# Patient Record
Sex: FEMALE | Race: ASIAN | NOT HISPANIC OR LATINO
[De-identification: names, ages, dates, MRNs, and addresses within clinical notes are randomized per-mention and may not be internally consistent; named-entity substitution may affect disease eponyms.]

---

## 2019-08-07 ENCOUNTER — APPOINTMENT (OUTPATIENT)
Dept: UROLOGY | Facility: CLINIC | Age: 74
End: 2019-08-07

## 2019-08-16 ENCOUNTER — APPOINTMENT (OUTPATIENT)
Dept: UROLOGY | Facility: CLINIC | Age: 74
End: 2019-08-16
Payer: MEDICARE

## 2019-08-16 VITALS
DIASTOLIC BLOOD PRESSURE: 77 MMHG | HEIGHT: 60 IN | WEIGHT: 125 LBS | RESPIRATION RATE: 17 BRPM | BODY MASS INDEX: 24.54 KG/M2 | SYSTOLIC BLOOD PRESSURE: 151 MMHG | HEART RATE: 97 BPM | TEMPERATURE: 98.2 F

## 2019-08-16 PROCEDURE — 99204 OFFICE O/P NEW MOD 45 MIN: CPT

## 2019-08-16 RX ORDER — METHOTREXATE 2.5 MG/1
2.5 TABLET ORAL
Qty: 78 | Refills: 0 | Status: ACTIVE | COMMUNITY
Start: 2019-07-26

## 2019-08-16 RX ORDER — CHROMIUM 200 MCG
TABLET ORAL
Refills: 0 | Status: ACTIVE | COMMUNITY

## 2019-08-16 RX ORDER — MELOXICAM 15 MG/1
15 TABLET ORAL
Qty: 90 | Refills: 0 | Status: ACTIVE | COMMUNITY
Start: 2019-07-26

## 2019-08-16 NOTE — HISTORY OF PRESENT ILLNESS
[FreeTextEntry1] : 74F presents for evaluation of left 3.5 cm renal mass found on imaging done due to ongoing cough and right flank pain. No gross hematuria, weight loss, loss of appetite, shortness of breath. No chemical or smoking exposures. No family history. \par \par PMH: rheumatism\par Meds: methotrexate, mobic, folic acid\par PSH: appendectomy (1969)\par Allergies: none \par Social: non-smoker, dry-cleaning business\par Family Hx: none

## 2019-08-16 NOTE — PHYSICAL EXAM
[General Appearance - Well Developed] : well developed [Normal Appearance] : normal appearance [General Appearance - Well Nourished] : well nourished [] : no respiratory distress [Respiration, Rhythm And Depth] : normal respiratory rhythm and effort [Exaggerated Use Of Accessory Muscles For Inspiration] : no accessory muscle use [Abdomen Soft] : soft [Abdomen Tenderness] : non-tender [Costovertebral Angle Tenderness] : no ~M costovertebral angle tenderness [Skin Color & Pigmentation] : normal skin color and pigmentation [Skin Turgor] : supple

## 2019-08-16 NOTE — ASSESSMENT
[FreeTextEntry1] : 74F presents for evaluation of left 3.5 cm renal mass\par -Discussed risks and benefits of active surveillance, biopsy versus partial nephrectomy \par -Opting for biopsy to guide decisions regarding surgery \par -CBC/PT/INR

## 2019-08-16 NOTE — REVIEW OF SYSTEMS
[Feeling Tired] : feeling tired [Dry Eyes] : dryness of the eyes [Abdominal Pain] : abdominal pain [see HPI] : see HPI [Painful [place]] : painful [place] [Loss of interest] : loss of interest in sexual activity [Date of last menstrual period ____] : date of last menstrual period: [unfilled] [Presently in menopause ___] : presently in menopause [unfilled] [Urine Infection (bladder/kidney)] : bladder/kidney infection [Pain during urination] : pain during urination [Blood in urine that you can see] : blood visible in urine [Slow urine stream] : slow urine stream [Itching] : itching [Easy Bruising] : a tendency for easy bruising [Easy Bleeding] : a tendency for easy bleeding [Negative] : Psychiatric

## 2019-08-19 ENCOUNTER — MESSAGE (OUTPATIENT)
Age: 74
End: 2019-08-19

## 2019-08-20 ENCOUNTER — APPOINTMENT (OUTPATIENT)
Dept: UROLOGY | Facility: CLINIC | Age: 74
End: 2019-08-20
Payer: MEDICARE

## 2019-08-20 ENCOUNTER — APPOINTMENT (OUTPATIENT)
Dept: UROLOGY | Facility: CLINIC | Age: 74
End: 2019-08-20

## 2019-08-20 ENCOUNTER — OUTPATIENT (OUTPATIENT)
Dept: OUTPATIENT SERVICES | Facility: HOSPITAL | Age: 74
LOS: 1 days | End: 2019-08-20
Payer: MEDICARE

## 2019-08-20 VITALS
DIASTOLIC BLOOD PRESSURE: 77 MMHG | HEART RATE: 89 BPM | SYSTOLIC BLOOD PRESSURE: 149 MMHG | WEIGHT: 125 LBS | TEMPERATURE: 98.5 F | BODY MASS INDEX: 24.54 KG/M2 | HEIGHT: 60 IN

## 2019-08-20 DIAGNOSIS — N28.89 OTHER SPECIFIED DISORDERS OF KIDNEY AND URETER: ICD-10-CM

## 2019-08-20 DIAGNOSIS — R35.0 FREQUENCY OF MICTURITION: ICD-10-CM

## 2019-08-20 PROCEDURE — 76775 US EXAM ABDO BACK WALL LIM: CPT | Mod: 26

## 2019-08-20 PROCEDURE — 50200 RENAL BIOPSY PERQ: CPT

## 2019-08-20 PROCEDURE — 76775 US EXAM ABDO BACK WALL LIM: CPT

## 2019-08-20 NOTE — PROCEDURE
[Kidney Biopsy] : kidney biopsy was performed [Consent Obtained] : written consent was obtained prior to the procedure and is detailed in the patient's record [Patient] : the patient [Left Kidney] : left kidney [Betadine] : with betadine solution [18 gauge 1 inch] : An 18 gauge 1 inch needle was used [Tolerated Well] : the patient tolerated the procedure well

## 2019-08-22 ENCOUNTER — CLINICAL ADVICE (OUTPATIENT)
Age: 74
End: 2019-08-22

## 2019-08-22 LAB — CORE LAB BIOPSY: NORMAL

## 2019-08-23 DIAGNOSIS — N28.89 OTHER SPECIFIED DISORDERS OF KIDNEY AND URETER: ICD-10-CM

## 2019-09-04 ENCOUNTER — OUTPATIENT (OUTPATIENT)
Dept: OUTPATIENT SERVICES | Facility: HOSPITAL | Age: 74
LOS: 1 days | End: 2019-09-04
Payer: MEDICARE

## 2019-09-04 VITALS
RESPIRATION RATE: 14 BRPM | SYSTOLIC BLOOD PRESSURE: 110 MMHG | TEMPERATURE: 98 F | HEIGHT: 60 IN | WEIGHT: 126.99 LBS | OXYGEN SATURATION: 98 % | DIASTOLIC BLOOD PRESSURE: 70 MMHG | HEART RATE: 73 BPM

## 2019-09-04 DIAGNOSIS — N28.89 OTHER SPECIFIED DISORDERS OF KIDNEY AND URETER: ICD-10-CM

## 2019-09-04 DIAGNOSIS — Z90.49 ACQUIRED ABSENCE OF OTHER SPECIFIED PARTS OF DIGESTIVE TRACT: Chronic | ICD-10-CM

## 2019-09-04 LAB
ANION GAP SERPL CALC-SCNC: 13 MMO/L — SIGNIFICANT CHANGE UP (ref 7–14)
APPEARANCE UR: CLEAR — SIGNIFICANT CHANGE UP
BASOPHILS # BLD AUTO: 0.05 K/UL — SIGNIFICANT CHANGE UP (ref 0–0.2)
BASOPHILS NFR BLD AUTO: 1.1 % — SIGNIFICANT CHANGE UP (ref 0–2)
BILIRUB UR-MCNC: NEGATIVE — SIGNIFICANT CHANGE UP
BLOOD UR QL VISUAL: NEGATIVE — SIGNIFICANT CHANGE UP
BUN SERPL-MCNC: 17 MG/DL — SIGNIFICANT CHANGE UP (ref 7–23)
CALCIUM SERPL-MCNC: 10.1 MG/DL — SIGNIFICANT CHANGE UP (ref 8.4–10.5)
CHLORIDE SERPL-SCNC: 107 MMOL/L — SIGNIFICANT CHANGE UP (ref 98–107)
CO2 SERPL-SCNC: 23 MMOL/L — SIGNIFICANT CHANGE UP (ref 22–31)
COLOR SPEC: SIGNIFICANT CHANGE UP
CREAT SERPL-MCNC: 0.66 MG/DL — SIGNIFICANT CHANGE UP (ref 0.5–1.3)
EOSINOPHIL # BLD AUTO: 0.09 K/UL — SIGNIFICANT CHANGE UP (ref 0–0.5)
EOSINOPHIL NFR BLD AUTO: 2 % — SIGNIFICANT CHANGE UP (ref 0–6)
GLUCOSE SERPL-MCNC: 82 MG/DL — SIGNIFICANT CHANGE UP (ref 70–99)
GLUCOSE UR-MCNC: NEGATIVE — SIGNIFICANT CHANGE UP
HCT VFR BLD CALC: 34.4 % — LOW (ref 34.5–45)
HGB BLD-MCNC: 11.2 G/DL — LOW (ref 11.5–15.5)
IMM GRANULOCYTES NFR BLD AUTO: 0.2 % — SIGNIFICANT CHANGE UP (ref 0–1.5)
KETONES UR-MCNC: NEGATIVE — SIGNIFICANT CHANGE UP
LEUKOCYTE ESTERASE UR-ACNC: NEGATIVE — SIGNIFICANT CHANGE UP
LYMPHOCYTES # BLD AUTO: 1.57 K/UL — SIGNIFICANT CHANGE UP (ref 1–3.3)
LYMPHOCYTES # BLD AUTO: 35.5 % — SIGNIFICANT CHANGE UP (ref 13–44)
MCHC RBC-ENTMCNC: 31.9 PG — SIGNIFICANT CHANGE UP (ref 27–34)
MCHC RBC-ENTMCNC: 32.6 % — SIGNIFICANT CHANGE UP (ref 32–36)
MCV RBC AUTO: 98 FL — SIGNIFICANT CHANGE UP (ref 80–100)
MONOCYTES # BLD AUTO: 0.5 K/UL — SIGNIFICANT CHANGE UP (ref 0–0.9)
MONOCYTES NFR BLD AUTO: 11.3 % — SIGNIFICANT CHANGE UP (ref 2–14)
NEUTROPHILS # BLD AUTO: 2.2 K/UL — SIGNIFICANT CHANGE UP (ref 1.8–7.4)
NEUTROPHILS NFR BLD AUTO: 49.9 % — SIGNIFICANT CHANGE UP (ref 43–77)
NITRITE UR-MCNC: NEGATIVE — SIGNIFICANT CHANGE UP
NRBC # FLD: 0 K/UL — SIGNIFICANT CHANGE UP (ref 0–0)
PH UR: 8 — SIGNIFICANT CHANGE UP (ref 5–8)
PLATELET # BLD AUTO: 187 K/UL — SIGNIFICANT CHANGE UP (ref 150–400)
PMV BLD: 11.4 FL — SIGNIFICANT CHANGE UP (ref 7–13)
POTASSIUM SERPL-MCNC: 4.2 MMOL/L — SIGNIFICANT CHANGE UP (ref 3.5–5.3)
POTASSIUM SERPL-SCNC: 4.2 MMOL/L — SIGNIFICANT CHANGE UP (ref 3.5–5.3)
PROT UR-MCNC: NEGATIVE — SIGNIFICANT CHANGE UP
RBC # BLD: 3.51 M/UL — LOW (ref 3.8–5.2)
RBC # FLD: 14.5 % — SIGNIFICANT CHANGE UP (ref 10.3–14.5)
SODIUM SERPL-SCNC: 143 MMOL/L — SIGNIFICANT CHANGE UP (ref 135–145)
SP GR SPEC: 1.01 — SIGNIFICANT CHANGE UP (ref 1–1.04)
UROBILINOGEN FLD QL: NORMAL — SIGNIFICANT CHANGE UP
WBC # BLD: 4.42 K/UL — SIGNIFICANT CHANGE UP (ref 3.8–10.5)
WBC # FLD AUTO: 4.42 K/UL — SIGNIFICANT CHANGE UP (ref 3.8–10.5)

## 2019-09-04 PROCEDURE — 93010 ELECTROCARDIOGRAM REPORT: CPT

## 2019-09-04 RX ORDER — METHOTREXATE 2.5 MG/1
6 TABLET ORAL
Qty: 0 | Refills: 0 | DISCHARGE

## 2019-09-04 RX ORDER — SODIUM CHLORIDE 9 MG/ML
1000 INJECTION, SOLUTION INTRAVENOUS
Refills: 0 | Status: DISCONTINUED | OUTPATIENT
Start: 2019-09-16 | End: 2019-09-16

## 2019-09-04 NOTE — H&P PST ADULT - MUSCULOSKELETAL
details… detailed exam normal strength/no joint warmth/no joint swelling/ROM intact/no joint erythema/no calf tenderness

## 2019-09-04 NOTE — H&P PST ADULT - NSICDXPROBLEM_GEN_ALL_CORE_FT
PROBLEM DIAGNOSES  Problem: Other specified disorders of kidney and ureter  Assessment and Plan: Left laparoscopic partial nephrectomy .possible open ,possible radical   Medical clearance as per Dr Bright

## 2019-09-04 NOTE — H&P PST ADULT - RS GEN PE MLT RESP DETAILS PC
breath sounds equal/respirations non-labored/good air movement/no rales/no rhonchi/no wheezes/clear to auscultation bilaterally/airway patent

## 2019-09-04 NOTE — H&P PST ADULT - HISTORY OF PRESENT ILLNESS
This is a 74 y.o .female with incidental finding of other specified disorders of kidney and ureter. Pt had MRI of abdomen , referred to Dr Bright , evaluated . Pt offers no complaints , now for surgery .

## 2019-09-05 LAB — SPECIMEN SOURCE: SIGNIFICANT CHANGE UP

## 2019-09-06 LAB — BACTERIA UR CULT: SIGNIFICANT CHANGE UP

## 2019-09-09 ENCOUNTER — APPOINTMENT (OUTPATIENT)
Dept: UROLOGY | Facility: CLINIC | Age: 74
End: 2019-09-09

## 2019-09-10 ENCOUNTER — APPOINTMENT (OUTPATIENT)
Dept: UROLOGY | Facility: CLINIC | Age: 74
End: 2019-09-10
Payer: MEDICARE

## 2019-09-10 PROCEDURE — 99214 OFFICE O/P EST MOD 30 MIN: CPT

## 2019-09-10 RX ORDER — DIAZEPAM 5 MG/1
5 TABLET ORAL
Qty: 1 | Refills: 0 | Status: COMPLETED | COMMUNITY
Start: 2019-08-18 | End: 2019-09-10

## 2019-09-10 NOTE — PHYSICAL EXAM
[General Appearance - Well Developed] : well developed [General Appearance - Well Nourished] : well nourished [Bowel Sounds] : normal bowel sounds [Abdomen Soft] : soft [Skin Color & Pigmentation] : normal skin color and pigmentation [Skin Turgor] : supple [Heart Rate And Rhythm] : Heart rate and rhythm were normal [] : no respiratory distress [Oriented To Time, Place, And Person] : oriented to person, place, and time [Normal Station and Gait] : the gait and station were normal for the patient's age [No Focal Deficits] : no focal deficits

## 2019-09-10 NOTE — ASSESSMENT
[FreeTextEntry1] : We discussed the surgery in detail . We discussed risks including converting to an open procedure , injury to adjacent major organs , nerves , muscles . We discussed the possibility of converting to an open procedure and risk of blood transfusion . We discussed the clear liquid diet and bowel prep .\par they wish to proceed

## 2019-09-10 NOTE — HISTORY OF PRESENT ILLNESS
[FreeTextEntry1] : Left renal mass . History of cough . Chest xray done . Abdominal ultrasound done revealing a 3.5 cm renal mass . Biopsy done positive for RCC FG 2 Here to discuss surgery  [None] : no symptoms

## 2019-09-15 ENCOUNTER — TRANSCRIPTION ENCOUNTER (OUTPATIENT)
Age: 74
End: 2019-09-15

## 2019-09-16 ENCOUNTER — RESULT REVIEW (OUTPATIENT)
Age: 74
End: 2019-09-16

## 2019-09-16 ENCOUNTER — APPOINTMENT (OUTPATIENT)
Dept: UROLOGY | Facility: HOSPITAL | Age: 74
End: 2019-09-16

## 2019-09-16 ENCOUNTER — INPATIENT (INPATIENT)
Facility: HOSPITAL | Age: 74
LOS: 1 days | Discharge: HOME | End: 2019-09-18
Attending: UROLOGY | Admitting: UROLOGY
Payer: MEDICARE

## 2019-09-16 VITALS
HEART RATE: 70 BPM | TEMPERATURE: 98 F | WEIGHT: 126.99 LBS | SYSTOLIC BLOOD PRESSURE: 158 MMHG | DIASTOLIC BLOOD PRESSURE: 64 MMHG | HEIGHT: 60 IN | OXYGEN SATURATION: 99 % | RESPIRATION RATE: 16 BRPM

## 2019-09-16 DIAGNOSIS — Z90.49 ACQUIRED ABSENCE OF OTHER SPECIFIED PARTS OF DIGESTIVE TRACT: Chronic | ICD-10-CM

## 2019-09-16 DIAGNOSIS — R11.2 NAUSEA WITH VOMITING, UNSPECIFIED: ICD-10-CM

## 2019-09-16 DIAGNOSIS — M06.9 RHEUMATOID ARTHRITIS, UNSPECIFIED: ICD-10-CM

## 2019-09-16 DIAGNOSIS — D64.9 ANEMIA, UNSPECIFIED: ICD-10-CM

## 2019-09-16 DIAGNOSIS — N28.89 OTHER SPECIFIED DISORDERS OF KIDNEY AND URETER: ICD-10-CM

## 2019-09-16 LAB
BLD GP AB SCN SERPL QL: NEGATIVE — SIGNIFICANT CHANGE UP
RH IG SCN BLD-IMP: POSITIVE — SIGNIFICANT CHANGE UP
RH IG SCN BLD-IMP: POSITIVE — SIGNIFICANT CHANGE UP

## 2019-09-16 PROCEDURE — 88307 TISSUE EXAM BY PATHOLOGIST: CPT | Mod: 26

## 2019-09-16 PROCEDURE — 99223 1ST HOSP IP/OBS HIGH 75: CPT

## 2019-09-16 PROCEDURE — 88312 SPECIAL STAINS GROUP 1: CPT | Mod: 26

## 2019-09-16 RX ORDER — ONDANSETRON 8 MG/1
4 TABLET, FILM COATED ORAL ONCE
Refills: 0 | Status: DISCONTINUED | OUTPATIENT
Start: 2019-09-16 | End: 2019-09-16

## 2019-09-16 RX ORDER — ACETAMINOPHEN 500 MG
650 TABLET ORAL EVERY 6 HOURS
Refills: 0 | Status: DISCONTINUED | OUTPATIENT
Start: 2019-09-16 | End: 2019-09-18

## 2019-09-16 RX ORDER — OXYCODONE AND ACETAMINOPHEN 5; 325 MG/1; MG/1
2 TABLET ORAL EVERY 6 HOURS
Refills: 0 | Status: DISCONTINUED | OUTPATIENT
Start: 2019-09-16 | End: 2019-09-18

## 2019-09-16 RX ORDER — FOLIC ACID 0.8 MG
1 TABLET ORAL DAILY
Refills: 0 | Status: DISCONTINUED | OUTPATIENT
Start: 2019-09-16 | End: 2019-09-18

## 2019-09-16 RX ORDER — OXYCODONE HYDROCHLORIDE 5 MG/1
5 TABLET ORAL EVERY 6 HOURS
Refills: 0 | Status: DISCONTINUED | OUTPATIENT
Start: 2019-09-16 | End: 2019-09-18

## 2019-09-16 RX ORDER — SODIUM CHLORIDE 9 MG/ML
1000 INJECTION, SOLUTION INTRAVENOUS
Refills: 0 | Status: DISCONTINUED | OUTPATIENT
Start: 2019-09-16 | End: 2019-09-17

## 2019-09-16 RX ORDER — KETOROLAC TROMETHAMINE 30 MG/ML
15 SYRINGE (ML) INJECTION EVERY 6 HOURS
Refills: 0 | Status: DISCONTINUED | OUTPATIENT
Start: 2019-09-16 | End: 2019-09-18

## 2019-09-16 RX ORDER — SENNA PLUS 8.6 MG/1
2 TABLET ORAL AT BEDTIME
Refills: 0 | Status: DISCONTINUED | OUTPATIENT
Start: 2019-09-16 | End: 2019-09-18

## 2019-09-16 RX ORDER — HEPARIN SODIUM 5000 [USP'U]/ML
5000 INJECTION INTRAVENOUS; SUBCUTANEOUS EVERY 8 HOURS
Refills: 0 | Status: DISCONTINUED | OUTPATIENT
Start: 2019-09-16 | End: 2019-09-18

## 2019-09-16 RX ORDER — HYDROMORPHONE HYDROCHLORIDE 2 MG/ML
0.5 INJECTION INTRAMUSCULAR; INTRAVENOUS; SUBCUTANEOUS
Refills: 0 | Status: DISCONTINUED | OUTPATIENT
Start: 2019-09-16 | End: 2019-09-16

## 2019-09-16 RX ORDER — INFLUENZA VIRUS VACCINE 15; 15; 15; 15 UG/.5ML; UG/.5ML; UG/.5ML; UG/.5ML
0.5 SUSPENSION INTRAMUSCULAR ONCE
Refills: 0 | Status: COMPLETED | OUTPATIENT
Start: 2019-09-16 | End: 2019-09-16

## 2019-09-16 RX ORDER — DOCUSATE SODIUM 100 MG
100 CAPSULE ORAL THREE TIMES A DAY
Refills: 0 | Status: DISCONTINUED | OUTPATIENT
Start: 2019-09-16 | End: 2019-09-18

## 2019-09-16 RX ORDER — KETOROLAC TROMETHAMINE 30 MG/ML
15 SYRINGE (ML) INJECTION EVERY 6 HOURS
Refills: 0 | Status: COMPLETED | OUTPATIENT
Start: 2019-09-16 | End: 2019-09-18

## 2019-09-16 RX ADMIN — HEPARIN SODIUM 5000 UNIT(S): 5000 INJECTION INTRAVENOUS; SUBCUTANEOUS at 23:19

## 2019-09-16 RX ADMIN — HYDROMORPHONE HYDROCHLORIDE 0.5 MILLIGRAM(S): 2 INJECTION INTRAMUSCULAR; INTRAVENOUS; SUBCUTANEOUS at 13:40

## 2019-09-16 RX ADMIN — HYDROMORPHONE HYDROCHLORIDE 0.5 MILLIGRAM(S): 2 INJECTION INTRAMUSCULAR; INTRAVENOUS; SUBCUTANEOUS at 14:45

## 2019-09-16 RX ADMIN — Medication 15 MILLIGRAM(S): at 23:19

## 2019-09-16 RX ADMIN — HYDROMORPHONE HYDROCHLORIDE 0.5 MILLIGRAM(S): 2 INJECTION INTRAMUSCULAR; INTRAVENOUS; SUBCUTANEOUS at 15:00

## 2019-09-16 RX ADMIN — Medication 15 MILLIGRAM(S): at 23:20

## 2019-09-16 RX ADMIN — HYDROMORPHONE HYDROCHLORIDE 0.5 MILLIGRAM(S): 2 INJECTION INTRAMUSCULAR; INTRAVENOUS; SUBCUTANEOUS at 13:57

## 2019-09-16 RX ADMIN — Medication 15 MILLIGRAM(S): at 18:26

## 2019-09-16 RX ADMIN — SODIUM CHLORIDE 30 MILLILITER(S): 9 INJECTION, SOLUTION INTRAVENOUS at 09:10

## 2019-09-16 RX ADMIN — SODIUM CHLORIDE 125 MILLILITER(S): 9 INJECTION, SOLUTION INTRAVENOUS at 13:20

## 2019-09-16 RX ADMIN — Medication 100 MILLIGRAM(S): at 23:19

## 2019-09-16 NOTE — CONSULT NOTE ADULT - SUBJECTIVE AND OBJECTIVE BOX
Patient is a 74y old  Female who presents with a chief complaint of     HPI: 74F h/o RA on MTX (no recent steroids), was evaluated for R sided rib pain.  Incidentally found to have a L renal mass, no symptoms, no hematuria.  Pt now s/p left laparoscopic Radical nephrectomy.  Doing well postop.  In PACU had some nausea/vomiting and dizziness, now resolved.  Mild pain at surgical sites.  No chest pain, SOB. Thristy, mild headache.    Allergies:  No Known Allergies    HOME MEDICATIONS:  MTX 15 qWed  folic acid  Mobic PRN    MEDICATIONS  (STANDING):  docusate sodium 100 milliGRAM(s) Oral three times a day  folic acid 1 milliGRAM(s) Oral daily  heparin  Injectable 5000 Unit(s) SubCutaneous every 8 hours  influenza   Vaccine 0.5 milliLiter(s) IntraMuscular once  ketorolac   Injectable 15 milliGRAM(s) IV Push every 6 hours  lactated ringers. 1000 milliLiter(s) (125 mL/Hr) IV Continuous <Continuous>    MEDICATIONS  (PRN):  acetaminophen   Tablet .. 650 milliGRAM(s) Oral every 6 hours PRN Mild Pain (1 - 3)  ketorolac   Injectable 15 milliGRAM(s) IV Push every 6 hours PRN Moderate Pain (4 - 6)  oxyCODONE    5 mG/acetaminophen 325 mG 2 Tablet(s) Oral every 6 hours PRN Severe Pain (7 - 10)  oxyCODONE    IR 5 milliGRAM(s) Oral every 6 hours PRN Moderate Pain (4 - 6)  senna 2 Tablet(s) Oral at bedtime PRN Constipation    PAST MEDICAL & SURGICAL HISTORY:  Arthritis: rheumatoid  S/P appendectomy:     SOCIAL HISTORY:  , 3 children, retired from dry cleaning  no tob/alcohol/drugs    FAMILY HISTORY:  mother - doing well at 97yo  father -  young of the flu    REVIEW OF SYSTEMS:  CONSTITUTIONAL: No fever, weight loss, or fatigue  EYES: No eye pain, visual disturbances, or discharge  ENMT:  No difficulty hearing, tinnitus, vertigo; No sinus or throat pain  NECK: No pain or stiffness  BREASTS: No pain, masses, or nipple discharge  RESPIRATORY: No cough, wheezing, chills or hemoptysis; No shortness of breath  CARDIOVASCULAR: No chest pain, palpitations, dizziness, or leg swelling  GASTROINTESTINAL: No abdominal or epigastric pain. No nausea, vomiting, or hematemesis; No diarrhea or constipation. No melena or hematochezia.  GENITOURINARY: No dysuria, frequency, hematuria, or incontinence  NEUROLOGICAL: No headaches, memory loss, loss of strength, numbness, or tremors  SKIN: No itching, burning, rashes, or lesions   LYMPH NODES: No enlarged glands  ENDOCRINE: No heat or cold intolerance; No hair loss  MUSCULOSKELETAL: No muscle or back pain  PSYCHIATRIC: No depression, anxiety, mood swings, or difficulty sleeping  HEME/LYMPH: No easy bruising, or bleeding gums  ALLERGY AND IMMUNOLOGIC: No hives or eczema  [ x ] All other ROS negative  [  ] Unable to obtain due to poor mental status    Vital Signs Last 24 Hrs  T(C): 36.4 (16 Sep 2019 17:46), Max: 36.7 (16 Sep 2019 08:22)  T(F): 97.5 (16 Sep 2019 17:46), Max: 98.1 (16 Sep 2019 08:22)  HR: 56 (16 Sep 2019 17:46) (48 - 71)  BP: 168/57 (16 Sep 2019 17:46) (136/62 - 168/57)  BP(mean): 80 (16 Sep 2019 16:00) (73 - 95)  RR: 17 (16 Sep 2019 17:46) (6 - 57)  SpO2: 99% (16 Sep 2019 17:46) (90% - 100%)    PHYSICAL EXAM:  GENERAL: NAD, well-groomed, well-developed  HEAD:  Atraumatic, Normocephalic  EYES: EOMI, PERRLA, conjunctiva and sclera clear  ENMT: Moist mucous membranes  NECK: Supple, No JVD  RESPIRATORY: Clear to auscultation bilaterally; No rales, rhonchi, wheezing, or rubs  CARDIOVASCULAR: Regular rate and rhythm; No murmurs, rubs, or gallops  GASTROINTESTINAL: Soft, Nondistended; incisions intact  GENITOURINARY: +hartley  EXTREMITIES:  2+ Peripheral Pulses, No clubbing, cyanosis, or edema  NERVOUS SYSTEM:  Alert & Oriented X3; Moving all 4 extremities; No gross sensory deficits  HEME/LYMPH: No lymphadenopathy noted  SKIN: No rashes or lesions  PSYCH: calm, appropriate    LABS:    Basic Metabolic Panel (19 @ 09:15)    Sodium, Serum: 143 mmol/L    Potassium, Serum: 4.2 mmol/L    Chloride, Serum: 107 mmol/L    Carbon Dioxide, Serum: 23 mmol/L    Anion Gap, Serum: 13 mmo/L    Blood Urea Nitrogen, Serum: 17 mg/dL    Creatinine, Serum: 0.66 mg/dL    Glucose, Serum: 82 mg/dL    Calcium, Total Serum: 10.1 mg/dL    eGFR if Non : 86:     Complete Blood Count + Automated Diff (19 @ 09:15)    Nucleated RBC #: 0 K/uL    WBC Count: 4.42 K/uL    RBC Count: 3.51 M/uL    Hemoglobin: 11.2 g/dL    Hematocrit: 34.4 %    Mean Cell Volume: 98.0 fL    Mean Cell Hemoglobin: 31.9 pg    Mean Cell Hemoglobin Conc: 32.6 %    Red Cell Distrib Width: 14.5 %    Platelet Count - Automated: 187 K/uL      RADIOLOGY & ADDITIONAL STUDIES:    EKG:   Personally Reviewed:  [ ] YES     Imaging:   Personally Reviewed:  [ ] YES               Consultant(s) notes reviewed:    Care Discussed with Consultant(s)/Other Providers:  urology re overall care

## 2019-09-16 NOTE — ASU PATIENT PROFILE, ADULT - INTERPRETATION SERVICES DECLINED
Vinh Parnell to interpet for her/ declines  phone at this time/Patient/Caregiver requests family/friend to interpret.

## 2019-09-16 NOTE — CHART NOTE - NSCHARTNOTEFT_GEN_A_CORE
Post op Check: yo POD #0    Pt seen and examined without complaints. Pain is controlled. Denies SOB/CP/N/V.     Vital Signs Last 24 Hrs  T(C): 36.7 (16 Sep 2019 13:00), Max: 36.7 (16 Sep 2019 08:22)  T(F): 98 (16 Sep 2019 13:00), Max: 98.1 (16 Sep 2019 08:22)  HR: 53 (16 Sep 2019 15:15) (48 - 70)  BP: 137/54 (16 Sep 2019 15:15) (137/54 - 162/73)  BP(mean): 73 (16 Sep 2019 15:15) (73 - 95)  RR: 15 (16 Sep 2019 15:15) (6 - 57)  SpO2: 99% (16 Sep 2019 15:15) (90% - 100%)    I&O's Summary  Hans:  630 yellow  16 Sep 2019 07:01  -  16 Sep 2019 15:30  --------------------------------------------------------  IN: 475 mL / OUT: 630 mL / NET: -155 mL        Physical Exam  Gen: NAD  Pulm: No respiratory distress, clear to auscultation  CV: Reg  Abd: Lower pfannensteil steris with some strike through, soft, ND, appropriately tender  : Maxwell secured  Venodynes: In place        Plan:   IVF: LR @ 125  Diet: CLD  Labs: in AM  Abx: None  Strict I&O's  Analgesia and antiemetics as needed  DVT prophylaxis/OOB  Incentive spirometry

## 2019-09-16 NOTE — ASU PATIENT PROFILE, ADULT - LANGUAGE ASSISTANCE NEEDED
patient English and Maltese speaking/No-Patient/Caregiver offered and refused free interpretation services.

## 2019-09-16 NOTE — CONSULT NOTE ADULT - ASSESSMENT
74F h/o RA on MTX (no recent steroids), was evaluated for R sided rib pain.  Incidentally found to have a L renal mass, no symptoms, no hematuria.  Pt now s/p left laparoscopic Radical nephrectomy.

## 2019-09-17 LAB
ANION GAP SERPL CALC-SCNC: 10 MMO/L — SIGNIFICANT CHANGE UP (ref 7–14)
BUN SERPL-MCNC: 18 MG/DL — SIGNIFICANT CHANGE UP (ref 7–23)
CALCIUM SERPL-MCNC: 9.3 MG/DL — SIGNIFICANT CHANGE UP (ref 8.4–10.5)
CHLORIDE SERPL-SCNC: 107 MMOL/L — SIGNIFICANT CHANGE UP (ref 98–107)
CO2 SERPL-SCNC: 22 MMOL/L — SIGNIFICANT CHANGE UP (ref 22–31)
CREAT SERPL-MCNC: 1.01 MG/DL — SIGNIFICANT CHANGE UP (ref 0.5–1.3)
GLUCOSE SERPL-MCNC: 167 MG/DL — HIGH (ref 70–99)
HCT VFR BLD CALC: 31.9 % — LOW (ref 34.5–45)
HGB BLD-MCNC: 10.4 G/DL — LOW (ref 11.5–15.5)
MCHC RBC-ENTMCNC: 31.9 PG — SIGNIFICANT CHANGE UP (ref 27–34)
MCHC RBC-ENTMCNC: 32.6 % — SIGNIFICANT CHANGE UP (ref 32–36)
MCV RBC AUTO: 97.9 FL — SIGNIFICANT CHANGE UP (ref 80–100)
NRBC # FLD: 0 K/UL — SIGNIFICANT CHANGE UP (ref 0–0)
PLATELET # BLD AUTO: 184 K/UL — SIGNIFICANT CHANGE UP (ref 150–400)
PMV BLD: 10.8 FL — SIGNIFICANT CHANGE UP (ref 7–13)
POTASSIUM SERPL-MCNC: 4.8 MMOL/L — SIGNIFICANT CHANGE UP (ref 3.5–5.3)
POTASSIUM SERPL-SCNC: 4.8 MMOL/L — SIGNIFICANT CHANGE UP (ref 3.5–5.3)
RBC # BLD: 3.26 M/UL — LOW (ref 3.8–5.2)
RBC # FLD: 14.6 % — HIGH (ref 10.3–14.5)
SODIUM SERPL-SCNC: 139 MMOL/L — SIGNIFICANT CHANGE UP (ref 135–145)
WBC # BLD: 11.94 K/UL — HIGH (ref 3.8–10.5)
WBC # FLD AUTO: 11.94 K/UL — HIGH (ref 3.8–10.5)

## 2019-09-17 PROCEDURE — 99233 SBSQ HOSP IP/OBS HIGH 50: CPT

## 2019-09-17 RX ORDER — SODIUM CHLORIDE 9 MG/ML
1000 INJECTION, SOLUTION INTRAVENOUS
Refills: 0 | Status: DISCONTINUED | OUTPATIENT
Start: 2019-09-17 | End: 2019-09-17

## 2019-09-17 RX ADMIN — Medication 1 MILLIGRAM(S): at 14:36

## 2019-09-17 RX ADMIN — HEPARIN SODIUM 5000 UNIT(S): 5000 INJECTION INTRAVENOUS; SUBCUTANEOUS at 14:37

## 2019-09-17 RX ADMIN — Medication 15 MILLIGRAM(S): at 18:46

## 2019-09-17 RX ADMIN — Medication 100 MILLIGRAM(S): at 21:32

## 2019-09-17 RX ADMIN — SODIUM CHLORIDE 75 MILLILITER(S): 9 INJECTION, SOLUTION INTRAVENOUS at 12:13

## 2019-09-17 RX ADMIN — Medication 15 MILLIGRAM(S): at 06:07

## 2019-09-17 RX ADMIN — Medication 10 MILLIGRAM(S): at 11:14

## 2019-09-17 RX ADMIN — Medication 100 MILLIGRAM(S): at 06:07

## 2019-09-17 RX ADMIN — Medication 15 MILLIGRAM(S): at 12:13

## 2019-09-17 RX ADMIN — HEPARIN SODIUM 5000 UNIT(S): 5000 INJECTION INTRAVENOUS; SUBCUTANEOUS at 21:32

## 2019-09-17 RX ADMIN — Medication 100 MILLIGRAM(S): at 14:36

## 2019-09-17 RX ADMIN — HEPARIN SODIUM 5000 UNIT(S): 5000 INJECTION INTRAVENOUS; SUBCUTANEOUS at 06:07

## 2019-09-17 NOTE — PROGRESS NOTE ADULT - SUBJECTIVE AND OBJECTIVE BOX
ANESTHESIA POSTOP CHECK    74y Female POSTOP DAY 1    Vital Signs Last 24 Hrs  T(C): 36.6 (17 Sep 2019 09:59), Max: 36.7 (16 Sep 2019 13:00)  T(F): 97.8 (17 Sep 2019 09:59), Max: 98.1 (17 Sep 2019 06:01)  HR: 78 (17 Sep 2019 09:59) (48 - 88)  BP: 120/54 (17 Sep 2019 09:59) (107/56 - 168/57)  BP(mean): 80 (16 Sep 2019 16:00) (73 - 95)  RR: 16 (17 Sep 2019 09:59) (6 - 57)  SpO2: 100% (17 Sep 2019 09:59) (90% - 100%)  I&O's Summary    16 Sep 2019 07:01  -  17 Sep 2019 07:00  --------------------------------------------------------  IN: 1850 mL / OUT: 2915 mL / NET: -1065 mL        [X ] NO APPARENT ANESTHESIA COMPLICATIONS

## 2019-09-17 NOTE — PROGRESS NOTE ADULT - PROBLEM SELECTOR PLAN 2
hold methotrexate for now  hold Mobic periop  Tylenol PRN. d/w Dr. GEORGE Domínguez, pt's rheumatologist [phone] - pt can resume MTX as usual, will have her office call pt to set up f/u appointment  hold Mobic periop  Tylenol PRN. d/w Dr. Dianne Domínguez, pt's rheumatologist [phone] - pt can resume MTX as usual, will have her office call pt to set up f/u appointment  hold Mobic periop  Tylenol PRN.

## 2019-09-17 NOTE — PROGRESS NOTE ADULT - ASSESSMENT
74F POD # 1 s/p left lap radical nephrectomy   -AM labs  -Dulcolax  -OOB/amb, pain control   -D/C Hans, trial of void  -GI function  -Dale De

## 2019-09-17 NOTE — PROGRESS NOTE ADULT - PROBLEM SELECTOR PLAN 4
post op management per urology, encourage ambulation, bowel function returning, pain control, IVFs, IS, DVT ppx (SC heparin).

## 2019-09-17 NOTE — PROGRESS NOTE ADULT - SUBJECTIVE AND OBJECTIVE BOX
Patient is a 74y old  Female who presents with a chief complaint of surgery (16 Sep 2019 19:10)      SUBJECTIVE / OVERNIGHT EVENTS:  No problems reported over night. Small amount flatus.  Tolerating clears.  Been up walking.  Voided x2 since hartley removed.    MEDICATIONS  (STANDING):  dextrose 5% + sodium chloride 0.45%. 1000 milliLiter(s) (75 mL/Hr) IV Continuous <Continuous>  docusate sodium 100 milliGRAM(s) Oral three times a day  folic acid 1 milliGRAM(s) Oral daily  heparin  Injectable 5000 Unit(s) SubCutaneous every 8 hours  influenza   Vaccine 0.5 milliLiter(s) IntraMuscular once  ketorolac   Injectable 15 milliGRAM(s) IV Push every 6 hours    MEDICATIONS  (PRN):  acetaminophen   Tablet .. 650 milliGRAM(s) Oral every 6 hours PRN Mild Pain (1 - 3)  ketorolac   Injectable 15 milliGRAM(s) IV Push every 6 hours PRN Moderate Pain (4 - 6)  oxyCODONE    5 mG/acetaminophen 325 mG 2 Tablet(s) Oral every 6 hours PRN Severe Pain (7 - 10)  oxyCODONE    IR 5 milliGRAM(s) Oral every 6 hours PRN Moderate Pain (4 - 6)  senna 2 Tablet(s) Oral at bedtime PRN Constipation      CAPILLARY BLOOD GLUCOSE          I&O's Summary    16 Sep 2019 07:01  -  17 Sep 2019 07:00  --------------------------------------------------------  IN: 1850 mL / OUT: 2915 mL / NET: -1065 mL        Vital Signs Last 24 Hrs  T(C): 36.6 (17 Sep 2019 09:59), Max: 36.7 (17 Sep 2019 01:43)  T(F): 97.8 (17 Sep 2019 09:59), Max: 98.1 (17 Sep 2019 06:01)  HR: 78 (17 Sep 2019 09:59) (48 - 88)  BP: 120/54 (17 Sep 2019 09:59) (107/56 - 168/57)  BP(mean): 80 (16 Sep 2019 16:00) (73 - 89)  RR: 16 (17 Sep 2019 09:59) (12 - 57)  SpO2: 100% (17 Sep 2019 09:59) (93% - 100%)    PHYSICAL EXAM:  GENERAL: resting comfortably in bed  HEAD:  Atraumatic, Normocephalic  EYES: EOMI, PERRLA, conjunctiva and sclera clear  NECK: Supple, No JVD  CHEST/LUNG: Clear to auscultation bilaterally; No wheeze  HEART: Regular rate and rhythm; No murmurs, rubs, or gallops  ABDOMEN: Soft, ND, incisions intact  EXTREMITIES:  2+ Peripheral Pulses, No clubbing, cyanosis, or edema  PSYCH: AAOx3  NEUROLOGY: non-focal  SKIN: No rashes or lesions  (hartley out)    LABS:                        10.4   11.94 )-----------( 184      ( 17 Sep 2019 10:58 )             31.9     09-17    139  |  107  |  18  ----------------------------<  167<H>  4.8   |  22  |  1.01    Ca    9.3      17 Sep 2019 10:58                RADIOLOGY & ADDITIONAL TESTS:    Imaging Personally Reviewed:    Consultant(s) Notes Reviewed:      Care Discussed with Consultants/Other Providers: urology re overall care

## 2019-09-17 NOTE — PROGRESS NOTE ADULT - SUBJECTIVE AND OBJECTIVE BOX
Progress Note    Subjective  Patient seen and examined in AM. Pain controlled. Tolerating clears. Not yet OOB. No GI function.  Maxwell removed on AM rounds    Objective  T(F): , Max: 98.1 (09-16-19 @ 08:22)  HR: 77  BP: 118/54  SpO2: 100%    Output     Indwelling Catheter - Urethral: 2125 cc     Gen NAD  Abd soft, lightly distended/tender, incisions c/d/i, mild serosang drainage from Pfannenstiel incision   : Maxwell clear yellow     Diet/Fluids  Clears, LR @ 125      Labs pending

## 2019-09-18 ENCOUNTER — TRANSCRIPTION ENCOUNTER (OUTPATIENT)
Age: 74
End: 2019-09-18

## 2019-09-18 VITALS
HEART RATE: 88 BPM | OXYGEN SATURATION: 99 % | DIASTOLIC BLOOD PRESSURE: 64 MMHG | TEMPERATURE: 98 F | SYSTOLIC BLOOD PRESSURE: 132 MMHG | RESPIRATION RATE: 17 BRPM

## 2019-09-18 PROCEDURE — 99238 HOSP IP/OBS DSCHRG MGMT 30/<: CPT

## 2019-09-18 PROCEDURE — 99232 SBSQ HOSP IP/OBS MODERATE 35: CPT

## 2019-09-18 RX ORDER — ACETAMINOPHEN 500 MG
2 TABLET ORAL
Qty: 0 | Refills: 0 | DISCHARGE
Start: 2019-09-18

## 2019-09-18 RX ADMIN — HEPARIN SODIUM 5000 UNIT(S): 5000 INJECTION INTRAVENOUS; SUBCUTANEOUS at 05:30

## 2019-09-18 RX ADMIN — Medication 15 MILLIGRAM(S): at 05:30

## 2019-09-18 RX ADMIN — Medication 100 MILLIGRAM(S): at 05:29

## 2019-09-18 NOTE — PROGRESS NOTE ADULT - SUBJECTIVE AND OBJECTIVE BOX
Progress Note    Subjective  Seen and examined. Tolerating regular diet. Pain controlled. OOB/ambulating. Voiding. +GI function    Objective  T(F): , Max: 98.7 (09-18-19 @ 01:25)  HR: 82  BP: 150/78  SpO2: 99%    Void: 500     Gen: NAD  Abd soft, NT/ND, incisions c/d/i

## 2019-09-18 NOTE — DISCHARGE NOTE NURSING/CASE MANAGEMENT/SOCIAL WORK - NSDCPNDISPN_GEN_ALL_CORE
Education provided on the pain management plan of care/Opioids not applicable/not prescribed/Activities of daily living, including home environment that might     exacerbate pain or reduce effectiveness of the pain management plan of care as well as strategies to address these issues

## 2019-09-18 NOTE — PROGRESS NOTE ADULT - ASSESSMENT
74F POD # 2 s/p left lap radical nephrectomy   -No AM labs  -OOB/amb, pain control   -GI function  -Reg, IV lock   -D/C home today

## 2019-09-18 NOTE — DISCHARGE NOTE NURSING/CASE MANAGEMENT/SOCIAL WORK - NSDPDISTO_GEN_ALL_CORE
Home/Pt  with abdominal scope sites and incisions, steri-strips CDI. Pt voiding without difficulty. yuliya diet well  no nausea, no vomiting. VS stable Afebrile. pt seen by MD and cleared for Dc to home as per safe DC plan.

## 2019-09-18 NOTE — DISCHARGE NOTE PROVIDER - CARE PROVIDER_API CALL
Eris Bright; [facility])  Urology  [address] [place] [address]  Phone: [phone]  Fax: [phone]  Follow Up Time:

## 2019-09-18 NOTE — PROGRESS NOTE ADULT - PROBLEM SELECTOR PLAN 2
d/w Dr. Dianne Domínguez, pt's rheumatologist [phone] - pt can resume MTX as usual, will have her office call pt to set up f/u appointment  hold Mobic periop  Tylenol PRN.

## 2019-09-18 NOTE — PROGRESS NOTE ADULT - SUBJECTIVE AND OBJECTIVE BOX
Patient is a 74y old  Female who presents with a chief complaint of renal mass (18 Sep 2019 07:13)      SUBJECTIVE / OVERNIGHT EVENTS:  Doing well.  Tolerating PO.  Walking without problem.      MEDICATIONS  (STANDING):  docusate sodium 100 milliGRAM(s) Oral three times a day  folic acid 1 milliGRAM(s) Oral daily  heparin  Injectable 5000 Unit(s) SubCutaneous every 8 hours  ketorolac   Injectable 15 milliGRAM(s) IV Push every 6 hours    MEDICATIONS  (PRN):  acetaminophen   Tablet .. 650 milliGRAM(s) Oral every 6 hours PRN Mild Pain (1 - 3)  ketorolac   Injectable 15 milliGRAM(s) IV Push every 6 hours PRN Moderate Pain (4 - 6)  oxyCODONE    5 mG/acetaminophen 325 mG 2 Tablet(s) Oral every 6 hours PRN Severe Pain (7 - 10)  oxyCODONE    IR 5 milliGRAM(s) Oral every 6 hours PRN Moderate Pain (4 - 6)  senna 2 Tablet(s) Oral at bedtime PRN Constipation      CAPILLARY BLOOD GLUCOSE          I&O's Summary    17 Sep 2019 07:01  -  18 Sep 2019 07:00  --------------------------------------------------------  IN: 0 mL / OUT: 900 mL / NET: -900 mL        Vital Signs Last 24 Hrs  T(C): 36.5 (18 Sep 2019 05:29), Max: 37.1 (18 Sep 2019 01:25)  T(F): 97.7 (18 Sep 2019 05:29), Max: 98.7 (18 Sep 2019 01:25)  HR: 82 (18 Sep 2019 05:29) (78 - 98)  BP: 150/78 (18 Sep 2019 05:29) (110/46 - 150/78)  BP(mean): --  RR: 17 (18 Sep 2019 05:29) (16 - 17)  SpO2: 99% (18 Sep 2019 05:29) (97% - 100%)    PHYSICAL EXAM:  GENERAL: resting comfortably in bed  HEAD:  Atraumatic, Normocephalic  EYES: EOMI, PERRLA, conjunctiva and sclera clear  NECK: Supple, No JVD  CHEST/LUNG: Clear to auscultation bilaterally; No wheeze  HEART: Regular rate and rhythm; No murmurs, rubs, or gallops  ABDOMEN: Soft, ND, incisions intact  EXTREMITIES:  2+ Peripheral Pulses, No clubbing, cyanosis, or edema  PSYCH: AAOx3  NEUROLOGY: non-focal  SKIN: No rashes or lesions    LABS:                        10.4   11.94 )-----------( 184      ( 17 Sep 2019 10:58 )             31.9     09-17    139  |  107  |  18  ----------------------------<  167<H>  4.8   |  22  |  1.01    Ca    9.3      17 Sep 2019 10:58                RADIOLOGY & ADDITIONAL TESTS:    Imaging Personally Reviewed:    Consultant(s) Notes Reviewed:      Care Discussed with Consultants/Other Providers: urology re overall care

## 2019-09-18 NOTE — DISCHARGE NOTE NURSING/CASE MANAGEMENT/SOCIAL WORK - NSDCPECAREGIVERED_GEN_ALL_CORE
Medline and carenotes for surgical procedure Lap Rdical Nephrectomy, as well as DC Medications and side effects literature for patient reference.

## 2019-09-18 NOTE — DISCHARGE NOTE PROVIDER - NSDCACTIVITY_GEN_ALL_CORE
Walking - Indoors allowed/Stairs allowed/No heavy lifting/straining/Showering allowed/Walking - Outdoors allowed

## 2019-09-18 NOTE — DISCHARGE NOTE NURSING/CASE MANAGEMENT/SOCIAL WORK - NSDCFUADDAPPT_GEN_ALL_CORE_FT
Follow-up with  in the office as instructed for post-op check as well as with PMD for continuity of care.

## 2019-09-18 NOTE — PROGRESS NOTE ADULT - PROBLEM SELECTOR PLAN 4
post op management per urology, encourage ambulation, bowel function returning, pain control, IVFs, IS, DVT ppx (SC heparin).  stable for d/c home today

## 2019-09-18 NOTE — DISCHARGE NOTE PROVIDER - NSDCCPCAREPLAN_GEN_ALL_CORE_FT
PRINCIPAL DISCHARGE DIAGNOSIS  Diagnosis: Kidney mass  Assessment and Plan of Treatment: Drink plenty of fluids.  No heavy lifting (greater than 10 pounds) or straining for 4 to 6 weeks.  You may shower, just pat white strips dry, they will fall off in a few weeks.   Call 's  office  to schedule a follow up appointment.  Call the office if you have fever greater than 101, difficulty urinating, pain not relieved with pain medication, nausea/vomiting..

## 2019-09-18 NOTE — DISCHARGE NOTE PROVIDER - HOSPITAL COURSE
Pt had L. lap rad neph 2 days ago; did well;  ambulating; voiding well; passed gas and yuliya reg diet; home today

## 2019-09-18 NOTE — DISCHARGE NOTE NURSING/CASE MANAGEMENT/SOCIAL WORK - PATIENT PORTAL LINK FT
You can access the FollowMyHealth Patient Portal offered by [facility] by registering at the following website: [email or web address]. By joining [facility]’s FollowMyHealth portal, you will also be able to view your health information using other applications (apps) compatible with our system.

## 2019-10-08 ENCOUNTER — APPOINTMENT (OUTPATIENT)
Dept: UROLOGY | Facility: CLINIC | Age: 74
End: 2019-10-08
Payer: MEDICARE

## 2019-10-08 PROCEDURE — 99024 POSTOP FOLLOW-UP VISIT: CPT

## 2019-10-08 NOTE — ASSESSMENT
[FreeTextEntry1] : 74F with a history of left radical nephrectomy (9/16/19- ccRCC, FG 2, pT1a) here for follow-up.\par -Renal u/s in 6 months

## 2019-10-08 NOTE — HISTORY OF PRESENT ILLNESS
[FreeTextEntry1] : 74F with a history of left radical nephrectomy (9/16/19- ccRCC, FG 2, pT1a) here for follow-up. Tolerating diet, +GI function. Reports intermittent nausea, which self-resolves. Pain controlled. Pathology reviewed again. No other complaints

## 2019-10-08 NOTE — PHYSICAL EXAM
[General Appearance - Well Developed] : well developed [General Appearance - Well Nourished] : well nourished [Normal Appearance] : normal appearance [Skin Color & Pigmentation] : normal skin color and pigmentation [Skin Turgor] : supple [Edema] : no peripheral edema [] : no respiratory distress [Respiration, Rhythm And Depth] : normal respiratory rhythm and effort [Exaggerated Use Of Accessory Muscles For Inspiration] : no accessory muscle use [Abdomen Soft] : soft [Abdomen Tenderness] : non-tender [FreeTextEntry1] : incisions c/d/i, remaining steristrips removed

## 2020-04-10 ENCOUNTER — APPOINTMENT (OUTPATIENT)
Dept: UROLOGY | Facility: CLINIC | Age: 75
End: 2020-04-10

## 2020-06-09 ENCOUNTER — APPOINTMENT (OUTPATIENT)
Dept: UROLOGY | Facility: CLINIC | Age: 75
End: 2020-06-09
Payer: MEDICARE

## 2020-06-09 ENCOUNTER — OUTPATIENT (OUTPATIENT)
Dept: OUTPATIENT SERVICES | Facility: HOSPITAL | Age: 75
LOS: 1 days | End: 2020-06-09
Payer: MEDICARE

## 2020-06-09 VITALS — TEMPERATURE: 98.2 F

## 2020-06-09 DIAGNOSIS — Z90.49 ACQUIRED ABSENCE OF OTHER SPECIFIED PARTS OF DIGESTIVE TRACT: Chronic | ICD-10-CM

## 2020-06-09 DIAGNOSIS — R35.0 FREQUENCY OF MICTURITION: ICD-10-CM

## 2020-06-09 PROCEDURE — 76775 US EXAM ABDO BACK WALL LIM: CPT | Mod: 26

## 2020-06-09 PROCEDURE — 99214 OFFICE O/P EST MOD 30 MIN: CPT | Mod: 25

## 2020-06-09 PROCEDURE — 76775 US EXAM ABDO BACK WALL LIM: CPT

## 2020-06-09 NOTE — ASSESSMENT
[FreeTextEntry1] : Renal ultrasound done today is benign . No evidence of recurrence .Right mid pole cyst noted \par Reassured about the left sided discomfort . No evidence of hernia .

## 2020-06-09 NOTE — HISTORY OF PRESENT ILLNESS
[None] : no symptoms [FreeTextEntry1] : Left radical nephrectomy done in September 2019. Pt1a FG 2 She is having some discomfort intermittently on the left abd area . Explained it is scar tissue or the bowel replacing the kidney site

## 2020-06-10 DIAGNOSIS — C64.9 MALIGNANT NEOPLASM OF UNSPECIFIED KIDNEY, EXCEPT RENAL PELVIS: ICD-10-CM

## 2020-10-21 NOTE — H&P PST ADULT - NEGATIVE GENERAL GENITOURINARY SYMPTOMS
0900: DaughterDuarte at bedside. Consent obtained, update provided regarding plan of care.     1100: Rounds with Dr. Matson.    1300: Patient to OR, report at bedside.    1645: Patient returned from OR, report at bedside. DaughterDuarte at bedside and update provided.       no flank pain L/no flank pain R/no hematuria/no dysuria

## 2020-11-12 ENCOUNTER — APPOINTMENT (OUTPATIENT)
Dept: UROLOGY | Facility: CLINIC | Age: 75
End: 2020-11-12
Payer: MEDICARE

## 2020-11-12 VITALS
WEIGHT: 123 LBS | SYSTOLIC BLOOD PRESSURE: 134 MMHG | DIASTOLIC BLOOD PRESSURE: 84 MMHG | HEART RATE: 77 BPM | HEIGHT: 60 IN | BODY MASS INDEX: 24.15 KG/M2 | TEMPERATURE: 97.6 F

## 2020-11-12 DIAGNOSIS — R10.84 GENERALIZED ABDOMINAL PAIN: ICD-10-CM

## 2020-11-12 PROCEDURE — 99072 ADDL SUPL MATRL&STAF TM PHE: CPT

## 2020-11-12 PROCEDURE — 99214 OFFICE O/P EST MOD 30 MIN: CPT

## 2020-11-15 PROBLEM — R10.84 ABDOMINAL PAIN, DIFFUSE: Status: ACTIVE | Noted: 2020-11-12

## 2020-11-15 NOTE — ASSESSMENT
[FreeTextEntry1] : 76 yo F with history of RCC s/p lap radical nephrectomy, abdominal pain\par \par - Reviewed US done in June, 2020\par - Discussed potential etiologies for symptoms -  and non- related\par - UA\par - Abd US

## 2020-11-15 NOTE — HISTORY OF PRESENT ILLNESS
[FreeTextEntry1] : 74 yo Albanian speaking F s.p lap radical nephrectomy for RCC\par Presents with longstanding history of diffuse abdominal pain, flank pain, pelvic pain\par Describes it as constant pressure\par Admits she had symptoms even prior to surgery\par chronic constipation\par normal urination\par no gross hematuria, no dysuria\par

## 2020-11-15 NOTE — PHYSICAL EXAM
[General Appearance - Well Developed] : well developed [General Appearance - Well Nourished] : well nourished [Normal Appearance] : normal appearance [Well Groomed] : well groomed [General Appearance - In No Acute Distress] : no acute distress [Edema] : no peripheral edema [Respiration, Rhythm And Depth] : normal respiratory rhythm and effort [Exaggerated Use Of Accessory Muscles For Inspiration] : no accessory muscle use [Abdomen Soft] : soft [Costovertebral Angle Tenderness] : no ~M costovertebral angle tenderness [Urinary Bladder Findings] : the bladder was normal on palpation [Normal Station and Gait] : the gait and station were normal for the patient's age [] : no rash [No Focal Deficits] : no focal deficits [Oriented To Time, Place, And Person] : oriented to person, place, and time [Affect] : the affect was normal [Mood] : the mood was normal [Not Anxious] : not anxious [No Palpable Adenopathy] : no palpable adenopathy [FreeTextEntry1] : nondistended, currently not tender to palpation, well healed incision

## 2020-11-16 LAB
APPEARANCE: CLEAR
BILIRUBIN URINE: NEGATIVE
BLOOD URINE: NEGATIVE
COLOR: COLORLESS
GLUCOSE QUALITATIVE U: NEGATIVE
KETONES URINE: NEGATIVE
LEUKOCYTE ESTERASE URINE: NEGATIVE
NITRITE URINE: NEGATIVE
PH URINE: 7
PROTEIN URINE: NEGATIVE
SPECIFIC GRAVITY URINE: 1
UROBILINOGEN URINE: NORMAL

## 2021-03-15 NOTE — ASU PATIENT PROFILE, ADULT - TEACHING/LEARNING CULTURAL CONSIDERATIONS
[facility]  ED  EMERGENCY DEPARTMENT ENCOUNTER      Pt Name: Carina Argueta  MRN: [id number]  [place] 1961  Date of evaluation: 3/15/2021  Provider: Keith Beckman MD    CHIEF COMPLAINT       Chief Complaint   Patient presents with    Hip Pain     Patient seen here yesterday for hip pain, pain is getting worse. Difficult for patient to get up and down, has hardware in place from prior surgery. HISTORY OF PRESENT ILLNESS   (Location/Symptom, Timing/Onset, Context/Setting, Quality, Duration, Modifying Factors, Severity)  Note limiting factors. Carina Argueta is a 60 y.o. female who presents to the emergency department     Is a 79-year-old female previously healthy who presents with right low back pain, right hip pain, and right knee pain. Patient reports that this has been a chronic ongoing issue for a few months was actually seen by orthopedic doctor back in January. She was supposed to have an outpatient MRI done but she lost her insurance was unable to complete it. She did have an injection done in February in her right knee which seemed to help some of the pain but still had the persistent right back pain. Over the weekend the pain has gotten out of control. She reports excruciating pain in her right low back, right groin, down to her right knee. She rates this pain as an 8 out of 10 on the pain scale. She was seen yesterday in the emergency department was given lidocaine patches and steroids which she states have not helped. She did take some leftover pain meds at home which helped but made her very sleepy and actually made her nauseous. Patient had worsening pain today and was having difficulty getting around, using the restroom and showering so came back in for reevaluation. The history is provided by the patient. Nursing Notes were reviewed.     REVIEW OF SYSTEMS    (2-9 systems for level 4, 10 or more for level 5)     Review of Systems   Constitutional: Negative for chills and fever. HENT: Negative for congestion and sore throat. Eyes: Negative for visual disturbance. Respiratory: Negative for cough and shortness of breath. Cardiovascular: Negative for chest pain. Gastrointestinal: Negative for abdominal pain, nausea and vomiting. Genitourinary: Negative for dysuria and hematuria. Musculoskeletal: Positive for back pain. Negative for neck pain. Skin: Negative for pallor and rash. Neurological: Negative for light-headedness and headaches. All other systems reviewed and are negative. Except as noted above the remainder of the review of systems was reviewed and negative.        PAST MEDICAL HISTORY     Past Medical History:   Diagnosis Date    Cigarette smoker     DJD (degenerative joint disease) of hip     left         SURGICAL HISTORY       Past Surgical History:   Procedure Laterality Date    COLONOSCOPY N/A 3/27/2019    COLONOSCOPY POLYPECTOMY SNARE/COLD BIOPSY performed by Kraig Gaytan DO at [address] [place] [address] Dr      left - deep laceration    JOINT REPLACEMENT Bilateral     THR    TOE AMPUTATION Right     right second toe amputation    TOTAL HIP ARTHROPLASTY Left 4/12    Dr Yudy Joshi       Discharge Medication List as of 3/15/2021 10:14 AM      CONTINUE these medications which have NOT CHANGED    Details   lidocaine (LIDODERM) 5 % Place 1 patch onto the skin daily for 10 days 12 hours on, 12 hours off., Disp-10 patch, R-0Normal      predniSONE (DELTASONE) 20 MG tablet Take 2 tablets by mouth daily for 5 days, Disp-10 tablet, R-0Normal      Flaxseed, Linseed, (FLAX SEED OIL PO) Take by mouthHistorical Med      Multiple Vitamins-Minerals (THERAPEUTIC MULTIVITAMIN-MINERALS) tablet Take 1 tablet by mouth dailyHistorical Med             ALLERGIES     Misc natural products    FAMILY HISTORY       Family History   Problem Relation Age of Onset    Cancer Mother         lymphoma    Heart Disease Father     Colon Cancer Father     Other Brother         ALS    Other Sister         ALS          SOCIAL HISTORY       Social History     Socioeconomic History    Marital status:      Spouse name: Not on file    Number of children: Not on file    Years of education: Not on file    Highest education level: Not on file   Occupational History    Not on file   Social Needs    Financial resource strain: Not on file    Food insecurity     Worry: Not on file     Inability: Not on file    Transportation needs     Medical: Not on file     Non-medical: Not on file   Tobacco Use    Smoking status: Current Some Day Smoker     Types: Cigarettes    Smokeless tobacco: Never Used   Substance and Sexual Activity    Alcohol use:  Yes    Drug use: No    Sexual activity: Not on file   Lifestyle    Physical activity     Days per week: Not on file     Minutes per session: Not on file    Stress: Not on file   Relationships    Social connections     Talks on phone: Not on file     Gets together: Not on file     Attends Anabaptist service: Not on file     Active member of club or organization: Not on file     Attends meetings of clubs or organizations: Not on file     Relationship status: Not on file    Intimate partner violence     Fear of current or ex partner: Not on file     Emotionally abused: Not on file     Physically abused: Not on file     Forced sexual activity: Not on file   Other Topics Concern    Not on file   Social History Narrative    Not on file       SCREENINGS    Peetz Coma Scale  Eye Opening: Spontaneous  Best Verbal Response: Oriented  Best Motor Response: Obeys commands  Peetz Coma Scale Score: 15          PHYSICAL EXAM    (up to 7 for level 4, 8 or more for level 5)     ED Triage Vitals   BP Temp Temp Source Pulse Resp SpO2 Height Weight   03/15/21 0706 03/15/21 0706 03/15/21 0706 03/15/21 0706 03/15/21 0706 03/15/21 0706 03/15/21 0709 03/15/21 0709   132/78 97.7 °F (36.5 °C) Oral 76 20 96 % 5' 2\" (1.575 m) 193 lb (87.5 kg)       Physical Exam  Vitals signs and nursing note reviewed. Constitutional:       General: She is not in acute distress. Appearance: Normal appearance. HENT:      Head: Normocephalic and atraumatic. Nose: Nose normal. No congestion. Mouth/Throat:      Mouth: Mucous membranes are moist.   Eyes:      Conjunctiva/sclera: Conjunctivae normal.   Neck:      Musculoskeletal: Normal range of motion and neck supple. Cardiovascular:      Rate and Rhythm: Normal rate and regular rhythm. Pulses: Normal pulses. Heart sounds: Normal heart sounds. No murmur. Pulmonary:      Effort: Pulmonary effort is normal. No respiratory distress. Breath sounds: Normal breath sounds. Abdominal:      General: There is no distension. Palpations: Abdomen is soft. Tenderness: There is no abdominal tenderness. Musculoskeletal:        Back:       Comments: Patient remains neurovascularly intact with intact strength and sensation. Patient has full normal range of motion of the right hip. Reports pain over her right quad but is not tender to touch. Reports pain in the right knee but has full range of motion, no redness or swelling noted. Skin:     General: Skin is warm and dry. Neurological:      General: No focal deficit present. Mental Status: She is alert and oriented to person, place, and time. DIAGNOSTIC RESULTS     EKG: All EKG's are interpreted by the Emergency Department Physician who either signs or Co-signs this chart in the absence of a cardiologist.        RADIOLOGY:     Interpretation per the Radiologist below, if available at the time of this note:    CT Lumbar Spine WO Contrast   Final Result   1. Multilevel degenerative changes of the spine with advanced disc space   height loss as above. 2. No acute fracture. 3. 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          CT HIP RIGHT WO CONTRAST   Final Result   Status post bilateral total hip arthroplasties. No convincing evidence for   perihardware fracture or hardware loosening. LABS:  Labs Reviewed - No data to display    All other labs were within normal range or not returned as of this dictation. EMERGENCY DEPARTMENT COURSE and DIFFERENTIAL DIAGNOSIS/MDM:   Vitals:    Vitals:    03/15/21 0822 03/15/21 0924 03/15/21 1035 03/15/21 1116   BP: (!) 148/78 125/88 132/74 (!) 143/83   Pulse: 67 67 77 70   Resp: 18 20 22 20   Temp:       TempSrc:       SpO2: 96% 94% 100% 97%   Weight:       Height:           Patient evaluated and previous record reviewed. Patient presents with worsening right low back pain, right hip pain, right knee pain. Vital signs stable and within normal limits. Physical exam as documented above notable for point tenderness at the right sciatic nerve outlet, full range of motion of the right hip and the right knee. CT scan of the right hip obtained which shows intact hardware without acute fracture. CT lumbar spine shows chronic degenerative changes. No red flag symptoms present on exam.  Patient was given pain medicine and muscle relaxer with improvement in symptoms. Will discharge her home with a short course of both and encouraged her to continue taking the steroids that were prescribed yesterday in addition to the lidocaine patches. Advised patient that she needs to get the MRI that she was ordered by her orthopedic surgeon and to follow-up with orthopedics in the clinic for further plan of care. She voices understanding and is amenable with plan. CONSULTS:  None    PROCEDURES:  Unless otherwise noted below, none     Procedures      FINAL IMPRESSION      1. Acute right-sided low back pain with right-sided sciatica    2.  Right hip pain          DISPOSITION/PLAN   DISPOSITION Decision To Discharge 03/15/2021 10:11:17 AM      PATIENT REFERRED TO:  Hortencia Piña MD  [address]  [address] [facility]  [phone]    Schedule an appointment as soon as possible for a visit         DISCHARGE MEDICATIONS:  Discharge Medication List as of 3/15/2021 10:14 AM      START taking these medications    Details   HYDROcodone-acetaminophen (NORCO) 5-325 MG per tablet Take 1 tablet by mouth every 6 hours as needed for Pain for up to 3 days. Intended supply: 3 days. Take lowest dose possible to manage pain, Disp-12 tablet, R-0Normal      orphenadrine (NORFLEX) 100 MG extended release tablet Take 1 tablet by mouth 2 times daily for 10 days, Disp-20 tablet, R-0Normal           Controlled Substances Monitoring:     No flowsheet data found.     (Please note that portions of this note were completed with a voice recognition program.  Efforts were made to edit the dictations but occasionally words are mis-transcribed.)    Arturo Frankel MD (electronically signed)  Attending Emergency Physician           Aristides Young MD  03/15/21 1150 none

## 2021-06-11 ENCOUNTER — OUTPATIENT (OUTPATIENT)
Dept: OUTPATIENT SERVICES | Facility: HOSPITAL | Age: 76
LOS: 1 days | End: 2021-06-11
Payer: MEDICARE

## 2021-06-11 ENCOUNTER — APPOINTMENT (OUTPATIENT)
Dept: UROLOGY | Facility: CLINIC | Age: 76
End: 2021-06-11
Payer: MEDICARE

## 2021-06-11 VITALS
RESPIRATION RATE: 17 BRPM | HEART RATE: 83 BPM | TEMPERATURE: 98.2 F | BODY MASS INDEX: 25.32 KG/M2 | SYSTOLIC BLOOD PRESSURE: 146 MMHG | HEIGHT: 60 IN | DIASTOLIC BLOOD PRESSURE: 77 MMHG | WEIGHT: 129 LBS

## 2021-06-11 DIAGNOSIS — Z90.49 ACQUIRED ABSENCE OF OTHER SPECIFIED PARTS OF DIGESTIVE TRACT: Chronic | ICD-10-CM

## 2021-06-11 DIAGNOSIS — R35.0 FREQUENCY OF MICTURITION: ICD-10-CM

## 2021-06-11 DIAGNOSIS — C64.9 MALIGNANT NEOPLASM OF UNSPECIFIED KIDNEY, EXCEPT RENAL PELVIS: ICD-10-CM

## 2021-06-11 PROCEDURE — 99213 OFFICE O/P EST LOW 20 MIN: CPT

## 2021-06-11 PROCEDURE — 76775 US EXAM ABDO BACK WALL LIM: CPT | Mod: 26

## 2021-06-11 PROCEDURE — 99072 ADDL SUPL MATRL&STAF TM PHE: CPT

## 2021-06-11 PROCEDURE — 76775 US EXAM ABDO BACK WALL LIM: CPT

## 2021-09-07 ENCOUNTER — INPATIENT (INPATIENT)
Facility: HOSPITAL | Age: 76
LOS: 26 days | Discharge: HOME | DRG: 406 | End: 2021-10-04
Attending: SURGERY | Admitting: INTERNAL MEDICINE
Payer: MEDICARE

## 2021-09-07 VITALS
RESPIRATION RATE: 18 BRPM | OXYGEN SATURATION: 100 % | TEMPERATURE: 98 F | HEART RATE: 79 BPM | HEIGHT: 60 IN | DIASTOLIC BLOOD PRESSURE: 80 MMHG | WEIGHT: 115.08 LBS | SYSTOLIC BLOOD PRESSURE: 133 MMHG

## 2021-09-07 DIAGNOSIS — Z90.49 ACQUIRED ABSENCE OF OTHER SPECIFIED PARTS OF DIGESTIVE TRACT: Chronic | ICD-10-CM

## 2021-09-07 DIAGNOSIS — M06.9 RHEUMATOID ARTHRITIS, UNSPECIFIED: ICD-10-CM

## 2021-09-07 DIAGNOSIS — R10.9 UNSPECIFIED ABDOMINAL PAIN: ICD-10-CM

## 2021-09-07 DIAGNOSIS — K80.51 CALCULUS OF BILE DUCT WITHOUT CHOLANGITIS OR CHOLECYSTITIS WITH OBSTRUCTION: ICD-10-CM

## 2021-09-07 LAB
ALBUMIN SERPL ELPH-MCNC: 4.4 G/DL — SIGNIFICANT CHANGE UP (ref 3.3–5)
ALP SERPL-CCNC: 922 U/L — HIGH (ref 40–120)
ALT FLD-CCNC: 376 U/L — HIGH (ref 10–45)
ANION GAP SERPL CALC-SCNC: 20 MMOL/L — HIGH (ref 5–17)
APPEARANCE UR: CLEAR — SIGNIFICANT CHANGE UP
AST SERPL-CCNC: 250 U/L — HIGH (ref 10–40)
BASOPHILS # BLD AUTO: 0.05 K/UL — SIGNIFICANT CHANGE UP (ref 0–0.2)
BASOPHILS NFR BLD AUTO: 1 % — SIGNIFICANT CHANGE UP (ref 0–2)
BILIRUB SERPL-MCNC: 3.9 MG/DL — HIGH (ref 0.2–1.2)
BILIRUB UR-MCNC: NEGATIVE — SIGNIFICANT CHANGE UP
BUN SERPL-MCNC: 12 MG/DL — SIGNIFICANT CHANGE UP (ref 7–23)
CALCIUM SERPL-MCNC: 11.4 MG/DL — HIGH (ref 8.4–10.5)
CHLORIDE SERPL-SCNC: 107 MMOL/L — SIGNIFICANT CHANGE UP (ref 96–108)
CO2 SERPL-SCNC: 20 MMOL/L — LOW (ref 22–31)
COLOR SPEC: SIGNIFICANT CHANGE UP
CREAT SERPL-MCNC: 0.81 MG/DL — SIGNIFICANT CHANGE UP (ref 0.5–1.3)
DIFF PNL FLD: NEGATIVE — SIGNIFICANT CHANGE UP
EOSINOPHIL # BLD AUTO: 0.17 K/UL — SIGNIFICANT CHANGE UP (ref 0–0.5)
EOSINOPHIL NFR BLD AUTO: 3.3 % — SIGNIFICANT CHANGE UP (ref 0–6)
GLUCOSE SERPL-MCNC: 128 MG/DL — HIGH (ref 70–99)
GLUCOSE UR QL: NEGATIVE — SIGNIFICANT CHANGE UP
HCT VFR BLD CALC: 36.5 % — SIGNIFICANT CHANGE UP (ref 34.5–45)
HGB BLD-MCNC: 12.2 G/DL — SIGNIFICANT CHANGE UP (ref 11.5–15.5)
IMM GRANULOCYTES NFR BLD AUTO: 0.4 % — SIGNIFICANT CHANGE UP (ref 0–1.5)
KETONES UR-MCNC: NEGATIVE — SIGNIFICANT CHANGE UP
LEUKOCYTE ESTERASE UR-ACNC: NEGATIVE — SIGNIFICANT CHANGE UP
LIDOCAIN IGE QN: 111 U/L — HIGH (ref 7–60)
LYMPHOCYTES # BLD AUTO: 1.56 K/UL — SIGNIFICANT CHANGE UP (ref 1–3.3)
LYMPHOCYTES # BLD AUTO: 30.5 % — SIGNIFICANT CHANGE UP (ref 13–44)
MCHC RBC-ENTMCNC: 32.5 PG — SIGNIFICANT CHANGE UP (ref 27–34)
MCHC RBC-ENTMCNC: 33.4 GM/DL — SIGNIFICANT CHANGE UP (ref 32–36)
MCV RBC AUTO: 97.3 FL — SIGNIFICANT CHANGE UP (ref 80–100)
MONOCYTES # BLD AUTO: 0.45 K/UL — SIGNIFICANT CHANGE UP (ref 0–0.9)
MONOCYTES NFR BLD AUTO: 8.8 % — SIGNIFICANT CHANGE UP (ref 2–14)
NEUTROPHILS # BLD AUTO: 2.87 K/UL — SIGNIFICANT CHANGE UP (ref 1.8–7.4)
NEUTROPHILS NFR BLD AUTO: 56 % — SIGNIFICANT CHANGE UP (ref 43–77)
NITRITE UR-MCNC: NEGATIVE — SIGNIFICANT CHANGE UP
NRBC # BLD: 0 /100 WBCS — SIGNIFICANT CHANGE UP (ref 0–0)
PH UR: 8 — SIGNIFICANT CHANGE UP (ref 5–8)
PLATELET # BLD AUTO: 194 K/UL — SIGNIFICANT CHANGE UP (ref 150–400)
POTASSIUM SERPL-MCNC: 4.1 MMOL/L — SIGNIFICANT CHANGE UP (ref 3.5–5.3)
POTASSIUM SERPL-SCNC: 4.1 MMOL/L — SIGNIFICANT CHANGE UP (ref 3.5–5.3)
PROT SERPL-MCNC: 8.3 G/DL — SIGNIFICANT CHANGE UP (ref 6–8.3)
PROT UR-MCNC: NEGATIVE — SIGNIFICANT CHANGE UP
RBC # BLD: 3.75 M/UL — LOW (ref 3.8–5.2)
RBC # FLD: 16.1 % — HIGH (ref 10.3–14.5)
SARS-COV-2 RNA SPEC QL NAA+PROBE: SIGNIFICANT CHANGE UP
SODIUM SERPL-SCNC: 147 MMOL/L — HIGH (ref 135–145)
SP GR SPEC: 1 — LOW (ref 1.01–1.02)
UROBILINOGEN FLD QL: NEGATIVE — SIGNIFICANT CHANGE UP
WBC # BLD: 5.12 K/UL — SIGNIFICANT CHANGE UP (ref 3.8–10.5)
WBC # FLD AUTO: 5.12 K/UL — SIGNIFICANT CHANGE UP (ref 3.8–10.5)

## 2021-09-07 PROCEDURE — 99285 EMERGENCY DEPT VISIT HI MDM: CPT

## 2021-09-07 RX ORDER — INFLUENZA VIRUS VACCINE 15; 15; 15; 15 UG/.5ML; UG/.5ML; UG/.5ML; UG/.5ML
0.5 SUSPENSION INTRAMUSCULAR ONCE
Refills: 0 | Status: DISCONTINUED | OUTPATIENT
Start: 2021-09-07 | End: 2021-10-04

## 2021-09-07 RX ORDER — METHOTREXATE 2.5 MG/1
6 TABLET ORAL
Qty: 0 | Refills: 0 | DISCHARGE

## 2021-09-07 RX ORDER — ACETAMINOPHEN 500 MG
650 TABLET ORAL EVERY 6 HOURS
Refills: 0 | Status: DISCONTINUED | OUTPATIENT
Start: 2021-09-07 | End: 2021-09-17

## 2021-09-07 RX ORDER — HEPARIN SODIUM 5000 [USP'U]/ML
5000 INJECTION INTRAVENOUS; SUBCUTANEOUS EVERY 12 HOURS
Refills: 0 | Status: DISCONTINUED | OUTPATIENT
Start: 2021-09-07 | End: 2021-09-17

## 2021-09-07 RX ORDER — FOLIC ACID 0.8 MG
1 TABLET ORAL
Qty: 0 | Refills: 0 | DISCHARGE

## 2021-09-07 RX ADMIN — HEPARIN SODIUM 5000 UNIT(S): 5000 INJECTION INTRAVENOUS; SUBCUTANEOUS at 18:03

## 2021-09-07 NOTE — ED PROVIDER NOTE - PROGRESS NOTE DETAILS
David, PGY-2  Reached out to Dr. Parnell who sent patient in. Dr. Parnell in middle of a procedure and will try again around 2:30. David, PGY-2  Spoke with Dr. Parnell who is requesting ERCP for patient. Spoke with PCP Dr. Salvador who is aware of plan and will admit to unattached.

## 2021-09-07 NOTE — ED PROVIDER NOTE - ATTENDING CONTRIBUTION TO CARE
Attending MD Alejandra.  Agree with HPI as authored by resident.  Pt with gradual onset sxs and mild transaminitis at last check ~1 wk ago.  Pt endorses intermittent RUQ pain and has developed dark urine, pruritis and mild jaundice noted to sclera.  Concern on MRI for poss intraductal papillary mucinous neoplasm (1.1 cm cystic lesion) vs. poss biliary stenosis/stricture.  Pt is well appearing, no current nausea/vomiting.  Planned admission for ERCP.  Pt is hemodynamically stable. Attending MD Alejandra.  Agree with HPI as authored by resident.  Pt with gradual onset sxs and mild transaminitis at last check ~1 wk ago.  Pt endorses intermittent RUQ pain and has developed dark urine, pruritis and mild jaundice noted to sclera.  Concern on MRI for poss intraductal papillary mucinous neoplasm (1.1 cm cystic lesion) vs. poss biliary stenosis/stricture.  Pt is well appearing, no current nausea/vomiting.  Planned admission for ERCP.  Pt is hemodynamically stable.  Pt stable for admission to medicine with planned GI cx inpt for ERCP.

## 2021-09-07 NOTE — ED PROVIDER NOTE - OBJECTIVE STATEMENT
75 y/o female with pmhx of rheumatoid arthritis, high cholesterol, appendectomy, and left nephrectomy presenting with abdominal pain x months sent in by PCP (Dr. Parnell; [phone]) for MRI findings significant for dilated pancreatic/bile ducts and elevated bili/liver enzymes requesting ERCP. Abdominal pain localized to RUQ with intermittent radiation to back. States that pain is mild, not requiring pain medication, no associated fevers/chills, n/v/d, cough, rashes, or changes in urination. Reports intermittent mild itching and darker urine than usual.

## 2021-09-07 NOTE — ED PROVIDER NOTE - NS ED ROS FT
Gen: Denies fever  CV: Denies chest pain, palpitations  Skin: Denies rash, erythema, color changes  Resp: Denies SOB, cough  Endo: Denies sensitivity to heat, cold, increased urination  GI: Denies diarrhea, constipation, nausea, vomiting  Msk: Denies LE swelling, extremity pain  : Denies dysuria, increased frequency  Neuro: Denies weakness, numbness/tingling

## 2021-09-07 NOTE — CONSULT NOTE ADULT - SUBJECTIVE AND OBJECTIVE BOX
Chief Complaint:  Patient is a 76y old  Female who presents with a chief complaint of     HPI:    76 year old woman with history of L. renal cancer s/p L. nephrectomy (2019), rheumatoid arthritis (on MTX), presenting at request of outpatient Gastroenterologist for abnormal MRCP on 21. Non-contrast MRCP revealed severely dilated intrahepatic and extrahepatic bile ducts, dilated PD, multiple cystic lesions communicating with main PD concerning for occult mass. Prior to MRCP, US abdomen from 21 revealed 12 mm CBD and dilated intrahepatic and extrahepatic ducts. Imaging performed by Gastroenterologist. Dr. Francis Parnell for elevated liver enzymes. Patient reports itching and dark urine for the last 1 month, as well as abdominal pain for 6 months. Due to abdominal pain, she is eating less (admits to skipping dinner) resulting in 5 lb weight loss recently. She reports a family history of pancreatic CA in her brother who had a pancreatic body mass diagnosed at age 74 years. The cancer was resected without post-operative chemotherapy.     Allergies:  No Known Allergies      Home Medications:    Hospital Medications:  acetaminophen   Tablet .. 650 milliGRAM(s) Oral every 6 hours PRN  heparin   Injectable 5000 Unit(s) SubCutaneous every 12 hours  influenza   Vaccine 0.5 milliLiter(s) IntraMuscular once      PMHX/PSHX:  Arthritis    S/P appendectomy        Family history:      Social History:     ROS:     General:  No weight loss, fevers, chills, night sweats, fatigue  Eyes:  No vision changes, no yellowing of eyes   ENT:  No throat pain, runny nose  CV:  No chest pain, palpitations  Resp:  No SOB, cough, wheezing  GI:  See HPI  :  No burning with urination, no hematuria +dark urine  Muscle:  No muscle pain, weakness  Neuro:  No numbness/tingling, memory problems  Psych:  No fatigue, insomnia, mood problems  Heme:  No easy bruisability  Skin:  No rash + itching       PHYSICAL EXAM:     GENERAL:  Appears stated age, well-groomed, non-toxic  HEENT:  Conjunctivae clear and pink,  no scleral icterus  CHEST:  Normal respiratory effort  HEART:  Regular rhythm & rate  ABDOMEN:  Soft,  non-distended +RUQ/epigstrium mildly TTP  EXTREMITIES:  no cyanosis,clubbing or edema  SKIN:  No rash/erythema/ecchymoses/petechiae +laparoscopic scars over abdomen  NEURO:  Alert, orientedx3    Vital Signs:  Vital Signs Last 24 Hrs  T(C): 36.7 (07 Sep 2021 20:47), Max: 36.9 (07 Sep 2021 12:04)  T(F): 98.1 (07 Sep 2021 20:47), Max: 98.4 (07 Sep 2021 12:04)  HR: 71 (07 Sep 2021 20:47) (69 - 79)  BP: 127/73 (07 Sep 2021 20:47) (127/73 - 138/75)  BP(mean): --  RR: 14 (07 Sep 2021 20:47) (14 - 18)  SpO2: 98% (07 Sep 2021 20:47) (98% - 100%)  Daily Height in cm: 152.4 (07 Sep 2021 10:54)    Daily     LABS:                        12.2   5.12  )-----------( 194      ( 07 Sep 2021 12:19 )             36.5     09-07    147<H>  |  107  |  12  ----------------------------<  128<H>  4.1   |  20<L>  |  0.81    Ca    11.4<H>      07 Sep 2021 12:19    TPro  8.3  /  Alb  4.4  /  TBili  3.9<H>  /  DBili  2.9<H>  /  AST  250<H>  /  ALT  376<H>  /  AlkPhos  922<H>  09-07    LIVER FUNCTIONS - ( 07 Sep 2021 12:19 )  Alb: 4.4 g/dL / Pro: 8.3 g/dL / ALK PHOS: 922 U/L / ALT: 376 U/L / AST: 250 U/L / GGT: x             Urinalysis Basic - ( 07 Sep 2021 12:32 )    Color: Light Yellow / Appearance: Clear / S.005 / pH: x  Gluc: x / Ketone: Negative  / Bili: Negative / Urobili: Negative   Blood: x / Protein: Negative / Nitrite: Negative   Leuk Esterase: Negative / RBC: x / WBC x   Sq Epi: x / Non Sq Epi: x / Bacteria: x      Amylase Serum--      Lipase [id number]       Ammonia--      Imaging:             Chief Complaint:  Patient is a 76y old  Female who presents with a chief complaint of jaundice    HPI:    76 year old woman with history of L. renal cancer s/p L. nephrectomy (2019), rheumatoid arthritis (on MTX), presenting at request of outpatient Gastroenterologist for abnormal MRCP on 21. Non-contrast MRCP revealed severely dilated intrahepatic and extrahepatic bile ducts, dilated PD, multiple cystic lesions communicating with main PD concerning for occult mass. Prior to MRCP, US abdomen from 21 revealed 12 mm CBD and dilated intrahepatic and extrahepatic ducts. Imaging performed by Gastroenterologist. Dr. Francis Parnell for elevated liver enzymes. Patient reports itching and dark urine for the last 1 month, as well as abdominal pain for 6 months. Due to abdominal pain, she is eating less (admits to skipping dinner) resulting in 5 lb weight loss recently. She reports a family history of pancreatic CA in her brother who had a pancreatic body mass diagnosed at age 74 years. The cancer was resected without post-operative chemotherapy.     Allergies:  No Known Allergies      Home Medications:    Hospital Medications:  acetaminophen   Tablet .. 650 milliGRAM(s) Oral every 6 hours PRN  heparin   Injectable 5000 Unit(s) SubCutaneous every 12 hours  influenza   Vaccine 0.5 milliLiter(s) IntraMuscular once      PMHX/PSHX:  Arthritis    S/P appendectomy        Family history:    brother with pancreatic cancer    Social History:   Tob: Denies  EtOH: Denies  Illicit Drugs: Denies      ROS:     General:  No weight loss, fevers, chills, night sweats, fatigue  Eyes:  No vision changes, no yellowing of eyes   ENT:  No throat pain, runny nose  CV:  No chest pain, palpitations  Resp:  No SOB, cough, wheezing  GI:  See HPI  :  No burning with urination, no hematuria +dark urine  Muscle:  No muscle pain, weakness  Neuro:  No numbness/tingling, memory problems  Psych:  No fatigue, insomnia, mood problems  Heme:  No easy bruisability  Skin:  No rash + itching       PHYSICAL EXAM:     GENERAL:  Appears stated age, well-groomed, non-toxic  HEENT:  Conjunctivae clear and pink,  no scleral icterus  CHEST:  Normal respiratory effort  HEART:  Regular rhythm & rate  ABDOMEN:  Soft,  non-distended +RUQ/epigstrium mildly TTP  EXTREMITIES:  no cyanosis,clubbing or edema  SKIN:  No rash/erythema/ecchymoses/petechiae +laparoscopic scars over abdomen  NEURO:  Alert, orientedx3    Vital Signs:  Vital Signs Last 24 Hrs  T(C): 36.7 (07 Sep 2021 20:47), Max: 36.9 (07 Sep 2021 12:04)  T(F): 98.1 (07 Sep 2021 20:47), Max: 98.4 (07 Sep 2021 12:04)  HR: 71 (07 Sep 2021 20:47) (69 - 79)  BP: 127/73 (07 Sep 2021 20:47) (127/73 - 138/75)  BP(mean): --  RR: 14 (07 Sep 2021 20:47) (14 - 18)  SpO2: 98% (07 Sep 2021 20:47) (98% - 100%)  Daily Height in cm: 152.4 (07 Sep 2021 10:54)    Daily     LABS:                        12.2   5.12  )-----------( 194      ( 07 Sep 2021 12:19 )             36.5     09-    147<H>  |  107  |  12  ----------------------------<  128<H>  4.1   |  20<L>  |  0.81    Ca    11.4<H>      07 Sep 2021 12:19    TPro  8.3  /  Alb  4.4  /  TBili  3.9<H>  /  DBili  2.9<H>  /  AST  250<H>  /  ALT  376<H>  /  AlkPhos  922<H>      LIVER FUNCTIONS - ( 07 Sep 2021 12:19 )  Alb: 4.4 g/dL / Pro: 8.3 g/dL / ALK PHOS: 922 U/L / ALT: 376 U/L / AST: 250 U/L / GGT: x             Urinalysis Basic - ( 07 Sep 2021 12:32 )    Color: Light Yellow / Appearance: Clear / S.005 / pH: x  Gluc: x / Ketone: Negative  / Bili: Negative / Urobili: Negative   Blood: x / Protein: Negative / Nitrite: Negative   Leuk Esterase: Negative / RBC: x / WBC x   Sq Epi: x / Non Sq Epi: x / Bacteria: x      Amylase Serum--      Lipase [id number]       Ammonia--      Imaging:    reviewed

## 2021-09-07 NOTE — ED PROVIDER NOTE - PHYSICAL EXAMINATION
Gen: WDWN, NAD, comfortable appearing   HEENT: PERRLA, EOMI, no scleral icterus, no nasal discharge, mucous membranes moist  CV: RRR, +S1/S2, no M/R/G  Resp: CTAB, no W/R/R  GI: Abdomen soft non-distended, NTTP, negative Ortega sign, no masses organomegaly   MSK: No CVA tenderness, no open wounds, no bruising, no LE edema  Neuro: A&Ox4, following commands, moving all four extremities spontaneously  Psych: appropriate mood  Skin: No jaundice

## 2021-09-07 NOTE — ED ADULT NURSE NOTE - OBJECTIVE STATEMENT
76y female arrived to ED complaining of abd pain. Patient reports several weeks of R sided intermittent abd pain worse with food. Patient went to PMD and found to have elevated LFTs on outpatient labs. Patient PMHx RA. Endorses nausea. Denies CP, SOB, v/d, fever, chills. Patient A&Ox4, ambulatory, VS stable.

## 2021-09-07 NOTE — CONSULT NOTE ADULT - ASSESSMENT
76 year old woman with history of L. renal cancer s/p L. nephrectomy (2019), rheumatoid arthritis (on MTX), presenting at request of outpatient Gastroenterologist for abnormal MRCP on 9/1/21.     # Dilated PD and biliary tree - Cholestatic liver injury pattern with direct hyperbilirubinemia; highly concerning for ampullary mass vs. pancreatic head/neck mass in setting of family history vs. less likely IPMN  # Pancreatic cystic lesions - Directly communicating with main PD; DDx includes side branch or mixed IPMN   # Rheumatoid arthritis on MTX  # History of renal CA s/p L. nephrectomy    Recommendations:  - plan for EGD +/- ERCP on 9/9/21 (Thursday) to evaluate for occult mass  - can start cholestyramine 4 g BID PRN before meals for pruritis  - please check PT/INR with AM labs  - trend total bilirubin and liver enzymes on CMP daily  - regular diet now; NPO at midnight on 9/8/21      Minnie Ness PGY-6  Gastroenterology/Hepatology Fellow  Pager #[phone]  E-mail [email or web address]  Call [phone] to speak to answering service for on-call GI fellow 5pm-7am and weekends.

## 2021-09-07 NOTE — ED PROVIDER NOTE - CLINICAL SUMMARY MEDICAL DECISION MAKING FREE TEXT BOX
75 y/o female with pmhx of rheumatoid arthritis, high cholesterol, appendectomy, and left nephrectomy presenting with abdominal pain x months sent in by PCP (Dr. Parnell; [phone]) for MRI findings significant for dilated pancreatic/bile ducts and elevated bili/liver enzymes requesting ERCP. Will repeat labs and patient will likely need admission for ERCP.

## 2021-09-07 NOTE — CHART NOTE - NSCHARTNOTEFT_GEN_A_CORE
Patient seen and examined. Will plan for EUS+/-ERCP on 9/9/21 (Thursday). Patient can resume oral diet at this time (ordered by GI fellow). Please order PT/INR with AM labs. Patient informed of plan. Full consult note to follow.    Minnie Ness PGY-6  Gastroenterology/Hepatology Fellow  Pager #[phone]  E-mail [email or web address]  Call [phone] to speak to answering service for on-call GI fellow 5pm-7am and weekends. Patient seen and examined. Outpatient medical records reviewed. Will plan for EUS+/-ERCP on 9/9/21 (Thursday). Patient can resume oral diet at this time (ordered by GI fellow). Please order PT/INR with AM labs. Patient informed of plan. Full consult note to follow.    Minnie eNss PGY-6  Gastroenterology/Hepatology Fellow  Pager #[phone]  E-mail [email or web address]  Call [phone] to speak to answering service for on-call GI fellow 5pm-7am and weekends.

## 2021-09-07 NOTE — ED PROVIDER NOTE - CARE PLAN
1 Principal Discharge DX:	Abdominal pain   Principal Discharge DX:	Abdominal pain  Secondary Diagnosis:	Transaminitis

## 2021-09-07 NOTE — H&P ADULT - HISTORY OF PRESENT ILLNESS
75 y/o female with pmhx of rheumatoid arthritis, high cholesterol, appendectomy, and left nephrectomy presenting with abdominal pain x months sent in by PCP (Dr. Parnell; [phone]) for MRI findings significant for dilated pancreatic/bile ducts and elevated bili/liver enzymes requesting ERCP. Abdominal pain localized to RUQ with intermittent radiation to back. States that pain is mild, not requiring pain medication, no associated fevers/chills, n/v/d, cough, rashes, or changes in urination. Reports intermittent mild itching and darker urine than usual.  Labs indicate elevated LFTs and priti

## 2021-09-08 LAB
ALBUMIN SERPL ELPH-MCNC: 3.6 G/DL — SIGNIFICANT CHANGE UP (ref 3.3–5)
ALP SERPL-CCNC: 761 U/L — HIGH (ref 40–120)
ALT FLD-CCNC: 311 U/L — HIGH (ref 10–45)
ANION GAP SERPL CALC-SCNC: 13 MMOL/L — SIGNIFICANT CHANGE UP (ref 5–17)
AST SERPL-CCNC: 226 U/L — HIGH (ref 10–40)
BILIRUB SERPL-MCNC: 1.9 MG/DL — HIGH (ref 0.2–1.2)
BUN SERPL-MCNC: 16 MG/DL — SIGNIFICANT CHANGE UP (ref 7–23)
CALCIUM SERPL-MCNC: 9.9 MG/DL — SIGNIFICANT CHANGE UP (ref 8.4–10.5)
CHLORIDE SERPL-SCNC: 105 MMOL/L — SIGNIFICANT CHANGE UP (ref 96–108)
CO2 SERPL-SCNC: 20 MMOL/L — LOW (ref 22–31)
CREAT SERPL-MCNC: 0.77 MG/DL — SIGNIFICANT CHANGE UP (ref 0.5–1.3)
CULTURE RESULTS: NO GROWTH — SIGNIFICANT CHANGE UP
GLUCOSE SERPL-MCNC: 91 MG/DL — SIGNIFICANT CHANGE UP (ref 70–99)
HCT VFR BLD CALC: 35.5 % — SIGNIFICANT CHANGE UP (ref 34.5–45)
HGB BLD-MCNC: 11.4 G/DL — LOW (ref 11.5–15.5)
INR BLD: 0.9 RATIO — SIGNIFICANT CHANGE UP (ref 0.88–1.16)
MCHC RBC-ENTMCNC: 31.5 PG — SIGNIFICANT CHANGE UP (ref 27–34)
MCHC RBC-ENTMCNC: 32.1 GM/DL — SIGNIFICANT CHANGE UP (ref 32–36)
MCV RBC AUTO: 98.1 FL — SIGNIFICANT CHANGE UP (ref 80–100)
NRBC # BLD: 0 /100 WBCS — SIGNIFICANT CHANGE UP (ref 0–0)
PLATELET # BLD AUTO: 168 K/UL — SIGNIFICANT CHANGE UP (ref 150–400)
POTASSIUM SERPL-MCNC: 4.1 MMOL/L — SIGNIFICANT CHANGE UP (ref 3.5–5.3)
POTASSIUM SERPL-SCNC: 4.1 MMOL/L — SIGNIFICANT CHANGE UP (ref 3.5–5.3)
PROT SERPL-MCNC: 6.7 G/DL — SIGNIFICANT CHANGE UP (ref 6–8.3)
PROTHROM AB SERPL-ACNC: 10.9 SEC — SIGNIFICANT CHANGE UP (ref 10.6–13.6)
RBC # BLD: 3.62 M/UL — LOW (ref 3.8–5.2)
RBC # FLD: 16.3 % — HIGH (ref 10.3–14.5)
SODIUM SERPL-SCNC: 138 MMOL/L — SIGNIFICANT CHANGE UP (ref 135–145)
SPECIMEN SOURCE: SIGNIFICANT CHANGE UP
WBC # BLD: 4.27 K/UL — SIGNIFICANT CHANGE UP (ref 3.8–10.5)
WBC # FLD AUTO: 4.27 K/UL — SIGNIFICANT CHANGE UP (ref 3.8–10.5)

## 2021-09-08 PROCEDURE — 99222 1ST HOSP IP/OBS MODERATE 55: CPT | Mod: GC

## 2021-09-08 PROCEDURE — 93010 ELECTROCARDIOGRAM REPORT: CPT

## 2021-09-08 RX ORDER — CHOLESTYRAMINE 4 G/9G
4 POWDER, FOR SUSPENSION ORAL
Refills: 0 | Status: DISCONTINUED | OUTPATIENT
Start: 2021-09-08 | End: 2021-09-17

## 2021-09-08 RX ADMIN — HEPARIN SODIUM 5000 UNIT(S): 5000 INJECTION INTRAVENOUS; SUBCUTANEOUS at 05:54

## 2021-09-08 RX ADMIN — HEPARIN SODIUM 5000 UNIT(S): 5000 INJECTION INTRAVENOUS; SUBCUTANEOUS at 18:56

## 2021-09-08 NOTE — PROGRESS NOTE ADULT - PROBLEM SELECTOR PLAN 4
post op management per urology, encourage ambulation, bowel function returning, pain control, IVFs, IS, DVT ppx

## 2021-09-08 NOTE — PROGRESS NOTE ADULT - SUBJECTIVE AND OBJECTIVE BOX
Patient is a 74y old  Female who presents with a chief complaint of renal mass (18 Sep 2019 07:13)      SUBJECTIVE / OVERNIGHT EVENTS:  Tolerating PO.  Walking without problem.      MEDICATIONS  (STANDING):  docusate sodium 100 milliGRAM(s) Oral three times a day  folic acid 1 milliGRAM(s) Oral daily  heparin  Injectable 5000 Unit(s) SubCutaneous every 8 hours  ketorolac   Injectable 15 milliGRAM(s) IV Push every 6 hours    MEDICATIONS  (PRN):  acetaminophen   Tablet .. 650 milliGRAM(s) Oral every 6 hours PRN Mild Pain (1 - 3)  ketorolac   Injectable 15 milliGRAM(s) IV Push every 6 hours PRN Moderate Pain (4 - 6)  oxyCODONE    5 mG/acetaminophen 325 mG 2 Tablet(s) Oral every 6 hours PRN Severe Pain (7 - 10)  oxyCODONE    IR 5 milliGRAM(s) Oral every 6 hours PRN Moderate Pain (4 - 6)  senna 2 Tablet(s) Oral at bedtime PRN Constipation      CAPILLARY BLOOD GLUCOSE          I&O's Summary    17 Sep 2019 07:01  -  18 Sep 2019 07:00  --------------------------------------------------------  IN: 0 mL / OUT: 900 mL / NET: -900 mL        Vital Signs Last 24 Hrs  T(C): 36.5 (18 Sep 2019 05:29), Max: 37.1 (18 Sep 2019 01:25)  T(F): 97.7 (18 Sep 2019 05:29), Max: 98.7 (18 Sep 2019 01:25)  HR: 82 (18 Sep 2019 05:29) (78 - 98)  BP: 150/78 (18 Sep 2019 05:29) (110/46 - 150/78)  BP(mean): --  RR: 17 (18 Sep 2019 05:29) (16 - 17)  SpO2: 99% (18 Sep 2019 05:29) (97% - 100%)    PHYSICAL EXAM:  GENERAL: resting comfortably in bed  HEAD:  Atraumatic, Normocephalic  EYES: EOMI, PERRLA, conjunctiva and sclera clear  NECK: Supple, No JVD  CHEST/LUNG: Clear to auscultation bilaterally; No wheeze  HEART: Regular rate and rhythm; No murmurs, rubs, or gallops  ABDOMEN: Soft, ND, incisions intact  EXTREMITIES:  2+ Peripheral Pulses, No clubbing, cyanosis, or edema  PSYCH: AAOx3  NEUROLOGY: non-focal  SKIN: No rashes or lesions    LABS:                        10.4   11.94 )-----------( 184      ( 17 Sep 2019 10:58 )             31.9     09-17    139  |  107  |  18  ----------------------------<  167<H>  4.8   |  22  |  1.01    Ca    9.3      17 Sep 2019 10:58                RADIOLOGY & ADDITIONAL TESTS:    Imaging Personally Reviewed:    Consultant(s) Notes Reviewed:      Care Discussed with Consultants/Other Providers: urology re overall care

## 2021-09-09 ENCOUNTER — RESULT REVIEW (OUTPATIENT)
Age: 76
End: 2021-09-09

## 2021-09-09 LAB
ALBUMIN SERPL ELPH-MCNC: 3.6 G/DL — SIGNIFICANT CHANGE UP (ref 3.3–5)
ALP SERPL-CCNC: 758 U/L — HIGH (ref 40–120)
ALT FLD-CCNC: 275 U/L — HIGH (ref 10–45)
ANION GAP SERPL CALC-SCNC: 14 MMOL/L — SIGNIFICANT CHANGE UP (ref 5–17)
AST SERPL-CCNC: 158 U/L — HIGH (ref 10–40)
BILIRUB DIRECT SERPL-MCNC: 0.8 MG/DL — HIGH (ref 0–0.2)
BILIRUB INDIRECT FLD-MCNC: 0.7 MG/DL — SIGNIFICANT CHANGE UP (ref 0.2–1)
BILIRUB SERPL-MCNC: 1.5 MG/DL — HIGH (ref 0.2–1.2)
BILIRUB SERPL-MCNC: 1.5 MG/DL — HIGH (ref 0.2–1.2)
BLD GP AB SCN SERPL QL: NEGATIVE — SIGNIFICANT CHANGE UP
BUN SERPL-MCNC: 14 MG/DL — SIGNIFICANT CHANGE UP (ref 7–23)
CALCIUM SERPL-MCNC: 10.1 MG/DL — SIGNIFICANT CHANGE UP (ref 8.4–10.5)
CHLORIDE SERPL-SCNC: 107 MMOL/L — SIGNIFICANT CHANGE UP (ref 96–108)
CO2 SERPL-SCNC: 19 MMOL/L — LOW (ref 22–31)
CREAT SERPL-MCNC: 0.77 MG/DL — SIGNIFICANT CHANGE UP (ref 0.5–1.3)
GLUCOSE SERPL-MCNC: 103 MG/DL — HIGH (ref 70–99)
HCT VFR BLD CALC: 34.1 % — LOW (ref 34.5–45)
HCT VFR BLD CALC: 35.4 % — SIGNIFICANT CHANGE UP (ref 34.5–45)
HGB BLD-MCNC: 11.1 G/DL — LOW (ref 11.5–15.5)
HGB BLD-MCNC: 11.7 G/DL — SIGNIFICANT CHANGE UP (ref 11.5–15.5)
INR BLD: 0.89 RATIO — SIGNIFICANT CHANGE UP (ref 0.88–1.16)
MCHC RBC-ENTMCNC: 31.6 PG — SIGNIFICANT CHANGE UP (ref 27–34)
MCHC RBC-ENTMCNC: 31.8 PG — SIGNIFICANT CHANGE UP (ref 27–34)
MCHC RBC-ENTMCNC: 32.6 GM/DL — SIGNIFICANT CHANGE UP (ref 32–36)
MCHC RBC-ENTMCNC: 33.1 GM/DL — SIGNIFICANT CHANGE UP (ref 32–36)
MCV RBC AUTO: 96.2 FL — SIGNIFICANT CHANGE UP (ref 80–100)
MCV RBC AUTO: 97.2 FL — SIGNIFICANT CHANGE UP (ref 80–100)
NRBC # BLD: 0 /100 WBCS — SIGNIFICANT CHANGE UP (ref 0–0)
NRBC # BLD: 0 /100 WBCS — SIGNIFICANT CHANGE UP (ref 0–0)
PLATELET # BLD AUTO: 172 K/UL — SIGNIFICANT CHANGE UP (ref 150–400)
PLATELET # BLD AUTO: 173 K/UL — SIGNIFICANT CHANGE UP (ref 150–400)
POTASSIUM SERPL-MCNC: 3.8 MMOL/L — SIGNIFICANT CHANGE UP (ref 3.5–5.3)
POTASSIUM SERPL-SCNC: 3.8 MMOL/L — SIGNIFICANT CHANGE UP (ref 3.5–5.3)
PROT SERPL-MCNC: 6.9 G/DL — SIGNIFICANT CHANGE UP (ref 6–8.3)
PROTHROM AB SERPL-ACNC: 10.7 SEC — SIGNIFICANT CHANGE UP (ref 10.6–13.6)
RBC # BLD: 3.51 M/UL — LOW (ref 3.8–5.2)
RBC # BLD: 3.68 M/UL — LOW (ref 3.8–5.2)
RBC # FLD: 15.9 % — HIGH (ref 10.3–14.5)
RBC # FLD: 16 % — HIGH (ref 10.3–14.5)
RH IG SCN BLD-IMP: POSITIVE — SIGNIFICANT CHANGE UP
RH IG SCN BLD-IMP: POSITIVE — SIGNIFICANT CHANGE UP
SODIUM SERPL-SCNC: 140 MMOL/L — SIGNIFICANT CHANGE UP (ref 135–145)
WBC # BLD: 4.49 K/UL — SIGNIFICANT CHANGE UP (ref 3.8–10.5)
WBC # BLD: 6.76 K/UL — SIGNIFICANT CHANGE UP (ref 3.8–10.5)
WBC # FLD AUTO: 4.49 K/UL — SIGNIFICANT CHANGE UP (ref 3.8–10.5)
WBC # FLD AUTO: 6.76 K/UL — SIGNIFICANT CHANGE UP (ref 3.8–10.5)

## 2021-09-09 PROCEDURE — 76000 FLUOROSCOPY <1 HR PHYS/QHP: CPT | Mod: 26,59

## 2021-09-09 PROCEDURE — 88173 CYTOPATH EVAL FNA REPORT: CPT | Mod: 26

## 2021-09-09 PROCEDURE — 74329 X-RAY FOR PANCREAS ENDOSCOPY: CPT | Mod: 26,59

## 2021-09-09 PROCEDURE — 43238 EGD US FINE NEEDLE BX/ASPIR: CPT

## 2021-09-09 PROCEDURE — 88305 TISSUE EXAM BY PATHOLOGIST: CPT | Mod: 26

## 2021-09-09 PROCEDURE — 43255 EGD CONTROL BLEEDING ANY: CPT | Mod: 59

## 2021-09-09 RX ORDER — PANTOPRAZOLE SODIUM 20 MG/1
80 TABLET, DELAYED RELEASE ORAL ONCE
Refills: 0 | Status: COMPLETED | OUTPATIENT
Start: 2021-09-09 | End: 2021-09-09

## 2021-09-09 RX ORDER — SODIUM CHLORIDE 9 MG/ML
500 INJECTION, SOLUTION INTRAVENOUS
Refills: 0 | Status: COMPLETED | OUTPATIENT
Start: 2021-09-09 | End: 2021-09-09

## 2021-09-09 RX ORDER — INDOMETHACIN 50 MG
100 CAPSULE ORAL ONCE
Refills: 0 | Status: COMPLETED | OUTPATIENT
Start: 2021-09-09 | End: 2021-09-09

## 2021-09-09 RX ORDER — PANTOPRAZOLE SODIUM 20 MG/1
8 TABLET, DELAYED RELEASE ORAL
Qty: 80 | Refills: 0 | Status: DISCONTINUED | OUTPATIENT
Start: 2021-09-09 | End: 2021-09-11

## 2021-09-09 RX ORDER — METOCLOPRAMIDE HCL 10 MG
5 TABLET ORAL ONCE
Refills: 0 | Status: COMPLETED | OUTPATIENT
Start: 2021-09-09 | End: 2021-09-09

## 2021-09-09 RX ADMIN — Medication 100 MILLIGRAM(S): at 16:59

## 2021-09-09 RX ADMIN — Medication 100 MILLIGRAM(S): at 14:47

## 2021-09-09 RX ADMIN — Medication 5 MILLIGRAM(S): at 17:32

## 2021-09-09 RX ADMIN — PANTOPRAZOLE SODIUM 10 MG/HR: 20 TABLET, DELAYED RELEASE ORAL at 17:31

## 2021-09-09 RX ADMIN — SODIUM CHLORIDE 30 MILLILITER(S): 9 INJECTION, SOLUTION INTRAVENOUS at 13:21

## 2021-09-09 RX ADMIN — PANTOPRAZOLE SODIUM 80 MILLIGRAM(S): 20 TABLET, DELAYED RELEASE ORAL at 17:31

## 2021-09-09 NOTE — CHART NOTE - NSCHARTNOTEFT_GEN_A_CORE
Patient give pantoprazole 80 mg IVP then continuous infusion for upper GI bleeding during EUS. Bleeding managed endoscopically with epinephrine injection and hemostatic clip placement. No bleeding at end of procedure. Please refer to procedure note in [place]. Interventional Radiology notified of patient in event of recurrent upper GI bleeding. Continue PPI infusion. Please keep NPO at this time.     Minnie Ness PGY-6  Gastroenterology/Hepatology Fellow  Pager #[phone]  E-mail [email or web address]  Call [phone] to speak to answering service for on-call GI fellow 5pm-7am and weekends.

## 2021-09-09 NOTE — PRE PROCEDURE NOTE - PRE PROCEDURE EVALUATION
Attending Physician:       Dr. Ralph/Dr. Canales                     Procedure:   EUS/ERCP    Indication for Procedure:   Choledocholithiasis  ________________________________________________________  PAST MEDICAL & SURGICAL HISTORY:    Arthritis rheumatoid    S/P appendectomy 1969      ALLERGIES:  No Known Allergies    HOME MEDICATIONS:  atorvastatin 10 mg oral tablet: 1 tab(s) orally once a day    NOTE: CURRENTLY ON HOLD. PATIENT LAST DOSE JULY 2021  folic acid 1 mg oral tablet: 1 tab(s) orally once a day    NOTE: CURRENTLY ON HOLD. PATIENT LAST DOSE JULY 2021  methotrexate 2.5 mg oral tablet: 6 tab(s) orally once a week wednesdays    NOTE: CURRENTLY ON HOLD. PATIENT LAST DOSE JULY 2021  Systane ophthalmic solution: 1 drop(s) to each affected eye , As Needed    AICD/PPM: [ ] yes   [ ] no    PERTINENT LAB DATA:                        11.7   4.49  )-----------( 173      ( 09 Sep 2021 07:07 )             35.4     09-09    140  |  107  |  14  ----------------------------<  103<H>  3.8   |  19<L>  |  0.77    Ca    10.1      09 Sep 2021 07:07  TPro  6.9  /  Alb  3.6  /  TBili  1.5<H>  /  DBili  0.8<H>  /  AST  158<H>  /  ALT  275<H>  /  AlkPhos  758<H>  09-09  PT/INR - ( 09 Sep 2021 07:07 )   PT: 10.7 sec;   INR: 0.89 ratio      PHYSICAL EXAMINATION:    T(C): 36.8  HR: 69  BP: 112/69  RR: 18  SpO2: 97%    Constitutional: NAD    Neck:  No JVD  Respiratory: CTAB/L  Cardiovascular: S1 and S2  Gastrointestinal: BS+, soft, NT/ND  Extremities: No peripheral edema  Neurological: A/O x 4      COMMENTS:    The patient is a suitable candidate for the planned procedure unless box checked [ ]  No, explain:

## 2021-09-09 NOTE — PROGRESS NOTE ADULT - SUBJECTIVE AND OBJECTIVE BOX
Patient is a 76y old  Female who presents with a chief complaint of     HPI:  No new symptoms  No n/v    PAST MEDICAL & SURGICAL HISTORY:  Arthritis  rheumatoid    S/P appendectomy  1969        Review of Systems:   CONSTITUTIONAL: No fever, weight loss, or fatigue  EYES: No eye pain, visual disturbances, or discharge  ENMT:  No difficulty hearing, tinnitus, vertigo; No sinus or throat pain  NECK: No pain or stiffness  BREASTS: No pain, masses, or nipple discharge  RESPIRATORY: No cough, wheezing, chills or hemoptysis; No shortness of breath  CARDIOVASCULAR: No chest pain, palpitations, dizziness, or leg swelling  GASTROINTESTINAL: Mild abdominal & epigastric pain. No nausea, vomiting, or hematemesis; No diarrhea or constipation. No melena or hematochezia.  GENITOURINARY: No dysuria, frequency, hematuria, or incontinence  NEUROLOGICAL: No headaches, memory loss, loss of strength, numbness, or tremors  SKIN: No itching, burning, rashes, or lesions   LYMPH NODES: No enlarged glands  ENDOCRINE: No heat or cold intolerance; No hair loss  MUSCULOSKELETAL: No joint pain or swelling; No muscle, back, or extremity pain  PSYCHIATRIC: No depression, anxiety, mood swings, or difficulty sleeping  HEME/LYMPH: No easy bruising, or bleeding gums  ALLERY AND IMMUNOLOGIC: No hives or eczema    Allergies    No Known Allergies    Intolerances        Social History:     FAMILY HISTORY:      MEDICATIONS  (STANDING):  heparin   Injectable 5000 Unit(s) SubCutaneous every 12 hours  influenza   Vaccine 0.5 milliLiter(s) IntraMuscular once  metoclopramide Injectable 5 milliGRAM(s) IV Push once  pantoprazole  Injectable 80 milliGRAM(s) IV Push once  pantoprazole Infusion 8 mG/Hr (10 mL/Hr) IV Continuous <Continuous>    MEDICATIONS  (PRN):  acetaminophen   Tablet .. 650 milliGRAM(s) Oral every 6 hours PRN Temp greater or equal to 38C (100.4F), Moderate Pain (4 - 6)  cholestyramine Powder (Sugar-Free) 4 Gram(s) Oral two times a day PRN Pruritis        CAPILLARY BLOOD GLUCOSE        I&O's Summary    08 Sep 2021 07:01  -  09 Sep 2021 07:00  --------------------------------------------------------  IN: 1380 mL / OUT: 0 mL / NET: 1380 mL    09 Sep 2021 07:01  -  09 Sep 2021 17:06  --------------------------------------------------------  IN: 0 mL / OUT: 0 mL / NET: 0 mL        PHYSICAL EXAM:  Vital Signs Last 24 Hrs  T(C): 36.3 (09 Sep 2021 16:05), Max: 36.8 (09 Sep 2021 04:06)  T(F): 97.3 (09 Sep 2021 16:05), Max: 98.3 (09 Sep 2021 08:48)  HR: 62 (09 Sep 2021 16:55) (61 - 76)  BP: 133/80 (09 Sep 2021 16:55) (110/66 - 148/72)  BP(mean): --  RR: 15 (09 Sep 2021 16:55) (15 - 20)  SpO2: 98% (09 Sep 2021 16:55) (97% - 99%)    GENERAL: NAD, well-developed  HEAD:  Atraumatic, Normocephalic  EYES: EOMI, PERRLA, conjunctiva and sclera clear  NECK: Supple, No JVD  CHEST/LUNG: Clear to auscultation bilaterally; No wheeze  HEART: Regular rate and rhythm; No murmurs, rubs, or gallops  ABDOMEN: Soft, Nontender, Nondistended; Bowel sounds present  EXTREMITIES:  2+ Peripheral Pulses, No clubbing, cyanosis, or edema  PSYCH: AAOx3  NEUROLOGY: non-focal  SKIN: No rashes or lesions    LABS:                        11.7   4.49  )-----------( 173      ( 09 Sep 2021 07:07 )             35.4     09-09    140  |  107  |  14  ----------------------------<  103<H>  3.8   |  19<L>  |  0.77    Ca    10.1      09 Sep 2021 07:07    TPro  6.9  /  Alb  3.6  /  TBili  1.5<H>  /  DBili  0.8<H>  /  AST  158<H>  /  ALT  275<H>  /  AlkPhos  758<H>  09-09    PT/INR - ( 09 Sep 2021 07:07 )   PT: 10.7 sec;   INR: 0.89 ratio                   RADIOLOGY & ADDITIONAL TESTS:    Imaging Personally Reviewed:    Consultant(s) Notes Reviewed:      Care Discussed with Consultants/Other Providers:

## 2021-09-10 LAB
ALBUMIN SERPL ELPH-MCNC: 3.2 G/DL — LOW (ref 3.3–5)
ALP SERPL-CCNC: 662 U/L — HIGH (ref 40–120)
ALT FLD-CCNC: 201 U/L — HIGH (ref 10–45)
ANION GAP SERPL CALC-SCNC: 16 MMOL/L — SIGNIFICANT CHANGE UP (ref 5–17)
ANION GAP SERPL CALC-SCNC: 16 MMOL/L — SIGNIFICANT CHANGE UP (ref 5–17)
AST SERPL-CCNC: 102 U/L — HIGH (ref 10–40)
BILIRUB SERPL-MCNC: 1.4 MG/DL — HIGH (ref 0.2–1.2)
BUN SERPL-MCNC: 16 MG/DL — SIGNIFICANT CHANGE UP (ref 7–23)
BUN SERPL-MCNC: 18 MG/DL — SIGNIFICANT CHANGE UP (ref 7–23)
CALCIUM SERPL-MCNC: 9.5 MG/DL — SIGNIFICANT CHANGE UP (ref 8.4–10.5)
CALCIUM SERPL-MCNC: 9.6 MG/DL — SIGNIFICANT CHANGE UP (ref 8.4–10.5)
CANCER AG19-9 SERPL-ACNC: 50 U/ML — HIGH
CHLORIDE SERPL-SCNC: 106 MMOL/L — SIGNIFICANT CHANGE UP (ref 96–108)
CHLORIDE SERPL-SCNC: 108 MMOL/L — SIGNIFICANT CHANGE UP (ref 96–108)
CO2 SERPL-SCNC: 14 MMOL/L — LOW (ref 22–31)
CO2 SERPL-SCNC: 18 MMOL/L — LOW (ref 22–31)
COVID-19 SPIKE DOMAIN AB INTERP: POSITIVE
COVID-19 SPIKE DOMAIN ANTIBODY RESULT: >250 U/ML — HIGH
CREAT SERPL-MCNC: 0.74 MG/DL — SIGNIFICANT CHANGE UP (ref 0.5–1.3)
CREAT SERPL-MCNC: 0.77 MG/DL — SIGNIFICANT CHANGE UP (ref 0.5–1.3)
GLUCOSE SERPL-MCNC: 81 MG/DL — SIGNIFICANT CHANGE UP (ref 70–99)
GLUCOSE SERPL-MCNC: 82 MG/DL — SIGNIFICANT CHANGE UP (ref 70–99)
HCT VFR BLD CALC: 31.6 % — LOW (ref 34.5–45)
HCT VFR BLD CALC: 32.1 % — LOW (ref 34.5–45)
HGB BLD-MCNC: 10.4 G/DL — LOW (ref 11.5–15.5)
HGB BLD-MCNC: 10.6 G/DL — LOW (ref 11.5–15.5)
MAGNESIUM SERPL-MCNC: 2.1 MG/DL — SIGNIFICANT CHANGE UP (ref 1.6–2.6)
MCHC RBC-ENTMCNC: 32.2 PG — SIGNIFICANT CHANGE UP (ref 27–34)
MCHC RBC-ENTMCNC: 32.3 PG — SIGNIFICANT CHANGE UP (ref 27–34)
MCHC RBC-ENTMCNC: 32.9 GM/DL — SIGNIFICANT CHANGE UP (ref 32–36)
MCHC RBC-ENTMCNC: 33 GM/DL — SIGNIFICANT CHANGE UP (ref 32–36)
MCV RBC AUTO: 97.6 FL — SIGNIFICANT CHANGE UP (ref 80–100)
MCV RBC AUTO: 98.1 FL — SIGNIFICANT CHANGE UP (ref 80–100)
NRBC # BLD: 0 /100 WBCS — SIGNIFICANT CHANGE UP (ref 0–0)
NRBC # BLD: 0 /100 WBCS — SIGNIFICANT CHANGE UP (ref 0–0)
PLATELET # BLD AUTO: 157 K/UL — SIGNIFICANT CHANGE UP (ref 150–400)
PLATELET # BLD AUTO: 168 K/UL — SIGNIFICANT CHANGE UP (ref 150–400)
POTASSIUM SERPL-MCNC: 4.5 MMOL/L — SIGNIFICANT CHANGE UP (ref 3.5–5.3)
POTASSIUM SERPL-MCNC: 5.9 MMOL/L — HIGH (ref 3.5–5.3)
POTASSIUM SERPL-SCNC: 4.5 MMOL/L — SIGNIFICANT CHANGE UP (ref 3.5–5.3)
POTASSIUM SERPL-SCNC: 5.9 MMOL/L — HIGH (ref 3.5–5.3)
PROT SERPL-MCNC: 6.7 G/DL — SIGNIFICANT CHANGE UP (ref 6–8.3)
RBC # BLD: 3.22 M/UL — LOW (ref 3.8–5.2)
RBC # BLD: 3.29 M/UL — LOW (ref 3.8–5.2)
RBC # FLD: 15.5 % — HIGH (ref 10.3–14.5)
RBC # FLD: 15.5 % — HIGH (ref 10.3–14.5)
SARS-COV-2 IGG+IGM SERPL QL IA: >250 U/ML — HIGH
SARS-COV-2 IGG+IGM SERPL QL IA: POSITIVE
SODIUM SERPL-SCNC: 138 MMOL/L — SIGNIFICANT CHANGE UP (ref 135–145)
SODIUM SERPL-SCNC: 140 MMOL/L — SIGNIFICANT CHANGE UP (ref 135–145)
WBC # BLD: 5.28 K/UL — SIGNIFICANT CHANGE UP (ref 3.8–10.5)
WBC # BLD: 6.68 K/UL — SIGNIFICANT CHANGE UP (ref 3.8–10.5)
WBC # FLD AUTO: 5.28 K/UL — SIGNIFICANT CHANGE UP (ref 3.8–10.5)
WBC # FLD AUTO: 6.68 K/UL — SIGNIFICANT CHANGE UP (ref 3.8–10.5)

## 2021-09-10 PROCEDURE — 99232 SBSQ HOSP IP/OBS MODERATE 35: CPT | Mod: GC

## 2021-09-10 RX ORDER — SODIUM CHLORIDE 9 MG/ML
1000 INJECTION INTRAMUSCULAR; INTRAVENOUS; SUBCUTANEOUS
Refills: 0 | Status: DISCONTINUED | OUTPATIENT
Start: 2021-09-10 | End: 2021-09-12

## 2021-09-10 RX ORDER — SODIUM CHLORIDE 9 MG/ML
1000 INJECTION, SOLUTION INTRAVENOUS
Refills: 0 | Status: DISCONTINUED | OUTPATIENT
Start: 2021-09-10 | End: 2021-09-10

## 2021-09-10 RX ADMIN — PANTOPRAZOLE SODIUM 10 MG/HR: 20 TABLET, DELAYED RELEASE ORAL at 21:52

## 2021-09-10 RX ADMIN — HEPARIN SODIUM 5000 UNIT(S): 5000 INJECTION INTRAVENOUS; SUBCUTANEOUS at 17:58

## 2021-09-10 RX ADMIN — SODIUM CHLORIDE 60 MILLILITER(S): 9 INJECTION INTRAMUSCULAR; INTRAVENOUS; SUBCUTANEOUS at 21:52

## 2021-09-10 RX ADMIN — HEPARIN SODIUM 5000 UNIT(S): 5000 INJECTION INTRAVENOUS; SUBCUTANEOUS at 05:12

## 2021-09-10 RX ADMIN — SODIUM CHLORIDE 60 MILLILITER(S): 9 INJECTION, SOLUTION INTRAVENOUS at 12:36

## 2021-09-10 RX ADMIN — PANTOPRAZOLE SODIUM 10 MG/HR: 20 TABLET, DELAYED RELEASE ORAL at 05:10

## 2021-09-10 NOTE — PROGRESS NOTE ADULT - ATTENDING COMMENTS
As above.    Impression:    #1.  Pancreatic mass, subtle, associated with dilated bile and pancreatic ducts, s/p EUS/FNB on 9/9/21  #2.  Failed ERCP, but no high grade biliary obstruction  #3.  Gastroesophageal junction abrasion with bleeding, s/p endoscopic hemostasis on 9/9/21.  #4.  Anemia, stable, no overt GI bleed.    Recommendations:    #1.  CT pancreas protocol to further characterize pancreas and regional vessels.  #2.  Await pathology results.

## 2021-09-10 NOTE — PROGRESS NOTE ADULT - ASSESSMENT
76 year old woman with history of L. renal cancer s/p L. nephrectomy (2019), rheumatoid arthritis (on MTX), presenting at request of outpatient Gastroenterologist for abnormal MRCP on 9/1/21.     # Hypoechoic pancreas lesion on EUS - s/p FNA x 7 passes. Awaiting pathology. Associated with dilated PD and CBD. Ddx includes pancraetic adenocarcinoma vs. neuroendocrine tumor vs. less likely pancreatic cyst (side branch or mixed IPMN)   # Upper GI bleeding secondary to gastric cardia tear pm EUS - s/p bipolar cautery and hemostatic clip placement x4. Hb unchanged post-procedure. No melena/hematemesis post-procedure.   # Rheumatoid arthritis on MTX  # History of renal CA s/p L. nephrectomy    Recommendations:  - start clear liquid diet now  - plan obtain CT pancreas protocol for further characterization of pancreas lesion  - can hydrate patient with IV fluids to mitigate risk for AMERICA prior to CT scan  - cholestyramine 4 g BID PRN before meals for pruritis  - trend CBC daily and monitor bowel movements given recent upper GI bleeding  - continue pantoprazole infusion at 8mg/hr; can transition to 40 mg IV BID tomorrow  - GI to follow      Minnie Ness PGY-6  Gastroenterology/Hepatology Fellow  Pager #[phone]  E-mail [email or web address]  Call [phone] to speak to answering service for on-call GI fellow 5pm-7am and weekends.        Minnie Ness PGY-6  Gastroenterology/Hepatology Fellow  Pager #[phone]  E-mail [email or web address]  Call [phone] to speak to answering service for on-call GI fellow 5pm-7am and weekends.

## 2021-09-10 NOTE — PROGRESS NOTE ADULT - SUBJECTIVE AND OBJECTIVE BOX
Patient is a 76y old  Female who presents with a chief complaint of     9/10/21  HPI:  No new symptoms  No n/v    PAST MEDICAL & SURGICAL HISTORY:  Arthritis  rheumatoid    S/P appendectomy  1969        Review of Systems:   CONSTITUTIONAL: No fever, weight loss, or fatigue  EYES: No eye pain, visual disturbances, or discharge  ENMT:  No difficulty hearing, tinnitus, vertigo; No sinus or throat pain  NECK: No pain or stiffness  BREASTS: No pain, masses, or nipple discharge  RESPIRATORY: No cough, wheezing, chills or hemoptysis; No shortness of breath  CARDIOVASCULAR: No chest pain, palpitations, dizziness, or leg swelling  GASTROINTESTINAL: Mild abdominal & epigastric pain. No nausea, vomiting, or hematemesis; No diarrhea or constipation. No melena or hematochezia.  GENITOURINARY: No dysuria, frequency, hematuria, or incontinence  NEUROLOGICAL: No headaches, memory loss, loss of strength, numbness, or tremors  SKIN: No itching, burning, rashes, or lesions   LYMPH NODES: No enlarged glands  ENDOCRINE: No heat or cold intolerance; No hair loss  MUSCULOSKELETAL: No joint pain or swelling; No muscle, back, or extremity pain  PSYCHIATRIC: No depression, anxiety, mood swings, or difficulty sleeping  HEME/LYMPH: No easy bruising, or bleeding gums  ALLERY AND IMMUNOLOGIC: No hives or eczema    Allergies    No Known Allergies    Intolerances        Social History:     FAMILY HISTORY:      MEDICATIONS  (STANDING):  heparin   Injectable 5000 Unit(s) SubCutaneous every 12 hours  influenza   Vaccine 0.5 milliLiter(s) IntraMuscular once  metoclopramide Injectable 5 milliGRAM(s) IV Push once  pantoprazole  Injectable 80 milliGRAM(s) IV Push once  pantoprazole Infusion 8 mG/Hr (10 mL/Hr) IV Continuous <Continuous>    MEDICATIONS  (PRN):  acetaminophen   Tablet .. 650 milliGRAM(s) Oral every 6 hours PRN Temp greater or equal to 38C (100.4F), Moderate Pain (4 - 6)  cholestyramine Powder (Sugar-Free) 4 Gram(s) Oral two times a day PRN Pruritis        CAPILLARY BLOOD GLUCOSE        I&O's Summary    08 Sep 2021 07:01  -  09 Sep 2021 07:00  --------------------------------------------------------  IN: 1380 mL / OUT: 0 mL / NET: 1380 mL    09 Sep 2021 07:01  -  09 Sep 2021 17:06  --------------------------------------------------------  IN: 0 mL / OUT: 0 mL / NET: 0 mL        PHYSICAL EXAM:  Vital Signs Last 24 Hrs  T(C): 36.3 (09 Sep 2021 16:05), Max: 36.8 (09 Sep 2021 04:06)  T(F): 97.3 (09 Sep 2021 16:05), Max: 98.3 (09 Sep 2021 08:48)  HR: 62 (09 Sep 2021 16:55) (61 - 76)  BP: 133/80 (09 Sep 2021 16:55) (110/66 - 148/72)  BP(mean): --  RR: 15 (09 Sep 2021 16:55) (15 - 20)  SpO2: 98% (09 Sep 2021 16:55) (97% - 99%)    GENERAL: NAD, well-developed  HEAD:  Atraumatic, Normocephalic  EYES: EOMI, PERRLA, conjunctiva and sclera clear  NECK: Supple, No JVD  CHEST/LUNG: Clear to auscultation bilaterally; No wheeze  HEART: Regular rate and rhythm; No murmurs, rubs, or gallops  ABDOMEN: Soft, Nontender, Nondistended; Bowel sounds present  EXTREMITIES:  2+ Peripheral Pulses, No clubbing, cyanosis, or edema  PSYCH: AAOx3  NEUROLOGY: non-focal  SKIN: No rashes or lesions    LABS:                        11.7   4.49  )-----------( 173      ( 09 Sep 2021 07:07 )             35.4     09-09    140  |  107  |  14  ----------------------------<  103<H>  3.8   |  19<L>  |  0.77    Ca    10.1      09 Sep 2021 07:07    TPro  6.9  /  Alb  3.6  /  TBili  1.5<H>  /  DBili  0.8<H>  /  AST  158<H>  /  ALT  275<H>  /  AlkPhos  758<H>  09-09    PT/INR - ( 09 Sep 2021 07:07 )   PT: 10.7 sec;   INR: 0.89 ratio                   RADIOLOGY & ADDITIONAL TESTS:    Imaging Personally Reviewed:    Consultant(s) Notes Reviewed:      Care Discussed with Consultants/Other Providers:

## 2021-09-10 NOTE — PROGRESS NOTE ADULT - SUBJECTIVE AND OBJECTIVE BOX
Chief Complaint:  Patient is a 76y old  Female who presents with a chief complaint of     Interval Events:     s/p EUS with biopsy of ill-defined small hypoechoic mass in pancreas head; grossly dilated PD and distal CBD noted; EUS complicated by bleeding managed with bipolar cautery and hemostatic clip placement - no bleeding at end of procedure   ERCP and cannulation of bile duct attempted but no successful so procedure was aborted    Patient reports one brown stool last night; denied nausea, vomiting, abdominal pain, dizziness overnight and this morning  She states she is hungry and requests to eat       Allergies:  No Known Allergies      Hospital Medications:  acetaminophen   Tablet .. 650 milliGRAM(s) Oral every 6 hours PRN  cholestyramine Powder (Sugar-Free) 4 Gram(s) Oral two times a day PRN  dextrose 5% + sodium chloride 0.9%. 1000 milliLiter(s) IV Continuous <Continuous>  heparin   Injectable 5000 Unit(s) SubCutaneous every 12 hours  influenza   Vaccine 0.5 milliLiter(s) IntraMuscular once  pantoprazole Infusion 8 mG/Hr IV Continuous <Continuous>      PMHX/PSHX:  Arthritis    S/P appendectomy        Family history:      ROS:     General:  No weight loss, fevers, chills, night sweats, fatigue   Eyes:  No vision changes  ENT:  No sore throat, pain, runny nose  CV:  No chest pain, palpitations, dizziness   Resp:  No SOB, cough, wheezing  GI:  See HPI  :  No burning with urination, hematuria  Muscle:  No pain, weakness  Neuro:  No weakness/tingling, memory problems  Psych:  No fatigue, insomnia, mood problems, depression  Heme:  No easy bruisability  Skin:  No rash, edema      PHYSICAL EXAM:     GENERAL:  Appears stated age, well-groomed, non-toxic  HEENT:  Conjunctivae clear and pink,  no scleral icterus  CHEST:  Normal respiratory effort  HEART:  Regular rhythm & rate  ABDOMEN:  Soft,  non-distended +RUQ/epigastrium mildly TTP  EXTREMITIES:  no LE edema  SKIN:  No rash/erythema/ecchymoses/petechiae +healed laparoscopic scars over abdomen  NEURO:  Alert, orientedx3      Vital Signs:  Vital Signs Last 24 Hrs  T(C): 36.7 (10 Sep 2021 05:13), Max: 36.9 (09 Sep 2021 19:56)  T(F): 98.1 (10 Sep 2021 05:13), Max: 98.4 (09 Sep 2021 19:56)  HR: 69 (10 Sep 2021 05:13) (61 - 76)  BP: 116/63 (10 Sep 2021 05:13) (116/63 - 148/72)  BP(mean): --  RR: 16 (10 Sep 2021 05:13) (15 - 20)  SpO2: 97% (10 Sep 2021 05:13) (97% - 99%)  Daily Height in cm: 154.94 (09 Sep 2021 12:10)    Daily     LABS:                        10.6   6.68  )-----------( 157      ( 10 Sep 2021 11:15 )             32.1     09-10    138  |  108  |  16  ----------------------------<  81  5.9<H>   |  14<L>  |  0.74    Ca    9.5      10 Sep 2021 07:25    TPro  6.7  /  Alb  3.2<L>  /  TBili  1.4<H>  /  DBili  x   /  AST  102<H>  /  ALT  201<H>  /  AlkPhos  662<H>  09-10    LIVER FUNCTIONS - ( 10 Sep 2021 07:25 )  Alb: 3.2 g/dL / Pro: 6.7 g/dL / ALK PHOS: 662 U/L / ALT: 201 U/L / AST: 102 U/L / GGT: x           PT/INR - ( 09 Sep 2021 07:07 )   PT: 10.7 sec;   INR: 0.89 ratio        Imaging:  < from: Upper EUS (09.09.21 @ 12:58) >  [facility]  ____________________________________________________________________________________________________  Patient Name: Lucas Milton                          MRN: [id number]  Account Number: [id number]                     YOB: 1945  Room: Endoscopy Room 4                           Gender: Female  Attending MD: Tashia Ralph MD               Procedure Date No Time: 9/9/2021  ____________________________________________________________________________________________________     Procedure:           Upper EUS  Indications:         Dilated CBD and PD without obvious mass on MRCP  Providers:           Tashia Ralph MD, Minnie Ness (Fellow), Jian Canales MD  Referring MD:        Cyn Leal MD; Inpatient  Medicines:           Propofol per Anesthesia, Levaquin 500mg IV, Fluoro 1.0min  Complications:       No immediate complications.  ____________________________________________________________________________________________________    < end of copied text >  < from: Upper EUS (09.09.21 @ 12:58) >  Findings:       EGD: :       The upper and mid esophagus was normal. The distal esophagus was sigmoid shaped.       The entire examined stomach was normal.       The examined duodenum was normal.       EUS:       The exam was performed with a linear echoendoscope.       There was a very subtle, ill-defined hypoechoic area in the head of the pancreas that        measured approximately 1.7cmx1.8cm in diameter. The CBD had an abrupt cutoff at this level        and had significant proximal dilation (up to 1.8cm). The PD also appeared to end abruptly at        this area and was significantly dilated as well. The lesion appeared adjacent to the portal        vein but did not involve the vessel. The SMV and splenic vein at the level of the the       confluence were not able to be visualized.       The pancreatic parenchyma throughout was heterogeneous and the PD was dilated throughout.        There was a hypoechoic region around the duct.       The examined portions of the liver, spleen, andkidneys appeared normal.       There were no suspicious appearing lymph nodes.       After ensuring an avascular path, the pancreatic head lesion was biopsied for cytology with a        25g FNB Acquire needle x 7 passes. There was visible tissue acquisition.       ERCP:       The  film was normal.       The duodenoscope was passed to the 2nd portion of the duodenum to the major papilla, which        was normal but covered by a howell/duodenal fold.       Bile was freely flowing from the ampulla. Initial attempts at biliary cannulation with the        3.9F sphinctertome resulted in preferential PD cannulation. Further attempts to cannulate the        bile duct were not made as bile was clearly flowing freely and her liver tests were only       mildly elevated       Upon withdrawl of the duodenoscope, there was a large amount of clotted blood seen in the        stomach. Thus, repeat EGD was planned.       EGD:       The upper endoscope was passed through the normal appearing esophagus into the stomach, which        a large amount of clotted blood was seen.       The stomach was carefully examined and lavaged. An actively spurting vessel was seen just        distal to the GEJ in the gastric cardia (lesser curvature). This was only able to be        visualized in a torqued forward viewing position and was not seen in retroflexion. 4 clips        were deployed and the site was burned with bipolar cautery, eventually resulting in cessation        of bleeding.       The liquid from the stomach was suctioned, however the clots remained.                                                                                                        Impression:          - Subtle pancreatic head hypoechoic lesion (1.7x1.8cm) with abrupt cutoffof                        CBD and PD. Biopsied for histology with FNB needle x 7 passes. Adjacent to                        but not involving portal vein. SMV and SV at the confluence were not able to                        be seen.      - Hypoechoic, heterogeneous pancreatic parenchyma with dilated PD throughout.                       - Grossly dilated CBD proximal to the pancreatic head.                       - No other abnormalities seen on EUS.                       - CBD stent not placed. Bile seen flowing freely from ampulla.                       - Actively spurting arterial bleed from gastric cardia just distal to GEJ.                        Hemostasis acheived with clips and cautery.  Recommendation:      - Return patient to hospital [facility] for ongoing care.                       - Follow up pancreatic biopsy results.                       - Follow liver tests; if rising significantly can re-evaluate for ERCP/CBD                        stent.                       - For gastric bleeding vessel, trend Hgb, keep NPO and start high dose PPI.                        If rebleeds (hemodynamic instability, bright red hematemesis, dropping Hgb,                        significant melena), please call IR for empiric embolization (IR aware).                                                                                                        Attending Participation:       I personally performed the entire procedure.                                                      < end of copied text >

## 2021-09-11 LAB
ALBUMIN SERPL ELPH-MCNC: 3.1 G/DL — LOW (ref 3.3–5)
ALP SERPL-CCNC: 558 U/L — HIGH (ref 40–120)
ALT FLD-CCNC: 156 U/L — HIGH (ref 10–45)
ANION GAP SERPL CALC-SCNC: 11 MMOL/L — SIGNIFICANT CHANGE UP (ref 5–17)
AST SERPL-CCNC: 83 U/L — HIGH (ref 10–40)
BILIRUB SERPL-MCNC: 1.2 MG/DL — SIGNIFICANT CHANGE UP (ref 0.2–1.2)
BUN SERPL-MCNC: 17 MG/DL — SIGNIFICANT CHANGE UP (ref 7–23)
CALCIUM SERPL-MCNC: 9 MG/DL — SIGNIFICANT CHANGE UP (ref 8.4–10.5)
CHLORIDE SERPL-SCNC: 112 MMOL/L — HIGH (ref 96–108)
CO2 SERPL-SCNC: 21 MMOL/L — LOW (ref 22–31)
CREAT SERPL-MCNC: 0.92 MG/DL — SIGNIFICANT CHANGE UP (ref 0.5–1.3)
GLUCOSE SERPL-MCNC: 96 MG/DL — SIGNIFICANT CHANGE UP (ref 70–99)
HCT VFR BLD CALC: 31.4 % — LOW (ref 34.5–45)
HGB BLD-MCNC: 10.3 G/DL — LOW (ref 11.5–15.5)
MCHC RBC-ENTMCNC: 32.6 PG — SIGNIFICANT CHANGE UP (ref 27–34)
MCHC RBC-ENTMCNC: 32.8 GM/DL — SIGNIFICANT CHANGE UP (ref 32–36)
MCV RBC AUTO: 99.4 FL — SIGNIFICANT CHANGE UP (ref 80–100)
NRBC # BLD: 0 /100 WBCS — SIGNIFICANT CHANGE UP (ref 0–0)
PLATELET # BLD AUTO: 173 K/UL — SIGNIFICANT CHANGE UP (ref 150–400)
POTASSIUM SERPL-MCNC: 4.3 MMOL/L — SIGNIFICANT CHANGE UP (ref 3.5–5.3)
POTASSIUM SERPL-SCNC: 4.3 MMOL/L — SIGNIFICANT CHANGE UP (ref 3.5–5.3)
PROT SERPL-MCNC: 6 G/DL — SIGNIFICANT CHANGE UP (ref 6–8.3)
RBC # BLD: 3.16 M/UL — LOW (ref 3.8–5.2)
RBC # FLD: 15.6 % — HIGH (ref 10.3–14.5)
SODIUM SERPL-SCNC: 144 MMOL/L — SIGNIFICANT CHANGE UP (ref 135–145)
WBC # BLD: 4.51 K/UL — SIGNIFICANT CHANGE UP (ref 3.8–10.5)
WBC # FLD AUTO: 4.51 K/UL — SIGNIFICANT CHANGE UP (ref 3.8–10.5)

## 2021-09-11 PROCEDURE — 74177 CT ABD & PELVIS W/CONTRAST: CPT | Mod: 26

## 2021-09-11 PROCEDURE — 99232 SBSQ HOSP IP/OBS MODERATE 35: CPT | Mod: GC

## 2021-09-11 RX ORDER — PANTOPRAZOLE SODIUM 20 MG/1
40 TABLET, DELAYED RELEASE ORAL
Refills: 0 | Status: DISCONTINUED | OUTPATIENT
Start: 2021-09-11 | End: 2021-09-17

## 2021-09-11 RX ADMIN — PANTOPRAZOLE SODIUM 10 MG/HR: 20 TABLET, DELAYED RELEASE ORAL at 05:35

## 2021-09-11 RX ADMIN — HEPARIN SODIUM 5000 UNIT(S): 5000 INJECTION INTRAVENOUS; SUBCUTANEOUS at 17:49

## 2021-09-11 RX ADMIN — SODIUM CHLORIDE 60 MILLILITER(S): 9 INJECTION INTRAMUSCULAR; INTRAVENOUS; SUBCUTANEOUS at 05:35

## 2021-09-11 RX ADMIN — HEPARIN SODIUM 5000 UNIT(S): 5000 INJECTION INTRAVENOUS; SUBCUTANEOUS at 05:37

## 2021-09-11 NOTE — PROGRESS NOTE ADULT - SUBJECTIVE AND OBJECTIVE BOX
Patient is a 76y old  Female who presents with a chief complaint of     9/11/21  HPI:  No new symptoms  No n/v    PAST MEDICAL & SURGICAL HISTORY:  Arthritis  rheumatoid    S/P appendectomy  1969        Review of Systems:   CONSTITUTIONAL: No fever, weight loss, or fatigue  EYES: No eye pain, visual disturbances, or discharge  ENMT:  No difficulty hearing, tinnitus, vertigo; No sinus or throat pain  NECK: No pain or stiffness  BREASTS: No pain, masses, or nipple discharge  RESPIRATORY: No cough, wheezing, chills or hemoptysis; No shortness of breath  CARDIOVASCULAR: No chest pain, palpitations, dizziness, or leg swelling  GASTROINTESTINAL: Mild abdominal & epigastric pain. No nausea, vomiting, or hematemesis; No diarrhea or constipation. No melena or hematochezia.  GENITOURINARY: No dysuria, frequency, hematuria, or incontinence  NEUROLOGICAL: No headaches, memory loss, loss of strength, numbness, or tremors  SKIN: No itching, burning, rashes, or lesions   LYMPH NODES: No enlarged glands  ENDOCRINE: No heat or cold intolerance; No hair loss  MUSCULOSKELETAL: No joint pain or swelling; No muscle, back, or extremity pain  PSYCHIATRIC: No depression, anxiety, mood swings, or difficulty sleeping  HEME/LYMPH: No easy bruising, or bleeding gums  ALLERY AND IMMUNOLOGIC: No hives or eczema    Allergies    No Known Allergies    Intolerances        Social History:     FAMILY HISTORY:      MEDICATIONS  (STANDING):  heparin   Injectable 5000 Unit(s) SubCutaneous every 12 hours  influenza   Vaccine 0.5 milliLiter(s) IntraMuscular once  metoclopramide Injectable 5 milliGRAM(s) IV Push once  pantoprazole  Injectable 80 milliGRAM(s) IV Push once  pantoprazole Infusion 8 mG/Hr (10 mL/Hr) IV Continuous <Continuous>    MEDICATIONS  (PRN):  acetaminophen   Tablet .. 650 milliGRAM(s) Oral every 6 hours PRN Temp greater or equal to 38C (100.4F), Moderate Pain (4 - 6)  cholestyramine Powder (Sugar-Free) 4 Gram(s) Oral two times a day PRN Pruritis        CAPILLARY BLOOD GLUCOSE        I&O's Summary    08 Sep 2021 07:01  -  09 Sep 2021 07:00  --------------------------------------------------------  IN: 1380 mL / OUT: 0 mL / NET: 1380 mL    09 Sep 2021 07:01  -  09 Sep 2021 17:06  --------------------------------------------------------  IN: 0 mL / OUT: 0 mL / NET: 0 mL        PHYSICAL EXAM:  Vital Signs Last 24 Hrs  T(C): 36.3 (09 Sep 2021 16:05), Max: 36.8 (09 Sep 2021 04:06)  T(F): 97.3 (09 Sep 2021 16:05), Max: 98.3 (09 Sep 2021 08:48)  HR: 62 (09 Sep 2021 16:55) (61 - 76)  BP: 133/80 (09 Sep 2021 16:55) (110/66 - 148/72)  BP(mean): --  RR: 15 (09 Sep 2021 16:55) (15 - 20)  SpO2: 98% (09 Sep 2021 16:55) (97% - 99%)    GENERAL: NAD, well-developed  HEAD:  Atraumatic, Normocephalic  EYES: EOMI, PERRLA, conjunctiva and sclera clear  NECK: Supple, No JVD  CHEST/LUNG: Clear to auscultation bilaterally; No wheeze  HEART: Regular rate and rhythm; No murmurs, rubs, or gallops  ABDOMEN: Soft, Nontender, Nondistended; Bowel sounds present  EXTREMITIES:  2+ Peripheral Pulses, No clubbing, cyanosis, or edema  PSYCH: AAOx3  NEUROLOGY: non-focal  SKIN: No rashes or lesions    LABS:                        11.7   4.49  )-----------( 173      ( 09 Sep 2021 07:07 )             35.4     09-09    140  |  107  |  14  ----------------------------<  103<H>  3.8   |  19<L>  |  0.77    Ca    10.1      09 Sep 2021 07:07    TPro  6.9  /  Alb  3.6  /  TBili  1.5<H>  /  DBili  0.8<H>  /  AST  158<H>  /  ALT  275<H>  /  AlkPhos  758<H>  09-09    PT/INR - ( 09 Sep 2021 07:07 )   PT: 10.7 sec;   INR: 0.89 ratio                   RADIOLOGY & ADDITIONAL TESTS:    Imaging Personally Reviewed:    Consultant(s) Notes Reviewed:      Care Discussed with Consultants/Other Providers:

## 2021-09-11 NOTE — PROGRESS NOTE ADULT - ATTENDING COMMENTS
76 year old woman with history of L. renal cancer s/p L. nephrectomy (2019), rheumatoid arthritis (on MTX), who presented with jaundice and abnormal MRCP as outpatient (double duct sign).  S/p EGD/EUS/attempted ERCP with subtle pancreatic head mass s/p FNB and hemostatic therapy of bleeding GEJ vessel.   TB remains <2 despite elevated liver enzymes and dilated ducts.  Pending panc protocol CT for better characterization of pancreatic lesion as well as path results from biopsy.  Can consider repeat ERCP attempt as needed down the road if total bilirubin rises.  Will need medical and surgical oncology consultations.  OK to advance diet as tolerated.    Thank you for this interesting consult.  Please call the advanced GI service with any questions or concerns.

## 2021-09-11 NOTE — PROGRESS NOTE ADULT - ASSESSMENT
76 year old woman with history of L. renal cancer s/p L. nephrectomy (2019), rheumatoid arthritis (on MTX), presenting at request of outpatient Gastroenterologist for abnormal MRCP on 9/1/21.     # Hypoechoic pancreas lesion on EUS - s/p FNA x 7 passes. Awaiting pathology. Associated with dilated PD and CBD. Ddx includes pancraetic adenocarcinoma vs. neuroendocrine tumor vs. less likely pancreatic cyst (side branch or mixed IPMN)   # Upper GI bleeding secondary to gastric cardia tear pm EUS - s/p bipolar cautery and hemostatic clip placement x4. Hb unchanged post-procedure. No melena/hematemesis post-procedure.   # Rheumatoid arthritis on MTX  # History of renal CA s/p L. nephrectomy    Recommendations:  -continue clear liquid diet  - obtain CT pancreas protocol for further characterization of pancreas lesion  (please call to expedite today)  - can hydrate patient with IV fluids to mitigate risk for [place] prior to CT scan  - cholestyramine 4 g BID PRN before meals for pruritis  - trend CBC daily and monitor bowel movements given recent upper GI bleeding  - on pantoprazole infusion at 8mg/hr --> can transition to 40 mg IV BID today  - GI to follow              Thank you for involving us in the care of this patient, please reach out if any further questions.     Ashwin Navarro MD  Gastroenterology Fellow, PGY5  Available on Microsoft Teams  [phone] ([facility])  [id number] ([facility])  Please contact on call fellow weekdays after 5pm-7am and weekends: [phone]

## 2021-09-11 NOTE — PROGRESS NOTE ADULT - SUBJECTIVE AND OBJECTIVE BOX
Chief Complaint:  Patient is a 76y old  Female who presents with a chief complaint of     Interval Events:     NAEON, afebrile HD stable  Denies BM today, previously had 1 brown BM. Denies abd pain, n/v/d  Liver enzymes downtrending    s/p EUS with biopsy of ill-defined small hypoechoic mass in pancreas head; grossly dilated PD and distal CBD noted; EUS complicated by bleeding managed with bipolar cautery and hemostatic clip placement - no bleeding at end of procedure   ERCP and cannulation of bile duct attempted but no successful so procedure was aborted  Awaiting CT pancreas protocol      Allergies:  No Known Allergies      Hospital Medications:  acetaminophen   Tablet .. 650 milliGRAM(s) Oral every 6 hours PRN  cholestyramine Powder (Sugar-Free) 4 Gram(s) Oral two times a day PRN  dextrose 5% + sodium chloride 0.9%. 1000 milliLiter(s) IV Continuous <Continuous>  heparin   Injectable 5000 Unit(s) SubCutaneous every 12 hours  influenza   Vaccine 0.5 milliLiter(s) IntraMuscular once  pantoprazole Infusion 8 mG/Hr IV Continuous <Continuous>      PMHX/PSHX:  Arthritis    S/P appendectomy        Family history:      ROS:     General:  No weight loss, fevers, chills, night sweats, fatigue   Eyes:  No vision changes  ENT:  No sore throat, pain, runny nose  CV:  No chest pain, palpitations, dizziness   Resp:  No SOB, cough, wheezing  GI:  See HPI  :  No burning with urination, hematuria  Muscle:  No pain, weakness  Neuro:  No weakness/tingling, memory problems  Psych:  No fatigue, insomnia, mood problems, depression  Heme:  No easy bruisability  Skin:  No rash, edema      PHYSICAL EXAM:   Vital Signs Last 24 Hrs  T(C): 36.7 (11 Sep 2021 04:30), Max: 36.8 (10 Sep 2021 14:49)  T(F): 98 (11 Sep 2021 04:30), Max: 98.3 (10 Sep 2021 14:49)  HR: 62 (11 Sep 2021 04:30) (62 - 78)  BP: 115/67 (11 Sep 2021 04:30) (115/67 - 129/69)  BP(mean): --  RR: 18 (11 Sep 2021 04:30) (18 - 18)  SpO2: 98% (11 Sep 2021 04:30) (97% - 98%)    GENERAL:  Appears stated age, well-groomed, non-toxic  HEENT:  Conjunctivae clear and pink,  no scleral icterus  CHEST:  Normal respiratory effort  HEART:  Regular rhythm & rate  ABDOMEN:  Soft,  non-distended +RUQ/epigastrium mildly TTP  EXTREMITIES:  no LE edema  SKIN:  No rash/erythema/ecchymoses/petechiae +healed laparoscopic scars over abdomen  NEURO:  Alert, orientedx3      LABS:                        10.3   4.51  )-----------( 173      ( 11 Sep 2021 07:30 )             31.4     09-11    144  |  112<H>  |  17  ----------------------------<  96  4.3   |  21<L>  |  0.92    Ca    9.0      11 Sep 2021 07:30  Mg     2.1     09-10    TPro  6.0  /  Alb  3.1<L>  /  TBili  1.2  /  DBili  x   /  AST  83<H>  /  ALT  156<H>  /  AlkPhos  558<H>  09-11    LIVER FUNCTIONS - ( 11 Sep 2021 07:30 )  Alb: 3.1 g/dL / Pro: 6.0 g/dL / ALK PHOS: 558 U/L / ALT: 156 U/L / AST: 83 U/L / GGT: x                             Imaging:  < from: Upper EUS (09.09.21 @ 12:58) >  [facility]  ____________________________________________________________________________________________________  Patient Name: Lucas Milton                          MRN: [id number]  Account Number: [id number]                     YOB: 1945  Room: Endoscopy Room 4                           Gender: Female  Attending MD: Tashia Ralph MD               Procedure Date No Time: 9/9/2021  ____________________________________________________________________________________________________     Procedure:           Upper EUS  Indications:         Dilated CBD and PD without obvious mass on MRCP  Providers:           Tashia Ralph MD, Minnie Ness (Fellow), Jian Canales MD  Referring MD:        Cyn Leal MD; Inpatient  Medicines:           Propofol per Anesthesia, Levaquin 500mg IV, Fluoro 1.0min  Complications:       No immediate complications.  ____________________________________________________________________________________________________    < end of copied text >  < from: Upper EUS (09.09.21 @ 12:58) >  Findings:       EGD: :       The upper and mid esophagus was normal. The distal esophagus was sigmoid shaped.       The entire examined stomach was normal.       The examined duodenum was normal.       EUS:       The exam was performed with a linear echoendoscope.       There was a very subtle, ill-defined hypoechoic area in the head of the pancreas that        measured approximately 1.7cmx1.8cm in diameter. The CBD had an abrupt cutoff at this level        and had significant proximal dilation (up to 1.8cm). The PD also appeared to end abruptly at        this area and was significantly dilated as well. The lesion appeared adjacent to the portal        vein but did not involve the vessel. The SMV and splenic vein at the level of the the       confluence were not able to be visualized.       The pancreatic parenchyma throughout was heterogeneous and the PD was dilated throughout.        There was a hypoechoic region around the duct.       The examined portions of the liver, spleen, andkidneys appeared normal.       There were no suspicious appearing lymph nodes.       After ensuring an avascular path, the pancreatic head lesion was biopsied for cytology with a        25g FNB Acquire needle x 7 passes. There was visible tissue acquisition.       ERCP:       The  film was normal.       The duodenoscope was passed to the 2nd portion of the duodenum to the major papilla, which        was normal but covered by a howell/duodenal fold.       Bile was freely flowing from the ampulla. Initial attempts at biliary cannulation with the        3.9F sphinctertome resulted in preferential PD cannulation. Further attempts to cannulate the        bile duct were not made as bile was clearly flowing freely and her liver tests were only       mildly elevated       Upon withdrawl of the duodenoscope, there was a large amount of clotted blood seen in the        stomach. Thus, repeat EGD was planned.       EGD:       The upper endoscope was passed through the normal appearing esophagus into the stomach, which        a large amount of clotted blood was seen.       The stomach was carefully examined and lavaged. An actively spurting vessel was seen just        distal to the GEJ in the gastric cardia (lesser curvature). This was only able to be        visualized in a torqued forward viewing position and was not seen in retroflexion. 4 clips        were deployed and the site was burned with bipolar cautery, eventually resulting in cessation        of bleeding.       The liquid from the stomach was suctioned, however the clots remained.                                                                                                        Impression:          - Subtle pancreatic head hypoechoic lesion (1.7x1.8cm) with abrupt cutoffof                        CBD and PD. Biopsied for histology with FNB needle x 7 passes. Adjacent to                        but not involving portal vein. SMV and SV at the confluence were not able to                        be seen.      - Hypoechoic, heterogeneous pancreatic parenchyma with dilated PD throughout.                       - Grossly dilated CBD proximal to the pancreatic head.                       - No other abnormalities seen on EUS.                       - CBD stent not placed. Bile seen flowing freely from ampulla.                       - Actively spurting arterial bleed from gastric cardia just distal to GEJ.                        Hemostasis acheived with clips and cautery.  Recommendation:      - Return patient to hospital [facility] for ongoing care.                       - Follow up pancreatic biopsy results.                       - Follow liver tests; if rising significantly can re-evaluate for ERCP/CBD                        stent.                       - For gastric bleeding vessel, trend Hgb, keep NPO and start high dose PPI.                        If rebleeds (hemodynamic instability, bright red hematemesis, dropping Hgb,                        significant melena), please call IR for empiric embolization (IR aware).                                                                                                        Attending Participation:       I personally performed the entire procedure.                                                      < end of copied text >

## 2021-09-12 LAB
ALBUMIN SERPL ELPH-MCNC: 3.2 G/DL — LOW (ref 3.3–5)
ALP SERPL-CCNC: 511 U/L — HIGH (ref 40–120)
ALT FLD-CCNC: 140 U/L — HIGH (ref 10–45)
ANION GAP SERPL CALC-SCNC: 11 MMOL/L — SIGNIFICANT CHANGE UP (ref 5–17)
AST SERPL-CCNC: 75 U/L — HIGH (ref 10–40)
BILIRUB SERPL-MCNC: 1 MG/DL — SIGNIFICANT CHANGE UP (ref 0.2–1.2)
BUN SERPL-MCNC: 12 MG/DL — SIGNIFICANT CHANGE UP (ref 7–23)
CALCIUM SERPL-MCNC: 9.1 MG/DL — SIGNIFICANT CHANGE UP (ref 8.4–10.5)
CHLORIDE SERPL-SCNC: 110 MMOL/L — HIGH (ref 96–108)
CO2 SERPL-SCNC: 23 MMOL/L — SIGNIFICANT CHANGE UP (ref 22–31)
CREAT SERPL-MCNC: 0.83 MG/DL — SIGNIFICANT CHANGE UP (ref 0.5–1.3)
GLUCOSE SERPL-MCNC: 103 MG/DL — HIGH (ref 70–99)
POTASSIUM SERPL-MCNC: 3.5 MMOL/L — SIGNIFICANT CHANGE UP (ref 3.5–5.3)
POTASSIUM SERPL-SCNC: 3.5 MMOL/L — SIGNIFICANT CHANGE UP (ref 3.5–5.3)
PROT SERPL-MCNC: 6.1 G/DL — SIGNIFICANT CHANGE UP (ref 6–8.3)
SODIUM SERPL-SCNC: 144 MMOL/L — SIGNIFICANT CHANGE UP (ref 135–145)

## 2021-09-12 RX ORDER — SODIUM CHLORIDE 9 MG/ML
1000 INJECTION INTRAMUSCULAR; INTRAVENOUS; SUBCUTANEOUS
Refills: 0 | Status: DISCONTINUED | OUTPATIENT
Start: 2021-09-12 | End: 2021-09-13

## 2021-09-12 RX ORDER — SODIUM CHLORIDE 9 MG/ML
1000 INJECTION INTRAMUSCULAR; INTRAVENOUS; SUBCUTANEOUS
Refills: 0 | Status: COMPLETED | OUTPATIENT
Start: 2021-09-12 | End: 2022-08-11

## 2021-09-12 RX ADMIN — HEPARIN SODIUM 5000 UNIT(S): 5000 INJECTION INTRAVENOUS; SUBCUTANEOUS at 05:32

## 2021-09-12 RX ADMIN — PANTOPRAZOLE SODIUM 40 MILLIGRAM(S): 20 TABLET, DELAYED RELEASE ORAL at 05:30

## 2021-09-12 RX ADMIN — SODIUM CHLORIDE 60 MILLILITER(S): 9 INJECTION INTRAMUSCULAR; INTRAVENOUS; SUBCUTANEOUS at 12:45

## 2021-09-12 RX ADMIN — HEPARIN SODIUM 5000 UNIT(S): 5000 INJECTION INTRAVENOUS; SUBCUTANEOUS at 17:21

## 2021-09-12 RX ADMIN — PANTOPRAZOLE SODIUM 40 MILLIGRAM(S): 20 TABLET, DELAYED RELEASE ORAL at 17:21

## 2021-09-13 LAB
ALBUMIN SERPL ELPH-MCNC: 3.4 G/DL — SIGNIFICANT CHANGE UP (ref 3.3–5)
ALP SERPL-CCNC: 482 U/L — HIGH (ref 40–120)
ALT FLD-CCNC: 127 U/L — HIGH (ref 10–45)
ANION GAP SERPL CALC-SCNC: 11 MMOL/L — SIGNIFICANT CHANGE UP (ref 5–17)
AST SERPL-CCNC: 84 U/L — HIGH (ref 10–40)
BILIRUB SERPL-MCNC: 1 MG/DL — SIGNIFICANT CHANGE UP (ref 0.2–1.2)
BUN SERPL-MCNC: 9 MG/DL — SIGNIFICANT CHANGE UP (ref 7–23)
CALCIUM SERPL-MCNC: 9.6 MG/DL — SIGNIFICANT CHANGE UP (ref 8.4–10.5)
CHLORIDE SERPL-SCNC: 107 MMOL/L — SIGNIFICANT CHANGE UP (ref 96–108)
CO2 SERPL-SCNC: 23 MMOL/L — SIGNIFICANT CHANGE UP (ref 22–31)
CREAT SERPL-MCNC: 0.79 MG/DL — SIGNIFICANT CHANGE UP (ref 0.5–1.3)
GLUCOSE SERPL-MCNC: 102 MG/DL — HIGH (ref 70–99)
HCT VFR BLD CALC: 33.4 % — LOW (ref 34.5–45)
HGB BLD-MCNC: 10.8 G/DL — LOW (ref 11.5–15.5)
MCHC RBC-ENTMCNC: 32.3 GM/DL — SIGNIFICANT CHANGE UP (ref 32–36)
MCHC RBC-ENTMCNC: 32.3 PG — SIGNIFICANT CHANGE UP (ref 27–34)
MCV RBC AUTO: 100 FL — SIGNIFICANT CHANGE UP (ref 80–100)
NRBC # BLD: 0 /100 WBCS — SIGNIFICANT CHANGE UP (ref 0–0)
PLATELET # BLD AUTO: 167 K/UL — SIGNIFICANT CHANGE UP (ref 150–400)
POTASSIUM SERPL-MCNC: 3.6 MMOL/L — SIGNIFICANT CHANGE UP (ref 3.5–5.3)
POTASSIUM SERPL-SCNC: 3.6 MMOL/L — SIGNIFICANT CHANGE UP (ref 3.5–5.3)
PROT SERPL-MCNC: 6.2 G/DL — SIGNIFICANT CHANGE UP (ref 6–8.3)
RBC # BLD: 3.34 M/UL — LOW (ref 3.8–5.2)
RBC # FLD: 15 % — HIGH (ref 10.3–14.5)
SODIUM SERPL-SCNC: 141 MMOL/L — SIGNIFICANT CHANGE UP (ref 135–145)
WBC # BLD: 4.26 K/UL — SIGNIFICANT CHANGE UP (ref 3.8–10.5)
WBC # FLD AUTO: 4.26 K/UL — SIGNIFICANT CHANGE UP (ref 3.8–10.5)

## 2021-09-13 PROCEDURE — 99232 SBSQ HOSP IP/OBS MODERATE 35: CPT | Mod: GC

## 2021-09-13 PROCEDURE — 99223 1ST HOSP IP/OBS HIGH 75: CPT

## 2021-09-13 PROCEDURE — 71250 CT THORAX DX C-: CPT | Mod: 26

## 2021-09-13 RX ADMIN — HEPARIN SODIUM 5000 UNIT(S): 5000 INJECTION INTRAVENOUS; SUBCUTANEOUS at 05:16

## 2021-09-13 RX ADMIN — HEPARIN SODIUM 5000 UNIT(S): 5000 INJECTION INTRAVENOUS; SUBCUTANEOUS at 17:46

## 2021-09-13 RX ADMIN — PANTOPRAZOLE SODIUM 40 MILLIGRAM(S): 20 TABLET, DELAYED RELEASE ORAL at 05:15

## 2021-09-13 RX ADMIN — SODIUM CHLORIDE 60 MILLILITER(S): 9 INJECTION INTRAMUSCULAR; INTRAVENOUS; SUBCUTANEOUS at 05:51

## 2021-09-13 RX ADMIN — PANTOPRAZOLE SODIUM 40 MILLIGRAM(S): 20 TABLET, DELAYED RELEASE ORAL at 17:46

## 2021-09-13 NOTE — PROGRESS NOTE ADULT - ASSESSMENT
76 F with history of L. renal cancer s/p L. nephrectomy (2019), rheumatoid arthritis (on MTX), presenting at request of outpatient Gastroenterologist for abnormal MRCP on 9/1/21.     Impression:  # Hypoechoic pancreas lesion on EUS - s/p FNA x 7 passes. Awaiting pathology. Associated with dilated PD and CBD. Ddx includes pancraetic adenocarcinoma vs. neuroendocrine tumor vs. less likely pancreatic cyst (side branch or mixed IPMN)   # Upper GI bleeding secondary to gastric cardia tear pm EUS - s/p bipolar cautery and hemostatic clip placement x4. Hb unchanged post-procedure. No melena/hematemesis post-procedure.   # Rheumatoid arthritis on MTX  # History of renal CA s/p L. nephrectomy    Recommendations:  - Diet as tolerated  - PPI BID  - Cholestyramine 4 g BID PRN before meals for pruritis  - F/u pathology  - Will need med onc and surg onc f/u   - If bilirubin rises can consider repeat attempt at ERCP if necessary. Would not plan for it at this point given stable bilirubin.     Ben Root  Gastroenterology/Hepatology Fellow  Available via [employer] Teams    NON-URGENT CONSULTS:  Please email [email or web address]  AT NIGHT AND ON WEEKENDS:  Contact on-call GI fellow via answering service ([phone]) from 5pm-8am and on weekends/holidays  MONDAY-FRIDAY 8AM-5PM:  Pager# [phone] ([facility]) or [phone] ([facility])  GI Phone# [phone] ([facility])

## 2021-09-13 NOTE — DIETITIAN INITIAL EVALUATION ADULT. - CONTINUE CURRENT NUTRITION CARE PLAN
Continue soft diet. Defer diet/fluid consistencies to medical team/pt's preference. Will continue to monitor as able and adjust as needed./yes

## 2021-09-13 NOTE — PROGRESS NOTE ADULT - SUBJECTIVE AND OBJECTIVE BOX
Chief Complaint:  Patient is a 76y old  Female who presents with a chief complaint of     Reason for consult: pancreas mass    Interval Events: Hb stable, CT shows pancreas head mass w/o vascular invasion.     Hospital Medications:  acetaminophen   Tablet .. 650 milliGRAM(s) Oral every 6 hours PRN  cholestyramine Powder (Sugar-Free) 4 Gram(s) Oral two times a day PRN  heparin   Injectable 5000 Unit(s) SubCutaneous every 12 hours  influenza   Vaccine 0.5 milliLiter(s) IntraMuscular once  pantoprazole    Tablet 40 milliGRAM(s) Oral two times a day  sodium chloride 0.9%. 1000 milliLiter(s) IV Continuous <Continuous>      ROS:   General:  No  fevers, chills, night sweats, fatigue  Eyes:  Good vision, no reported pain  ENT:  No sore throat, pain, runny nose  CV:  No pain, palpitations  Pulm:  No dyspnea, cough  GI:  See HPI, otherwise negative  :  No  incontinence, nocturia  Muscle:  No pain, weakness  Neuro:  No memory problems  Psych:  No insomnia, mood problems, depression  Endocrine:  No polyuria, polydipsia, cold/heat intolerance  Heme:  No petechiae, ecchymosis, easy bruisability  Skin:  No rash    PHYSICAL EXAM:   Vital Signs:  Vital Signs Last 24 Hrs  T(C): 36.6 (13 Sep 2021 04:22), Max: 36.9 (12 Sep 2021 20:37)  T(F): 97.8 (13 Sep 2021 04:22), Max: 98.4 (12 Sep 2021 20:37)  HR: 59 (13 Sep 2021 04:22) (59 - 63)  BP: 145/70 (13 Sep 2021 04:22) (118/71 - 145/70)  BP(mean): --  RR: 18 (13 Sep 2021 04:22) (18 - 18)  SpO2: 99% (13 Sep 2021 04:22) (97% - 99%)  Daily     Daily     GENERAL: no acute distress  NEURO: alert  HEENT: anicteric sclera, no conjunctival pallor appreciated  CHEST: no respiratory distress, no accessory muscle use  CARDIAC: regular rate, rhythm  ABDOMEN: soft, non-tender, non-distended, no rebound or guarding  EXTREMITIES: warm, well perfused, no edema  SKIN: no lesions noted    LABS: reviewed                        10.8   4.26  )-----------( 167      ( 13 Sep 2021 06:48 )             33.4     09-13    141  |  107  |  9   ----------------------------<  102<H>  3.6   |  23  |  0.79    Ca    9.6      13 Sep 2021 06:47    TPro  6.2  /  Alb  3.4  /  TBili  1.0  /  DBili  x   /  AST  84<H>  /  ALT  127<H>  /  AlkPhos  482<H>  09-13    LIVER FUNCTIONS - ( 13 Sep 2021 06:47 )  Alb: 3.4 g/dL / Pro: 6.2 g/dL / ALK PHOS: 482 U/L / ALT: 127 U/L / AST: 84 U/L / GGT: x             Interval Diagnostic Studies: see sunrise for full report

## 2021-09-13 NOTE — DIETITIAN INITIAL EVALUATION ADULT. - ENTER FROM (G/KG)
1

## 2021-09-13 NOTE — PROGRESS NOTE ADULT - ATTENDING COMMENTS
As above.    Impression:    #1.  Pancreatic mass, subtle, associated with dilated bile and pancreatic ducts, s/p EUS/FNB on 9/9/21.  CT pancreas protocol without obvious vascular involvement.  #2.  Failed ERCP, but no high grade biliary obstruction, mild hyperbilirubinemia stable.  #3.  Gastroesophageal junction abrasion with bleeding, s/p endoscopic hemostasis on 9/9/21.  #4.  Anemia, stable, no overt GI bleed.    Recommendations:    #0.  Follow CBC/LFTs  #1.  Await pathology results.  #2.  Regular diet. As above.    Impression:    #1.  Pancreatic mass, subtle, associated with dilated bile and pancreatic ducts, s/p EUS/FNB on 9/9/21.  CT pancreas protocol without obvious vascular involvement.  #2.  Failed ERCP, but no high grade biliary obstruction, mild hyperbilirubinemia stable.  #3.  Gastroesophageal junction abrasion with bleeding, s/p endoscopic hemostasis on 9/9/21.  #4.  Anemia, stable, no overt GI bleed.    Recommendations:    #0.  Follow CBC/LFTs  #1.  Await pathology results.  #2.  Regular diet.  #3.  Surgical oncology followup.

## 2021-09-13 NOTE — DIETITIAN INITIAL EVALUATION ADULT. - ADD RECOMMEND
1. Will continue to monitor PO intake, weight, labs, skin, GI status, diet. 2. Gently encourage PO intake and obtain food preferences as able (pt did not have any at this time). 3. Consider Multivitamin if medically feasible to optimize nutrient intake. 4. Provided recommendations to optimize PO and protein intake - made aware RD remains available. 5. Malnutrition notification placed in chart.

## 2021-09-13 NOTE — DIETITIAN INITIAL EVALUATION ADULT. - REASON FOR ADMISSION
Pt 77 y/o F with PMH as per chart: rheumatoid arthritis, high cholesterol, appendectomy, left renal cell cancer S/P left nephrectomy (2019), admitted with abdominal pain x months, S/P EUS with biopsy of ill-defined small hypoechoic mass in pancreas head, grossly dilated PD and distal CBD noted, EUS complicated by bleeding managed with bipolar cautery and hemostatic clip placement - no bleeding at end of procedure (09/09), pending bx results.

## 2021-09-13 NOTE — CONSULT NOTE ADULT - SUBJECTIVE AND OBJECTIVE BOX
SURGICAL ONCOLOGY CONSULT  ========================    HPI:  75 y/o female with pmhx of rheumatoid arthritis, high cholesterol, appendectomy, and left nephrectomy presenting with abdominal pain x months sent in by PCP (Dr. Parnell; [phone]) for MRI findings significant for dilated pancreatic/bile ducts and elevated bili/liver enzymes requesting ERCP. Abdominal pain localized to RUQ with intermittent radiation to back. States that pain is mild, not requiring pain medication, no associated fevers/chills, n/v/d, cough, rashes, or changes in urination. Reports intermittent mild itching and darker urine than usual.  Labs indicate elevated LFTs and priti (07 Sep 2021 16:07)      PAST MEDICAL & SURGICAL HISTORY:  Arthritis  rheumatoid    S/P appendectomy  1969        MEDICATIONS  (STANDING):  heparin   Injectable 5000 Unit(s) SubCutaneous every 12 hours  influenza   Vaccine 0.5 milliLiter(s) IntraMuscular once  pantoprazole    Tablet 40 milliGRAM(s) Oral two times a day      ALLERGIES:    ___________________________________________  REVIEW OF SYSTEMS:  All ROS negative except as per HPI.    ___________________________________________  VITALS:  Vital Signs Last 24 Hrs  T(C): 36.6 (13 Sep 2021 12:26), Max: 36.9 (12 Sep 2021 20:37)  T(F): 97.8 (13 Sep 2021 12:26), Max: 98.4 (12 Sep 2021 20:37)  HR: 72 (13 Sep 2021 12:26) (59 - 72)  BP: 151/83 (13 Sep 2021 12:26) (135/71 - 151/83)  BP(mean): --  RR: 18 (13 Sep 2021 12:26) (18 - 18)  SpO2: 99% (13 Sep 2021 12:26) (97% - 99%)    CAPILLARY BLOOD GLUCOSE          I&O's Detail    12 Sep 2021 07:01  -  13 Sep 2021 07:00  --------------------------------------------------------  IN:    Oral Fluid: 360 mL  Total IN: 360 mL    OUT:  Total OUT: 0 mL    Total NET: 360 mL          PHYSICAL EXAM:  General: AAOx3, no acute distress.  Respiratory: breathing comfortably, no increased WOB   Abdomen: soft, nontender, nondistended, no rebound, no guarding  Extremities: Moves all four.     ____________________________________________  LABS:  CBC Full  -  ( 13 Sep 2021 06:48 )  WBC Count : 4.26 K/uL  RBC Count : 3.34 M/uL  Hemoglobin : 10.8 g/dL  Hematocrit : 33.4 %  Platelet Count - Automated : 167 K/uL  Mean Cell Volume : 100.0 fl  Mean Cell Hemoglobin : 32.3 pg  Mean Cell Hemoglobin Concentration : 32.3 gm/dL  Auto Neutrophil # : x  Auto Lymphocyte # : x  Auto Monocyte # : x  Auto Eosinophil # : x  Auto Basophil # : x  Auto Neutrophil % : x  Auto Lymphocyte % : x  Auto Monocyte % : x  Auto Eosinophil % : x  Auto Basophil % : x    09-13    141  |  107  |  9   ----------------------------<  102<H>  3.6   |  23  |  0.79    Ca    9.6      13 Sep 2021 06:47    TPro  6.2  /  Alb  3.4  /  TBili  1.0  /  DBili  x   /  AST  84<H>  /  ALT  127<H>  /  AlkPhos  482<H>  09-13    LIVER FUNCTIONS - ( 13 Sep 2021 06:47 )  Alb: 3.4 g/dL / Pro: 6.2 g/dL / ALK PHOS: 482 U/L / ALT: 127 U/L / AST: 84 U/L / GGT: x                     ____________________________________________  RADIOLOGY & ADDITIONAL STUDIES:   SURGICAL ONCOLOGY CONSULT  ========================    HPI:  77 y/o F with a history of rheumatoid arthritis (on MTX), HLD, appendectomy and left renal cell cancer s/p left nephrectomy (2019) sent in by her doctor due to transaminitis and an abnormal MRCP showing a dilated PD and CBD. Patient reports poor appetite and unintentional 6lb weight loss since July. She denies abdominal pain, fevers or chills. Reports that she noticed her urine was becoming darker and her skin was jaundiced. She had outpatient labs done by her PCP which showed a transaminitis and subsequently had an abdominal ultrasound which was suspicious for lesions in her pancreas. Patient underwent an outpatient MRCP which noted a dilated PD and CBD. She was sent in for further evaluation. Patient underwent an EUS on 9/9 which showed a hypoechoic lesion in the pancreatic head, not involving the portal vein, measuring 1.7x1.8cm which was biopsied. No stents were placed given that bile was free-flowing. Patient had a CT pancreatic protocol yesterday that again showed a heterogeneously enhancing pancreatic head mass measuring 2.2x2.6x2.2cm with a dilated PD to 1.2cm and CBD of 1.9cm. Since admission, tbili has downtrended from 3.9->1, Alk phos 922->482, AST//376->84/127. Ca 19-9 50. Patient states her brother also had pancreatic cancer and states he had a lesion in the middle of his pancreas which was resected 5 years ago without need for chemotherapy and he is currently doing well. Patient states her last colonoscopy was 2 years       PAST MEDICAL & SURGICAL HISTORY:  Arthritis  rheumatoid    S/P appendectomy  1969        MEDICATIONS  (STANDING):  heparin   Injectable 5000 Unit(s) SubCutaneous every 12 hours  influenza   Vaccine 0.5 milliLiter(s) IntraMuscular once  pantoprazole    Tablet 40 milliGRAM(s) Oral two times a day      ALLERGIES:    ___________________________________________  REVIEW OF SYSTEMS:  All ROS negative except as per HPI.    ___________________________________________  VITALS:  Vital Signs Last 24 Hrs  T(C): 36.6 (13 Sep 2021 12:26), Max: 36.9 (12 Sep 2021 20:37)  T(F): 97.8 (13 Sep 2021 12:26), Max: 98.4 (12 Sep 2021 20:37)  HR: 72 (13 Sep 2021 12:26) (59 - 72)  BP: 151/83 (13 Sep 2021 12:26) (135/71 - 151/83)  BP(mean): --  RR: 18 (13 Sep 2021 12:26) (18 - 18)  SpO2: 99% (13 Sep 2021 12:26) (97% - 99%)    CAPILLARY BLOOD GLUCOSE          I&O's Detail    12 Sep 2021 07:01  -  13 Sep 2021 07:00  --------------------------------------------------------  IN:    Oral Fluid: 360 mL  Total IN: 360 mL    OUT:  Total OUT: 0 mL    Total NET: 360 mL          PHYSICAL EXAM:  General: AAOx3, no acute distress.  Respiratory: breathing comfortably, no increased WOB   Abdomen: soft, nontender, nondistended, no rebound, no guarding  Extremities: Moves all four.     ____________________________________________  LABS:  CBC Full  -  ( 13 Sep 2021 06:48 )  WBC Count : 4.26 K/uL  RBC Count : 3.34 M/uL  Hemoglobin : 10.8 g/dL  Hematocrit : 33.4 %  Platelet Count - Automated : 167 K/uL  Mean Cell Volume : 100.0 fl  Mean Cell Hemoglobin : 32.3 pg  Mean Cell Hemoglobin Concentration : 32.3 gm/dL  Auto Neutrophil # : x  Auto Lymphocyte # : x  Auto Monocyte # : x  Auto Eosinophil # : x  Auto Basophil # : x  Auto Neutrophil % : x  Auto Lymphocyte % : x  Auto Monocyte % : x  Auto Eosinophil % : x  Auto Basophil % : x    09-13    141  |  107  |  9   ----------------------------<  102<H>  3.6   |  23  |  0.79    Ca    9.6      13 Sep 2021 06:47    TPro  6.2  /  Alb  3.4  /  TBili  1.0  /  DBili  x   /  AST  84<H>  /  ALT  127<H>  /  AlkPhos  482<H>  09-13    LIVER FUNCTIONS - ( 13 Sep 2021 06:47 )  Alb: 3.4 g/dL / Pro: 6.2 g/dL / ALK PHOS: 482 U/L / ALT: 127 U/L / AST: 84 U/L / GGT: x                     ____________________________________________  RADIOLOGY & ADDITIONAL STUDIES:   SURGICAL ONCOLOGY CONSULT  ========================    HPI:  77 y/o F with a history of rheumatoid arthritis (on MTX), HLD, appendectomy and left renal cell cancer s/p left nephrectomy (2019) sent in by her doctor due to transaminitis and an abnormal MRCP showing a dilated PD and CBD. Patient reports poor appetite and unintentional 6lb weight loss since July. She denies abdominal pain, fevers or chills. Reports that she noticed her urine was becoming darker and her skin was jaundiced. She had outpatient labs done by her PCP which showed a transaminitis and subsequently had an abdominal ultrasound which was suspicious for lesions in her pancreas. Patient underwent an outpatient MRCP which noted a dilated PD and CBD. She was sent in for further evaluation. Patient underwent an EUS on 9/9 which showed a hypoechoic lesion in the pancreatic head, not involving the portal vein, measuring 1.7x1.8cm which was biopsied. No stents were placed given that bile was free-flowing. Patient had a CT pancreatic protocol yesterday that again showed a heterogeneously enhancing pancreatic head mass measuring 2.2x2.6x2.2cm with a dilated PD to 1.2cm and CBD of 1.9cm. Since admission, tbili has downtrended from 3.9->1, Alk phos 922->482, AST//376->84/127. Ca 19-9 50. Patient states her brother also had pancreatic cancer and states he had a lesion in the middle of his pancreas which was resected 5 years ago without need for chemotherapy and he is currently doing well. Patient states her last colonoscopy was 2 years ago and reportedly normal. She also had upper endoscopies every 2 years for "gastric emptying" problems.     Surgical oncology consulted for evaluation for possible operative intervention.    PAST MEDICAL & SURGICAL HISTORY:  Rheumatoid arthritis  Left renal cancer    PAST SURGICAL HISTORY:  Appendectomy 1969  Laparoscopic left nephrectomy 2019    MEDICATIONS:  Methotrexate    ALLERGIES:  NKDA    SOCIAL:  Denies alcohol, drug, tobacco use  ___________________________________________  REVIEW OF SYSTEMS:  All ROS negative except as per HPI.    ___________________________________________  VITALS:  Vital Signs Last 24 Hrs  T(C): 36.6 (13 Sep 2021 12:26), Max: 36.9 (12 Sep 2021 20:37)  T(F): 97.8 (13 Sep 2021 12:26), Max: 98.4 (12 Sep 2021 20:37)  HR: 72 (13 Sep 2021 12:26) (59 - 72)  BP: 151/83 (13 Sep 2021 12:26) (135/71 - 151/83)  BP(mean): --  RR: 18 (13 Sep 2021 12:26) (18 - 18)  SpO2: 99% (13 Sep 2021 12:26) (97% - 99%)      I&O's Detail    12 Sep 2021 07:01  -  13 Sep 2021 07:00  --------------------------------------------------------  IN:    Oral Fluid: 360 mL  Total IN: 360 mL    OUT:  Total OUT: 0 mL    Total NET: 360 mL      PHYSICAL EXAM:  General: AAOx3, no acute distress.  Respiratory: breathing comfortably, no increased WOB   Abdomen: soft, nontender, nondistended, no rebound, no guarding  Extremities: Moves all four.     ____________________________________________  LABS:  CBC Full  -  ( 13 Sep 2021 06:48 )  WBC Count : 4.26 K/uL  RBC Count : 3.34 M/uL  Hemoglobin : 10.8 g/dL  Hematocrit : 33.4 %  Platelet Count - Automated : 167 K/uL  Mean Cell Volume : 100.0 fl  Mean Cell Hemoglobin : 32.3 pg  Mean Cell Hemoglobin Concentration : 32.3 gm/dL  Auto Neutrophil # : x  Auto Lymphocyte # : x  Auto Monocyte # : x  Auto Eosinophil # : x  Auto Basophil # : x  Auto Neutrophil % : x  Auto Lymphocyte % : x  Auto Monocyte % : x  Auto Eosinophil % : x  Auto Basophil % : x    09-13    141  |  107  |  9   ----------------------------<  102<H>  3.6   |  23  |  0.79    Ca    9.6      13 Sep 2021 06:47    TPro  6.2  /  Alb  3.4  /  TBili  1.0  /  DBili  x   /  AST  84<H>  /  ALT  127<H>  /  AlkPhos  482<H>  09-13    LIVER FUNCTIONS - ( 13 Sep 2021 06:47 )  Alb: 3.4 g/dL / Pro: 6.2 g/dL / ALK PHOS: 482 U/L / ALT: 127 U/L / AST: 84 U/L / GGT: x           ____________________________________________  RADIOLOGY & ADDITIONAL STUDIES:   SURGICAL ONCOLOGY CONSULT  ========================    HPI:  75 y/o F with a history of rheumatoid arthritis (on MTX), HLD, appendectomy and left renal cell cancer s/p left nephrectomy (2019) sent in by her doctor due to transaminitis and an abnormal MRCP showing a dilated PD and CBD. Patient reports poor appetite and unintentional 6lb weight loss since July. She denies abdominal pain, fevers or chills. Reports that she noticed her urine was becoming darker and her skin was jaundiced. She had outpatient labs done by her PCP which showed a transaminitis and subsequently had an abdominal ultrasound which was suspicious for lesions in her pancreas. Patient underwent an outpatient MRCP which noted a dilated PD and CBD. She was sent in for further evaluation. Patient underwent an EUS on 9/9 which showed a hypoechoic lesion in the pancreatic head, not involving the portal vein, measuring 1.7x1.8cm which was biopsied. No stents were placed given that bile was free-flowing. Patient had a CT pancreatic protocol yesterday that again showed a heterogeneously enhancing pancreatic head mass measuring 2.2x2.6x2.2cm with a dilated PD to 1.2cm and CBD of 1.9cm. Since admission, tbili has downtrended from 3.9->1, Alk phos 922->482, AST//376->84/127. Ca 19-9 50. Patient states her brother also had pancreatic cancer and states he had a lesion in the middle of his pancreas which was resected 5 years ago without need for chemotherapy and he is currently doing well. Patient states her last colonoscopy was 2 years ago and reportedly normal. She also had upper endoscopies every 2 years for "gastric emptying" problems.     Surgical oncology consulted for evaluation for possible operative intervention.    PAST MEDICAL & SURGICAL HISTORY:  Rheumatoid arthritis  Left renal cancer    PAST SURGICAL HISTORY:  Appendectomy 1969  Laparoscopic left nephrectomy 2019    MEDICATIONS:  Methotrexate    ALLERGIES:  NKDA    SOCIAL:  Denies alcohol, drug, tobacco use  ___________________________________________  REVIEW OF SYSTEMS:  All ROS negative except as per HPI.    ___________________________________________  VITALS:  Vital Signs Last 24 Hrs  T(C): 36.6 (13 Sep 2021 12:26), Max: 36.9 (12 Sep 2021 20:37)  T(F): 97.8 (13 Sep 2021 12:26), Max: 98.4 (12 Sep 2021 20:37)  HR: 72 (13 Sep 2021 12:26) (59 - 72)  BP: 151/83 (13 Sep 2021 12:26) (135/71 - 151/83)  BP(mean): --  RR: 18 (13 Sep 2021 12:26) (18 - 18)  SpO2: 99% (13 Sep 2021 12:26) (97% - 99%)      I&O's Detail    12 Sep 2021 07:01  -  13 Sep 2021 07:00  --------------------------------------------------------  IN:    Oral Fluid: 360 mL  Total IN: 360 mL    OUT:  Total OUT: 0 mL    Total NET: 360 mL      PHYSICAL EXAM:  General: AAOx3, no acute distress.  Respiratory: breathing comfortably, no increased WOB   Abdomen: soft, nontender, nondistended, no rebound, no guarding  Extremities: Moves all four.     ____________________________________________  LABS:  CBC Full  -  ( 13 Sep 2021 06:48 )  WBC Count : 4.26 K/uL  RBC Count : 3.34 M/uL  Hemoglobin : 10.8 g/dL  Hematocrit : 33.4 %  Platelet Count - Automated : 167 K/uL  Mean Cell Volume : 100.0 fl  Mean Cell Hemoglobin : 32.3 pg  Mean Cell Hemoglobin Concentration : 32.3 gm/dL  Auto Neutrophil # : x  Auto Lymphocyte # : x  Auto Monocyte # : x  Auto Eosinophil # : x  Auto Basophil # : x  Auto Neutrophil % : x  Auto Lymphocyte % : x  Auto Monocyte % : x  Auto Eosinophil % : x  Auto Basophil % : x    09-13    141  |  107  |  9   ----------------------------<  102<H>  3.6   |  23  |  0.79    Ca    9.6      13 Sep 2021 06:47    TPro  6.2  /  Alb  3.4  /  TBili  1.0  /  DBili  x   /  AST  84<H>  /  ALT  127<H>  /  AlkPhos  482<H>  09-13    LIVER FUNCTIONS - ( 13 Sep 2021 06:47 )  Alb: 3.4 g/dL / Pro: 6.2 g/dL / ALK PHOS: 482 U/L / ALT: 127 U/L / AST: 84 U/L / GGT: x           ____________________________________________  RADIOLOGY & ADDITIONAL STUDIES:    < from: Upper EUS (09.09.21 @ 12:58) >  Findings:       EGD: :       The upper and mid esophagus was normal. The distal esophagus was sigmoid shaped.       The entire examined stomach was normal.       The examined duodenum was normal.       EUS:       The exam was performed with a linear echoendoscope.       There was a very subtle, ill-defined hypoechoic area in the head of the pancreas that        measured approximately 1.7cmx1.8cm in diameter. The CBD had an abrupt cutoff at this level        and had significant proximal dilation (up to 1.8cm). The PD also appeared to end abruptly at        this area and was significantly dilated as well. The lesion appeared adjacent to the portal        vein but did not involve the vessel. The SMV and splenic vein at the level of the the       confluence were not able to be visualized.       The pancreatic parenchyma throughout was heterogeneous and the PD was dilated throughout.        There was a hypoechoic region around the duct.       The examined portions of the liver, spleen, andkidneys appeared normal.       There were no suspicious appearing lymph nodes.       After ensuring an avascular path, the pancreatic head lesion was biopsied for cytology with a        25g FNB Acquire needle x 7 passes. There was visible tissue acquisition.       ERCP:       The  film was normal.       The duodenoscope was passed to the 2nd portion of the duodenum to the major papilla, which        was normal but covered by a howell/duodenal fold.       Bile was freely flowing from the ampulla. Initial attempts at biliary cannulation with the        3.9F sphinctertome resulted in preferential PD cannulation. Further attempts to cannulate the        bile duct were not made as bile was clearly flowing freely and her liver tests were only       mildly elevated       Upon withdrawl of the duodenoscope, there was a large amount of clotted blood seen in the        stomach. Thus, repeat EGD was planned.       EGD:       The upper endoscope was passed through the normal appearing esophagus into the stomach, which        a large amount of clotted blood was seen.       The stomach was carefully examined and lavaged. An actively spurting vessel was seen just        distal to the GEJ in the gastric cardia (lesser curvature). This was only able to be        visualized in a torqued forward viewing position and was not seen in retroflexion. 4 clips        were deployed and the site was burned with bipolar cautery, eventually resulting in cessation        of bleeding.       The liquid from the stomach was suctioned, however the clots remained.                                                                                                        Impression:          - Subtle pancreatic head hypoechoic lesion (1.7x1.8cm) with abrupt cutoffof                        CBD and PD. Biopsied for histology with FNB needle x 7 passes. Adjacent to                        but not involving portal vein. SMV and SV at the confluence were not able to                        be seen.      - Hypoechoic, heterogeneous pancreatic parenchyma with dilated PD throughout.                       - Grossly dilated CBD proximal to the pancreatic head.                       - No other abnormalities seen on EUS.                       - CBD stent not placed. Bile seen flowing freely from ampulla.                       - Actively spurting arterial bleed from gastric cardia just distal to GEJ.                        Hemostasis acheived with clips and cautery.    < end of copied text >    ===    < from: CT Abdomen and Pelvis w/ IV Cont (09.11.21 @ 12:50) >  IMPRESSION:  CBD and pancreatic ductal dilatation with a heterogeneously enhancing pancreatic head mass measuring 2.6 x 2.2 x 2.6 cm suggestive of pancreatic head adenocarcinoma. Suggest further evaluation with MRI of the pancreas.  There is no local invasion of the nearby portal vein, SMV, hepatic artery, or GDA.  Mild mass effect on the IVC and the right renal vein by the distended CBD in the pancreatic head mass.    < end of copied text >

## 2021-09-13 NOTE — DIETITIAN INITIAL EVALUATION ADULT. - ORAL INTAKE PTA/DIET HISTORY
Pt reports poor PO intake x 2 months PTA due to not having an appetite. Confirms NKFA. Pt reports not following any type of diet or restriction at home. Reports taking Multivitamin PTA; denies drinking any nutritional supplement.

## 2021-09-13 NOTE — CONSULT NOTE ADULT - ASSESSMENT
75 y/o F with a history of rheumatoid arthritis (on MTX), HLD, appendectomy and left renal cell cancer s/p left nephrectomy (2019) sent in by her doctor due to transaminitis and an abnormal MRCP showing a dilated PD and CBD now s/p EUS with concern for pancreatic cancer. Patient hemodynamically stable without evidence of cholangitis.    Plan/Recommendations:  - Operative planning pending biopsy results, however mass concerning for malignancy  - Will f/u results of FNA from EUS  - Please obtain CT chest to complete staging  - CA 19-9 50    D/w Dr. Brigid Lozano, PGY2  Red Team Surgery [phone]

## 2021-09-13 NOTE — DIETITIAN INITIAL EVALUATION ADULT. - PHYSCIAL ASSESSMENT
Skin: no noted pressure injuries as per documentation.   Performed nutrition focused physical exam with pt's consent and noted no signs of muscle/fat loss in any area. well nourished

## 2021-09-13 NOTE — DIETITIAN INITIAL EVALUATION ADULT. - OTHER INFO
Pt reports poor appetite and PO intake in house due to previously being on clear liquid diet x 3 days, states PO intake improving today. Refused nutritional supplements. Denies difficulty chewing/swallowing soft diet. Denies nausea or vomiting. Reports small BM today (09/13), states likely due to poor PO intake.     Pt reports 6 pounds weight loss x 2 months PTA due to poor PO intake, from  to 115 pounds. Weight as per flow sheets (09/09) 117 pounds.     Provided recommendations to optimize PO and protein intake, recommended small frequent meals by ordering nutrient-dense snacks and leaving non-perishable food away from tray for later consumption during the day or between meals and to start with protein; reviewed foods with protein and menu order procedures in hospital. Pt and  deny having further questions/concerns about diet and nutrition - made aware RD remains available.

## 2021-09-13 NOTE — DIETITIAN INITIAL EVALUATION ADULT. - REASON INDICATOR FOR ASSESSMENT
Pt seen for length of stay initial assessment.  Information obtained from: medical record and pt.  present at bedside.

## 2021-09-14 LAB
ALBUMIN SERPL ELPH-MCNC: 3.5 G/DL — SIGNIFICANT CHANGE UP (ref 3.3–5)
ALP SERPL-CCNC: 549 U/L — HIGH (ref 40–120)
ALT FLD-CCNC: 202 U/L — HIGH (ref 10–45)
ANION GAP SERPL CALC-SCNC: 14 MMOL/L — SIGNIFICANT CHANGE UP (ref 5–17)
AST SERPL-CCNC: 256 U/L — HIGH (ref 10–40)
BILIRUB SERPL-MCNC: 1.1 MG/DL — SIGNIFICANT CHANGE UP (ref 0.2–1.2)
BUN SERPL-MCNC: 11 MG/DL — SIGNIFICANT CHANGE UP (ref 7–23)
CALCIUM SERPL-MCNC: 9.4 MG/DL — SIGNIFICANT CHANGE UP (ref 8.4–10.5)
CHLORIDE SERPL-SCNC: 107 MMOL/L — SIGNIFICANT CHANGE UP (ref 96–108)
CO2 SERPL-SCNC: 22 MMOL/L — SIGNIFICANT CHANGE UP (ref 22–31)
CREAT SERPL-MCNC: 0.8 MG/DL — SIGNIFICANT CHANGE UP (ref 0.5–1.3)
GLUCOSE SERPL-MCNC: 118 MG/DL — HIGH (ref 70–99)
HCT VFR BLD CALC: 32.6 % — LOW (ref 34.5–45)
HGB BLD-MCNC: 10.5 G/DL — LOW (ref 11.5–15.5)
MCHC RBC-ENTMCNC: 31.7 PG — SIGNIFICANT CHANGE UP (ref 27–34)
MCHC RBC-ENTMCNC: 32.2 GM/DL — SIGNIFICANT CHANGE UP (ref 32–36)
MCV RBC AUTO: 98.5 FL — SIGNIFICANT CHANGE UP (ref 80–100)
NRBC # BLD: 0 /100 WBCS — SIGNIFICANT CHANGE UP (ref 0–0)
PLATELET # BLD AUTO: 161 K/UL — SIGNIFICANT CHANGE UP (ref 150–400)
POTASSIUM SERPL-MCNC: 3.6 MMOL/L — SIGNIFICANT CHANGE UP (ref 3.5–5.3)
POTASSIUM SERPL-SCNC: 3.6 MMOL/L — SIGNIFICANT CHANGE UP (ref 3.5–5.3)
PROT SERPL-MCNC: 6.4 G/DL — SIGNIFICANT CHANGE UP (ref 6–8.3)
RBC # BLD: 3.31 M/UL — LOW (ref 3.8–5.2)
RBC # FLD: 14.9 % — HIGH (ref 10.3–14.5)
SARS-COV-2 RNA SPEC QL NAA+PROBE: SIGNIFICANT CHANGE UP
SODIUM SERPL-SCNC: 143 MMOL/L — SIGNIFICANT CHANGE UP (ref 135–145)
WBC # BLD: 4.29 K/UL — SIGNIFICANT CHANGE UP (ref 3.8–10.5)
WBC # FLD AUTO: 4.29 K/UL — SIGNIFICANT CHANGE UP (ref 3.8–10.5)

## 2021-09-14 PROCEDURE — 99232 SBSQ HOSP IP/OBS MODERATE 35: CPT

## 2021-09-14 RX ORDER — POLYETHYLENE GLYCOL 3350 17 G/17G
17 POWDER, FOR SOLUTION ORAL ONCE
Refills: 0 | Status: COMPLETED | OUTPATIENT
Start: 2021-09-14 | End: 2021-09-14

## 2021-09-14 RX ADMIN — PANTOPRAZOLE SODIUM 40 MILLIGRAM(S): 20 TABLET, DELAYED RELEASE ORAL at 17:23

## 2021-09-14 RX ADMIN — HEPARIN SODIUM 5000 UNIT(S): 5000 INJECTION INTRAVENOUS; SUBCUTANEOUS at 06:19

## 2021-09-14 RX ADMIN — HEPARIN SODIUM 5000 UNIT(S): 5000 INJECTION INTRAVENOUS; SUBCUTANEOUS at 17:23

## 2021-09-14 RX ADMIN — PANTOPRAZOLE SODIUM 40 MILLIGRAM(S): 20 TABLET, DELAYED RELEASE ORAL at 06:19

## 2021-09-14 RX ADMIN — POLYETHYLENE GLYCOL 3350 17 GRAM(S): 17 POWDER, FOR SOLUTION ORAL at 18:05

## 2021-09-14 NOTE — PROGRESS NOTE ADULT - SUBJECTIVE AND OBJECTIVE BOX
Red Team Surgery Progress Note     SUBJECTIVE: Patient seen and examined at bedside with surgical team, patient without complaints. No pain and tolerating diet.     OBJECTIVE:    Vital Signs Last 24 Hrs  T(C): 36.8 (14 Sep 2021 14:28), Max: 36.8 (13 Sep 2021 19:52)  T(F): 98.3 (14 Sep 2021 14:28), Max: 98.3 (13 Sep 2021 19:52)  HR: 70 (14 Sep 2021 14:28) (66 - 78)  BP: 137/74 (14 Sep 2021 14:28) (120/73 - 137/74)  BP(mean): --  RR: 18 (14 Sep 2021 14:28) (18 - 18)  SpO2: 98% (14 Sep 2021 14:28) (98% - 99%)I&O's Detail    13 Sep 2021 07:01  -  14 Sep 2021 07:00  --------------------------------------------------------  IN:    Oral Fluid: 1660 mL  Total IN: 1660 mL    OUT:  Total OUT: 0 mL    Total NET: 1660 mL      14 Sep 2021 07:01  -  14 Sep 2021 16:27  --------------------------------------------------------  IN:    Oral Fluid: 960 mL  Total IN: 960 mL    OUT:  Total OUT: 0 mL    Total NET: 960 mL      MEDICATIONS  (STANDING):  heparin   Injectable 5000 Unit(s) SubCutaneous every 12 hours  influenza   Vaccine 0.5 milliLiter(s) IntraMuscular once  pantoprazole    Tablet 40 milliGRAM(s) Oral two times a day    MEDICATIONS  (PRN):  acetaminophen   Tablet .. 650 milliGRAM(s) Oral every 6 hours PRN Temp greater or equal to 38C (100.4F), Moderate Pain (4 - 6)  cholestyramine Powder (Sugar-Free) 4 Gram(s) Oral two times a day PRN Pruritis      PHYSICAL EXAM:  Constitutional: A&Ox3, NAD  Respiratory: Unlabored breathing  Abdomen: Soft, nondistended, NTTP. No rebound or guarding.  Extremities: WWP, KHANNA spontaneously    LABS:                        10.5   4.29  )-----------( 161      ( 14 Sep 2021 07:11 )             32.6     09-14    143  |  107  |  11  ----------------------------<  118<H>  3.6   |  22  |  0.80    Ca    9.4      14 Sep 2021 07:11    TPro  6.4  /  Alb  3.5  /  TBili  1.1  /  DBili  x   /  AST  256<H>  /  ALT  202<H>  /  AlkPhos  549<H>  09-14      LIVER FUNCTIONS - ( 14 Sep 2021 07:11 )  Alb: 3.5 g/dL / Pro: 6.4 g/dL / ALK PHOS: 549 U/L / ALT: 202 U/L / AST: 256 U/L / GGT: x

## 2021-09-14 NOTE — PROGRESS NOTE ADULT - SUBJECTIVE AND OBJECTIVE BOX
Patient is a 76y old  Female who presents with a chief complaint of     9/14/21  HPI:  No new symptoms  No n/v  Seen by surgical oncology    PAST MEDICAL & SURGICAL HISTORY:  Arthritis  rheumatoid    S/P appendectomy  1969        Review of Systems:   CONSTITUTIONAL: No fever, weight loss, or fatigue  EYES: No eye pain, visual disturbances, or discharge  ENMT:  No difficulty hearing, tinnitus, vertigo; No sinus or throat pain  NECK: No pain or stiffness  BREASTS: No pain, masses, or nipple discharge  RESPIRATORY: No cough, wheezing, chills or hemoptysis; No shortness of breath  CARDIOVASCULAR: No chest pain, palpitations, dizziness, or leg swelling  GASTROINTESTINAL: Mild abdominal & epigastric pain. No nausea, vomiting, or hematemesis; No diarrhea or constipation. No melena or hematochezia.  GENITOURINARY: No dysuria, frequency, hematuria, or incontinence  NEUROLOGICAL: No headaches, memory loss, loss of strength, numbness, or tremors  SKIN: No itching, burning, rashes, or lesions   LYMPH NODES: No enlarged glands  ENDOCRINE: No heat or cold intolerance; No hair loss  MUSCULOSKELETAL: No joint pain or swelling; No muscle, back, or extremity pain  PSYCHIATRIC: No depression, anxiety, mood swings, or difficulty sleeping  HEME/LYMPH: No easy bruising, or bleeding gums  ALLERY AND IMMUNOLOGIC: No hives or eczema    Allergies    No Known Allergies    Intolerances        Social History:     FAMILY HISTORY:      MEDICATIONS  (STANDING):  heparin   Injectable 5000 Unit(s) SubCutaneous every 12 hours  influenza   Vaccine 0.5 milliLiter(s) IntraMuscular once  metoclopramide Injectable 5 milliGRAM(s) IV Push once  pantoprazole  Injectable 80 milliGRAM(s) IV Push once  pantoprazole Infusion 8 mG/Hr (10 mL/Hr) IV Continuous <Continuous>    MEDICATIONS  (PRN):  acetaminophen   Tablet .. 650 milliGRAM(s) Oral every 6 hours PRN Temp greater or equal to 38C (100.4F), Moderate Pain (4 - 6)  cholestyramine Powder (Sugar-Free) 4 Gram(s) Oral two times a day PRN Pruritis        CAPILLARY BLOOD GLUCOSE        I&O's Summary    08 Sep 2021 07:01  -  09 Sep 2021 07:00  --------------------------------------------------------  IN: 1380 mL / OUT: 0 mL / NET: 1380 mL    09 Sep 2021 07:01  -  09 Sep 2021 17:06  --------------------------------------------------------  IN: 0 mL / OUT: 0 mL / NET: 0 mL        PHYSICAL EXAM:  Vital Signs Last 24 Hrs  T(C): 36.3 (09 Sep 2021 16:05), Max: 36.8 (09 Sep 2021 04:06)  T(F): 97.3 (09 Sep 2021 16:05), Max: 98.3 (09 Sep 2021 08:48)  HR: 62 (09 Sep 2021 16:55) (61 - 76)  BP: 133/80 (09 Sep 2021 16:55) (110/66 - 148/72)  BP(mean): --  RR: 15 (09 Sep 2021 16:55) (15 - 20)  SpO2: 98% (09 Sep 2021 16:55) (97% - 99%)    GENERAL: NAD, well-developed  HEAD:  Atraumatic, Normocephalic  EYES: EOMI, PERRLA, conjunctiva and sclera clear  NECK: Supple, No JVD  CHEST/LUNG: Clear to auscultation bilaterally; No wheeze  HEART: Regular rate and rhythm; No murmurs, rubs, or gallops  ABDOMEN: Soft, Nontender, Nondistended; Bowel sounds present  EXTREMITIES:  2+ Peripheral Pulses, No clubbing, cyanosis, or edema  PSYCH: AAOx3  NEUROLOGY: non-focal  SKIN: No rashes or lesions    LABS:                        11.7   4.49  )-----------( 173      ( 09 Sep 2021 07:07 )             35.4     09-09    140  |  107  |  14  ----------------------------<  103<H>  3.8   |  19<L>  |  0.77    Ca    10.1      09 Sep 2021 07:07    TPro  6.9  /  Alb  3.6  /  TBili  1.5<H>  /  DBili  0.8<H>  /  AST  158<H>  /  ALT  275<H>  /  AlkPhos  758<H>  09-09    PT/INR - ( 09 Sep 2021 07:07 )   PT: 10.7 sec;   INR: 0.89 ratio                   RADIOLOGY & ADDITIONAL TESTS:    Imaging Personally Reviewed:    Consultant(s) Notes Reviewed:      Care Discussed with Consultants/Other Providers:

## 2021-09-14 NOTE — PROGRESS NOTE ADULT - ASSESSMENT
75 y/o F with a history of rheumatoid arthritis (on MTX), HLD, appendectomy and left renal cell cancer s/p left nephrectomy (2019) sent in by her doctor due to transaminitis and an abnormal MRCP showing a dilated PD and CBD now s/p EUS with concern for pancreatic cancer. Patient hemodynamically stable without evidence of cholangitis.    Plan/Recommendations:  - Please order CEA and IGG4   - f/u CT chest   - f/u path results   - Operative planning pending biopsy results, however mass concerning for malignancy  - Will f/u results of FNA from EUS  - Please obtain CT chest to complete staging  - CA [phone]      [facility]  [id number]

## 2021-09-14 NOTE — PROGRESS NOTE ADULT - ATTENDING COMMENTS
Pathology noted.    D/w pt son Lorne and also w pt plan for Whipple procedure - Friday pending medical risk stratification Pathology noted.    D/w pt son Lorne and also w pt (with [place] ) plan for Whipple procedure - Friday pending medical risk stratification.    Discussed option of [place] trial as well - Pt and son are extremely anxious they prefer to proceed with resection at this time.  Also d/w study RN coordinator - study consent paperwork is not feasible with verbal translation alone.

## 2021-09-15 LAB
ALBUMIN SERPL ELPH-MCNC: 3.5 G/DL — SIGNIFICANT CHANGE UP (ref 3.3–5)
ALP SERPL-CCNC: 637 U/L — HIGH (ref 40–120)
ALT FLD-CCNC: 226 U/L — HIGH (ref 10–45)
ANION GAP SERPL CALC-SCNC: 13 MMOL/L — SIGNIFICANT CHANGE UP (ref 5–17)
AST SERPL-CCNC: 236 U/L — HIGH (ref 10–40)
BILIRUB SERPL-MCNC: 1.4 MG/DL — HIGH (ref 0.2–1.2)
BUN SERPL-MCNC: 11 MG/DL — SIGNIFICANT CHANGE UP (ref 7–23)
CALCIUM SERPL-MCNC: 9.8 MG/DL — SIGNIFICANT CHANGE UP (ref 8.4–10.5)
CEA SERPL-MCNC: 1.3 NG/ML — SIGNIFICANT CHANGE UP (ref 0–3.8)
CHLORIDE SERPL-SCNC: 107 MMOL/L — SIGNIFICANT CHANGE UP (ref 96–108)
CO2 SERPL-SCNC: 22 MMOL/L — SIGNIFICANT CHANGE UP (ref 22–31)
CREAT SERPL-MCNC: 0.91 MG/DL — SIGNIFICANT CHANGE UP (ref 0.5–1.3)
GLUCOSE SERPL-MCNC: 98 MG/DL — SIGNIFICANT CHANGE UP (ref 70–99)
HCT VFR BLD CALC: 33.3 % — LOW (ref 34.5–45)
HGB BLD-MCNC: 10.8 G/DL — LOW (ref 11.5–15.5)
MCHC RBC-ENTMCNC: 31.8 PG — SIGNIFICANT CHANGE UP (ref 27–34)
MCHC RBC-ENTMCNC: 32.4 GM/DL — SIGNIFICANT CHANGE UP (ref 32–36)
MCV RBC AUTO: 97.9 FL — SIGNIFICANT CHANGE UP (ref 80–100)
NON-GYNECOLOGICAL CYTOLOGY STUDY: SIGNIFICANT CHANGE UP
NRBC # BLD: 0 /100 WBCS — SIGNIFICANT CHANGE UP (ref 0–0)
PLATELET # BLD AUTO: 177 K/UL — SIGNIFICANT CHANGE UP (ref 150–400)
POTASSIUM SERPL-MCNC: 3.4 MMOL/L — LOW (ref 3.5–5.3)
POTASSIUM SERPL-SCNC: 3.4 MMOL/L — LOW (ref 3.5–5.3)
PROT SERPL-MCNC: 6.5 G/DL — SIGNIFICANT CHANGE UP (ref 6–8.3)
RBC # BLD: 3.4 M/UL — LOW (ref 3.8–5.2)
RBC # FLD: 14.6 % — HIGH (ref 10.3–14.5)
SODIUM SERPL-SCNC: 142 MMOL/L — SIGNIFICANT CHANGE UP (ref 135–145)
WBC # BLD: 4.1 K/UL — SIGNIFICANT CHANGE UP (ref 3.8–10.5)
WBC # FLD AUTO: 4.1 K/UL — SIGNIFICANT CHANGE UP (ref 3.8–10.5)

## 2021-09-15 RX ORDER — POTASSIUM CHLORIDE 20 MEQ
20 PACKET (EA) ORAL ONCE
Refills: 0 | Status: COMPLETED | OUTPATIENT
Start: 2021-09-15 | End: 2021-09-15

## 2021-09-15 RX ORDER — POLYETHYLENE GLYCOL 3350 17 G/17G
17 POWDER, FOR SOLUTION ORAL DAILY
Refills: 0 | Status: DISCONTINUED | OUTPATIENT
Start: 2021-09-15 | End: 2021-09-17

## 2021-09-15 RX ADMIN — POLYETHYLENE GLYCOL 3350 17 GRAM(S): 17 POWDER, FOR SOLUTION ORAL at 09:35

## 2021-09-15 RX ADMIN — HEPARIN SODIUM 5000 UNIT(S): 5000 INJECTION INTRAVENOUS; SUBCUTANEOUS at 06:00

## 2021-09-15 RX ADMIN — Medication 20 MILLIEQUIVALENT(S): at 14:48

## 2021-09-15 RX ADMIN — PANTOPRAZOLE SODIUM 40 MILLIGRAM(S): 20 TABLET, DELAYED RELEASE ORAL at 18:17

## 2021-09-15 RX ADMIN — HEPARIN SODIUM 5000 UNIT(S): 5000 INJECTION INTRAVENOUS; SUBCUTANEOUS at 18:18

## 2021-09-15 RX ADMIN — PANTOPRAZOLE SODIUM 40 MILLIGRAM(S): 20 TABLET, DELAYED RELEASE ORAL at 05:59

## 2021-09-15 NOTE — PROGRESS NOTE ADULT - SUBJECTIVE AND OBJECTIVE BOX
Patient is a 76y old  Female who presents with a chief complaint of     9/15/21  HPI:  No new symptoms  No n/v  Seen by surgical oncology, scheduled for Whipple surgery    PAST MEDICAL & SURGICAL HISTORY:  Arthritis  rheumatoid    S/P appendectomy  1969        Review of Systems:   CONSTITUTIONAL: No fever, weight loss, or fatigue  EYES: No eye pain, visual disturbances, or discharge  ENMT:  No difficulty hearing, tinnitus, vertigo; No sinus or throat pain  NECK: No pain or stiffness  BREASTS: No pain, masses, or nipple discharge  RESPIRATORY: No cough, wheezing, chills or hemoptysis; No shortness of breath  CARDIOVASCULAR: No chest pain, palpitations, dizziness, or leg swelling  GASTROINTESTINAL: Mild abdominal & epigastric pain. No nausea, vomiting, or hematemesis; No diarrhea or constipation. No melena or hematochezia.  GENITOURINARY: No dysuria, frequency, hematuria, or incontinence  NEUROLOGICAL: No headaches, memory loss, loss of strength, numbness, or tremors  SKIN: No itching, burning, rashes, or lesions   LYMPH NODES: No enlarged glands  ENDOCRINE: No heat or cold intolerance; No hair loss  MUSCULOSKELETAL: No joint pain or swelling; No muscle, back, or extremity pain  PSYCHIATRIC: No depression, anxiety, mood swings, or difficulty sleeping  HEME/LYMPH: No easy bruising, or bleeding gums  ALLERY AND IMMUNOLOGIC: No hives or eczema    Allergies    No Known Allergies    Intolerances        Social History:     FAMILY HISTORY:      MEDICATIONS  (STANDING):  heparin   Injectable 5000 Unit(s) SubCutaneous every 12 hours  influenza   Vaccine 0.5 milliLiter(s) IntraMuscular once  metoclopramide Injectable 5 milliGRAM(s) IV Push once  pantoprazole  Injectable 80 milliGRAM(s) IV Push once  pantoprazole Infusion 8 mG/Hr (10 mL/Hr) IV Continuous <Continuous>    MEDICATIONS  (PRN):  acetaminophen   Tablet .. 650 milliGRAM(s) Oral every 6 hours PRN Temp greater or equal to 38C (100.4F), Moderate Pain (4 - 6)  cholestyramine Powder (Sugar-Free) 4 Gram(s) Oral two times a day PRN Pruritis        CAPILLARY BLOOD GLUCOSE        I&O's Summary    08 Sep 2021 07:01  -  09 Sep 2021 07:00  --------------------------------------------------------  IN: 1380 mL / OUT: 0 mL / NET: 1380 mL    09 Sep 2021 07:01  -  09 Sep 2021 17:06  --------------------------------------------------------  IN: 0 mL / OUT: 0 mL / NET: 0 mL        PHYSICAL EXAM:  Vital Signs Last 24 Hrs  T(C): 36.3 (09 Sep 2021 16:05), Max: 36.8 (09 Sep 2021 04:06)  T(F): 97.3 (09 Sep 2021 16:05), Max: 98.3 (09 Sep 2021 08:48)  HR: 62 (09 Sep 2021 16:55) (61 - 76)  BP: 133/80 (09 Sep 2021 16:55) (110/66 - 148/72)  BP(mean): --  RR: 15 (09 Sep 2021 16:55) (15 - 20)  SpO2: 98% (09 Sep 2021 16:55) (97% - 99%)    GENERAL: NAD, well-developed  HEAD:  Atraumatic, Normocephalic  EYES: EOMI, PERRLA, conjunctiva and sclera clear  NECK: Supple, No JVD  CHEST/LUNG: Clear to auscultation bilaterally; No wheeze  HEART: Regular rate and rhythm; No murmurs, rubs, or gallops  ABDOMEN: Soft, Nontender, Nondistended; Bowel sounds present  EXTREMITIES:  2+ Peripheral Pulses, No clubbing, cyanosis, or edema  PSYCH: AAOx3  NEUROLOGY: non-focal  SKIN: No rashes or lesions    LABS:                        11.7   4.49  )-----------( 173      ( 09 Sep 2021 07:07 )             35.4     09-09    140  |  107  |  14  ----------------------------<  103<H>  3.8   |  19<L>  |  0.77    Ca    10.1      09 Sep 2021 07:07    TPro  6.9  /  Alb  3.6  /  TBili  1.5<H>  /  DBili  0.8<H>  /  AST  158<H>  /  ALT  275<H>  /  AlkPhos  758<H>  09-09    PT/INR - ( 09 Sep 2021 07:07 )   PT: 10.7 sec;   INR: 0.89 ratio                   RADIOLOGY & ADDITIONAL TESTS:    Imaging Personally Reviewed:    Consultant(s) Notes Reviewed:      Care Discussed with Consultants/Other Providers:

## 2021-09-16 ENCOUNTER — TRANSCRIPTION ENCOUNTER (OUTPATIENT)
Age: 76
End: 2021-09-16

## 2021-09-16 LAB
ALBUMIN SERPL ELPH-MCNC: 3.7 G/DL — SIGNIFICANT CHANGE UP (ref 3.3–5)
ALP SERPL-CCNC: 727 U/L — HIGH (ref 40–120)
ALT FLD-CCNC: 302 U/L — HIGH (ref 10–45)
ANION GAP SERPL CALC-SCNC: 12 MMOL/L — SIGNIFICANT CHANGE UP (ref 5–17)
AST SERPL-CCNC: 362 U/L — HIGH (ref 10–40)
BILIRUB SERPL-MCNC: 2.8 MG/DL — HIGH (ref 0.2–1.2)
BUN SERPL-MCNC: 12 MG/DL — SIGNIFICANT CHANGE UP (ref 7–23)
CALCIUM SERPL-MCNC: 10.4 MG/DL — SIGNIFICANT CHANGE UP (ref 8.4–10.5)
CHLORIDE SERPL-SCNC: 104 MMOL/L — SIGNIFICANT CHANGE UP (ref 96–108)
CO2 SERPL-SCNC: 22 MMOL/L — SIGNIFICANT CHANGE UP (ref 22–31)
CREAT SERPL-MCNC: 0.92 MG/DL — SIGNIFICANT CHANGE UP (ref 0.5–1.3)
GLUCOSE SERPL-MCNC: 109 MG/DL — HIGH (ref 70–99)
POTASSIUM SERPL-MCNC: 4.3 MMOL/L — SIGNIFICANT CHANGE UP (ref 3.5–5.3)
POTASSIUM SERPL-SCNC: 4.3 MMOL/L — SIGNIFICANT CHANGE UP (ref 3.5–5.3)
PROT SERPL-MCNC: 7 G/DL — SIGNIFICANT CHANGE UP (ref 6–8.3)
SARS-COV-2 RNA SPEC QL NAA+PROBE: SIGNIFICANT CHANGE UP
SODIUM SERPL-SCNC: 138 MMOL/L — SIGNIFICANT CHANGE UP (ref 135–145)

## 2021-09-16 PROCEDURE — 99232 SBSQ HOSP IP/OBS MODERATE 35: CPT

## 2021-09-16 PROCEDURE — 93306 TTE W/DOPPLER COMPLETE: CPT | Mod: 26

## 2021-09-16 PROCEDURE — 71045 X-RAY EXAM CHEST 1 VIEW: CPT | Mod: 26

## 2021-09-16 RX ORDER — SODIUM CHLORIDE 9 MG/ML
1000 INJECTION, SOLUTION INTRAVENOUS
Refills: 0 | Status: DISCONTINUED | OUTPATIENT
Start: 2021-09-16 | End: 2021-09-17

## 2021-09-16 RX ADMIN — HEPARIN SODIUM 5000 UNIT(S): 5000 INJECTION INTRAVENOUS; SUBCUTANEOUS at 06:09

## 2021-09-16 RX ADMIN — Medication 10 MILLIGRAM(S): at 08:31

## 2021-09-16 RX ADMIN — PANTOPRAZOLE SODIUM 40 MILLIGRAM(S): 20 TABLET, DELAYED RELEASE ORAL at 18:07

## 2021-09-16 RX ADMIN — HEPARIN SODIUM 5000 UNIT(S): 5000 INJECTION INTRAVENOUS; SUBCUTANEOUS at 18:07

## 2021-09-16 RX ADMIN — PANTOPRAZOLE SODIUM 40 MILLIGRAM(S): 20 TABLET, DELAYED RELEASE ORAL at 06:08

## 2021-09-16 RX ADMIN — Medication 1 TABLET(S): at 18:07

## 2021-09-16 NOTE — CHART NOTE - NSCHARTNOTEFT_GEN_A_CORE
Nutrition Follow Up Note  Patient seen for malnutrition follow up     Pt 77 y/o F with PMH as per chart: rheumatoid arthritis, high cholesterol, appendectomy, left renal cell cancer S/P left nephrectomy (2019), admitted with abdominal pain x months, S/P EUS with biopsy of ill-defined small hypoechoic mass in pancreas head, grossly dilated PD and distal CBD noted, EUS complicated by bleeding managed with bipolar cautery and hemostatic clip placement - no bleeding at end of procedure (09/09), pending bx results, pending Whipple (open pancreaticoduodenectomy) on (09/17).     Source: [x] Patient       [x] EMR        [] RN        [] Family at bedside       [] Other:    -If unable to interview patient: [] Trach/Vent/BiPAP  [] Disoriented/confused/inappropriate to interview as per chart     Pt started clear liquid diet after breakfast this am. Reports fair appetite and PO intake. Noted % PO intake as per flow sheets and 100% per breakfast tray at bedside. Offered nutritional supplement - pt agreed to try Ensure Clear, states not liking "anything milky". Denies difficulty chewing/swallowing soft diet. Pt denies nausea or vomiting. Reports constipation, last BM 3 days ago (09/13) - requests prune juice after surgery. Reviewed recommendations to optimize PO and protein intake as tolerated when diet is resumed. Pt denies having further questions/concerns about diet and nutrition - made aware RD remains available.     Diet Order:   Diet, NPO after Midnight:      NPO Start Date: 16-Sep-2021,   NPO Start Time: 23:59  Except Medications (09-16-21)  Diet, Clear Liquid (09-16-21)    Weights: (09/07) 117 pounds.     Nutritionally Pertinent MEDICATIONS  (STANDING):  lactated ringers.  pantoprazole    Tablet    Pertinent Labs: (09/16) Na 138, BUN 12, Cr 0.92, <H>, K+ 4.3, Alk Phos 727<H>, ALT/SGPT 302<H>, AST/SGOT 362<H>    Skin per nursing documentation: no pressure injuries.   Edema as per flow sheets: none.     Estimated Needs:   [x] no change since previous assessment  [] recalculated:     Previous Nutrition Diagnosis: Malnutrition; moderate     Nutrition Diagnosis is: [x] ongoing - addressed with PO intake encouragement and recommendations for nutritional supplement.     New Nutrition Diagnosis: [x] Not applicable    Nutrition Care Plan:  [x] In Progress     Nutrition Interventions:     Education Provided:       [x] Yes  [] No    Recommendations:      1. As medically/surgically feasible, consider low fat + soft diet. Defer diet/fluid consistencies to medical team/pt's preference. Will continue to monitor as able and adjust as needed.  2. Recommend Ensure Clear 3x daily (540 kane and 24 gm protein) to optimize kcal and protein intake. Spoke to PA.   3. Once applicable, gently encourage PO intake and obtain food preferences as able.   4. Consider Multivitamin if medically feasible to optimize nutrient intake.   5. Provided recommendations to optimize PO and protein intake as tolerated when diet is resumed.   6. Obtain current weight as able to identify changes if any.     Monitoring and Evaluation:   Continue to monitor nutritional intake, tolerance to diet prescription, weights, labs, skin integrity    RD remains available upon request and will follow up per protocol  Jenna Castanon MS RDN CDN #[phone]

## 2021-09-16 NOTE — PROGRESS NOTE ADULT - SUBJECTIVE AND OBJECTIVE BOX
SURGICAL ONCOLOGY PROGRESS NOTE    No complaints.      Vital Signs Last 24 Hrs  T(C): 36.3 (16 Sep 2021 04:58), Max: 36.9 (15 Sep 2021 20:20)  T(F): 97.4 (16 Sep 2021 04:58), Max: 98.4 (15 Sep 2021 20:20)  HR: 69 (16 Sep 2021 04:58) (68 - 70)  BP: 128/75 (16 Sep 2021 04:58) (124/73 - 130/80)  BP(mean): --  RR: 18 (16 Sep 2021 04:58) (18 - 18)  SpO2: 99% (16 Sep 2021 04:58) (96% - 99%)  I&O's Detail    15 Sep 2021 07:01  -  16 Sep 2021 07:00  --------------------------------------------------------  IN:    Oral Fluid: 1880 mL  Total IN: 1880 mL    OUT:  Total OUT: 0 mL    Total NET: 1880 mL          PE:    A&A  NAD    soft, NT, ND, No peritoneal signs                            10.8   4.10  )-----------( 177      ( 15 Sep 2021 07:20 )             33.3     09-16    138  |  104  |  12  ----------------------------<  109<H>  4.3   |  22  |  0.92    Ca    10.4      16 Sep 2021 04:47    TPro  7.0  /  Alb  3.7  /  TBili  2.8<H>  /  DBili  x   /  AST  362<H>  /  ALT  302<H>  /  AlkPhos  727<H>  09-16

## 2021-09-16 NOTE — CONSULT NOTE ADULT - SUBJECTIVE AND OBJECTIVE BOX
C A R D I O L O G Y  *********************    DATE OF SERVICE: 09-16-21    HISTORY OF PRESENT ILLNESS: HPI:  Patient is a 77 y/o female with PMH of rheumatoid arthritis, HLD, appendectomy, and left renal CA s/p L nephrectomy who presented with abdominal pain found to have pancreatic mass suspicious for malignancy and pending Whipple procedure. Cardiology consulted for preop clearance. Patient resting comfortably in no distress. Denies prior cardiac history such as CAD/MI/CHF. Reports good exercise tolerance. Denies chest pain, SOB, CAZARES, palpitations, dizziness, or syncope.    PAST MEDICAL & SURGICAL HISTORY:  Arthritis  rheumatoid    S/P appendectomy  1969      MEDICATIONS:  MEDICATIONS  (STANDING):  heparin   Injectable 5000 Unit(s) SubCutaneous every 12 hours  influenza   Vaccine 0.5 milliLiter(s) IntraMuscular once  lactated ringers. 1000 milliLiter(s) (90 mL/Hr) IV Continuous <Continuous>  pantoprazole    Tablet 40 milliGRAM(s) Oral two times a day      Allergies    No Known Allergies    Intolerances        FAMILY HISTORY:    Non-contributary for premature coronary disease or sudden cardiac death    SOCIAL HISTORY:    [x ] Non-smoker  [ ] Smoker  [ ] Alcohol    FLU VACCINE THIS YEAR STARTS IN AUGUST:  [ ] Yes    [ ] No    IF OVER 65 HAVE YOU EVER HAD A PNA VACCINE:  [ ] Yes    [ ] No       [ ] N/A      REVIEW OF SYSTEMS:  [ ]chest pain  [  ]shortness of breath  [  ]palpitations  [  ]syncope  [ ]near syncope [ ]upper extremity weakness   [ ] lower extremity weakness  [  ]diplopia  [  ]altered mental status   [  ]fevers  [ ]chills [ ]nausea  [ ]vomitting  [  ]dysphagia    [x ]abdominal pain  [ ]melena  [ ]BRBPR    [  ]epistaxis  [  ]rash    [ ]lower extremity edema        [X] All others negative	  [ ] Unable to obtain      LABS:	 	    CARDIAC MARKERS:                              10.8   4.10  )-----------( 177      ( 15 Sep 2021 07:20 )             33.3     Hb Trend:     09-16    138  |  104  |  12  ----------------------------<  109<H>  4.3   |  22  |  0.92    Ca    10.4      16 Sep 2021 04:47    TPro  7.0  /  Alb  3.7  /  TBili  2.8<H>  /  DBili  x   /  AST  362<H>  /  ALT  302<H>  /  AlkPhos  727<H>  09-16    Creatinine Trend: 0.92<--, 0.91<--, 0.80<--, 0.79<--, 0.83<--, 0.92<--    Coags:      proBNP:   Lipid Profile:   HgA1c:   TSH:         PHYSICAL EXAM:  T(C): 36.3 (09-16-21 @ 04:58), Max: 36.9 (09-15-21 @ 20:20)  HR: 69 (09-16-21 @ 04:58) (68 - 70)  BP: 128/75 (09-16-21 @ 04:58) (124/73 - 130/80)  RR: 18 (09-16-21 @ 04:58) (18 - 18)  SpO2: 99% (09-16-21 @ 04:58) (96% - 99%)  Wt(kg): --   BMI (kg/m2): 22.3 (09-09-21 @ 12:10)  I&O's Summary    15 Sep 2021 07:01  -  16 Sep 2021 07:00  --------------------------------------------------------  IN: 1880 mL / OUT: 0 mL / NET: 1880 mL    16 Sep 2021 07:01  -  16 Sep 2021 11:37  --------------------------------------------------------  IN: 360 mL / OUT: 0 mL / NET: 360 mL        Gen: Appears well in NAD  HEENT:  (-)icterus (-)pallor  CV: N S1 S2 1/6 PATRICIA (+)2 Pulses B/l  Resp:  Clear to auscultation B/L, normal effort  GI: (+) BS Soft, NT, ND  Lymph:  (-)Edema, (-)obvious lymphadenopathy  Skin: Warm to touch, Normal turgor  Psych: Appropriate mood and affect      TELEMETRY: None	      ECG: NSR, no acute ST-T changes      Echo: Pending (ordered)	    RADIOLOGY:  < from: CT Chest No Cont (09.13.21 @ 21:53) >  IMPRESSION:    4 mm nonspecific nodule within the periphery of basilar left lower lobe possibly a benign intrapulmonary lymph node. Recommend follow-up chest CT in 3 months.    < end of copied text >      ASSESSMENT/PLAN: Patient is a 77 y/o female with PMH of rheumatoid arthritis, HLD, appendectomy, and left renal CA s/p L nephrectomy who presented with abdominal pain found to have pancreatic mass suspicious for malignancy and pending Whipple procedure. Cardiology consulted for preop clearance.    - No evidence of clinical HF, anginal symptoms, or unstable cardiac syndromes  - Reports good exercise tolerance without chest pain or SOB  - EKG unremarkable in NSR  - Check baseline echo today (echo dept called to expedite today)  - Based on patient's current RCRI score, would consider her low cardiac risk for planned procedure. She otherwise appears optimized from CV perspective if echo unremarkable  - Follow up echo results  - Will follow with you and assist as needed    Kyrie Torres PA-C  Pager: [phone]

## 2021-09-16 NOTE — PROGRESS NOTE ADULT - ASSESSMENT
Pancreatic mass    Planning OR tomorrow for Whipple.  Discussed r/b/a post op expectations poss complications.  Pt understands and agrees to proceed.    Clears today    NPO p MN    Cards Eval     S/w pt son yesterday re operative plan

## 2021-09-16 NOTE — CHART NOTE - NSCHARTNOTEFT_GEN_A_CORE
Surgery Preop Note    Diagnosis: pancreatic head mass  Procedure: Whipple (open pancreaticoduodenectomy)  Surgeon: Dr. Rainey                          10.8   4.10  )-----------( 177      ( 15 Sep 2021 07:20 )             33.3     09-16    138  |  104  |  12  ----------------------------<  109<H>  4.3   |  22  |  0.92    Ca    10.4      16 Sep 2021 04:47    TPro  7.0  /  Alb  3.7  /  TBili  2.8<H>  /  DBili  x   /  AST  362<H>  /  ALT  302<H>  /  AlkPhos  727<H>  09-16        Plan: OR TOMORROW AT 7:30AM FOR WHIPPLE PROCEDURE    Preop Checklist:    [x]NPO after midnight  [x]IVF  [x]AM labs (CBC, BMP, coags)  [x]Type and Screen x2  []COVID - pending 9/16  [x]EKG  []Chest xray - pending  [x]Anticoagulation - ok to continue DVT ppx  []Medical clearance - mod CV risk  []Cardiac clearance - pending, TTE echo scheduled for today  []Consent - to be obtained    ANDREA Lozano, PGY2  [facility] [id number] Surgery Preop Note    DIAGNOSIS: pancreatic head mass  PROCEDURE: diagnostic laparoscopy, Whipple procedure (open pancreaticoduodenectomy), possible jejunostomy tube   SURGEON: Dr. Rainey  TIME: 9/17/2021 at 7:30AM                          10.8   4.10  )-----------( 177      ( 15 Sep 2021 07:20 )             33.3     09-16    138  |  104  |  12  ----------------------------<  109<H>  4.3   |  22  |  0.92    Ca    10.4      16 Sep 2021 04:47    TPro  7.0  /  Alb  3.7  /  TBili  2.8<H>  /  DBili  x   /  AST  362<H>  /  ALT  302<H>  /  AlkPhos  727<H>  09-16      Preop Checklist:    [x]NPO after midnight  [x]IVF  [x]AM labs (CBC, BMP, coags)  [x]Type and Screen x2  []COVID - pending 9/16  [x]EKG  []Chest xray - pending  [x]Anticoagulation - ok to continue DVT ppx  []Medical clearance - mod CV risk  []Cardiac clearance - pending, TTE echo scheduled for today  []Consent - to be obtained    ANDREA Lozano, PGY2  Red Team Surgery [id number]

## 2021-09-17 ENCOUNTER — APPOINTMENT (OUTPATIENT)
Dept: SURGICAL ONCOLOGY | Facility: HOSPITAL | Age: 76
End: 2021-09-17

## 2021-09-17 ENCOUNTER — RESULT REVIEW (OUTPATIENT)
Age: 76
End: 2021-09-17

## 2021-09-17 LAB
ALBUMIN SERPL ELPH-MCNC: 3.7 G/DL — SIGNIFICANT CHANGE UP (ref 3.3–5)
ALP SERPL-CCNC: 702 U/L — HIGH (ref 40–120)
ALT FLD-CCNC: 282 U/L — HIGH (ref 10–45)
ANION GAP SERPL CALC-SCNC: 12 MMOL/L — SIGNIFICANT CHANGE UP (ref 5–17)
ANION GAP SERPL CALC-SCNC: 18 MMOL/L — HIGH (ref 5–17)
APTT BLD: 34.1 SEC — SIGNIFICANT CHANGE UP (ref 27.5–35.5)
APTT BLD: 60.2 SEC — HIGH (ref 27.5–35.5)
AST SERPL-CCNC: 246 U/L — HIGH (ref 10–40)
BILIRUB SERPL-MCNC: 2.1 MG/DL — HIGH (ref 0.2–1.2)
BLD GP AB SCN SERPL QL: NEGATIVE — SIGNIFICANT CHANGE UP
BUN SERPL-MCNC: 10 MG/DL — SIGNIFICANT CHANGE UP (ref 7–23)
BUN SERPL-MCNC: 13 MG/DL — SIGNIFICANT CHANGE UP (ref 7–23)
CALCIUM SERPL-MCNC: 9.1 MG/DL — SIGNIFICANT CHANGE UP (ref 8.4–10.5)
CALCIUM SERPL-MCNC: 9.7 MG/DL — SIGNIFICANT CHANGE UP (ref 8.4–10.5)
CHLORIDE SERPL-SCNC: 105 MMOL/L — SIGNIFICANT CHANGE UP (ref 96–108)
CHLORIDE SERPL-SCNC: 105 MMOL/L — SIGNIFICANT CHANGE UP (ref 96–108)
CO2 SERPL-SCNC: 17 MMOL/L — LOW (ref 22–31)
CO2 SERPL-SCNC: 23 MMOL/L — SIGNIFICANT CHANGE UP (ref 22–31)
CREAT SERPL-MCNC: 0.86 MG/DL — SIGNIFICANT CHANGE UP (ref 0.5–1.3)
CREAT SERPL-MCNC: 0.91 MG/DL — SIGNIFICANT CHANGE UP (ref 0.5–1.3)
GAS PNL BLDA: SIGNIFICANT CHANGE UP
GLUCOSE BLDC GLUCOMTR-MCNC: 141 MG/DL — HIGH (ref 70–99)
GLUCOSE SERPL-MCNC: 115 MG/DL — HIGH (ref 70–99)
GLUCOSE SERPL-MCNC: 152 MG/DL — HIGH (ref 70–99)
HCT VFR BLD CALC: 33.2 % — LOW (ref 34.5–45)
HCT VFR BLD CALC: 33.8 % — LOW (ref 34.5–45)
HGB BLD-MCNC: 11.1 G/DL — LOW (ref 11.5–15.5)
HGB BLD-MCNC: 11.1 G/DL — LOW (ref 11.5–15.5)
IGG SERPL-MCNC: 1248 MG/DL — SIGNIFICANT CHANGE UP (ref 586–1602)
IGG1 SER-MCNC: 639 MG/DL — SIGNIFICANT CHANGE UP (ref 248–810)
IGG2 SER-MCNC: 479 MG/DL — SIGNIFICANT CHANGE UP (ref 130–555)
IGG3 SER-MCNC: 19 MG/DL — SIGNIFICANT CHANGE UP (ref 15–102)
IGG4 SER-MCNC: 16 MG/DL — SIGNIFICANT CHANGE UP (ref 2–96)
INR BLD: 0.95 RATIO — SIGNIFICANT CHANGE UP (ref 0.88–1.16)
INR BLD: 0.97 RATIO — SIGNIFICANT CHANGE UP (ref 0.88–1.16)
MAGNESIUM SERPL-MCNC: 2.2 MG/DL — SIGNIFICANT CHANGE UP (ref 1.6–2.6)
MAGNESIUM SERPL-MCNC: 2.3 MG/DL — SIGNIFICANT CHANGE UP (ref 1.6–2.6)
MCHC RBC-ENTMCNC: 31.5 PG — SIGNIFICANT CHANGE UP (ref 27–34)
MCHC RBC-ENTMCNC: 32.3 PG — SIGNIFICANT CHANGE UP (ref 27–34)
MCHC RBC-ENTMCNC: 32.8 GM/DL — SIGNIFICANT CHANGE UP (ref 32–36)
MCHC RBC-ENTMCNC: 33.4 GM/DL — SIGNIFICANT CHANGE UP (ref 32–36)
MCV RBC AUTO: 96 FL — SIGNIFICANT CHANGE UP (ref 80–100)
MCV RBC AUTO: 96.5 FL — SIGNIFICANT CHANGE UP (ref 80–100)
NRBC # BLD: 0 /100 WBCS — SIGNIFICANT CHANGE UP (ref 0–0)
NRBC # BLD: 0 /100 WBCS — SIGNIFICANT CHANGE UP (ref 0–0)
PHOSPHATE SERPL-MCNC: 4 MG/DL — SIGNIFICANT CHANGE UP (ref 2.5–4.5)
PHOSPHATE SERPL-MCNC: 4.7 MG/DL — HIGH (ref 2.5–4.5)
PLATELET # BLD AUTO: 184 K/UL — SIGNIFICANT CHANGE UP (ref 150–400)
PLATELET # BLD AUTO: 201 K/UL — SIGNIFICANT CHANGE UP (ref 150–400)
POTASSIUM SERPL-MCNC: 3.7 MMOL/L — SIGNIFICANT CHANGE UP (ref 3.5–5.3)
POTASSIUM SERPL-MCNC: 4 MMOL/L — SIGNIFICANT CHANGE UP (ref 3.5–5.3)
POTASSIUM SERPL-SCNC: 3.7 MMOL/L — SIGNIFICANT CHANGE UP (ref 3.5–5.3)
POTASSIUM SERPL-SCNC: 4 MMOL/L — SIGNIFICANT CHANGE UP (ref 3.5–5.3)
PROT SERPL-MCNC: 6.8 G/DL — SIGNIFICANT CHANGE UP (ref 6–8.3)
PROTHROM AB SERPL-ACNC: 11.4 SEC — SIGNIFICANT CHANGE UP (ref 10.6–13.6)
PROTHROM AB SERPL-ACNC: 11.7 SEC — SIGNIFICANT CHANGE UP (ref 10.6–13.6)
RBC # BLD: 3.44 M/UL — LOW (ref 3.8–5.2)
RBC # BLD: 3.52 M/UL — LOW (ref 3.8–5.2)
RBC # FLD: 14.7 % — HIGH (ref 10.3–14.5)
RBC # FLD: 15.5 % — HIGH (ref 10.3–14.5)
RH IG SCN BLD-IMP: POSITIVE — SIGNIFICANT CHANGE UP
SODIUM SERPL-SCNC: 140 MMOL/L — SIGNIFICANT CHANGE UP (ref 135–145)
SODIUM SERPL-SCNC: 140 MMOL/L — SIGNIFICANT CHANGE UP (ref 135–145)
WBC # BLD: 14.26 K/UL — HIGH (ref 3.8–10.5)
WBC # BLD: 5.02 K/UL — SIGNIFICANT CHANGE UP (ref 3.8–10.5)
WBC # FLD AUTO: 14.26 K/UL — HIGH (ref 3.8–10.5)
WBC # FLD AUTO: 5.02 K/UL — SIGNIFICANT CHANGE UP (ref 3.8–10.5)

## 2021-09-17 PROCEDURE — 48150 PARTIAL REMOVAL OF PANCREAS: CPT

## 2021-09-17 PROCEDURE — 88309 TISSUE EXAM BY PATHOLOGIST: CPT | Mod: 26

## 2021-09-17 PROCEDURE — 88305 TISSUE EXAM BY PATHOLOGIST: CPT | Mod: 26

## 2021-09-17 PROCEDURE — 88304 TISSUE EXAM BY PATHOLOGIST: CPT | Mod: 26

## 2021-09-17 PROCEDURE — 88331 PATH CONSLTJ SURG 1 BLK 1SPC: CPT | Mod: 26

## 2021-09-17 PROCEDURE — 49203: CPT

## 2021-09-17 RX ORDER — SODIUM CHLORIDE 9 MG/ML
1000 INJECTION, SOLUTION INTRAVENOUS
Refills: 0 | Status: DISCONTINUED | OUTPATIENT
Start: 2021-09-17 | End: 2021-09-18

## 2021-09-17 RX ORDER — PANTOPRAZOLE SODIUM 20 MG/1
40 TABLET, DELAYED RELEASE ORAL
Refills: 0 | Status: DISCONTINUED | OUTPATIENT
Start: 2021-09-17 | End: 2021-10-03

## 2021-09-17 RX ORDER — INSULIN LISPRO 100/ML
VIAL (ML) SUBCUTANEOUS EVERY 4 HOURS
Refills: 0 | Status: DISCONTINUED | OUTPATIENT
Start: 2021-09-17 | End: 2021-10-02

## 2021-09-17 RX ORDER — ACETAMINOPHEN 500 MG
1000 TABLET ORAL ONCE
Refills: 0 | Status: COMPLETED | OUTPATIENT
Start: 2021-09-17 | End: 2021-09-17

## 2021-09-17 RX ORDER — ACETAMINOPHEN 500 MG
700 TABLET ORAL EVERY 6 HOURS
Refills: 0 | Status: COMPLETED | OUTPATIENT
Start: 2021-09-17 | End: 2021-09-18

## 2021-09-17 RX ORDER — ENOXAPARIN SODIUM 100 MG/ML
40 INJECTION SUBCUTANEOUS DAILY
Refills: 0 | Status: DISCONTINUED | OUTPATIENT
Start: 2021-09-17 | End: 2021-10-04

## 2021-09-17 RX ORDER — HYDROMORPHONE HYDROCHLORIDE 2 MG/ML
0.25 INJECTION INTRAMUSCULAR; INTRAVENOUS; SUBCUTANEOUS ONCE
Refills: 0 | Status: DISCONTINUED | OUTPATIENT
Start: 2021-09-17 | End: 2021-09-17

## 2021-09-17 RX ADMIN — HYDROMORPHONE HYDROCHLORIDE 0.25 MILLIGRAM(S): 2 INJECTION INTRAMUSCULAR; INTRAVENOUS; SUBCUTANEOUS at 21:49

## 2021-09-17 RX ADMIN — HYDROMORPHONE HYDROCHLORIDE 0.25 MILLIGRAM(S): 2 INJECTION INTRAMUSCULAR; INTRAVENOUS; SUBCUTANEOUS at 22:04

## 2021-09-17 RX ADMIN — SODIUM CHLORIDE 90 MILLILITER(S): 9 INJECTION, SOLUTION INTRAVENOUS at 02:13

## 2021-09-17 RX ADMIN — PANTOPRAZOLE SODIUM 40 MILLIGRAM(S): 20 TABLET, DELAYED RELEASE ORAL at 06:05

## 2021-09-17 RX ADMIN — Medication 280 MILLIGRAM(S): at 23:36

## 2021-09-17 RX ADMIN — Medication 400 MILLIGRAM(S): at 19:44

## 2021-09-17 RX ADMIN — Medication 1000 MILLIGRAM(S): at 20:14

## 2021-09-17 NOTE — CONSULT NOTE ADULT - SUBJECTIVE AND OBJECTIVE BOX
General Surgery Consult Note  Attending: Dr. Joseph Rainey  Service: Red Team  [id number]    HPI:    PAST MEDICAL & SURGICAL HISTORY:  Arthritis  rheumatoid  S/P appendectomy  1969    ALLERGIES:  No Known Allergies    SOCIAL HISTORY:    FAMILY HISTORY:    PHYSICAL EXAM:  General: NAD, resting comfortably  HEENT: NC/AT, EOMI, normal hearing, no oral lesions, no LAD, neck supple  Pulmonary: normal resp effort, CTA-B  Cardiovascular: NSR, no murmurs  Abdominal: soft, ND/NT, no organomegaly  Extremities: WWP, normal strength, no clubbing/cyanosis/edema  Neuro: A/O x 3, CNs II-XII grossly intact, normal sensation, no focal deficits  Pulses: palpable distal pulses    VITAL SIGNS:  Vital Signs Last 24 Hrs  T(C): 36.8 (17 Sep 2021 07:24), Max: 36.8 (17 Sep 2021 04:47)  T(F): 98.2 (17 Sep 2021 06:50), Max: 98.2 (17 Sep 2021 04:47)  HR: 68 (17 Sep 2021 07:24) (67 - 72)  BP: 118/72 (17 Sep 2021 07:24) (114/73 - 118/72)  BP(mean): --  RR: 18 (17 Sep 2021 07:24) (18 - 18)  SpO2: 98% (17 Sep 2021 07:24) (98% - 99%)    I&O's Summary    16 Sep 2021 07:01  -  17 Sep 2021 07:00  --------------------------------------------------------  IN: 1730 mL / OUT: 0 mL / NET: 1730 mL      LABS:                        11.1   5.02  )-----------( 184      ( 17 Sep 2021 04:13 )             33.8     09-17    140  |  105  |  13  ----------------------------<  115<H>  3.7   |  23  |  0.91    Ca    9.7      17 Sep 2021 04:12  Phos  4.0     09-17  Mg     2.3     09-17    TPro  6.8  /  Alb  3.7  /  TBili  2.1<H>  /  DBili  x   /  AST  246<H>  /  ALT  282<H>  /  AlkPhos  702<H>  09-17    PT/INR - ( 17 Sep 2021 04:13 )   PT: 11.4 sec;   INR: 0.95 ratio         PTT - ( 17 Sep 2021 04:13 )  PTT:60.2 sec    CAPILLARY BLOOD GLUCOSE    LIVER FUNCTIONS - ( 17 Sep 2021 04:12 )  Alb: 3.7 g/dL / Pro: 6.8 g/dL / ALK PHOS: 702 U/L / ALT: 282 U/L / AST: 246 U/L / GGT: x           RADIOLOGY & ADDITIONAL STUDIES:             HISTORY OF PRESENT ILLNESS:  SUZY SILVA is a 76y Female with Hx of RA (on MTX), HLD, appendectomy, and L renal cell CA (s/p L nephrectomy 2019) sent in by her     75 y/o F with a history of rheumatoid arthritis (on MTX), HLD, appendectomy and left renal cell cancer s/p left nephrectomy (2019) sent in by her doctor due to transaminitis and an abnormal MRCP showing a dilated PD and CBD. Patient reports poor appetite and unintentional 6lb weight loss since July. She denies abdominal pain, fevers or chills. Reports that she noticed her urine was becoming darker and her skin was jaundiced. She had outpatient labs done by her PCP which showed a transaminitis and subsequently had an abdominal ultrasound which was suspicious for lesions in her pancreas. Patient underwent an outpatient MRCP which noted a dilated PD and CBD. She was sent in for further evaluation. Patient underwent an EUS on 9/9 which showed a hypoechoic lesion in the pancreatic head, not involving the portal vein, measuring 1.7x1.8cm which was biopsied. No stents were placed given that bile was free-flowing. Patient had a CT pancreatic protocol yesterday that again showed a heterogeneously enhancing pancreatic head mass measuring 2.2x2.6x2.2cm with a dilated PD to 1.2cm and CBD of 1.9cm. Since admission, tbili has downtrended from 3.9->1, Alk phos 922->482, AST//376->84/127. Ca 19-9 50. Patient states her brother also had pancreatic cancer and states he had a lesion in the middle of his pancreas which was resected 5 years ago without need for chemotherapy and he is currently doing well. Patient states her last colonoscopy was 2 years ago and reportedly normal. She also had upper endoscopies every 2 years for "gastric emptying" problems.         PAST MEDICAL HISTORY:   Arthritis  rheumatoid  S/P appendectomy 1969    HOME MEDICATIONS:      ALLERGIES:   No Known Allergies        PHYSICAL EXAM:    VITAL SIGNS:  ICU Vital Signs Last 24 Hrs  T(C): 36.6 (17 Sep 2021 18:05), Max: 36.8 (17 Sep 2021 04:47)  T(F): 97.9 (17 Sep 2021 18:05), Max: 98.2 (17 Sep 2021 04:47)  HR: 89 (17 Sep 2021 19:00) (67 - 89)  BP: 125/65 (17 Sep 2021 18:05) (114/73 - 125/65)  BP(mean): 89 (17 Sep 2021 18:05) (89 - 89)  ABP: 142/63 (17 Sep 2021 19:00) (136/61 - 143/63)  ABP(mean): 94 (17 Sep 2021 19:00) (93 - 107)  RR: 20 (17 Sep 2021 19:00) (18 - 24)  SpO2: 100% (17 Sep 2021 19:00) (98% - 100%)      NEURO:   Exam:   Medications:   acetaminophen  IVPB .. 1000 milliGRAM(s) IV Intermittent once      RESPIRATORY  Exam:   Mechanical Ventilation:     Labs:  ABG - ( 17 Sep 2021 18:25 )  pH: 7.33  /  pCO2: 38    /  pO2: 195   / HCO3: 20    / Base Excess: -5.4  /  SaO2: 99.8    Lactate: x                Medications:      CARDIOVASCULAR  Exam:  Cardiac Rhythm:  Labs:     Medications:       GI/NUTRITION  Exam:  Diet:  pantoprazole  Injectable 40 milliGRAM(s) IV Push two times a day      GENITOURINARY/RENAL  Exam:   [ ] Maxwell catheter, indication: urine output monitoring in critically ill patient    Meds:       I/Os:    09-16 @ 07:01 - 09-17 @ 07:00  --------------------------------------------------------  IN:    Lactated Ringers: 360 mL    Oral Fluid: 1370 mL  Total IN: 1730 mL    OUT:  Total OUT: 0 mL    Total NET: 1730 mL      09-17 @ 07:01  -  09-17 @ 19:24  --------------------------------------------------------  IN:  Total IN: 0 mL    OUT:    Bulb (mL): 25 mL    Bulb (mL): 30 mL    Indwelling Catheter - Urethral (mL): 125 mL  Total OUT: 180 mL    Total NET: -180 mL          Weight:  Weight (kg): 53.5 (09-17 @ 07:24)    Labs:  09-17    140  |  105  |  10  ----------------------------<  152<H>  4.0   |  17<L>  |  0.86    Ca    9.1      17 Sep 2021 18:27  Phos  4.7     09-17  Mg     2.2     09-17    TPro  6.8  /  Alb  3.7  /  TBili  2.1<H>  /  DBili  x   /  AST  246<H>  /  ALT  282<H>  /  AlkPhos  702<H>  09-17      HEMATOLOGIC  [ ] VTE Prophylaxis:    Medications:      Labs:                        11.1   14.26 )-----------( 201      ( 17 Sep 2021 18:27 )             33.2     PT/INR - ( 17 Sep 2021 18:27 )   PT: 11.7 sec;   INR: 0.97 ratio         PTT - ( 17 Sep 2021 18:27 )  PTT:34.1 sec    Transfusion: [ ] PRBC	[ ] Platelets	[ ] FFP	[ ] Cryoprecipitate      INFECTIOUS DISEASES:  Medications:  influenza   Vaccine 0.5 milliLiter(s) IntraMuscular once      Recent Cultures:  RECENT CULTURES:        ENDOCRINE  Glucose:  CAPILLARY BLOOD GLUCOSE        Medications:         PATIENT CARE ACCESS DEVICES:  [ ] Peripheral IV  [ ] Central Venous Line	[ ] R	[ ] L	[ ] IJ	[ ] Fem	[ ] SC	Placed:   [ ] Arterial Line		[ ] R	[ ] L	[ ] Fem	[ ] Rad	[ ] Ax	Placed:   [ ] PICC:					[ ] Mediport  [ ] Urinary Catheter, Date Placed:   [x] Necessity of urinary, arterial, and venous catheters discussed HISTORY OF PRESENT ILLNESS:  SUZY SILVA is a 76y Female with Hx of RA (on MTX), HLD, appendectomy, and L renal cell CA (s/p L nephrectomy 2019) who presented with transaminitis and an abnormal MRCP showing dilated PD and CBD, found to have a 1.7x1.8cm hypoechoic pancreatic head lesion on EUS on 9/9. She was taken to the OR on 9/17 for a Whipple on 9/17 and transferred to the SICU extubated post-operatively for hemodynamic monitoring.        Urine - Maxwell catheter 1,250  normosol 4,500    PAST MEDICAL HISTORY:   Arthritis  rheumatoid  S/P appendectomy 1969    HOME MEDICATIONS:      ALLERGIES:   No Known Allergies        PHYSICAL EXAM:    VITAL SIGNS:  ICU Vital Signs Last 24 Hrs  T(C): 36.6 (17 Sep 2021 18:05), Max: 36.8 (17 Sep 2021 04:47)  T(F): 97.9 (17 Sep 2021 18:05), Max: 98.2 (17 Sep 2021 04:47)  HR: 89 (17 Sep 2021 19:00) (67 - 89)  BP: 125/65 (17 Sep 2021 18:05) (114/73 - 125/65)  BP(mean): 89 (17 Sep 2021 18:05) (89 - 89)  ABP: 142/63 (17 Sep 2021 19:00) (136/61 - 143/63)  ABP(mean): 94 (17 Sep 2021 19:00) (93 - 107)  RR: 20 (17 Sep 2021 19:00) (18 - 24)  SpO2: 100% (17 Sep 2021 19:00) (98% - 100%)      NEURO:   Exam: awake, alert, oriented  Medications: acetaminophen  IVPB .. 1000 milliGRAM(s) IV Intermittent once    RESPIRATORY  Exam: no increased WOB on NC  Labs:  ABG - ( 17 Sep 2021 18:25 )  pH: 7.33  /  pCO2: 38    /  pO2: 195   / HCO3: 20    / Base Excess: -5.4  /  SaO2: 99.8      CARDIOVASCULAR  Exam: normal rate and rhythm noted on cardiac monitor   Cardiac Rhythm: normal sinus    GI/NUTRITION  Exam: abd soft, non-distended, appropriately tender to palpations; VIOLA drain x2 in place, dressing c/d/i, with serosanguineous OP; Prevena vac in place, functioning well with good seal  Meds: pantoprazole  Injectable 40 milliGRAM(s) IV Push two times a day    GENITOURINARY/RENAL  Exam: Maxwell catheter in place, draining clear yellow urine     I/Os:    09-16 @ 07:01  -  09-17 @ 07:00  --------------------------------------------------------  IN:    Lactated Ringers: 360 mL    Oral Fluid: 1370 mL  Total IN: 1730 mL    OUT:  Total OUT: 0 mL    Total NET: 1730 mL      09-17 @ 07:01  -  09-17 @ 19:24  --------------------------------------------------------  IN:  Total IN: 0 mL    OUT:    Bulb (mL): 25 mL    Bulb (mL): 30 mL    Indwelling Catheter - Urethral (mL): 125 mL  Total OUT: 180 mL    Total NET: -180 mL    Weight (kg): 53.5 (09-17 @ 07:24)    Labs:    140  |  105  |  10  ----------------------------<  152<H>  4.0   |  17<L>  |  0.86    Ca    9.1      17 Sep 2021 18:27  Phos  4.7     09-17  Mg     2.2     09-17    TPro  6.8  /  Alb  3.7  /  TBili  2.1<H>  /  DBili  x   /  AST  246<H>  /  ALT  282<H>  /  AlkPhos  702<H>  09-17    HEMATOLOGIC  Labs:             11.1   14.26 )-----------( 201      ( 17 Sep 2021 18:27 )             33.2     PT/INR - ( 17 Sep 2021 18:27 )   PT: 11.7 sec;   INR: 0.97 ratio     PTT - ( 17 Sep 2021 18:27 )  PTT:34.1 sec  Transfusion: [ ] PRBC	[ ] Platelets	[ ] FFP	[ ] Cryoprecipitate    INFECTIOUS DISEASES:  Medications:  influenza   Vaccine 0.5 milliLiter(s) IntraMuscular once  Recent Cultures: none    ENDOCRINE  Glucose:  CAPILLARY BLOOD GLUCOSE      PATIENT CARE ACCESS DEVICES:  [2] Peripheral IV - 9/17  [ ] Central Venous Line	[ ] R	[ ] L	[ ] IJ	[ ] Fem	[ ] SC	Placed:   [ ] Arterial Line		[ ] R	[ ] L	[ ] Fem	[ ] Rad	[ ] Ax	Placed:   [ ] PICC:					[ ] Mediport  [x] Urinary Catheter, Date Placed: 9/17  [x] Necessity of urinary, arterial, and venous catheters discussed HISTORY OF PRESENT ILLNESS:  SUZY SILVA is a 76y Female with Hx of RA (on MTX), HLD, appendectomy, and L renal cell CA (s/p L nephrectomy 2019) who presented with transaminitis and an abnormal MRCP showing dilated PD and CBD, found to have a 1.7x1.8cm hypoechoic pancreatic head lesion on EUS on 9/9. She was taken to the OR on 9/17 for a Whipple on 9/17 and transferred to the SICU extubated post-operatively for hemodynamic monitoring.        Urine - Maxwell catheter 1,250  normosol 4,500    PAST MEDICAL HISTORY:   Arthritis  rheumatoid  S/P appendectomy 1969    HOME MEDICATIONS:  · 	methotrexate 2.5 mg oral tablet: Last Dose Taken:  , 6 tab(s) orally once a week wednesdays  	NOTE: CURRENTLY ON HOLD. PATIENT LAST DOSE JULY 2021  · 	atorvastatin 10 mg oral tablet: Last Dose Taken:  , 1 tab(s) orally once a day  	NOTE: CURRENTLY ON HOLD. PATIENT LAST DOSE JULY 2021  · 	folic acid 1 mg oral tablet: Last Dose Taken:  , 1 tab(s) orally once a day  	NOTE: CURRENTLY ON HOLD. PATIENT LAST DOSE JULY 2021  · 	Systane ophthalmic solution: Last Dose Taken:  , 1 drop(s) to each affected eye , As Needed    ALLERGIES:   No Known Allergies      PHYSICAL EXAM:    VITAL SIGNS:  ICU Vital Signs Last 24 Hrs  T(C): 36.6 (17 Sep 2021 18:05), Max: 36.8 (17 Sep 2021 04:47)  T(F): 97.9 (17 Sep 2021 18:05), Max: 98.2 (17 Sep 2021 04:47)  HR: 89 (17 Sep 2021 19:00) (67 - 89)  BP: 125/65 (17 Sep 2021 18:05) (114/73 - 125/65)  BP(mean): 89 (17 Sep 2021 18:05) (89 - 89)  ABP: 142/63 (17 Sep 2021 19:00) (136/61 - 143/63)  ABP(mean): 94 (17 Sep 2021 19:00) (93 - 107)  RR: 20 (17 Sep 2021 19:00) (18 - 24)  SpO2: 100% (17 Sep 2021 19:00) (98% - 100%)      NEURO:   Exam: awake, alert, oriented  Medications: acetaminophen  IVPB .. 1000 milliGRAM(s) IV Intermittent once    RESPIRATORY  Exam: no increased WOB on NC  Labs:  ABG - ( 17 Sep 2021 18:25 )  pH: 7.33  /  pCO2: 38    /  pO2: 195   / HCO3: 20    / Base Excess: -5.4  /  SaO2: 99.8      CARDIOVASCULAR  Exam: normal rate and rhythm noted on cardiac monitor   Cardiac Rhythm: normal sinus    GI/NUTRITION  Exam: abd soft, non-distended, appropriately tender to palpations; VIOLA drain x2 in place, dressing c/d/i, with serosanguineous OP; Prevena vac in place, functioning well with good seal  Meds: pantoprazole  Injectable 40 milliGRAM(s) IV Push two times a day    GENITOURINARY/RENAL  Exam: Maxwell catheter in place, draining clear yellow urine     I/Os:    09-16 @ 07:01  -  09-17 @ 07:00  --------------------------------------------------------  IN:    Lactated Ringers: 360 mL    Oral Fluid: 1370 mL  Total IN: 1730 mL    OUT:  Total OUT: 0 mL    Total NET: 1730 mL      09-17 @ 07:01  -  09-17 @ 19:24  --------------------------------------------------------  IN:  Total IN: 0 mL    OUT:    Bulb (mL): 25 mL    Bulb (mL): 30 mL    Indwelling Catheter - Urethral (mL): 125 mL  Total OUT: 180 mL    Total NET: -180 mL    Weight (kg): 53.5 (09-17 @ 07:24)    Labs:    140  |  105  |  10  ----------------------------<  152<H>  4.0   |  17<L>  |  0.86    Ca    9.1      17 Sep 2021 18:27  Phos  4.7     09-17  Mg     2.2     09-17    TPro  6.8  /  Alb  3.7  /  TBili  2.1<H>  /  DBili  x   /  AST  246<H>  /  ALT  282<H>  /  AlkPhos  702<H>  09-17    HEMATOLOGIC  Labs:             11.1   14.26 )-----------( 201      ( 17 Sep 2021 18:27 )             33.2     PT/INR - ( 17 Sep 2021 18:27 )   PT: 11.7 sec;   INR: 0.97 ratio     PTT - ( 17 Sep 2021 18:27 )  PTT:34.1 sec  Transfusion: [ ] PRBC	[ ] Platelets	[ ] FFP	[ ] Cryoprecipitate    INFECTIOUS DISEASES:  Medications:  influenza   Vaccine 0.5 milliLiter(s) IntraMuscular once  Recent Cultures: none    ENDOCRINE  Glucose:  CAPILLARY BLOOD GLUCOSE      PATIENT CARE ACCESS DEVICES:  [2] Peripheral IV - 9/17  [ ] Central Venous Line	[ ] R	[ ] L	[ ] IJ	[ ] Fem	[ ] SC	Placed:   [ ] Arterial Line		[ ] R	[ ] L	[ ] Fem	[ ] Rad	[ ] Ax	Placed:   [ ] PICC:					[ ] Mediport  [x] Urinary Catheter, Date Placed: 9/17  [x] Necessity of urinary, arterial, and venous catheters discussed

## 2021-09-17 NOTE — BRIEF OPERATIVE NOTE - OPERATION/FINDINGS
diagnostic laparoscopy negative for gross metastasis, duodenum mobilized, mobilization of hepatic flexure, lesser sac entered, SMV identified, GDA identified and ligated, cholecystectomy performed, and  common bile duct ligated, distal stomach divided and proximal jejenum divided using stapler, neck of pancreas encircled and freed from anterior surface of portal vein and divided, reconstruction performed hepaticojejunostomy, pancreaticojejunostomy, and retrogastric and antecolic gastrojejunostomy  JPx2 - right posterior and left anterior to HJ anastomosis    repair of left lateral abdominal wall hernia repaired primarily

## 2021-09-17 NOTE — PRE-ANESTHESIA EVALUATION ADULT - NSRADCARDRESULTSFT_GEN_ALL_CORE
< from: Transthoracic Echocardiogram (09.16.21 @ 09:23) >    Dimensions:    Normal Values:  LA:     2.4    2.0 - 4.0 cm  Ao:     3.2    2.0 - 3.8 cm  SEPTUM: 0.9    0.6 - 1.2 cm  PWT:    0.7    0.6 - 1.1 cm  LVIDd:  4.1    3.0 - 5.6 cm  LVIDs:  2.5    1.8 - 4.0 cm  Derived variables:  LVMI: 64 g/m2  RWT: 0.34  EF (Visual Estimate): 70 %  ------------------------------------------------------------------------  Observations:  Mitral Valve: Normal mitral valve.  Aortic Valve/Aorta: Normal aortic valve.  Normal aortic root.  Left Atrium: Normal left atrium.  Left Ventricle: Normal left ventricular internal dimensions  and wall thicknesses.  Normal left ventricular systolic function. Probably no  segmental wall motion abnormalities.  Impaired LV-relaxation with normal filling pressure.  Right Heart: Normal right atrium. Normal right ventricular  size and function.  Normal tricuspid valve.  Normal pulmonic valve. Mild pulmonic regurgitation.  Pericardium/Pleura: Pericardial fat pad noted.  Hemodynamic: Estimated right atrial pressue is normal.  No evidence of pulmonary hypertension.  ------------------------------------------------------------------------  Conclusions:  Normal left ventricular systolic function. Probably no  segmental wall motion abnormalities.    < end of copied text >

## 2021-09-17 NOTE — CONSULT NOTE ADULT - ATTENDING COMMENTS
seen and agree w/ PA.  patient has no apparent cardiovascular symptoms, and has been able to complete >4METS of exercise, but has been feeling sick for quite some time now, and is undergoing a high risk intra-abdominal procedure.     she will be cleared to undergo surgery.  results of an echocardiogram may better inform anesthesiology .  looking for valve issues and even mild LV dysfunction.  given the urgent nature of surgery, I would not postpone for stress testing or coronary angiography.  have discussed w/ echo lab to expedite testing.
76 year old woman with history of L. renal cancer s/p L. nephrectomy (2019), rheumatoid arthritis (on MTX), presenting at request of outpatient Gastroenterologist for abnormal MRCP on 9/1/21 with findings of significantly dilated biliary tree without obvious mass.  She also has mildly elevated liver tests and bilirubin.  This is concerning for a malignant obstruction such as pancreatic mass vs ampullary mass vs CBD mass.  Keep NPO after midnight tonight (9/8/21) and will plan for EGD/EUS/ERCP tomorrow for diagnosis and possible stenting.    Thank you for this interesting consult.  Please call the advanced GI service with any questions or concerns.
75 y/o F with a history of rheumatoid arthritis (on MTX), HLD, appendectomy and left renal cell cancer s/p left nephrectomy (2019) sent in by her doctor due to transaminitis and an abnormal MRCP showing a dilated PD and CBD now s/p EUS with concern for pancreatic cancer. Patient hemodynamically stable without evidence of cholangitis.    Plan/Recommendations:  - Operative planning pending biopsy results, however mass concerning for malignancy  - Will f/u results of FNA from EUS  - Please obtain CT chest to complete staging  - CA [id number] 50
Patient seen and examined after arrival to SICU on 9/17/21  s/p Pancreaticoduodenectomy for pancreatic cancer  Extubated in OR  On arrival to SICU awake, alert, oxygenation ok, hemodynamically stable  Will observe in SICU and resuscitation as needed

## 2021-09-17 NOTE — PRE-ANESTHESIA EVALUATION ADULT - NSANTHOSAYNRD_GEN_A_CORE
No. ARCHANA screening performed.  STOP BANG Legend: 0-2 = LOW Risk; 3-4 = INTERMEDIATE Risk; 5-8 = HIGH Risk
No. ARCHANA screening performed.  STOP BANG Legend: 0-2 = LOW Risk; 3-4 = INTERMEDIATE Risk; 5-8 = HIGH Risk

## 2021-09-17 NOTE — OPERATIVE REPORT - OPERATIVE RPOSRT DETAILS
FINDINGS:  Diagnostic laparoscopy without evidence of carcinomatosis or hepatic implants.  Laparotomy performed.  Anatomy was notable for a well placed segment VI right hepatic arterial branch, which was dissected and preserved coming directly up the superior mesenteric artery.  The common bile duct is approximately 1.5 cm in size.  Frozen section is negative.  Pancreas body appears atrophic.  The pancreatic parenchyma was firm, and the pancreatic duct  measured approximately 8 mm.  An 8-Taiwanese pediatric feeding tube was left as a pancreaticojejunostomy stent.  The hepaticojejunostomy is performed in a single layer of 4-0 PDS.  The pancreaticojejunostomy is performed of the ductal mucosal lymph nodes and this consisted of 5-0 PDS in an interrupted layer and 3-0 V-Loc as an outer layer.  Anastomosis is well perfused and without any tension; 19-Taiwanese Micky drains were placed.  A gastrojejunostomy is performed in a stapled fashion with the anastomosis being antecolic retrogastric isoperistaltic.  The anastomosis is stapled with an Endo ILENE purple 80-mm stapler.  Common enterotomy was closed transversely with a TA-60 blue stapler.    PROCEDURE:  The patient was taken to the operating room and placed on the table in the supine position.  Perioperative antibiotics were given within one hour of the incision.  SCDs were placed for DVT prophylaxis.  General endotracheal anesthesia was instituted.  A sterile Maxwell catheter was inserted.  The abdomen was then prepped and draped in the usual sterile fashion.  A time-out procedure was performed.    An infraumbilical 12-mm incision was made using a 15-blade.  The dissection was continued down to the linea alba.  A balloon tip trocar was inserted.  Pneumoperitoneum was established.  A flexible 10-mm laparoscope was inserted.  There was no gross evidence of carcinomatosis or hepatic implants.  The diagnostic laparoscopy was completed.  Pneumoperitoneum was evacuated.    A midline laparotomy incision was made using a 10-blade.  The abdominal wall was opened and a Ackerman retractor was placed.  Inspection of the peritoneal and hepatic surfaces again did not demonstrate any evidence of carcinomatosis or hepatic implants.  The duodenum was visualized and kocherized.  Cattell-Braasch maneuver was also performed.  The mobilization continued to the level of the ligament of Treitz and aorta, but in the posterior plane.  The superior mesenteric vein was identified and dissection along the pancreatic neck was begun.    Next, attention was directed to the upper abdomen.  The proper hepatic artery and GDA were identified.  Further dissection allowed identification of the common hepatic artery and arterial trunk  The GDA was visualized and test clamped.  Good pulsatile flow was noted to be in the common hepatic and proper hepatic arterial branches.  The common bile duct was dissected circumferentially and a yellow vessel loop was used to retract the common bile duct laterally.  During this portion of dissection a suspicion of aberrant right hepatic artery was present.  Further dissection allowed identification of a segment VI arterial branch coursing just posterolateral to the common bile duct.  This ultimately was traced back to its origin from the superior mesenteric artery itself.  At this point, the GDAs were visualized and again test clamped and divided between ties and the Ethicon vascular power stapler.  The portal vein was further delineated and the superior portion of the pancreatic neck was identified.  We went through the pancreatic tunnel along the superior mesenteric vein after the portal vein was completed.  A 0.25-inch Penrose was then passed behind the pancreatic neck.  At this point, the stomach just proximal to the pylorus was identified and transected using firing of the ILENE stapler with a black load.  The gallbladder was then taken down in a top down fashion from the gallbladder fossa.  The cystic duct was identified, triply clipped, and transected.  The cystic artery was also triply clipped and transected.  The gallbladder was sent as a separate specimen.  The common bile duct was then controlled with a bulldog clamp and transected.  The frozen section margin was sent for further analysis.  The pancreatic neck was then transected using cautery.  The pancreatic parenchymal texture was firm.  The pancreatic duct was approximately 8-mm in size and cannulated with 8-Taiwanese pediatric tubing tube.  The proximal jejunum, which had been delivered to the right side of the abdomen with extensive kocherization portion was selected for anastomosis.  A mesenteric window was created.  ILENE 80 purple stapler was then used to transect the proximal jejunum.  The mesentery of the jejunum was taken with serial applications of the LigaSure following to continue the dissection to the uncinate process of the pancreas.  The superior mesenteric artery was identified and dissection along the superior mesenteric artery continued.  At this point, the segment VI arterial branch was clearly delineated and the uncinate was dissected off the SMA and other replaced segment VI arterial branch.  The portal vein had been dissected off the uncinate process with careful dissection.  The posterolateral portion of the portal vein was somewhat adherent to the tumor.  The specimen was freed from the portal vein with a firing of the vascular Ethicon 35-mm motorized stapler.  The specimen was then oriented with stitches being placed in the common bile duct, pancreatic neck, portal vein, and SMA portion of dissection.  The pancreatic neck margin was sent for frozen sectioning, which demonstrated benign findings.    Reconstruction was then begun with the creation of hepaticojejunostomy.  The hepatic duct was approximately 1.5 cm in size.  An enterotomy was created and a posterior row consisting of 4-0 PDS was also performed.  An anterior row was then also performed with 4-0 PDS.  The vein anastomosis was mildly patent and without any tension.  Proximal to this anastomosis at the transected portion of the jejunum the pancreaticojejunostomy was then reconstructed.  An enterotomy was created and the outer posterior layer of 3-0 V-Loc was used to create the outer and posterior portion of the anastomosis exposing the serosa and small bowel to the capsule of the pancreas.  The ductal mucosal anastomosis was performed with 5-0 PDS and an 8-Taiwanese pediatric feeding tube was then left in place as a stent.  The anterior outer layer of the anastomosis was competed using a capsular to serosa V-Loc suture as well.  This anastomosis was also well perfused and without any tension whatsoever.  The gastrojejunostomy was completed in the left side of the abdomen consisting of isoperistaltic retrogastric antecolic fashion.  Please note, prior to creating this anastomosis the stay sutures were placed near the duodenojejunal flexure.  The anastomosis was performed along the greater curve of the stomach with stay sutures being placed and an 80-mm purple ILENE stapler.  The common enterotomy was then   FINDINGS:  Diagnostic laparoscopy without evidence of carcinomatosis or hepatic implants.  Laparotomy performed.  Anatomy was notable for a well placed segment VI right hepatic arterial branch, which was dissected and preserved coming directly up the superior mesenteric artery.  The common bile duct is approximately 1.5 cm in size.  Frozen section is negative.  Pancreas body appears atrophic.  The pancreatic parenchyma was firm, and the pancreatic duct  measured approximately 8 mm.  An 8-Swazi pediatric feeding tube was left as a pancreaticojejunostomy stent.  The hepaticojejunostomy is performed in a single layer of 4-0 PDS.  The pancreaticojejunostomy is performed of the ductal mucosal lymph nodes and this consisted of 5-0 PDS in an interrupted layer and 3-0 V-Loc as an outer layer.  Anastomosis is well perfused and without any tension; 19-Swazi Micky drains were placed.  A gastrojejunostomy is performed in a stapled fashion with the anastomosis being antecolic retrogastric isoperistaltic.  The anastomosis is stapled with an Endo ILENE purple 80-mm stapler.  Common enterotomy was closed transversely with a TA-60 blue stapler.    PROCEDURE:  The patient was taken to the operating room and placed on the table in the supine position.  Perioperative antibiotics were given within one hour of the incision.  SCDs were placed for DVT prophylaxis.  General endotracheal anesthesia was instituted.  A sterile Maxwell catheter was inserted.  The abdomen was then prepped and draped in the usual sterile fashion.  A time-out procedure was performed.    An infraumbilical 12-mm incision was made using a 15-blade.  The dissection was continued down to the linea alba.  A balloon tip trocar was inserted.  Pneumoperitoneum was established.  A flexible 10-mm laparoscope was inserted.  There was no gross evidence of carcinomatosis or hepatic implants.  The diagnostic laparoscopy was completed.  Pneumoperitoneum was evacuated.    A midline laparotomy incision was made using a 10-blade.  The abdominal wall was opened and a Ackerman retractor was placed.  Inspection of the peritoneal and hepatic surfaces again did not demonstrate any evidence of carcinomatosis or hepatic implants.  The duodenum was visualized and kocherized.  Cattell-Braasch maneuver was also performed.  The mobilization continued to the level of the ligament of Treitz and aorta, but in the posterior plane.  The superior mesenteric vein was identified and dissection along the pancreatic neck was begun.    Next, attention was directed to the upper abdomen.  The proper hepatic artery and GDA were identified.  Further dissection allowed identification of the common hepatic artery and arterial trunk  The GDA was visualized and test clamped.  Good pulsatile flow was noted to be in the common hepatic and proper hepatic arterial branches.  The common bile duct was dissected circumferentially and a yellow vessel loop was used to retract the common bile duct laterally.  During this portion of dissection a suspicion of aberrant right hepatic artery was present.  Further dissection allowed identification of a segment VI arterial branch coursing just posterolateral to the common bile duct.  This ultimately was traced back to its origin from the superior mesenteric artery itself.  At this point, the GDAs were visualized and again test clamped and divided between ties and the Ethicon vascular power stapler.  The portal vein was further delineated and the superior portion of the pancreatic neck was identified.  We went through the pancreatic tunnel along the superior mesenteric vein after the portal vein was completed.  A 0.25-inch Penrose was then passed behind the pancreatic neck.  At this point, the stomach just proximal to the pylorus was identified and transected using firing of the ILENE stapler with a black load.  The gallbladder was then taken down in a top down fashion from the gallbladder fossa.  The cystic duct was identified, triply clipped, and transected.  The cystic artery was also triply clipped and transected.  The gallbladder was sent as a separate specimen.  The common bile duct was then controlled with a bulldog clamp and transected.  The frozen section margin was sent for further analysis.  The pancreatic neck was then transected using cautery.  The pancreatic parenchymal texture was firm.  The pancreatic duct was approximately 8-mm in size and cannulated with 8-Swazi pediatric tubing tube.  The proximal jejunum, which had been delivered to the right side of the abdomen with extensive kocherization portion was selected for anastomosis.  A mesenteric window was created.  ILENE 80 purple stapler was then used to transect the proximal jejunum.  The mesentery of the jejunum was taken with serial applications of the LigaSure following to continue the dissection to the uncinate process of the pancreas.  The superior mesenteric artery was identified and dissection along the superior mesenteric artery continued.  At this point, the segment VI arterial branch was clearly delineated and the uncinate was dissected off the SMA and other replaced segment VI arterial branch.  The portal vein had been dissected off the uncinate process with careful dissection.  The posterolateral portion of the portal vein was somewhat adherent to the tumor.  The specimen was freed from the portal vein with a firing of the vascular Ethicon 35-mm motorized stapler.  The specimen was then oriented with stitches being placed in the common bile duct, pancreatic neck, portal vein, and SMA portion of dissection.  The pancreatic neck margin was sent for frozen sectioning, which demonstrated benign findings.    Reconstruction was then begun with the creation of hepaticojejunostomy.  The hepatic duct was approximately 1.5 cm in size.  An enterotomy was created and a posterior row consisting of 4-0 PDS was also performed.  An anterior row was then also performed with 4-0 PDS.  The vein anastomosis was mildly patent and without any tension.  Proximal to this anastomosis at the transected portion of the jejunum the pancreaticojejunostomy was then reconstructed.  An enterotomy was created and the outer posterior layer of 3-0 V-Loc was used to create the outer and posterior portion of the anastomosis exposing the serosa and small bowel to the capsule of the pancreas.  The ductal mucosal anastomosis was performed with 5-0 PDS and an 8-Swazi pediatric feeding tube was then left in place as a stent.  The anterior outer layer of the anastomosis was competed using a capsular to serosa V-Loc suture as well.  This anastomosis was also well perfused and without any tension whatsoever.  The gastrojejunostomy was completed in the left side of the abdomen consisting of isoperistaltic retrogastric antecolic fashion.  Please note, prior to creating this anastomosis the stay sutures were placed near the duodenojejunal flexure.  The anastomosis was performed along the greater curve of the stomach with stay sutures being placed and an 80-mm purple ILENE stapler.  The common enterotomy was then closed transversely using firing of the TA 60 blue stapler.  Reinforcing stitches were placed to minimize tension on the anastomosis.  An NG tube was positioned appropriately and secured by Anesthesia.  The abdomen was then copiously irrigated and inspected for hemostasis, which was achieved.  A 19-Swazi Micky drain was placed in the upper abdomen draining both anterior and posterior to the anastomosis.  The midline fascia was then closed with looped #1 PDS stitch.  The subcutaneous tissue was then reapproximated using buried interrupted 2-0 Vicryl suture and a Prevena wound VAC was placed as well.    The patient was then extubated and taken to the recovery room in stable condition.  The instrument, sponge, and needle counts were correct at the end of the case.  The patient tolerated the procedure well.  There were no complications.

## 2021-09-17 NOTE — PRE-ANESTHESIA EVALUATION ADULT - NSANTHPMHFT_GEN_ALL_CORE
76F w/ RA on methotrexate, Left renal cell CA s/p Left nephrectomy 2019, HLD admitted 9/7 with jaundice, elevated LFTs and outpt MRCP concerning for CBD dilatation. Found to have pancreatic head mass on CT A/P.

## 2021-09-17 NOTE — CONSULT NOTE ADULT - ASSESSMENT
76y Female with Hx of RA (on MTX), HLD, appendectomy, and L renal cell CA (s/p L nephrectomy 2019) who presented with transaminitis and an abnormal MRCP showing dilated PD and CBD, found to have a 1.7x1.8cm hypoechoic pancreatic head lesion on EUS on 9/9. She was taken to the OR on 9/17 for a Whipple on 9/17 and transferred to the SICU extubated post-operatively for hemodynamic monitoring.     PLAN:    Neuro: post-op pain control - s/p spinal block   - Pain control: IV Tylenol ATC     Respiratory: post-op atelectasis  - Monitor respiratory status  - Wean supplemental O2 as tolerated  - Incentive spirometry 10x/hr while in bed   - Encourage OOB as tolerated     Cardiovascular: h/o HLD  - Monitor vitals signs q1h     Gastrointestinal: s/p Whipple  - Diet: NPO  - NGT to LCWS  - Protonix 40mg IV BID  - AM VIOLA Amylase -- VIOLA#1 @ 0500, VIOLA#2 @ 0530    Genitourinary/Renal: no active issues; h/o L renal cell CA (s/p L nephrectomy 2019)  - IVF: LR @ 100cc/hr  - Monitor UOP q1h  - Trend electrolytes on BMP qD, replete PRN    Heme:  - Trend H/H on CBC qD  - VTE ppx: Lovenox   - SCDs while in bed    ID: no active issues   - Trend WBC on CBC qD  - Monitor fever curve    Endocrine: h/o RA (on home MTX)  - Trend FS q4h  - MISS   - HOLD home methotrexate    Lines:   - PIV x 2 - 9/17  - Maxwell - 9/17    Dispo: SICU  Code Status: Full Code      Rossy Atkinson, PGY-2  Surgical ICU  #[id number]  76y Female with Hx of RA (on MTX), HLD, appendectomy, and L renal cell CA (s/p L nephrectomy 2019) who presented with transaminitis and an abnormal MRCP showing dilated PD and CBD, found to have a 1.7x1.8cm hypoechoic pancreatic head lesion on EUS on 9/9. She was taken to the OR on 9/17 for a Whipple on 9/17 and transferred to the SICU extubated post-operatively for hemodynamic monitoring.     PLAN:    Neuro: post-op pain control - s/p spinal block   - Pain control: IV Tylenol ATC     Respiratory: post-op atelectasis  - Monitor respiratory status  - Wean supplemental O2 as tolerated  - Incentive spirometry 10x/hr while in bed   - Encourage OOB as tolerated     Cardiovascular: h/o HLD  - Monitor vitals signs q1h     Gastrointestinal: s/p Whipple  - Diet: NPO  - NGT to LCWS  - Incisional Prevena vac x 5d  - Protonix 40mg IV BID  - AM VIOLA Amylase -- VIOLA#1 @ 0500, VIOLA#2 @ 0530    Genitourinary/Renal: no active issues; h/o L renal cell CA (s/p L nephrectomy 2019)  - IVF: LR @ 100cc/hr  - C/w Maxwell catheter  - Monitor UOP q1h  - Trend electrolytes on CMP qD, replete PRN    Heme:  - Trend H/H on CBC qD  - VTE ppx: Lovenox   - SCDs while in bed    ID: no active issues   - Trend WBC on CBC qD  - Monitor fever curve    Endocrine: h/o RA (on home MTX)  - Trend FS q4h  - MISS   - HOLD home methotrexate    Lines:   - PIV x 2 - 9/17  - Maxwell - 9/17    Dispo: SICU  Code Status: Full Code      Rossy China, PGY-2  Surgical ICU  #[id number]  76y Female with Hx of RA (on MTX), HLD, appendectomy, and L renal cell CA (s/p L nephrectomy 2019) who presented with transaminitis and an abnormal MRCP showing dilated PD and CBD, found to have a 1.7x1.8cm hypoechoic pancreatic head lesion on EUS on 9/9. She was taken to the OR on 9/17 for a Whipple on 9/17 and transferred to the SICU extubated post-operatively for hemodynamic monitoring.     PLAN:    Neuro: post-op pain control - s/p spinal block   - Pain control: IV Tylenol ATC     Respiratory: post-op atelectasis  - Monitor respiratory status  - Wean supplemental O2 as tolerated  - Incentive spirometry 10x/hr while in bed   - Encourage OOB as tolerated     Cardiovascular: h/o HLD  - Monitor vitals signs q1h     Gastrointestinal: s/p Whipple  - Diet: NPO  - NGT to LCWS  - Incisional Prevena vac x 5d  - Protonix 40mg IV BID  - AM VIOLA Amylase -- VIOLA#1 @ 0500, VIOLA#2 @ 0530  - HOLD home atorvastatin 10mg PO qD while patient is NPO    Genitourinary/Renal: no active issues; h/o L renal cell CA (s/p L nephrectomy 2019)  - IVF: LR @ 100cc/hr  - C/w Maxwell catheter  - Monitor UOP q1h  - Trend electrolytes on CMP qD, replete PRN    Heme:  - Trend H/H on CBC qD  - VTE ppx: Lovenox   - SCDs while in bed    ID: no active issues   - Trend WBC on CBC qD  - Monitor fever curve    Endocrine: h/o RA (last dose MTX 7/2021)  - Trend FS q4h  - MISS     Lines:   - PIV x 2 - 9/17  - Maxwell - 9/17    Dispo: SICU  Code Status: Full Code      Rossy Atkinson, PGY-2  Surgical ICU  #[id number]

## 2021-09-18 LAB
A1C WITH ESTIMATED AVERAGE GLUCOSE RESULT: 5.7 % — HIGH (ref 4–5.6)
ALBUMIN SERPL ELPH-MCNC: 2.8 G/DL — LOW (ref 3.3–5)
ALBUMIN SERPL ELPH-MCNC: 3.1 G/DL — LOW (ref 3.3–5)
ALP SERPL-CCNC: 377 U/L — HIGH (ref 40–120)
ALP SERPL-CCNC: 479 U/L — HIGH (ref 40–120)
ALT FLD-CCNC: 139 U/L — HIGH (ref 10–45)
ALT FLD-CCNC: 211 U/L — HIGH (ref 10–45)
AMYLASE FLD-CCNC: 64 U/L — SIGNIFICANT CHANGE UP
AMYLASE FLD-CCNC: 72 U/L — SIGNIFICANT CHANGE UP
ANION GAP SERPL CALC-SCNC: 11 MMOL/L — SIGNIFICANT CHANGE UP (ref 5–17)
ANION GAP SERPL CALC-SCNC: 14 MMOL/L — SIGNIFICANT CHANGE UP (ref 5–17)
APTT BLD: 36.7 SEC — HIGH (ref 27.5–35.5)
APTT BLD: 38.5 SEC — HIGH (ref 27.5–35.5)
AST SERPL-CCNC: 160 U/L — HIGH (ref 10–40)
AST SERPL-CCNC: 67 U/L — HIGH (ref 10–40)
BILIRUB SERPL-MCNC: 1 MG/DL — SIGNIFICANT CHANGE UP (ref 0.2–1.2)
BILIRUB SERPL-MCNC: 1.2 MG/DL — SIGNIFICANT CHANGE UP (ref 0.2–1.2)
BUN SERPL-MCNC: 10 MG/DL — SIGNIFICANT CHANGE UP (ref 7–23)
BUN SERPL-MCNC: 11 MG/DL — SIGNIFICANT CHANGE UP (ref 7–23)
CALCIUM SERPL-MCNC: 8.6 MG/DL — SIGNIFICANT CHANGE UP (ref 8.4–10.5)
CALCIUM SERPL-MCNC: 9 MG/DL — SIGNIFICANT CHANGE UP (ref 8.4–10.5)
CHLORIDE SERPL-SCNC: 105 MMOL/L — SIGNIFICANT CHANGE UP (ref 96–108)
CHLORIDE SERPL-SCNC: 106 MMOL/L — SIGNIFICANT CHANGE UP (ref 96–108)
CO2 SERPL-SCNC: 18 MMOL/L — LOW (ref 22–31)
CO2 SERPL-SCNC: 20 MMOL/L — LOW (ref 22–31)
CREAT SERPL-MCNC: 0.74 MG/DL — SIGNIFICANT CHANGE UP (ref 0.5–1.3)
CREAT SERPL-MCNC: 0.78 MG/DL — SIGNIFICANT CHANGE UP (ref 0.5–1.3)
ESTIMATED AVERAGE GLUCOSE: 117 MG/DL — HIGH (ref 68–114)
GAS PNL BLDA: SIGNIFICANT CHANGE UP
GAS PNL BLDA: SIGNIFICANT CHANGE UP
GLUCOSE BLDC GLUCOMTR-MCNC: 112 MG/DL — HIGH (ref 70–99)
GLUCOSE BLDC GLUCOMTR-MCNC: 117 MG/DL — HIGH (ref 70–99)
GLUCOSE BLDC GLUCOMTR-MCNC: 125 MG/DL — HIGH (ref 70–99)
GLUCOSE BLDC GLUCOMTR-MCNC: 158 MG/DL — HIGH (ref 70–99)
GLUCOSE BLDC GLUCOMTR-MCNC: 166 MG/DL — HIGH (ref 70–99)
GLUCOSE SERPL-MCNC: 151 MG/DL — HIGH (ref 70–99)
GLUCOSE SERPL-MCNC: 173 MG/DL — HIGH (ref 70–99)
HCT VFR BLD CALC: 29.5 % — LOW (ref 34.5–45)
HCT VFR BLD CALC: 31.8 % — LOW (ref 34.5–45)
HGB BLD-MCNC: 10.6 G/DL — LOW (ref 11.5–15.5)
HGB BLD-MCNC: 9.6 G/DL — LOW (ref 11.5–15.5)
MAGNESIUM SERPL-MCNC: 2 MG/DL — SIGNIFICANT CHANGE UP (ref 1.6–2.6)
MAGNESIUM SERPL-MCNC: 2 MG/DL — SIGNIFICANT CHANGE UP (ref 1.6–2.6)
MCHC RBC-ENTMCNC: 31.9 PG — SIGNIFICANT CHANGE UP (ref 27–34)
MCHC RBC-ENTMCNC: 32 PG — SIGNIFICANT CHANGE UP (ref 27–34)
MCHC RBC-ENTMCNC: 32.5 GM/DL — SIGNIFICANT CHANGE UP (ref 32–36)
MCHC RBC-ENTMCNC: 33.3 GM/DL — SIGNIFICANT CHANGE UP (ref 32–36)
MCV RBC AUTO: 95.8 FL — SIGNIFICANT CHANGE UP (ref 80–100)
MCV RBC AUTO: 98.3 FL — SIGNIFICANT CHANGE UP (ref 80–100)
NRBC # BLD: 0 /100 WBCS — SIGNIFICANT CHANGE UP (ref 0–0)
NRBC # BLD: 0 /100 WBCS — SIGNIFICANT CHANGE UP (ref 0–0)
PHOSPHATE SERPL-MCNC: 2.4 MG/DL — LOW (ref 2.5–4.5)
PHOSPHATE SERPL-MCNC: 5.8 MG/DL — HIGH (ref 2.5–4.5)
PLATELET # BLD AUTO: 177 K/UL — SIGNIFICANT CHANGE UP (ref 150–400)
PLATELET # BLD AUTO: 182 K/UL — SIGNIFICANT CHANGE UP (ref 150–400)
POTASSIUM SERPL-MCNC: 4 MMOL/L — SIGNIFICANT CHANGE UP (ref 3.5–5.3)
POTASSIUM SERPL-MCNC: 4.3 MMOL/L — SIGNIFICANT CHANGE UP (ref 3.5–5.3)
POTASSIUM SERPL-SCNC: 4 MMOL/L — SIGNIFICANT CHANGE UP (ref 3.5–5.3)
POTASSIUM SERPL-SCNC: 4.3 MMOL/L — SIGNIFICANT CHANGE UP (ref 3.5–5.3)
PROT SERPL-MCNC: 5.5 G/DL — LOW (ref 6–8.3)
PROT SERPL-MCNC: 5.7 G/DL — LOW (ref 6–8.3)
RBC # BLD: 3 M/UL — LOW (ref 3.8–5.2)
RBC # BLD: 3.32 M/UL — LOW (ref 3.8–5.2)
RBC # FLD: 15.6 % — HIGH (ref 10.3–14.5)
RBC # FLD: 15.8 % — HIGH (ref 10.3–14.5)
SODIUM SERPL-SCNC: 136 MMOL/L — SIGNIFICANT CHANGE UP (ref 135–145)
SODIUM SERPL-SCNC: 138 MMOL/L — SIGNIFICANT CHANGE UP (ref 135–145)
WBC # BLD: 11.17 K/UL — HIGH (ref 3.8–10.5)
WBC # BLD: 12.34 K/UL — HIGH (ref 3.8–10.5)
WBC # FLD AUTO: 11.17 K/UL — HIGH (ref 3.8–10.5)
WBC # FLD AUTO: 12.34 K/UL — HIGH (ref 3.8–10.5)

## 2021-09-18 PROCEDURE — 99232 SBSQ HOSP IP/OBS MODERATE 35: CPT

## 2021-09-18 RX ORDER — ONDANSETRON 8 MG/1
4 TABLET, FILM COATED ORAL EVERY 6 HOURS
Refills: 0 | Status: DISCONTINUED | OUTPATIENT
Start: 2021-09-18 | End: 2021-09-22

## 2021-09-18 RX ORDER — NALOXONE HYDROCHLORIDE 4 MG/.1ML
0.1 SPRAY NASAL
Refills: 0 | Status: DISCONTINUED | OUTPATIENT
Start: 2021-09-18 | End: 2021-09-22

## 2021-09-18 RX ORDER — HYDROMORPHONE HYDROCHLORIDE 2 MG/ML
0.5 INJECTION INTRAMUSCULAR; INTRAVENOUS; SUBCUTANEOUS
Refills: 0 | Status: DISCONTINUED | OUTPATIENT
Start: 2021-09-18 | End: 2021-09-18

## 2021-09-18 RX ORDER — CALCIUM GLUCONATE 100 MG/ML
2 VIAL (ML) INTRAVENOUS ONCE
Refills: 0 | Status: COMPLETED | OUTPATIENT
Start: 2021-09-18 | End: 2021-09-18

## 2021-09-18 RX ORDER — HYDROMORPHONE HYDROCHLORIDE 2 MG/ML
30 INJECTION INTRAMUSCULAR; INTRAVENOUS; SUBCUTANEOUS
Refills: 0 | Status: DISCONTINUED | OUTPATIENT
Start: 2021-09-18 | End: 2021-09-22

## 2021-09-18 RX ORDER — HYDROMORPHONE HYDROCHLORIDE 2 MG/ML
0.5 INJECTION INTRAMUSCULAR; INTRAVENOUS; SUBCUTANEOUS
Refills: 0 | Status: DISCONTINUED | OUTPATIENT
Start: 2021-09-18 | End: 2021-09-22

## 2021-09-18 RX ORDER — CHLORHEXIDINE GLUCONATE 213 G/1000ML
1 SOLUTION TOPICAL
Refills: 0 | Status: DISCONTINUED | OUTPATIENT
Start: 2021-09-19 | End: 2021-10-04

## 2021-09-18 RX ORDER — DEXTROSE MONOHYDRATE, SODIUM CHLORIDE, AND POTASSIUM CHLORIDE 50; .745; 4.5 G/1000ML; G/1000ML; G/1000ML
1000 INJECTION, SOLUTION INTRAVENOUS
Refills: 0 | Status: DISCONTINUED | OUTPATIENT
Start: 2021-09-18 | End: 2021-09-21

## 2021-09-18 RX ADMIN — Medication 280 MILLIGRAM(S): at 12:10

## 2021-09-18 RX ADMIN — Medication 280 MILLIGRAM(S): at 05:15

## 2021-09-18 RX ADMIN — HYDROMORPHONE HYDROCHLORIDE 0.5 MILLIGRAM(S): 2 INJECTION INTRAMUSCULAR; INTRAVENOUS; SUBCUTANEOUS at 09:15

## 2021-09-18 RX ADMIN — Medication 700 MILLIGRAM(S): at 05:45

## 2021-09-18 RX ADMIN — ENOXAPARIN SODIUM 40 MILLIGRAM(S): 100 INJECTION SUBCUTANEOUS at 12:08

## 2021-09-18 RX ADMIN — HYDROMORPHONE HYDROCHLORIDE 30 MILLILITER(S): 2 INJECTION INTRAMUSCULAR; INTRAVENOUS; SUBCUTANEOUS at 12:31

## 2021-09-18 RX ADMIN — DEXTROSE MONOHYDRATE, SODIUM CHLORIDE, AND POTASSIUM CHLORIDE 84 MILLILITER(S): 50; .745; 4.5 INJECTION, SOLUTION INTRAVENOUS at 13:30

## 2021-09-18 RX ADMIN — HYDROMORPHONE HYDROCHLORIDE 30 MILLILITER(S): 2 INJECTION INTRAMUSCULAR; INTRAVENOUS; SUBCUTANEOUS at 19:10

## 2021-09-18 RX ADMIN — Medication 2: at 17:57

## 2021-09-18 RX ADMIN — Medication 200 GRAM(S): at 05:15

## 2021-09-18 RX ADMIN — PANTOPRAZOLE SODIUM 40 MILLIGRAM(S): 20 TABLET, DELAYED RELEASE ORAL at 05:15

## 2021-09-18 RX ADMIN — PANTOPRAZOLE SODIUM 40 MILLIGRAM(S): 20 TABLET, DELAYED RELEASE ORAL at 17:57

## 2021-09-18 RX ADMIN — Medication 700 MILLIGRAM(S): at 00:06

## 2021-09-18 RX ADMIN — Medication 2: at 22:16

## 2021-09-18 RX ADMIN — Medication 700 MILLIGRAM(S): at 12:30

## 2021-09-18 RX ADMIN — HYDROMORPHONE HYDROCHLORIDE 0.5 MILLIGRAM(S): 2 INJECTION INTRAMUSCULAR; INTRAVENOUS; SUBCUTANEOUS at 09:00

## 2021-09-18 NOTE — PROGRESS NOTE ADULT - ASSESSMENT
76y Female with Hx of RA (on MTX), HLD, appendectomy, and L renal cell CA (s/p L nephrectomy 2019) who presented with transaminitis and an abnormal MRCP showing dilated PD and CBD, found to have a 1.7x1.8cm hypoechoic pancreatic head lesion on EUS on 9/9. She was taken to the OR on 9/17 for a Whipple on 9/17 and transferred to the SICU extubated post-operatively for hemodynamic monitoring.     PLAN:    Neuro: post-op pain control - s/p spinal block   - Pain control: IV Tylenol ATC   - no toradol for 48hr    Respiratory: post-op atelectasis  - Monitor respiratory status  - Wean supplemental O2 as tolerated  - Incentive spirometry 10x/hr while in bed   - Encourage OOB as tolerated     Cardiovascular: h/o HLD  - Monitor vitals signs q1h     Gastrointestinal: s/p Whipple  - Diet: NPO  - NGT to LCWS  - Incisional Prevena vac x 5d  - Protonix 40mg IV BID  - AM VIOLA Amylase -- VIOLA#1 @ 0500, VIOLA#2 @ 0530  - HOLD home atorvastatin 10mg PO qD while patient is NPO    Genitourinary/Renal: no active issues; h/o L renal cell CA (s/p L nephrectomy 2019)  - IVF: LR @ 100cc/hr  - C/w Maxwell catheter  - Monitor UOP q1h  - Trend electrolytes on CMP qD, replete PRN    Heme:  - Trend H/H on CBC qD  - VTE ppx: Lovenox   - SCDs while in bed    ID: no active issues   - Trend WBC on CBC qD  - Monitor fever curve    Endocrine: h/o RA (last dose MTX 7/2021)  - Trend FS q4h  - MISS     Lines:   - PIV x 2 - 9/17  - Maxwell - 9/17    Dispo: SICU  Code Status: Full Code

## 2021-09-18 NOTE — PROGRESS NOTE ADULT - SUBJECTIVE AND OBJECTIVE BOX
24h Events:  No acute events overnight.    Subjective:   Patient seen at bedside this AM. Pt reports that she feels good, only some minimal pain. Denies n/v. No flatus or BM yet.    Objective:  Vital Signs  T(C): 36.8 (09-18 @ 07:00), Max: 36.8 (09-18 @ 07:00)  HR: 94 (09-18 @ 08:00) (84 - 104)  BP: 137/78 (09-17 @ 19:00) (125/65 - 137/78)  RR: 17 (09-18 @ 08:00) (12 - 26)  SpO2: 97% (09-18 @ 08:00) (96% - 100%)  09-17-21 @ 07:01  -  09-18-21 @ 07:00  --------------------------------------------------------  IN:  Total IN: 0 mL    OUT:    Bulb (mL): 55 mL    Bulb (mL): 110 mL    Indwelling Catheter - Urethral (mL): 635 mL    Nasogastric/Oral tube (mL): 150 mL  Total OUT: 950 mL    Total NET: -950 mL      09-18-21 @ 07:01  -  09-18-21 @ 10:22  --------------------------------------------------------  IN:  Total IN: 0 mL    OUT:    Indwelling Catheter - Urethral (mL): 75 mL  Total OUT: 75 mL    Total NET: -75 mL        Physical Exam:  GEN: resting in bed comfortably in NAD. NGT to LCWS  RESP: no increased WOB  ABD: soft, non-distended, appropriately tender to palpation without rebound tenderness or guarding. Prevena vac in place holding suction. Drains x2 with serosanguinous output  EXTR: warm, well-perfused, no edema    Labs:                        10.6   11.17 )-----------( 182      ( 18 Sep 2021 00:55 )             31.8   09-18    138  |  106  |  10  ----------------------------<  151<H>  4.0   |  18<L>  |  0.78    Ca    8.6      18 Sep 2021 00:55  Phos  5.8     09-18  Mg     2.0     09-18    TPro  5.5<L>  /  Alb  2.8<L>  /  TBili  1.2  /  DBili  x   /  AST  160<H>  /  ALT  211<H>  /  AlkPhos  479<H>  09-18    CAPILLARY BLOOD GLUCOSE      POCT Blood Glucose.: 125 mg/dL (18 Sep 2021 05:17)  POCT Blood Glucose.: 141 mg/dL (17 Sep 2021 22:16)      Imaging:

## 2021-09-18 NOTE — PROGRESS NOTE ADULT - ASSESSMENT
76y Female with Hx of RA (on MTX), HLD, appendectomy, and L renal cell CA (s/p L nephrectomy 2019) who presented with transaminitis and an abnormal MRCP showing dilated PD and CBD, found to have a 1.7x1.8cm hypoechoic pancreatic head lesion on EUS on 9/9. She was taken to the OR on 9/17 for a Whipple on 9/17 and transferred to the SICU extubated post-operatively for hemodynamic monitoring.     - NGT to LCWS  - NPO  - Await return of bowel function  - Prevena vac in place, to come off POD5 (9/22)  - Maxwell catheter  - Trend VIOLA amylase  - Care per SICU    Red Team Surgery  [id number]

## 2021-09-18 NOTE — PROGRESS NOTE ADULT - ASSESSMENT
CARDIOLOGY ATTENDING    Agree with above. Given unremarkable echo no further inpatient cardiac workup expected. Supportive care as per SICU.

## 2021-09-18 NOTE — PROGRESS NOTE ADULT - ATTENDING COMMENTS
Patient seen and examined   Chart reviewed  s/p Pancreaticoduodenectomy for pancreatic cancer  No acute events overnight.  Pt remains awake, alert, oxygenation ok, hemodynamically stable  No further resuscitation needed.  Continue PCA  D/c A-line  Change to maintenance fluid  Continue supportive care.

## 2021-09-18 NOTE — CHART NOTE - NSCHARTNOTEFT_GEN_A_CORE
Surgery Post-Op Note    Pre-Op Dx: Pancreatic mass  Procedure: Diagnostic laparoscopy with Whipple procedure      Surgeon: Darien Rainey,  Ravin, Noah      SUBJECTIVE:  Pt seen and examined at the bedside. Pt w/ no complaints. Denies F/C/N/V. Pain controlled with medication. No chest pain or shortness of breath. NG tube in place draining dark brown/green gastric content. VIOLA drains with serosanguinous output.    OBJECTIVE:  Vital Signs Last 24 Hrs  T(C): 36.6 (18 Sep 2021 03:00), Max: 36.8 (17 Sep 2021 04:47)  T(F): 97.9 (18 Sep 2021 03:00), Max: 98.2 (17 Sep 2021 04:47)  HR: 97 (18 Sep 2021 04:00) (67 - 104)  BP: 137/78 (17 Sep 2021 19:00) (115/70 - 137/78)  BP(mean): 89 (17 Sep 2021 19:00) (89 - 89)  RR: 14 (18 Sep 2021 04:00) (12 - 26)  SpO2: 97% (18 Sep 2021 04:00) (96% - 100%)    Physical Exam:  General: NAD, resting comfortably in bed  Neuro: A/O x 3, no focal deficits  Pulmonary: Nonlabored breathing, no respiratory distress  Cardiovascular: NSR  Abdominal: soft, ATTP. ND  Incision: C/D/I   Drains: JPx2, Maxwell  Extremities: WWP    LABS:                        10.6   11.17 )-----------( 182      ( 18 Sep 2021 00:55 )             31.8     09-18    138  |  106  |  10  ----------------------------<  151<H>  4.0   |  18<L>  |  0.78    Ca    8.6      18 Sep 2021 00:55  Phos  5.8     09-18  Mg     2.0     09-18    TPro  5.5<L>  /  Alb  2.8<L>  /  TBili  1.2  /  DBili  x   /  AST  160<H>  /  ALT  211<H>  /  AlkPhos  479<H>  09-18    PT/INR - ( 17 Sep 2021 18:27 )   PT: 11.7 sec;   INR: 0.97 ratio         PTT - ( 18 Sep 2021 00:55 )  PTT:36.7 sec  CAPILLARY BLOOD GLUCOSE      POCT Blood Glucose.: 141 mg/dL (17 Sep 2021 22:16)      LIVER FUNCTIONS - ( 18 Sep 2021 00:55 )  Alb: 2.8 g/dL / Pro: 5.5 g/dL / ALK PHOS: 479 U/L / ALT: 211 U/L / AST: 160 U/L / GGT: x               IMAGING:    ASSESSMENT:76y Female now 4hours s/p Whipple procedure for pancreatic mass (9/17). Patient recovering in SICU, without complaints.     PLAN:  - Pain control  - Encourage IS  - Nausea control PRN  - Monitor vitals  - Diet: IVF, NPO  - Monitor I+Os  - OOB/ Ambulate  - DVT ppx: lovenox  - Appreciate SICU care     Team Surgery

## 2021-09-18 NOTE — PROGRESS NOTE ADULT - SUBJECTIVE AND OBJECTIVE BOX
24 hr events:    SUBJECTIVE:  76y Female with Hx of RA (on MTX), HLD, appendectomy, and L renal cell CA (s/p L nephrectomy 2019) who presented with transaminitis and an abnormal MRCP showing dilated PD and CBD, found to have a 1.7x1.8cm hypoechoic pancreatic head lesion on EUS on 9/9. She was taken to the OR on 9/17 for a Whipple on 9/17 and transferred to the SICU extubated post-operatively for hemodynamic monitoring.       Urine - Maxwell catheter 1,250  normosol 4,500    MEDICATIONS  (STANDING):  acetaminophen  IVPB .. 700 milliGRAM(s) IV Intermittent every 6 hours  calcium gluconate IVPB 2 Gram(s) IV Intermittent once  enoxaparin Injectable 40 milliGRAM(s) SubCutaneous daily  influenza   Vaccine 0.5 milliLiter(s) IntraMuscular once  insulin lispro (ADMELOG) corrective regimen sliding scale   SubCutaneous every 4 hours  lactated ringers. 1000 milliLiter(s) (100 mL/Hr) IV Continuous <Continuous>  pantoprazole  Injectable 40 milliGRAM(s) IV Push two times a day    MEDICATIONS  (PRN):  artificial tears (preservative free) Ophthalmic Solution 1 Drop(s) Both EYES two times a day PRN Dry Eyes      ICU Vital Signs Last 24 Hrs  T(C): 36.7 (17 Sep 2021 23:00), Max: 36.8 (17 Sep 2021 04:47)  T(F): 98.1 (17 Sep 2021 23:00), Max: 98.2 (17 Sep 2021 04:47)  HR: 97 (18 Sep 2021 01:00) (67 - 104)  BP: 137/78 (17 Sep 2021 19:00) (115/70 - 137/78)  BP(mean): 89 (17 Sep 2021 19:00) (89 - 89)  ABP: 132/57 (18 Sep 2021 01:00) (131/59 - 158/70)  ABP(mean): 85 (18 Sep 2021 01:00) (85 - 107)  RR: 13 (18 Sep 2021 01:00) (12 - 26)  SpO2: 100% (18 Sep 2021 01:00) (98% - 100%)      Physical Exam:  NEURO:   Exam: awake, alert, oriented  Medications: acetaminophen  IVPB .. 1000 milliGRAM(s) IV Intermittent once    RESPIRATORY  Exam: no increased WOB on NC    CARDIOVASCULAR  Exam: normal rate and rhythm noted on cardiac monitor   Cardiac Rhythm: normal sinus    GI/NUTRITION  Exam: abd soft, non-distended, appropriately tender to palpations; VIOLA drain x2 in place, dressing c/d/i, with serosanguineous OP; Prevena vac in place, functioning well with good seal    GENITOURINARY/RENAL  Exam: Maxwell catheter in place, draining clear yellow urine       I&O's Summary    16 Sep 2021 07:01  -  17 Sep 2021 07:00  --------------------------------------------------------  IN: 1730 mL / OUT: 0 mL / NET: 1730 mL    17 Sep 2021 07:01  -  18 Sep 2021 01:48  --------------------------------------------------------  IN: 570 mL / OUT: 460 mL / NET: 110 mL        LABS:                        10.6   11.17 )-----------( 182      ( 18 Sep 2021 00:55 )             31.8     09-18    138  |  106  |  10  ----------------------------<  151<H>  4.0   |  18<L>  |  0.78    Ca    8.6      18 Sep 2021 00:55  Phos  5.8     09-18  Mg     2.0     09-18    TPro  5.5<L>  /  Alb  2.8<L>  /  TBili  1.2  /  DBili  x   /  AST  160<H>  /  ALT  211<H>  /  AlkPhos  479<H>  09-18    PT/INR - ( 17 Sep 2021 18:27 )   PT: 11.7 sec;   INR: 0.97 ratio         PTT - ( 17 Sep 2021 18:27 )  PTT:34.1 sec    CAPILLARY BLOOD GLUCOSE      POCT Blood Glucose.: 141 mg/dL (17 Sep 2021 22:16)    LIVER FUNCTIONS - ( 18 Sep 2021 00:55 )  Alb: 2.8 g/dL / Pro: 5.5 g/dL / ALK PHOS: 479 U/L / ALT: 211 U/L / AST: 160 U/L / GGT: x                HPI:  76y Female with Hx of RA (on MTX), HLD, appendectomy, and L renal cell CA (s/p L nephrectomy 2019) who presented with transaminitis and an abnormal MRCP showing dilated PD and CBD, found to have a 1.7x1.8cm hypoechoic pancreatic head lesion on EUS on 9/9. She was taken to the OR on 9/17 for a Whipple on 9/17 and transferred to the SICU extubated post-operatively for hemodynamic monitoring.       Urine - Maxwell catheter 1,250  normosol 4,500    24 HOUR EVENTS:    SUBJECTIVE/ROS:  Patient seen at bedside this AM. Reports feeling well, without complaints. Pain is well-controlled. Denies headache, shortness of breath, chest pain, dizziness.     PHYSICAL EXAM:    NEURO  RASS:     GCS:     CAM ICU:  Exam: awake, alert, oriented  Meds: acetaminophen  IVPB .. 700 milliGRAM(s) IV Intermittent every 6 hours  HYDROmorphone  Injectable 0.5 milliGRAM(s) IV Push every 3 hours PRN Severe Pain (7 - 10)  [x] Adequacy of sedation and pain control has been assessed and adjusted    RESPIRATORY  RR: 15 (09-18-21 @ 02:00) (12 - 26)  SpO2: 96% (09-18-21 @ 02:00) (96% - 100%)  Wt(kg): --  Exam: no increased WOB on RA  Mechanical Ventilation:   Labs: ABG - ( 18 Sep 2021 00:53 )  pH: 7.34  /  pCO2: 37    /  pO2: 183   / HCO3: 20    / Base Excess: -5.3  /  SaO2: 99.8     Meds:     CARDIOVASCULAR  HR: 100 (09-18-21 @ 02:00) (67 - 104)  BP: 137/78 (09-17-21 @ 19:00) (115/70 - 137/78)  BP(mean): 89 (09-17-21 @ 19:00) (89 - 89)  ABP: 134/56 (09-18-21 @ 02:00) (131/59 - 158/70)  ABP(mean): 84 (09-18-21 @ 02:00) (84 - 107)  Wt(kg): --  CVP(cm H2O): --    Exam: regular rate and rhythm noted on cardiac monitor  Cardiac Rhythm: normal sinus rhythm  Perfusion     [x]Adequate   [ ]Inadequate  Mentation   [x]Normal       [ ]Reduced  Extremities  [x]Warm         [ ]Cool  Volume Status [ ]Hypervolemic [x]Euvolemic [ ]Hypovolemic  Meds:     GI/NUTRITION  Exam: soft, nontender, nondistended, incision c/d/i  Meds: pantoprazole  Injectable 40 milliGRAM(s) IV Push two times a day      GENITOURINARY  I&O's Detail    09-16 @ 07:01  -  09-17 @ 07:00  --------------------------------------------------------  IN:    Lactated Ringers: 360 mL    Oral Fluid: 1370 mL  Total IN: 1730 mL    OUT:  Total OUT: 0 mL    Total NET: 1730 mL      09-17 @ 07:01 - 09-18 @ 03:03  --------------------------------------------------------  IN:    IV PiggyBack: 170 mL    Lactated Ringers: 600 mL  Total IN: 770 mL    OUT:    Bulb (mL): 25 mL    Bulb (mL): 30 mL    Indwelling Catheter - Urethral (mL): 465 mL  Total OUT: 520 mL    Total NET: 250 mL        Labs: 09-18    138  |  106  |  10  ----------------------------<  151<H>  4.0   |  18<L>  |  0.78    Ca    8.6      18 Sep 2021 00:55  Phos  5.8     09-18  Mg     2.0     09-18    TPro  5.5<L>  /  Alb  2.8<L>  /  TBili  1.2  /  DBili  x   /  AST  160<H>  /  ALT  211<H>  /  AlkPhos  479<H>  09-18    [ ] Maxwell catheter, indication: N/A  Meds: calcium gluconate IVPB 2 Gram(s) IV Intermittent once  lactated ringers. 1000 milliLiter(s) IV Continuous <Continuous>      HEMATOLOGIC  Meds: enoxaparin Injectable 40 milliGRAM(s) SubCutaneous daily    [x] VTE Prophylaxis  Labs:                         10.6   11.17 )-----------( 182      ( 18 Sep 2021 00:55 )             31.8     PT/INR - ( 17 Sep 2021 18:27 )   PT: 11.7 sec;   INR: 0.97 ratio         PTT - ( 18 Sep 2021 00:55 )  PTT:36.7 sec  Transfusion     [ ] PRBC   [ ] Platelets   [ ] FFP   [ ] Cryoprecipitate    INFECTIOUS DISEASES  Labs:   WBC Count: 11.17 K/uL (09-18 @ 00:55)  WBC Count: 14.26 K/uL (09-17 @ 18:27)  WBC Count: 5.02 K/uL (09-17 @ 04:13)    Recent Cultures:  RECENT CULTURES:    Meds: influenza   Vaccine 0.5 milliLiter(s) IntraMuscular once      ENDOCRINE  CAPILLARY BLOOD GLUCOSE      POCT Blood Glucose.: 141 mg/dL (17 Sep 2021 22:16)    Meds: insulin lispro (ADMELOG) corrective regimen sliding scale   SubCutaneous every 4 hours        ACCESS DEVICES:  [ ] Peripheral IV  [ ] Central Venous Line	[ ] R	[ ] L	[ ] IJ	[ ] Fem	[ ] SC	Placed:   [ ] Arterial Line		[ ] R	[ ] L	[ ] Fem	[ ] Rad	[ ] Ax	Placed:   [ ] PICC:					[ ] Mediport  [ ] Urinary Catheter, Date Placed:   [x] Necessity of urinary, arterial, and venous catheters discussed      OTHER MEDICATIONS:  artificial tears (preservative free) Ophthalmic Solution 1 Drop(s) Both EYES two times a day PRN

## 2021-09-18 NOTE — PROGRESS NOTE ADULT - SUBJECTIVE AND OBJECTIVE BOX
pt seen and examined, no complaints, ROS - .     acetaminophen  IVPB .. 700 milliGRAM(s) IV Intermittent every 6 hours  artificial tears (preservative free) Ophthalmic Solution 1 Drop(s) Both EYES two times a day PRN  enoxaparin Injectable 40 milliGRAM(s) SubCutaneous daily  HYDROmorphone  Injectable 0.5 milliGRAM(s) IV Push every 3 hours PRN  influenza   Vaccine 0.5 milliLiter(s) IntraMuscular once  insulin lispro (ADMELOG) corrective regimen sliding scale   SubCutaneous every 4 hours  lactated ringers. 1000 milliLiter(s) IV Continuous <Continuous>  pantoprazole  Injectable 40 milliGRAM(s) IV Push two times a day                            10.6   11.17 )-----------( 182      ( 18 Sep 2021 00:55 )             31.8       Hemoglobin: 10.6 g/dL (09-18 @ 00:55)  Hemoglobin: 11.1 g/dL (09-17 @ 18:27)  Hemoglobin: 11.1 g/dL (09-17 @ 04:13)  Hemoglobin: 10.8 g/dL (09-15 @ 07:20)  Hemoglobin: 10.5 g/dL (09-14 @ 07:11)      09-18    138  |  106  |  10  ----------------------------<  151<H>  4.0   |  18<L>  |  0.78    Ca    8.6      18 Sep 2021 00:55  Phos  5.8     09-18  Mg     2.0     09-18    TPro  5.5<L>  /  Alb  2.8<L>  /  TBili  1.2  /  DBili  x   /  AST  160<H>  /  ALT  211<H>  /  AlkPhos  479<H>  09-18    Creatinine Trend: 0.78<--, 0.86<--, 0.91<--, 0.92<--, 0.91<--, 0.80<--    COAGS: PT/INR - ( 17 Sep 2021 18:27 )   PT: 11.7 sec;   INR: 0.97 ratio         PTT - ( 18 Sep 2021 00:55 )  PTT:36.7 sec          T(C): 36.8 (09-18-21 @ 07:00), Max: 36.8 (09-18-21 @ 07:00)  HR: 94 (09-18-21 @ 08:00) (84 - 104)  BP: 137/78 (09-17-21 @ 19:00) (125/65 - 137/78)  RR: 17 (09-18-21 @ 08:00) (12 - 26)  SpO2: 97% (09-18-21 @ 08:00) (96% - 100%)  Wt(kg): --    I&O's Summary    17 Sep 2021 07:01  -  18 Sep 2021 07:00  --------------------------------------------------------  IN: 1440 mL / OUT: 950 mL / NET: 490 mL    18 Sep 2021 07:01  -  18 Sep 2021 10:48  --------------------------------------------------------  IN: 100 mL / OUT: 75 mL / NET: 25 mL      GEN: resting in bed comfortably in NAD. NGT to LCWS  RESP: no increased WOB  ABD: soft, non-distended, appropriately tender to palpation without rebound tenderness or guarding. Prevena vac in place holding suction. Drains x2 with serosanguinous output  EXTR: warm, well-perfused, no edema      76y Female with Hx of RA (on MTX), HLD, appendectomy, and L renal cell CA (s/p L nephrectomy 2019) who presented with transaminitis and an abnormal MRCP showing dilated PD and CBD, found to have a 1.7x1.8cm hypoechoic pancreatic head lesion on EUS on 9/9. She was taken to the OR on 9/17 for a Whipple on 9/17    Tele stable    GI / DVT prophylaxis.   keep K>4, mag >2.0   echo noted   Pain management   No s/s of chf on exam  Appreciate ICU follow up

## 2021-09-19 LAB
AMYLASE FLD-CCNC: 21 U/L — SIGNIFICANT CHANGE UP
AMYLASE FLD-CCNC: 37 U/L — SIGNIFICANT CHANGE UP
GLUCOSE BLDC GLUCOMTR-MCNC: 120 MG/DL — HIGH (ref 70–99)
GLUCOSE BLDC GLUCOMTR-MCNC: 128 MG/DL — HIGH (ref 70–99)
GLUCOSE BLDC GLUCOMTR-MCNC: 131 MG/DL — HIGH (ref 70–99)
GLUCOSE BLDC GLUCOMTR-MCNC: 136 MG/DL — HIGH (ref 70–99)
GLUCOSE BLDC GLUCOMTR-MCNC: 137 MG/DL — HIGH (ref 70–99)
GLUCOSE BLDC GLUCOMTR-MCNC: 150 MG/DL — HIGH (ref 70–99)

## 2021-09-19 RX ORDER — DIPHENHYDRAMINE HCL 50 MG
25 CAPSULE ORAL ONCE
Refills: 0 | Status: COMPLETED | OUTPATIENT
Start: 2021-09-19 | End: 2021-09-19

## 2021-09-19 RX ORDER — ACETAMINOPHEN 500 MG
750 TABLET ORAL EVERY 6 HOURS
Refills: 0 | Status: COMPLETED | OUTPATIENT
Start: 2021-09-19 | End: 2021-09-20

## 2021-09-19 RX ADMIN — PANTOPRAZOLE SODIUM 40 MILLIGRAM(S): 20 TABLET, DELAYED RELEASE ORAL at 18:00

## 2021-09-19 RX ADMIN — HYDROMORPHONE HYDROCHLORIDE 30 MILLILITER(S): 2 INJECTION INTRAMUSCULAR; INTRAVENOUS; SUBCUTANEOUS at 19:17

## 2021-09-19 RX ADMIN — Medication 62.5 MILLIMOLE(S): at 01:02

## 2021-09-19 RX ADMIN — HYDROMORPHONE HYDROCHLORIDE 30 MILLILITER(S): 2 INJECTION INTRAMUSCULAR; INTRAVENOUS; SUBCUTANEOUS at 12:12

## 2021-09-19 RX ADMIN — PANTOPRAZOLE SODIUM 40 MILLIGRAM(S): 20 TABLET, DELAYED RELEASE ORAL at 06:09

## 2021-09-19 RX ADMIN — DEXTROSE MONOHYDRATE, SODIUM CHLORIDE, AND POTASSIUM CHLORIDE 84 MILLILITER(S): 50; .745; 4.5 INJECTION, SOLUTION INTRAVENOUS at 06:59

## 2021-09-19 RX ADMIN — ENOXAPARIN SODIUM 40 MILLIGRAM(S): 100 INJECTION SUBCUTANEOUS at 11:25

## 2021-09-19 RX ADMIN — HYDROMORPHONE HYDROCHLORIDE 30 MILLILITER(S): 2 INJECTION INTRAMUSCULAR; INTRAVENOUS; SUBCUTANEOUS at 06:58

## 2021-09-19 RX ADMIN — Medication 25 MILLIGRAM(S): at 06:16

## 2021-09-19 RX ADMIN — CHLORHEXIDINE GLUCONATE 1 APPLICATION(S): 213 SOLUTION TOPICAL at 06:10

## 2021-09-19 RX ADMIN — Medication 300 MILLIGRAM(S): at 11:25

## 2021-09-19 NOTE — CONSULT NOTE ADULT - SUBJECTIVE AND OBJECTIVE BOX
Patient is a 76y old  Female who presents with a chief complaint of Abdominal Pain (16 Sep 2021 11:37) and was found to have a pancreatic head mass with blockage of the ducts s/p surgery on 9.17.21 (was in surgery when I rounded), in the ICU, being transferred to the floor.  Pt has slight pain RUQ, no n/v, has an NGT, rest of the detailed ROS unremarkable. Was eating less for last few weeks and lost a few ponds.      PAST MEDICAL & SURGICAL HISTORY:  Arthritis  rheumatoid    S/P appendectomy  1969    FHX: Her brother had tumor of the pancreas at 79 but it was not cancer as per her.           Meds:  acetaminophen  IVPB .. 750 milliGRAM(s) IV Intermittent every 6 hours  artificial tears (preservative free) Ophthalmic Solution 1 Drop(s) Both EYES two times a day PRN  chlorhexidine 2% Cloths 1 Application(s) Topical <User Schedule>  dextrose 5% + sodium chloride 0.45% with potassium chloride 20 mEq/L 1000 milliLiter(s) IV Continuous <Continuous>  enoxaparin Injectable 40 milliGRAM(s) SubCutaneous daily  HYDROmorphone PCA (1 mG/mL) 30 milliLiter(s) PCA Continuous PCA Continuous  HYDROmorphone PCA (1 mG/mL) Rescue Clinician Bolus 0.5 milliGRAM(s) IV Push every 15 minutes PRN  influenza   Vaccine 0.5 milliLiter(s) IntraMuscular once  insulin lispro (ADMELOG) corrective regimen sliding scale   SubCutaneous every 4 hours  naloxone Injectable 0.1 milliGRAM(s) IV Push every 3 minutes PRN  ondansetron Injectable 4 milliGRAM(s) IV Push every 6 hours PRN  pantoprazole  Injectable 40 milliGRAM(s) IV Push two times a day      No Known Allergies      FAMILY HISTORY:      Vital Signs Last 24 Hrs  T(C): 36.7 (19 Sep 2021 11:00), Max: 37 (19 Sep 2021 07:00)  T(F): 98.1 (19 Sep 2021 11:00), Max: 98.6 (19 Sep 2021 07:00)  HR: 98 (19 Sep 2021 11:00) (69 - 101)  BP: 120/56 (19 Sep 2021 11:00) (98/55 - 120/56)  BP(mean): 77 (19 Sep 2021 11:00) (73 - 86)  RR: 21 (19 Sep 2021 11:00) (10 - 24)  SpO2: 99% (19 Sep 2021 11:00) (91% - 100%)                          9.6    12.34 )-----------( 177      ( 18 Sep 2021 22:25 )             29.5       09-18    136  |  105  |  11  ----------------------------<  173<H>  4.3   |  20<L>  |  0.74    Ca    9.0      18 Sep 2021 22:25  Phos  2.4     09-18  Mg     2.0     09-18    TPro  5.7<L>  /  Alb  3.1<L>  /  TBili  1.0  /  DBili  x   /  AST  67<H>  /  ALT  139<H>  /  AlkPhos  377<H>  09-18      PT/INR - ( 17 Sep 2021 18:27 )   PT: 11.7 sec;   INR: 0.97 ratio         PTT - ( 18 Sep 2021 22:25 )  PTT:38.5 sec      CT a/p: IMPRESSION:  CBD and pancreatic ductal dilatation with a heterogeneously enhancing pancreatic head mass measuring 2.6 x 2.2 x 2.6 cm suggestive of pancreatic head adenocarcinoma. Suggest further evaluation with MRI of the pancreas.  There is no local invasion of the nearby portal vein, SMV, hepatic artery, or GDA.  Mild mass effect on the IVC and the right renal vein by the distended CBD in the pancreatic head mass.      --- End of Report ---              DOMI ONEAL MD; Resident Interventional Radiology  This document has been electronically signed.  TARYN DIGGS MD; Attending Radiologist  This document has been electronically signed. Sep 11 2021  8:05PM        CT chest: IMPRESSION:    4 mm nonspecific nodule within the periphery of basilar left lower lobe possibly a benign intrapulmonary lymph node. Recommend follow-up chest CT in 3 months.    --- End of Report ---                KIERA SMITH MD; Attending Radiologist  This document has been electronically signed. Sep 14 2021  7:55AM        Cytopath: Final Diagnosis   PANCREAS, HEAD, MASS, FNA   ATYPICAL FINDINGS.   Cytology slides reveal a sample composed of scattered groups of cells   showing cytologic atypia along with benign appearing glandular   epithelium   and focal areas of fibrosis in a background of histiocytes,   lymphocytes,   blood, and amorphous cellular debris.   The cell block is nondiagnostic ; multiple levels are examined.   This case was reviewed for  with cytopathology staff   who   concur(s) with the diagnosis on 09/14/2021   Screened by: Kevin Hines CT([facility])   Verified by: ALEJANDRA VALENTINO MD Pathologist   (Electronic Signature)   Reported on: 09/15/21 12:08 EDT, [address], [place],     EUS: Impression:          - Subtle pancreatic head hypoechoic lesion (1.7x1.8cm) with abrupt cutoffof                        CBD and PD. Biopsied for histology with FNB needle x 7 passes. Adjacent to                        but not involving portal vein. SMV and SV at the confluence were not able to                        be seen.      - Hypoechoic, heterogeneous pancreatic parenchyma with dilated PD throughout.                       - Grossly dilated CBD proximal to the pancreatic head.                       - No other abnormalities seen on EUS.                       - CBD stent not placed. Bile seen flowing freely from ampulla.                       - Actively spurting arterial bleed from gastric cardia just distal to GEJ.                        Hemostasis acheived with clips and cautery.  Recommendation:      - Return patient to hospital [facility] for ongoing care.                       - Follow up pancreatic biopsy results.                       - Follow liver tests; if rising significantly can re-evaluate for ERCP/CBD                        stent.                       - For gastric bleeding vessel, trend Hgb, keep NPO and start high dose PPI.                        If rebleeds (hemodynamic instability, bright red hematemesis, dropping Hgb,                        significant melena), please call IR for empiric embolization (IR aware).                                                                                                        Attending Participation:       I personally performed the entire procedure.                                                      ___________________  Tashia Ralph MD  9/9/2021 6:43:02 PM    __________________  Jian Canlaes MD  Number of Addenda: 0    Note Initiated On: 9/9/2021 12:58 PM

## 2021-09-19 NOTE — PROGRESS NOTE ADULT - SUBJECTIVE AND OBJECTIVE BOX
Day 2 of Anesthesia Pain Management Service    SUBJECTIVE: Patient is doing well with IV PCA    Pain Scale Score:	[X] Refer to charted pain scores    THERAPY:    [ ] IV PCA Morphine		[ ] 5 mg/mL	[ ] 1 mg/mL  [X] IV PCA Hydromorphone	[ ] 5 mg/mL	[X] 1 mg/mL  [ ] IV PCA Fentanyl		[ ] 50 micrograms/mL    Demand dose: 0.2 mg     Lockout: 6 minutes   Continuous Rate: 0 mg/hr  4 Hour Limit: 4 mg    MEDICATIONS  (STANDING):  acetaminophen  IVPB .. 750 milliGRAM(s) IV Intermittent every 6 hours  chlorhexidine 2% Cloths 1 Application(s) Topical <User Schedule>  dextrose 5% + sodium chloride 0.45% with potassium chloride 20 mEq/L 1000 milliLiter(s) (84 mL/Hr) IV Continuous <Continuous>  enoxaparin Injectable 40 milliGRAM(s) SubCutaneous daily  HYDROmorphone PCA (1 mG/mL) 30 milliLiter(s) PCA Continuous PCA Continuous  influenza   Vaccine 0.5 milliLiter(s) IntraMuscular once  insulin lispro (ADMELOG) corrective regimen sliding scale   SubCutaneous every 4 hours  pantoprazole  Injectable 40 milliGRAM(s) IV Push two times a day    MEDICATIONS  (PRN):  artificial tears (preservative free) Ophthalmic Solution 1 Drop(s) Both EYES two times a day PRN Dry Eyes  HYDROmorphone PCA (1 mG/mL) Rescue Clinician Bolus 0.5 milliGRAM(s) IV Push every 15 minutes PRN for Pain Scale GREATER THAN 6  naloxone Injectable 0.1 milliGRAM(s) IV Push every 3 minutes PRN For ANY of the following changes in patient status:  A. RR LESS THAN 10 breaths per minute, B. Oxygen saturation LESS THAN 90%, C. Sedation score of 6  ondansetron Injectable 4 milliGRAM(s) IV Push every 6 hours PRN Nausea      OBJECTIVE:    Sedation Score:	[ X] Alert	[ ] Drowsy 	[ ] Arousable	[ ] Asleep	[ ] Unresponsive    Side Effects:	[X ] None	[ ] Nausea	[ ] Vomiting	[ ] Pruritus  		[ ] Other:    Vital Signs Last 24 Hrs  T(C): 36.7 (19 Sep 2021 11:00), Max: 37 (19 Sep 2021 07:00)  T(F): 98.1 (19 Sep 2021 11:00), Max: 98.6 (19 Sep 2021 07:00)  HR: 98 (19 Sep 2021 11:00) (69 - 101)  BP: 120/56 (19 Sep 2021 11:00) (98/55 - 120/56)  BP(mean): 77 (19 Sep 2021 11:00) (73 - 86)  RR: 21 (19 Sep 2021 11:00) (10 - 21)  SpO2: 99% (19 Sep 2021 11:00) (91% - 100%)    ASSESSMENT/ PLAN    Therapy to  be:               [X] Continued   [ ] Discontinued   [ ] Changed to PRN Analgesics    Documentation and Verification of current medications:   [X] Done	[ ] Not done, not eligible    Comments: NGT Still in place, still NPO Day 2 of Anesthesia Pain Management Service    SUBJECTIVE: Patient is doing well with IV PCA    Pain Scale Score:	[X] Refer to charted pain scores    THERAPY:    [ ] IV PCA Morphine		[ ] 5 mg/mL	[ ] 1 mg/mL  [X] IV PCA Hydromorphone	[ ] 5 mg/mL	[X] 1 mg/mL  [ ] IV PCA Fentanyl		[ ] 50 micrograms/mL    Demand dose: 0.2 mg     Lockout: 6 minutes   Continuous Rate: 0 mg/hr  4 Hour Limit: 4 mg    MEDICATIONS(STANDING):  acetaminophen  IVPB .. 750 milliGRAM(s) IV Intermittent every 6 hours>  chlorhexidine 2% Cloths 1 Application(s) Topical <User Schedule>  dextrose 5% + sodium chloride 0.45% with potassium chloride 20 mEq/L 1000 milliLiter(s) (84 mL/Hr) IV Continuous <Continuous  enoxaparin Injectable 40 milliGRAM(s) SubCutaneous daily  HYDROmorphone PCA (1 mG/mL) 30 milliLiter(s) PCA Continuous PCA Continuous  influenza   Vaccine 0.5 milliLiter(s) IntraMuscular once  insulin lispro (ADMELOG) corrective regimen sliding scale   SubCutaneous every 4 hours  pantoprazole  Injectable 40 milliGRAM(s) IV Push two times a day    MEDICATIONS  (PRN):  artificial tears (preservative free) Ophthalmic Solution 1 Drop(s) Both EYES two times a day PRN Dry Eyes  HYDROmorphone PCA (1 mG/mL) Rescue Clinician Bolus 0.5 milliGRAM(s) IV Push every 15 minutes PRN for Pain Scale GREATER THAN 6  naloxone Injectable 0.1 milliGRAM(s) IV Push every 3 minutes PRN For ANY of the following changes in patient status:  A. RR LESS THAN 10 breaths per minute, B. Oxygen saturation LESS THAN 90%, C. Sedation score of 6  ondansetron Injectable 4 milliGRAM(s) IV Push every 6 hours PRN Nausea       OBJECTIVE:    Sedation Score:	[ X] Alert	[ ] Drowsy 	[ ] Arousable	[ ] Asleep	[ ] Unresponsive    Side Effects:	[X ] None	[ ] Nausea	[ ] Vomiting	[ ] Pruritus  		[ ] Other:    Vital Signs Last 24 Hrs  T(C): 36.7 (19 Sep 2021 11:00), Max: 37 (19 Sep 2021 07:00)  T(F): 98.1 (19 Sep 2021 11:00), Max: 98.6 (19 Sep 2021 07:00)  HR: 98 (19 Sep 2021 11:00) (69 - 101)  BP: 120/56 (19 Sep 2021 11:00) (98/55 - 120/56)  BP(mean): 77 (19 Sep 2021 11:00) (73 - 86)  RR: 21 (19 Sep 2021 11:00) (10 - 21)  SpO2: 99% (19 Sep 2021 11:00) (91% - 100%)    ASSESSMENT/ PLAN    Therapy to  be:               [X] Continued   [ ] Discontinued   [ ] Changed to PRN Analgesics    Documentation and Verification of current medications:   [X] Done	[ ] Not done, not eligible    Comments: NGT Still in place, still NPO. Continue

## 2021-09-19 NOTE — PROGRESS NOTE ADULT - SUBJECTIVE AND OBJECTIVE BOX
24h Events:  No acute events overnight.    Subjective:   Patient seen at bedside this AM.     Objective:  Vital Signs  T(C): 36.6 (09-18 @ 23:00), Max: 36.8 (09-18 @ 07:00)  HR: 76 (09-19 @ 00:00) (69 - 100)  BP: 104/57 (09-19 @ 00:00) (98/55 - 117/59)  RR: 10 (09-19 @ 00:00) (10 - 24)  SpO2: 100% (09-19 @ 00:00) (91% - 100%)  09-17-21 @ 07:01  -  09-18-21 @ 07:00  --------------------------------------------------------  IN:  Total IN: 0 mL    OUT:    Bulb (mL): 55 mL    Bulb (mL): 110 mL    Indwelling Catheter - Urethral (mL): 635 mL    Nasogastric/Oral tube (mL): 150 mL  Total OUT: 950 mL    Total NET: -950 mL      09-18-21 @ 07:01  -  09-19-21 @ 00:33  --------------------------------------------------------  IN:  Total IN: 0 mL    OUT:    Bulb (mL): 35 mL    Bulb (mL): 40 mL    Indwelling Catheter - Urethral (mL): 430 mL    Nasogastric/Oral tube (mL): 100 mL  Total OUT: 605 mL    Total NET: -605 mL      Physical Exam:  GEN: resting in bed comfortably in NAD. NGT to LCWS  RESP: no increased WOB  ABD: soft, non-distended, appropriately tender to palpation without rebound tenderness or guarding. Prevena vac in place holding suction. Drains x2 with serosanguinous output  EXTR: warm, well-perfused, no edema    Labs:                        9.6    12.34 )-----------( 177      ( 18 Sep 2021 22:25 )             29.5   09-18    136  |  105  |  11  ----------------------------<  173<H>  4.3   |  20<L>  |  0.74    Ca    9.0      18 Sep 2021 22:25  Phos  2.4     09-18  Mg     2.0     09-18    TPro  5.7<L>  /  Alb  3.1<L>  /  TBili  1.0  /  DBili  x   /  AST  67<H>  /  ALT  139<H>  /  AlkPhos  377<H>  09-18    CAPILLARY BLOOD GLUCOSE      POCT Blood Glucose.: 166 mg/dL (18 Sep 2021 22:13)  POCT Blood Glucose.: 158 mg/dL (18 Sep 2021 17:46)  POCT Blood Glucose.: 112 mg/dL (18 Sep 2021 13:34)  POCT Blood Glucose.: 117 mg/dL (18 Sep 2021 10:44)  POCT Blood Glucose.: 125 mg/dL (18 Sep 2021 05:17)      Imaging:     24h Events:  - IVF changed to D5 1/2NS+20K @ 84cc per Whipple pathway  - Started on a Dilaudid PCA    Subjective:   Patient seen at bedside this AM. Reports pain is improved. No gas yet.     Objective:  Vital Signs  T(C): 36.6 (09-18 @ 23:00), Max: 36.8 (09-18 @ 07:00)  HR: 76 (09-19 @ 00:00) (69 - 100)  BP: 104/57 (09-19 @ 00:00) (98/55 - 117/59)  RR: 10 (09-19 @ 00:00) (10 - 24)  SpO2: 100% (09-19 @ 00:00) (91% - 100%)  09-17-21 @ 07:01  -  09-18-21 @ 07:00  --------------------------------------------------------  IN:  Total IN: 0 mL    OUT:    Bulb (mL): 55 mL    Bulb (mL): 110 mL    Indwelling Catheter - Urethral (mL): 635 mL    Nasogastric/Oral tube (mL): 150 mL  Total OUT: 950 mL    Total NET: -950 mL      09-18-21 @ 07:01  -  09-19-21 @ 00:33  --------------------------------------------------------  IN:  Total IN: 0 mL    OUT:    Bulb (mL): 35 mL    Bulb (mL): 40 mL    Indwelling Catheter - Urethral (mL): 430 mL    Nasogastric/Oral tube (mL): 100 mL  Total OUT: 605 mL    Total NET: -605 mL      Physical Exam:  GEN: resting in bed comfortably in NAD. NGT to LCWS  RESP: no increased WOB  ABD: soft, non-distended, appropriately tender to palpation without rebound tenderness or guarding. Prevena vac in place holding suction. Drains x2 with serosanguinous output  EXTR: warm, well-perfused, no edema    Labs:                        9.6    12.34 )-----------( 177      ( 18 Sep 2021 22:25 )             29.5   09-18    136  |  105  |  11  ----------------------------<  173<H>  4.3   |  20<L>  |  0.74    Ca    9.0      18 Sep 2021 22:25  Phos  2.4     09-18  Mg     2.0     09-18    TPro  5.7<L>  /  Alb  3.1<L>  /  TBili  1.0  /  DBili  x   /  AST  67<H>  /  ALT  139<H>  /  AlkPhos  377<H>  09-18    CAPILLARY BLOOD GLUCOSE      POCT Blood Glucose.: 166 mg/dL (18 Sep 2021 22:13)  POCT Blood Glucose.: 158 mg/dL (18 Sep 2021 17:46)  POCT Blood Glucose.: 112 mg/dL (18 Sep 2021 13:34)  POCT Blood Glucose.: 117 mg/dL (18 Sep 2021 10:44)  POCT Blood Glucose.: 125 mg/dL (18 Sep 2021 05:17)      Imaging:

## 2021-09-19 NOTE — PROGRESS NOTE ADULT - ASSESSMENT
CARDIOLOGY ATTENDING    Agree with above. Given unremarkable echo no further inpatient cardiac workup expected. Supportive care as per SICU. CARDIOLOGY ATTENDING    Agree with above. Given unremarkable echo no further inpatient cardiac workup expected. Supportive care as per SICU.

## 2021-09-19 NOTE — CONSULT NOTE ADULT - ASSESSMENT
76y old  Female who presents with a chief complaint of Abdominal Pains/p resection of the pancreatic mass, will recommend:    - continue Rx as per medicine, surgical onc  - pain control - on PCA pump  - DVT prophylaxis - on lovenox  - all other supportive Rx  - f/u on final pathology  - obtain basic anemia w/u.  - to f/u as outpt after discharge      for questions, please call [phone]

## 2021-09-19 NOTE — PROGRESS NOTE ADULT - SUBJECTIVE AND OBJECTIVE BOX
HPI:  76y Female with Hx of RA (on MTX), HLD, appendectomy, and L renal cell CA (s/p L nephrectomy 2019) who presented with transaminitis and an abnormal MRCP showing dilated PD and CBD, found to have a 1.7x1.8cm hypoechoic pancreatic head lesion on EUS on 9/9. She was taken to the OR on 9/17 for a Whipple on 9/17 and transferred to the SICU extubated post-operatively for hemodynamic monitoring.     24 HOUR EVENTS:  - IVF changed to D5 1/2NS+20K @ 84cc per Whipple pathway  - Started on a Dilaudid PCA    SUBJECTIVE/ROS:  Patient seen at bedside this AM. Reports feeling well, without complaints. Pain is well-controlled. Denies headache, shortness of breath, chest pain, dizziness.     PHYSICAL EXAM:    NEURO  Exam: awake, alert, oriented  Meds: HYDROmorphone PCA (1 mG/mL) 30 milliLiter(s) PCA Continuous PCA Continuous  HYDROmorphone PCA (1 mG/mL) Rescue Clinician Bolus 0.5 milliGRAM(s) IV Push every 15 minutes PRN for Pain Scale GREATER THAN 6  ondansetron Injectable 4 milliGRAM(s) IV Push every 6 hours PRN Nausea  [x] Adequacy of sedation and pain control has been assessed and adjusted    RESPIRATORY  RR: 10 (09-19-21 @ 00:00) (10 - 24)  SpO2: 100% (09-19-21 @ 00:00) (91% - 100%)  Exam: no increased WOB on RA  Labs: ABG - ( 18 Sep 2021 22:20 )  pH: 7.30  /  pCO2: 50    /  pO2: 149   / HCO3: 25    / Base Excess: -2.1  /  SaO2: 99.7      CARDIOVASCULAR  HR: 76 (09-19-21 @ 00:00) (69 - 100)  BP: 104/57 (09-19-21 @ 00:00) (98/55 - 117/59)  BP(mean): 78 (09-19-21 @ 00:00) (74 - 84)  ABP: 124/54 (09-18-21 @ 22:00) (105/52 - 153/64)  ABP(mean): 79 (09-18-21 @ 22:00) (71 - 97)  Exam: regular rate and rhythm noted on cardiac monitor  Cardiac Rhythm: normal sinus rhythm  Perfusion     [x]Adequate   [ ]Inadequate  Mentation   [x]Normal       [ ]Reduced  Extremities  [x]Warm         [ ]Cool  Volume Status [ ]Hypervolemic [x]Euvolemic [ ]Hypovolemic    GI/NUTRITION  Exam: soft, nontender, nondistended, incision c/d/i  Meds: pantoprazole  Injectable 40 milliGRAM(s) IV Push two times a day    GENITOURINARY  I&O's Detail    09-17 @ 07:01 - 09-18 @ 07:00  --------------------------------------------------------  IN:    IV PiggyBack: 100 mL    IV PiggyBack: 240 mL    Lactated Ringers: 1100 mL  Total IN: 1440 mL    OUT:    Bulb (mL): 55 mL    Bulb (mL): 110 mL    Indwelling Catheter - Urethral (mL): 635 mL    Nasogastric/Oral tube (mL): 150 mL  Total OUT: 950 mL    Total NET: 490 mL    09-18 @ 07:01  -  09-19 @ 00:49  --------------------------------------------------------  IN:    dextrose 5% + sodium chloride 0.45% w/ Additives: 924 mL    IV PiggyBack: 50 mL    Lactated Ringers: 600 mL  Total IN: 1574 mL    OUT:    Bulb (mL): 35 mL    Bulb (mL): 40 mL    Indwelling Catheter - Urethral (mL): 430 mL    Nasogastric/Oral tube (mL): 100 mL  Total OUT: 605 mL    Total NET: 969 mL    Labs: 09-18    136  |  105  |  11  ----------------------------<  173<H>  4.3   |  20<L>  |  0.74    Ca    9.0      18 Sep 2021 22:25  Phos  2.4     09-18  Mg     2.0     09-18    TPro  5.7<L>  /  Alb  3.1<L>  /  TBili  1.0  /  DBili  x   /  AST  67<H>  /  ALT  139<H>  /  AlkPhos  377<H>  09-18    [ ] Maxwell catheter, indication: N/A  Meds: dextrose 5% + sodium chloride 0.45% with potassium chloride 20 mEq/L 1000 milliLiter(s) IV Continuous <Continuous>  sodium phosphate IVPB 15 milliMole(s) IV Intermittent once    HEMATOLOGIC  Meds: enoxaparin Injectable 40 milliGRAM(s) SubCutaneous daily    [x] VTE Prophylaxis             9.6    12.34 )-----------( 177      ( 18 Sep 2021 22:25 )             29.5   PT/INR - ( 17 Sep 2021 18:27 )   PT: 11.7 sec;   INR: 0.97 ratio    PTT - ( 18 Sep 2021 22:25 )  PTT:38.5 sec  Transfusion     [ ] PRBC   [ ] Platelets   [ ] FFP   [ ] Cryoprecipitate    INFECTIOUS DISEASES  Labs:   WBC Count: 12.34 K/uL (09-18 @ 22:25)  WBC Count: 11.17 K/uL (09-18 @ 00:55)  Meds: influenza   Vaccine 0.5 milliLiter(s) IntraMuscular once    ENDOCRINE  POCT Blood Glucose.: 166 mg/dL (18 Sep 2021 22:13)  POCT Blood Glucose.: 158 mg/dL (18 Sep 2021 17:46)  POCT Blood Glucose.: 112 mg/dL (18 Sep 2021 13:34)  POCT Blood Glucose.: 117 mg/dL (18 Sep 2021 10:44)  POCT Blood Glucose.: 125 mg/dL (18 Sep 2021 05:17)    Meds: insulin lispro (ADMELOG) corrective regimen sliding scale   SubCutaneous every 4 hours      ACCESS DEVICES:  [2] Peripheral IV - 9/17  [ ] Central Venous Line	[ ] R	[ ] L	[ ] IJ	[ ] Fem	[ ] SC	Placed:   [ ] Arterial Line		[ ] R	[ ] L	[ ] Fem	[ ] Rad	[ ] Ax	Placed:   [ ] PICC:					[ ] Mediport  [x] Urinary Catheter, Date Placed: 9/17  [x] Necessity of urinary, arterial, and venous catheters discussed      OTHER MEDICATIONS:  artificial tears (preservative free) Ophthalmic Solution 1 Drop(s) Both EYES two times a day PRN  chlorhexidine 2% Cloths 1 Application(s) Topical <User Schedule>  naloxone Injectable 0.1 milliGRAM(s) IV Push every 3 minutes PRN

## 2021-09-19 NOTE — PROGRESS NOTE ADULT - SUBJECTIVE AND OBJECTIVE BOX
pt seen and examined, no complaints, ROS - .            acetaminophen  IVPB .. 750 milliGRAM(s) IV Intermittent every 6 hours  artificial tears (preservative free) Ophthalmic Solution 1 Drop(s) Both EYES two times a day PRN  chlorhexidine 2% Cloths 1 Application(s) Topical <User Schedule>  dextrose 5% + sodium chloride 0.45% with potassium chloride 20 mEq/L 1000 milliLiter(s) IV Continuous <Continuous>  enoxaparin Injectable 40 milliGRAM(s) SubCutaneous daily  HYDROmorphone PCA (1 mG/mL) 30 milliLiter(s) PCA Continuous PCA Continuous  HYDROmorphone PCA (1 mG/mL) Rescue Clinician Bolus 0.5 milliGRAM(s) IV Push every 15 minutes PRN  influenza   Vaccine 0.5 milliLiter(s) IntraMuscular once  insulin lispro (ADMELOG) corrective regimen sliding scale   SubCutaneous every 4 hours  naloxone Injectable 0.1 milliGRAM(s) IV Push every 3 minutes PRN  ondansetron Injectable 4 milliGRAM(s) IV Push every 6 hours PRN  pantoprazole  Injectable 40 milliGRAM(s) IV Push two times a day                            9.6    12.34 )-----------( 177      ( 18 Sep 2021 22:25 )             29.5       Hemoglobin: 9.6 g/dL (09-18 @ 22:25)  Hemoglobin: 10.6 g/dL (09-18 @ 00:55)  Hemoglobin: 11.1 g/dL (09-17 @ 18:27)  Hemoglobin: 11.1 g/dL (09-17 @ 04:13)  Hemoglobin: 10.8 g/dL (09-15 @ 07:20)      09-18    136  |  105  |  11  ----------------------------<  173<H>  4.3   |  20<L>  |  0.74    Ca    9.0      18 Sep 2021 22:25  Phos  2.4     09-18  Mg     2.0     09-18    TPro  5.7<L>  /  Alb  3.1<L>  /  TBili  1.0  /  DBili  x   /  AST  67<H>  /  ALT  139<H>  /  AlkPhos  377<H>  09-18    Creatinine Trend: 0.74<--, 0.78<--, 0.86<--, 0.91<--, 0.92<--, 0.91<--    COAGS: PTT - ( 18 Sep 2021 22:25 )  PTT:38.5 sec          T(C): 37 (09-19-21 @ 07:00), Max: 37 (09-19-21 @ 07:00)  HR: 93 (09-19-21 @ 09:00) (69 - 101)  BP: 117/57 (09-19-21 @ 09:00) (98/55 - 118/65)  RR: 10 (09-19-21 @ 09:00) (10 - 24)  SpO2: 95% (09-19-21 @ 09:00) (91% - 100%)  Wt(kg): --    I&O's Summary    18 Sep 2021 07:01  -  19 Sep 2021 07:00  --------------------------------------------------------  IN: 2412 mL / OUT: 1415 mL / NET: 997 mL    19 Sep 2021 07:01  -  19 Sep 2021 10:10  --------------------------------------------------------  IN: 252 mL / OUT: 150 mL / NET: 102 mL        GEN: resting in bed comfortably in NAD. NGT to LCWS  RESP: no increased WOB  ABD: soft, non-distended, appropriately tender to palpation without rebound tenderness or guarding. Prevena vac in place holding suction. Drains x2 with serosanguinous output  EXTR: warm, well-perfused, no edema      76y Female with Hx of RA (on MTX), HLD, appendectomy, and L renal cell CA (s/p L nephrectomy 2019) who presented with transaminitis and an abnormal MRCP showing dilated PD and CBD, found to have a 1.7x1.8cm hypoechoic pancreatic head lesion on EUS on 9/9. She was taken to the OR on 9/17 for a Whipple on 9/17    Tele stable    GI / DVT prophylaxis.   keep K>4, mag >2.0   echo noted   Diet per Sx, start Statin once yuliya   Pain management   No s/s of chf on exam  Appreciate ICU follow up

## 2021-09-19 NOTE — PROGRESS NOTE ADULT - ASSESSMENT
76y Female with Hx of RA (on MTX), HLD, appendectomy, and L renal cell CA (s/p L nephrectomy 2019) who presented with transaminitis and an abnormal MRCP showing dilated PD and CBD, found to have a 1.7x1.8cm hypoechoic pancreatic head lesion on EUS on 9/9. She was taken to the OR on 9/17 for a Whipple on 9/17 and transferred to the SICU extubated post-operatively for hemodynamic monitoring.     PLAN:    Neuro: post-op pain control - s/p spinal block   - Pain control: Dilaudid PCA  - No toradol for 48hr    Respiratory: post-op atelectasis  - Monitor respiratory status  - Wean supplemental O2 as tolerated  - Incentive spirometry 10x/hr while in bed   - Encourage OOB as tolerated     Cardiovascular: h/o HLD  - Monitor vitals signs q1h     Gastrointestinal: s/p Whipple  - Diet: NPO  - NGT to LCWS  - Incisional Prevena vac x 5d  - Protonix 40mg IV BID  - AM VIOLA Amylase -- VIOLA#1 @ 0500, VIOLA#2 @ 0530  - HOLD home atorvastatin 10mg PO qD while patient is NPO    Genitourinary/Renal: no active issues; h/o L renal cell CA (s/p L nephrectomy 2019)  - IVF: D5 1/2NS+20K @ 84cc/hr  - C/w Maxwell catheter  - Monitor UOP q1h  - Trend electrolytes on CMP qD, replete PRN    Heme: no active issues   - Trend H/H on CBC qD  - VTE ppx: Lovenox   - SCDs while in bed    ID: no active issues   - Trend WBC on CBC qD  - Monitor fever curve    Endocrine: h/o RA (last dose MTX 7/2021)  - Trend FS q4h  - MISS     Lines:   - PIV x 2 - 9/17  - Maxwell - 9/17    Dispo: SICU  Code Status: Full Code

## 2021-09-19 NOTE — PROGRESS NOTE ADULT - ASSESSMENT
76y Female with Hx of RA (on MTX), HLD, appendectomy, and L renal cell CA (s/p L nephrectomy 2019) who presented with transaminitis and an abnormal MRCP showing dilated PD and CBD, found to have a 1.7x1.8cm hypoechoic pancreatic head lesion on EUS on 9/9. She was taken to the OR on 9/17 for a Whipple on 9/17 and transferred to the SICU extubated post-operatively for hemodynamic monitoring.     - NGT to LCWS  - NPO  - Await return of bowel function  - Prevena vac in place, to come off POD5 (9/22)  - Maxwell catheter  - Trend VIOLA amylase  - Care per SICU    Red Team Surgery  [id number]  76y Female with Hx of RA (on MTX), HLD, appendectomy, and L renal cell CA (s/p L nephrectomy 2019) who presented with transaminitis and an abnormal MRCP showing dilated PD and CBD, found to have a 1.7x1.8cm hypoechoic pancreatic head lesion on EUS on 9/9. She was taken to the OR on 9/17 for a Whipple on 9/17 and transferred to the SICU extubated post-operatively for hemodynamic monitoring. Now POD2.     - NGT clamp trial today. If <150 cc, can DC and advance to sips   - NPO  - Await return of bowel function  - Prevena vac in place, to come off POD5 (9/22)  - Hans dc'ed, trial of void at 7 pm  - Trend VIOLA amylase  - Care per SICU    Red Team Surgery  [id number]

## 2021-09-20 LAB
ALBUMIN SERPL ELPH-MCNC: 2.9 G/DL — LOW (ref 3.3–5)
ALP SERPL-CCNC: 276 U/L — HIGH (ref 40–120)
ALT FLD-CCNC: 85 U/L — HIGH (ref 10–45)
AMYLASE FLD-CCNC: 10 U/L — SIGNIFICANT CHANGE UP
AMYLASE FLD-CCNC: 19 U/L — SIGNIFICANT CHANGE UP
ANION GAP SERPL CALC-SCNC: 11 MMOL/L — SIGNIFICANT CHANGE UP (ref 5–17)
APTT BLD: 33.9 SEC — SIGNIFICANT CHANGE UP (ref 27.5–35.5)
AST SERPL-CCNC: 52 U/L — HIGH (ref 10–40)
BILIRUB SERPL-MCNC: 0.8 MG/DL — SIGNIFICANT CHANGE UP (ref 0.2–1.2)
BUN SERPL-MCNC: 6 MG/DL — LOW (ref 7–23)
CALCIUM SERPL-MCNC: 8.7 MG/DL — SIGNIFICANT CHANGE UP (ref 8.4–10.5)
CHLORIDE SERPL-SCNC: 106 MMOL/L — SIGNIFICANT CHANGE UP (ref 96–108)
CO2 SERPL-SCNC: 23 MMOL/L — SIGNIFICANT CHANGE UP (ref 22–31)
CREAT SERPL-MCNC: 0.73 MG/DL — SIGNIFICANT CHANGE UP (ref 0.5–1.3)
FERRITIN SERPL-MCNC: 745 NG/ML — HIGH (ref 15–150)
FOLATE SERPL-MCNC: 8.5 NG/ML — SIGNIFICANT CHANGE UP
GLUCOSE BLDC GLUCOMTR-MCNC: 119 MG/DL — HIGH (ref 70–99)
GLUCOSE BLDC GLUCOMTR-MCNC: 124 MG/DL — HIGH (ref 70–99)
GLUCOSE BLDC GLUCOMTR-MCNC: 128 MG/DL — HIGH (ref 70–99)
GLUCOSE BLDC GLUCOMTR-MCNC: 129 MG/DL — HIGH (ref 70–99)
GLUCOSE BLDC GLUCOMTR-MCNC: 130 MG/DL — HIGH (ref 70–99)
GLUCOSE BLDC GLUCOMTR-MCNC: 153 MG/DL — HIGH (ref 70–99)
GLUCOSE SERPL-MCNC: 134 MG/DL — HIGH (ref 70–99)
HAPTOGLOB SERPL-MCNC: 148 MG/DL — SIGNIFICANT CHANGE UP (ref 34–200)
HCT VFR BLD CALC: 27.2 % — LOW (ref 34.5–45)
HGB BLD-MCNC: 8.5 G/DL — LOW (ref 11.5–15.5)
INR BLD: 1.1 RATIO — SIGNIFICANT CHANGE UP (ref 0.88–1.16)
IRON SATN MFR SERPL: 18 % — SIGNIFICANT CHANGE UP (ref 14–50)
IRON SATN MFR SERPL: 27 UG/DL — LOW (ref 30–160)
LDH SERPL L TO P-CCNC: 165 U/L — SIGNIFICANT CHANGE UP (ref 50–242)
MAGNESIUM SERPL-MCNC: 2 MG/DL — SIGNIFICANT CHANGE UP (ref 1.6–2.6)
MCHC RBC-ENTMCNC: 31.3 GM/DL — LOW (ref 32–36)
MCHC RBC-ENTMCNC: 31.6 PG — SIGNIFICANT CHANGE UP (ref 27–34)
MCV RBC AUTO: 101.1 FL — HIGH (ref 80–100)
NRBC # BLD: 0 /100 WBCS — SIGNIFICANT CHANGE UP (ref 0–0)
PHOSPHATE SERPL-MCNC: 1.1 MG/DL — LOW (ref 2.5–4.5)
PHOSPHATE SERPL-MCNC: 1.4 MG/DL — LOW (ref 2.5–4.5)
PLATELET # BLD AUTO: 169 K/UL — SIGNIFICANT CHANGE UP (ref 150–400)
POTASSIUM SERPL-MCNC: 3.7 MMOL/L — SIGNIFICANT CHANGE UP (ref 3.5–5.3)
POTASSIUM SERPL-SCNC: 3.7 MMOL/L — SIGNIFICANT CHANGE UP (ref 3.5–5.3)
PROT SERPL-MCNC: 5.6 G/DL — LOW (ref 6–8.3)
PROTHROM AB SERPL-ACNC: 13.1 SEC — SIGNIFICANT CHANGE UP (ref 10.6–13.6)
RBC # BLD: 2.69 M/UL — LOW (ref 3.8–5.2)
RBC # BLD: 2.78 M/UL — LOW (ref 3.8–5.2)
RBC # FLD: 15.4 % — HIGH (ref 10.3–14.5)
RETICS #: 69.8 K/UL — SIGNIFICANT CHANGE UP (ref 25–125)
RETICS/RBC NFR: 2.5 % — SIGNIFICANT CHANGE UP (ref 0.5–2.5)
SODIUM SERPL-SCNC: 140 MMOL/L — SIGNIFICANT CHANGE UP (ref 135–145)
TIBC SERPL-MCNC: 154 UG/DL — LOW (ref 220–430)
UIBC SERPL-MCNC: 127 UG/DL — SIGNIFICANT CHANGE UP (ref 110–370)
VIT B12 SERPL-MCNC: 1234 PG/ML — SIGNIFICANT CHANGE UP (ref 232–1245)
WBC # BLD: 8.42 K/UL — SIGNIFICANT CHANGE UP (ref 3.8–10.5)
WBC # FLD AUTO: 8.42 K/UL — SIGNIFICANT CHANGE UP (ref 3.8–10.5)

## 2021-09-20 RX ORDER — METOCLOPRAMIDE HCL 10 MG
10 TABLET ORAL THREE TIMES A DAY
Refills: 0 | Status: DISCONTINUED | OUTPATIENT
Start: 2021-09-20 | End: 2021-09-20

## 2021-09-20 RX ORDER — SODIUM,POTASSIUM PHOSPHATES 278-250MG
2 POWDER IN PACKET (EA) ORAL ONCE
Refills: 0 | Status: COMPLETED | OUTPATIENT
Start: 2021-09-20 | End: 2021-09-20

## 2021-09-20 RX ORDER — POTASSIUM CHLORIDE 20 MEQ
10 PACKET (EA) ORAL ONCE
Refills: 0 | Status: COMPLETED | OUTPATIENT
Start: 2021-09-20 | End: 2021-09-20

## 2021-09-20 RX ORDER — METOCLOPRAMIDE HCL 10 MG
10 TABLET ORAL THREE TIMES A DAY
Refills: 0 | Status: DISCONTINUED | OUTPATIENT
Start: 2021-09-20 | End: 2021-10-03

## 2021-09-20 RX ADMIN — PANTOPRAZOLE SODIUM 40 MILLIGRAM(S): 20 TABLET, DELAYED RELEASE ORAL at 17:28

## 2021-09-20 RX ADMIN — ONDANSETRON 4 MILLIGRAM(S): 8 TABLET, FILM COATED ORAL at 10:07

## 2021-09-20 RX ADMIN — Medication 85 MILLIMOLE(S): at 21:24

## 2021-09-20 RX ADMIN — Medication 750 MILLIGRAM(S): at 11:00

## 2021-09-20 RX ADMIN — ENOXAPARIN SODIUM 40 MILLIGRAM(S): 100 INJECTION SUBCUTANEOUS at 12:34

## 2021-09-20 RX ADMIN — DEXTROSE MONOHYDRATE, SODIUM CHLORIDE, AND POTASSIUM CHLORIDE 42 MILLILITER(S): 50; .745; 4.5 INJECTION, SOLUTION INTRAVENOUS at 21:24

## 2021-09-20 RX ADMIN — ONDANSETRON 4 MILLIGRAM(S): 8 TABLET, FILM COATED ORAL at 20:07

## 2021-09-20 RX ADMIN — Medication 2: at 22:41

## 2021-09-20 RX ADMIN — HYDROMORPHONE HYDROCHLORIDE 30 MILLILITER(S): 2 INJECTION INTRAMUSCULAR; INTRAVENOUS; SUBCUTANEOUS at 07:37

## 2021-09-20 RX ADMIN — CHLORHEXIDINE GLUCONATE 1 APPLICATION(S): 213 SOLUTION TOPICAL at 08:28

## 2021-09-20 RX ADMIN — Medication 2 PACKET(S): at 10:06

## 2021-09-20 RX ADMIN — HYDROMORPHONE HYDROCHLORIDE 30 MILLILITER(S): 2 INJECTION INTRAMUSCULAR; INTRAVENOUS; SUBCUTANEOUS at 19:20

## 2021-09-20 RX ADMIN — Medication 10 MILLIEQUIVALENT(S): at 10:06

## 2021-09-20 RX ADMIN — PANTOPRAZOLE SODIUM 40 MILLIGRAM(S): 20 TABLET, DELAYED RELEASE ORAL at 05:19

## 2021-09-20 RX ADMIN — Medication 300 MILLIGRAM(S): at 10:05

## 2021-09-20 RX ADMIN — Medication 10 MILLIGRAM(S): at 15:20

## 2021-09-20 RX ADMIN — Medication 10 MILLIGRAM(S): at 21:26

## 2021-09-20 NOTE — PHYSICAL THERAPY INITIAL EVALUATION ADULT - PERTINENT HX OF CURRENT PROBLEM, REHAB EVAL
76 y.o. F PMH rheumatoid arthritis, high cholesterol, appendectomy, & left nephrectomy p/w abdominal pain x months sent in by PCP for MRI findings significant for dilated pancreatic/bile ducts & elevated bili/liver enzymes requesting ERCP. Pain in RUQ w/ intermittent radiation to back. States pain is mild, not requiring pain medication. Now s/p Diagnostic laparoscopy with Whipple procedure on 9/17/21.

## 2021-09-20 NOTE — PHYSICAL THERAPY INITIAL EVALUATION ADULT - ADDITIONAL COMMENTS
Pt resides in an apartment w/ spouse, 5 steps to enter, 1 flight inside. PTA pt was independent w/ all functional mobility & did not use an AD for ambulation.

## 2021-09-20 NOTE — PROGRESS NOTE ADULT - SUBJECTIVE AND OBJECTIVE BOX
C A R D I O L O G Y  **********************************     DATE OF SERVICE: 09-20-21    Patient denies chest pain or shortness of breath.   Review of systems otherwise (-)  	  MEDICATIONS:  MEDICATIONS  (STANDING):  chlorhexidine 2% Cloths 1 Application(s) Topical <User Schedule>  dextrose 5% + sodium chloride 0.45% with potassium chloride 20 mEq/L 1000 milliLiter(s) (42 mL/Hr) IV Continuous <Continuous>  enoxaparin Injectable 40 milliGRAM(s) SubCutaneous daily  HYDROmorphone PCA (1 mG/mL) 30 milliLiter(s) PCA Continuous PCA Continuous  influenza   Vaccine 0.5 milliLiter(s) IntraMuscular once  insulin lispro (ADMELOG) corrective regimen sliding scale   SubCutaneous every 4 hours  metoclopramide  IVPB 10 milliGRAM(s) IV Intermittent three times a day  pantoprazole  Injectable 40 milliGRAM(s) IV Push two times a day      LABS:	 	    CARDIAC MARKERS:                                8.5    8.42  )-----------( 169      ( 20 Sep 2021 07:09 )             27.2     Hemoglobin: 8.5 g/dL (09-20 @ 07:09)  Hemoglobin: 9.6 g/dL (09-18 @ 22:25)  Hemoglobin: 10.6 g/dL (09-18 @ 00:55)  Hemoglobin: 11.1 g/dL (09-17 @ 18:27)  Hemoglobin: 11.1 g/dL (09-17 @ 04:13)      09-20    140  |  106  |  6<L>  ----------------------------<  134<H>  3.7   |  23  |  0.73    Ca    8.7      20 Sep 2021 07:11  Phos  1.1     09-20  Mg     2.0     09-20    TPro  5.6<L>  /  Alb  2.9<L>  /  TBili  0.8  /  DBili  x   /  AST  52<H>  /  ALT  85<H>  /  AlkPhos  276<H>  09-20    Creatinine Trend: 0.73<--, 0.74<--, 0.78<--, 0.86<--, 0.91<--, 0.92<--    COAGS:   PT/INR - ( 20 Sep 2021 07:11 )   PT: 13.1 sec;   INR: 1.10 ratio         PTT - ( 20 Sep 2021 07:11 )  PTT:33.9 sec    proBNP:   Lipid Profile:   HgA1c:   TSH:       PHYSICAL EXAM:  T(C): 36.7 (09-20-21 @ 09:07), Max: 37.1 (09-19-21 @ 12:02)  HR: 98 (09-20-21 @ 09:07) (65 - 100)  BP: 132/80 (09-20-21 @ 09:07) (103/67 - 152/67)  RR: 18 (09-20-21 @ 09:07) (16 - 21)  SpO2: 98% (09-20-21 @ 09:07) (93% - 99%)  Wt(kg): --  I&O's Summary    19 Sep 2021 07:01  -  20 Sep 2021 07:00  --------------------------------------------------------  IN: 2201 mL / OUT: 1025 mL / NET: 1176 mL    20 Sep 2021 07:01  -  20 Sep 2021 10:44  --------------------------------------------------------  IN: 0 mL / OUT: 150 mL / NET: -150 mL          Gen: Appears well in NAD  HEENT:  (-)icterus (-)pallor  CV: N S1 S2 1/6 PATRICIA (+)2 Pulses B/l  Resp:  Clear to auscultation B/L, normal effort  GI: (+) BS Soft, NT, ND  Lymph:  (-)Edema, (-)obvious lymphadenopathy  Skin: Warm to touch, Normal turgor  Psych: Appropriate mood and affect      TELEMETRY: None	      ASSESSMENT/PLAN: 76y Female with Hx of RA (on MTX), HLD, appendectomy, and L renal cell CA (s/p L nephrectomy 2019) who presented with transaminitis and an abnormal MRCP showing dilated PD and CBD, found to have a 1.7x1.8cm hypoechoic pancreatic head lesion on EUS on 9/9. She was taken to the OR for a Whipple on 9/17.    - No evidence of clinical HF or anginal symptoms  - TTE noted with normal LV function  - Surgery follow up appreciated  - No further inpatient cardiac w/u planned    Kyrie Torres PA-C  Pager: [phone]

## 2021-09-20 NOTE — PROGRESS NOTE ADULT - SUBJECTIVE AND OBJECTIVE BOX
POST OPERATIVE DAY #: s/p gregg POD 3     SUBJECTIVE: Pt seen and examined at bedside. There were no acute overnight events. The patient has some nausea this am but otherwise is doing well. Her pain is controlled. She denies CP, SOB, fevers, chills, N/D.        Vital Signs Last 24 Hrs  T(C): 37.1 (20 Sep 2021 05:00), Max: 37.1 (19 Sep 2021 12:02)  T(F): 98.8 (20 Sep 2021 05:00), Max: 98.8 (19 Sep 2021 12:02)  HR: 100 (20 Sep 2021 05:00) (65 - 100)  BP: 152/67 (20 Sep 2021 05:00) (103/67 - 152/67)  BP(mean): 77 (19 Sep 2021 11:00) (77 - 80)  RR: 17 (20 Sep 2021 05:00) (10 - 21)  SpO2: 95% (20 Sep 2021 05:00) (93% - 99%)  I&O's Summary    19 Sep 2021 07:01  -  20 Sep 2021 07:00  --------------------------------------------------------  IN: 2201 mL / OUT: 1025 mL / NET: 1176 mL      I&O's Detail    19 Sep 2021 07:01  -  20 Sep 2021 07:00  --------------------------------------------------------  IN:    dextrose 5% + sodium chloride 0.45% w/ Additives: 2016 mL    IV PiggyBack: 75 mL    Oral Fluid: 110 mL  Total IN: 2201 mL    OUT:    Bulb (mL): 50 mL    Bulb (mL): 80 mL    Indwelling Catheter - Urethral (mL): 195 mL    Nasogastric/Oral tube (mL): 75 mL    Voided (mL): 625 mL  Total OUT: 1025 mL    Total NET: 1176 mL          Labs:                         8.5    8.42  )-----------( 169      ( 20 Sep 2021 07:09 )             27.2     09-20    140  |  106  |  6<L>  ----------------------------<  134<H>  3.7   |  23  |  0.73    Ca    8.7      20 Sep 2021 07:11  Phos  1.1     09-20  Mg     2.0     09-20    TPro  5.6<L>  /  Alb  2.9<L>  /  TBili  0.8  /  DBili  x   /  AST  52<H>  /  ALT  85<H>  /  AlkPhos  276<H>  09-20    PT/INR - ( 20 Sep 2021 07:11 )   PT: 13.1 sec;   INR: 1.10 ratio         PTT - ( 20 Sep 2021 07:11 )  PTT:33.9 sec      Physical Exam:  GEN: resting in bed comfortably in NAD.  RESP: no increased WOB  ABD: soft, non-distended, appropriately tender to palpation without rebound tenderness or guarding. Prevena vac in place holding suction. Drains x2 with serosanguinous output  EXTR: warm, well-perfused, no edema

## 2021-09-20 NOTE — PROGRESS NOTE ADULT - ASSESSMENT
76y Female with Hx of RA (on MTX), HLD, appendectomy, and L renal cell CA (s/p L nephrectomy 2019) who presented with transaminitis and an abnormal MRCP showing dilated PD and CBD, found to have a 1.7x1.8cm hypoechoic pancreatic head lesion on EUS on 9/9. She was taken to the OR on 9/17 for a Whipple on 9/17 and transferred to the SICU extubated post-operatively for hemodynamic monitoring. Now POD3. Her NGT is out and she is tolerating sips/chips. She passed her TOV. She is doing well.    Plan:  - DVT ppx  - Diet advance to CLD  - Will go down on IVF to 42cc/hr  - Await return of bowel function  - Pain control as needed  - Prevena vac in place, to come off POD5 (9/22)  - Trend VIOLA amylase  -Continue to follow whipple pathway      Red Team Surgery  [id number]

## 2021-09-20 NOTE — PROGRESS NOTE ADULT - ATTENDING COMMENTS
S/p Whipple    Cont post op protocol    sips     await flatus    discussed post op findings w pt and son S/p Whipple    Cont post op protocol    sips     await flatus    discussed post op findings w pt and son    replete electolytes

## 2021-09-20 NOTE — PROGRESS NOTE ADULT - SUBJECTIVE AND OBJECTIVE BOX
Day __ of Anesthesia Pain Management Service    SUBJECTIVE: Patient is doing well with IV PCA    Pain Scale Score:	[X] Refer to charted pain scores    THERAPY:    [ ] IV PCA Morphine		[ ] 5 mg/mL	[ ] 1 mg/mL  [X] IV PCA Hydromorphone	[ ] 5 mg/mL	[X] 1 mg/mL  [ ] IV PCA Fentanyl		[ ] 50 micrograms/mL    Demand dose: 0.2 mg     Lockout: 6 minutes   Continuous Rate: 0 mg/hr  4 Hour Limit: 4 mg    MEDICATIONS  (STANDING):  acetaminophen  IVPB .. 750 milliGRAM(s) IV Intermittent every 6 hours  chlorhexidine 2% Cloths 1 Application(s) Topical <User Schedule>  dextrose 5% + sodium chloride 0.45% with potassium chloride 20 mEq/L 1000 milliLiter(s) (42 mL/Hr) IV Continuous <Continuous>  enoxaparin Injectable 40 milliGRAM(s) SubCutaneous daily  HYDROmorphone PCA (1 mG/mL) 30 milliLiter(s) PCA Continuous PCA Continuous  influenza   Vaccine 0.5 milliLiter(s) IntraMuscular once  insulin lispro (ADMELOG) corrective regimen sliding scale   SubCutaneous every 4 hours  pantoprazole  Injectable 40 milliGRAM(s) IV Push two times a day  potassium chloride    Tablet ER 10 milliEquivalent(s) Oral once  potassium phosphate / sodium phosphate Powder (PHOS-NaK) 2 Packet(s) Oral once    MEDICATIONS  (PRN):  artificial tears (preservative free) Ophthalmic Solution 1 Drop(s) Both EYES two times a day PRN Dry Eyes  HYDROmorphone PCA (1 mG/mL) Rescue Clinician Bolus 0.5 milliGRAM(s) IV Push every 15 minutes PRN for Pain Scale GREATER THAN 6  naloxone Injectable 0.1 milliGRAM(s) IV Push every 3 minutes PRN For ANY of the following changes in patient status:  A. RR LESS THAN 10 breaths per minute, B. Oxygen saturation LESS THAN 90%, C. Sedation score of 6  ondansetron Injectable 4 milliGRAM(s) IV Push every 6 hours PRN Nausea      OBJECTIVE:    Sedation Score:	[ X] Alert	[ ] Drowsy 	[ ] Arousable	[ ] Asleep	[ ] Unresponsive    Side Effects:	[X ] None	[ ] Nausea	[ ] Vomiting	[ ] Pruritus  		[ ] Other:    Vital Signs Last 24 Hrs  T(C): 36.7 (20 Sep 2021 09:07), Max: 37.1 (19 Sep 2021 12:02)  T(F): 98.1 (20 Sep 2021 09:07), Max: 98.8 (19 Sep 2021 12:02)  HR: 98 (20 Sep 2021 09:07) (65 - 100)  BP: 132/80 (20 Sep 2021 09:07) (103/67 - 152/67)  BP(mean): 77 (19 Sep 2021 11:00) (77 - 79)  RR: 18 (20 Sep 2021 09:07) (10 - 21)  SpO2: 98% (20 Sep 2021 09:07) (93% - 99%)    ASSESSMENT/ PLAN    Therapy to  be:               [X] Continued   [ ] Discontinued   [ ] Changed to PRN Analgesics    Documentation and Verification of current medications:   [X] Done	[ ] Not done, not eligible    Comments: patient with minimal use. re-educated to use PRN. consider switching to PO tomorrow

## 2021-09-21 LAB
ALBUMIN SERPL ELPH-MCNC: 2.9 G/DL — LOW (ref 3.3–5)
ALP SERPL-CCNC: 242 U/L — HIGH (ref 40–120)
ALT FLD-CCNC: 70 U/L — HIGH (ref 10–45)
AMYLASE FLD-CCNC: 114 U/L — SIGNIFICANT CHANGE UP
AMYLASE FLD-CCNC: 8 U/L — SIGNIFICANT CHANGE UP
ANION GAP SERPL CALC-SCNC: 12 MMOL/L — SIGNIFICANT CHANGE UP (ref 5–17)
ANION GAP SERPL CALC-SCNC: 15 MMOL/L — SIGNIFICANT CHANGE UP (ref 5–17)
ANION GAP SERPL CALC-SCNC: 9 MMOL/L — SIGNIFICANT CHANGE UP (ref 5–17)
APTT BLD: 33.3 SEC — SIGNIFICANT CHANGE UP (ref 27.5–35.5)
AST SERPL-CCNC: 41 U/L — HIGH (ref 10–40)
BILIRUB SERPL-MCNC: 0.7 MG/DL — SIGNIFICANT CHANGE UP (ref 0.2–1.2)
BUN SERPL-MCNC: 4 MG/DL — LOW (ref 7–23)
BUN SERPL-MCNC: 5 MG/DL — LOW (ref 7–23)
BUN SERPL-MCNC: <4 MG/DL — LOW (ref 7–23)
CALCIUM SERPL-MCNC: 7.8 MG/DL — LOW (ref 8.4–10.5)
CALCIUM SERPL-MCNC: 8.4 MG/DL — SIGNIFICANT CHANGE UP (ref 8.4–10.5)
CALCIUM SERPL-MCNC: 8.6 MG/DL — SIGNIFICANT CHANGE UP (ref 8.4–10.5)
CHLORIDE SERPL-SCNC: 103 MMOL/L — SIGNIFICANT CHANGE UP (ref 96–108)
CHLORIDE SERPL-SCNC: 104 MMOL/L — SIGNIFICANT CHANGE UP (ref 96–108)
CHLORIDE SERPL-SCNC: 107 MMOL/L — SIGNIFICANT CHANGE UP (ref 96–108)
CO2 SERPL-SCNC: 21 MMOL/L — LOW (ref 22–31)
CO2 SERPL-SCNC: 21 MMOL/L — LOW (ref 22–31)
CO2 SERPL-SCNC: 23 MMOL/L — SIGNIFICANT CHANGE UP (ref 22–31)
CREAT SERPL-MCNC: 0.65 MG/DL — SIGNIFICANT CHANGE UP (ref 0.5–1.3)
CREAT SERPL-MCNC: 0.65 MG/DL — SIGNIFICANT CHANGE UP (ref 0.5–1.3)
CREAT SERPL-MCNC: 0.68 MG/DL — SIGNIFICANT CHANGE UP (ref 0.5–1.3)
GLUCOSE BLDC GLUCOMTR-MCNC: 114 MG/DL — HIGH (ref 70–99)
GLUCOSE BLDC GLUCOMTR-MCNC: 122 MG/DL — HIGH (ref 70–99)
GLUCOSE BLDC GLUCOMTR-MCNC: 125 MG/DL — HIGH (ref 70–99)
GLUCOSE BLDC GLUCOMTR-MCNC: 126 MG/DL — HIGH (ref 70–99)
GLUCOSE BLDC GLUCOMTR-MCNC: 138 MG/DL — HIGH (ref 70–99)
GLUCOSE BLDC GLUCOMTR-MCNC: 156 MG/DL — HIGH (ref 70–99)
GLUCOSE BLDC GLUCOMTR-MCNC: 170 MG/DL — HIGH (ref 70–99)
GLUCOSE SERPL-MCNC: 132 MG/DL — HIGH (ref 70–99)
GLUCOSE SERPL-MCNC: 162 MG/DL — HIGH (ref 70–99)
GLUCOSE SERPL-MCNC: 506 MG/DL — CRITICAL HIGH (ref 70–99)
HCT VFR BLD CALC: 25.4 % — LOW (ref 34.5–45)
HGB BLD-MCNC: 8.2 G/DL — LOW (ref 11.5–15.5)
INR BLD: 1.15 RATIO — SIGNIFICANT CHANGE UP (ref 0.88–1.16)
MAGNESIUM SERPL-MCNC: 2 MG/DL — SIGNIFICANT CHANGE UP (ref 1.6–2.6)
MCHC RBC-ENTMCNC: 32.3 GM/DL — SIGNIFICANT CHANGE UP (ref 32–36)
MCHC RBC-ENTMCNC: 32.3 PG — SIGNIFICANT CHANGE UP (ref 27–34)
MCV RBC AUTO: 100 FL — SIGNIFICANT CHANGE UP (ref 80–100)
NRBC # BLD: 0 /100 WBCS — SIGNIFICANT CHANGE UP (ref 0–0)
PHOSPHATE SERPL-MCNC: 3.6 MG/DL — SIGNIFICANT CHANGE UP (ref 2.5–4.5)
PHOSPHATE SERPL-MCNC: 3.6 MG/DL — SIGNIFICANT CHANGE UP (ref 2.5–4.5)
PLATELET # BLD AUTO: 195 K/UL — SIGNIFICANT CHANGE UP (ref 150–400)
POTASSIUM SERPL-MCNC: 3.2 MMOL/L — LOW (ref 3.5–5.3)
POTASSIUM SERPL-MCNC: 4.1 MMOL/L — SIGNIFICANT CHANGE UP (ref 3.5–5.3)
POTASSIUM SERPL-MCNC: 5.4 MMOL/L — HIGH (ref 3.5–5.3)
POTASSIUM SERPL-SCNC: 3.2 MMOL/L — LOW (ref 3.5–5.3)
POTASSIUM SERPL-SCNC: 4.1 MMOL/L — SIGNIFICANT CHANGE UP (ref 3.5–5.3)
POTASSIUM SERPL-SCNC: 5.4 MMOL/L — HIGH (ref 3.5–5.3)
PROT SERPL-MCNC: 5.5 G/DL — LOW (ref 6–8.3)
PROTHROM AB SERPL-ACNC: 13.7 SEC — HIGH (ref 10.6–13.6)
RBC # BLD: 2.54 M/UL — LOW (ref 3.8–5.2)
RBC # FLD: 14.7 % — HIGH (ref 10.3–14.5)
SODIUM SERPL-SCNC: 133 MMOL/L — LOW (ref 135–145)
SODIUM SERPL-SCNC: 140 MMOL/L — SIGNIFICANT CHANGE UP (ref 135–145)
SODIUM SERPL-SCNC: 142 MMOL/L — SIGNIFICANT CHANGE UP (ref 135–145)
WBC # BLD: 6.81 K/UL — SIGNIFICANT CHANGE UP (ref 3.8–10.5)
WBC # FLD AUTO: 6.81 K/UL — SIGNIFICANT CHANGE UP (ref 3.8–10.5)

## 2021-09-21 PROCEDURE — 74018 RADEX ABDOMEN 1 VIEW: CPT | Mod: 26

## 2021-09-21 RX ORDER — ACETAMINOPHEN 500 MG
975 TABLET ORAL EVERY 6 HOURS
Refills: 0 | Status: DISCONTINUED | OUTPATIENT
Start: 2021-09-21 | End: 2021-09-22

## 2021-09-21 RX ORDER — POTASSIUM CHLORIDE 20 MEQ
40 PACKET (EA) ORAL ONCE
Refills: 0 | Status: COMPLETED | OUTPATIENT
Start: 2021-09-21 | End: 2021-09-21

## 2021-09-21 RX ADMIN — HYDROMORPHONE HYDROCHLORIDE 30 MILLILITER(S): 2 INJECTION INTRAMUSCULAR; INTRAVENOUS; SUBCUTANEOUS at 08:16

## 2021-09-21 RX ADMIN — HYDROMORPHONE HYDROCHLORIDE 30 MILLILITER(S): 2 INJECTION INTRAMUSCULAR; INTRAVENOUS; SUBCUTANEOUS at 19:20

## 2021-09-21 RX ADMIN — Medication 10 MILLIGRAM(S): at 05:37

## 2021-09-21 RX ADMIN — Medication 2: at 14:12

## 2021-09-21 RX ADMIN — Medication 10 MILLIGRAM(S): at 21:12

## 2021-09-21 RX ADMIN — CHLORHEXIDINE GLUCONATE 1 APPLICATION(S): 213 SOLUTION TOPICAL at 07:49

## 2021-09-21 RX ADMIN — PANTOPRAZOLE SODIUM 40 MILLIGRAM(S): 20 TABLET, DELAYED RELEASE ORAL at 17:11

## 2021-09-21 RX ADMIN — PANTOPRAZOLE SODIUM 40 MILLIGRAM(S): 20 TABLET, DELAYED RELEASE ORAL at 05:37

## 2021-09-21 RX ADMIN — Medication 975 MILLIGRAM(S): at 17:12

## 2021-09-21 RX ADMIN — ONDANSETRON 4 MILLIGRAM(S): 8 TABLET, FILM COATED ORAL at 19:21

## 2021-09-21 RX ADMIN — Medication 40 MILLIEQUIVALENT(S): at 07:50

## 2021-09-21 RX ADMIN — HYDROMORPHONE HYDROCHLORIDE 30 MILLILITER(S): 2 INJECTION INTRAMUSCULAR; INTRAVENOUS; SUBCUTANEOUS at 07:12

## 2021-09-21 RX ADMIN — ENOXAPARIN SODIUM 40 MILLIGRAM(S): 100 INJECTION SUBCUTANEOUS at 11:26

## 2021-09-21 RX ADMIN — Medication 975 MILLIGRAM(S): at 11:26

## 2021-09-21 NOTE — PROGRESS NOTE ADULT - ATTENDING COMMENTS
S/p Whipple    Cont post op protocol    sips     await flatus    discussed post op findings w pt and son    replete electolytes

## 2021-09-21 NOTE — PROGRESS NOTE ADULT - SUBJECTIVE AND OBJECTIVE BOX
Day 4\3 of Anesthesia Pain Management Service    SUBJECTIVE: I'm doing ok    Pain Scale Score:	[X] Refer to charted pain scores    THERAPY:    [ ] IV PCA Morphine		[ ] 5 mg/mL	[ ] 1 mg/mL  [X] IV PCA Hydromorphone	[ ] 5 mg/mL	[X] 1 mg/mL  [ ] IV PCA Fentanyl		[ ] 50 micrograms/mL    Demand dose: 0.2 mg     Lockout: 6 minutes   Continuous Rate: 0 mg/hr  4 Hour Limit: 4 mg    MEDICATIONS  (STANDING):  acetaminophen   Tablet .. 975 milliGRAM(s) Oral every 6 hours  chlorhexidine 2% Cloths 1 Application(s) Topical <User Schedule>  dextrose 5% + sodium chloride 0.45% with potassium chloride 20 mEq/L 1000 milliLiter(s) (42 mL/Hr) IV Continuous <Continuous>  enoxaparin Injectable 40 milliGRAM(s) SubCutaneous daily  HYDROmorphone PCA (1 mG/mL) 30 milliLiter(s) PCA Continuous PCA Continuous  influenza   Vaccine 0.5 milliLiter(s) IntraMuscular once  insulin lispro (ADMELOG) corrective regimen sliding scale   SubCutaneous every 4 hours  metoclopramide Injectable 10 milliGRAM(s) IV Push three times a day  pantoprazole  Injectable 40 milliGRAM(s) IV Push two times a day    MEDICATIONS  (PRN):  artificial tears (preservative free) Ophthalmic Solution 1 Drop(s) Both EYES two times a day PRN Dry Eyes  HYDROmorphone PCA (1 mG/mL) Rescue Clinician Bolus 0.5 milliGRAM(s) IV Push every 15 minutes PRN for Pain Scale GREATER THAN 6  naloxone Injectable 0.1 milliGRAM(s) IV Push every 3 minutes PRN For ANY of the following changes in patient status:  A. RR LESS THAN 10 breaths per minute, B. Oxygen saturation LESS THAN 90%, C. Sedation score of 6  ondansetron Injectable 4 milliGRAM(s) IV Push every 6 hours PRN Nausea      OBJECTIVE:    Sedation Score:	[ X] Alert 	[ ] Drowsy 	[ ] Arousable	[ ] Asleep	[ ] Unresponsive    Side Effects:	[X ] None	[ ] Nausea	[ ] Vomiting	[ ] Pruritus  		[ ] Other:    Vital Signs Last 24 Hrs  T(C): 36.4 (21 Sep 2021 09:29), Max: 37.2 (20 Sep 2021 17:14)  T(F): 97.5 (21 Sep 2021 09:29), Max: 99 (20 Sep 2021 17:14)  HR: 103 (21 Sep 2021 09:29) (82 - 106)  BP: 123/73 (21 Sep 2021 09:29) (119/68 - 137/67)  BP(mean): --  RR: 18 (21 Sep 2021 09:29) (18 - 18)  SpO2: 94% (21 Sep 2021 09:29) (93% - 99%)    ASSESSMENT/ PLAN    Therapy to  be:               [X] Continued   [ ] Discontinued   [ ] Changed to PRN Analgesics    Documentation and Verification of current medications:   [X] Done	[ ] Not done, not eligible    Comments: PCA use minimal. Transition to prn analgesics when tolerating po.

## 2021-09-21 NOTE — PROGRESS NOTE ADULT - ATTENDING COMMENTS
seen and agree w/ PA. making good progress post-op from [place]. tolerating thin liquids, but with limited appetite.  expected post-op abd pain and full/nauseated feeling.  no angina or palpitations.

## 2021-09-21 NOTE — PROGRESS NOTE ADULT - SUBJECTIVE AND OBJECTIVE BOX
POST OPERATIVE DAY #: s/p gregg POD 3     SUBJECTIVE: Pt seen and examined at bedside. There were no acute overnight events. The patient has some nausea this am but otherwise is doing well. Her pain is controlled. She denies CP, SOB, fevers, chills, N/D.        Vital Signs Last 24 Hrs  T(C): 37.1 (20 Sep 2021 05:00), Max: 37.1 (19 Sep 2021 12:02)  T(F): 98.8 (20 Sep 2021 05:00), Max: 98.8 (19 Sep 2021 12:02)  HR: 100 (20 Sep 2021 05:00) (65 - 100)  BP: 152/67 (20 Sep 2021 05:00) (103/67 - 152/67)  BP(mean): 77 (19 Sep 2021 11:00) (77 - 80)  RR: 17 (20 Sep 2021 05:00) (10 - 21)  SpO2: 95% (20 Sep 2021 05:00) (93% - 99%)  I&O's Summary    19 Sep 2021 07:01  -  20 Sep 2021 07:00  --------------------------------------------------------  IN: 2201 mL / OUT: 1025 mL / NET: 1176 mL      I&O's Detail    19 Sep 2021 07:01  -  20 Sep 2021 07:00  --------------------------------------------------------  IN:    dextrose 5% + sodium chloride 0.45% w/ Additives: 2016 mL    IV PiggyBack: 75 mL    Oral Fluid: 110 mL  Total IN: 2201 mL    OUT:    Bulb (mL): 50 mL    Bulb (mL): 80 mL    Indwelling Catheter - Urethral (mL): 195 mL    Nasogastric/Oral tube (mL): 75 mL    Voided (mL): 625 mL  Total OUT: 1025 mL    Total NET: 1176 mL          Labs:                         8.5    8.42  )-----------( 169      ( 20 Sep 2021 07:09 )             27.2     09-20    140  |  106  |  6<L>  ----------------------------<  134<H>  3.7   |  23  |  0.73    Ca    8.7      20 Sep 2021 07:11  Phos  1.1     09-20  Mg     2.0     09-20    TPro  5.6<L>  /  Alb  2.9<L>  /  TBili  0.8  /  DBili  x   /  AST  52<H>  /  ALT  85<H>  /  AlkPhos  276<H>  09-20    PT/INR - ( 20 Sep 2021 07:11 )   PT: 13.1 sec;   INR: 1.10 ratio         PTT - ( 20 Sep 2021 07:11 )  PTT:33.9 sec      Physical Exam:  GEN: resting in bed comfortably in NAD.  RESP: no increased WOB  ABD: soft, non-distended, appropriately tender to palpation without rebound tenderness or guarding. Prevena vac in place holding suction. Drains x2 with serosanguinous output  EXTR: warm, well-perfused, no edema       POST OPERATIVE DAY #: s/p gregg POD 3    SUBJECTIVE: Pt seen and examined at bedside. There were no acute overnight events. The patient is passing gas, but has not yet had bowel movements. She has been ambulating.      Vital Signs Last 24 Hrs  T(C): 37.1 (20 Sep 2021 05:00), Max: 37.1 (19 Sep 2021 12:02)  T(F): 98.8 (20 Sep 2021 05:00), Max: 98.8 (19 Sep 2021 12:02)  HR: 100 (20 Sep 2021 05:00) (65 - 100)  BP: 152/67 (20 Sep 2021 05:00) (103/67 - 152/67)  BP(mean): 77 (19 Sep 2021 11:00) (77 - 80)  RR: 17 (20 Sep 2021 05:00) (10 - 21)  SpO2: 95% (20 Sep 2021 05:00) (93% - 99%)  I&O's Summary    19 Sep 2021 07:01  -  20 Sep 2021 07:00  --------------------------------------------------------  IN: 2201 mL / OUT: 1025 mL / NET: 1176 mL      I&O's Detail    19 Sep 2021 07:01  -  20 Sep 2021 07:00  --------------------------------------------------------  IN:    dextrose 5% + sodium chloride 0.45% w/ Additives: 2016 mL    IV PiggyBack: 75 mL    Oral Fluid: 110 mL  Total IN: 2201 mL    OUT:    Bulb (mL): 50 mL    Bulb (mL): 80 mL    Indwelling Catheter - Urethral (mL): 195 mL    Nasogastric/Oral tube (mL): 75 mL    Voided (mL): 625 mL  Total OUT: 1025 mL    Total NET: 1176 mL          Labs:                         8.5    8.42  )-----------( 169      ( 20 Sep 2021 07:09 )             27.2     09-20    140  |  106  |  6<L>  ----------------------------<  134<H>  3.7   |  23  |  0.73    Ca    8.7      20 Sep 2021 07:11  Phos  1.1     09-20  Mg     2.0     09-20    TPro  5.6<L>  /  Alb  2.9<L>  /  TBili  0.8  /  DBili  x   /  AST  52<H>  /  ALT  85<H>  /  AlkPhos  276<H>  09-20    PT/INR - ( 20 Sep 2021 07:11 )   PT: 13.1 sec;   INR: 1.10 ratio         PTT - ( 20 Sep 2021 07:11 )  PTT:33.9 sec      Physical Exam:  GEN: resting in bed comfortably in NAD.  RESP: no increased WOB  ABD: soft, non-distended, appropriately tender to palpation without rebound tenderness or guarding. Prevena vac in place holding suction. Drains x2 with serosanguinous output  EXTR: warm, well-perfused, no edema

## 2021-09-21 NOTE — PROGRESS NOTE ADULT - SUBJECTIVE AND OBJECTIVE BOX
C A [facility]  **********************************     DATE OF SERVICE: 09-21-21    Denies chest pain or shortness of breath.   Review of systems otherwise (-)  	    MEDICATIONS  (STANDING):  acetaminophen   Tablet .. 975 milliGRAM(s) Oral every 6 hours  chlorhexidine 2% Cloths 1 Application(s) Topical <User Schedule>  dextrose 5% + sodium chloride 0.45% with potassium chloride 20 mEq/L 1000 milliLiter(s) (42 mL/Hr) IV Continuous <Continuous>  enoxaparin Injectable 40 milliGRAM(s) SubCutaneous daily  HYDROmorphone PCA (1 mG/mL) 30 milliLiter(s) PCA Continuous PCA Continuous  influenza   Vaccine 0.5 milliLiter(s) IntraMuscular once  insulin lispro (ADMELOG) corrective regimen sliding scale   SubCutaneous every 4 hours  metoclopramide Injectable 10 milliGRAM(s) IV Push three times a day  pantoprazole  Injectable 40 milliGRAM(s) IV Push two times a day    MEDICATIONS  (PRN):  artificial tears (preservative free) Ophthalmic Solution 1 Drop(s) Both EYES two times a day PRN Dry Eyes  HYDROmorphone PCA (1 mG/mL) Rescue Clinician Bolus 0.5 milliGRAM(s) IV Push every 15 minutes PRN for Pain Scale GREATER THAN 6  naloxone Injectable 0.1 milliGRAM(s) IV Push every 3 minutes PRN For ANY of the following changes in patient status:  A. RR LESS THAN 10 breaths per minute, B. Oxygen saturation LESS THAN 90%, C. Sedation score of 6  ondansetron Injectable 4 milliGRAM(s) IV Push every 6 hours PRN Nausea      LABS:                          8.2    6.81  )-----------( 195      ( 21 Sep 2021 04:56 )             25.4     Hemoglobin: 8.2 g/dL (09-21 @ 04:56)  Hemoglobin: 8.5 g/dL (09-20 @ 07:09)  Hemoglobin: 9.6 g/dL (09-18 @ 22:25)  Hemoglobin: 10.6 g/dL (09-18 @ 00:55)  Hemoglobin: 11.1 g/dL (09-17 @ 18:27)    09-21    133<L>  |  103  |  4<L>  ----------------------------<  506<HH>  5.4<H>   |  21<L>  |  0.65    Ca    7.8<L>      21 Sep 2021 11:02  Phos  3.6     09-21  Mg     2.0     09-21    TPro  5.5<L>  /  Alb  2.9<L>  /  TBili  0.7  /  DBili  x   /  AST  41<H>  /  ALT  70<H>  /  AlkPhos  242<H>  09-21    Creatinine Trend: 0.65<--, 0.68<--, 0.73<--, 0.74<--, 0.78<--, 0.86<--   PT/INR - ( 21 Sep 2021 04:56 )   PT: 13.7 sec;   INR: 1.15 ratio         PTT - ( 21 Sep 2021 04:56 )  PTT:33.3 sec          09-20-21 @ 07:01  -  09-21-21 @ 07:00  --------------------------------------------------------  IN: 2250 mL / OUT: 2125 mL / NET: 125 mL    09-21-21 @ 07:01  -  09-21-21 @ 12:37  --------------------------------------------------------  IN: 288 mL / OUT: 270 mL / NET: 18 mL        PHYSICAL EXAM  Vital Signs Last 24 Hrs  T(C): 36.4 (21 Sep 2021 09:29), Max: 37.2 (20 Sep 2021 17:14)  T(F): 97.5 (21 Sep 2021 09:29), Max: 99 (20 Sep 2021 17:14)  HR: 103 (21 Sep 2021 09:29) (82 - 106)  BP: 123/73 (21 Sep 2021 09:29) (119/68 - 137/67)  BP(mean): --  RR: 18 (21 Sep 2021 09:29) (18 - 18)  SpO2: 94% (21 Sep 2021 09:29) (93% - 99%)        Gen: Appears well in NAD  HEENT:  (-)icterus (-)pallor  CV: N S1 S2 1/6 PATRICIA (+)2 Pulses B/l  Resp:  Clear to auscultation B/L, normal effort  GI: (+) BS Soft, NT, ND  Lymph:  (-)Edema, (-)obvious lymphadenopathy  Skin: Warm to touch, Normal turgor  Psych: Appropriate mood and affect      TELEMETRY: None	      ASSESSMENT/PLAN: 76y Female with Hx of RA (on MTX), HLD, appendectomy, and L renal cell CA (s/p L nephrectomy 2019) who presented with transaminitis and an abnormal MRCP showing dilated PD and CBD, found to have a 1.7x1.8cm hypoechoic pancreatic head lesion on EUS on 9/9. She was taken to the OR for a Whipple on 9/17.    - Progressing well post op  - No evidence of clinical HF or anginal symptoms  - TTE noted with normal LV function  - Post op care per surgery  - No further inpatient cardiac w/u planned    Kyrie Torres PA-C  Pager: [phone]

## 2021-09-21 NOTE — PROGRESS NOTE ADULT - ASSESSMENT
76y Female with Hx of RA (on MTX), HLD, appendectomy, and L renal cell CA (s/p L nephrectomy 2019) who presented with transaminitis and an abnormal MRCP showing dilated PD and CBD, found to have a 1.7x1.8cm hypoechoic pancreatic head lesion on EUS on 9/9. She was taken to the OR on 9/17 for a Whipple on 9/17 and transferred to the SICU extubated post-operatively for hemodynamic monitoring. Now POD4. Her NGT is out and she is tolerating sips/chips. She passed her TOV. She is doing well.    Plan:       Red Team Surgery  [id number]    76y Female with Hx of RA (on MTX), HLD, appendectomy, and L renal cell CA (s/p L nephrectomy 2019) who presented with transaminitis and an abnormal MRCP showing dilated PD and CBD, found to have a 1.7x1.8cm hypoechoic pancreatic head lesion on EUS on 9/9. She was taken to the OR on 9/17 for a Whipple on 9/17 and transferred to the SICU extubated post-operatively for hemodynamic monitoring. Now POD4. Her NGT is out and she is tolerating sips/chips. She passed her TOV. She is doing well.    Plan:  - DVT ppx  - CLD for breakfast. If tolerating and passing gas, advance to mechanical soft diet and IVL.  - Pain control as needed  - Prevena vac in place, to come off POD5 (9/22)  - Trend VIOLA amylase  -Continue to follow whipple pathway    Red Team Surgery  [id number]

## 2021-09-21 NOTE — PROGRESS NOTE ADULT - SUBJECTIVE AND OBJECTIVE BOX
Patient is a 76y old  Female who presents with a chief complaint of     9/21/21  HPI:  No n/v  Surgical oncology FU noted, s/p gregg    PAST MEDICAL & SURGICAL HISTORY:  Arthritis  rheumatoid    S/P appendectomy  1969        Review of Systems:   CONSTITUTIONAL: No fever, weight loss, or fatigue  EYES: No eye pain, visual disturbances, or discharge  ENMT:  No difficulty hearing, tinnitus, vertigo; No sinus or throat pain  NECK: No pain or stiffness  BREASTS: No pain, masses, or nipple discharge  RESPIRATORY: No cough, wheezing, chills or hemoptysis; No shortness of breath  CARDIOVASCULAR: No chest pain, palpitations, dizziness, or leg swelling  GASTROINTESTINAL: Mild abdominal & epigastric pain. No nausea, vomiting, or hematemesis; No diarrhea or constipation. No melena or hematochezia.  GENITOURINARY: No dysuria, frequency, hematuria, or incontinence  NEUROLOGICAL: No headaches, memory loss, loss of strength, numbness, or tremors  SKIN: No itching, burning, rashes, or lesions   LYMPH NODES: No enlarged glands  ENDOCRINE: No heat or cold intolerance; No hair loss  MUSCULOSKELETAL: No joint pain or swelling; No muscle, back, or extremity pain  PSYCHIATRIC: No depression, anxiety, mood swings, or difficulty sleeping  HEME/LYMPH: No easy bruising, or bleeding gums  ALLERY AND IMMUNOLOGIC: No hives or eczema    Allergies    No Known Allergies    Intolerances        Social History:     FAMILY HISTORY:      MEDICATIONS  (STANDING):  heparin   Injectable 5000 Unit(s) SubCutaneous every 12 hours  influenza   Vaccine 0.5 milliLiter(s) IntraMuscular once  metoclopramide Injectable 5 milliGRAM(s) IV Push once  pantoprazole  Injectable 80 milliGRAM(s) IV Push once  pantoprazole Infusion 8 mG/Hr (10 mL/Hr) IV Continuous <Continuous>    MEDICATIONS  (PRN):  acetaminophen   Tablet .. 650 milliGRAM(s) Oral every 6 hours PRN Temp greater or equal to 38C (100.4F), Moderate Pain (4 - 6)  cholestyramine Powder (Sugar-Free) 4 Gram(s) Oral two times a day PRN Pruritis        CAPILLARY BLOOD GLUCOSE        I&O's Summary    08 Sep 2021 07:01  -  09 Sep 2021 07:00  --------------------------------------------------------  IN: 1380 mL / OUT: 0 mL / NET: 1380 mL    09 Sep 2021 07:01  -  09 Sep 2021 17:06  --------------------------------------------------------  IN: 0 mL / OUT: 0 mL / NET: 0 mL        PHYSICAL EXAM:  Vital Signs Last 24 Hrs  T(C): 36.3 (09 Sep 2021 16:05), Max: 36.8 (09 Sep 2021 04:06)  T(F): 97.3 (09 Sep 2021 16:05), Max: 98.3 (09 Sep 2021 08:48)  HR: 62 (09 Sep 2021 16:55) (61 - 76)  BP: 133/80 (09 Sep 2021 16:55) (110/66 - 148/72)  BP(mean): --  RR: 15 (09 Sep 2021 16:55) (15 - 20)  SpO2: 98% (09 Sep 2021 16:55) (97% - 99%)    GENERAL: NAD, well-developed  HEAD:  Atraumatic, Normocephalic  EYES: EOMI, PERRLA, conjunctiva and sclera clear  NECK: Supple, No JVD  CHEST/LUNG: Clear to auscultation bilaterally; No wheeze  HEART: Regular rate and rhythm; No murmurs, rubs, or gallops  ABDOMEN: Soft, Nontender, Nondistended; Bowel sounds present  EXTREMITIES:  2+ Peripheral Pulses, No clubbing, cyanosis, or edema  PSYCH: AAOx3  NEUROLOGY: non-focal  SKIN: No rashes or lesions    LABS:                        11.7   4.49  )-----------( 173      ( 09 Sep 2021 07:07 )             35.4     09-09    140  |  107  |  14  ----------------------------<  103<H>  3.8   |  19<L>  |  0.77    Ca    10.1      09 Sep 2021 07:07    TPro  6.9  /  Alb  3.6  /  TBili  1.5<H>  /  DBili  0.8<H>  /  AST  158<H>  /  ALT  275<H>  /  AlkPhos  758<H>  09-09    PT/INR - ( 09 Sep 2021 07:07 )   PT: 10.7 sec;   INR: 0.89 ratio                   RADIOLOGY & ADDITIONAL TESTS:    Imaging Personally Reviewed:    Consultant(s) Notes Reviewed:      Care Discussed with Consultants/Other Providers:

## 2021-09-21 NOTE — PROVIDER CONTACT NOTE (CRITICAL VALUE NOTIFICATION) - ACTION/TREATMENT ORDERED:
aware, RN to check fingerstick on pt and continue to monitor pt. Dipika Mendoza MD aware, RN to check fingerstick on pt and continue to monitor pt. Dipika Mendoza MD aware, RN to check fingerstick on pt and continue to monitor pt. Repeat fingerstick result: 170

## 2021-09-22 LAB
ALBUMIN SERPL ELPH-MCNC: 3.4 G/DL — SIGNIFICANT CHANGE UP (ref 3.3–5)
ALP SERPL-CCNC: 238 U/L — HIGH (ref 40–120)
ALT FLD-CCNC: 62 U/L — HIGH (ref 10–45)
AMYLASE FLD-CCNC: 35 U/L — SIGNIFICANT CHANGE UP
AMYLASE FLD-CCNC: 8 U/L — SIGNIFICANT CHANGE UP
ANION GAP SERPL CALC-SCNC: 15 MMOL/L — SIGNIFICANT CHANGE UP (ref 5–17)
APTT BLD: 28.3 SEC — SIGNIFICANT CHANGE UP (ref 27.5–35.5)
AST SERPL-CCNC: 33 U/L — SIGNIFICANT CHANGE UP (ref 10–40)
BILIRUB SERPL-MCNC: 0.9 MG/DL — SIGNIFICANT CHANGE UP (ref 0.2–1.2)
BUN SERPL-MCNC: 6 MG/DL — LOW (ref 7–23)
CALCIUM SERPL-MCNC: 9.1 MG/DL — SIGNIFICANT CHANGE UP (ref 8.4–10.5)
CHLORIDE SERPL-SCNC: 102 MMOL/L — SIGNIFICANT CHANGE UP (ref 96–108)
CO2 SERPL-SCNC: 21 MMOL/L — LOW (ref 22–31)
CREAT SERPL-MCNC: 0.76 MG/DL — SIGNIFICANT CHANGE UP (ref 0.5–1.3)
GLUCOSE BLDC GLUCOMTR-MCNC: 107 MG/DL — HIGH (ref 70–99)
GLUCOSE BLDC GLUCOMTR-MCNC: 114 MG/DL — HIGH (ref 70–99)
GLUCOSE BLDC GLUCOMTR-MCNC: 117 MG/DL — HIGH (ref 70–99)
GLUCOSE BLDC GLUCOMTR-MCNC: 122 MG/DL — HIGH (ref 70–99)
GLUCOSE BLDC GLUCOMTR-MCNC: 139 MG/DL — HIGH (ref 70–99)
GLUCOSE BLDC GLUCOMTR-MCNC: 156 MG/DL — HIGH (ref 70–99)
GLUCOSE SERPL-MCNC: 134 MG/DL — HIGH (ref 70–99)
HCT VFR BLD CALC: 28.5 % — LOW (ref 34.5–45)
HGB BLD-MCNC: 9.3 G/DL — LOW (ref 11.5–15.5)
INR BLD: 1.14 RATIO — SIGNIFICANT CHANGE UP (ref 0.88–1.16)
MAGNESIUM SERPL-MCNC: 2 MG/DL — SIGNIFICANT CHANGE UP (ref 1.6–2.6)
MCHC RBC-ENTMCNC: 32.3 PG — SIGNIFICANT CHANGE UP (ref 27–34)
MCHC RBC-ENTMCNC: 32.6 GM/DL — SIGNIFICANT CHANGE UP (ref 32–36)
MCV RBC AUTO: 99 FL — SIGNIFICANT CHANGE UP (ref 80–100)
NRBC # BLD: 0 /100 WBCS — SIGNIFICANT CHANGE UP (ref 0–0)
PHOSPHATE SERPL-MCNC: 2.8 MG/DL — SIGNIFICANT CHANGE UP (ref 2.5–4.5)
PLATELET # BLD AUTO: 280 K/UL — SIGNIFICANT CHANGE UP (ref 150–400)
POTASSIUM SERPL-MCNC: 4 MMOL/L — SIGNIFICANT CHANGE UP (ref 3.5–5.3)
POTASSIUM SERPL-SCNC: 4 MMOL/L — SIGNIFICANT CHANGE UP (ref 3.5–5.3)
PROT SERPL-MCNC: 6.3 G/DL — SIGNIFICANT CHANGE UP (ref 6–8.3)
PROTHROM AB SERPL-ACNC: 13.6 SEC — SIGNIFICANT CHANGE UP (ref 10.6–13.6)
RBC # BLD: 2.88 M/UL — LOW (ref 3.8–5.2)
RBC # FLD: 14.6 % — HIGH (ref 10.3–14.5)
SODIUM SERPL-SCNC: 138 MMOL/L — SIGNIFICANT CHANGE UP (ref 135–145)
SPECIMEN SOURCE FLD: SIGNIFICANT CHANGE UP
SPECIMEN SOURCE FLD: SIGNIFICANT CHANGE UP
WBC # BLD: 8.84 K/UL — SIGNIFICANT CHANGE UP (ref 3.8–10.5)
WBC # FLD AUTO: 8.84 K/UL — SIGNIFICANT CHANGE UP (ref 3.8–10.5)

## 2021-09-22 PROCEDURE — 74018 RADEX ABDOMEN 1 VIEW: CPT | Mod: 26

## 2021-09-22 RX ORDER — OXYCODONE HYDROCHLORIDE 5 MG/1
5 TABLET ORAL EVERY 4 HOURS
Refills: 0 | Status: DISCONTINUED | OUTPATIENT
Start: 2021-09-22 | End: 2021-09-26

## 2021-09-22 RX ORDER — ACETAMINOPHEN 500 MG
1000 TABLET ORAL ONCE
Refills: 0 | Status: COMPLETED | OUTPATIENT
Start: 2021-09-22 | End: 2021-09-22

## 2021-09-22 RX ORDER — ERYTHROMYCIN ETHYLSUCCINATE 400 MG
250 TABLET ORAL EVERY 6 HOURS
Refills: 0 | Status: DISCONTINUED | OUTPATIENT
Start: 2021-09-22 | End: 2021-09-23

## 2021-09-22 RX ORDER — SODIUM,POTASSIUM PHOSPHATES 278-250MG
1 POWDER IN PACKET (EA) ORAL ONCE
Refills: 0 | Status: COMPLETED | OUTPATIENT
Start: 2021-09-22 | End: 2021-09-22

## 2021-09-22 RX ORDER — IBUPROFEN 200 MG
400 TABLET ORAL EVERY 6 HOURS
Refills: 0 | Status: DISCONTINUED | OUTPATIENT
Start: 2021-09-22 | End: 2021-09-26

## 2021-09-22 RX ORDER — ONDANSETRON 8 MG/1
4 TABLET, FILM COATED ORAL EVERY 6 HOURS
Refills: 0 | Status: DISCONTINUED | OUTPATIENT
Start: 2021-09-22 | End: 2021-10-04

## 2021-09-22 RX ORDER — ONDANSETRON 8 MG/1
4 TABLET, FILM COATED ORAL ONCE
Refills: 0 | Status: COMPLETED | OUTPATIENT
Start: 2021-09-22 | End: 2021-09-22

## 2021-09-22 RX ADMIN — Medication 250 MILLIGRAM(S): at 17:11

## 2021-09-22 RX ADMIN — Medication 10 MILLIGRAM(S): at 13:23

## 2021-09-22 RX ADMIN — Medication 1 PACKET(S): at 08:17

## 2021-09-22 RX ADMIN — PANTOPRAZOLE SODIUM 40 MILLIGRAM(S): 20 TABLET, DELAYED RELEASE ORAL at 05:09

## 2021-09-22 RX ADMIN — Medication 400 MILLIGRAM(S): at 17:11

## 2021-09-22 RX ADMIN — ONDANSETRON 4 MILLIGRAM(S): 8 TABLET, FILM COATED ORAL at 10:22

## 2021-09-22 RX ADMIN — HYDROMORPHONE HYDROCHLORIDE 30 MILLILITER(S): 2 INJECTION INTRAMUSCULAR; INTRAVENOUS; SUBCUTANEOUS at 07:21

## 2021-09-22 RX ADMIN — OXYCODONE HYDROCHLORIDE 5 MILLIGRAM(S): 5 TABLET ORAL at 10:22

## 2021-09-22 RX ADMIN — ENOXAPARIN SODIUM 40 MILLIGRAM(S): 100 INJECTION SUBCUTANEOUS at 11:17

## 2021-09-22 RX ADMIN — PANTOPRAZOLE SODIUM 40 MILLIGRAM(S): 20 TABLET, DELAYED RELEASE ORAL at 17:10

## 2021-09-22 RX ADMIN — Medication 1000 MILLIGRAM(S): at 20:08

## 2021-09-22 RX ADMIN — ONDANSETRON 4 MILLIGRAM(S): 8 TABLET, FILM COATED ORAL at 19:38

## 2021-09-22 RX ADMIN — Medication 1000 MILLIGRAM(S): at 06:45

## 2021-09-22 RX ADMIN — OXYCODONE HYDROCHLORIDE 5 MILLIGRAM(S): 5 TABLET ORAL at 10:52

## 2021-09-22 RX ADMIN — Medication 400 MILLIGRAM(S): at 06:03

## 2021-09-22 RX ADMIN — Medication 975 MILLIGRAM(S): at 11:17

## 2021-09-22 RX ADMIN — Medication 2: at 10:28

## 2021-09-22 RX ADMIN — Medication 10 MILLIGRAM(S): at 05:09

## 2021-09-22 RX ADMIN — Medication 250 MILLIGRAM(S): at 11:17

## 2021-09-22 RX ADMIN — Medication 10 MILLIGRAM(S): at 21:32

## 2021-09-22 RX ADMIN — Medication 200 MILLIGRAM(S): at 19:38

## 2021-09-22 NOTE — PROGRESS NOTE ADULT - PROBLEM SELECTOR PLAN 1
EUS done, Bleeding area noted in GE J area., clipped  CBC  is stable  GI FU noted, switch to Protonix 40 mg BID  MRI of pancreas was done as outpatient, no need ot repeat  Review pathology records from her previous nephrectomy, shows renal cell ca  Biopsy results from EUS pending
GI consulted, May need ERCP
EUS done, Bleeding area noted in GE J area., clipped  CBC  is stable  GI FU noted, switch to Protonix 40 mg BID  CT of pancreas ordered to evaluate pancreatic head mass.
Pancreatic head tumour, s/p Whipple
antiemetics PRN
Clinical picture is highly suspicious for Pancreatic Ca. She has been scheduled for whipple surgery.  RCRI score is moderate for CV risk.  Will get cardiology in AM for clearance as well. Her EKG is WNL.
EUS done, Bleeding area noted in GE J area., clipped  CBC  is stable  GI FU noted, switch to Protonix 40 mg BID  MRI of pancreas ordered  Review pathology records from her previous nephrectomy  Biopsy results from EUS pending
Pancreatic head tumour, s/p Whipple  Anti emetic prn
EUS done, Bleeding area noted, clipped  FU CBC closely

## 2021-09-22 NOTE — PROGRESS NOTE ADULT - PROBLEM SELECTOR PROBLEM 1
Nausea and vomiting, intractability of vomiting not specified, unspecified vomiting type
Abdominal pain

## 2021-09-22 NOTE — PROGRESS NOTE ADULT - SUBJECTIVE AND OBJECTIVE BOX
C A R D I O L O G Y  **********************************     DATE OF SERVICE: 09-22-21    Denies chest pain or shortness of breath.   Review of systems otherwise (-)  	    MEDICATIONS  (STANDING):  acetaminophen   Tablet .. 975 milliGRAM(s) Oral every 6 hours  chlorhexidine 2% Cloths 1 Application(s) Topical <User Schedule>  enoxaparin Injectable 40 milliGRAM(s) SubCutaneous daily  erythromycin     enteric coated 250 milliGRAM(s) Oral every 6 hours  influenza   Vaccine 0.5 milliLiter(s) IntraMuscular once  insulin lispro (ADMELOG) corrective regimen sliding scale   SubCutaneous every 4 hours  metoclopramide Injectable 10 milliGRAM(s) IV Push three times a day  pantoprazole  Injectable 40 milliGRAM(s) IV Push two times a day    MEDICATIONS  (PRN):  artificial tears (preservative free) Ophthalmic Solution 1 Drop(s) Both EYES two times a day PRN Dry Eyes  ondansetron    Tablet 4 milliGRAM(s) Oral every 6 hours PRN Nausea  oxyCODONE    IR 5 milliGRAM(s) Oral every 4 hours PRN Moderate Pain (4 - 6)      LABS:                          9.3    8.84  )-----------( 280      ( 22 Sep 2021 07:05 )             28.5     Hemoglobin: 9.3 g/dL (09-22 @ 07:05)  Hemoglobin: 8.2 g/dL (09-21 @ 04:56)  Hemoglobin: 8.5 g/dL (09-20 @ 07:09)  Hemoglobin: 9.6 g/dL (09-18 @ 22:25)  Hemoglobin: 10.6 g/dL (09-18 @ 00:55)    09-22    138  |  102  |  6<L>  ----------------------------<  134<H>  4.0   |  21<L>  |  0.76    Ca    9.1      22 Sep 2021 07:05  Phos  2.8     09-22  Mg     2.0     09-22    TPro  6.3  /  Alb  3.4  /  TBili  0.9  /  DBili  x   /  AST  33  /  ALT  62<H>  /  AlkPhos  238<H>  09-22    Creatinine Trend: 0.76<--, 0.65<--, 0.65<--, 0.68<--, 0.73<--, 0.74<--   PT/INR - ( 22 Sep 2021 07:05 )   PT: 13.6 sec;   INR: 1.14 ratio         PTT - ( 22 Sep 2021 07:05 )  PTT:28.3 sec          09-21-21 @ 07:01  -  09-22-21 @ 07:00  --------------------------------------------------------  IN: 938 mL / OUT: 2120 mL / NET: -1182 mL    09-22-21 @ 07:01  -  09-22-21 @ 11:14  --------------------------------------------------------  IN: 0 mL / OUT: 60 mL / NET: -60 mL        PHYSICAL EXAM  Vital Signs Last 24 Hrs  T(C): 36.4 (22 Sep 2021 08:29), Max: 37.5 (22 Sep 2021 01:16)  T(F): 97.6 (22 Sep 2021 08:29), Max: 99.5 (22 Sep 2021 01:16)  HR: 89 (22 Sep 2021 08:29) (80 - 94)  BP: 113/68 (22 Sep 2021 08:29) (113/68 - 134/80)  BP(mean): --  RR: 18 (22 Sep 2021 08:29) (16 - 18)  SpO2: 95% (22 Sep 2021 08:29) (94% - 99%)      Gen: Appears well in NAD  HEENT:  (-)icterus (-)pallor  CV: N S1 S2 1/6 PATRICIA (+)2 Pulses B/l  Resp:  Clear to auscultation B/L, normal effort  GI: (+) BS Soft, NT, ND  Lymph:  (-)Edema, (-)obvious lymphadenopathy  Skin: Warm to touch, Normal turgor  Psych: Appropriate mood and affect    TELEMETRY: None	      ASSESSMENT/PLAN: 76y Female with Hx of RA (on MTX), HLD, appendectomy, and L renal cell CA (s/p L nephrectomy 2019) who presented with transaminitis and an abnormal MRCP showing dilated PD and CBD, found to have a 1.7x1.8cm hypoechoic pancreatic head lesion on EUS on 9/9. She was taken to the OR for a Whipple on 9/17.    - No evidence of clinical HF or anginal symptoms  - TTE noted with normal LV function  - BP/HR remain stable  - Surgery f/u noted  - No further inpatient cardiac w/u planned    Kyrie Torres PA-C  Pager: [phone]

## 2021-09-22 NOTE — PROVIDER CONTACT NOTE (OTHER) - ACTION/TREATMENT ORDERED:
MD Krishan aware. Will come to bedside and assess pt. RN will continue to monitor. MD Krishan aware. Will come to bedside and assess pt. RN will continue to monitor. Abd xray performed. MD Krishan aware. Will come to bedside and assess pt. RN will continue to monitor. Abd xray performed. MD at bedside when pt had emesis count #2. As per Krishan, continue clear liquid diet

## 2021-09-22 NOTE — PROGRESS NOTE ADULT - PROBLEM SELECTOR PROBLEM 3
Anemia, unspecified type
Rheumatoid arthritis

## 2021-09-22 NOTE — PROGRESS NOTE ADULT - PROBLEM SELECTOR PROBLEM 2
Choledocholithiasis with obstruction
Rheumatoid arthritis, involving unspecified site, unspecified rheumatoid factor presence

## 2021-09-22 NOTE — PROGRESS NOTE ADULT - PROBLEM SELECTOR PLAN 2
ERCP done  FU biopsy  findings
As above
AS above. FU LFTs  ERCP scheduled for today
ERCP done  FU biopsy  findings
As above
ERCP done  FU biopsy  findings
FU clinically

## 2021-09-22 NOTE — CHART NOTE - NSCHARTNOTEFT_GEN_A_CORE
Patient has been complaining for nausea since around 6PM. She was given Zofran at 7Pm and Reglan at 10Pm. She still remained nauseous and vomited about 200cc of green fluid. We obtained abdominal xray to r/o ileus. Her abdomen is soft and nondistended.     Patient was reexamined at 11:30Pm and 12:30AM. She reports having no nausea or vomiting. If she vomits again, the plan is insert NGT.

## 2021-09-22 NOTE — PROGRESS NOTE ADULT - PROBLEM SELECTOR PLAN 3
Hold Mtx for now,FU clinically
Holding Mtx
Hold Mtx for now,FU clinically
Holding Mtx
mild, ?chronic in setting RA, f/u clinically  outpt f/u PMD.

## 2021-09-22 NOTE — PROGRESS NOTE ADULT - SUBJECTIVE AND OBJECTIVE BOX
Day 1 of Anesthesia Pain Management Service    SUBJECTIVE: Doing well    Pain Scale Score:	[X] Refer to charted pain scores    THERAPY:    [ ] IV PCA Morphine		        [ ] 5 mg/mL	[ ] 1 mg/mL  [X] IV PCA Hydromorphone	[ ] 5 mg/mL	[X] 1 mg/mL  [ ] IV PCA Fentanyl		        [ ] 50 micrograms/mL    Demand dose: 0.2 mg     Lockout: 6 minutes   Continuous Rate: 0 mg/hr  4 Hour Limit: 4 mg    MEDICATIONS  (STANDING):  acetaminophen   Tablet .. 975 milliGRAM(s) Oral every 6 hours  chlorhexidine 2% Cloths 1 Application(s) Topical <User Schedule>  enoxaparin Injectable 40 milliGRAM(s) SubCutaneous daily  erythromycin     enteric coated 250 milliGRAM(s) Oral every 6 hours  influenza   Vaccine 0.5 milliLiter(s) IntraMuscular once  insulin lispro (ADMELOG) corrective regimen sliding scale   SubCutaneous every 4 hours  metoclopramide Injectable 10 milliGRAM(s) IV Push three times a day  pantoprazole  Injectable 40 milliGRAM(s) IV Push two times a day    MEDICATIONS  (PRN):  artificial tears (preservative free) Ophthalmic Solution 1 Drop(s) Both EYES two times a day PRN Dry Eyes  ondansetron    Tablet 4 milliGRAM(s) Oral every 6 hours PRN Nausea  oxyCODONE    IR 5 milliGRAM(s) Oral every 4 hours PRN Moderate Pain (4 - 6)      OBJECTIVE:    Sedation Score:	[ X] Alert	 [ ] Drowsy 	[ ] Arousable	[ ] Asleep	[ ] Unresponsive    Side Effects:	[X ] None	[ ] Nausea	[ ] Vomiting	[ ] Pruritus  		[ ] Other:    Vital Signs Last 24 Hrs  T(C): 36.4 (22 Sep 2021 08:29), Max: 37.5 (22 Sep 2021 01:16)  T(F): 97.6 (22 Sep 2021 08:29), Max: 99.5 (22 Sep 2021 01:16)  HR: 89 (22 Sep 2021 08:29) (80 - 94)  BP: 113/68 (22 Sep 2021 08:29) (113/68 - 134/80)  BP(mean): --  RR: 18 (22 Sep 2021 08:29) (16 - 18)  SpO2: 95% (22 Sep 2021 08:29) (94% - 99%)    ASSESSMENT/ PLAN    Therapy to  be:               [  ] Continued   [X ] Discontinued   [ X] Changed to PRN Analgesics    Documentation and Verification of current medications:   [X] Done	[ ] Not done, not eligible    Comments: PCA D\c'd and transitioned to prn analgesics

## 2021-09-22 NOTE — PROGRESS NOTE ADULT - SUBJECTIVE AND OBJECTIVE BOX
POST OPERATIVE DAY #: s/p gregg POD 4    SUBJECTIVE: Pt seen and examined at bedside. Patient had nausea and vomiting overnight. Abdominal Xr normal. Zofran and Reglan given, n/v resolved. She has been ambulating.      Vital Signs Last 24 Hrs  T(C): 37.1 (20 Sep 2021 05:00), Max: 37.1 (19 Sep 2021 12:02)  T(F): 98.8 (20 Sep 2021 05:00), Max: 98.8 (19 Sep 2021 12:02)  HR: 100 (20 Sep 2021 05:00) (65 - 100)  BP: 152/67 (20 Sep 2021 05:00) (103/67 - 152/67)  BP(mean): 77 (19 Sep 2021 11:00) (77 - 80)  RR: 17 (20 Sep 2021 05:00) (10 - 21)  SpO2: 95% (20 Sep 2021 05:00) (93% - 99%)  I&O's Summary    19 Sep 2021 07:01  -  20 Sep 2021 07:00  --------------------------------------------------------  IN: 2201 mL / OUT: 1025 mL / NET: 1176 mL      I&O's Detail    19 Sep 2021 07:01  -  20 Sep 2021 07:00  --------------------------------------------------------  IN:    dextrose 5% + sodium chloride 0.45% w/ Additives: 2016 mL    IV PiggyBack: 75 mL    Oral Fluid: 110 mL  Total IN: 2201 mL    OUT:    Bulb (mL): 50 mL    Bulb (mL): 80 mL    Indwelling Catheter - Urethral (mL): 195 mL    Nasogastric/Oral tube (mL): 75 mL    Voided (mL): 625 mL  Total OUT: 1025 mL    Total NET: 1176 mL          Labs:                         8.5    8.42  )-----------( 169      ( 20 Sep 2021 07:09 )             27.2     09-20    140  |  106  |  6<L>  ----------------------------<  134<H>  3.7   |  23  |  0.73    Ca    8.7      20 Sep 2021 07:11  Phos  1.1     09-20  Mg     2.0     09-20    TPro  5.6<L>  /  Alb  2.9<L>  /  TBili  0.8  /  DBili  x   /  AST  52<H>  /  ALT  85<H>  /  AlkPhos  276<H>  09-20    PT/INR - ( 20 Sep 2021 07:11 )   PT: 13.1 sec;   INR: 1.10 ratio         PTT - ( 20 Sep 2021 07:11 )  PTT:33.9 sec      Physical Exam:  GEN: resting in bed comfortably in NAD.  RESP: no increased WOB  ABD: soft, non-distended, appropriately tender to palpation without rebound tenderness or guarding. Prevena vac in place holding suction. Drains x2 with serosanguinous output  EXTR: warm, well-perfused, no edema       POST OPERATIVE DAY #: s/p gregg POD 4    SUBJECTIVE: Patient had nausea and vomiting overnight. Abdominal Xr normal. Zofran and Reglan given, n/v resolved. This morning, she says she has not had much of an appetite, likely due to not liking the food she was ordered. Prevena vac was removed at bedside.      Vital Signs Last 24 Hrs  T(C): 37.1 (20 Sep 2021 05:00), Max: 37.1 (19 Sep 2021 12:02)  T(F): 98.8 (20 Sep 2021 05:00), Max: 98.8 (19 Sep 2021 12:02)  HR: 100 (20 Sep 2021 05:00) (65 - 100)  BP: 152/67 (20 Sep 2021 05:00) (103/67 - 152/67)  BP(mean): 77 (19 Sep 2021 11:00) (77 - 80)  RR: 17 (20 Sep 2021 05:00) (10 - 21)  SpO2: 95% (20 Sep 2021 05:00) (93% - 99%)  I&O's Summary    19 Sep 2021 07:01  -  20 Sep 2021 07:00  --------------------------------------------------------  IN: 2201 mL / OUT: 1025 mL / NET: 1176 mL      I&O's Detail    19 Sep 2021 07:01  -  20 Sep 2021 07:00  --------------------------------------------------------  IN:    dextrose 5% + sodium chloride 0.45% w/ Additives: 2016 mL    IV PiggyBack: 75 mL    Oral Fluid: 110 mL  Total IN: 2201 mL    OUT:    Bulb (mL): 50 mL    Bulb (mL): 80 mL    Indwelling Catheter - Urethral (mL): 195 mL    Nasogastric/Oral tube (mL): 75 mL    Voided (mL): 625 mL  Total OUT: 1025 mL    Total NET: 1176 mL          Labs:                         8.5    8.42  )-----------( 169      ( 20 Sep 2021 07:09 )             27.2     09-20    140  |  106  |  6<L>  ----------------------------<  134<H>  3.7   |  23  |  0.73    Ca    8.7      20 Sep 2021 07:11  Phos  1.1     09-20  Mg     2.0     09-20    TPro  5.6<L>  /  Alb  2.9<L>  /  TBili  0.8  /  DBili  x   /  AST  52<H>  /  ALT  85<H>  /  AlkPhos  276<H>  09-20    PT/INR - ( 20 Sep 2021 07:11 )   PT: 13.1 sec;   INR: 1.10 ratio         PTT - ( 20 Sep 2021 07:11 )  PTT:33.9 sec      Physical Exam:  GEN: resting in bed comfortably in NAD.  RESP: no increased WOB  ABD: soft, non-distended, appropriately tender to palpation without rebound tenderness or guarding. Drains x2 with serosanguinous output  EXTR: warm, well-perfused, no edema

## 2021-09-22 NOTE — PROVIDER CONTACT NOTE (OTHER) - SITUATION
Pt vomited 350cc- color noted as dark green. Pt states she now feels better after the emesis count. Pt vomited 350cc- color noted as light green. Pt states she now feels better after the emesis count.

## 2021-09-22 NOTE — PROGRESS NOTE ADULT - ASSESSMENT
76y Female with Hx of RA (on MTX), HLD, appendectomy, and L renal cell CA (s/p L nephrectomy 2019) who presented with transaminitis and an abnormal MRCP showing dilated PD and CBD, found to have a 1.7x1.8cm hypoechoic pancreatic head lesion on EUS on 9/9. She was taken to the OR on 9/17 for a Whipple on 9/17 and transferred to the SICU extubated post-operatively for hemodynamic monitoring. Now POD4. Her NGT is out and she is tolerating sips/chips. She passed her TOV. She is doing well.    Plan:  - DVT ppx  - CLD for breakfast. If tolerating and passing gas, advance to mechanical soft diet and IVL.  - Pain control as needed  - Prevena vac in place, to come off POD5 (9/22)  - Trend VIOLA amylase  -Continue to follow whipple pathway    Red Team Surgery  [id number]    76y Female with Hx of RA (on MTX), HLD, appendectomy, and L renal cell CA (s/p L nephrectomy 2019) who presented with transaminitis and an abnormal MRCP showing dilated PD and CBD, found to have a 1.7x1.8cm hypoechoic pancreatic head lesion on EUS on 9/9. She was taken to the OR on 9/17 for a Whipple on 9/17 and transferred to the SICU extubated post-operatively for hemodynamic monitoring. Now POD5, she had an episode bilious vomiting last night and one during the day.    Plan:  - DVT ppx  - Abdominal xray for evaluation after vomiting episodes  - CLD  - DC PCA; Oxy 5q4 for moderate pain; ibuprofen 400q6 standing; standing tylenol  - Trend VIOLA amylase  -Continue to follow whipple pathway    Red Team Surgery  [id number]

## 2021-09-22 NOTE — PROGRESS NOTE ADULT - SUBJECTIVE AND OBJECTIVE BOX
Patient is a 76y old  Female who presents with a chief complaint of     9/22/21  HPI:  Vomited today  Surgical oncology TERESSA blackmon, s/p gregg    PAST MEDICAL & SURGICAL HISTORY:  Arthritis  rheumatoid    S/P appendectomy  1969        Review of Systems:   CONSTITUTIONAL: No fever, weight loss, or fatigue  EYES: No eye pain, visual disturbances, or discharge  ENMT:  No difficulty hearing, tinnitus, vertigo; No sinus or throat pain  NECK: No pain or stiffness  BREASTS: No pain, masses, or nipple discharge  RESPIRATORY: No cough, wheezing, chills or hemoptysis; No shortness of breath  CARDIOVASCULAR: No chest pain, palpitations, dizziness, or leg swelling  GASTROINTESTINAL: Mild abdominal & epigastric pain. No nausea, vomiting, or hematemesis; No diarrhea or constipation. No melena or hematochezia.  GENITOURINARY: No dysuria, frequency, hematuria, or incontinence  NEUROLOGICAL: No headaches, memory loss, loss of strength, numbness, or tremors  SKIN: No itching, burning, rashes, or lesions   LYMPH NODES: No enlarged glands  ENDOCRINE: No heat or cold intolerance; No hair loss  MUSCULOSKELETAL: No joint pain or swelling; No muscle, back, or extremity pain  PSYCHIATRIC: No depression, anxiety, mood swings, or difficulty sleeping  HEME/LYMPH: No easy bruising, or bleeding gums  ALLERY AND IMMUNOLOGIC: No hives or eczema    Allergies    No Known Allergies    Intolerances        Social History:     FAMILY HISTORY:      MEDICATIONS  (STANDING):  heparin   Injectable 5000 Unit(s) SubCutaneous every 12 hours  influenza   Vaccine 0.5 milliLiter(s) IntraMuscular once  metoclopramide Injectable 5 milliGRAM(s) IV Push once  pantoprazole  Injectable 80 milliGRAM(s) IV Push once  pantoprazole Infusion 8 mG/Hr (10 mL/Hr) IV Continuous <Continuous>    MEDICATIONS  (PRN):  acetaminophen   Tablet .. 650 milliGRAM(s) Oral every 6 hours PRN Temp greater or equal to 38C (100.4F), Moderate Pain (4 - 6)  cholestyramine Powder (Sugar-Free) 4 Gram(s) Oral two times a day PRN Pruritis        CAPILLARY BLOOD GLUCOSE        I&O's Summary    08 Sep 2021 07:01  -  09 Sep 2021 07:00  --------------------------------------------------------  IN: 1380 mL / OUT: 0 mL / NET: 1380 mL    09 Sep 2021 07:01  -  09 Sep 2021 17:06  --------------------------------------------------------  IN: 0 mL / OUT: 0 mL / NET: 0 mL        PHYSICAL EXAM:  Vital Signs Last 24 Hrs  T(C): 36.3 (09 Sep 2021 16:05), Max: 36.8 (09 Sep 2021 04:06)  T(F): 97.3 (09 Sep 2021 16:05), Max: 98.3 (09 Sep 2021 08:48)  HR: 62 (09 Sep 2021 16:55) (61 - 76)  BP: 133/80 (09 Sep 2021 16:55) (110/66 - 148/72)  BP(mean): --  RR: 15 (09 Sep 2021 16:55) (15 - 20)  SpO2: 98% (09 Sep 2021 16:55) (97% - 99%)    GENERAL: NAD, well-developed  HEAD:  Atraumatic, Normocephalic  EYES: EOMI, PERRLA, conjunctiva and sclera clear  NECK: Supple, No JVD  CHEST/LUNG: Clear to auscultation bilaterally; No wheeze  HEART: Regular rate and rhythm; No murmurs, rubs, or gallops  ABDOMEN: Soft, Nontender, Nondistended; Bowel sounds present  EXTREMITIES:  2+ Peripheral Pulses, No clubbing, cyanosis, or edema  PSYCH: AAOx3  NEUROLOGY: non-focal  SKIN: No rashes or lesions    LABS:                        11.7   4.49  )-----------( 173      ( 09 Sep 2021 07:07 )             35.4     09-09    140  |  107  |  14  ----------------------------<  103<H>  3.8   |  19<L>  |  0.77    Ca    10.1      09 Sep 2021 07:07    TPro  6.9  /  Alb  3.6  /  TBili  1.5<H>  /  DBili  0.8<H>  /  AST  158<H>  /  ALT  275<H>  /  AlkPhos  758<H>  09-09    PT/INR - ( 09 Sep 2021 07:07 )   PT: 10.7 sec;   INR: 0.89 ratio                   RADIOLOGY & ADDITIONAL TESTS:    Imaging Personally Reviewed:    Consultant(s) Notes Reviewed:      Care Discussed with Consultants/Other Providers:

## 2021-09-23 LAB
ALBUMIN SERPL ELPH-MCNC: 3 G/DL — LOW (ref 3.3–5)
ALP SERPL-CCNC: 202 U/L — HIGH (ref 40–120)
ALT FLD-CCNC: 46 U/L — HIGH (ref 10–45)
AMYLASE FLD-CCNC: 7 U/L — SIGNIFICANT CHANGE UP
ANION GAP SERPL CALC-SCNC: 17 MMOL/L — SIGNIFICANT CHANGE UP (ref 5–17)
AST SERPL-CCNC: 24 U/L — SIGNIFICANT CHANGE UP (ref 10–40)
BILIRUB SERPL-MCNC: 0.7 MG/DL — SIGNIFICANT CHANGE UP (ref 0.2–1.2)
BUN SERPL-MCNC: 11 MG/DL — SIGNIFICANT CHANGE UP (ref 7–23)
CALCIUM SERPL-MCNC: 8.9 MG/DL — SIGNIFICANT CHANGE UP (ref 8.4–10.5)
CHLORIDE SERPL-SCNC: 97 MMOL/L — SIGNIFICANT CHANGE UP (ref 96–108)
CO2 SERPL-SCNC: 18 MMOL/L — LOW (ref 22–31)
CREAT SERPL-MCNC: 0.79 MG/DL — SIGNIFICANT CHANGE UP (ref 0.5–1.3)
GLUCOSE BLDC GLUCOMTR-MCNC: 117 MG/DL — HIGH (ref 70–99)
GLUCOSE BLDC GLUCOMTR-MCNC: 78 MG/DL — SIGNIFICANT CHANGE UP (ref 70–99)
GLUCOSE BLDC GLUCOMTR-MCNC: 79 MG/DL — SIGNIFICANT CHANGE UP (ref 70–99)
GLUCOSE BLDC GLUCOMTR-MCNC: 81 MG/DL — SIGNIFICANT CHANGE UP (ref 70–99)
GLUCOSE BLDC GLUCOMTR-MCNC: 83 MG/DL — SIGNIFICANT CHANGE UP (ref 70–99)
GLUCOSE BLDC GLUCOMTR-MCNC: 96 MG/DL — SIGNIFICANT CHANGE UP (ref 70–99)
GLUCOSE SERPL-MCNC: 88 MG/DL — SIGNIFICANT CHANGE UP (ref 70–99)
HCT VFR BLD CALC: 27.4 % — LOW (ref 34.5–45)
HGB BLD-MCNC: 8.8 G/DL — LOW (ref 11.5–15.5)
MAGNESIUM SERPL-MCNC: 2.1 MG/DL — SIGNIFICANT CHANGE UP (ref 1.6–2.6)
MCHC RBC-ENTMCNC: 32 PG — SIGNIFICANT CHANGE UP (ref 27–34)
MCHC RBC-ENTMCNC: 32.1 GM/DL — SIGNIFICANT CHANGE UP (ref 32–36)
MCV RBC AUTO: 99.6 FL — SIGNIFICANT CHANGE UP (ref 80–100)
NRBC # BLD: 0 /100 WBCS — SIGNIFICANT CHANGE UP (ref 0–0)
PHOSPHATE SERPL-MCNC: 3.1 MG/DL — SIGNIFICANT CHANGE UP (ref 2.5–4.5)
PLATELET # BLD AUTO: 297 K/UL — SIGNIFICANT CHANGE UP (ref 150–400)
POTASSIUM SERPL-MCNC: 4.2 MMOL/L — SIGNIFICANT CHANGE UP (ref 3.5–5.3)
POTASSIUM SERPL-SCNC: 4.2 MMOL/L — SIGNIFICANT CHANGE UP (ref 3.5–5.3)
PROT SERPL-MCNC: 5.8 G/DL — LOW (ref 6–8.3)
RBC # BLD: 2.75 M/UL — LOW (ref 3.8–5.2)
RBC # FLD: 14.8 % — HIGH (ref 10.3–14.5)
SODIUM SERPL-SCNC: 132 MMOL/L — LOW (ref 135–145)
WBC # BLD: 10.21 K/UL — SIGNIFICANT CHANGE UP (ref 3.8–10.5)
WBC # FLD AUTO: 10.21 K/UL — SIGNIFICANT CHANGE UP (ref 3.8–10.5)

## 2021-09-23 RX ORDER — ACETAMINOPHEN 500 MG
650 TABLET ORAL EVERY 6 HOURS
Refills: 0 | Status: DISCONTINUED | OUTPATIENT
Start: 2021-09-23 | End: 2021-09-26

## 2021-09-23 RX ORDER — ERYTHROMYCIN ETHYLSUCCINATE 400 MG
250 TABLET ORAL EVERY 6 HOURS
Refills: 0 | Status: DISCONTINUED | OUTPATIENT
Start: 2021-09-23 | End: 2021-09-23

## 2021-09-23 RX ORDER — ERYTHROMYCIN ETHYLSUCCINATE 400 MG
200 TABLET ORAL EVERY 8 HOURS
Refills: 0 | Status: DISCONTINUED | OUTPATIENT
Start: 2021-09-23 | End: 2021-10-03

## 2021-09-23 RX ADMIN — ENOXAPARIN SODIUM 40 MILLIGRAM(S): 100 INJECTION SUBCUTANEOUS at 12:57

## 2021-09-23 RX ADMIN — Medication 250 MILLIGRAM(S): at 14:56

## 2021-09-23 RX ADMIN — CHLORHEXIDINE GLUCONATE 1 APPLICATION(S): 213 SOLUTION TOPICAL at 13:02

## 2021-09-23 RX ADMIN — Medication 10 MILLIGRAM(S): at 06:14

## 2021-09-23 RX ADMIN — Medication 10 MILLIGRAM(S): at 22:43

## 2021-09-23 RX ADMIN — PANTOPRAZOLE SODIUM 40 MILLIGRAM(S): 20 TABLET, DELAYED RELEASE ORAL at 06:14

## 2021-09-23 RX ADMIN — PANTOPRAZOLE SODIUM 40 MILLIGRAM(S): 20 TABLET, DELAYED RELEASE ORAL at 18:53

## 2021-09-23 RX ADMIN — Medication 10 MILLIGRAM(S): at 14:54

## 2021-09-23 RX ADMIN — Medication 250 MILLIGRAM(S): at 22:42

## 2021-09-23 NOTE — PROVIDER CONTACT NOTE (OTHER) - RECOMMENDATIONS
Possible NG tube for decompression and/or CT Abd/Pel to r/o SBO. Educate pt on risk of aspiration and keeping head of bed above 30 degrees

## 2021-09-23 NOTE — PROGRESS NOTE ADULT - SUBJECTIVE AND OBJECTIVE BOX
POST OPERATIVE DAY #: s/p gregg POD 5    SUBJECTIVE: Patient had nausea and vomiting overnight.      Vital Signs Last 24 Hrs  T(C): 37.1 (20 Sep 2021 05:00), Max: 37.1 (19 Sep 2021 12:02)  T(F): 98.8 (20 Sep 2021 05:00), Max: 98.8 (19 Sep 2021 12:02)  HR: 100 (20 Sep 2021 05:00) (65 - 100)  BP: 152/67 (20 Sep 2021 05:00) (103/67 - 152/67)  BP(mean): 77 (19 Sep 2021 11:00) (77 - 80)  RR: 17 (20 Sep 2021 05:00) (10 - 21)  SpO2: 95% (20 Sep 2021 05:00) (93% - 99%)  I&O's Summary    19 Sep 2021 07:01  -  20 Sep 2021 07:00  --------------------------------------------------------  IN: 2201 mL / OUT: 1025 mL / NET: 1176 mL      I&O's Detail    19 Sep 2021 07:01  -  20 Sep 2021 07:00  --------------------------------------------------------  IN:    dextrose 5% + sodium chloride 0.45% w/ Additives: 2016 mL    IV PiggyBack: 75 mL    Oral Fluid: 110 mL  Total IN: 2201 mL    OUT:    Bulb (mL): 50 mL    Bulb (mL): 80 mL    Indwelling Catheter - Urethral (mL): 195 mL    Nasogastric/Oral tube (mL): 75 mL    Voided (mL): 625 mL  Total OUT: 1025 mL    Total NET: 1176 mL          Labs:                         8.5    8.42  )-----------( 169      ( 20 Sep 2021 07:09 )             27.2     09-20    140  |  106  |  6<L>  ----------------------------<  134<H>  3.7   |  23  |  0.73    Ca    8.7      20 Sep 2021 07:11  Phos  1.1     09-20  Mg     2.0     09-20    TPro  5.6<L>  /  Alb  2.9<L>  /  TBili  0.8  /  DBili  x   /  AST  52<H>  /  ALT  85<H>  /  AlkPhos  276<H>  09-20    PT/INR - ( 20 Sep 2021 07:11 )   PT: 13.1 sec;   INR: 1.10 ratio         PTT - ( 20 Sep 2021 07:11 )  PTT:33.9 sec      Physical Exam:  GEN: resting in bed comfortably in NAD.  RESP: no increased WOB  ABD: soft, non-distended, appropriately tender to palpation without rebound tenderness or guarding. Drains x2 with serosanguinous output  EXTR: warm, well-perfused, no edema       POST OPERATIVE DAY #: s/p gregg POD 5    SUBJECTIVE: Patient had nausea and vomiting overnight. Patient seen at bedside this morning. Not currently N/V. Expressed low appetite with food being offered. We encouraged her to eat food she is comfortable with from him.       Vital Signs Last 24 Hrs  T(C): 37.1 (20 Sep 2021 05:00), Max: 37.1 (19 Sep 2021 12:02)  T(F): 98.8 (20 Sep 2021 05:00), Max: 98.8 (19 Sep 2021 12:02)  HR: 100 (20 Sep 2021 05:00) (65 - 100)  BP: 152/67 (20 Sep 2021 05:00) (103/67 - 152/67)  BP(mean): 77 (19 Sep 2021 11:00) (77 - 80)  RR: 17 (20 Sep 2021 05:00) (10 - 21)  SpO2: 95% (20 Sep 2021 05:00) (93% - 99%)  I&O's Summary    19 Sep 2021 07:01  -  20 Sep 2021 07:00  --------------------------------------------------------  IN: 2201 mL / OUT: 1025 mL / NET: 1176 mL      I&O's Detail    19 Sep 2021 07:01  -  20 Sep 2021 07:00  --------------------------------------------------------  IN:    dextrose 5% + sodium chloride 0.45% w/ Additives: 2016 mL    IV PiggyBack: 75 mL    Oral Fluid: 110 mL  Total IN: 2201 mL    OUT:    Bulb (mL): 50 mL    Bulb (mL): 80 mL    Indwelling Catheter - Urethral (mL): 195 mL    Nasogastric/Oral tube (mL): 75 mL    Voided (mL): 625 mL  Total OUT: 1025 mL    Total NET: 1176 mL          Labs:                         8.5    8.42  )-----------( 169      ( 20 Sep 2021 07:09 )             27.2     09-20    140  |  106  |  6<L>  ----------------------------<  134<H>  3.7   |  23  |  0.73    Ca    8.7      20 Sep 2021 07:11  Phos  1.1     09-20  Mg     2.0     09-20    TPro  5.6<L>  /  Alb  2.9<L>  /  TBili  0.8  /  DBili  x   /  AST  52<H>  /  ALT  85<H>  /  AlkPhos  276<H>  09-20    PT/INR - ( 20 Sep 2021 07:11 )   PT: 13.1 sec;   INR: 1.10 ratio         PTT - ( 20 Sep 2021 07:11 )  PTT:33.9 sec      Physical Exam:  GEN: resting in bed comfortably in NAD.  RESP: no increased WOB  ABD: soft, non-distended, appropriately tender to palpation without rebound tenderness or guarding. VIOLA (right) with serosanguinous output  EXTR: warm, well-perfused, no edema

## 2021-09-23 NOTE — PROGRESS NOTE ADULT - ASSESSMENT
76y Female with Hx of RA (on MTX), HLD, appendectomy, and L renal cell CA (s/p L nephrectomy 2019) who presented with transaminitis and an abnormal MRCP showing dilated PD and CBD, found to have a 1.7x1.8cm hypoechoic pancreatic head lesion on EUS on 9/9. She was taken to the OR on 9/17 for a Whipple on 9/17 and transferred to the SICU extubated post-operatively for hemodynamic monitoring. Now POD5, she had an episode bilious vomiting last night and one during the day.    Plan:  - DVT ppx  - Abdominal xray for evaluation after vomiting episodes  - CLD  - DC PCA; Oxy 5q4 for moderate pain; ibuprofen 400q6 standing; standing tylenol  - Trend VIOLA amylase  -Continue to follow whipple pathway    Red Team Surgery  [id number]    76y Female with Hx of RA (on MTX), HLD, appendectomy, and L renal cell CA (s/p L nephrectomy 2019) who presented with transaminitis and an abnormal MRCP showing dilated PD and CBD, found to have a 1.7x1.8cm hypoechoic pancreatic head lesion on EUS on 9/9. She was taken to the OR on 9/17 for a Whipple on 9/17 and transferred to the SICU extubated post-operatively for hemodynamic monitoring. Now POD6, she has been nauseaus/vomiting with hospital food.     Plan:  - Change erythromycin to IV  - PO tylenol  - Check amylase from remaining VIOLA and DC if low  - DVT ppx  - Abdominal xray for evaluation after vomiting episodes  - CLD  - DC PCA; Oxy 5q4 for moderate pain; ibuprofen 400q6 standing; standing tylenol  - Trend VIOLA amylase  -Continue to follow whipple pathway    Patient seen and discussed with Dr. Rainey this morning.    Red Team Surgery  [id number]

## 2021-09-23 NOTE — PROGRESS NOTE ADULT - SUBJECTIVE AND OBJECTIVE BOX
C A R D I O L O G Y  **********************************     DATE OF SERVICE: 09-23-21    Vomiting overnight, feeling better now. Denies chest pain or shortness of breath.   Review of systems otherwise (-)      MEDICATIONS  (STANDING):  acetaminophen   Tablet .. 650 milliGRAM(s) Oral every 6 hours  chlorhexidine 2% Cloths 1 Application(s) Topical <User Schedule>  enoxaparin Injectable 40 milliGRAM(s) SubCutaneous daily  erythromycin   IVPB 200 milliGRAM(s) IV Intermittent every 8 hours  ibuprofen  Tablet. 400 milliGRAM(s) Oral every 6 hours  influenza   Vaccine 0.5 milliLiter(s) IntraMuscular once  insulin lispro (ADMELOG) corrective regimen sliding scale   SubCutaneous every 4 hours  metoclopramide Injectable 10 milliGRAM(s) IV Push three times a day  pantoprazole  Injectable 40 milliGRAM(s) IV Push two times a day    MEDICATIONS  (PRN):  artificial tears (preservative free) Ophthalmic Solution 1 Drop(s) Both EYES two times a day PRN Dry Eyes  ondansetron    Tablet 4 milliGRAM(s) Oral every 6 hours PRN Nausea  oxyCODONE    IR 5 milliGRAM(s) Oral every 4 hours PRN Moderate Pain (4 - 6)      LABS:                          8.8    10.21 )-----------( 297      ( 23 Sep 2021 07:09 )             27.4     Hemoglobin: 8.8 g/dL (09-23 @ 07:09)  Hemoglobin: 9.3 g/dL (09-22 @ 07:05)  Hemoglobin: 8.2 g/dL (09-21 @ 04:56)  Hemoglobin: 8.5 g/dL (09-20 @ 07:09)  Hemoglobin: 9.6 g/dL (09-18 @ 22:25)    09-23    132<L>  |  97  |  11  ----------------------------<  88  4.2   |  18<L>  |  0.79    Ca    8.9      23 Sep 2021 07:08  Phos  3.1     09-23  Mg     2.1     09-23    TPro  5.8<L>  /  Alb  3.0<L>  /  TBili  0.7  /  DBili  x   /  AST  24  /  ALT  46<H>  /  AlkPhos  202<H>  09-23    Creatinine Trend: 0.79<--, 0.76<--, 0.65<--, 0.65<--, 0.68<--, 0.73<--             09-22-21 @ 07:01  -  09-23-21 @ 07:00  --------------------------------------------------------  IN: 670 mL / OUT: 1205 mL / NET: -535 mL        PHYSICAL EXAM  Vital Signs Last 24 Hrs  T(C): 36.8 (23 Sep 2021 08:57), Max: 37.3 (22 Sep 2021 16:48)  T(F): 98.2 (23 Sep 2021 08:57), Max: 99.1 (22 Sep 2021 16:48)  HR: 87 (23 Sep 2021 08:57) (77 - 91)  BP: 155/73 (23 Sep 2021 08:57) (115/74 - 155/73)  BP(mean): --  RR: 18 (23 Sep 2021 08:57) (18 - 18)  SpO2: 97% (23 Sep 2021 08:57) (95% - 100%)      Gen: Appears well in NAD  HEENT:  (-)icterus (-)pallor  CV: N S1 S2 1/6 PATRICIA (+)2 Pulses B/l  Resp:  Clear to auscultation B/L, normal effort  GI: (+) BS Soft, NT, ND  Lymph:  (-)Edema, (-)obvious lymphadenopathy  Skin: Warm to touch, Normal turgor  Psych: Appropriate mood and affect    TELEMETRY: None	      ASSESSMENT/PLAN: 76y Female with Hx of RA (on MTX), HLD, appendectomy, and L renal cell CA (s/p L nephrectomy 2019) who presented with transaminitis and an abnormal MRCP showing dilated PD and CBD, found to have a 1.7x1.8cm hypoechoic pancreatic head lesion on EUS on 9/9. She was taken to the OR for a Whipple on 9/17.    - Remains stable from CV perspective  - No evidence of clinical HF or anginal symptoms  - TTE noted with normal LV function  - Surgery follow up noted  - No further inpatient cardiac w/u planned    Kyrie Torres PA-C  Pager: [phone]

## 2021-09-24 LAB
ALBUMIN SERPL ELPH-MCNC: 3 G/DL — LOW (ref 3.3–5)
ALP SERPL-CCNC: 182 U/L — HIGH (ref 40–120)
ALT FLD-CCNC: 37 U/L — SIGNIFICANT CHANGE UP (ref 10–45)
ANION GAP SERPL CALC-SCNC: 20 MMOL/L — HIGH (ref 5–17)
AST SERPL-CCNC: 20 U/L — SIGNIFICANT CHANGE UP (ref 10–40)
BILIRUB SERPL-MCNC: 0.6 MG/DL — SIGNIFICANT CHANGE UP (ref 0.2–1.2)
BUN SERPL-MCNC: 12 MG/DL — SIGNIFICANT CHANGE UP (ref 7–23)
CALCIUM SERPL-MCNC: 8.3 MG/DL — LOW (ref 8.4–10.5)
CHLORIDE SERPL-SCNC: 100 MMOL/L — SIGNIFICANT CHANGE UP (ref 96–108)
CO2 SERPL-SCNC: 14 MMOL/L — LOW (ref 22–31)
CREAT SERPL-MCNC: 0.68 MG/DL — SIGNIFICANT CHANGE UP (ref 0.5–1.3)
GLUCOSE BLDC GLUCOMTR-MCNC: 80 MG/DL — SIGNIFICANT CHANGE UP (ref 70–99)
GLUCOSE BLDC GLUCOMTR-MCNC: 81 MG/DL — SIGNIFICANT CHANGE UP (ref 70–99)
GLUCOSE BLDC GLUCOMTR-MCNC: 82 MG/DL — SIGNIFICANT CHANGE UP (ref 70–99)
GLUCOSE BLDC GLUCOMTR-MCNC: 85 MG/DL — SIGNIFICANT CHANGE UP (ref 70–99)
GLUCOSE BLDC GLUCOMTR-MCNC: 86 MG/DL — SIGNIFICANT CHANGE UP (ref 70–99)
GLUCOSE BLDC GLUCOMTR-MCNC: 91 MG/DL — SIGNIFICANT CHANGE UP (ref 70–99)
GLUCOSE SERPL-MCNC: 84 MG/DL — SIGNIFICANT CHANGE UP (ref 70–99)
HCT VFR BLD CALC: 27.3 % — LOW (ref 34.5–45)
HGB BLD-MCNC: 8.8 G/DL — LOW (ref 11.5–15.5)
MAGNESIUM SERPL-MCNC: 2 MG/DL — SIGNIFICANT CHANGE UP (ref 1.6–2.6)
MCHC RBC-ENTMCNC: 32.2 GM/DL — SIGNIFICANT CHANGE UP (ref 32–36)
MCHC RBC-ENTMCNC: 32.2 PG — SIGNIFICANT CHANGE UP (ref 27–34)
MCV RBC AUTO: 100 FL — SIGNIFICANT CHANGE UP (ref 80–100)
NRBC # BLD: 0 /100 WBCS — SIGNIFICANT CHANGE UP (ref 0–0)
PHOSPHATE SERPL-MCNC: 2.7 MG/DL — SIGNIFICANT CHANGE UP (ref 2.5–4.5)
PLATELET # BLD AUTO: 329 K/UL — SIGNIFICANT CHANGE UP (ref 150–400)
POTASSIUM SERPL-MCNC: 4 MMOL/L — SIGNIFICANT CHANGE UP (ref 3.5–5.3)
POTASSIUM SERPL-SCNC: 4 MMOL/L — SIGNIFICANT CHANGE UP (ref 3.5–5.3)
PROT SERPL-MCNC: 5.5 G/DL — LOW (ref 6–8.3)
RBC # BLD: 2.73 M/UL — LOW (ref 3.8–5.2)
RBC # FLD: 14.6 % — HIGH (ref 10.3–14.5)
SODIUM SERPL-SCNC: 134 MMOL/L — LOW (ref 135–145)
WBC # BLD: 9.42 K/UL — SIGNIFICANT CHANGE UP (ref 3.8–10.5)
WBC # FLD AUTO: 9.42 K/UL — SIGNIFICANT CHANGE UP (ref 3.8–10.5)

## 2021-09-24 RX ORDER — ACETAMINOPHEN 500 MG
750 TABLET ORAL ONCE
Refills: 0 | Status: COMPLETED | OUTPATIENT
Start: 2021-09-24 | End: 2021-09-24

## 2021-09-24 RX ORDER — SODIUM CHLORIDE 9 MG/ML
1000 INJECTION, SOLUTION INTRAVENOUS ONCE
Refills: 0 | Status: COMPLETED | OUTPATIENT
Start: 2021-09-24 | End: 2021-09-24

## 2021-09-24 RX ORDER — ACETAMINOPHEN 500 MG
1000 TABLET ORAL ONCE
Refills: 0 | Status: DISCONTINUED | OUTPATIENT
Start: 2021-09-24 | End: 2021-09-24

## 2021-09-24 RX ADMIN — Medication 250 MILLIGRAM(S): at 06:30

## 2021-09-24 RX ADMIN — Medication 300 MILLIGRAM(S): at 01:15

## 2021-09-24 RX ADMIN — Medication 400 MILLIGRAM(S): at 18:39

## 2021-09-24 RX ADMIN — Medication 750 MILLIGRAM(S): at 01:45

## 2021-09-24 RX ADMIN — Medication 400 MILLIGRAM(S): at 12:30

## 2021-09-24 RX ADMIN — Medication 10 MILLIGRAM(S): at 13:49

## 2021-09-24 RX ADMIN — PANTOPRAZOLE SODIUM 40 MILLIGRAM(S): 20 TABLET, DELAYED RELEASE ORAL at 18:42

## 2021-09-24 RX ADMIN — Medication 250 MILLIGRAM(S): at 13:49

## 2021-09-24 RX ADMIN — SODIUM CHLORIDE 1000 MILLILITER(S): 9 INJECTION, SOLUTION INTRAVENOUS at 08:51

## 2021-09-24 RX ADMIN — CHLORHEXIDINE GLUCONATE 1 APPLICATION(S): 213 SOLUTION TOPICAL at 08:51

## 2021-09-24 RX ADMIN — PANTOPRAZOLE SODIUM 40 MILLIGRAM(S): 20 TABLET, DELAYED RELEASE ORAL at 06:30

## 2021-09-24 RX ADMIN — Medication 10 MILLIGRAM(S): at 21:44

## 2021-09-24 RX ADMIN — ENOXAPARIN SODIUM 40 MILLIGRAM(S): 100 INJECTION SUBCUTANEOUS at 12:32

## 2021-09-24 RX ADMIN — Medication 10 MILLIGRAM(S): at 06:30

## 2021-09-24 RX ADMIN — Medication 250 MILLIGRAM(S): at 21:55

## 2021-09-24 NOTE — PROGRESS NOTE ADULT - SUBJECTIVE AND OBJECTIVE BOX
POST OPERATIVE DAY #: s/p gregg POD 6    SUBJECTIVE: P       Vital Signs Last 24 Hrs  T(C): 37.1 (20 Sep 2021 05:00), Max: 37.1 (19 Sep 2021 12:02)  T(F): 98.8 (20 Sep 2021 05:00), Max: 98.8 (19 Sep 2021 12:02)  HR: 100 (20 Sep 2021 05:00) (65 - 100)  BP: 152/67 (20 Sep 2021 05:00) (103/67 - 152/67)  BP(mean): 77 (19 Sep 2021 11:00) (77 - 80)  RR: 17 (20 Sep 2021 05:00) (10 - 21)  SpO2: 95% (20 Sep 2021 05:00) (93% - 99%)  I&O's Summary    19 Sep 2021 07:01  -  20 Sep 2021 07:00  --------------------------------------------------------  IN: 2201 mL / OUT: 1025 mL / NET: 1176 mL      I&O's Detail    19 Sep 2021 07:01  -  20 Sep 2021 07:00  --------------------------------------------------------  IN:    dextrose 5% + sodium chloride 0.45% w/ Additives: 2016 mL    IV PiggyBack: 75 mL    Oral Fluid: 110 mL  Total IN: 2201 mL    OUT:    Bulb (mL): 50 mL    Bulb (mL): 80 mL    Indwelling Catheter - Urethral (mL): 195 mL    Nasogastric/Oral tube (mL): 75 mL    Voided (mL): 625 mL  Total OUT: 1025 mL    Total NET: 1176 mL          Labs:                         8.5    8.42  )-----------( 169      ( 20 Sep 2021 07:09 )             27.2     09-20    140  |  106  |  6<L>  ----------------------------<  134<H>  3.7   |  23  |  0.73    Ca    8.7      20 Sep 2021 07:11  Phos  1.1     09-20  Mg     2.0     09-20    TPro  5.6<L>  /  Alb  2.9<L>  /  TBili  0.8  /  DBili  x   /  AST  52<H>  /  ALT  85<H>  /  AlkPhos  276<H>  09-20    PT/INR - ( 20 Sep 2021 07:11 )   PT: 13.1 sec;   INR: 1.10 ratio         PTT - ( 20 Sep 2021 07:11 )  PTT:33.9 sec      Physical Exam:  GEN: resting in bed comfortably in NAD.  RESP: no increased WOB  ABD: soft, non-distended, appropriately tender to palpation without rebound tenderness or guarding. VIOLA (right) with serosanguinous output  EXTR: warm, well-perfused, no edema       POST OPERATIVE DAY #: s/p gregg POD 7    24h events: VIOLA removed yest. 1x emesis   SUBJECTIVE: Pt noted to be resting in bed comfortably. Inquiring about discharge. Patient nauseous approx 530am, 1x small amount of emesis.    Vital Signs Last 24 Hrs  T(C): 37.1 (20 Sep 2021 05:00), Max: 37.1 (19 Sep 2021 12:02)  T(F): 98.8 (20 Sep 2021 05:00), Max: 98.8 (19 Sep 2021 12:02)  HR: 100 (20 Sep 2021 05:00) (65 - 100)  BP: 152/67 (20 Sep 2021 05:00) (103/67 - 152/67)  BP(mean): 77 (19 Sep 2021 11:00) (77 - 80)  RR: 17 (20 Sep 2021 05:00) (10 - 21)  SpO2: 95% (20 Sep 2021 05:00) (93% - 99%)  I&O's Summary    19 Sep 2021 07:01  -  20 Sep 2021 07:00  --------------------------------------------------------  IN: 2201 mL / OUT: 1025 mL / NET: 1176 mL    I&O's Detail  19 Sep 2021 07:01  -  20 Sep 2021 07:00  --------------------------------------------------------  IN:    dextrose 5% + sodium chloride 0.45% w/ Additives: 2016 mL    IV PiggyBack: 75 mL    Oral Fluid: 110 mL  Total IN: 2201 mL    OUT:    Bulb (mL): 50 mL    Bulb (mL): 80 mL    Indwelling Catheter - Urethral (mL): 195 mL    Nasogastric/Oral tube (mL): 75 mL    Voided (mL): 625 mL  Total OUT: 1025 mL  Total NET: 1176 mL  Labs:                         8.5    8.42  )-----------( 169      ( 20 Sep 2021 07:09 )             27.2     09-20    140  |  106  |  6<L>  ----------------------------<  134<H>  3.7   |  23  |  0.73    Ca    8.7      20 Sep 2021 07:11  Phos  1.1     09-20  Mg     2.0     09-20    TPro  5.6<L>  /  Alb  2.9<L>  /  TBili  0.8  /  DBili  x   /  AST  52<H>  /  ALT  85<H>  /  AlkPhos  276<H>  09-20  PT/INR - ( 20 Sep 2021 07:11 )   PT: 13.1 sec;   INR: 1.10 ratio    PTT - ( 20 Sep 2021 07:11 )  PTT:33.9 sec    Physical Exam:  GEN: resting in bed comfortably in NAD.  RESP: no increased WOB  ABD: soft, non-distended, minimally tender without rebound tenderness or guarding.   EXTR: warm, well-perfused, no edema

## 2021-09-24 NOTE — PROGRESS NOTE ADULT - ASSESSMENT
76y old  Female who presents with a chief complaint of Abdominal Pains/p resection of the pancreatic mass, will recommend:    - continue Rx as per medicine, surgical onc  - control of pain and nausea  - DVT prophylaxis - on lovenox  - all other supportive Rx  - f/u on final pathology  - no evidence of def of iron B12, folate, and no hemolysis  - to f/u as outpt after discharge      for questions, please call [phone]

## 2021-09-24 NOTE — PROGRESS NOTE ADULT - SUBJECTIVE AND OBJECTIVE BOX
C A R [facility]  **********************************     DATE OF SERVICE: 09-24-21    Denies chest pain or shortness of breath.   Review of systems otherwise (-)      MEDICATIONS  (STANDING):  acetaminophen   Tablet .. 650 milliGRAM(s) Oral every 6 hours  chlorhexidine 2% Cloths 1 Application(s) Topical <User Schedule>  enoxaparin Injectable 40 milliGRAM(s) SubCutaneous daily  erythromycin   IVPB 200 milliGRAM(s) IV Intermittent every 8 hours  ibuprofen  Tablet. 400 milliGRAM(s) Oral every 6 hours  influenza   Vaccine 0.5 milliLiter(s) IntraMuscular once  insulin lispro (ADMELOG) corrective regimen sliding scale   SubCutaneous every 4 hours  metoclopramide Injectable 10 milliGRAM(s) IV Push three times a day  pantoprazole  Injectable 40 milliGRAM(s) IV Push two times a day    MEDICATIONS  (PRN):  artificial tears (preservative free) Ophthalmic Solution 1 Drop(s) Both EYES two times a day PRN Dry Eyes  ondansetron    Tablet 4 milliGRAM(s) Oral every 6 hours PRN Nausea  oxyCODONE    IR 5 milliGRAM(s) Oral every 4 hours PRN Moderate Pain (4 - 6)      LABS:                          8.8    9.42  )-----------( 329      ( 24 Sep 2021 05:33 )             27.3     Hemoglobin: 8.8 g/dL (09-24 @ 05:33)  Hemoglobin: 8.8 g/dL (09-23 @ 07:09)  Hemoglobin: 9.3 g/dL (09-22 @ 07:05)  Hemoglobin: 8.2 g/dL (09-21 @ 04:56)  Hemoglobin: 8.5 g/dL (09-20 @ 07:09)    09-24    134<L>  |  100  |  12  ----------------------------<  84  4.0   |  14<L>  |  0.68    Ca    8.3<L>      24 Sep 2021 05:33  Phos  2.7     09-24  Mg     2.0     09-24    TPro  5.5<L>  /  Alb  3.0<L>  /  TBili  0.6  /  DBili  x   /  AST  20  /  ALT  37  /  AlkPhos  182<H>  09-24    Creatinine Trend: 0.68<--, 0.79<--, 0.76<--, 0.65<--, 0.65<--, 0.68<--             09-23-21 @ 07:01  -  09-24-21 @ 07:00  --------------------------------------------------------  IN: 350 mL / OUT: 740 mL / NET: -390 mL    09-24-21 @ 07:01  -  09-24-21 @ 17:19  --------------------------------------------------------  IN: 700 mL / OUT: 1000 mL / NET: -300 mL        PHYSICAL EXAM  Vital Signs Last 24 Hrs  T(C): 36.9 (24 Sep 2021 16:18), Max: 37 (23 Sep 2021 17:45)  T(F): 98.4 (24 Sep 2021 16:18), Max: 98.6 (23 Sep 2021 17:45)  HR: 82 (24 Sep 2021 16:18) (82 - 102)  BP: 135/76 (24 Sep 2021 16:18) (116/76 - 135/76)  BP(mean): --  RR: 18 (24 Sep 2021 16:18) (18 - 18)  SpO2: 98% (24 Sep 2021 16:18) (96% - 99%)        Gen: Appears well in NAD  HEENT:  (-)icterus (-)pallor  CV: N S1 S2 1/6 PATRICIA (+)2 Pulses B/l  Resp:  Clear to auscultation B/L, normal effort  GI: (+) BS Soft, NT, ND  Lymph:  (-)Edema, (-)obvious lymphadenopathy  Skin: Warm to touch, Normal turgor  Psych: Appropriate mood and affect    TELEMETRY: None	      ASSESSMENT/PLAN: 76y Female with Hx of RA (on MTX), HLD, appendectomy, and L renal cell CA (s/p L nephrectomy 2019) who presented with transaminitis and an abnormal MRCP showing dilated PD and CBD, found to have a 1.7x1.8cm hypoechoic pancreatic head lesion on EUS on 9/9. She was taken to the OR for a Whipple on 9/17.    - No evidence of clinical HF or anginal symptoms  - TTE noted with normal LV function  - Surgery follow up  - No further inpatient cardiac w/u planned    Kyrie Torres PA-C  Pager: [phone]

## 2021-09-24 NOTE — PROGRESS NOTE ADULT - ATTENDING COMMENTS
S/p whipple.  Mild gastroparesis. Drain removed    Give 1 L fluid bolus    Clears to reg diet as tolerated    Updated pt son Lorne - he will bring food from home

## 2021-09-24 NOTE — PROGRESS NOTE ADULT - SUBJECTIVE AND OBJECTIVE BOX
Events since last visit noted, pt is doing much better but still has nausea as the symptom and some pain in the abdomen. No fevers or chills and a detailed ROS is otherwise unremarkable to unchanged.         Meds:  acetaminophen   Tablet .. 650 milliGRAM(s) Oral every 6 hours  artificial tears (preservative free) Ophthalmic Solution 1 Drop(s) Both EYES two times a day PRN  chlorhexidine 2% Cloths 1 Application(s) Topical <User Schedule>  enoxaparin Injectable 40 milliGRAM(s) SubCutaneous daily  erythromycin   IVPB 200 milliGRAM(s) IV Intermittent every 8 hours  ibuprofen  Tablet. 400 milliGRAM(s) Oral every 6 hours  influenza   Vaccine 0.5 milliLiter(s) IntraMuscular once  insulin lispro (ADMELOG) corrective regimen sliding scale   SubCutaneous every 4 hours  metoclopramide Injectable 10 milliGRAM(s) IV Push three times a day  ondansetron    Tablet 4 milliGRAM(s) Oral every 6 hours PRN  oxyCODONE    IR 5 milliGRAM(s) Oral every 4 hours PRN  pantoprazole  Injectable 40 milliGRAM(s) IV Push two times a day      Vital Signs Last 24 Hrs  T(C): 36.9 (24 Sep 2021 16:18), Max: 37 (23 Sep 2021 17:45)  T(F): 98.4 (24 Sep 2021 16:18), Max: 98.6 (23 Sep 2021 17:45)  HR: 82 (24 Sep 2021 16:18) (82 - 102)  BP: 135/76 (24 Sep 2021 16:18) (116/76 - 135/76)  BP(mean): --  RR: 18 (24 Sep 2021 16:18) (18 - 18)  SpO2: 98% (24 Sep 2021 16:18) (96% - 99%)                          8.8    9.42  )-----------( 329      ( 24 Sep 2021 05:33 )             27.3       09-24    134<L>  |  100  |  12  ----------------------------<  84  4.0   |  14<L>  |  0.68    Ca    8.3<L>      24 Sep 2021 05:33  Phos  2.7     09-24  Mg     2.0     09-24    TPro  5.5<L>  /  Alb  3.0<L>  /  TBili  0.6  /  DBili  x   /  AST  20  /  ALT  37  /  AlkPhos  182<H>  09-24            Path: pending

## 2021-09-24 NOTE — PROGRESS NOTE ADULT - ASSESSMENT
76y Female with Hx of RA (on MTX), HLD, appendectomy, and L renal cell CA (s/p L nephrectomy 2019) who presented with transaminitis and an abnormal MRCP showing dilated PD and CBD, found to have a 1.7x1.8cm hypoechoic pancreatic head lesion on EUS on 9/9. She was taken to the OR on 9/17 for a Whipple on 9/17 and transferred to the SICU extubated post-operatively for hemodynamic monitoring. Now POD6, she has been nauseaus/vomiting with hospital food.     Plan:  - Change erythromycin to IV  - PO tylenol  - Check amylase from remaining VIOLA and DC if low  - DVT ppx  - Abdominal xray for evaluation after vomiting episodes  - CLD  - DC PCA; Oxy 5q4 for moderate pain; ibuprofen 400q6 standing; standing tylenol  - Trend VIOLA amylase  -Continue to follow whipple pathway    Patient seen and discussed with Dr. Rainey this morning.    Red Team Surgery  [id number]    76y Female with Hx of RA (on MTX), HLD, appendectomy, and L renal cell CA (s/p L nephrectomy 2019) who presented with transaminitis and an abnormal MRCP showing dilated PD and CBD, found to have a 1.7x1.8cm hypoechoic pancreatic head lesion on EUS on 9/9. She was taken to the OR on 9/17 for a Whipple on 9/17 and transferred to the SICU extubated post-operatively for hemodynamic monitoring. Now POD7, she has been nauseaus/vomiting with hospital food.     Plan:  - Continue to monitor abd/food tolerance  - IV erythromycin  - DVT ppx  - CLD  - Pain relief: Oxy 5q4 for moderate pain; ibuprofen 400q6 standing; standing tylenol  - Continue to follow whipple pathway    Patient seen and discussed with Dr. Rainey this morning.    Red Team Surgery  [id number]

## 2021-09-25 LAB
GLUCOSE BLDC GLUCOMTR-MCNC: 102 MG/DL — HIGH (ref 70–99)
GLUCOSE BLDC GLUCOMTR-MCNC: 54 MG/DL — CRITICAL LOW (ref 70–99)
GLUCOSE BLDC GLUCOMTR-MCNC: 73 MG/DL — SIGNIFICANT CHANGE UP (ref 70–99)
GLUCOSE BLDC GLUCOMTR-MCNC: 81 MG/DL — SIGNIFICANT CHANGE UP (ref 70–99)
GLUCOSE BLDC GLUCOMTR-MCNC: 84 MG/DL — SIGNIFICANT CHANGE UP (ref 70–99)
GLUCOSE BLDC GLUCOMTR-MCNC: 87 MG/DL — SIGNIFICANT CHANGE UP (ref 70–99)
GLUCOSE BLDC GLUCOMTR-MCNC: 90 MG/DL — SIGNIFICANT CHANGE UP (ref 70–99)
GLUCOSE BLDC GLUCOMTR-MCNC: 91 MG/DL — SIGNIFICANT CHANGE UP (ref 70–99)

## 2021-09-25 PROCEDURE — 99024 POSTOP FOLLOW-UP VISIT: CPT

## 2021-09-25 RX ORDER — POLYETHYLENE GLYCOL 3350 17 G/17G
17 POWDER, FOR SOLUTION ORAL DAILY
Refills: 0 | Status: DISCONTINUED | OUTPATIENT
Start: 2021-09-25 | End: 2021-09-27

## 2021-09-25 RX ORDER — POLYETHYLENE GLYCOL 3350 17 G/17G
17 POWDER, FOR SOLUTION ORAL ONCE
Refills: 0 | Status: COMPLETED | OUTPATIENT
Start: 2021-09-25 | End: 2021-09-25

## 2021-09-25 RX ADMIN — Medication 250 MILLIGRAM(S): at 23:33

## 2021-09-25 RX ADMIN — Medication 650 MILLIGRAM(S): at 16:40

## 2021-09-25 RX ADMIN — Medication 10 MILLIGRAM(S): at 21:42

## 2021-09-25 RX ADMIN — Medication 650 MILLIGRAM(S): at 05:39

## 2021-09-25 RX ADMIN — Medication 400 MILLIGRAM(S): at 21:40

## 2021-09-25 RX ADMIN — Medication 250 MILLIGRAM(S): at 05:38

## 2021-09-25 RX ADMIN — Medication 400 MILLIGRAM(S): at 16:41

## 2021-09-25 RX ADMIN — Medication 10 MILLIGRAM(S): at 05:38

## 2021-09-25 RX ADMIN — PANTOPRAZOLE SODIUM 40 MILLIGRAM(S): 20 TABLET, DELAYED RELEASE ORAL at 05:38

## 2021-09-25 RX ADMIN — POLYETHYLENE GLYCOL 3350 17 GRAM(S): 17 POWDER, FOR SOLUTION ORAL at 12:01

## 2021-09-25 RX ADMIN — Medication 10 MILLIGRAM(S): at 05:39

## 2021-09-25 RX ADMIN — Medication 400 MILLIGRAM(S): at 05:39

## 2021-09-25 RX ADMIN — Medication 10 MILLIGRAM(S): at 17:28

## 2021-09-25 RX ADMIN — Medication 250 MILLIGRAM(S): at 17:22

## 2021-09-25 RX ADMIN — PANTOPRAZOLE SODIUM 40 MILLIGRAM(S): 20 TABLET, DELAYED RELEASE ORAL at 17:29

## 2021-09-25 RX ADMIN — Medication 650 MILLIGRAM(S): at 21:40

## 2021-09-25 RX ADMIN — ENOXAPARIN SODIUM 40 MILLIGRAM(S): 100 INJECTION SUBCUTANEOUS at 12:01

## 2021-09-25 NOTE — PROGRESS NOTE ADULT - SUBJECTIVE AND OBJECTIVE BOX
POST OPERATIVE DAY #: s/p gregg POD 7     SUBJECTIVE:     Vital Signs Last 24 Hrs  T(C): 37.1 (20 Sep 2021 05:00), Max: 37.1 (19 Sep 2021 12:02)  T(F): 98.8 (20 Sep 2021 05:00), Max: 98.8 (19 Sep 2021 12:02)  HR: 100 (20 Sep 2021 05:00) (65 - 100)  BP: 152/67 (20 Sep 2021 05:00) (103/67 - 152/67)  BP(mean): 77 (19 Sep 2021 11:00) (77 - 80)  RR: 17 (20 Sep 2021 05:00) (10 - 21)  SpO2: 95% (20 Sep 2021 05:00) (93% - 99%)  I&O's Summary    19 Sep 2021 07:01  -  20 Sep 2021 07:00  --------------------------------------------------------  IN: 2201 mL / OUT: 1025 mL / NET: 1176 mL    I&O's Detail  19 Sep 2021 07:01  -  20 Sep 2021 07:00  --------------------------------------------------------  IN:    dextrose 5% + sodium chloride 0.45% w/ Additives: 2016 mL    IV PiggyBack: 75 mL    Oral Fluid: 110 mL  Total IN: 2201 mL    OUT:    Bulb (mL): 50 mL    Bulb (mL): 80 mL    Indwelling Catheter - Urethral (mL): 195 mL    Nasogastric/Oral tube (mL): 75 mL    Voided (mL): 625 mL  Total OUT: 1025 mL  Total NET: 1176 mL  Labs:                         8.5    8.42  )-----------( 169      ( 20 Sep 2021 07:09 )             27.2     09-20    140  |  106  |  6<L>  ----------------------------<  134<H>  3.7   |  23  |  0.73    Ca    8.7      20 Sep 2021 07:11  Phos  1.1     09-20  Mg     2.0     09-20    TPro  5.6<L>  /  Alb  2.9<L>  /  TBili  0.8  /  DBili  x   /  AST  52<H>  /  ALT  85<H>  /  AlkPhos  276<H>  09-20  PT/INR - ( 20 Sep 2021 07:11 )   PT: 13.1 sec;   INR: 1.10 ratio    PTT - ( 20 Sep 2021 07:11 )  PTT:33.9 sec    Physical Exam:  GEN: resting in bed comfortably in NAD.  RESP: no increased WOB  ABD: soft, non-distended, minimally tender without rebound tenderness or guarding.   EXTR: warm, well-perfused, no edema       POST OPERATIVE DAY #: s/p gregg POD 8     SUBJECTIVE: Patient seen and examined at bedside. She endorses being constipated occasionally at home.     Vital Signs Last 24 Hrs  T(C): 37.1 (20 Sep 2021 05:00), Max: 37.1 (19 Sep 2021 12:02)  T(F): 98.8 (20 Sep 2021 05:00), Max: 98.8 (19 Sep 2021 12:02)  HR: 100 (20 Sep 2021 05:00) (65 - 100)  BP: 152/67 (20 Sep 2021 05:00) (103/67 - 152/67)  BP(mean): 77 (19 Sep 2021 11:00) (77 - 80)  RR: 17 (20 Sep 2021 05:00) (10 - 21)  SpO2: 95% (20 Sep 2021 05:00) (93% - 99%)  I&O's Summary    19 Sep 2021 07:01  -  20 Sep 2021 07:00  --------------------------------------------------------  IN: 2201 mL / OUT: 1025 mL / NET: 1176 mL    I&O's Detail  19 Sep 2021 07:01  -  20 Sep 2021 07:00  --------------------------------------------------------  IN:    dextrose 5% + sodium chloride 0.45% w/ Additives: 2016 mL    IV PiggyBack: 75 mL    Oral Fluid: 110 mL  Total IN: 2201 mL    OUT:    Bulb (mL): 50 mL    Bulb (mL): 80 mL    Indwelling Catheter - Urethral (mL): 195 mL    Nasogastric/Oral tube (mL): 75 mL    Voided (mL): 625 mL  Total OUT: 1025 mL  Total NET: 1176 mL  Labs:                         8.5    8.42  )-----------( 169      ( 20 Sep 2021 07:09 )             27.2     09-20    140  |  106  |  6<L>  ----------------------------<  134<H>  3.7   |  23  |  0.73    Ca    8.7      20 Sep 2021 07:11  Phos  1.1     09-20  Mg     2.0     09-20    TPro  5.6<L>  /  Alb  2.9<L>  /  TBili  0.8  /  DBili  x   /  AST  52<H>  /  ALT  85<H>  /  AlkPhos  276<H>  09-20  PT/INR - ( 20 Sep 2021 07:11 )   PT: 13.1 sec;   INR: 1.10 ratio    PTT - ( 20 Sep 2021 07:11 )  PTT:33.9 sec    Physical Exam:  GEN: resting in bed comfortably in NAD.  RESP: no increased WOB  ABD: soft, non-distended, non-tender without rebound tenderness or guarding.   EXTR: warm, well-perfused, no edema

## 2021-09-25 NOTE — PROGRESS NOTE ADULT - SUBJECTIVE AND OBJECTIVE BOX
C A R D I O L O G Y  **********************************     DATE OF SERVICE: 09-25-21          acetaminophen   Tablet .. 650 milliGRAM(s) Oral every 6 hours  artificial tears (preservative free) Ophthalmic Solution 1 Drop(s) Both EYES two times a day PRN  chlorhexidine 2% Cloths 1 Application(s) Topical <User Schedule>  enoxaparin Injectable 40 milliGRAM(s) SubCutaneous daily  erythromycin   IVPB 200 milliGRAM(s) IV Intermittent every 8 hours  ibuprofen  Tablet. 400 milliGRAM(s) Oral every 6 hours  influenza   Vaccine 0.5 milliLiter(s) IntraMuscular once  insulin lispro (ADMELOG) corrective regimen sliding scale   SubCutaneous every 4 hours  metoclopramide Injectable 10 milliGRAM(s) IV Push three times a day  ondansetron    Tablet 4 milliGRAM(s) Oral every 6 hours PRN  oxyCODONE    IR 5 milliGRAM(s) Oral every 4 hours PRN  pantoprazole  Injectable 40 milliGRAM(s) IV Push two times a day  polyethylene glycol 3350 17 Gram(s) Oral once                            8.8    9.42  )-----------( 329      ( 24 Sep 2021 05:33 )             27.3       Hemoglobin: 8.8 g/dL (09-24 @ 05:33)  Hemoglobin: 8.8 g/dL (09-23 @ 07:09)  Hemoglobin: 9.3 g/dL (09-22 @ 07:05)  Hemoglobin: 8.2 g/dL (09-21 @ 04:56)      09-24    134<L>  |  100  |  12  ----------------------------<  84  4.0   |  14<L>  |  0.68    Ca    8.3<L>      24 Sep 2021 05:33  Phos  2.7     09-24  Mg     2.0     09-24    TPro  5.5<L>  /  Alb  3.0<L>  /  TBili  0.6  /  DBili  x   /  AST  20  /  ALT  37  /  AlkPhos  182<H>  09-24    Creatinine Trend: 0.68<--, 0.79<--, 0.76<--, 0.65<--, 0.65<--, 0.68<--    COAGS:           T(C): 36.8 (09-25-21 @ 09:00), Max: 36.9 (09-24-21 @ 16:18)  HR: 90 (09-25-21 @ 09:00) (78 - 91)  BP: 124/81 (09-25-21 @ 09:00) (122/71 - 135/80)  RR: 18 (09-25-21 @ 09:00) (18 - 18)  SpO2: 98% (09-25-21 @ 09:00) (93% - 99%)  Wt(kg): --    I&O's Summary    24 Sep 2021 07:01  -  25 Sep 2021 07:00  --------------------------------------------------------  IN: 1290 mL / OUT: 1550 mL / NET: -260 mL      Gen: Appears well in NAD  HEENT:  (-)icterus (-)pallor  CV: N S1 S2 1/6 PATRICIA (+)2 Pulses B/l  Resp:  Clear to auscultation B/L, normal effort  GI: (+) BS Soft, NT, ND  Lymph:  (-)Edema, (-)obvious lymphadenopathy  Skin: Warm to touch, Normal turgor  Psych: Appropriate mood and affect    TELEMETRY: None	      ASSESSMENT/PLAN: 76y Female with Hx of RA (on MTX), HLD, appendectomy, and L renal cell CA (s/p L nephrectomy 2019) who presented with transaminitis and an abnormal MRCP showing dilated PD and CBD, found to have a 1.7x1.8cm hypoechoic pancreatic head lesion on EUS on 9/9. She was taken to the OR for a Whipple on 9/17.    - No evidence of clinical HF or anginal symptoms  - TTE noted with normal LV function  - Surgery follow up  - No further inpatient cardiac w/u planned    S Janet GUPTA   Pager: [phone]

## 2021-09-25 NOTE — PROGRESS NOTE ADULT - ASSESSMENT
76y Female with Hx of RA (on MTX), HLD, appendectomy, and L renal cell CA (s/p L nephrectomy 2019) who presented with transaminitis and an abnormal MRCP showing dilated PD and CBD, found to have a 1.7x1.8cm hypoechoic pancreatic head lesion on EUS on 9/9. She was taken to the OR on 9/17 for a Whipple on 9/17 and transferred to the SICU extubated post-operatively for hemodynamic monitoring. Now POD8, she has been nauseaus/vomiting with hospital food.     Plan:  - Continue to monitor abd/food tolerance  - IV erythromycin  - DVT ppx  - CLD  - Pain relief: Oxy 5q4 for moderate pain; ibuprofen 400q6 standing; standing tylenol  - Continue to follow whipple pathway    Patient seen and discussed with Dr. Rainey this morning.    Red Team Surgery  [id number]    76y Female with Hx of RA (on MTX), HLD, appendectomy, and L renal cell CA (s/p L nephrectomy 2019) who presented with transaminitis and an abnormal MRCP showing dilated PD and CBD, found to have a 1.7x1.8cm hypoechoic pancreatic head lesion on EUS on 9/9. She was taken to the OR on 9/17 for a Whipple on 9/17 and transferred to the SICU extubated post-operatively for hemodynamic monitoring. Now POD8, she has been nauseaus/vomiting with hospital food.     Plan:  - Continue to monitor abd/food tolerance  - enema and miralax for constipation  - IV erythromycin  - DVT ppx  - Diet: mechanical soft (patient likes toast and jelly)  - Pain relief: Oxy 5q4 for moderate pain; ibuprofen 400q6 standing; standing tylenol  - Continue to follow whipple pathway    Patient seen and examined with Dr. Rainey this morning.    Red Team Surgery  [id number]

## 2021-09-26 LAB
ALBUMIN SERPL ELPH-MCNC: 3 G/DL — LOW (ref 3.3–5)
ALP SERPL-CCNC: 153 U/L — HIGH (ref 40–120)
ALT FLD-CCNC: 28 U/L — SIGNIFICANT CHANGE UP (ref 10–45)
ANION GAP SERPL CALC-SCNC: 21 MMOL/L — HIGH (ref 5–17)
AST SERPL-CCNC: 17 U/L — SIGNIFICANT CHANGE UP (ref 10–40)
BILIRUB SERPL-MCNC: 0.6 MG/DL — SIGNIFICANT CHANGE UP (ref 0.2–1.2)
BUN SERPL-MCNC: 9 MG/DL — SIGNIFICANT CHANGE UP (ref 7–23)
CALCIUM SERPL-MCNC: 8.4 MG/DL — SIGNIFICANT CHANGE UP (ref 8.4–10.5)
CHLORIDE SERPL-SCNC: 101 MMOL/L — SIGNIFICANT CHANGE UP (ref 96–108)
CO2 SERPL-SCNC: 11 MMOL/L — LOW (ref 22–31)
CREAT SERPL-MCNC: 0.65 MG/DL — SIGNIFICANT CHANGE UP (ref 0.5–1.3)
GLUCOSE BLDC GLUCOMTR-MCNC: 164 MG/DL — HIGH (ref 70–99)
GLUCOSE BLDC GLUCOMTR-MCNC: 79 MG/DL — SIGNIFICANT CHANGE UP (ref 70–99)
GLUCOSE BLDC GLUCOMTR-MCNC: 83 MG/DL — SIGNIFICANT CHANGE UP (ref 70–99)
GLUCOSE BLDC GLUCOMTR-MCNC: 83 MG/DL — SIGNIFICANT CHANGE UP (ref 70–99)
GLUCOSE BLDC GLUCOMTR-MCNC: 86 MG/DL — SIGNIFICANT CHANGE UP (ref 70–99)
GLUCOSE BLDC GLUCOMTR-MCNC: 88 MG/DL — SIGNIFICANT CHANGE UP (ref 70–99)
GLUCOSE SERPL-MCNC: 77 MG/DL — SIGNIFICANT CHANGE UP (ref 70–99)
HCT VFR BLD CALC: 28.5 % — LOW (ref 34.5–45)
HGB BLD-MCNC: 8.9 G/DL — LOW (ref 11.5–15.5)
MAGNESIUM SERPL-MCNC: 2.1 MG/DL — SIGNIFICANT CHANGE UP (ref 1.6–2.6)
MCHC RBC-ENTMCNC: 31.1 PG — SIGNIFICANT CHANGE UP (ref 27–34)
MCHC RBC-ENTMCNC: 31.2 GM/DL — LOW (ref 32–36)
MCV RBC AUTO: 99.7 FL — SIGNIFICANT CHANGE UP (ref 80–100)
NRBC # BLD: 0 /100 WBCS — SIGNIFICANT CHANGE UP (ref 0–0)
PHOSPHATE SERPL-MCNC: 2.1 MG/DL — LOW (ref 2.5–4.5)
PLATELET # BLD AUTO: 409 K/UL — HIGH (ref 150–400)
POTASSIUM SERPL-MCNC: 3.6 MMOL/L — SIGNIFICANT CHANGE UP (ref 3.5–5.3)
POTASSIUM SERPL-SCNC: 3.6 MMOL/L — SIGNIFICANT CHANGE UP (ref 3.5–5.3)
PROT SERPL-MCNC: 5.6 G/DL — LOW (ref 6–8.3)
RBC # BLD: 2.86 M/UL — LOW (ref 3.8–5.2)
RBC # FLD: 15.1 % — HIGH (ref 10.3–14.5)
SARS-COV-2 RNA SPEC QL NAA+PROBE: SIGNIFICANT CHANGE UP
SODIUM SERPL-SCNC: 133 MMOL/L — LOW (ref 135–145)
WBC # BLD: 8.89 K/UL — SIGNIFICANT CHANGE UP (ref 3.8–10.5)
WBC # FLD AUTO: 8.89 K/UL — SIGNIFICANT CHANGE UP (ref 3.8–10.5)

## 2021-09-26 PROCEDURE — 74018 RADEX ABDOMEN 1 VIEW: CPT | Mod: 26

## 2021-09-26 PROCEDURE — 71045 X-RAY EXAM CHEST 1 VIEW: CPT | Mod: 26

## 2021-09-26 RX ORDER — ACETAMINOPHEN 500 MG
750 TABLET ORAL EVERY 6 HOURS
Refills: 0 | Status: DISCONTINUED | OUTPATIENT
Start: 2021-09-26 | End: 2021-10-03

## 2021-09-26 RX ORDER — HYDROMORPHONE HYDROCHLORIDE 2 MG/ML
0.5 INJECTION INTRAMUSCULAR; INTRAVENOUS; SUBCUTANEOUS EVERY 4 HOURS
Refills: 0 | Status: DISCONTINUED | OUTPATIENT
Start: 2021-09-26 | End: 2021-10-01

## 2021-09-26 RX ORDER — POTASSIUM CHLORIDE 20 MEQ
10 PACKET (EA) ORAL
Refills: 0 | Status: COMPLETED | OUTPATIENT
Start: 2021-09-26 | End: 2021-09-26

## 2021-09-26 RX ORDER — SODIUM CHLORIDE 9 MG/ML
1000 INJECTION, SOLUTION INTRAVENOUS
Refills: 0 | Status: DISCONTINUED | OUTPATIENT
Start: 2021-09-26 | End: 2021-09-27

## 2021-09-26 RX ADMIN — Medication 10 MILLIGRAM(S): at 06:00

## 2021-09-26 RX ADMIN — Medication 250 MILLIGRAM(S): at 09:30

## 2021-09-26 RX ADMIN — Medication 250 MILLIGRAM(S): at 17:05

## 2021-09-26 RX ADMIN — Medication 100 MILLIEQUIVALENT(S): at 16:54

## 2021-09-26 RX ADMIN — PANTOPRAZOLE SODIUM 40 MILLIGRAM(S): 20 TABLET, DELAYED RELEASE ORAL at 05:58

## 2021-09-26 RX ADMIN — Medication 2: at 22:48

## 2021-09-26 RX ADMIN — ENOXAPARIN SODIUM 40 MILLIGRAM(S): 100 INJECTION SUBCUTANEOUS at 12:38

## 2021-09-26 RX ADMIN — Medication 10 MILLIGRAM(S): at 14:34

## 2021-09-26 RX ADMIN — Medication 10 MILLIGRAM(S): at 22:49

## 2021-09-26 RX ADMIN — PANTOPRAZOLE SODIUM 40 MILLIGRAM(S): 20 TABLET, DELAYED RELEASE ORAL at 17:05

## 2021-09-26 RX ADMIN — SODIUM CHLORIDE 84 MILLILITER(S): 9 INJECTION, SOLUTION INTRAVENOUS at 17:05

## 2021-09-26 RX ADMIN — Medication 85 MILLIMOLE(S): at 19:13

## 2021-09-26 RX ADMIN — Medication 100 MILLIEQUIVALENT(S): at 14:33

## 2021-09-26 RX ADMIN — Medication 100 MILLIEQUIVALENT(S): at 12:38

## 2021-09-26 RX ADMIN — POLYETHYLENE GLYCOL 3350 17 GRAM(S): 17 POWDER, FOR SOLUTION ORAL at 12:38

## 2021-09-26 NOTE — PROGRESS NOTE ADULT - SUBJECTIVE AND OBJECTIVE BOX
C A R D I O L O G Y  **********************************     DATE OF SERVICE: 09-26-21          acetaminophen   Tablet .. 650 milliGRAM(s) Oral every 6 hours  artificial tears (preservative free) Ophthalmic Solution 1 Drop(s) Both EYES two times a day PRN  chlorhexidine 2% Cloths 1 Application(s) Topical <User Schedule>  enoxaparin Injectable 40 milliGRAM(s) SubCutaneous daily  erythromycin   IVPB 200 milliGRAM(s) IV Intermittent every 8 hours  ibuprofen  Tablet. 400 milliGRAM(s) Oral every 6 hours  influenza   Vaccine 0.5 milliLiter(s) IntraMuscular once  insulin lispro (ADMELOG) corrective regimen sliding scale   SubCutaneous every 4 hours  metoclopramide Injectable 10 milliGRAM(s) IV Push three times a day  ondansetron    Tablet 4 milliGRAM(s) Oral every 6 hours PRN  oxyCODONE    IR 5 milliGRAM(s) Oral every 4 hours PRN  pantoprazole  Injectable 40 milliGRAM(s) IV Push two times a day  polyethylene glycol 3350 17 Gram(s) Oral daily                            8.9    8.89  )-----------( 409      ( 26 Sep 2021 07:34 )             28.5       Hemoglobin: 8.9 g/dL (09-26 @ 07:34)  Hemoglobin: 8.8 g/dL (09-24 @ 05:33)  Hemoglobin: 8.8 g/dL (09-23 @ 07:09)  Hemoglobin: 9.3 g/dL (09-22 @ 07:05)      09-26    133<L>  |  101  |  9   ----------------------------<  77  3.6   |  11<L>  |  0.65    Ca    8.4      26 Sep 2021 07:30  Phos  2.1     09-26  Mg     2.1     09-26    TPro  5.6<L>  /  Alb  3.0<L>  /  TBili  0.6  /  DBili  x   /  AST  17  /  ALT  28  /  AlkPhos  153<H>  09-26    Creatinine Trend: 0.65<--, 0.68<--, 0.79<--, 0.76<--, 0.65<--, 0.65<--    COAGS:           T(C): 36.4 (09-26-21 @ 09:12), Max: 37.2 (09-25-21 @ 14:10)  HR: 85 (09-26-21 @ 09:12) (84 - 97)  BP: 122/73 (09-26-21 @ 09:12) (122/73 - 133/89)  RR: 18 (09-26-21 @ 09:12) (17 - 18)  SpO2: 100% (09-26-21 @ 09:12) (97% - 100%)  Wt(kg): --    I&O's Summary    25 Sep 2021 07:01  -  26 Sep 2021 07:00  --------------------------------------------------------  IN: 760 mL / OUT: 700 mL / NET: 60 mL    26 Sep 2021 07:01  -  26 Sep 2021 09:33  --------------------------------------------------------  IN: 200 mL / OUT: 0 mL / NET: 200 mL      Gen: Appears well in NAD  HEENT:  (-)icterus (-)pallor  CV: N S1 S2 1/6 PATRICIA (+)2 Pulses B/l  Resp:  Clear to auscultation B/L, normal effort  GI: (+) BS Soft, NT, ND  Lymph:  (-)Edema, (-)obvious lymphadenopathy  Skin: Warm to touch, Normal turgor  Psych: Appropriate mood and affect    TELEMETRY: None	      ASSESSMENT/PLAN: 76y Female with Hx of RA (on MTX), HLD, appendectomy, and L renal cell CA (s/p L nephrectomy 2019) who presented with transaminitis and an abnormal MRCP showing dilated PD and CBD, found to have a 1.7x1.8cm hypoechoic pancreatic head lesion on EUS on 9/9. She was taken to the OR for a Whipple on 9/17.    - No evidence of clinical HF or anginal symptoms  - TTE noted with normal LV function  - Surgery follow up  - No further inpatient cardiac w/u planned    S Janet GUPTA   Pager: [phone]

## 2021-09-26 NOTE — PROGRESS NOTE ADULT - SUBJECTIVE AND OBJECTIVE BOX
TEAM Surgery Progress Note  Patient is a 76y old  Female who presents with a chief complaint of Abdominal Pain (16 Sep 2021 11:37)      INTERVAL EVENTS: Digital rectal exam with no evidence of stool impaction. No acute events overnight.  SUBJECTIVE:     OBJECTIVE:    Vital Signs Last 24 Hrs  T(C): 36.8 (26 Sep 2021 01:03), Max: 37.2 (25 Sep 2021 14:10)  T(F): 98.3 (26 Sep 2021 01:03), Max: 98.9 (25 Sep 2021 14:10)  HR: 84 (26 Sep 2021 01:03) (84 - 97)  BP: 132/75 (26 Sep 2021 01:03) (124/81 - 135/80)  BP(mean): --  RR: 18 (26 Sep 2021 01:03) (17 - 18)  SpO2: 98% (26 Sep 2021 01:03) (97% - 99%)I&O's Detail    24 Sep 2021 07:01  -  25 Sep 2021 07:00  --------------------------------------------------------  IN:    IV PiggyBack: 250 mL    Oral Fluid: 1040 mL  Total IN: 1290 mL    OUT:    Voided (mL): 1550 mL  Total OUT: 1550 mL    Total NET: -260 mL      25 Sep 2021 07:01  -  26 Sep 2021 03:42  --------------------------------------------------------  IN:    Oral Fluid: 520 mL  Total IN: 520 mL    OUT:    Voided (mL): 700 mL  Total OUT: 700 mL    Total NET: -180 mL      MEDICATIONS  (STANDING):  acetaminophen   Tablet .. 650 milliGRAM(s) Oral every 6 hours  chlorhexidine 2% Cloths 1 Application(s) Topical <User Schedule>  enoxaparin Injectable 40 milliGRAM(s) SubCutaneous daily  erythromycin   IVPB 200 milliGRAM(s) IV Intermittent every 8 hours  ibuprofen  Tablet. 400 milliGRAM(s) Oral every 6 hours  influenza   Vaccine 0.5 milliLiter(s) IntraMuscular once  insulin lispro (ADMELOG) corrective regimen sliding scale   SubCutaneous every 4 hours  metoclopramide Injectable 10 milliGRAM(s) IV Push three times a day  pantoprazole  Injectable 40 milliGRAM(s) IV Push two times a day  polyethylene glycol 3350 17 Gram(s) Oral daily    MEDICATIONS  (PRN):  artificial tears (preservative free) Ophthalmic Solution 1 Drop(s) Both EYES two times a day PRN Dry Eyes  ondansetron    Tablet 4 milliGRAM(s) Oral every 6 hours PRN Nausea  oxyCODONE    IR 5 milliGRAM(s) Oral every 4 hours PRN Moderate Pain (4 - 6)      PHYSICAL EXAM:  GEN: resting in bed comfortably in NAD.  RESP: no increased WOB  ABD: soft, non-distended, non-tender without rebound tenderness or guarding. Incisions are c/d/i  EXTR: warm, well-perfused, no edema    LABS:                        8.8    9.42  )-----------( 329      ( 24 Sep 2021 05:33 )             27.3     09-24    134<L>  |  100  |  12  ----------------------------<  84  4.0   |  14<L>  |  0.68    Ca    8.3<L>      24 Sep 2021 05:33  Phos  2.7     09-24  Mg     2.0     09-24    TPro  5.5<L>  /  Alb  3.0<L>  /  TBili  0.6  /  DBili  x   /  AST  20  /  ALT  37  /  AlkPhos  182<H>  09-24      LIVER FUNCTIONS - ( 24 Sep 2021 05:33 )  Alb: 3.0 g/dL / Pro: 5.5 g/dL / ALK PHOS: 182 U/L / ALT: 37 U/L / AST: 20 U/L / GGT: x                 IMAGING:     TEAM Surgery Progress Note  Patient is a 76y old  Female who presents with a chief complaint of Abdominal Pain (16 Sep 2021 11:37)      INTERVAL EVENTS: Digital rectal exam with no evidence of stool impaction. No acute events overnight.  SUBJECTIVE: Patient seen in AM reporting no appetite, passing gas and had 1 Bm last night.     OBJECTIVE:    Vital Signs Last 24 Hrs  T(C): 36.8 (26 Sep 2021 01:03), Max: 37.2 (25 Sep 2021 14:10)  T(F): 98.3 (26 Sep 2021 01:03), Max: 98.9 (25 Sep 2021 14:10)  HR: 84 (26 Sep 2021 01:03) (84 - 97)  BP: 132/75 (26 Sep 2021 01:03) (124/81 - 135/80)  BP(mean): --  RR: 18 (26 Sep 2021 01:03) (17 - 18)  SpO2: 98% (26 Sep 2021 01:03) (97% - 99%)I&O's Detail    24 Sep 2021 07:01  -  25 Sep 2021 07:00  --------------------------------------------------------  IN:    IV PiggyBack: 250 mL    Oral Fluid: 1040 mL  Total IN: 1290 mL    OUT:    Voided (mL): 1550 mL  Total OUT: 1550 mL    Total NET: -260 mL      25 Sep 2021 07:01  -  26 Sep 2021 03:42  --------------------------------------------------------  IN:    Oral Fluid: 520 mL  Total IN: 520 mL    OUT:    Voided (mL): 700 mL  Total OUT: 700 mL    Total NET: -180 mL      MEDICATIONS  (STANDING):  acetaminophen   Tablet .. 650 milliGRAM(s) Oral every 6 hours  chlorhexidine 2% Cloths 1 Application(s) Topical <User Schedule>  enoxaparin Injectable 40 milliGRAM(s) SubCutaneous daily  erythromycin   IVPB 200 milliGRAM(s) IV Intermittent every 8 hours  ibuprofen  Tablet. 400 milliGRAM(s) Oral every 6 hours  influenza   Vaccine 0.5 milliLiter(s) IntraMuscular once  insulin lispro (ADMELOG) corrective regimen sliding scale   SubCutaneous every 4 hours  metoclopramide Injectable 10 milliGRAM(s) IV Push three times a day  pantoprazole  Injectable 40 milliGRAM(s) IV Push two times a day  polyethylene glycol 3350 17 Gram(s) Oral daily    MEDICATIONS  (PRN):  artificial tears (preservative free) Ophthalmic Solution 1 Drop(s) Both EYES two times a day PRN Dry Eyes  ondansetron    Tablet 4 milliGRAM(s) Oral every 6 hours PRN Nausea  oxyCODONE    IR 5 milliGRAM(s) Oral every 4 hours PRN Moderate Pain (4 - 6)      PHYSICAL EXAM:  GEN: resting in bed comfortably in NAD.  RESP: no increased WOB  ABD: soft, non-distended, non-tender without rebound tenderness or guarding. Incisions are c/d/i  EXTR: warm, well-perfused, no edema    LABS:                        8.8    9.42  )-----------( 329      ( 24 Sep 2021 05:33 )             27.3     09-24    134<L>  |  100  |  12  ----------------------------<  84  4.0   |  14<L>  |  0.68    Ca    8.3<L>      24 Sep 2021 05:33  Phos  2.7     09-24  Mg     2.0     09-24    TPro  5.5<L>  /  Alb  3.0<L>  /  TBili  0.6  /  DBili  x   /  AST  20  /  ALT  37  /  AlkPhos  182<H>  09-24      LIVER FUNCTIONS - ( 24 Sep 2021 05:33 )  Alb: 3.0 g/dL / Pro: 5.5 g/dL / ALK PHOS: 182 U/L / ALT: 37 U/L / AST: 20 U/L / GGT: x                 IMAGING:

## 2021-09-26 NOTE — PROGRESS NOTE ADULT - ASSESSMENT
76y Female with Hx of RA (on MTX), HLD, appendectomy, and L renal cell CA (s/p L nephrectomy 2019) who presented with transaminitis and an abnormal MRCP showing dilated PD and CBD, found to have a 1.7x1.8cm hypoechoic pancreatic head lesion on EUS on 9/9. She was taken to the OR on 9/17 for a Whipple on 9/17 and transferred to the SICU extubated post-operatively for hemodynamic monitoring. Now POD9, she has been nauseaus/vomiting with hospital food.     Plan:  - Continue to monitor abd/food tolerance  - enema and miralax for constipation  - IV erythromycin  - DVT ppx  - Diet: mechanical soft (patient likes toast and jelly)  - Pain relief: Oxy 5q4 for moderate pain; ibuprofen 400q6 standing; standing tylenol  - Continue to follow whipple pathway    Red Team Surgery  [id number]

## 2021-09-27 LAB
ALBUMIN SERPL ELPH-MCNC: 2.7 G/DL — LOW (ref 3.3–5)
ALP SERPL-CCNC: 133 U/L — HIGH (ref 40–120)
ALT FLD-CCNC: 21 U/L — SIGNIFICANT CHANGE UP (ref 10–45)
ANION GAP SERPL CALC-SCNC: 13 MMOL/L — SIGNIFICANT CHANGE UP (ref 5–17)
ANION GAP SERPL CALC-SCNC: 18 MMOL/L — HIGH (ref 5–17)
AST SERPL-CCNC: 12 U/L — SIGNIFICANT CHANGE UP (ref 10–40)
BILIRUB SERPL-MCNC: 0.5 MG/DL — SIGNIFICANT CHANGE UP (ref 0.2–1.2)
BUN SERPL-MCNC: 4 MG/DL — LOW (ref 7–23)
BUN SERPL-MCNC: <4 MG/DL — LOW (ref 7–23)
CALCIUM SERPL-MCNC: 8.1 MG/DL — LOW (ref 8.4–10.5)
CALCIUM SERPL-MCNC: 8.5 MG/DL — SIGNIFICANT CHANGE UP (ref 8.4–10.5)
CHLORIDE SERPL-SCNC: 104 MMOL/L — SIGNIFICANT CHANGE UP (ref 96–108)
CHLORIDE SERPL-SCNC: 104 MMOL/L — SIGNIFICANT CHANGE UP (ref 96–108)
CO2 SERPL-SCNC: 15 MMOL/L — LOW (ref 22–31)
CO2 SERPL-SCNC: 20 MMOL/L — LOW (ref 22–31)
CREAT SERPL-MCNC: 0.51 MG/DL — SIGNIFICANT CHANGE UP (ref 0.5–1.3)
CREAT SERPL-MCNC: 0.56 MG/DL — SIGNIFICANT CHANGE UP (ref 0.5–1.3)
GAS PNL BLDA: SIGNIFICANT CHANGE UP
GLUCOSE BLDC GLUCOMTR-MCNC: 111 MG/DL — HIGH (ref 70–99)
GLUCOSE BLDC GLUCOMTR-MCNC: 129 MG/DL — HIGH (ref 70–99)
GLUCOSE BLDC GLUCOMTR-MCNC: 139 MG/DL — HIGH (ref 70–99)
GLUCOSE BLDC GLUCOMTR-MCNC: 140 MG/DL — HIGH (ref 70–99)
GLUCOSE BLDC GLUCOMTR-MCNC: 148 MG/DL — HIGH (ref 70–99)
GLUCOSE BLDC GLUCOMTR-MCNC: 156 MG/DL — HIGH (ref 70–99)
GLUCOSE SERPL-MCNC: 138 MG/DL — HIGH (ref 70–99)
GLUCOSE SERPL-MCNC: 322 MG/DL — HIGH (ref 70–99)
HCT VFR BLD CALC: 26.2 % — LOW (ref 34.5–45)
HGB BLD-MCNC: 8.7 G/DL — LOW (ref 11.5–15.5)
MAGNESIUM SERPL-MCNC: 2 MG/DL — SIGNIFICANT CHANGE UP (ref 1.6–2.6)
MCHC RBC-ENTMCNC: 32.5 PG — SIGNIFICANT CHANGE UP (ref 27–34)
MCHC RBC-ENTMCNC: 33.2 GM/DL — SIGNIFICANT CHANGE UP (ref 32–36)
MCV RBC AUTO: 97.8 FL — SIGNIFICANT CHANGE UP (ref 80–100)
NRBC # BLD: 0 /100 WBCS — SIGNIFICANT CHANGE UP (ref 0–0)
PHOSPHATE SERPL-MCNC: 2.3 MG/DL — LOW (ref 2.5–4.5)
PLATELET # BLD AUTO: 355 K/UL — SIGNIFICANT CHANGE UP (ref 150–400)
POTASSIUM SERPL-MCNC: 3 MMOL/L — LOW (ref 3.5–5.3)
POTASSIUM SERPL-MCNC: 6.8 MMOL/L — CRITICAL HIGH (ref 3.5–5.3)
POTASSIUM SERPL-SCNC: 3 MMOL/L — LOW (ref 3.5–5.3)
POTASSIUM SERPL-SCNC: 6.8 MMOL/L — CRITICAL HIGH (ref 3.5–5.3)
PROT SERPL-MCNC: 5.1 G/DL — LOW (ref 6–8.3)
RBC # BLD: 2.68 M/UL — LOW (ref 3.8–5.2)
RBC # FLD: 15.3 % — HIGH (ref 10.3–14.5)
SODIUM SERPL-SCNC: 137 MMOL/L — SIGNIFICANT CHANGE UP (ref 135–145)
SODIUM SERPL-SCNC: 137 MMOL/L — SIGNIFICANT CHANGE UP (ref 135–145)
WBC # BLD: 6.58 K/UL — SIGNIFICANT CHANGE UP (ref 3.8–10.5)
WBC # FLD AUTO: 6.58 K/UL — SIGNIFICANT CHANGE UP (ref 3.8–10.5)

## 2021-09-27 PROCEDURE — 71045 X-RAY EXAM CHEST 1 VIEW: CPT | Mod: 26

## 2021-09-27 PROCEDURE — 74177 CT ABD & PELVIS W/CONTRAST: CPT | Mod: 26

## 2021-09-27 PROCEDURE — 74018 RADEX ABDOMEN 1 VIEW: CPT | Mod: 26

## 2021-09-27 RX ORDER — SODIUM CHLORIDE 9 MG/ML
1000 INJECTION, SOLUTION INTRAVENOUS
Refills: 0 | Status: DISCONTINUED | OUTPATIENT
Start: 2021-09-27 | End: 2021-09-27

## 2021-09-27 RX ORDER — ELECTROLYTE SOLUTION,INJ
1 VIAL (ML) INTRAVENOUS
Refills: 0 | Status: DISCONTINUED | OUTPATIENT
Start: 2021-09-27 | End: 2021-09-27

## 2021-09-27 RX ORDER — SODIUM CHLORIDE 9 MG/ML
10 INJECTION INTRAMUSCULAR; INTRAVENOUS; SUBCUTANEOUS
Refills: 0 | Status: DISCONTINUED | OUTPATIENT
Start: 2021-09-27 | End: 2021-10-04

## 2021-09-27 RX ORDER — SODIUM CHLORIDE 9 MG/ML
1000 INJECTION, SOLUTION INTRAVENOUS ONCE
Refills: 0 | Status: COMPLETED | OUTPATIENT
Start: 2021-09-27 | End: 2021-09-27

## 2021-09-27 RX ORDER — POTASSIUM PHOSPHATE, MONOBASIC POTASSIUM PHOSPHATE, DIBASIC 236; 224 MG/ML; MG/ML
30 INJECTION, SOLUTION INTRAVENOUS ONCE
Refills: 0 | Status: COMPLETED | OUTPATIENT
Start: 2021-09-27 | End: 2021-09-27

## 2021-09-27 RX ORDER — CHLORHEXIDINE GLUCONATE 213 G/1000ML
1 SOLUTION TOPICAL
Refills: 0 | Status: DISCONTINUED | OUTPATIENT
Start: 2021-09-27 | End: 2021-10-02

## 2021-09-27 RX ORDER — POTASSIUM CHLORIDE 20 MEQ
10 PACKET (EA) ORAL
Refills: 0 | Status: COMPLETED | OUTPATIENT
Start: 2021-09-27 | End: 2021-09-27

## 2021-09-27 RX ADMIN — Medication 250 MILLIGRAM(S): at 09:11

## 2021-09-27 RX ADMIN — POTASSIUM PHOSPHATE, MONOBASIC POTASSIUM PHOSPHATE, DIBASIC 83.33 MILLIMOLE(S): 236; 224 INJECTION, SOLUTION INTRAVENOUS at 10:31

## 2021-09-27 RX ADMIN — ENOXAPARIN SODIUM 40 MILLIGRAM(S): 100 INJECTION SUBCUTANEOUS at 13:01

## 2021-09-27 RX ADMIN — Medication 250 MILLIGRAM(S): at 00:27

## 2021-09-27 RX ADMIN — PANTOPRAZOLE SODIUM 40 MILLIGRAM(S): 20 TABLET, DELAYED RELEASE ORAL at 06:09

## 2021-09-27 RX ADMIN — SODIUM CHLORIDE 1000 MILLILITER(S): 9 INJECTION, SOLUTION INTRAVENOUS at 09:15

## 2021-09-27 RX ADMIN — SODIUM CHLORIDE 100 MILLILITER(S): 9 INJECTION, SOLUTION INTRAVENOUS at 10:18

## 2021-09-27 RX ADMIN — Medication 100 MILLIEQUIVALENT(S): at 17:42

## 2021-09-27 RX ADMIN — Medication 10 MILLIGRAM(S): at 21:14

## 2021-09-27 RX ADMIN — PANTOPRAZOLE SODIUM 40 MILLIGRAM(S): 20 TABLET, DELAYED RELEASE ORAL at 17:32

## 2021-09-27 RX ADMIN — Medication 100 MILLIEQUIVALENT(S): at 17:20

## 2021-09-27 RX ADMIN — Medication 250 MILLIGRAM(S): at 16:42

## 2021-09-27 RX ADMIN — Medication 100 MILLIEQUIVALENT(S): at 16:39

## 2021-09-27 RX ADMIN — CHLORHEXIDINE GLUCONATE 1 APPLICATION(S): 213 SOLUTION TOPICAL at 09:10

## 2021-09-27 RX ADMIN — Medication 10 MILLIGRAM(S): at 06:09

## 2021-09-27 RX ADMIN — Medication 10 MILLIGRAM(S): at 13:01

## 2021-09-27 NOTE — PROGRESS NOTE ADULT - SUBJECTIVE AND OBJECTIVE BOX
TEAM Surgery Progress Note  Patient is a 76y old  Female who presents with a chief complaint of Abdominal Pain (16 Sep 2021 11:37)      INTERVAL EVENTS:    SUBJECTIVE:       OBJECTIVE:    Vital Signs Last 24 Hrs  T(C): 36.8 (26 Sep 2021 01:03), Max: 37.2 (25 Sep 2021 14:10)  T(F): 98.3 (26 Sep 2021 01:03), Max: 98.9 (25 Sep 2021 14:10)  HR: 84 (26 Sep 2021 01:03) (84 - 97)  BP: 132/75 (26 Sep 2021 01:03) (124/81 - 135/80)  BP(mean): --  RR: 18 (26 Sep 2021 01:03) (17 - 18)  SpO2: 98% (26 Sep 2021 01:03) (97% - 99%)I&O's Detail    24 Sep 2021 07:01  -  25 Sep 2021 07:00  --------------------------------------------------------  IN:    IV PiggyBack: 250 mL    Oral Fluid: 1040 mL  Total IN: 1290 mL    OUT:    Voided (mL): 1550 mL  Total OUT: 1550 mL    Total NET: -260 mL      25 Sep 2021 07:01  -  26 Sep 2021 03:42  --------------------------------------------------------  IN:    Oral Fluid: 520 mL  Total IN: 520 mL    OUT:    Voided (mL): 700 mL  Total OUT: 700 mL    Total NET: -180 mL      MEDICATIONS  (STANDING):  acetaminophen   Tablet .. 650 milliGRAM(s) Oral every 6 hours  chlorhexidine 2% Cloths 1 Application(s) Topical <User Schedule>  enoxaparin Injectable 40 milliGRAM(s) SubCutaneous daily  erythromycin   IVPB 200 milliGRAM(s) IV Intermittent every 8 hours  ibuprofen  Tablet. 400 milliGRAM(s) Oral every 6 hours  influenza   Vaccine 0.5 milliLiter(s) IntraMuscular once  insulin lispro (ADMELOG) corrective regimen sliding scale   SubCutaneous every 4 hours  metoclopramide Injectable 10 milliGRAM(s) IV Push three times a day  pantoprazole  Injectable 40 milliGRAM(s) IV Push two times a day  polyethylene glycol 3350 17 Gram(s) Oral daily    MEDICATIONS  (PRN):  artificial tears (preservative free) Ophthalmic Solution 1 Drop(s) Both EYES two times a day PRN Dry Eyes  ondansetron    Tablet 4 milliGRAM(s) Oral every 6 hours PRN Nausea  oxyCODONE    IR 5 milliGRAM(s) Oral every 4 hours PRN Moderate Pain (4 - 6)      PHYSICAL EXAM:  GEN: resting in bed comfortably in NAD.  RESP: no increased WOB  ABD: soft, non-distended, non-tender without rebound tenderness or guarding. Incisions are c/d/i  EXTR: warm, well-perfused, no edema    LABS:                        8.8    9.42  )-----------( 329      ( 24 Sep 2021 05:33 )             27.3     09-24    134<L>  |  100  |  12  ----------------------------<  84  4.0   |  14<L>  |  0.68    Ca    8.3<L>      24 Sep 2021 05:33  Phos  2.7     09-24  Mg     2.0     09-24    TPro  5.5<L>  /  Alb  3.0<L>  /  TBili  0.6  /  DBili  x   /  AST  20  /  ALT  37  /  AlkPhos  182<H>  09-24      LIVER FUNCTIONS - ( 24 Sep 2021 05:33 )  Alb: 3.0 g/dL / Pro: 5.5 g/dL / ALK PHOS: 182 U/L / ALT: 37 U/L / AST: 20 U/L / GGT: x                 IMAGING:     TEAM Surgery Progress Note  Patient is a 76y old  Female who presents with a chief complaint of Abdominal Pain (16 Sep 2021 11:37)      INTERVAL EVENTS:  Patient had AXR yesterday with severely distended stomach. NGT was placed with immediate return of 2L fluid; had another ~800cc overnight.   SUBJECTIVE:   This AM patient passing gas, not endorsing nausea or abdominal pain, but does report irritation from NGT     OBJECTIVE:    Vital Signs Last 24 Hrs  T(C): 36.8 (26 Sep 2021 01:03), Max: 37.2 (25 Sep 2021 14:10)  T(F): 98.3 (26 Sep 2021 01:03), Max: 98.9 (25 Sep 2021 14:10)  HR: 84 (26 Sep 2021 01:03) (84 - 97)  BP: 132/75 (26 Sep 2021 01:03) (124/81 - 135/80)  BP(mean): --  RR: 18 (26 Sep 2021 01:03) (17 - 18)  SpO2: 98% (26 Sep 2021 01:03) (97% - 99%)I&O's Detail    24 Sep 2021 07:01  -  25 Sep 2021 07:00  --------------------------------------------------------  IN:    IV PiggyBack: 250 mL    Oral Fluid: 1040 mL  Total IN: 1290 mL    OUT:    Voided (mL): 1550 mL  Total OUT: 1550 mL    Total NET: -260 mL      25 Sep 2021 07:01  -  26 Sep 2021 03:42  --------------------------------------------------------  IN:    Oral Fluid: 520 mL  Total IN: 520 mL    OUT:    Voided (mL): 700 mL  Total OUT: 700 mL    Total NET: -180 mL      MEDICATIONS  (STANDING):  acetaminophen   Tablet .. 650 milliGRAM(s) Oral every 6 hours  chlorhexidine 2% Cloths 1 Application(s) Topical <User Schedule>  enoxaparin Injectable 40 milliGRAM(s) SubCutaneous daily  erythromycin   IVPB 200 milliGRAM(s) IV Intermittent every 8 hours  ibuprofen  Tablet. 400 milliGRAM(s) Oral every 6 hours  influenza   Vaccine 0.5 milliLiter(s) IntraMuscular once  insulin lispro (ADMELOG) corrective regimen sliding scale   SubCutaneous every 4 hours  metoclopramide Injectable 10 milliGRAM(s) IV Push three times a day  pantoprazole  Injectable 40 milliGRAM(s) IV Push two times a day  polyethylene glycol 3350 17 Gram(s) Oral daily    MEDICATIONS  (PRN):  artificial tears (preservative free) Ophthalmic Solution 1 Drop(s) Both EYES two times a day PRN Dry Eyes  ondansetron    Tablet 4 milliGRAM(s) Oral every 6 hours PRN Nausea  oxyCODONE    IR 5 milliGRAM(s) Oral every 4 hours PRN Moderate Pain (4 - 6)      PHYSICAL EXAM:  GEN: resting in bed, appears dejected, NGT in place  RESP: no increased WOB  ABD: soft, non-distended, non-tender without rebound tenderness or guarding. Incisions are c/d/i  EXTR: warm, well-perfused, no edema    LABS:                        8.8    9.42  )-----------( 329      ( 24 Sep 2021 05:33 )             27.3     09-24    134<L>  |  100  |  12  ----------------------------<  84  4.0   |  14<L>  |  0.68    Ca    8.3<L>      24 Sep 2021 05:33  Phos  2.7     09-24  Mg     2.0     09-24    TPro  5.5<L>  /  Alb  3.0<L>  /  TBili  0.6  /  DBili  x   /  AST  20  /  ALT  37  /  AlkPhos  182<H>  09-24      LIVER FUNCTIONS - ( 24 Sep 2021 05:33 )  Alb: 3.0 g/dL / Pro: 5.5 g/dL / ALK PHOS: 182 U/L / ALT: 37 U/L / AST: 20 U/L / GGT: x                 IMAGING:

## 2021-09-27 NOTE — CONSULT NOTE ADULT - SUBJECTIVE AND OBJECTIVE BOX
NUTRITION SUPPORT / TPN CONSULT NOTE    HPI:  77 y/o female with pmhx of rheumatoid arthritis, high cholesterol, appendectomy, and left nephrectomy presenting with abdominal pain x months sent in by PCP (Dr. Parnell; [phone]) for MRI findings significant for dilated pancreatic/bile ducts and elevated bili/liver enzymes requesting ERCP. Abdominal pain localized to RUQ with intermittent radiation to back. States that pain is mild, not requiring pain medication, no associated fevers/chills, n/v/d, cough, rashes, or changes in urination. Reports intermittent mild itching and darker urine than usual.  Labs indicate elevated LFTs and priti (07 Sep 2021 16:07).      Pt was found to have pancreatic mass, underwent EUS/bx, failed ERCP.  Surgery was consulted and on 9/17 underwent Whipple.  Pt was on a diet, tolerating minimal amounts (mostly clear liquids) with intermittent episodes of vomiting.  On 9/26 pt vomited, AXR performed which showed postop ileus.  NGT was placed with 2000cc bilious return. TPN consulted for nutrition support.            MEDICATIONS  (STANDING):  chlorhexidine 2% Cloths 1 Application(s) Topical <User Schedule>  dextrose 5% + lactated ringers 1000 milliLiter(s) (100 mL/Hr) IV Continuous <Continuous>  enoxaparin Injectable 40 milliGRAM(s) SubCutaneous daily  erythromycin   IVPB 200 milliGRAM(s) IV Intermittent every 8 hours  influenza   Vaccine 0.5 milliLiter(s) IntraMuscular once  insulin lispro (ADMELOG) corrective regimen sliding scale   SubCutaneous every 4 hours  lactated ringers Bolus 1000 milliLiter(s) IV Bolus once  metoclopramide Injectable 10 milliGRAM(s) IV Push three times a day  pantoprazole  Injectable 40 milliGRAM(s) IV Push two times a day  polyethylene glycol 3350 17 Gram(s) Oral daily  potassium phosphate IVPB 30 milliMole(s) IV Intermittent once    MEDICATIONS  (PRN):  acetaminophen  IVPB .. 750 milliGRAM(s) IV Intermittent every 6 hours PRN Mild Pain (1 - 3)  artificial tears (preservative free) Ophthalmic Solution 1 Drop(s) Both EYES two times a day PRN Dry Eyes  HYDROmorphone  Injectable 0.5 milliGRAM(s) IV Push every 4 hours PRN Severe Pain (7 - 10)  ondansetron    Tablet 4 milliGRAM(s) Oral every 6 hours PRN Nausea      PAST MEDICAL & SURGICAL HISTORY:  Arthritis  rheumatoid    S/P appendectomy  1969        FAMILY HISTORY:      ALLERGIES  No Known Allergies      REVIEW OF SYSTEMS  --------------------------------------------------------------------------------    Skin: No rashes  Head/Eyes/Ears/Mouth: No headache; Normal hearing; Normal vision w/o blurriness; No sinus pain/discomfort, sore throat  Respiratory: No dyspnea, cough, wheezing, hemoptysis  CV: No chest pain, PND, orthopnea  GI: No abdominal pain, diarrhea, constipation, nausea, vomiting, melena, hematochezia  : No increased frequency, dysuria, hematuria, nocturia  MSK: No joint pain/swelling; no back pain; no edema  Neuro: No dizziness/lightheadedness, weakness, seizures, numbness, tingling  Heme: No easy bruising or bleeding  Endo: No heat/cold intolerance  Psych: No significant nervousness, anxiety, stress, depression      VITALS  T(C): 36.9 (09-27-21 @ 05:46), Max: 37.1 (09-26-21 @ 17:30)  HR: 96 (09-27-21 @ 05:46) (82 - 100)  BP: 125/71 (09-27-21 @ 05:46) (122/73 - 147/66)  RR: 18 (09-27-21 @ 05:46) (18 - 18)  SpO2: 96% (09-27-21 @ 05:46) (96% - 100%)  I&O's Detail    26 Sep 2021 07:01  -  27 Sep 2021 07:00  --------------------------------------------------------  IN:    dextrose 5% + lactated ringers: 1260 mL    Oral Fluid: 450 mL  Total IN: 1710 mL    OUT:    Nasogastric/Oral tube (mL): 2830 mL    Voided (mL): 1500 mL  Total OUT: 4330 mL    Total NET: -2620 mL            LABS:                        8.7    6.58  )-----------( 355      ( 27 Sep 2021 07:12 )             26.2     09-27    137  |  104  |  4<L>  ----------------------------<  138<H>  3.0<L>   |  15<L>  |  0.56    Ca    8.1<L>      27 Sep 2021 07:10  Phos  2.3     09-27  Mg     2.0     09-27    TPro  5.1<L>  /  Alb  2.7<L>  /  TBili  0.5  /  DBili  x   /  AST  12  /  ALT  21  /  AlkPhos  133<H>  09-27    Ionized Calcium Levels:     CAPILLARY BLOOD GLUCOSE      POCT Blood Glucose.: 140 mg/dL (27 Sep 2021 06:07)  CAPILLARY BLOOD GLUCOSE      POCT Blood Glucose.: 140 mg/dL (27 Sep 2021 06:07)  POCT Blood Glucose.: 111 mg/dL (27 Sep 2021 02:27)  POCT Blood Glucose.: 164 mg/dL (26 Sep 2021 21:55)  POCT Blood Glucose.: 83 mg/dL (26 Sep 2021 17:54)  POCT Blood Glucose.: 79 mg/dL (26 Sep 2021 14:28)  POCT Blood Glucose.: 86 mg/dL (26 Sep 2021 11:19)            PHYSICAL EXAM  --------------------------------------------------------------------------------    Physical Exam:  	Gen: NAD, thin appearing, NGT in place draining bilious contents  	HEENT: PERRL, supple neck, clear oropharynx  	Pulm: CTA B/L  	CV: RRR, S1S2; no rub  	Back: No spinal or CVA tenderness; no sacral edema  	Abd: soft, nontender/nondistended, no rebound, no guarding            UE: Warm, FROM, no clubbing, intact strength; no edema; no asterixis  	LE: Warm, FROM, no clubbing, intact strength; no edema  	Neuro: No focal deficits, intact gait  	Psych: Normal affect and mood  	Skin: Warm, without rashes

## 2021-09-27 NOTE — PROGRESS NOTE ADULT - ASSESSMENT
76y Female with Hx of RA (on MTX), HLD, appendectomy, and L renal cell CA (s/p L nephrectomy 2019) who presented with transaminitis and an abnormal MRCP showing dilated PD and CBD, found to have a 1.7x1.8cm hypoechoic pancreatic head lesion on EUS on 9/9. She was taken to the OR on 9/17 for a Whipple on 9/17 and transferred to the SICU extubated post-operatively for hemodynamic monitoring. Now POD9, she has been nauseaus/vomiting with hospital food.     Plan:  - Continue to monitor abd/food tolerance  - enema and miralax for constipation  - IV erythromycin  - DVT ppx  - Diet: mechanical soft (patient likes toast and jelly)  - Pain relief: Oxy 5q4 for moderate pain; ibuprofen 400q6 standing; standing tylenol  - Continue to follow whipple pathway    Red Team Surgery  [id number]  76y Female with Hx of RA (on MTX), HLD, appendectomy, and L renal cell CA (s/p L nephrectomy 2019) who presented with transaminitis and an abnormal MRCP showing dilated PD and CBD, found to have a 1.7x1.8cm hypoechoic pancreatic head lesion on EUS on 9/9. She was taken to the OR on 9/17 for a Whipple on 9/17 and transferred to the SICU extubated post-operatively for hemodynamic monitoring. Patient had NGT placed (9/26) due to severely distended stomach on AXR, now POD 10, recovering on the floor with likely delayed gastric emptying.     Plan:  - NPO/IVF/NGT   - 1/2 to 1 repletions for NGT output  - Cont. IV erythromycin, reglan for promotility  - TPN consult and PICC placement  - F/u CTAP abdomen pelvis IV with PO contrast down NGT   - 1L bolus today; f/u PM BMP after repletions    - Pain relief: switched to IV pain meds; IV acetaminophen and 0.5 dilaudid for severe pain   - DVT ppx    Red Team Surgery  [id number]

## 2021-09-27 NOTE — PROVIDER CONTACT NOTE (CRITICAL VALUE NOTIFICATION) - BACKGROUND
Pt admitted for abd pain for a few months. Pt s/p whipple on 9/17
pt a&ox4, p/w abdominal pain x a few months. s/p whipple procedure
Pt s/p gregg and diagnostic lab on 9/17. BMP was redrawn this AM to check repletion of K+

## 2021-09-27 NOTE — PROGRESS NOTE ADULT - SUBJECTIVE AND OBJECTIVE BOX
C A R D I O L O G Y  **********************************     DATE OF SERVICE: 09-27-21     Resting comfortably, NGT in place. Denies chest pain or SOB. Review of systems otherwise (-)      MEDICATIONS  (STANDING):  chlorhexidine 2% Cloths 1 Application(s) Topical <User Schedule>  dextrose 5% + lactated ringers 1000 milliLiter(s) (100 mL/Hr) IV Continuous <Continuous>  enoxaparin Injectable 40 milliGRAM(s) SubCutaneous daily  erythromycin   IVPB 200 milliGRAM(s) IV Intermittent every 8 hours  influenza   Vaccine 0.5 milliLiter(s) IntraMuscular once  insulin lispro (ADMELOG) corrective regimen sliding scale   SubCutaneous every 4 hours  metoclopramide Injectable 10 milliGRAM(s) IV Push three times a day  pantoprazole  Injectable 40 milliGRAM(s) IV Push two times a day  Parenteral Nutrition - Adult 1 Each (50 mL/Hr) TPN Continuous <Continuous>  potassium phosphate IVPB 30 milliMole(s) IV Intermittent once    MEDICATIONS  (PRN):  acetaminophen  IVPB .. 750 milliGRAM(s) IV Intermittent every 6 hours PRN Mild Pain (1 - 3)  artificial tears (preservative free) Ophthalmic Solution 1 Drop(s) Both EYES two times a day PRN Dry Eyes  HYDROmorphone  Injectable 0.5 milliGRAM(s) IV Push every 4 hours PRN Severe Pain (7 - 10)  ondansetron    Tablet 4 milliGRAM(s) Oral every 6 hours PRN Nausea      LABS:                          8.7    6.58  )-----------( 355      ( 27 Sep 2021 07:12 )             26.2     Hemoglobin: 8.7 g/dL (09-27 @ 07:12)  Hemoglobin: 8.9 g/dL (09-26 @ 07:34)  Hemoglobin: 8.8 g/dL (09-24 @ 05:33)  Hemoglobin: 8.8 g/dL (09-23 @ 07:09)    09-27    137  |  104  |  4<L>  ----------------------------<  138<H>  3.0<L>   |  15<L>  |  0.56    Ca    8.1<L>      27 Sep 2021 07:10  Phos  2.3     09-27  Mg     2.0     09-27    TPro  5.1<L>  /  Alb  2.7<L>  /  TBili  0.5  /  DBili  x   /  AST  12  /  ALT  21  /  AlkPhos  133<H>  09-27    Creatinine Trend: 0.56<--, 0.65<--, 0.68<--, 0.79<--, 0.76<--, 0.65<--             09-26-21 @ 07:01  -  09-27-21 @ 07:00  --------------------------------------------------------  IN: 1710 mL / OUT: 4330 mL / NET: -2620 mL    09-27-21 @ 07:01  -  09-27-21 @ 12:31  --------------------------------------------------------  IN: 0 mL / OUT: 400 mL / NET: -400 mL        PHYSICAL EXAM  Vital Signs Last 24 Hrs  T(C): 37.2 (27 Sep 2021 09:11), Max: 37.2 (27 Sep 2021 09:11)  T(F): 99 (27 Sep 2021 09:11), Max: 99 (27 Sep 2021 09:11)  HR: 95 (27 Sep 2021 09:11) (82 - 100)  BP: 126/82 (27 Sep 2021 09:11) (125/71 - 147/66)  BP(mean): --  RR: 18 (27 Sep 2021 09:11) (18 - 18)  SpO2: 96% (27 Sep 2021 09:11) (96% - 99%)      Gen: Appears well in NAD  HEENT:  (-)icterus (-)pallor  CV: N S1 S2 1/6 PATRICIA (+)2 Pulses B/l  Resp:  Clear to auscultation B/L, normal effort  GI: (+) BS Soft, NT, ND  Lymph:  (-)Edema, (-)obvious lymphadenopathy  Skin: Warm to touch, Normal turgor  Psych: Appropriate mood and affect    TELEMETRY: None	      ASSESSMENT/PLAN: 76y Female with Hx of RA (on MTX), HLD, appendectomy, and L renal cell CA (s/p L nephrectomy 2019) who presented with transaminitis and an abnormal MRCP showing dilated PD and CBD, found to have a 1.7x1.8cm hypoechoic pancreatic head lesion on EUS on 9/9. She was taken to the OR for a Whipple on 9/17.    - No evidence of clinical HF or anginal symptoms  - TTE noted with normal LV function  - Surgery follow up - NGT in place  - No further inpatient cardiac w/u planned    Kyrei Torres PA-C  Pager: [phone]

## 2021-09-27 NOTE — CONSULT NOTE ADULT - CONSULT REQUESTED DATE/TIME
07-Sep-2021 19:47
13-Sep-2021 13:22
17-Sep-2021 15:30
27-Sep-2021 08:57
16-Sep-2021 11:37
16-Sep-2021 16:59

## 2021-09-27 NOTE — PROVIDER CONTACT NOTE (CRITICAL VALUE NOTIFICATION) - RECOMMENDATIONS
pt is currently being supplemented with K and labs will be repeated post administration.
Notify provider, check glucose via fingerstick on pt.
Notify provider

## 2021-09-27 NOTE — CHART NOTE - NSCHARTNOTEFT_GEN_A_CORE
Nutrition Follow Up Note     Patient seen for: malnutrition follow up and TPN Team consult 9/27    Source: medical record, TPN Team rounds. Pt OOB to chair.    Chart reviewed, events noted. "76y Female with Hx of RA (on MTX), HLD, appendectomy, and L renal cell CA (s/p L nephrectomy 2019) who presented with transaminitis and an abnormal MRCP showing dilated PD and CBD, found to have a 1.7x1.8cm hypoechoic pancreatic head lesion on EUS on 9/9. She was taken to the OR on 9/17 for a Whipple on 9/17 and transferred to the SICU extubated post-operatively for hemodynamic monitoring. Patient had NGT placed (9/26) due to severely distended stomach on AXR, now POD 10, recovering on the floor with likely delayed gastric emptying."    Nutrition Status:  - Diet History: Pt has been ordered for NPO vs Clear Liqiuds vs Soft diet, over course of 20 days in-house.  - TPN Team consulted 9/27 in setting of distended stomach, high NGT output, and concern for delayed gastric emptying. TPN to be initiated 9/27 pending PICC placement.  - Pt noted with hypokalemia which may adversely affect gastric motility. Potassium increased in TPN accordingly.   - Hypophosphatemia noted; supplemented with NaPhos. TPN initiated with 45 mmol NaPhos accordingly.  - Low bicarb noted; TPN in initiated with 80mEq NaAcetate  - Reglan and Erythromycin ordered  - IVF: D5 lactated ringers @ 100 ml/hr  - Insulin (5 units) added to TPN bag on day 1    Diet Order: Diet, NPO (09-26-21 @ 17:26)    PARENTERAL NUTRITION:  Day 1 (9/27): Half-dose amino acids and dextrose; no lipid; 1.4L total volume to accommodate Ca:P ratio  Day 2: Full-dose amino acids and dextrose; half-dose lipid     Day 3: TPN GOAL recommendations:  90 grams amino acids (600ml 15% a.a.)  140 grams dextrose (200ml 70% dex)  50 grams SMOFlipid (250ml 20%IVFE @ 20.8ml/hr x 12 hours)    Provides: 1336 kcal/day (27.8 kcal/kg and 1.9 gm/kg protein per dosing wt 47.9kg)    Non-Protein Calories: 976 kcal/day (20 kcal/kg)  Dextrose Infusion Rate: 2 mg/kg/min  Lipid Infusion Rate: 1.0 gm/kg/day; 0.087 gm/kg/hr    Electrolytes and Insulin: (ordered 9/27/21)  Insulin (units): 5  NaCl (mEq):  40  Na acetate (mEq): 80  Na Phos (mmol): 45  KCL (mEq):  100  Calcium gluconate (mEq): 10  Mg sulfate (mEq): 12  MTE-5 concentrate (ml): 1  MVI (ml): 10     GI:  Last BM: 9/24 (x3), 9/25 (small, s/p enema/suppository), +flatus 9/27  NGT output x 24-hrs: 2830 ml    Anthropometric Measurements:   Height (cm): 154.9 (09-17-21 @ 07:24)  DOSING Weight (kg):   53.5 (09-07-21)  47.9 (09-17-21) *used for calculations  BMI (kg/m2): 20 (09-17-21 @ 19:00)  IBW: 47.6 kg  UBW (per pt): 52.1 kg    Medications: MEDICATIONS  (STANDING):  chlorhexidine 2% Cloths 1 Application(s) Topical <User Schedule>  dextrose 5% + lactated ringers 1000 milliLiter(s) (100 mL/Hr) IV Continuous <Continuous>  enoxaparin Injectable 40 milliGRAM(s) SubCutaneous daily  erythromycin   IVPB 200 milliGRAM(s) IV Intermittent every 8 hours  influenza   Vaccine 0.5 milliLiter(s) IntraMuscular once  insulin lispro (ADMELOG) corrective regimen sliding scale   SubCutaneous every 4 hours  metoclopramide Injectable 10 milliGRAM(s) IV Push three times a day  pantoprazole  Injectable 40 milliGRAM(s) IV Push two times a day  Parenteral Nutrition - Adult 1 Each (58 mL/Hr) TPN Continuous <Continuous>    MEDICATIONS  (PRN):  acetaminophen  IVPB .. 750 milliGRAM(s) IV Intermittent every 6 hours PRN Mild Pain (1 - 3)  artificial tears (preservative free) Ophthalmic Solution 1 Drop(s) Both EYES two times a day PRN Dry Eyes  HYDROmorphone  Injectable 0.5 milliGRAM(s) IV Push every 4 hours PRN Severe Pain (7 - 10)  ondansetron    Tablet 4 milliGRAM(s) Oral every 6 hours PRN Nausea      Labs: 09-27 @ 07:10: Sodium 137, Potassium 3.0<L>, Calcium 8.1<L>, Magnesium 2.0, Phosphorus 2.3<L>, BUN 4<L>, Creatinine 0.56, Glucose 138<H>, Total Bilirubin 0.5, Albumin, 2.7<L>    Hemoglobin : 8.7 g/dL  Hematocrit : 26.2 %    LIVER FUNCTIONS - ( 27 Sep 2021 07:10 )  Alb: 2.7 g/dL / Pro: 5.1 g/dL / ALK PHOS: 133 U/L / ALT: 21 U/L / AST: 12 U/L / GGT: x           Hemoglobin A1C 5.7%    POCT Blood Glucose.: 139 mg/dL (09-27-21 @ 10:15)  POCT Blood Glucose.: 140 mg/dL (09-27-21 @ 06:07)  POCT Blood Glucose.: 111 mg/dL (09-27-21 @ 02:27)  POCT Blood Glucose.: 164 mg/dL (09-26-21 @ 21:55)  POCT Blood Glucose.: 83 mg/dL (09-26-21 @ 17:54)  POCT Blood Glucose.: 79 mg/dL (09-26-21 @ 14:28)    Skin: no pressure injuries documented  Edema: no edema documented     Estimated Needs: based on dosing wt 47.9 kg, with consideration for TPN  Energy: 1811-1901 calories (25-30 kcal/kg)  Protein: 77-96 grams (1.6-2 gm/kg)    Previous Nutrition Diagnosis: Moderate Malnutrition  Nutrition Diagnosis is: ongoing, addressed with TPN     New Nutrition Diagnosis:  none    Recommended Interventions:   1. TPN per TPN Team/Nutrition Assessment; plan to initiate TPN 9/27, pending PICC placement  2. Obtain triglycerides, baseline prealbumin  3. Monitor NGT output, GI function, and acid-base balance      Monitoring and Evaluation:   Continue to monitor nutrition provision and tolerance, weights, labs, skin integrity.   RD remains available upon request and will follow up per protocol.    Tori Sands, MS RD CDN [facility], #[phone]

## 2021-09-27 NOTE — CONSULT NOTE ADULT - ASSESSMENT
A/P  77 y/o F POD #10 s/p Whipple with ileus                TPN pager [phone], spectra [phone]  M-F 6A-4P, Weekends and holidays 8A-12P  discussed with Dr. Eckert and Dr. ROSANA Dejesus      A/P  77 y/o F POD #10 s/p Whipple with gastroparesis, NPO/NGT    TPN Goals:  90g AA, 140g dextrose, 50g lipids    - TPN approved, TPN to start at half goal without lipids:  45g AA, 140g dextrose in 1200cc total volume  - PICC to be placed - please obtain consent and call PICC nurse [phone] when consent obtained  - HgA1C 5.7, will add 5U insulin to TPN  - FS q 6 hours with ISS coverage  - hypokalemia - KCl started at 100meq KCl  - hypophosphatemia - NaPhos started at 45mmol  - low bicarb - NaAcetate started at 80meq  - lipid panel, prealbumin ordered  - daily CMP, Mg, phos, iCa  - please obtain ABG with lytes, lactate    TPN pager [phone], spectra [phone]  M-F 6A-4P, Weekends and holidays 8A-12P  discussed with Dr. Eckert and Dr. ROSANA Dejesus                   TPN pager [phone], spectra [phone]  M-F 6A-4P, Weekends and holidays 8A-12P  discussed with Dr. Eckert and Dr. ROSANA Dejesus      A/P  75 y/o F POD #10 s/p Whipple with gastroparesis, NPO/NGT    TPN Goals:  90g AA, 140g dextrose, 50g lipids    - TPN approved, TPN to start at half goal without lipids:  45g AA, 70g dextrose in 1200cc total volume  - PICC to be placed - please obtain consent and call PICC nurse [phone] when consent obtained  - HgA1C 5.7, will add 5U insulin to TPN  - FS q 6 hours with ISS coverage  - IV lock when TPN commences  - hypokalemia - KCl started at 100meq KCl  - hypophosphatemia - NaPhos started at 45mmol  - low bicarb - NaAcetate started at 80meq  - lipid panel, prealbumin ordered  - daily CMP, Mg, phos, iCa  - please obtain ABG with lytes, lactate    TPN pager [phone], spectra [phone]  M-F 6A-4P, Weekends and holidays 8A-12P  discussed with Dr. Eckert and Dr. ROSANA Dejesus                   TPN pager [phone], spectra [phone]  M-F 6A-4P, Weekends and holidays 8A-12P  discussed with Dr. Eckert and Dr. ROSANA Dejesus      A/P  77 y/o F POD #10 s/p Whipple with gastroparesis, NPO/NGT    TPN Goals:  90g AA, 140g dextrose, 50g lipids    - TPN approved, TPN to start at half goal without lipids:  45g AA, 70g dextrose in 1200cc total volume  - PICC to be placed - please obtain consent and call PICC nurse [phone] when consent obtained  - HgA1C 5.7, will add 5U insulin to TPN  - FS q 6 hours with ISS coverage  - IV lock when TPN commences  - hypokalemia - KCl started at 100meq KCl  - hypophosphatemia - NaPhos started at 45mmol  - low bicarb - NaAcetate started at 80meq  - lipid panel, prealbumin ordered  - daily CMP, Mg, phos, iCa  - please obtain ABG with lytes, lactate  - care per surgery    TPN pager [phone], spectra [phone]  M-F 6A-4P, Weekends and holidays 8A-12P  discussed with Dr. Eckert and Dr. ROSANA Dejesus              A/P  77 y/o F POD #10 s/p Whipple with gastroparesis, NPO/NGT    TPN Goals:  90g AA, 140g dextrose, 50g lipids    - TPN approved, TPN to start at half goal without lipids:  45g AA, 70g dextrose in 1400cc total volume  - minimum volume 1400cc due to Ca:Phos  - PICC to be placed - please obtain consent and call PICC nurse [phone] when consent obtained  - HgA1C 5.7, will add 5U insulin to TPN  - FS q 6 hours with ISS coverage  - IV lock when TPN commences  - hypokalemia - KCl started at 100meq KCl  - hypophosphatemia - NaPhos started at 45mmol  - low bicarb - NaAcetate started at 80meq  - lipid panel, prealbumin ordered  - daily CMP, Mg, phos, iCa  - please obtain ABG with lytes, lactate  - care per surgery    TPN pager [phone], spectra [phone]  M-F 6A-4P, Weekends and holidays 8A-12P  discussed with Dr. Eckert and Dr. ROSANA Dejesus              A/P  77 y/o female with pmhx of rheumatoid arthritis, high cholesterol, appendectomy, and left nephrectomy POD #10 s/p Whipple with gastroparesis, NPO/NGT    TPN Goals:  90g AA, 140g dextrose, 50g lipids    - TPN approved, TPN to start at half goal without lipids:  45g AA, 70g dextrose in 1400cc total volume  - minimum volume 1400cc due to Ca:Phos  - PICC to be placed - please obtain consent and call PICC nurse [id number] when consent obtained  - HgA1C 5.7, will add 5U insulin to TPN  - FS q 6 hours with ISS coverage  - IV lock when TPN commences  - hypokalemia - KCl started at 100meq KCl  - hypophosphatemia - NaPhos started at 45mmol  - low bicarb - NaAcetate started at 80meq  - lipid panel, prealbumin ordered  - daily CMP, Mg, phos, iCa  - please obtain ABG with lytes, lactate  - care per surgery    TPN pager [phone], spectra [phone]  M-F 6A-4P, Weekends and holidays 8A-12P  discussed with Dr. Eckert and Dr. ROSANA Dejesus

## 2021-09-28 LAB
ALBUMIN SERPL ELPH-MCNC: 2.9 G/DL — LOW (ref 3.3–5)
ALP SERPL-CCNC: 127 U/L — HIGH (ref 40–120)
ALT FLD-CCNC: 18 U/L — SIGNIFICANT CHANGE UP (ref 10–45)
ANION GAP SERPL CALC-SCNC: 10 MMOL/L — SIGNIFICANT CHANGE UP (ref 5–17)
ANION GAP SERPL CALC-SCNC: 11 MMOL/L — SIGNIFICANT CHANGE UP (ref 5–17)
APPEARANCE UR: CLEAR — SIGNIFICANT CHANGE UP
AST SERPL-CCNC: 13 U/L — SIGNIFICANT CHANGE UP (ref 10–40)
BILIRUB SERPL-MCNC: 0.5 MG/DL — SIGNIFICANT CHANGE UP (ref 0.2–1.2)
BILIRUB UR-MCNC: NEGATIVE — SIGNIFICANT CHANGE UP
BUN SERPL-MCNC: <4 MG/DL — LOW (ref 7–23)
BUN SERPL-MCNC: <4 MG/DL — LOW (ref 7–23)
CA-I BLD-SCNC: 1.21 MMOL/L — SIGNIFICANT CHANGE UP (ref 1.15–1.33)
CALCIUM SERPL-MCNC: 8.3 MG/DL — LOW (ref 8.4–10.5)
CALCIUM SERPL-MCNC: 8.5 MG/DL — SIGNIFICANT CHANGE UP (ref 8.4–10.5)
CHLORIDE SERPL-SCNC: 103 MMOL/L — SIGNIFICANT CHANGE UP (ref 96–108)
CHLORIDE SERPL-SCNC: 106 MMOL/L — SIGNIFICANT CHANGE UP (ref 96–108)
CHOLEST SERPL-MCNC: 151 MG/DL — SIGNIFICANT CHANGE UP
CO2 SERPL-SCNC: 23 MMOL/L — SIGNIFICANT CHANGE UP (ref 22–31)
CO2 SERPL-SCNC: 24 MMOL/L — SIGNIFICANT CHANGE UP (ref 22–31)
COLOR SPEC: SIGNIFICANT CHANGE UP
CREAT SERPL-MCNC: 0.48 MG/DL — LOW (ref 0.5–1.3)
CREAT SERPL-MCNC: 0.49 MG/DL — LOW (ref 0.5–1.3)
DIFF PNL FLD: NEGATIVE — SIGNIFICANT CHANGE UP
GLUCOSE BLDC GLUCOMTR-MCNC: 127 MG/DL — HIGH (ref 70–99)
GLUCOSE BLDC GLUCOMTR-MCNC: 134 MG/DL — HIGH (ref 70–99)
GLUCOSE BLDC GLUCOMTR-MCNC: 155 MG/DL — HIGH (ref 70–99)
GLUCOSE BLDC GLUCOMTR-MCNC: 156 MG/DL — HIGH (ref 70–99)
GLUCOSE BLDC GLUCOMTR-MCNC: 158 MG/DL — HIGH (ref 70–99)
GLUCOSE BLDC GLUCOMTR-MCNC: 88 MG/DL — SIGNIFICANT CHANGE UP (ref 70–99)
GLUCOSE SERPL-MCNC: 121 MG/DL — HIGH (ref 70–99)
GLUCOSE SERPL-MCNC: 133 MG/DL — HIGH (ref 70–99)
GLUCOSE UR QL: NEGATIVE — SIGNIFICANT CHANGE UP
HCT VFR BLD CALC: 25.8 % — LOW (ref 34.5–45)
HDLC SERPL-MCNC: 56 MG/DL — SIGNIFICANT CHANGE UP
HGB BLD-MCNC: 8.7 G/DL — LOW (ref 11.5–15.5)
KETONES UR-MCNC: ABNORMAL
LEUKOCYTE ESTERASE UR-ACNC: NEGATIVE — SIGNIFICANT CHANGE UP
LIPID PNL WITH DIRECT LDL SERPL: 65 MG/DL — SIGNIFICANT CHANGE UP
MAGNESIUM SERPL-MCNC: 2.1 MG/DL — SIGNIFICANT CHANGE UP (ref 1.6–2.6)
MCHC RBC-ENTMCNC: 32.3 PG — SIGNIFICANT CHANGE UP (ref 27–34)
MCHC RBC-ENTMCNC: 33.7 GM/DL — SIGNIFICANT CHANGE UP (ref 32–36)
MCV RBC AUTO: 95.9 FL — SIGNIFICANT CHANGE UP (ref 80–100)
NITRITE UR-MCNC: NEGATIVE — SIGNIFICANT CHANGE UP
NON HDL CHOLESTEROL: 95 MG/DL — SIGNIFICANT CHANGE UP
NRBC # BLD: 0 /100 WBCS — SIGNIFICANT CHANGE UP (ref 0–0)
PH UR: 7 — SIGNIFICANT CHANGE UP (ref 5–8)
PHOSPHATE SERPL-MCNC: 2.8 MG/DL — SIGNIFICANT CHANGE UP (ref 2.5–4.5)
PLATELET # BLD AUTO: 335 K/UL — SIGNIFICANT CHANGE UP (ref 150–400)
POTASSIUM SERPL-MCNC: 3.3 MMOL/L — LOW (ref 3.5–5.3)
POTASSIUM SERPL-MCNC: 3.8 MMOL/L — SIGNIFICANT CHANGE UP (ref 3.5–5.3)
POTASSIUM SERPL-SCNC: 3.3 MMOL/L — LOW (ref 3.5–5.3)
POTASSIUM SERPL-SCNC: 3.8 MMOL/L — SIGNIFICANT CHANGE UP (ref 3.5–5.3)
PREALB SERPL-MCNC: 8 MG/DL — LOW (ref 20–40)
PROT SERPL-MCNC: 5.4 G/DL — LOW (ref 6–8.3)
PROT UR-MCNC: NEGATIVE — SIGNIFICANT CHANGE UP
RBC # BLD: 2.69 M/UL — LOW (ref 3.8–5.2)
RBC # FLD: 15.5 % — HIGH (ref 10.3–14.5)
SODIUM SERPL-SCNC: 138 MMOL/L — SIGNIFICANT CHANGE UP (ref 135–145)
SODIUM SERPL-SCNC: 139 MMOL/L — SIGNIFICANT CHANGE UP (ref 135–145)
SP GR SPEC: 1.01 — SIGNIFICANT CHANGE UP (ref 1.01–1.02)
TRIGL SERPL-MCNC: 149 MG/DL — SIGNIFICANT CHANGE UP
UROBILINOGEN FLD QL: NEGATIVE — SIGNIFICANT CHANGE UP
WBC # BLD: 7.17 K/UL — SIGNIFICANT CHANGE UP (ref 3.8–10.5)
WBC # FLD AUTO: 7.17 K/UL — SIGNIFICANT CHANGE UP (ref 3.8–10.5)

## 2021-09-28 PROCEDURE — 99024 POSTOP FOLLOW-UP VISIT: CPT

## 2021-09-28 PROCEDURE — 99232 SBSQ HOSP IP/OBS MODERATE 35: CPT

## 2021-09-28 RX ORDER — I.V. FAT EMULSION 20 G/100ML
10.4 EMULSION INTRAVENOUS
Qty: 25 | Refills: 0 | Status: DISCONTINUED | OUTPATIENT
Start: 2021-09-28 | End: 2021-09-29

## 2021-09-28 RX ORDER — ELECTROLYTE SOLUTION,INJ
1 VIAL (ML) INTRAVENOUS
Refills: 0 | Status: DISCONTINUED | OUTPATIENT
Start: 2021-09-28 | End: 2021-09-28

## 2021-09-28 RX ADMIN — Medication 250 MILLIGRAM(S): at 08:26

## 2021-09-28 RX ADMIN — Medication 250 MILLIGRAM(S): at 15:05

## 2021-09-28 RX ADMIN — Medication 10 MILLIGRAM(S): at 15:05

## 2021-09-28 RX ADMIN — PANTOPRAZOLE SODIUM 40 MILLIGRAM(S): 20 TABLET, DELAYED RELEASE ORAL at 05:28

## 2021-09-28 RX ADMIN — CHLORHEXIDINE GLUCONATE 1 APPLICATION(S): 213 SOLUTION TOPICAL at 07:19

## 2021-09-28 RX ADMIN — Medication 1 EACH: at 17:20

## 2021-09-28 RX ADMIN — ENOXAPARIN SODIUM 40 MILLIGRAM(S): 100 INJECTION SUBCUTANEOUS at 12:05

## 2021-09-28 RX ADMIN — Medication 2: at 18:39

## 2021-09-28 RX ADMIN — Medication 10 MILLIGRAM(S): at 22:10

## 2021-09-28 RX ADMIN — Medication 10 MILLIGRAM(S): at 05:28

## 2021-09-28 RX ADMIN — Medication 2: at 06:56

## 2021-09-28 RX ADMIN — CHLORHEXIDINE GLUCONATE 1 APPLICATION(S): 213 SOLUTION TOPICAL at 05:37

## 2021-09-28 RX ADMIN — Medication 2: at 22:00

## 2021-09-28 RX ADMIN — PANTOPRAZOLE SODIUM 40 MILLIGRAM(S): 20 TABLET, DELAYED RELEASE ORAL at 17:20

## 2021-09-28 RX ADMIN — Medication 10 MILLIGRAM(S): at 21:59

## 2021-09-28 RX ADMIN — I.V. FAT EMULSION 10.4 ML/HR: 20 EMULSION INTRAVENOUS at 17:19

## 2021-09-28 RX ADMIN — Medication 250 MILLIGRAM(S): at 00:31

## 2021-09-28 NOTE — PROGRESS NOTE ADULT - SUBJECTIVE AND OBJECTIVE BOX
C A R D I O L O G Y  **********************************     DATE OF SERVICE: 09-28-21     No distress. NGT remains in place. Denies chest pain or SOB. Review of systems otherwise (-)      MEDICATIONS  (STANDING):  chlorhexidine 2% Cloths 1 Application(s) Topical <User Schedule>  chlorhexidine 4% Liquid 1 Application(s) Topical <User Schedule>  enoxaparin Injectable 40 milliGRAM(s) SubCutaneous daily  erythromycin   IVPB 200 milliGRAM(s) IV Intermittent every 8 hours  influenza   Vaccine 0.5 milliLiter(s) IntraMuscular once  insulin lispro (ADMELOG) corrective regimen sliding scale   SubCutaneous every 4 hours  metoclopramide Injectable 10 milliGRAM(s) IV Push three times a day  pantoprazole  Injectable 40 milliGRAM(s) IV Push two times a day  Parenteral Nutrition - Adult 1 Each (58 mL/Hr) TPN Continuous <Continuous>    MEDICATIONS  (PRN):  acetaminophen  IVPB .. 750 milliGRAM(s) IV Intermittent every 6 hours PRN Mild Pain (1 - 3)  artificial tears (preservative free) Ophthalmic Solution 1 Drop(s) Both EYES two times a day PRN Dry Eyes  HYDROmorphone  Injectable 0.5 milliGRAM(s) IV Push every 4 hours PRN Severe Pain (7 - 10)  ondansetron    Tablet 4 milliGRAM(s) Oral every 6 hours PRN Nausea  sodium chloride 0.9% lock flush 10 milliLiter(s) IV Push every 1 hour PRN Pre/post blood products, medications, blood draw, and to maintain line patency      LABS:                          8.7    7.17  )-----------( 335      ( 28 Sep 2021 07:12 )             25.8     Hemoglobin: 8.7 g/dL (09-28 @ 07:12)  Hemoglobin: 8.7 g/dL (09-27 @ 07:12)  Hemoglobin: 8.9 g/dL (09-26 @ 07:34)  Hemoglobin: 8.8 g/dL (09-24 @ 05:33)    09-28    138  |  103  |  <4<L>  ----------------------------<  121<H>  3.3<L>   |  24  |  0.48<L>    Ca    8.5      28 Sep 2021 07:11  Phos  2.8     09-28  Mg     2.1     09-28    TPro  5.4<L>  /  Alb  2.9<L>  /  TBili  0.5  /  DBili  x   /  AST  13  /  ALT  18  /  AlkPhos  127<H>  09-28    Creatinine Trend: 0.48<--, 0.49<--, 0.51<--, 0.56<--, 0.65<--, 0.68<--             09-27-21 @ 07:01  -  09-28-21 @ 07:00  --------------------------------------------------------  IN: 2554 mL / OUT: 2450 mL / NET: 104 mL    09-28-21 @ 07:01  -  09-28-21 @ 10:37  --------------------------------------------------------  IN: 0 mL / OUT: 60 mL / NET: -60 mL        PHYSICAL EXAM  Vital Signs Last 24 Hrs  T(C): 36.7 (28 Sep 2021 09:12), Max: 37.4 (28 Sep 2021 05:06)  T(F): 98 (28 Sep 2021 09:12), Max: 99.3 (28 Sep 2021 05:06)  HR: 94 (28 Sep 2021 09:12) (89 - 100)  BP: 119/79 (28 Sep 2021 09:12) (115/72 - 125/64)  BP(mean): --  RR: 18 (28 Sep 2021 09:12) (18 - 18)  SpO2: 97% (28 Sep 2021 09:12) (95% - 99%)      Gen: Appears well in NAD  HEENT:  (-)icterus (-)pallor  CV: N S1 S2 1/6 PATRICIA (+)2 Pulses B/l  Resp:  Clear to auscultation B/L, normal effort  GI: (+) BS Soft, NT, ND  Lymph:  (-)Edema, (-)obvious lymphadenopathy  Skin: Warm to touch, Normal turgor  Psych: Appropriate mood and affect    TELEMETRY: None	      ASSESSMENT/PLAN: 76y Female with Hx of RA (on MTX), HLD, appendectomy, and L renal cell CA (s/p L nephrectomy 2019) who presented with transaminitis and an abnormal MRCP showing dilated PD and CBD, found to have a 1.7x1.8cm hypoechoic pancreatic head lesion on EUS on 9/9. She was taken to the OR for a Whipple on 9/17.    - Remains stable from CV perspective  - No evidence of clinical HF or anginal symptoms  - TTE noted with normal LV function  - Surgery follow up - NGT in place  - No further inpatient cardiac w/u planned    Kyrie Torres PA-C  Pager: [phone]

## 2021-09-28 NOTE — PROGRESS NOTE ADULT - ATTENDING COMMENTS
CT w suspicion of edema at [facility]  TPN/NGT for now    Left a message for pt son yesterday updating him stable

## 2021-09-28 NOTE — PROGRESS NOTE ADULT - ASSESSMENT
76y Female with Hx of RA (on MTX), HLD, appendectomy, and L renal cell CA (s/p L nephrectomy 2019) who presented with transaminitis and an abnormal MRCP showing dilated PD and CBD, found to have a 1.7x1.8cm hypoechoic pancreatic head lesion on EUS on 9/9. She was taken to the OR on 9/17 for a Whipple on 9/17 and transferred to the SICU extubated post-operatively for hemodynamic monitoring. Patient had NGT placed (9/26) due to severely distended stomach on AXR, now POD 10, recovering on the floor with likely delayed gastric emptying.     Plan:         Red Team Surgery  [id number]

## 2021-09-28 NOTE — PROGRESS NOTE ADULT - SUBJECTIVE AND OBJECTIVE BOX
[facility] NUTRITION SUPPORT--  Attending/ PA FOLLOW UP NOTE      24 hour events/subjective:     TPN originally consulted for nutrition support and started TPN on 9/27.  Pt is tolerating without  issues, and denies palpitations, chest pain, shortness of breath. Pt further denies fevers, chills nor night sweats. Denies nausea nor vomiting nor abdominal pain. Reports flatus yesterday, but none today. Denies any BM.        PAST HISTORY  --------------------------------------------------------------------------------  No significant changes to PMH, PSH, FHx, SHx, unless otherwise noted    ALLERGIES & MEDICATIONS  --------------------------------------------------------------------------------  Allergies    No Known Allergies    Intolerances      Standing Inpatient Medications  chlorhexidine 2% Cloths 1 Application(s) Topical <User Schedule>  chlorhexidine 4% Liquid 1 Application(s) Topical <User Schedule>  enoxaparin Injectable 40 milliGRAM(s) SubCutaneous daily  erythromycin   IVPB 200 milliGRAM(s) IV Intermittent every 8 hours  fat emulsion (Fish Oil and Plant Based) 20% Infusion 10.4 mL/Hr IV Continuous <Continuous>  influenza   Vaccine 0.5 milliLiter(s) IntraMuscular once  insulin lispro (ADMELOG) corrective regimen sliding scale   SubCutaneous every 4 hours  metoclopramide Injectable 10 milliGRAM(s) IV Push three times a day  pantoprazole  Injectable 40 milliGRAM(s) IV Push two times a day  Parenteral Nutrition - Adult 1 Each TPN Continuous <Continuous>  Parenteral Nutrition - Adult 1 Each TPN Continuous <Continuous>    PRN Inpatient Medications  acetaminophen  IVPB .. 750 milliGRAM(s) IV Intermittent every 6 hours PRN  artificial tears (preservative free) Ophthalmic Solution 1 Drop(s) Both EYES two times a day PRN  HYDROmorphone  Injectable 0.5 milliGRAM(s) IV Push every 4 hours PRN  ondansetron    Tablet 4 milliGRAM(s) Oral every 6 hours PRN  sodium chloride 0.9% lock flush 10 milliLiter(s) IV Push every 1 hour PRN      REVIEW OF SYSTEMS  --------------------------------------------------------------------------------  Gen: as per HPI  Skin: No rashes  Head/Eyes/Ears/Mouth: No headache;No sore throat  Respiratory: No dyspnea, cough,   CV: No chest pain, PND, orthopnea  GI: as per HPI  : No increased frequency, dysuria, hematuria, nocturia  MSK: No joint pain/swelling; no back pain; no edema  Neuro: No dizziness/lightheadedness, weakness, seizures, numbness, tingling  Psych: No significant nervousness, anxiety, stress, depression    All other systems were reviewed and are negative, except as noted.      LABS/STUDIES  --------------------------------------------------------------------------------              8.7    7.17  >-----------<  335      [09-28-21 @ 07:12]              25.8     138  |  103  |  <4  ----------------------------<  121      [09-28-21 @ 07:11]  3.3   |  24  |  0.48        Ca     8.5     [09-28-21 @ 07:11]      iCa    1.21     [09-28 @ 07:26]      Mg     2.1     [09-28-21 @ 07:11]      Phos  2.8     [09-28-21 @ 07:11]    TPro  5.4  /  Alb  2.9  /  TBili  0.5  /  DBili  x   /  AST  13  /  ALT  18  /  AlkPhos  127  [09-28-21 @ 07:11]          Blood Gas Arterial - Calcium, Ionized: 1.19 mmol/L (09-27-21 @ 12:59)    Glucose, Serum: 121 mg/dL (09-28-21 @ 07:11)  Glucose, Serum: 133 mg/dL (09-27-21 @ 23:55)  Glucose, Serum: 322 mg/dL (09-27-21 @ 22:42)    Prealbumin, Serum: 8 mg/dL (09-28-21 @ 11:24)          09-27-21 @ 07:01  -  09-28-21 @ 07:00  --------------------------------------------------------  IN: 2554 mL / OUT: 2450 mL / NET: 104 mL    09-28-21 @ 07:01  -  09-28-21 @ 12:32  --------------------------------------------------------  IN: 0 mL / OUT: 60 mL / NET: -60 mL        VITALS/PHYSICAL EXAM  --------------------------------------------------------------------------------  T(C): 36.7 (09-28-21 @ 09:12), Max: 37.4 (09-28-21 @ 05:06)  HR: 94 (09-28-21 @ 09:12) (89 - 100)  BP: 119/79 (09-28-21 @ 09:12) (115/72 - 125/64)  RR: 18 (09-28-21 @ 09:12) (18 - 18)  SpO2: 97% (09-28-21 @ 09:12) (95% - 99%)  Wt(kg): --    Physical Exam:    	Gen: NAD, well-appearing  	HEENT: PERRL, NGT in place bilious output              Neck: Trachea midline, no noted JVD              Chest: non labored breathing, equal chest expansion b/l  	Abd: nondistended, NT soft,  abdominal SOIs are CDI  	UE: Warm, FROM, intact strength; no edeme LUE PICC dressing CDI.  	LE: Warm, FROM, intact strength; no edema  	Neuro: AOx3  	Psych: Normal affect and mood  	Skin: Warm, without rashes  .  .  .

## 2021-09-28 NOTE — PROGRESS NOTE ADULT - SUBJECTIVE AND OBJECTIVE BOX
TEAM Surgery Progress Note  Patient is a 76y old  Female who presents with a chief complaint of Abdominal Pain (16 Sep 2021 11:37)      INTERVAL EVENTS:     SUBJECTIVE:       OBJECTIVE:    Vital Signs Last 24 Hrs  T(C): 36.8 (26 Sep 2021 01:03), Max: 37.2 (25 Sep 2021 14:10)  T(F): 98.3 (26 Sep 2021 01:03), Max: 98.9 (25 Sep 2021 14:10)  HR: 84 (26 Sep 2021 01:03) (84 - 97)  BP: 132/75 (26 Sep 2021 01:03) (124/81 - 135/80)  BP(mean): --  RR: 18 (26 Sep 2021 01:03) (17 - 18)  SpO2: 98% (26 Sep 2021 01:03) (97% - 99%)I&O's Detail    24 Sep 2021 07:01  -  25 Sep 2021 07:00  --------------------------------------------------------  IN:    IV PiggyBack: 250 mL    Oral Fluid: 1040 mL  Total IN: 1290 mL    OUT:    Voided (mL): 1550 mL  Total OUT: 1550 mL    Total NET: -260 mL      25 Sep 2021 07:01  -  26 Sep 2021 03:42  --------------------------------------------------------  IN:    Oral Fluid: 520 mL  Total IN: 520 mL    OUT:    Voided (mL): 700 mL  Total OUT: 700 mL    Total NET: -180 mL      MEDICATIONS  (STANDING):  acetaminophen   Tablet .. 650 milliGRAM(s) Oral every 6 hours  chlorhexidine 2% Cloths 1 Application(s) Topical <User Schedule>  enoxaparin Injectable 40 milliGRAM(s) SubCutaneous daily  erythromycin   IVPB 200 milliGRAM(s) IV Intermittent every 8 hours  ibuprofen  Tablet. 400 milliGRAM(s) Oral every 6 hours  influenza   Vaccine 0.5 milliLiter(s) IntraMuscular once  insulin lispro (ADMELOG) corrective regimen sliding scale   SubCutaneous every 4 hours  metoclopramide Injectable 10 milliGRAM(s) IV Push three times a day  pantoprazole  Injectable 40 milliGRAM(s) IV Push two times a day  polyethylene glycol 3350 17 Gram(s) Oral daily    MEDICATIONS  (PRN):  artificial tears (preservative free) Ophthalmic Solution 1 Drop(s) Both EYES two times a day PRN Dry Eyes  ondansetron    Tablet 4 milliGRAM(s) Oral every 6 hours PRN Nausea  oxyCODONE    IR 5 milliGRAM(s) Oral every 4 hours PRN Moderate Pain (4 - 6)      PHYSICAL EXAM:  GEN: resting in bed, appears dejected, NGT in place  RESP: no increased WOB  ABD: soft, non-distended, non-tender without rebound tenderness or guarding. Incisions are c/d/i  EXTR: warm, well-perfused, no edema    LABS:                        8.8    9.42  )-----------( 329      ( 24 Sep 2021 05:33 )             27.3     09-24    134<L>  |  100  |  12  ----------------------------<  84  4.0   |  14<L>  |  0.68    Ca    8.3<L>      24 Sep 2021 05:33  Phos  2.7     09-24  Mg     2.0     09-24    TPro  5.5<L>  /  Alb  3.0<L>  /  TBili  0.6  /  DBili  x   /  AST  20  /  ALT  37  /  AlkPhos  182<H>  09-24      LIVER FUNCTIONS - ( 24 Sep 2021 05:33 )  Alb: 3.0 g/dL / Pro: 5.5 g/dL / ALK PHOS: 182 U/L / ALT: 37 U/L / AST: 20 U/L / GGT: x                 IMAGING:

## 2021-09-28 NOTE — PROGRESS NOTE ADULT - ASSESSMENT
75 y/o female with pmhx of rheumatoid arthritis, high cholesterol, appendectomy, and left nephrectomy POD #10 s/p Whipple with gastroparesis, NPO/NGT    TPN Goals:  90g AA, 140g dextrose, 50g lipids    - Increase to goal CHO/AA in TPN and add half goal SMOF today @1.4l total volume.  - Nutritional Assessment. Pt still NPO. Please obtain prealbumin weekly. Prealbumin noted to be 8 at baseline on 9/28.  - PICC in place LUE. Maintenance as per protocol  - Hyperglycemic Control- HgA1C 5.7, have 5U insulin in TPN, please obtain q6 fingersticks with ISS coverage. Fingersticks noted to be  over 24hrs. May need to increase insuln in TPN bag tomorrow pending fingersticks over next 24hrs, since CHO (Carbohydrates) load increased to goal today.  - Electrolyte Imbalance Risk. -Obtain daily CMP, Mg, phos, iCa. Will replete lytes in TPN bag daily as needed. Hypokalemia - KCl at 100meq KCl. Hypophosphatemia - NaPhos @ 45mmol. Low bicarb - NaAcetate @ 80meq  - Please obtain Triglycerides daily for on goal SMOF for 3 days, then can check weekly.  - Care per surgery    TPN pager [phone], spectra [phone]  M-F 6A-4P, Weekends and holidays 8A-12P  discussed with Dr. Eckert

## 2021-09-29 LAB
ALBUMIN SERPL ELPH-MCNC: 3.1 G/DL — LOW (ref 3.3–5)
ALP SERPL-CCNC: 136 U/L — HIGH (ref 40–120)
ALT FLD-CCNC: 17 U/L — SIGNIFICANT CHANGE UP (ref 10–45)
ANION GAP SERPL CALC-SCNC: 13 MMOL/L — SIGNIFICANT CHANGE UP (ref 5–17)
APPEARANCE UR: ABNORMAL
AST SERPL-CCNC: 14 U/L — SIGNIFICANT CHANGE UP (ref 10–40)
BASOPHILS # BLD AUTO: 0.03 K/UL — SIGNIFICANT CHANGE UP (ref 0–0.2)
BASOPHILS NFR BLD AUTO: 0.3 % — SIGNIFICANT CHANGE UP (ref 0–2)
BILIRUB SERPL-MCNC: 0.5 MG/DL — SIGNIFICANT CHANGE UP (ref 0.2–1.2)
BILIRUB UR-MCNC: NEGATIVE — SIGNIFICANT CHANGE UP
BUN SERPL-MCNC: 16 MG/DL — SIGNIFICANT CHANGE UP (ref 7–23)
CA-I BLD-SCNC: 1.25 MMOL/L — SIGNIFICANT CHANGE UP (ref 1.15–1.33)
CALCIUM SERPL-MCNC: 9 MG/DL — SIGNIFICANT CHANGE UP (ref 8.4–10.5)
CHLORIDE SERPL-SCNC: 104 MMOL/L — SIGNIFICANT CHANGE UP (ref 96–108)
CO2 SERPL-SCNC: 24 MMOL/L — SIGNIFICANT CHANGE UP (ref 22–31)
COLOR SPEC: SIGNIFICANT CHANGE UP
CREAT SERPL-MCNC: 0.5 MG/DL — SIGNIFICANT CHANGE UP (ref 0.5–1.3)
DIFF PNL FLD: NEGATIVE — SIGNIFICANT CHANGE UP
EOSINOPHIL # BLD AUTO: 0.15 K/UL — SIGNIFICANT CHANGE UP (ref 0–0.5)
EOSINOPHIL NFR BLD AUTO: 1.7 % — SIGNIFICANT CHANGE UP (ref 0–6)
GLUCOSE BLDC GLUCOMTR-MCNC: 116 MG/DL — HIGH (ref 70–99)
GLUCOSE BLDC GLUCOMTR-MCNC: 127 MG/DL — HIGH (ref 70–99)
GLUCOSE BLDC GLUCOMTR-MCNC: 133 MG/DL — HIGH (ref 70–99)
GLUCOSE BLDC GLUCOMTR-MCNC: 144 MG/DL — HIGH (ref 70–99)
GLUCOSE BLDC GLUCOMTR-MCNC: 145 MG/DL — HIGH (ref 70–99)
GLUCOSE BLDC GLUCOMTR-MCNC: 156 MG/DL — HIGH (ref 70–99)
GLUCOSE SERPL-MCNC: 138 MG/DL — HIGH (ref 70–99)
GLUCOSE UR QL: ABNORMAL
HCT VFR BLD CALC: 27.5 % — LOW (ref 34.5–45)
HGB BLD-MCNC: 9.4 G/DL — LOW (ref 11.5–15.5)
IMM GRANULOCYTES NFR BLD AUTO: 0.7 % — SIGNIFICANT CHANGE UP (ref 0–1.5)
KETONES UR-MCNC: NEGATIVE — SIGNIFICANT CHANGE UP
LEUKOCYTE ESTERASE UR-ACNC: NEGATIVE — SIGNIFICANT CHANGE UP
LYMPHOCYTES # BLD AUTO: 1.6 K/UL — SIGNIFICANT CHANGE UP (ref 1–3.3)
LYMPHOCYTES # BLD AUTO: 18.2 % — SIGNIFICANT CHANGE UP (ref 13–44)
MAGNESIUM SERPL-MCNC: 2.4 MG/DL — SIGNIFICANT CHANGE UP (ref 1.6–2.6)
MCHC RBC-ENTMCNC: 32.6 PG — SIGNIFICANT CHANGE UP (ref 27–34)
MCHC RBC-ENTMCNC: 34.2 GM/DL — SIGNIFICANT CHANGE UP (ref 32–36)
MCV RBC AUTO: 95.5 FL — SIGNIFICANT CHANGE UP (ref 80–100)
MONOCYTES # BLD AUTO: 0.83 K/UL — SIGNIFICANT CHANGE UP (ref 0–0.9)
MONOCYTES NFR BLD AUTO: 9.5 % — SIGNIFICANT CHANGE UP (ref 2–14)
NEUTROPHILS # BLD AUTO: 6.1 K/UL — SIGNIFICANT CHANGE UP (ref 1.8–7.4)
NEUTROPHILS NFR BLD AUTO: 69.6 % — SIGNIFICANT CHANGE UP (ref 43–77)
NITRITE UR-MCNC: NEGATIVE — SIGNIFICANT CHANGE UP
NRBC # BLD: 0 /100 WBCS — SIGNIFICANT CHANGE UP (ref 0–0)
PH UR: 7.5 — SIGNIFICANT CHANGE UP (ref 5–8)
PHOSPHATE SERPL-MCNC: 3.3 MG/DL — SIGNIFICANT CHANGE UP (ref 2.5–4.5)
PLATELET # BLD AUTO: 375 K/UL — SIGNIFICANT CHANGE UP (ref 150–400)
POTASSIUM SERPL-MCNC: 4 MMOL/L — SIGNIFICANT CHANGE UP (ref 3.5–5.3)
POTASSIUM SERPL-SCNC: 4 MMOL/L — SIGNIFICANT CHANGE UP (ref 3.5–5.3)
PROT SERPL-MCNC: 6 G/DL — SIGNIFICANT CHANGE UP (ref 6–8.3)
PROT UR-MCNC: SIGNIFICANT CHANGE UP
RBC # BLD: 2.88 M/UL — LOW (ref 3.8–5.2)
RBC # FLD: 15.4 % — HIGH (ref 10.3–14.5)
SODIUM SERPL-SCNC: 141 MMOL/L — SIGNIFICANT CHANGE UP (ref 135–145)
SP GR SPEC: 1.01 — SIGNIFICANT CHANGE UP (ref 1.01–1.02)
SURGICAL PATHOLOGY STUDY: SIGNIFICANT CHANGE UP
TRIGL SERPL-MCNC: 179 MG/DL — HIGH
UROBILINOGEN FLD QL: NEGATIVE — SIGNIFICANT CHANGE UP
WBC # BLD: 8.77 K/UL — SIGNIFICANT CHANGE UP (ref 3.8–10.5)
WBC # FLD AUTO: 8.77 K/UL — SIGNIFICANT CHANGE UP (ref 3.8–10.5)

## 2021-09-29 PROCEDURE — 99024 POSTOP FOLLOW-UP VISIT: CPT

## 2021-09-29 PROCEDURE — 99232 SBSQ HOSP IP/OBS MODERATE 35: CPT

## 2021-09-29 RX ORDER — ELECTROLYTE SOLUTION,INJ
1 VIAL (ML) INTRAVENOUS
Refills: 0 | Status: DISCONTINUED | OUTPATIENT
Start: 2021-09-29 | End: 2021-09-29

## 2021-09-29 RX ORDER — I.V. FAT EMULSION 20 G/100ML
20.8 EMULSION INTRAVENOUS
Qty: 50 | Refills: 0 | Status: DISCONTINUED | OUTPATIENT
Start: 2021-09-29 | End: 2021-09-30

## 2021-09-29 RX ADMIN — Medication 1 EACH: at 18:21

## 2021-09-29 RX ADMIN — Medication 10 MILLIGRAM(S): at 14:04

## 2021-09-29 RX ADMIN — I.V. FAT EMULSION 20.8 ML/HR: 20 EMULSION INTRAVENOUS at 18:20

## 2021-09-29 RX ADMIN — PANTOPRAZOLE SODIUM 40 MILLIGRAM(S): 20 TABLET, DELAYED RELEASE ORAL at 06:27

## 2021-09-29 RX ADMIN — ENOXAPARIN SODIUM 40 MILLIGRAM(S): 100 INJECTION SUBCUTANEOUS at 12:26

## 2021-09-29 RX ADMIN — Medication 10 MILLIGRAM(S): at 21:52

## 2021-09-29 RX ADMIN — CHLORHEXIDINE GLUCONATE 1 APPLICATION(S): 213 SOLUTION TOPICAL at 09:26

## 2021-09-29 RX ADMIN — Medication 250 MILLIGRAM(S): at 18:00

## 2021-09-29 RX ADMIN — CHLORHEXIDINE GLUCONATE 1 APPLICATION(S): 213 SOLUTION TOPICAL at 06:28

## 2021-09-29 RX ADMIN — PANTOPRAZOLE SODIUM 40 MILLIGRAM(S): 20 TABLET, DELAYED RELEASE ORAL at 18:00

## 2021-09-29 RX ADMIN — Medication 250 MILLIGRAM(S): at 00:55

## 2021-09-29 RX ADMIN — Medication 2: at 10:57

## 2021-09-29 RX ADMIN — Medication 10 MILLIGRAM(S): at 06:27

## 2021-09-29 RX ADMIN — Medication 250 MILLIGRAM(S): at 09:25

## 2021-09-29 NOTE — PROGRESS NOTE ADULT - SUBJECTIVE AND OBJECTIVE BOX
[facility] NUTRITION SUPPORT--  Attending/ PA FOLLOW UP NOTE      24 hour events/subjective:     TPN originally consulted for nutrition support and started TPN on 9/27.  Pt is tolerating without  issues, and denies palpitations, chest pain, shortness of breath. Pt further denies fevers, chills nor night sweats. Denies nausea nor vomiting nor abdominal pain. Reports flatus yesterday, but none today. Reports BM last night, no flatus.        PAST HISTORY  --------------------------------------------------------------------------------  No significant changes to PMH, PSH, FHx, SHx, unless otherwise noted    ALLERGIES & MEDICATIONS  --------------------------------------------------------------------------------  Allergies    No Known Allergies    Intolerances      Standing Inpatient Medications  chlorhexidine 2% Cloths 1 Application(s) Topical <User Schedule>  chlorhexidine 4% Liquid 1 Application(s) Topical <User Schedule>  enoxaparin Injectable 40 milliGRAM(s) SubCutaneous daily  erythromycin   IVPB 200 milliGRAM(s) IV Intermittent every 8 hours  fat emulsion (Fish Oil and Plant Based) 20% Infusion 20.8 mL/Hr IV Continuous <Continuous>  influenza   Vaccine 0.5 milliLiter(s) IntraMuscular once  insulin lispro (ADMELOG) corrective regimen sliding scale   SubCutaneous every 4 hours  metoclopramide Injectable 10 milliGRAM(s) IV Push three times a day  pantoprazole  Injectable 40 milliGRAM(s) IV Push two times a day  Parenteral Nutrition - Adult 1 Each TPN Continuous <Continuous>  Parenteral Nutrition - Adult 1 Each TPN Continuous <Continuous>    PRN Inpatient Medications  acetaminophen  IVPB .. 750 milliGRAM(s) IV Intermittent every 6 hours PRN  artificial tears (preservative free) Ophthalmic Solution 1 Drop(s) Both EYES two times a day PRN  HYDROmorphone  Injectable 0.5 milliGRAM(s) IV Push every 4 hours PRN  ondansetron    Tablet 4 milliGRAM(s) Oral every 6 hours PRN  sodium chloride 0.9% lock flush 10 milliLiter(s) IV Push every 1 hour PRN      REVIEW OF SYSTEMS  --------------------------------------------------------------------------------  Gen: as per HPI  Skin: No rashes  Head/Eyes/Ears/Mouth: No headache;No sore throat  Respiratory: No dyspnea, cough,   CV: No chest pain, PND, orthopnea  GI: as per HPI  : No increased frequency, dysuria, hematuria, nocturia  MSK: No joint pain/swelling; no back pain; no edema  Neuro: No dizziness/lightheadedness, weakness, seizures, numbness, tingling  Psych: No significant nervousness, anxiety, stress, depression    All other systems were reviewed and are negative, except as noted.      LABS/STUDIES  --------------------------------------------------------------------------------              9.4    8.77  >-----------<  375      [09-29-21 @ 07:18]              27.5     141  |  104  |  16  ----------------------------<  138      [09-29-21 @ 07:11]  4.0   |  24  |  0.50        Ca     9.0     [09-29-21 @ 07:11]      iCa    1.25     [09-29 @ 07:28]      Mg     2.4     [09-29-21 @ 07:11]      Phos  3.3     [09-29-21 @ 07:11]    TPro  6.0  /  Alb  3.1  /  TBili  0.5  /  DBili  x   /  AST  14  /  ALT  17  /  AlkPhos  136  [09-29-21 @ 07:11]            Glucose, Serum: 138 mg/dL (09-29-21 @ 07:11)    Prealbumin, Serum: 8 mg/dL (09-28-21 @ 11:24)          09-28-21 @ 07:01  -  09-29-21 @ 07:00  --------------------------------------------------------  IN: 889.2 mL / OUT: 1100 mL / NET: -210.8 mL    09-29-21 @ 07:01  -  09-29-21 @ 13:28  --------------------------------------------------------  IN: 0 mL / OUT: 120 mL / NET: -120 mL        VITALS/PHYSICAL EXAM  --------------------------------------------------------------------------------  T(C): 36.4 (09-29-21 @ 12:03), Max: 37.3 (09-29-21 @ 01:08)  HR: 91 (09-29-21 @ 12:03) (85 - 100)  BP: 117/78 (09-29-21 @ 12:03) (111/72 - 137/71)  RR: 18 (09-29-21 @ 12:03) (18 - 18)  SpO2: 98% (09-29-21 @ 12:03) (95% - 98%)  Wt(kg): --    Physical Exam:    	Gen: NAD, well-appearing  	HEENT: PERRL, NGT in place bilious output, clamped              Neck: Trachea midline, no noted JVD              Chest: non labored breathing, equal chest expansion b/l  	Abd: nondistended, NT soft,  abdominal SOIs are CDI  	UE: Warm, FROM, intact strength; no edeme LUE PICC dressing CDI.  	LE: Warm, FROM, intact strength; no edema  	Neuro: AOx3  	Psych: Normal affect and mood  	Skin: Warm, without rashes  .  .  .    .

## 2021-09-29 NOTE — PROVIDER CONTACT NOTE (OTHER) - BACKGROUND
Pt admitted for abd pain for a few months. Pt s/p whipple on 9/17, NGT removed 9/19 and pt was tolerating clear liquid diet. 9/21- pt advanced to soft diet and endorses emesis x2 overnight.
Pt s/p diagnostic lap whipple
Pt s/p diagnostic lap whipple
UA from 9/28 negative
Pt admitted for abd pain for a few months. Pt s/p whipple on 9/17, NGT in place.
Pt admitted for abdominal pain for months s/p diagnostic lap whipple procedure on 9/17. Was on clears on 9/20, advanced to soft 9/21 with minimal appetite.

## 2021-09-29 NOTE — PROVIDER CONTACT NOTE (OTHER) - REASON
Pt vomited- 350cc
Continued urinary frequency and dysuria
Pt c/o frequency, urgency and burning while urinating.
Pt emesis count x1
Pt is nauseous, emesis x2, c/o back pain and abdominal discomfort
Pt has had multiple emesis counts

## 2021-09-29 NOTE — PROGRESS NOTE ADULT - SUBJECTIVE AND OBJECTIVE BOX
C A R D I O L O G Y  **********************************     DATE OF SERVICE: 09-29-21     Denies chest pain or SOB. Review of systems otherwise (-)      MEDICATIONS  (STANDING):  chlorhexidine 2% Cloths 1 Application(s) Topical <User Schedule>  chlorhexidine 4% Liquid 1 Application(s) Topical <User Schedule>  enoxaparin Injectable 40 milliGRAM(s) SubCutaneous daily  erythromycin   IVPB 200 milliGRAM(s) IV Intermittent every 8 hours  fat emulsion (Fish Oil and Plant Based) 20% Infusion 20.8 mL/Hr (20.8 mL/Hr) IV Continuous <Continuous>  influenza   Vaccine 0.5 milliLiter(s) IntraMuscular once  insulin lispro (ADMELOG) corrective regimen sliding scale   SubCutaneous every 4 hours  metoclopramide Injectable 10 milliGRAM(s) IV Push three times a day  pantoprazole  Injectable 40 milliGRAM(s) IV Push two times a day  Parenteral Nutrition - Adult 1 Each (58 mL/Hr) TPN Continuous <Continuous>  Parenteral Nutrition - Adult 1 Each (58 mL/Hr) TPN Continuous <Continuous>    MEDICATIONS  (PRN):  acetaminophen  IVPB .. 750 milliGRAM(s) IV Intermittent every 6 hours PRN Mild Pain (1 - 3)  artificial tears (preservative free) Ophthalmic Solution 1 Drop(s) Both EYES two times a day PRN Dry Eyes  HYDROmorphone  Injectable 0.5 milliGRAM(s) IV Push every 4 hours PRN Severe Pain (7 - 10)  ondansetron    Tablet 4 milliGRAM(s) Oral every 6 hours PRN Nausea  sodium chloride 0.9% lock flush 10 milliLiter(s) IV Push every 1 hour PRN Pre/post blood products, medications, blood draw, and to maintain line patency      LABS:                          9.4    8.77  )-----------( 375      ( 29 Sep 2021 07:18 )             27.5     Hemoglobin: 9.4 g/dL (09-29 @ 07:18)  Hemoglobin: 8.7 g/dL (09-28 @ 07:12)  Hemoglobin: 8.7 g/dL (09-27 @ 07:12)  Hemoglobin: 8.9 g/dL (09-26 @ 07:34)    09-29    141  |  104  |  16  ----------------------------<  138<H>  4.0   |  24  |  0.50    Ca    9.0      29 Sep 2021 07:11  Phos  3.3     09-29  Mg     2.4     09-29    TPro  6.0  /  Alb  3.1<L>  /  TBili  0.5  /  DBili  x   /  AST  14  /  ALT  17  /  AlkPhos  136<H>  09-29    Creatinine Trend: 0.50<--, 0.48<--, 0.49<--, 0.51<--, 0.56<--, 0.65<--             09-28-21 @ 07:01  -  09-29-21 @ 07:00  --------------------------------------------------------  IN: 889.2 mL / OUT: 1100 mL / NET: -210.8 mL    09-29-21 @ 07:01  -  09-29-21 @ 13:48  --------------------------------------------------------  IN: 0 mL / OUT: 120 mL / NET: -120 mL        PHYSICAL EXAM  Vital Signs Last 24 Hrs  T(C): 36.4 (29 Sep 2021 12:03), Max: 37.3 (29 Sep 2021 01:08)  T(F): 97.5 (29 Sep 2021 12:03), Max: 99.2 (29 Sep 2021 01:08)  HR: 91 (29 Sep 2021 12:03) (90 - 100)  BP: 117/78 (29 Sep 2021 12:03) (111/72 - 137/71)  BP(mean): --  RR: 18 (29 Sep 2021 12:03) (18 - 18)  SpO2: 98% (29 Sep 2021 12:03) (95% - 98%)      Gen: Appears well in NAD  HEENT:  (-)icterus (-)pallor  CV: N S1 S2 1/6 PATRICIA (+)2 Pulses B/l  Resp:  Clear to auscultation B/L, normal effort  GI: (+) BS Soft, NT, ND  Lymph:  (-)Edema, (-)obvious lymphadenopathy  Skin: Warm to touch, Normal turgor  Psych: Appropriate mood and affect    TELEMETRY: None	      ASSESSMENT/PLAN: 76y Female with Hx of RA (on MTX), HLD, appendectomy, and L renal cell CA (s/p L nephrectomy 2019) who presented with transaminitis and an abnormal MRCP showing dilated PD and CBD, found to have a 1.7x1.8cm hypoechoic pancreatic head lesion on EUS on 9/9. She was taken to the OR for a Whipple on 9/17.    - No evidence of clinical HF or anginal symptoms  - TTE noted with normal LV function  - Pain management per primary team  - Surgery follow up - NGT in place  - No further inpatient cardiac w/u planned    Kyrie Torres PA-C  Pager: [phone]

## 2021-09-29 NOTE — PROVIDER CONTACT NOTE (OTHER) - SITUATION
Pt complains of continued urinary frequency. Also states dysuria as well with general genital pain and burning while urinating

## 2021-09-29 NOTE — PROVIDER CONTACT NOTE (OTHER) - ASSESSMENT
Pt emesis count x1
Pt has been having multiple counts of emesis around 4/5x since 1900
Pt stated she mistaken question if she ever passed gas since the surgery and stated she did not have flatus today. Pt had two rounds of emesis above 30cc each time, green in color. Zofran was given at 1930. and Reglan was given ATC.
Pt anox4, states she feels better now. PT ate breakfast and was ambulating around unit this AM. Pt was given zofran for c/o nausea at 1022.
Small amount of urine appears slightly cloudy, medium yellow in color. In addition, pt stated subjective symptoms of general pain in genitalia with increased pain and burning while urinating. Assessment of genitals shows no erythema or discharge
VS WDNL, pt noted to have urgency and frequency, urinating small amounts, denies any other pain or discomfort.

## 2021-09-29 NOTE — PROGRESS NOTE ADULT - ASSESSMENT
76y Female with Hx of RA (on MTX), HLD, appendectomy, and L renal cell CA (s/p L nephrectomy 2019) who presented with transaminitis and an abnormal MRCP showing dilated PD and CBD, found to have a 1.7x1.8cm hypoechoic pancreatic head lesion on EUS on 9/9. She was taken to the OR on 9/17 for a Whipple on 9/17 and transferred to the SICU extubated post-operatively for hemodynamic monitoring. Patient had NGT placed (9/26) due to severely distended stomach on AXR, now POD 12, recovering on the floor with likely delayed gastric emptying.     Plan:  - NPO/IVF/NGT   - 1/2 to 1 repletions for NGT output  - Cont. IV erythromycin, reglan for promotility  - Continue TPN  - F/u CTAP abdomen pelvis IV with PO contrast down NGT - suspicion of edema at GJ  - Pain relief: switched to IV pain meds; IV acetaminophen and 0.5 dilaudid for severe pain   - DVT ppx        Red Team Surgery  [id number]

## 2021-09-29 NOTE — PROVIDER CONTACT NOTE (OTHER) - DATE AND TIME:
21-Sep-2021 23:40
24-Sep-2021 06:15
22-Sep-2021 19:30
22-Sep-2021 11:52
28-Sep-2021 17:25
29-Sep-2021 10:34

## 2021-09-29 NOTE — PROGRESS NOTE ADULT - SUBJECTIVE AND OBJECTIVE BOX
SURGERY  Pager: #[phone]    STATUS POST: [place]    POST OPERATIVE DAY #12    INTERVAL EVENTS/SUBJECTIVE: No acute events overnight. Patient looking much happier this morning, reports passing gas and having bowel movements. No other complaints aside from NGT irritation     ______________________________________________  OBJECTIVE:   T(C): 36.8 (09-29-21 @ 05:53), Max: 37.3 (09-29-21 @ 01:08)  HR: 93 (09-29-21 @ 05:53) (85 - 100)  BP: 111/72 (09-29-21 @ 05:53) (111/72 - 137/71)  RR: 18 (09-29-21 @ 05:53) (18 - 18)  SpO2: 95% (09-29-21 @ 05:53) (95% - 98%)  Wt(kg): --  CAPILLARY BLOOD GLUCOSE      POCT Blood Glucose.: 144 mg/dL (29 Sep 2021 06:26)  POCT Blood Glucose.: 116 mg/dL (29 Sep 2021 02:01)  POCT Blood Glucose.: 155 mg/dL (28 Sep 2021 21:30)  POCT Blood Glucose.: 158 mg/dL (28 Sep 2021 17:31)  POCT Blood Glucose.: 127 mg/dL (28 Sep 2021 14:23)  POCT Blood Glucose.: 88 mg/dL (28 Sep 2021 09:59)    I&O's Detail    28 Sep 2021 07:01  -  29 Sep 2021 07:00  --------------------------------------------------------  IN:    Fat Emulsion (Fish Oil &amp; Plant Based) 20% Infusion: 135.2 mL    TPN (Total Parenteral Nutrition): 754 mL  Total IN: 889.2 mL    OUT:    Nasogastric/Oral tube (mL): 300 mL    Oral Fluid: 0 mL    Voided (mL): 800 mL  Total OUT: 1100 mL    Total NET: -210.8 mL          PHYSICAL EXAM:  GEN: resting in bed, well appearing, NGT in place  RESP: no increased WOB  ABD: soft, non-distended, non-tender without rebound tenderness or guarding. Incisions are c/d/i  EXTR: warm, well-perfused, no edema  ______________________________________________  LABS:  CBC Full  -  ( 29 Sep 2021 07:18 )  WBC Count : 8.77 K/uL  RBC Count : 2.88 M/uL  Hemoglobin : 9.4 g/dL  Hematocrit : 27.5 %  Platelet Count - Automated : 375 K/uL  Mean Cell Volume : 95.5 fl  Mean Cell Hemoglobin : 32.6 pg  Mean Cell Hemoglobin Concentration : 34.2 gm/dL  Auto Neutrophil # : 6.10 K/uL  Auto Lymphocyte # : 1.60 K/uL  Auto Monocyte # : 0.83 K/uL  Auto Eosinophil # : 0.15 K/uL  Auto Basophil # : 0.03 K/uL  Auto Neutrophil % : 69.6 %  Auto Lymphocyte % : 18.2 %  Auto Monocyte % : 9.5 %  Auto Eosinophil % : 1.7 %  Auto Basophil % : 0.3 %    09-29    141  |  104  |  16  ----------------------------<  138<H>  4.0   |  24  |  0.50    Ca    9.0      29 Sep 2021 07:11  Phos  3.3     09-29  Mg     2.4     09-29    TPro  6.0  /  Alb  3.1<L>  /  TBili  0.5  /  DBili  x   /  AST  14  /  ALT  17  /  AlkPhos  136<H>  09-29    _____________________________________________  RADIOLOGY:

## 2021-09-29 NOTE — PROGRESS NOTE ADULT - ASSESSMENT
[Very Good] : ~his/her~  mood as very good  75 y/o female with pmhx of rheumatoid arthritis, high cholesterol, appendectomy, and left nephrectomy POD #10 s/p Whipple with gastroparesis, NPO/NGT    TPN Goals:  90g AA, 140g dextrose, 50g lipids    - Continue goal CHO/AA in TPN and increase to goal SMOF today @1.4l total volume.  - Nutritional Assessment. Pt still NPO. Clamp trial in place. Please obtain prealbumin weekly. Prealbumin noted to be 8 at baseline on 9/28.  - PICC in place LUE. Maintenance as per protocol  - Hyperglycemic Control- HgA1C 5.7, have 5U insulin in TPN, please obtain q6 fingersticks with ISS coverage. Fingersticks noted to be 127-158 over 24hrs.  -Electrolyte Imbalance Risk. -Obtain daily CMP, Mg, phos, iCa. Will replete lytes in TPN bag daily as needed. Hypokalemia - KCl at 100meq KCl. Hypophosphatemia - NaPhos @ 45mmol. Low bicarb - NaAcetate @ 80meq  - Please obtain Triglycerides daily for on goal SMOF for 3 days, then can check weekly. Triglyceride today was 179, can increase to goal SMOF.  - Care per surgery    TPN pager [phone], spectra [phone]  M-F 6A-4P, Weekends and holidays 8A-12P  discussed with Dr. Eckert        [Yes] : Yes [2 - 4 times a month (2 pts)] : 2-4 times a month (2 points) [3 or 4 (1 pt)] : 3 or 4  (1 point) [Never (0 pts)] : Never (0 points) [No] : In the past 12 months have you used drugs other than those required for medical reasons? No [No falls in past year] : Patient reported no falls in the past year [0] : 2) Feeling down, depressed, or hopeless: Not at all (0) [None] : None [With Family] : lives with family [Student] : student [College] : College [Single] : single [Sexually Active] : sexually active [Feels Safe at Home] : Feels safe at home [Fully functional (bathing, dressing, toileting, transferring, walking, feeding)] : Fully functional (bathing, dressing, toileting, transferring, walking, feeding) [Fully functional (using the telephone, shopping, preparing meals, housekeeping, doing laundry, using] : Fully functional and needs no help or supervision to perform IADLs (using the telephone, shopping, preparing meals, housekeeping, doing laundry, using transportation, managing medications and managing finances) [] : No [Audit-CScore] : 3 [de-identified] : gym [de-identified] : reg [[id number]] : 0 [Change in mental status noted] : No change in mental status noted [Language] : denies difficulty with language [Behavior] : denies difficulty with behavior [Learning/Retaining New Information] : denies difficulty learning/retaining new information [Handling Complex Tasks] : denies difficulty handling complex tasks [Reasoning] : denies difficulty with reasoning [High Risk Behavior] : no high risk behavior [Reports changes in hearing] : Reports no changes in hearing [Reports normal functional visual acuity (ie: able to read med bottle)] : Reports poor functional visual acuity.  [Reports changes in vision] : Reports no changes in vision

## 2021-09-30 LAB
ALBUMIN SERPL ELPH-MCNC: 3.2 G/DL — LOW (ref 3.3–5)
ALP SERPL-CCNC: 130 U/L — HIGH (ref 40–120)
ALT FLD-CCNC: 16 U/L — SIGNIFICANT CHANGE UP (ref 10–45)
ANION GAP SERPL CALC-SCNC: 14 MMOL/L — SIGNIFICANT CHANGE UP (ref 5–17)
AST SERPL-CCNC: 15 U/L — SIGNIFICANT CHANGE UP (ref 10–40)
BILIRUB SERPL-MCNC: 0.4 MG/DL — SIGNIFICANT CHANGE UP (ref 0.2–1.2)
BUN SERPL-MCNC: 19 MG/DL — SIGNIFICANT CHANGE UP (ref 7–23)
CA-I BLD-SCNC: 1.27 MMOL/L — SIGNIFICANT CHANGE UP (ref 1.15–1.33)
CALCIUM SERPL-MCNC: 8.9 MG/DL — SIGNIFICANT CHANGE UP (ref 8.4–10.5)
CHLORIDE SERPL-SCNC: 103 MMOL/L — SIGNIFICANT CHANGE UP (ref 96–108)
CO2 SERPL-SCNC: 23 MMOL/L — SIGNIFICANT CHANGE UP (ref 22–31)
CREAT SERPL-MCNC: 0.53 MG/DL — SIGNIFICANT CHANGE UP (ref 0.5–1.3)
GLUCOSE BLDC GLUCOMTR-MCNC: 125 MG/DL — HIGH (ref 70–99)
GLUCOSE BLDC GLUCOMTR-MCNC: 130 MG/DL — HIGH (ref 70–99)
GLUCOSE BLDC GLUCOMTR-MCNC: 134 MG/DL — HIGH (ref 70–99)
GLUCOSE BLDC GLUCOMTR-MCNC: 135 MG/DL — HIGH (ref 70–99)
GLUCOSE BLDC GLUCOMTR-MCNC: 135 MG/DL — HIGH (ref 70–99)
GLUCOSE BLDC GLUCOMTR-MCNC: 150 MG/DL — HIGH (ref 70–99)
GLUCOSE SERPL-MCNC: 113 MG/DL — HIGH (ref 70–99)
HCT VFR BLD CALC: 28.2 % — LOW (ref 34.5–45)
HGB BLD-MCNC: 9.1 G/DL — LOW (ref 11.5–15.5)
MAGNESIUM SERPL-MCNC: 2.5 MG/DL — SIGNIFICANT CHANGE UP (ref 1.6–2.6)
MCHC RBC-ENTMCNC: 32 PG — SIGNIFICANT CHANGE UP (ref 27–34)
MCHC RBC-ENTMCNC: 32.3 GM/DL — SIGNIFICANT CHANGE UP (ref 32–36)
MCV RBC AUTO: 99.3 FL — SIGNIFICANT CHANGE UP (ref 80–100)
NRBC # BLD: 0 /100 WBCS — SIGNIFICANT CHANGE UP (ref 0–0)
PHOSPHATE SERPL-MCNC: 3.3 MG/DL — SIGNIFICANT CHANGE UP (ref 2.5–4.5)
PLATELET # BLD AUTO: 363 K/UL — SIGNIFICANT CHANGE UP (ref 150–400)
POTASSIUM SERPL-MCNC: 4.1 MMOL/L — SIGNIFICANT CHANGE UP (ref 3.5–5.3)
POTASSIUM SERPL-SCNC: 4.1 MMOL/L — SIGNIFICANT CHANGE UP (ref 3.5–5.3)
PROT SERPL-MCNC: 6.1 G/DL — SIGNIFICANT CHANGE UP (ref 6–8.3)
RBC # BLD: 2.84 M/UL — LOW (ref 3.8–5.2)
RBC # FLD: 15.6 % — HIGH (ref 10.3–14.5)
SODIUM SERPL-SCNC: 140 MMOL/L — SIGNIFICANT CHANGE UP (ref 135–145)
TRIGL SERPL-MCNC: 157 MG/DL — HIGH
WBC # BLD: 7.92 K/UL — SIGNIFICANT CHANGE UP (ref 3.8–10.5)
WBC # FLD AUTO: 7.92 K/UL — SIGNIFICANT CHANGE UP (ref 3.8–10.5)

## 2021-09-30 PROCEDURE — 99024 POSTOP FOLLOW-UP VISIT: CPT

## 2021-09-30 RX ORDER — I.V. FAT EMULSION 20 G/100ML
20.8 EMULSION INTRAVENOUS
Qty: 50 | Refills: 0 | Status: DISCONTINUED | OUTPATIENT
Start: 2021-09-30 | End: 2021-10-01

## 2021-09-30 RX ORDER — ELECTROLYTE SOLUTION,INJ
1 VIAL (ML) INTRAVENOUS
Refills: 0 | Status: DISCONTINUED | OUTPATIENT
Start: 2021-09-30 | End: 2021-09-30

## 2021-09-30 RX ADMIN — Medication 1 EACH: at 18:00

## 2021-09-30 RX ADMIN — CHLORHEXIDINE GLUCONATE 1 APPLICATION(S): 213 SOLUTION TOPICAL at 05:16

## 2021-09-30 RX ADMIN — Medication 250 MILLIGRAM(S): at 17:59

## 2021-09-30 RX ADMIN — Medication 250 MILLIGRAM(S): at 01:10

## 2021-09-30 RX ADMIN — Medication 250 MILLIGRAM(S): at 09:07

## 2021-09-30 RX ADMIN — PANTOPRAZOLE SODIUM 40 MILLIGRAM(S): 20 TABLET, DELAYED RELEASE ORAL at 18:38

## 2021-09-30 RX ADMIN — Medication 10 MILLIGRAM(S): at 21:55

## 2021-09-30 RX ADMIN — I.V. FAT EMULSION 20.8 ML/HR: 20 EMULSION INTRAVENOUS at 17:59

## 2021-09-30 RX ADMIN — ENOXAPARIN SODIUM 40 MILLIGRAM(S): 100 INJECTION SUBCUTANEOUS at 12:26

## 2021-09-30 RX ADMIN — Medication 10 MILLIGRAM(S): at 15:07

## 2021-09-30 RX ADMIN — PANTOPRAZOLE SODIUM 40 MILLIGRAM(S): 20 TABLET, DELAYED RELEASE ORAL at 05:16

## 2021-09-30 RX ADMIN — CHLORHEXIDINE GLUCONATE 1 APPLICATION(S): 213 SOLUTION TOPICAL at 08:14

## 2021-09-30 RX ADMIN — Medication 10 MILLIGRAM(S): at 05:16

## 2021-09-30 NOTE — PROGRESS NOTE ADULT - ASSESSMENT
76y Female with Hx of RA (on MTX), HLD, appendectomy, and L renal cell CA (s/p L nephrectomy 2019) who presented with transaminitis and an abnormal MRCP showing dilated PD and CBD, found to have a 1.7x1.8cm hypoechoic pancreatic head lesion on EUS on 9/9. She was taken to the OR on 9/17 for a Whipple on 9/17 and transferred to the SICU extubated post-operatively for hemodynamic monitoring. Patient had NGT placed (9/26) due to severely distended stomach on AXR, now POD 13, recovering on the floor with likely delayed gastric emptying.     Plan:  - NPO/IVF/NGT   - 1/2 to 1 repletions for NGT output  - Cont. IV erythromycin, reglan for promotility  - Continue TPN  - F/u CTAP abdomen pelvis IV with PO contrast down NGT - suspicion of edema at GJ  - Pain relief: switched to IV pain meds; IV acetaminophen and 0.5 dilaudid for severe pain   - DVT ppx        Red Team Surgery  [id number]    76y Female with Hx of RA (on MTX), HLD, appendectomy, and L renal cell CA (s/p L nephrectomy 2019) who presented with transaminitis and an abnormal MRCP showing dilated PD and CBD, found to have a 1.7x1.8cm hypoechoic pancreatic head lesion on EUS on 9/9. She was taken to the OR on 9/17 for a Whipple on 9/17 and transferred to the SICU extubated post-operatively for hemodynamic monitoring. Patient had NGT placed (9/26) due to severely distended stomach on AXR, now POD 13, recovering on the floor with likely delayed gastric emptying.     Plan:  - NPO/IVF, continue TPN  - Cont. IV erythromycin, reglan for promotility  - F/u CTAP abdomen pelvis IV with PO contrast down NGT - suspicion of edema at GJ  - Pain relief: switched to IV pain meds; IV acetaminophen and 0.5 dilaudid for severe pain   - DVT ppx        Red Team Surgery  [id number]    76y Female with Hx of RA (on MTX), HLD, appendectomy, and L renal cell CA (s/p L nephrectomy 2019) who presented with transaminitis and an abnormal MRCP showing dilated PD and CBD, found to have a 1.7x1.8cm hypoechoic pancreatic head lesion on EUS on 9/9. She was taken to the OR on 9/17 for a Whipple on 9/17 and transferred to the SICU extubated post-operatively for hemodynamic monitoring. Patient had NGT placed (9/26) due to severely distended stomach on AXR, now POD 13, recovering on the floor with likely delayed gastric emptying.     Plan:  - Continue TPN, start clear liquids  - Cont. IV erythromycin, reglan for promotility  - F/u CTAP abdomen pelvis IV with PO contrast down NGT - suspicion of edema at GJ  - Pain relief: switched to IV pain meds; IV acetaminophen and 0.5 dilaudid for severe pain   - DVT ppx        Red Team Surgery  [id number]

## 2021-09-30 NOTE — PROGRESS NOTE ADULT - SUBJECTIVE AND OBJECTIVE BOX
C A R D I O L O G Y  **********************************     DATE OF SERVICE: 09-30-21     Resting comfortably. NGT removed. Denies chest pain or SOB. Review of systems otherwise (-)      MEDICATIONS  (STANDING):  chlorhexidine 2% Cloths 1 Application(s) Topical <User Schedule>  chlorhexidine 4% Liquid 1 Application(s) Topical <User Schedule>  enoxaparin Injectable 40 milliGRAM(s) SubCutaneous daily  erythromycin   IVPB 200 milliGRAM(s) IV Intermittent every 8 hours  fat emulsion (Fish Oil and Plant Based) 20% Infusion 20.8 mL/Hr (20.8 mL/Hr) IV Continuous <Continuous>  influenza   Vaccine 0.5 milliLiter(s) IntraMuscular once  insulin lispro (ADMELOG) corrective regimen sliding scale   SubCutaneous every 4 hours  metoclopramide Injectable 10 milliGRAM(s) IV Push three times a day  pantoprazole  Injectable 40 milliGRAM(s) IV Push two times a day  Parenteral Nutrition - Adult 1 Each (58 mL/Hr) TPN Continuous <Continuous>  Parenteral Nutrition - Adult 1 Each (58 mL/Hr) TPN Continuous <Continuous>    MEDICATIONS  (PRN):  acetaminophen  IVPB .. 750 milliGRAM(s) IV Intermittent every 6 hours PRN Mild Pain (1 - 3)  artificial tears (preservative free) Ophthalmic Solution 1 Drop(s) Both EYES two times a day PRN Dry Eyes  HYDROmorphone  Injectable 0.5 milliGRAM(s) IV Push every 4 hours PRN Severe Pain (7 - 10)  ondansetron    Tablet 4 milliGRAM(s) Oral every 6 hours PRN Nausea  sodium chloride 0.9% lock flush 10 milliLiter(s) IV Push every 1 hour PRN Pre/post blood products, medications, blood draw, and to maintain line patency      LABS:                          9.1    7.92  )-----------( 363      ( 30 Sep 2021 07:17 )             28.2     Hemoglobin: 9.1 g/dL (09-30 @ 07:17)  Hemoglobin: 9.4 g/dL (09-29 @ 07:18)  Hemoglobin: 8.7 g/dL (09-28 @ 07:12)  Hemoglobin: 8.7 g/dL (09-27 @ 07:12)  Hemoglobin: 8.9 g/dL (09-26 @ 07:34)    09-30    140  |  103  |  19  ----------------------------<  113<H>  4.1   |  23  |  0.53    Ca    8.9      30 Sep 2021 07:17  Phos  3.3     09-30  Mg     2.5     09-30    TPro  6.1  /  Alb  3.2<L>  /  TBili  0.4  /  DBili  x   /  AST  15  /  ALT  16  /  AlkPhos  130<H>  09-30    Creatinine Trend: 0.53<--, 0.50<--, 0.48<--, 0.49<--, 0.51<--, 0.56<--             09-29-21 @ 07:01  -  09-30-21 @ 07:00  --------------------------------------------------------  IN: 1683.2 mL / OUT: 2630 mL / NET: -946.8 mL        PHYSICAL EXAM  Vital Signs Last 24 Hrs  T(C): 36.8 (30 Sep 2021 11:35), Max: 37.2 (29 Sep 2021 21:49)  T(F): 98.2 (30 Sep 2021 11:35), Max: 98.9 (29 Sep 2021 21:49)  HR: 101 (30 Sep 2021 11:35) (83 - 108)  BP: 117/76 (30 Sep 2021 11:35) (113/74 - 124/71)  BP(mean): --  RR: 18 (30 Sep 2021 11:35) (16 - 18)  SpO2: 98% (30 Sep 2021 11:35) (95% - 99%)      Gen: Appears well in NAD  HEENT:  (-)icterus (-)pallor  CV: N S1 S2 1/6 PATRICIA (+)2 Pulses B/l  Resp:  Clear to auscultation B/L, normal effort  GI: (+) BS Soft, NT, ND  Lymph:  (-)Edema, (-)obvious lymphadenopathy  Skin: Warm to touch, Normal turgor  Psych: Appropriate mood and affect    TELEMETRY: None	      ASSESSMENT/PLAN: 76y Female with Hx of RA (on MTX), HLD, appendectomy, and L renal cell CA (s/p L nephrectomy 2019) who presented with transaminitis and an abnormal MRCP showing dilated PD and CBD, found to have a 1.7x1.8cm hypoechoic pancreatic head lesion on EUS on 9/9. She was taken to the OR for a Whipple on 9/17.    - No evidence of clinical HF or anginal symptoms  - TTE noted with normal LV function  - Pain management per primary team  - Surgery follow up - NGT removed, diet as tolerated  - No further inpatient cardiac w/u planned    Kyrie Torres PA-C  Pager: [phone]

## 2021-09-30 NOTE — CHART NOTE - NSCHARTNOTESELECT_GEN_ALL_CORE
Advanced GI Chart Update/Event Note
GI Chart Update/Event Note
Nutrition Services
Pain Service/Event Note
Post-Op Check/Event Note
event note/Event Note
Nutrition Services
Nutrition Services
Pre-Op

## 2021-09-30 NOTE — CHART NOTE - NSCHARTNOTEFT_GEN_A_CORE
Nutrition Follow Up Note     Patient seen for: malnutrition/TPN Team follow up    Source: medical record, TPN Team rounds.     Chart reviewed, events noted. "76y Female with Hx of RA (on MTX), HLD, appendectomy, and L renal cell CA (s/p L nephrectomy 2019) who presented with transaminitis and an abnormal MRCP showing dilated PD and CBD, found to have a 1.7x1.8cm hypoechoic pancreatic head lesion on EUS on 9/9. She was taken to the OR on 9/17 for a Whipple on 9/17 and transferred to the SICU extubated post-operatively for hemodynamic monitoring. Patient had NGT placed (9/26) due to severely distended stomach on AXR, now POD 10, recovering on the floor with likely delayed gastric emptying."    Interim Events/Plan:  - NGT discontinued 9/29; diet advanced to clear liquids 9/30  - "F/u CTAP abdomen pelvis IV with PO contrast down NGT - suspicion of edema at GJ"    Nutrition Status:  - Diet History: Pt has been ordered for NPO vs Clear Liquids vs Soft diet, over course of 20 days in-house.  - TPN Team initiated 9/27 in setting of distended stomach, high NGT output, and concern for delayed gastric emptying.   - Potassium increased in TPN in setting of hypokalemia, to avoid gastric dysmotility.  - Hypophosphatemia addressed with 45 mmol NaPhos in TPN  - Low bicarb noted; TPN in initiated with 80mEq NaAcetate  - Reglan and Erythromycin ordered  - Hyperglycemia addressed with 5 units Insulin in TPN, and SSI q4 hrs    Diet Order: Diet, Clear Liquid (09-30-21 @ 08:02)    PARENTERAL NUTRITION  GOAL TPN (ordered 9/29) 1.4L infusing at 58 ml/hr (90 gm amino acids, 140 gm dextrose, 50 gm SMOF lipid) to provide 1336 kcal/day (27.8 kcal/kg and 1.9 gm/kg protein per dosing wt 47.9kg)    Non-Protein Calories: 976 kcal/day (20 kcal/kg)  Dextrose Infusion Rate: 2 mg/kg/min  Lipid Infusion Rate: 1.0 gm/kg/day; 0.087 gm/kg/hr    Electrolytes and Insulin: (ordered 9/29/21)  Insulin (units): 5  NaCl (mEq):  40  Na acetate (mEq): 80  Na Phos (mmol): 45  KCL (mEq):  100  Calcium gluconate (mEq): 10  Mg sulfate (mEq): 12  MTE-5 concentrate (ml): 1  MVI (ml): 10     GI:  Last BM: 9/29 (X3)  NGT output x 24-hrs: 150 ml; discontinued    Anthropometric Measurements:   Height (cm): 154.9 (09-17-21 @ 07:24)  DOSING Weight (kg):   53.5 (09-07-21)  47.9 (09-17-21) *used for calculations  BMI (kg/m2): 20 (09-17-21 @ 19:00)  IBW: 47.6 kg  UBW (per pt): 52.1 kg    Medications: MEDICATIONS  (STANDING):  chlorhexidine 2% Cloths 1 Application(s) Topical <User Schedule>  chlorhexidine 4% Liquid 1 Application(s) Topical <User Schedule>  enoxaparin Injectable 40 milliGRAM(s) SubCutaneous daily  erythromycin   IVPB 200 milliGRAM(s) IV Intermittent every 8 hours  fat emulsion (Fish Oil and Plant Based) 20% Infusion 20.8 mL/Hr (20.8 mL/Hr) IV Continuous <Continuous>  influenza   Vaccine 0.5 milliLiter(s) IntraMuscular once  insulin lispro (ADMELOG) corrective regimen sliding scale   SubCutaneous every 4 hours  metoclopramide Injectable 10 milliGRAM(s) IV Push three times a day  pantoprazole  Injectable 40 milliGRAM(s) IV Push two times a day  Parenteral Nutrition - Adult 1 Each (58 mL/Hr) TPN Continuous <Continuous>    MEDICATIONS  (PRN):  acetaminophen  IVPB .. 750 milliGRAM(s) IV Intermittent every 6 hours PRN Mild Pain (1 - 3)  artificial tears (preservative free) Ophthalmic Solution 1 Drop(s) Both EYES two times a day PRN Dry Eyes  HYDROmorphone  Injectable 0.5 milliGRAM(s) IV Push every 4 hours PRN Severe Pain (7 - 10)  ondansetron    Tablet 4 milliGRAM(s) Oral every 6 hours PRN Nausea  sodium chloride 0.9% lock flush 10 milliLiter(s) IV Push every 1 hour PRN Pre/post blood products, medications, blood draw, and to maintain line patency    Labs: 09-30 @ 07:17: Sodium 140, Potassium 4.1, Calcium 8.9, Magnesium 2.5, Phosphorus 3.3, BUN 19, Creatinine 0.53, Glucose 113<H>, Total Bilirubin 0.4, Albumin, 3.2<L>    Hemoglobin : 9.1 g/dL  Hematocrit : 28.2 %    LIVER FUNCTIONS - ( 30 Sep 2021 07:17 )  Alb: 3.2 g/dL / Pro: 6.1 g/dL / ALK PHOS: 130 U/L / ALT: 16 U/L / AST: 15 U/L / GGT: x           Triglycerides, Serum: 157 mg/dL (09-30-21 @ 07:17)  Triglycerides, Serum: 179 mg/dL (09-29-21 @ 07:11)  Triglycerides, Serum: 149 mg/dL (09-28-21 @ 11:06)    Hemoglobin A1C 5.7%    POCT Blood Glucose.: 130 mg/dL (09-30-21 @ 10:13)  POCT Blood Glucose.: 135 mg/dL (09-30-21 @ 06:04)  POCT Blood Glucose.: 134 mg/dL (09-30-21 @ 03:17)  POCT Blood Glucose.: 133 mg/dL (09-29-21 @ 21:55)  POCT Blood Glucose.: 127 mg/dL (09-29-21 @ 18:22)  POCT Blood Glucose.: 145 mg/dL (09-29-21 @ 14:00)    Skin: no pressure injuries documented  Edema: no edema documented     Estimated Needs: based on dosing wt 47.9 kg, with consideration for TPN  Energy: 7984-2557 calories (25-30 kcal/kg)  Protein: 77-96 grams (1.6-2 gm/kg)    Previous Nutrition Diagnosis: Moderate Malnutrition  Nutrition Diagnosis is: ongoing, addressed with TPN     New Nutrition Diagnosis:  none    Recommended Interventions:   1. TPN per TPN Team/Nutrition Assessment;  2. Monitor tolerance to clear liquids; advance to Low Fiber diet as tolerated      Monitoring and Evaluation:   Continue to monitor nutrition provision and tolerance, weights, labs, skin integrity.   RD remains available upon request and will follow up per protocol.    Tori Sands, MS SIEGEL CDN [facility], #[phone]. Nutrition Follow Up Note     Patient seen for: malnutrition/TPN Team follow up    Source: patient, medical record, TPN Team rounds.     Chart reviewed, events noted. "76y Female with Hx of RA (on MTX), HLD, appendectomy, and L renal cell CA (s/p L nephrectomy 2019) who presented with transaminitis and an abnormal MRCP showing dilated PD and CBD, found to have a 1.7x1.8cm hypoechoic pancreatic head lesion on EUS on 9/9. She was taken to the OR on 9/17 for a Whipple on 9/17 and transferred to the SICU extubated post-operatively for hemodynamic monitoring. Patient had NGT placed (9/26) due to severely distended stomach on AXR, now POD 10, recovering on the floor with likely delayed gastric emptying."    Interim Events:  - NGT discontinued 9/29; diet advanced to clear liquids 9/30. Pt tolerated juice and water with no GI distress.    Nutrition Status:  - Diet History: Pt has been ordered for NPO vs Clear Liquids vs Soft diet, over course of 20 days in-house.  - TPN initiated 9/27 in setting of distended stomach, high NGT output, and concern for delayed gastric emptying.   - Potassium increased in TPN in setting of hypokalemia, to avoid gastric dysmotility.  - Hypophosphatemia addressed with 45 mmol NaPhos in TPN  - Low bicarb noted; TPN initiated with 80mEq NaAcetate  - Reglan and Erythromycin ordered  - Hyperglycemia addressed with 5 units Insulin in TPN, and SSI q4 hrs    Diet Order: Diet, Clear Liquid (09-30-21 @ 08:02)    PARENTERAL NUTRITION  GOAL TPN (ordered 9/29) 1.4L infusing at 58 ml/hr (90 gm amino acids, 140 gm dextrose, 50 gm SMOF lipid) to provide 1336 kcal/day (27.8 kcal/kg and 1.9 gm/kg protein per dosing wt 47.9kg)    Non-Protein Calories: 976 kcal/day (20 kcal/kg)  Dextrose Infusion Rate: 2 mg/kg/min  Lipid Infusion Rate: 1.0 gm/kg/day; 0.087 gm/kg/hr    Electrolytes and Insulin: (ordered 9/29/21)  Insulin (units): 5  NaCl (mEq):  40  Na acetate (mEq): 80  Na Phos (mmol): 45  KCL (mEq):  100  Calcium gluconate (mEq): 10  Mg sulfate (mEq): 12  MTE-5 concentrate (ml): 1  MVI (ml): 10     GI:  Last BM: 9/29 (X3)  NGT output x 24-hrs: 150 ml; discontinued    Anthropometric Measurements:   Height (cm): 154.9 (09-17-21 @ 07:24)  DOSING Weight (kg):   53.5 (09-07-21)  47.9 (09-17-21) *used for calculations  BMI (kg/m2): 20 (09-17-21 @ 19:00)  IBW: 47.6 kg  UBW (per pt): 52.1 kg    Medications: MEDICATIONS  (STANDING):  chlorhexidine 2% Cloths 1 Application(s) Topical <User Schedule>  chlorhexidine 4% Liquid 1 Application(s) Topical <User Schedule>  enoxaparin Injectable 40 milliGRAM(s) SubCutaneous daily  erythromycin   IVPB 200 milliGRAM(s) IV Intermittent every 8 hours  fat emulsion (Fish Oil and Plant Based) 20% Infusion 20.8 mL/Hr (20.8 mL/Hr) IV Continuous <Continuous>  influenza   Vaccine 0.5 milliLiter(s) IntraMuscular once  insulin lispro (ADMELOG) corrective regimen sliding scale   SubCutaneous every 4 hours  metoclopramide Injectable 10 milliGRAM(s) IV Push three times a day  pantoprazole  Injectable 40 milliGRAM(s) IV Push two times a day  Parenteral Nutrition - Adult 1 Each (58 mL/Hr) TPN Continuous <Continuous>    MEDICATIONS  (PRN):  acetaminophen  IVPB .. 750 milliGRAM(s) IV Intermittent every 6 hours PRN Mild Pain (1 - 3)  artificial tears (preservative free) Ophthalmic Solution 1 Drop(s) Both EYES two times a day PRN Dry Eyes  HYDROmorphone  Injectable 0.5 milliGRAM(s) IV Push every 4 hours PRN Severe Pain (7 - 10)  ondansetron    Tablet 4 milliGRAM(s) Oral every 6 hours PRN Nausea  sodium chloride 0.9% lock flush 10 milliLiter(s) IV Push every 1 hour PRN Pre/post blood products, medications, blood draw, and to maintain line patency    Labs: 09-30 @ 07:17: Sodium 140, Potassium 4.1, Calcium 8.9, Magnesium 2.5, Phosphorus 3.3, BUN 19, Creatinine 0.53, Glucose 113<H>, Total Bilirubin 0.4, Albumin, 3.2<L>    Hemoglobin : 9.1 g/dL  Hematocrit : 28.2 %    LIVER FUNCTIONS - ( 30 Sep 2021 07:17 )  Alb: 3.2 g/dL / Pro: 6.1 g/dL / ALK PHOS: 130 U/L / ALT: 16 U/L / AST: 15 U/L / GGT: x           Triglycerides, Serum: 157 mg/dL (09-30-21 @ 07:17)  Triglycerides, Serum: 179 mg/dL (09-29-21 @ 07:11)  Triglycerides, Serum: 149 mg/dL (09-28-21 @ 11:06)    Hemoglobin A1C 5.7%    POCT Blood Glucose.: 130 mg/dL (09-30-21 @ 10:13)  POCT Blood Glucose.: 135 mg/dL (09-30-21 @ 06:04)  POCT Blood Glucose.: 134 mg/dL (09-30-21 @ 03:17)  POCT Blood Glucose.: 133 mg/dL (09-29-21 @ 21:55)  POCT Blood Glucose.: 127 mg/dL (09-29-21 @ 18:22)  POCT Blood Glucose.: 145 mg/dL (09-29-21 @ 14:00)    Skin: no pressure injuries documented  Edema: no edema documented     Estimated Needs: based on dosing wt 47.9 kg, with consideration for TPN  Energy: 0011-4448 calories (25-30 kcal/kg)  Protein: 77-96 grams (1.6-2 gm/kg)    Previous Nutrition Diagnosis: Moderate Malnutrition  Nutrition Diagnosis is: ongoing, addressed with TPN     New Nutrition Diagnosis:  none    Recommended Interventions:   1. TPN per TPN Team/Nutrition Assessment;  2. Monitor tolerance to clear liquids; advance to Low Fiber diet as tolerated      Monitoring and Evaluation:   Continue to monitor nutrition provision and tolerance, weights, labs, skin integrity.   RD remains available upon request and will follow up per protocol.    Tori Sands MS RD CDN [facility], #[phone].

## 2021-09-30 NOTE — PROGRESS NOTE ADULT - SUBJECTIVE AND OBJECTIVE BOX
[facility] NUTRITION SUPPORT / TPN -- FOLLOW UP NOTE  --------------------------------------------------------------------------------    24 hour events/subjective:  No acute overnight events.  Denies N/V.  +flatus, no BM.  Has not tried anything to drink yet.  Denies SOB/CP/dizziness/palpitations.    Diet:  Diet, Clear Liquid (09-30-21 @ 08:02)      PAST HISTORY  --------------------------------------------------------------------------------  No significant changes to PMH, PSH, FHx, SHx, unless otherwise noted    ALLERGIES & MEDICATIONS  --------------------------------------------------------------------------------  Allergies    No Known Allergies    Intolerances      Standing Inpatient Medications  chlorhexidine 2% Cloths 1 Application(s) Topical <User Schedule>  chlorhexidine 4% Liquid 1 Application(s) Topical <User Schedule>  enoxaparin Injectable 40 milliGRAM(s) SubCutaneous daily  erythromycin   IVPB 200 milliGRAM(s) IV Intermittent every 8 hours  fat emulsion (Fish Oil and Plant Based) 20% Infusion 20.8 mL/Hr IV Continuous <Continuous>  influenza   Vaccine 0.5 milliLiter(s) IntraMuscular once  insulin lispro (ADMELOG) corrective regimen sliding scale   SubCutaneous every 4 hours  metoclopramide Injectable 10 milliGRAM(s) IV Push three times a day  pantoprazole  Injectable 40 milliGRAM(s) IV Push two times a day  Parenteral Nutrition - Adult 1 Each TPN Continuous <Continuous>    PRN Inpatient Medications  acetaminophen  IVPB .. 750 milliGRAM(s) IV Intermittent every 6 hours PRN  artificial tears (preservative free) Ophthalmic Solution 1 Drop(s) Both EYES two times a day PRN  HYDROmorphone  Injectable 0.5 milliGRAM(s) IV Push every 4 hours PRN  ondansetron    Tablet 4 milliGRAM(s) Oral every 6 hours PRN  sodium chloride 0.9% lock flush 10 milliLiter(s) IV Push every 1 hour PRN      VITALS/PHYSICAL EXAM  --------------------------------------------------------------------------------  T(C): 36.4 (09-30-21 @ 05:16), Max: 37.2 (09-29-21 @ 21:49)  HR: 90 (09-30-21 @ 05:20) (83 - 108)  BP: 118/54 (09-30-21 @ 05:16) (113/74 - 124/71)  RR: 18 (09-30-21 @ 05:16) (16 - 18)  SpO2: 95% (09-30-21 @ 05:16) (95% - 99%)  Wt(kg): --        09-29-21 @ 07:01  -  09-30-21 @ 07:00  --------------------------------------------------------  IN: 1683.2 mL / OUT: 2630 mL / NET: -946.8 mL          Physical Exam:  	Gen: NAD, thin appearing  	HEENT: PERRL, supple neck, clear oropharynx	  	Abd: soft, nontender/nondistended, no rebound, no guarding          	Psych: Normal affect and mood  	Skin: Warm, without rashes        LABS/STUDIES  --------------------------------------------------------------------------------              9.1    7.92  >-----------<  363      [09-30-21 @ 07:17]              28.2     140  |  103  |  19  ----------------------------<  113      [09-30-21 @ 07:17]  4.1   |  23  |  0.53        Ca     8.9     [09-30-21 @ 07:17]      iCa    1.27     [09-30 @ 07:41]      Mg     2.5     [09-30-21 @ 07:17]      Phos  3.3     [09-30-21 @ 07:17]    TPro  6.1  /  Alb  3.2  /  TBili  0.4  /  DBili  x   /  AST  15  /  ALT  16  /  AlkPhos  130  [09-30-21 @ 07:17]          Ca ionized  Creatinine Trend:  POC glucoseGlucose, Serum: 113 mg/dL (09-30-21 @ 07:17)  CAPILLARY BLOOD GLUCOSE      POCT Blood Glucose.: 135 mg/dL (30 Sep 2021 06:04)  POCT Blood Glucose.: 134 mg/dL (30 Sep 2021 03:17)  POCT Blood Glucose.: 133 mg/dL (29 Sep 2021 21:55)  POCT Blood Glucose.: 127 mg/dL (29 Sep 2021 18:22)  POCT Blood Glucose.: 145 mg/dL (29 Sep 2021 14:00)  POCT Blood Glucose.: 156 mg/dL (29 Sep 2021 10:50)    PrealbuminPrealbumin, Serum: 8 mg/dL (09-28-21 @ 11:24)    Triglycerides

## 2021-09-30 NOTE — PROGRESS NOTE ADULT - ASSESSMENT
A/P  75 y/o female with pmhx of rheumatoid arthritis, high cholesterol, appendectomy, and left nephrectomy POD #13 s/p Whipple with gastroparesis    TPN Goals:  90g AA, 140g dextrose, 50g lipids    - Continue TPN at goal:  90gAA, 140g dextrose, 50g lipids in 1400cc total volume  - minimum volume 1400cc due to Ca:Phos  - Hyperglycemia - -145.  HgA1C 5.7, continue 5U insulin in TPN  - continue FS q 6 hours with ISS coverage  - Hx hypokalemia - continue KCl 100meq   - Hx hypophosphatemia - continue NaPhos 45mmol  - Hx low bicarb - NaAcetate started at 80meq  - , continue SMOF at goal, check TG weekly  - pt to start clears, continue TPN until able to tolerate adequate PO  - daily BMP, Mg, Phos, iCa  - care per surgery    TPN pager [phone], spectra [phone]  M-F 6A-4P, Weekends and holidays 8A-12P  discussed with Dr. Eckert and Dr. ROSANA Dejesus

## 2021-09-30 NOTE — PROGRESS NOTE ADULT - SUBJECTIVE AND OBJECTIVE BOX
SURGERY  Pager: #[phone]    STATUS POST: [place]    POST OPERATIVE DAY #13    INTERVAL EVENTS/SUBJECTIVE:   ______________________________________________  OBJECTIVE:   T(C): 36.8 (09-29-21 @ 05:53), Max: 37.3 (09-29-21 @ 01:08)  HR: 93 (09-29-21 @ 05:53) (85 - 100)  BP: 111/72 (09-29-21 @ 05:53) (111/72 - 137/71)  RR: 18 (09-29-21 @ 05:53) (18 - 18)  SpO2: 95% (09-29-21 @ 05:53) (95% - 98%)  Wt(kg): --  CAPILLARY BLOOD GLUCOSE      POCT Blood Glucose.: 144 mg/dL (29 Sep 2021 06:26)  POCT Blood Glucose.: 116 mg/dL (29 Sep 2021 02:01)  POCT Blood Glucose.: 155 mg/dL (28 Sep 2021 21:30)  POCT Blood Glucose.: 158 mg/dL (28 Sep 2021 17:31)  POCT Blood Glucose.: 127 mg/dL (28 Sep 2021 14:23)  POCT Blood Glucose.: 88 mg/dL (28 Sep 2021 09:59)    I&O's Detail    28 Sep 2021 07:01  -  29 Sep 2021 07:00  --------------------------------------------------------  IN:    Fat Emulsion (Fish Oil &amp; Plant Based) 20% Infusion: 135.2 mL    TPN (Total Parenteral Nutrition): 754 mL  Total IN: 889.2 mL    OUT:    Nasogastric/Oral tube (mL): 300 mL    Oral Fluid: 0 mL    Voided (mL): 800 mL  Total OUT: 1100 mL    Total NET: -210.8 mL          PHYSICAL EXAM:  GEN: resting in bed, well appearing, NGT in place  RESP: no increased WOB  ABD: soft, non-distended, non-tender without rebound tenderness or guarding. Incisions are c/d/i  EXTR: warm, well-perfused, no edema  ______________________________________________  LABS:  CBC Full  -  ( 29 Sep 2021 07:18 )  WBC Count : 8.77 K/uL  RBC Count : 2.88 M/uL  Hemoglobin : 9.4 g/dL  Hematocrit : 27.5 %  Platelet Count - Automated : 375 K/uL  Mean Cell Volume : 95.5 fl  Mean Cell Hemoglobin : 32.6 pg  Mean Cell Hemoglobin Concentration : 34.2 gm/dL  Auto Neutrophil # : 6.10 K/uL  Auto Lymphocyte # : 1.60 K/uL  Auto Monocyte # : 0.83 K/uL  Auto Eosinophil # : 0.15 K/uL  Auto Basophil # : 0.03 K/uL  Auto Neutrophil % : 69.6 %  Auto Lymphocyte % : 18.2 %  Auto Monocyte % : 9.5 %  Auto Eosinophil % : 1.7 %  Auto Basophil % : 0.3 %    09-29    141  |  104  |  16  ----------------------------<  138<H>  4.0   |  24  |  0.50    Ca    9.0      29 Sep 2021 07:11  Phos  3.3     09-29  Mg     2.4     09-29    TPro  6.0  /  Alb  3.1<L>  /  TBili  0.5  /  DBili  x   /  AST  14  /  ALT  17  /  AlkPhos  136<H>  09-29    _____________________________________________  RADIOLOGY:     SURGERY  Pager: #[phone]    STATUS POST: [place]    POST OPERATIVE DAY #13    INTERVAL EVENTS/SUBJECTIVE: No acute events overnight. Patient had NGT removed yesterday, tolerated well. Was also catheterized for urine retention, passed TOV in the evening. This AM on rounds patient reporting...   ______________________________________________  OBJECTIVE:   T(C): 36.8 (09-29-21 @ 05:53), Max: 37.3 (09-29-21 @ 01:08)  HR: 93 (09-29-21 @ 05:53) (85 - 100)  BP: 111/72 (09-29-21 @ 05:53) (111/72 - 137/71)  RR: 18 (09-29-21 @ 05:53) (18 - 18)  SpO2: 95% (09-29-21 @ 05:53) (95% - 98%)  Wt(kg): --  CAPILLARY BLOOD GLUCOSE      POCT Blood Glucose.: 144 mg/dL (29 Sep 2021 06:26)  POCT Blood Glucose.: 116 mg/dL (29 Sep 2021 02:01)  POCT Blood Glucose.: 155 mg/dL (28 Sep 2021 21:30)  POCT Blood Glucose.: 158 mg/dL (28 Sep 2021 17:31)  POCT Blood Glucose.: 127 mg/dL (28 Sep 2021 14:23)  POCT Blood Glucose.: 88 mg/dL (28 Sep 2021 09:59)    I&O's Detail    28 Sep 2021 07:01  -  29 Sep 2021 07:00  --------------------------------------------------------  IN:    Fat Emulsion (Fish Oil &amp; Plant Based) 20% Infusion: 135.2 mL    TPN (Total Parenteral Nutrition): 754 mL  Total IN: 889.2 mL    OUT:    Nasogastric/Oral tube (mL): 300 mL    Oral Fluid: 0 mL    Voided (mL): 800 mL  Total OUT: 1100 mL    Total NET: -210.8 mL          PHYSICAL EXAM:  GEN: resting in bed, well appearing  RESP: no increased WOB  ABD: soft, non-distended, non-tender without rebound tenderness or guarding. Incisions are c/d/i  EXTR: warm, well-perfused, no edema  ______________________________________________  LABS:  CBC Full  -  ( 29 Sep 2021 07:18 )  WBC Count : 8.77 K/uL  RBC Count : 2.88 M/uL  Hemoglobin : 9.4 g/dL  Hematocrit : 27.5 %  Platelet Count - Automated : 375 K/uL  Mean Cell Volume : 95.5 fl  Mean Cell Hemoglobin : 32.6 pg  Mean Cell Hemoglobin Concentration : 34.2 gm/dL  Auto Neutrophil # : 6.10 K/uL  Auto Lymphocyte # : 1.60 K/uL  Auto Monocyte # : 0.83 K/uL  Auto Eosinophil # : 0.15 K/uL  Auto Basophil # : 0.03 K/uL  Auto Neutrophil % : 69.6 %  Auto Lymphocyte % : 18.2 %  Auto Monocyte % : 9.5 %  Auto Eosinophil % : 1.7 %  Auto Basophil % : 0.3 %    09-29    141  |  104  |  16  ----------------------------<  138<H>  4.0   |  24  |  0.50    Ca    9.0      29 Sep 2021 07:11  Phos  3.3     09-29  Mg     2.4     09-29    TPro  6.0  /  Alb  3.1<L>  /  TBili  0.5  /  DBili  x   /  AST  14  /  ALT  17  /  AlkPhos  136<H>  09-29    _____________________________________________  RADIOLOGY:     SURGERY  Pager: #[phone]    STATUS POST: [place]    POST OPERATIVE DAY #13    INTERVAL EVENTS/SUBJECTIVE: No acute events overnight. Patient had NGT removed yesterday, tolerated well. Was also catheterized for urine retention, passed TOV in the evening. This AM on rounds patient reports pain well controlled. Has not had a BM in 2 days, not passing gas today yet.   ______________________________________________  OBJECTIVE:   T(C): 36.8 (09-29-21 @ 05:53), Max: 37.3 (09-29-21 @ 01:08)  HR: 93 (09-29-21 @ 05:53) (85 - 100)  BP: 111/72 (09-29-21 @ 05:53) (111/72 - 137/71)  RR: 18 (09-29-21 @ 05:53) (18 - 18)  SpO2: 95% (09-29-21 @ 05:53) (95% - 98%)  Wt(kg): --  CAPILLARY BLOOD GLUCOSE      POCT Blood Glucose.: 144 mg/dL (29 Sep 2021 06:26)  POCT Blood Glucose.: 116 mg/dL (29 Sep 2021 02:01)  POCT Blood Glucose.: 155 mg/dL (28 Sep 2021 21:30)  POCT Blood Glucose.: 158 mg/dL (28 Sep 2021 17:31)  POCT Blood Glucose.: 127 mg/dL (28 Sep 2021 14:23)  POCT Blood Glucose.: 88 mg/dL (28 Sep 2021 09:59)    I&O's Detail    28 Sep 2021 07:01  -  29 Sep 2021 07:00  --------------------------------------------------------  IN:    Fat Emulsion (Fish Oil &amp; Plant Based) 20% Infusion: 135.2 mL    TPN (Total Parenteral Nutrition): 754 mL  Total IN: 889.2 mL    OUT:    Nasogastric/Oral tube (mL): 300 mL    Oral Fluid: 0 mL    Voided (mL): 800 mL  Total OUT: 1100 mL    Total NET: -210.8 mL          PHYSICAL EXAM:  GEN: resting in bed, well appearing  RESP: no increased WOB  ABD: soft, non-distended, slightly tender to palpation in epigastric region, without rebound tenderness or guarding. Incisions are c/d/i  EXTR: warm, well-perfused, no edema  ______________________________________________  LABS:  CBC Full  -  ( 29 Sep 2021 07:18 )  WBC Count : 8.77 K/uL  RBC Count : 2.88 M/uL  Hemoglobin : 9.4 g/dL  Hematocrit : 27.5 %  Platelet Count - Automated : 375 K/uL  Mean Cell Volume : 95.5 fl  Mean Cell Hemoglobin : 32.6 pg  Mean Cell Hemoglobin Concentration : 34.2 gm/dL  Auto Neutrophil # : 6.10 K/uL  Auto Lymphocyte # : 1.60 K/uL  Auto Monocyte # : 0.83 K/uL  Auto Eosinophil # : 0.15 K/uL  Auto Basophil # : 0.03 K/uL  Auto Neutrophil % : 69.6 %  Auto Lymphocyte % : 18.2 %  Auto Monocyte % : 9.5 %  Auto Eosinophil % : 1.7 %  Auto Basophil % : 0.3 %    09-29    141  |  104  |  16  ----------------------------<  138<H>  4.0   |  24  |  0.50    Ca    9.0      29 Sep 2021 07:11  Phos  3.3     09-29  Mg     2.4     09-29    TPro  6.0  /  Alb  3.1<L>  /  TBili  0.5  /  DBili  x   /  AST  14  /  ALT  17  /  AlkPhos  136<H>  09-29    _____________________________________________  RADIOLOGY:

## 2021-10-01 LAB
ALBUMIN SERPL ELPH-MCNC: 3 G/DL — LOW (ref 3.3–5)
ALP SERPL-CCNC: 123 U/L — HIGH (ref 40–120)
ALT FLD-CCNC: 16 U/L — SIGNIFICANT CHANGE UP (ref 10–45)
ANION GAP SERPL CALC-SCNC: 10 MMOL/L — SIGNIFICANT CHANGE UP (ref 5–17)
AST SERPL-CCNC: 17 U/L — SIGNIFICANT CHANGE UP (ref 10–40)
BILIRUB SERPL-MCNC: 0.5 MG/DL — SIGNIFICANT CHANGE UP (ref 0.2–1.2)
BUN SERPL-MCNC: 23 MG/DL — SIGNIFICANT CHANGE UP (ref 7–23)
CA-I BLD-SCNC: 1.25 MMOL/L — SIGNIFICANT CHANGE UP (ref 1.15–1.33)
CALCIUM SERPL-MCNC: 9.2 MG/DL — SIGNIFICANT CHANGE UP (ref 8.4–10.5)
CHLORIDE SERPL-SCNC: 102 MMOL/L — SIGNIFICANT CHANGE UP (ref 96–108)
CO2 SERPL-SCNC: 24 MMOL/L — SIGNIFICANT CHANGE UP (ref 22–31)
CREAT SERPL-MCNC: 0.56 MG/DL — SIGNIFICANT CHANGE UP (ref 0.5–1.3)
GLUCOSE BLDC GLUCOMTR-MCNC: 111 MG/DL — HIGH (ref 70–99)
GLUCOSE BLDC GLUCOMTR-MCNC: 121 MG/DL — HIGH (ref 70–99)
GLUCOSE BLDC GLUCOMTR-MCNC: 128 MG/DL — HIGH (ref 70–99)
GLUCOSE BLDC GLUCOMTR-MCNC: 131 MG/DL — HIGH (ref 70–99)
GLUCOSE BLDC GLUCOMTR-MCNC: 134 MG/DL — HIGH (ref 70–99)
GLUCOSE BLDC GLUCOMTR-MCNC: 138 MG/DL — HIGH (ref 70–99)
GLUCOSE SERPL-MCNC: 114 MG/DL — HIGH (ref 70–99)
HCT VFR BLD CALC: 26.1 % — LOW (ref 34.5–45)
HGB BLD-MCNC: 8.8 G/DL — LOW (ref 11.5–15.5)
MAGNESIUM SERPL-MCNC: 2.5 MG/DL — SIGNIFICANT CHANGE UP (ref 1.6–2.6)
MCHC RBC-ENTMCNC: 33.1 PG — SIGNIFICANT CHANGE UP (ref 27–34)
MCHC RBC-ENTMCNC: 33.7 GM/DL — SIGNIFICANT CHANGE UP (ref 32–36)
MCV RBC AUTO: 98.1 FL — SIGNIFICANT CHANGE UP (ref 80–100)
NRBC # BLD: 0 /100 WBCS — SIGNIFICANT CHANGE UP (ref 0–0)
PHOSPHATE SERPL-MCNC: 3.8 MG/DL — SIGNIFICANT CHANGE UP (ref 2.5–4.5)
PLATELET # BLD AUTO: 310 K/UL — SIGNIFICANT CHANGE UP (ref 150–400)
POTASSIUM SERPL-MCNC: 4.2 MMOL/L — SIGNIFICANT CHANGE UP (ref 3.5–5.3)
POTASSIUM SERPL-SCNC: 4.2 MMOL/L — SIGNIFICANT CHANGE UP (ref 3.5–5.3)
PROT SERPL-MCNC: 5.9 G/DL — LOW (ref 6–8.3)
RBC # BLD: 2.66 M/UL — LOW (ref 3.8–5.2)
RBC # FLD: 15.5 % — HIGH (ref 10.3–14.5)
SODIUM SERPL-SCNC: 136 MMOL/L — SIGNIFICANT CHANGE UP (ref 135–145)
TRIGL SERPL-MCNC: 159 MG/DL — HIGH
WBC # BLD: 6.78 K/UL — SIGNIFICANT CHANGE UP (ref 3.8–10.5)
WBC # FLD AUTO: 6.78 K/UL — SIGNIFICANT CHANGE UP (ref 3.8–10.5)

## 2021-10-01 PROCEDURE — 99232 SBSQ HOSP IP/OBS MODERATE 35: CPT

## 2021-10-01 PROCEDURE — 74018 RADEX ABDOMEN 1 VIEW: CPT | Mod: 26

## 2021-10-01 RX ORDER — OXYCODONE HYDROCHLORIDE 5 MG/1
2.5 TABLET ORAL EVERY 4 HOURS
Refills: 0 | Status: DISCONTINUED | OUTPATIENT
Start: 2021-10-01 | End: 2021-10-02

## 2021-10-01 RX ORDER — ELECTROLYTE SOLUTION,INJ
1 VIAL (ML) INTRAVENOUS
Refills: 0 | Status: DISCONTINUED | OUTPATIENT
Start: 2021-10-01 | End: 2021-10-01

## 2021-10-01 RX ORDER — I.V. FAT EMULSION 20 G/100ML
20.8 EMULSION INTRAVENOUS
Qty: 50 | Refills: 0 | Status: DISCONTINUED | OUTPATIENT
Start: 2021-10-01 | End: 2021-10-02

## 2021-10-01 RX ADMIN — Medication 10 MILLIGRAM(S): at 13:31

## 2021-10-01 RX ADMIN — Medication 10 MILLIGRAM(S): at 21:57

## 2021-10-01 RX ADMIN — Medication 10 MILLIGRAM(S): at 06:26

## 2021-10-01 RX ADMIN — PANTOPRAZOLE SODIUM 40 MILLIGRAM(S): 20 TABLET, DELAYED RELEASE ORAL at 06:25

## 2021-10-01 RX ADMIN — ENOXAPARIN SODIUM 40 MILLIGRAM(S): 100 INJECTION SUBCUTANEOUS at 11:29

## 2021-10-01 RX ADMIN — Medication 1 EACH: at 17:53

## 2021-10-01 RX ADMIN — Medication 250 MILLIGRAM(S): at 17:54

## 2021-10-01 RX ADMIN — CHLORHEXIDINE GLUCONATE 1 APPLICATION(S): 213 SOLUTION TOPICAL at 10:30

## 2021-10-01 RX ADMIN — OXYCODONE HYDROCHLORIDE 2.5 MILLIGRAM(S): 5 TABLET ORAL at 21:58

## 2021-10-01 RX ADMIN — HYDROMORPHONE HYDROCHLORIDE 0.5 MILLIGRAM(S): 2 INJECTION INTRAMUSCULAR; INTRAVENOUS; SUBCUTANEOUS at 14:08

## 2021-10-01 RX ADMIN — I.V. FAT EMULSION 20.8 ML/HR: 20 EMULSION INTRAVENOUS at 17:53

## 2021-10-01 RX ADMIN — Medication 250 MILLIGRAM(S): at 01:32

## 2021-10-01 RX ADMIN — PANTOPRAZOLE SODIUM 40 MILLIGRAM(S): 20 TABLET, DELAYED RELEASE ORAL at 17:58

## 2021-10-01 RX ADMIN — Medication 250 MILLIGRAM(S): at 08:39

## 2021-10-01 RX ADMIN — HYDROMORPHONE HYDROCHLORIDE 0.5 MILLIGRAM(S): 2 INJECTION INTRAMUSCULAR; INTRAVENOUS; SUBCUTANEOUS at 14:38

## 2021-10-01 NOTE — PROGRESS NOTE ADULT - SUBJECTIVE AND OBJECTIVE BOX
C A R D I O L O G Y  **********************************     DATE OF SERVICE: 10-01-21     Denies chest pain or SOB. Review of systems otherwise (-)      MEDICATIONS  (STANDING):  chlorhexidine 2% Cloths 1 Application(s) Topical <User Schedule>  chlorhexidine 4% Liquid 1 Application(s) Topical <User Schedule>  enoxaparin Injectable 40 milliGRAM(s) SubCutaneous daily  erythromycin   IVPB 200 milliGRAM(s) IV Intermittent every 8 hours  fat emulsion (Fish Oil and Plant Based) 20% Infusion 20.8 mL/Hr (20.8 mL/Hr) IV Continuous <Continuous>  influenza   Vaccine 0.5 milliLiter(s) IntraMuscular once  insulin lispro (ADMELOG) corrective regimen sliding scale   SubCutaneous every 4 hours  metoclopramide Injectable 10 milliGRAM(s) IV Push three times a day  pantoprazole  Injectable 40 milliGRAM(s) IV Push two times a day  Parenteral Nutrition - Adult 1 Each (58 mL/Hr) TPN Continuous <Continuous>  Parenteral Nutrition - Adult 1 Each (58 mL/Hr) TPN Continuous <Continuous>    MEDICATIONS  (PRN):  acetaminophen  IVPB .. 750 milliGRAM(s) IV Intermittent every 6 hours PRN Mild Pain (1 - 3)  artificial tears (preservative free) Ophthalmic Solution 1 Drop(s) Both EYES two times a day PRN Dry Eyes  HYDROmorphone  Injectable 0.5 milliGRAM(s) IV Push every 4 hours PRN Severe Pain (7 - 10)  ondansetron    Tablet 4 milliGRAM(s) Oral every 6 hours PRN Nausea  sodium chloride 0.9% lock flush 10 milliLiter(s) IV Push every 1 hour PRN Pre/post blood products, medications, blood draw, and to maintain line patency      LABS:                          8.8    6.78  )-----------( 310      ( 01 Oct 2021 07:27 )             26.1     Hemoglobin: 8.8 g/dL (10-01 @ 07:27)  Hemoglobin: 9.1 g/dL (09-30 @ 07:17)  Hemoglobin: 9.4 g/dL (09-29 @ 07:18)  Hemoglobin: 8.7 g/dL (09-28 @ 07:12)  Hemoglobin: 8.7 g/dL (09-27 @ 07:12)    10-01    136  |  102  |  23  ----------------------------<  114<H>  4.2   |  24  |  0.56    Ca    9.2      01 Oct 2021 07:19  Phos  3.8     10-01  Mg     2.5     10-01    TPro  5.9<L>  /  Alb  3.0<L>  /  TBili  0.5  /  DBili  x   /  AST  17  /  ALT  16  /  AlkPhos  123<H>  10-01    Creatinine Trend: 0.56<--, 0.53<--, 0.50<--, 0.48<--, 0.49<--, 0.51<--             09-30-21 @ 07:01  -  10-01-21 @ 07:00  --------------------------------------------------------  IN: 2383.2 mL / OUT: 1550 mL / NET: 833.2 mL    10-01-21 @ 07:01  -  10-01-21 @ 13:21  --------------------------------------------------------  IN: 732 mL / OUT: 0 mL / NET: 732 mL        PHYSICAL EXAM  Vital Signs Last 24 Hrs  T(C): 36.7 (01 Oct 2021 09:36), Max: 37.1 (01 Oct 2021 01:28)  T(F): 98 (01 Oct 2021 09:36), Max: 98.7 (01 Oct 2021 01:28)  HR: 98 (01 Oct 2021 09:36) (88 - 112)  BP: 111/76 (01 Oct 2021 09:36) (108/75 - 118/73)  BP(mean): --  RR: 18 (01 Oct 2021 09:36) (18 - 18)  SpO2: 99% (01 Oct 2021 09:36) (95% - 99%)      Gen: Appears well in NAD  HEENT:  (-)icterus (-)pallor  CV: N S1 S2 1/6 PATRICIA (+)2 Pulses B/l  Resp:  Clear to auscultation B/L, normal effort  GI: (+) BS Soft, NT, ND  Lymph:  (-)Edema, (-)obvious lymphadenopathy  Skin: Warm to touch, Normal turgor  Psych: Appropriate mood and affect    TELEMETRY: None	      ASSESSMENT/PLAN: 76y Female with Hx of RA (on MTX), HLD, appendectomy, and L renal cell CA (s/p L nephrectomy 2019) who presented with transaminitis and an abnormal MRCP showing dilated PD and CBD, found to have a 1.7x1.8cm hypoechoic pancreatic head lesion on EUS on 9/9. She was taken to the OR for a Whipple on 9/17.    - No evidence of clinical HF or anginal symptoms  - TTE noted with normal LV function  - Pain management per primary team  - Surgery follow up appreciated  - No further inpatient cardiac w/u planned    Kyrie Torres PA-C  Pager: [phone]

## 2021-10-01 NOTE — PROGRESS NOTE ADULT - ASSESSMENT
76y Female with Hx of RA (on MTX), HLD, appendectomy, and L renal cell CA (s/p L nephrectomy 2019) who presented with transaminitis and an abnormal MRCP showing dilated PD and CBD, found to have a 1.7x1.8cm hypoechoic pancreatic head lesion on EUS on 9/9. She was taken to the OR on 9/17 for a Whipple on 9/17 and transferred to the SICU extubated post-operatively for hemodynamic monitoring. Patient had NGT placed (9/26) due to severely distended stomach on AXR, now POD 14, recovering on the floor with likely delayed gastric emptying.     Plan:  - Continue TPN, start clear liquids  - Cont. IV erythromycin, reglan for promotility  - F/u CTAP abdomen pelvis IV with PO contrast down NGT - suspicion of edema at GJ  - Pain relief: switched to IV pain meds; IV acetaminophen and 0.5 dilaudid for severe pain   - DVT ppx        Red Team Surgery  [id number]    76y Female with Hx of RA (on MTX), HLD, appendectomy, and L renal cell CA (s/p L nephrectomy 2019) who presented with transaminitis and an abnormal MRCP showing dilated PD and CBD, found to have a 1.7x1.8cm hypoechoic pancreatic head lesion on EUS on 9/9. She was taken to the OR on 9/17 for a Whipple on 9/17 and transferred to the SICU extubated post-operatively for hemodynamic monitoring. Patient had NGT placed (9/26) due to severely distended stomach on AXR, now POD 14, recovering on the floor with likely delayed gastric emptying.     Plan:  - Continue TPN, clear liquids, advanced to mechanical soft in pm after abd xray ordered  - Cont. IV erythromycin, reglan for promotility  - Pain relief: Tylenol IV  - DVT ppx      Red Team Surgery  [id number]

## 2021-10-01 NOTE — PROGRESS NOTE ADULT - ATTENDING SUPERVISION STATEMENT
ACP
Resident
Fellow
Resident

## 2021-10-01 NOTE — PROGRESS NOTE ADULT - ATTENDING COMMENTS
Patient seen and examined.  Agree with above.   TTE with normal LV function   No further inpatient cardiac workup needed at this time.     Myrtle Blount MD

## 2021-10-01 NOTE — PROGRESS NOTE ADULT - ASSESSMENT
77 y/o female with pmhx of rheumatoid arthritis, high cholesterol, appendectomy, and left nephrectomy POD #10 s/p Whipple with gastroparesis, NPO/NGT    TPN Goals:  90g AA, 140g dextrose, 50g lipids    - Continue goal CHO/AA in TPN and increase to goal SMOF today @1.4l total volume.  - Nutritional Assessment. Pt still NPO. Clamp trial in place. Please obtain prealbumin weekly. Prealbumin noted to be 8 at baseline on 9/28.  - PICC in place LUE. Maintenance as per protocol  - Hyperglycemic Control- HgA1C 5.7, have 5U insulin in TPN, please obtain q6 fingersticks with ISS coverage. Fingersticks noted to be 127-158 over 24hrs.  -Electrolyte Imbalance Risk. -Obtain daily CMP, Mg, phos, iCa. Will replete lytes in TPN bag daily as needed. Hypokalemia - KCl at 100meq KCl. Hypophosphatemia - NaPhos @ 45mmol. Low bicarb - NaAcetate @ 80meq  - Please obtain Triglycerides daily for on goal SMOF for 3 days, then can check weekly. Triglyceride today was 179, can increase to goal SMOF.  - Care per surgery    TPN pager [phone], spectra [phone]  M-F 6A-4P, Weekends and holidays 8A-12P  discussed with Dr. Eckert     75 y/o female with pmhx of rheumatoid arthritis, high cholesterol, appendectomy, and left nephrectomy POD #10 s/p Whipple with gastroparesis, NPO/NGT    TPN Goals:  90g AA, 140g dextrose, 50g lipids    - Continue goal CHO/AA in TPN and goal SMOF @1.4l total volume.  - Nutritional Assessment. Pt now on clears diet, will keep TPN at goal til diet is advanced and pt is tolerating.  - PICC in place LUE. Maintenance as per protocol  - Hyperglycemic Control- HgA1C 5.7, keep 5U insulin in TPN, please obtain q6 fingersticks with ISS coverage. Fingersticks noted to be 111-150 over 24hrs.  -Electrolyte Imbalance Risk. -Obtain daily CMP, Mg, phos, iCa. Will replete lytes in TPN bag daily as needed. Hypokalemia - KCl at 100meq KCl. Hypophosphatemia - NaPhos @ 45mmol. Low bicarb - NaAcetate @ 80meq  - Please obtain Triglycerides daily for on goal SMOF for 3 days, then can check weekly. Triglyceride today was 159, continue goal SMOF.  - Care per surgery    TPN pager [phone], spectra [phone]  M-F 6A-4P, Weekends and holidays 8A-12P  discussed with Dr. Eckert

## 2021-10-01 NOTE — PROGRESS NOTE ADULT - SUBJECTIVE AND OBJECTIVE BOX
[facility] NUTRITION SUPPORT--  Attending/ PA FOLLOW UP NOTE      24 hour events/subjective:   .    TPN originally consulted for nutrition support and started TPN on 9/27.  Pt is tolerating without  issues, and denies palpitations, chest pain, shortness of breath. Pt further denies fevers, chills nor night sweats. Denies nausea nor vomiting nor abdominal pain. Reports flatus yesterday, but none today. Reports BM last night, no flatus.    NGT removed and pt started on clears. AXR later today.    .        PAST HISTORY  --------------------------------------------------------------------------------  No significant changes to PMH, PSH, FHx, SHx, unless otherwise noted    ALLERGIES & MEDICATIONS  --------------------------------------------------------------------------------  Allergies    No Known Allergies    Intolerances      Standing Inpatient Medications  chlorhexidine 2% Cloths 1 Application(s) Topical <User Schedule>  chlorhexidine 4% Liquid 1 Application(s) Topical <User Schedule>  enoxaparin Injectable 40 milliGRAM(s) SubCutaneous daily  erythromycin   IVPB 200 milliGRAM(s) IV Intermittent every 8 hours  influenza   Vaccine 0.5 milliLiter(s) IntraMuscular once  insulin lispro (ADMELOG) corrective regimen sliding scale   SubCutaneous every 4 hours  metoclopramide Injectable 10 milliGRAM(s) IV Push three times a day  pantoprazole  Injectable 40 milliGRAM(s) IV Push two times a day  Parenteral Nutrition - Adult 1 Each TPN Continuous <Continuous>    PRN Inpatient Medications  acetaminophen  IVPB .. 750 milliGRAM(s) IV Intermittent every 6 hours PRN  artificial tears (preservative free) Ophthalmic Solution 1 Drop(s) Both EYES two times a day PRN  HYDROmorphone  Injectable 0.5 milliGRAM(s) IV Push every 4 hours PRN  ondansetron    Tablet 4 milliGRAM(s) Oral every 6 hours PRN  sodium chloride 0.9% lock flush 10 milliLiter(s) IV Push every 1 hour PRN      REVIEW OF SYSTEMS  --------------------------------------------------------------------------------  Gen: as per HPI  Skin: No rashes  Head/Eyes/Ears/Mouth: No headache;No sore throat  Respiratory: No dyspnea, cough,   CV: No chest pain, PND, orthopnea  GI: as per HPI  : No increased frequency, dysuria, hematuria, nocturia  MSK: No joint pain/swelling; no back pain; no edema  Neuro: No dizziness/lightheadedness, weakness, seizures, numbness, tingling  Psych: No significant nervousness, anxiety, stress, depression    All other systems were reviewed and are negative, except as noted.      LABS/STUDIES  --------------------------------------------------------------------------------              8.8    6.78  >-----------<  310      [10-01-21 @ 07:27]              26.1     136  |  102  |  23  ----------------------------<  114      [10-01-21 @ 07:19]  4.2   |  24  |  0.56        Ca     9.2     [10-01-21 @ 07:19]      iCa    1.25     [10-01 @ 07:28]      Mg     2.5     [10-01-21 @ 07:19]      Phos  3.8     [10-01-21 @ 07:19]    TPro  5.9  /  Alb  3.0  /  TBili  0.5  /  DBili  x   /  AST  17  /  ALT  16  /  AlkPhos  123  [10-01-21 @ 07:19]            Glucose, Serum: 114 mg/dL (10-01-21 @ 07:19)    Prealbumin, Serum: 8 mg/dL (09-28-21 @ 11:24)          09-30-21 @ 07:01  -  10-01-21 @ 07:00  --------------------------------------------------------  IN: 2383.2 mL / OUT: 1550 mL / NET: 833.2 mL        VITALS/PHYSICAL EXAM  --------------------------------------------------------------------------------  T(C): 36.8 (10-01-21 @ 06:23), Max: 37.1 (10-01-21 @ 01:28)  HR: 100 (10-01-21 @ 06:23) (88 - 112)  BP: 109/65 (10-01-21 @ 06:23) (108/75 - 118/73)  RR: 18 (10-01-21 @ 06:23) (18 - 18)  SpO2: 95% (10-01-21 @ 06:23) (95% - 99%)  Wt(kg): --        Physical Exam:    	Gen: NAD, well-appearing  	HEENT: PERRL,              Neck: Trachea midline, no noted JVD              Chest: non labored breathing, equal chest expansion b/l  	Abd: nondistended, NT soft,  abdominal SOIs are CDI  	UE: Warm, FROM, intact strength; no edeme LUE PICC dressing CDI.  	LE: Warm, FROM, intact strength; no edema  	Neuro: AOx3  	Psych: Normal affect and mood  	Skin: Warm, without rashes  .  .  .    .        .   [facility] NUTRITION SUPPORT--  Attending/ PA FOLLOW UP NOTE      24 hour events/subjective:     TPN originally consulted for nutrition support and started TPN on 9/27.  Pt is tolerating without  issues, and denies palpitations, chest pain, shortness of breath. Pt further denies fevers, chills nor night sweats. Denies nausea nor vomiting nor abdominal pain. Reports BM last.    NGT removed yesterday, pt started on clears and is tolerating. AXR later today.    .        PAST HISTORY  --------------------------------------------------------------------------------  No significant changes to PMH, PSH, FHx, SHx, unless otherwise noted    ALLERGIES & MEDICATIONS  --------------------------------------------------------------------------------  Allergies    No Known Allergies    Intolerances      Standing Inpatient Medications  chlorhexidine 2% Cloths 1 Application(s) Topical <User Schedule>  chlorhexidine 4% Liquid 1 Application(s) Topical <User Schedule>  enoxaparin Injectable 40 milliGRAM(s) SubCutaneous daily  erythromycin   IVPB 200 milliGRAM(s) IV Intermittent every 8 hours  influenza   Vaccine 0.5 milliLiter(s) IntraMuscular once  insulin lispro (ADMELOG) corrective regimen sliding scale   SubCutaneous every 4 hours  metoclopramide Injectable 10 milliGRAM(s) IV Push three times a day  pantoprazole  Injectable 40 milliGRAM(s) IV Push two times a day  Parenteral Nutrition - Adult 1 Each TPN Continuous <Continuous>    PRN Inpatient Medications  acetaminophen  IVPB .. 750 milliGRAM(s) IV Intermittent every 6 hours PRN  artificial tears (preservative free) Ophthalmic Solution 1 Drop(s) Both EYES two times a day PRN  HYDROmorphone  Injectable 0.5 milliGRAM(s) IV Push every 4 hours PRN  ondansetron    Tablet 4 milliGRAM(s) Oral every 6 hours PRN  sodium chloride 0.9% lock flush 10 milliLiter(s) IV Push every 1 hour PRN      REVIEW OF SYSTEMS  --------------------------------------------------------------------------------  Gen: as per HPI  Skin: No rashes  Head/Eyes/Ears/Mouth: No headache;No sore throat  Respiratory: No dyspnea, cough,   CV: No chest pain, PND, orthopnea  GI: as per HPI  : No increased frequency, dysuria, hematuria, nocturia  MSK: No joint pain/swelling; no back pain; no edema  Neuro: No dizziness/lightheadedness, weakness, seizures, numbness, tingling  Psych: No significant nervousness, anxiety, stress, depression    All other systems were reviewed and are negative, except as noted.      LABS/STUDIES  --------------------------------------------------------------------------------              8.8    6.78  >-----------<  310      [10-01-21 @ 07:27]              26.1     136  |  102  |  23  ----------------------------<  114      [10-01-21 @ 07:19]  4.2   |  24  |  0.56        Ca     9.2     [10-01-21 @ 07:19]      iCa    1.25     [10-01 @ 07:28]      Mg     2.5     [10-01-21 @ 07:19]      Phos  3.8     [10-01-21 @ 07:19]    TPro  5.9  /  Alb  3.0  /  TBili  0.5  /  DBili  x   /  AST  17  /  ALT  16  /  AlkPhos  123  [10-01-21 @ 07:19]            Glucose, Serum: 114 mg/dL (10-01-21 @ 07:19)    Prealbumin, Serum: 8 mg/dL (09-28-21 @ 11:24)          09-30-21 @ 07:01  -  10-01-21 @ 07:00  --------------------------------------------------------  IN: 2383.2 mL / OUT: 1550 mL / NET: 833.2 mL        VITALS/PHYSICAL EXAM  --------------------------------------------------------------------------------  T(C): 36.8 (10-01-21 @ 06:23), Max: 37.1 (10-01-21 @ 01:28)  HR: 100 (10-01-21 @ 06:23) (88 - 112)  BP: 109/65 (10-01-21 @ 06:23) (108/75 - 118/73)  RR: 18 (10-01-21 @ 06:23) (18 - 18)  SpO2: 95% (10-01-21 @ 06:23) (95% - 99%)  Wt(kg): --        Physical Exam:    	Gen: NAD, well-appearing  	HEENT: PERRL,              Neck: Trachea midline, no noted JVD              Chest: non labored breathing, equal chest expansion b/l  	Abd: nondistended, NT soft,  abdominal SOIs are CDI  	UE: Warm, FROM, intact strength; no edeme LUE PICC dressing CDI.  	LE: Warm, FROM, intact strength; no edema  	Neuro: AOx3  	Psych: Normal affect and mood  	Skin: Warm, without rashes  .  .  .    .        .

## 2021-10-01 NOTE — PROGRESS NOTE ADULT - SUBJECTIVE AND OBJECTIVE BOX
SURGERY  Pager: #[phone]    STATUS POST: [place]    POST OPERATIVE DAY #14    INTERVAL EVENTS/SUBJECTIVE: No acute events overnight. Patient had NGT removed yesterday, tolerated well. Was also catheterized for urine retention, passed TOV in the evening. This AM on rounds patient reports pain well controlled. Has not had a BM in 2 days, not passing gas today yet.   ______________________________________________  OBJECTIVE:   T(C): 36.8 (09-29-21 @ 05:53), Max: 37.3 (09-29-21 @ 01:08)  HR: 93 (09-29-21 @ 05:53) (85 - 100)  BP: 111/72 (09-29-21 @ 05:53) (111/72 - 137/71)  RR: 18 (09-29-21 @ 05:53) (18 - 18)  SpO2: 95% (09-29-21 @ 05:53) (95% - 98%)  Wt(kg): --  CAPILLARY BLOOD GLUCOSE      POCT Blood Glucose.: 144 mg/dL (29 Sep 2021 06:26)  POCT Blood Glucose.: 116 mg/dL (29 Sep 2021 02:01)  POCT Blood Glucose.: 155 mg/dL (28 Sep 2021 21:30)  POCT Blood Glucose.: 158 mg/dL (28 Sep 2021 17:31)  POCT Blood Glucose.: 127 mg/dL (28 Sep 2021 14:23)  POCT Blood Glucose.: 88 mg/dL (28 Sep 2021 09:59)    I&O's Detail    28 Sep 2021 07:01  -  29 Sep 2021 07:00  --------------------------------------------------------  IN:    Fat Emulsion (Fish Oil &amp; Plant Based) 20% Infusion: 135.2 mL    TPN (Total Parenteral Nutrition): 754 mL  Total IN: 889.2 mL    OUT:    Nasogastric/Oral tube (mL): 300 mL    Oral Fluid: 0 mL    Voided (mL): 800 mL  Total OUT: 1100 mL    Total NET: -210.8 mL          PHYSICAL EXAM:  GEN: resting in bed, well appearing  RESP: no increased WOB  ABD: soft, non-distended, slightly tender to palpation in epigastric region, without rebound tenderness or guarding. Incisions are c/d/i  EXTR: warm, well-perfused, no edema  ______________________________________________  LABS:  CBC Full  -  ( 29 Sep 2021 07:18 )  WBC Count : 8.77 K/uL  RBC Count : 2.88 M/uL  Hemoglobin : 9.4 g/dL  Hematocrit : 27.5 %  Platelet Count - Automated : 375 K/uL  Mean Cell Volume : 95.5 fl  Mean Cell Hemoglobin : 32.6 pg  Mean Cell Hemoglobin Concentration : 34.2 gm/dL  Auto Neutrophil # : 6.10 K/uL  Auto Lymphocyte # : 1.60 K/uL  Auto Monocyte # : 0.83 K/uL  Auto Eosinophil # : 0.15 K/uL  Auto Basophil # : 0.03 K/uL  Auto Neutrophil % : 69.6 %  Auto Lymphocyte % : 18.2 %  Auto Monocyte % : 9.5 %  Auto Eosinophil % : 1.7 %  Auto Basophil % : 0.3 %    09-29    141  |  104  |  16  ----------------------------<  138<H>  4.0   |  24  |  0.50    Ca    9.0      29 Sep 2021 07:11  Phos  3.3     09-29  Mg     2.4     09-29    TPro  6.0  /  Alb  3.1<L>  /  TBili  0.5  /  DBili  x   /  AST  14  /  ALT  17  /  AlkPhos  136<H>  09-29    _____________________________________________  RADIOLOGY:     SURGERY  Pager: #[phone]    STATUS POST: [place]    POST OPERATIVE DAY #14    INTERVAL EVENTS/SUBJECTIVE: No acute events overnight.  Was also catheterized for urine retention, passed TOV in the evening. This AM on rounds patient reports pain well controlled.   ______________________________________________  OBJECTIVE:   T(C): 36.8 (09-29-21 @ 05:53), Max: 37.3 (09-29-21 @ 01:08)  HR: 93 (09-29-21 @ 05:53) (85 - 100)  BP: 111/72 (09-29-21 @ 05:53) (111/72 - 137/71)  RR: 18 (09-29-21 @ 05:53) (18 - 18)  SpO2: 95% (09-29-21 @ 05:53) (95% - 98%)  Wt(kg): --  CAPILLARY BLOOD GLUCOSE      POCT Blood Glucose.: 144 mg/dL (29 Sep 2021 06:26)  POCT Blood Glucose.: 116 mg/dL (29 Sep 2021 02:01)  POCT Blood Glucose.: 155 mg/dL (28 Sep 2021 21:30)  POCT Blood Glucose.: 158 mg/dL (28 Sep 2021 17:31)  POCT Blood Glucose.: 127 mg/dL (28 Sep 2021 14:23)  POCT Blood Glucose.: 88 mg/dL (28 Sep 2021 09:59)    I&O's Detail    28 Sep 2021 07:01  -  29 Sep 2021 07:00  --------------------------------------------------------  IN:    Fat Emulsion (Fish Oil &amp; Plant Based) 20% Infusion: 135.2 mL    TPN (Total Parenteral Nutrition): 754 mL  Total IN: 889.2 mL    OUT:    Nasogastric/Oral tube (mL): 300 mL    Oral Fluid: 0 mL    Voided (mL): 800 mL  Total OUT: 1100 mL    Total NET: -210.8 mL          PHYSICAL EXAM:  GEN: resting in bed, well appearing  RESP: no increased WOB  ABD: soft, non-distended, slightly tender to palpation in epigastric region, without rebound tenderness or guarding. Incisions are c/d/i  EXTR: warm, well-perfused, no edema  ______________________________________________  LABS:  CBC Full  -  ( 29 Sep 2021 07:18 )  WBC Count : 8.77 K/uL  RBC Count : 2.88 M/uL  Hemoglobin : 9.4 g/dL  Hematocrit : 27.5 %  Platelet Count - Automated : 375 K/uL  Mean Cell Volume : 95.5 fl  Mean Cell Hemoglobin : 32.6 pg  Mean Cell Hemoglobin Concentration : 34.2 gm/dL  Auto Neutrophil # : 6.10 K/uL  Auto Lymphocyte # : 1.60 K/uL  Auto Monocyte # : 0.83 K/uL  Auto Eosinophil # : 0.15 K/uL  Auto Basophil # : 0.03 K/uL  Auto Neutrophil % : 69.6 %  Auto Lymphocyte % : 18.2 %  Auto Monocyte % : 9.5 %  Auto Eosinophil % : 1.7 %  Auto Basophil % : 0.3 %    09-29    141  |  104  |  16  ----------------------------<  138<H>  4.0   |  24  |  0.50    Ca    9.0      29 Sep 2021 07:11  Phos  3.3     09-29  Mg     2.4     09-29    TPro  6.0  /  Alb  3.1<L>  /  TBili  0.5  /  DBili  x   /  AST  14  /  ALT  17  /  AlkPhos  136<H>  09-29    _____________________________________________  RADIOLOGY:

## 2021-10-02 LAB
ALBUMIN SERPL ELPH-MCNC: 3.1 G/DL — LOW (ref 3.3–5)
ALP SERPL-CCNC: 122 U/L — HIGH (ref 40–120)
ALT FLD-CCNC: 51 U/L — HIGH (ref 10–45)
ANION GAP SERPL CALC-SCNC: 12 MMOL/L — SIGNIFICANT CHANGE UP (ref 5–17)
AST SERPL-CCNC: 59 U/L — HIGH (ref 10–40)
BILIRUB SERPL-MCNC: 0.4 MG/DL — SIGNIFICANT CHANGE UP (ref 0.2–1.2)
BUN SERPL-MCNC: 23 MG/DL — SIGNIFICANT CHANGE UP (ref 7–23)
CA-I BLD-SCNC: 1.23 MMOL/L — SIGNIFICANT CHANGE UP (ref 1.15–1.33)
CALCIUM SERPL-MCNC: 8.8 MG/DL — SIGNIFICANT CHANGE UP (ref 8.4–10.5)
CHLORIDE SERPL-SCNC: 100 MMOL/L — SIGNIFICANT CHANGE UP (ref 96–108)
CO2 SERPL-SCNC: 22 MMOL/L — SIGNIFICANT CHANGE UP (ref 22–31)
CREAT SERPL-MCNC: 0.52 MG/DL — SIGNIFICANT CHANGE UP (ref 0.5–1.3)
GLUCOSE BLDC GLUCOMTR-MCNC: 122 MG/DL — HIGH (ref 70–99)
GLUCOSE BLDC GLUCOMTR-MCNC: 131 MG/DL — HIGH (ref 70–99)
GLUCOSE BLDC GLUCOMTR-MCNC: 132 MG/DL — HIGH (ref 70–99)
GLUCOSE BLDC GLUCOMTR-MCNC: 135 MG/DL — HIGH (ref 70–99)
GLUCOSE BLDC GLUCOMTR-MCNC: 137 MG/DL — HIGH (ref 70–99)
GLUCOSE SERPL-MCNC: 120 MG/DL — HIGH (ref 70–99)
HCT VFR BLD CALC: 28.9 % — LOW (ref 34.5–45)
HGB BLD-MCNC: 9.2 G/DL — LOW (ref 11.5–15.5)
MAGNESIUM SERPL-MCNC: 2.4 MG/DL — SIGNIFICANT CHANGE UP (ref 1.6–2.6)
MCHC RBC-ENTMCNC: 31.5 PG — SIGNIFICANT CHANGE UP (ref 27–34)
MCHC RBC-ENTMCNC: 31.8 GM/DL — LOW (ref 32–36)
MCV RBC AUTO: 99 FL — SIGNIFICANT CHANGE UP (ref 80–100)
NRBC # BLD: 0 /100 WBCS — SIGNIFICANT CHANGE UP (ref 0–0)
PHOSPHATE SERPL-MCNC: 3.3 MG/DL — SIGNIFICANT CHANGE UP (ref 2.5–4.5)
PLATELET # BLD AUTO: 303 K/UL — SIGNIFICANT CHANGE UP (ref 150–400)
POTASSIUM SERPL-MCNC: 4.6 MMOL/L — SIGNIFICANT CHANGE UP (ref 3.5–5.3)
POTASSIUM SERPL-SCNC: 4.6 MMOL/L — SIGNIFICANT CHANGE UP (ref 3.5–5.3)
PROT SERPL-MCNC: 5.9 G/DL — LOW (ref 6–8.3)
RBC # BLD: 2.92 M/UL — LOW (ref 3.8–5.2)
RBC # FLD: 15.7 % — HIGH (ref 10.3–14.5)
SARS-COV-2 RNA SPEC QL NAA+PROBE: SIGNIFICANT CHANGE UP
SODIUM SERPL-SCNC: 134 MMOL/L — LOW (ref 135–145)
TRIGL SERPL-MCNC: 263 MG/DL — HIGH
WBC # BLD: 7.93 K/UL — SIGNIFICANT CHANGE UP (ref 3.8–10.5)
WBC # FLD AUTO: 7.93 K/UL — SIGNIFICANT CHANGE UP (ref 3.8–10.5)

## 2021-10-02 PROCEDURE — 99232 SBSQ HOSP IP/OBS MODERATE 35: CPT

## 2021-10-02 RX ORDER — GLUCAGON INJECTION, SOLUTION 0.5 MG/.1ML
1 INJECTION, SOLUTION SUBCUTANEOUS ONCE
Refills: 0 | Status: DISCONTINUED | OUTPATIENT
Start: 2021-10-02 | End: 2021-10-04

## 2021-10-02 RX ORDER — OXYCODONE HYDROCHLORIDE 5 MG/1
2.5 TABLET ORAL EVERY 4 HOURS
Refills: 0 | Status: DISCONTINUED | OUTPATIENT
Start: 2021-10-02 | End: 2021-10-04

## 2021-10-02 RX ORDER — SODIUM CHLORIDE 9 MG/ML
1000 INJECTION, SOLUTION INTRAVENOUS
Refills: 0 | Status: DISCONTINUED | OUTPATIENT
Start: 2021-10-02 | End: 2021-10-04

## 2021-10-02 RX ORDER — INSULIN LISPRO 100/ML
VIAL (ML) SUBCUTANEOUS
Refills: 0 | Status: DISCONTINUED | OUTPATIENT
Start: 2021-10-02 | End: 2021-10-04

## 2021-10-02 RX ORDER — DEXTROSE 50 % IN WATER 50 %
12.5 SYRINGE (ML) INTRAVENOUS ONCE
Refills: 0 | Status: DISCONTINUED | OUTPATIENT
Start: 2021-10-02 | End: 2021-10-04

## 2021-10-02 RX ORDER — ELECTROLYTE SOLUTION,INJ
1 VIAL (ML) INTRAVENOUS
Refills: 0 | Status: DISCONTINUED | OUTPATIENT
Start: 2021-10-02 | End: 2021-10-02

## 2021-10-02 RX ORDER — I.V. FAT EMULSION 20 G/100ML
10.4 EMULSION INTRAVENOUS
Qty: 25 | Refills: 0 | Status: DISCONTINUED | OUTPATIENT
Start: 2021-10-02 | End: 2021-10-04

## 2021-10-02 RX ORDER — LANOLIN ALCOHOL/MO/W.PET/CERES
5 CREAM (GRAM) TOPICAL AT BEDTIME
Refills: 0 | Status: DISCONTINUED | OUTPATIENT
Start: 2021-10-02 | End: 2021-10-03

## 2021-10-02 RX ORDER — DEXTROSE 50 % IN WATER 50 %
25 SYRINGE (ML) INTRAVENOUS ONCE
Refills: 0 | Status: DISCONTINUED | OUTPATIENT
Start: 2021-10-02 | End: 2021-10-04

## 2021-10-02 RX ORDER — INSULIN LISPRO 100/ML
VIAL (ML) SUBCUTANEOUS AT BEDTIME
Refills: 0 | Status: DISCONTINUED | OUTPATIENT
Start: 2021-10-02 | End: 2021-10-04

## 2021-10-02 RX ORDER — I.V. FAT EMULSION 20 G/100ML
20.8 EMULSION INTRAVENOUS
Qty: 50 | Refills: 0 | Status: DISCONTINUED | OUTPATIENT
Start: 2021-10-02 | End: 2021-10-02

## 2021-10-02 RX ORDER — DEXTROSE 50 % IN WATER 50 %
15 SYRINGE (ML) INTRAVENOUS ONCE
Refills: 0 | Status: DISCONTINUED | OUTPATIENT
Start: 2021-10-02 | End: 2021-10-04

## 2021-10-02 RX ADMIN — OXYCODONE HYDROCHLORIDE 2.5 MILLIGRAM(S): 5 TABLET ORAL at 05:47

## 2021-10-02 RX ADMIN — PANTOPRAZOLE SODIUM 40 MILLIGRAM(S): 20 TABLET, DELAYED RELEASE ORAL at 05:36

## 2021-10-02 RX ADMIN — Medication 250 MILLIGRAM(S): at 00:45

## 2021-10-02 RX ADMIN — CHLORHEXIDINE GLUCONATE 1 APPLICATION(S): 213 SOLUTION TOPICAL at 10:00

## 2021-10-02 RX ADMIN — Medication 10 MILLIGRAM(S): at 21:58

## 2021-10-02 RX ADMIN — PANTOPRAZOLE SODIUM 40 MILLIGRAM(S): 20 TABLET, DELAYED RELEASE ORAL at 17:29

## 2021-10-02 RX ADMIN — Medication 250 MILLIGRAM(S): at 09:59

## 2021-10-02 RX ADMIN — OXYCODONE HYDROCHLORIDE 2.5 MILLIGRAM(S): 5 TABLET ORAL at 14:00

## 2021-10-02 RX ADMIN — Medication 5 MILLIGRAM(S): at 21:57

## 2021-10-02 RX ADMIN — Medication 10 MILLIGRAM(S): at 13:12

## 2021-10-02 RX ADMIN — Medication 250 MILLIGRAM(S): at 18:09

## 2021-10-02 RX ADMIN — OXYCODONE HYDROCHLORIDE 2.5 MILLIGRAM(S): 5 TABLET ORAL at 13:10

## 2021-10-02 RX ADMIN — OXYCODONE HYDROCHLORIDE 2.5 MILLIGRAM(S): 5 TABLET ORAL at 01:09

## 2021-10-02 RX ADMIN — I.V. FAT EMULSION 10.4 ML/HR: 20 EMULSION INTRAVENOUS at 17:25

## 2021-10-02 RX ADMIN — OXYCODONE HYDROCHLORIDE 2.5 MILLIGRAM(S): 5 TABLET ORAL at 22:26

## 2021-10-02 RX ADMIN — Medication 10 MILLIGRAM(S): at 05:36

## 2021-10-02 RX ADMIN — ENOXAPARIN SODIUM 40 MILLIGRAM(S): 100 INJECTION SUBCUTANEOUS at 12:50

## 2021-10-02 RX ADMIN — OXYCODONE HYDROCHLORIDE 2.5 MILLIGRAM(S): 5 TABLET ORAL at 21:56

## 2021-10-02 RX ADMIN — Medication 1 EACH: at 17:24

## 2021-10-02 NOTE — PROGRESS NOTE ADULT - SUBJECTIVE AND OBJECTIVE BOX
[facility] NUTRITION SUPPORT--  Attending/ PA FOLLOW UP NOTE      24 hour events/subjective:              PAST HISTORY  --------------------------------------------------------------------------------  No significant changes to PMH, PSH, FHx, SHx, unless otherwise noted    ALLERGIES & MEDICATIONS  --------------------------------------------------------------------------------  Allergies    No Known Allergies    Intolerances      Standing Inpatient Medications  chlorhexidine 2% Cloths 1 Application(s) Topical <User Schedule>  enoxaparin Injectable 40 milliGRAM(s) SubCutaneous daily  erythromycin   IVPB 200 milliGRAM(s) IV Intermittent every 8 hours  influenza   Vaccine 0.5 milliLiter(s) IntraMuscular once  insulin lispro (ADMELOG) corrective regimen sliding scale   SubCutaneous every 4 hours  metoclopramide Injectable 10 milliGRAM(s) IV Push three times a day  oxyCODONE    IR 2.5 milliGRAM(s) Oral every 4 hours  pantoprazole  Injectable 40 milliGRAM(s) IV Push two times a day  Parenteral Nutrition - Adult 1 Each TPN Continuous <Continuous>    PRN Inpatient Medications  acetaminophen  IVPB .. 750 milliGRAM(s) IV Intermittent every 6 hours PRN  artificial tears (preservative free) Ophthalmic Solution 1 Drop(s) Both EYES two times a day PRN  ondansetron    Tablet 4 milliGRAM(s) Oral every 6 hours PRN  sodium chloride 0.9% lock flush 10 milliLiter(s) IV Push every 1 hour PRN      REVIEW OF SYSTEMS  --------------------------------------------------------------------------------  Gen: as per HPI  Skin: No rashes  Head/Eyes/Ears/Mouth: No headache;No sore throat  Respiratory: No dyspnea, cough,   CV: No chest pain, PND, orthopnea  GI: as per HPI  : No increased frequency, dysuria, hematuria, nocturia  MSK: No joint pain/swelling; no back pain; no edema  Neuro: No dizziness/lightheadedness, weakness, seizures, numbness, tingling  Psych: No significant nervousness, anxiety, stress, depression    All other systems were reviewed and are negative, except as noted.      LABS/STUDIES  --------------------------------------------------------------------------------              9.2    7.93  >-----------<  303      [10-02-21 @ 07:39]              28.9     134  |  100  |  23  ----------------------------<  120      [10-02-21 @ 07:36]  4.6   |  22  |  0.52        Ca     8.8     [10-02-21 @ 07:36]      iCa    1.23     [10-02 @ 07:48]      Mg     2.4     [10-02-21 @ 07:36]      Phos  3.3     [10-02-21 @ 07:36]    TPro  5.9  /  Alb  3.1  /  TBili  0.4  /  DBili  x   /  AST  59  /  ALT  51  /  AlkPhos  122  [10-02-21 @ 07:36]            Glucose, Serum: 120 mg/dL (10-02-21 @ 07:36)    Prealbumin, Serum: 8 mg/dL (09-28-21 @ 11:24)          10-01-21 @ 07:01  -  10-02-21 @ 07:00  --------------------------------------------------------  IN: 2954.4 mL / OUT: 0 mL / NET: 2954.4 mL    10-02-21 @ 07:01  -  10-02-21 @ 09:42  --------------------------------------------------------  IN: 150 mL / OUT: 0 mL / NET: 150 mL        VITALS/PHYSICAL EXAM  --------------------------------------------------------------------------------  T(C): 36.7 (10-02-21 @ 09:24), Max: 36.9 (10-02-21 @ 06:22)  HR: 98 (10-02-21 @ 09:24) (97 - 102)  BP: 101/69 (10-02-21 @ 09:24) (101/67 - 128/81)  RR: 18 (10-02-21 @ 09:24) (18 - 18)  SpO2: 98% (10-02-21 @ 09:24) (98% - 99%)  Wt(kg): --    .  .  .   [facility] NUTRITION SUPPORT--  Attending/ PA FOLLOW UP NOTE      24 hour events/subjective:    TPN originally consulted for nutrition support and started TPN on 9/27.  Pt is tolerating without  issues, and denies palpitations, chest pain, shortness of breath. Pt further denies fevers, chills nor night sweats.     Pt tolerated Clears diet yesterday, and has been advanced to Soft Mechanical diet. She reports eating minimal amounts due to lack of appetite. Denies nausea nor vomiting nor abdominal pain. AXR later today.          PAST HISTORY  --------------------------------------------------------------------------------  No significant changes to PMH, PSH, FHx, SHx, unless otherwise noted    ALLERGIES & MEDICATIONS  --------------------------------------------------------------------------------  Allergies    No Known Allergies    Intolerances      Standing Inpatient Medications  chlorhexidine 2% Cloths 1 Application(s) Topical <User Schedule>  enoxaparin Injectable 40 milliGRAM(s) SubCutaneous daily  erythromycin   IVPB 200 milliGRAM(s) IV Intermittent every 8 hours  influenza   Vaccine 0.5 milliLiter(s) IntraMuscular once  insulin lispro (ADMELOG) corrective regimen sliding scale   SubCutaneous every 4 hours  metoclopramide Injectable 10 milliGRAM(s) IV Push three times a day  oxyCODONE    IR 2.5 milliGRAM(s) Oral every 4 hours  pantoprazole  Injectable 40 milliGRAM(s) IV Push two times a day  Parenteral Nutrition - Adult 1 Each TPN Continuous <Continuous>    PRN Inpatient Medications  acetaminophen  IVPB .. 750 milliGRAM(s) IV Intermittent every 6 hours PRN  artificial tears (preservative free) Ophthalmic Solution 1 Drop(s) Both EYES two times a day PRN  ondansetron    Tablet 4 milliGRAM(s) Oral every 6 hours PRN  sodium chloride 0.9% lock flush 10 milliLiter(s) IV Push every 1 hour PRN      REVIEW OF SYSTEMS  --------------------------------------------------------------------------------  Gen: as per HPI  Skin: No rashes  Head/Eyes/Ears/Mouth: No headache;No sore throat  Respiratory: No dyspnea, cough,   CV: No chest pain, PND, orthopnea  GI: as per HPI  : No increased frequency, dysuria, hematuria, nocturia  MSK: No joint pain/swelling; no back pain; no edema  Neuro: No dizziness/lightheadedness, weakness, seizures, numbness, tingling  Psych: No significant nervousness, anxiety, stress, depression    All other systems were reviewed and are negative, except as noted.      LABS/STUDIES  --------------------------------------------------------------------------------              9.2    7.93  >-----------<  303      [10-02-21 @ 07:39]              28.9     134  |  100  |  23  ----------------------------<  120      [10-02-21 @ 07:36]  4.6   |  22  |  0.52        Ca     8.8     [10-02-21 @ 07:36]      iCa    1.23     [10-02 @ 07:48]      Mg     2.4     [10-02-21 @ 07:36]      Phos  3.3     [10-02-21 @ 07:36]    TPro  5.9  /  Alb  3.1  /  TBili  0.4  /  DBili  x   /  AST  59  /  ALT  51  /  AlkPhos  122  [10-02-21 @ 07:36]            Glucose, Serum: 120 mg/dL (10-02-21 @ 07:36)    Prealbumin, Serum: 8 mg/dL (09-28-21 @ 11:24)          10-01-21 @ 07:01  -  10-02-21 @ 07:00  --------------------------------------------------------  IN: 2954.4 mL / OUT: 0 mL / NET: 2954.4 mL    10-02-21 @ 07:01  -  10-02-21 @ 09:42  --------------------------------------------------------  IN: 150 mL / OUT: 0 mL / NET: 150 mL        VITALS/PHYSICAL EXAM  --------------------------------------------------------------------------------  T(C): 36.7 (10-02-21 @ 09:24), Max: 36.9 (10-02-21 @ 06:22)  HR: 98 (10-02-21 @ 09:24) (97 - 102)  BP: 101/69 (10-02-21 @ 09:24) (101/67 - 128/81)  RR: 18 (10-02-21 @ 09:24) (18 - 18)  SpO2: 98% (10-02-21 @ 09:24) (98% - 99%)  Wt(kg): --    .  Physical Exam:    	Gen: NAD, well-appearing  	HEENT: PERRL,              Neck: Trachea midline, no noted JVD              Chest: non labored breathing, equal chest expansion b/l  	Abd: nondistended, NT soft,  abdominal SOIs are CDI  	UE: Warm, FROM, intact strength; no edeme LUE PICC dressing CDI.  	LE: Warm, FROM, intact strength; no edema  	Neuro: AOx3  	Psych: Normal affect and mood  	Skin: Warm, without rashes  .  .  .    .

## 2021-10-02 NOTE — PROGRESS NOTE ADULT - ASSESSMENT
77 y/o female with pmhx of rheumatoid arthritis, high cholesterol, appendectomy, and left nephrectomy s/p Whipple with gastroparesis on 9/17. TPN started on 9/27 for nutritional support for Protein Calori Malnutrition for prolonged NPO/NGT. Pt has since been advanced to soft mechanical diet.    TPN Goals:  90g AA, 140g dextrose, 50g lipids    - Pt advanced to soft mechanical diet. Decrease to half goal CHO/AA in TPN and half goal SMOF at 850ml total volume.  - Nutritional Assessment. Pt now on mechanical soft diet, will decrease TPN to half goals today.  - PICC in place LUE. Maintenance as per protocol  - Hyperglycemic Control- HgA1C 5.7. Fingersticks noted to be 121-138 over 24hrs. No insulin added as CHO load decreased to half today  -Electrolyte Imbalance Risk. -Obtain daily CMP, Mg, phos, iCa. Will replete lytes in TPN bag daily as needed. Hypokalemia - KCl at 40meq KCl. Hypophosphatemia - NaPhos @ 30mmol. Low bicarb - NaAce @ 40meq  - Triglyceride today was 263, will decrease to half goal SMOF today.  - Care per surgery.    TPN pager [phone], spectra [phone]  M-F 6A-4P, Weekends and holidays 8A-12P  discussed with Dr. Eckert and Anjelica Tripathi

## 2021-10-02 NOTE — PROGRESS NOTE ADULT - SUBJECTIVE AND OBJECTIVE BOX
SURGERY  Pager: #[phone]    STATUS POST: [place]    POST OPERATIVE DAY #15    INTERVAL EVENTS/SUBJECTIVE: No acute events overnight.  Was also catheterized for urine retention, passed TOV in the evening. This AM on rounds patient reports pain well controlled.   ______________________________________________  OBJECTIVE:   T(C): 36.8 (09-29-21 @ 05:53), Max: 37.3 (09-29-21 @ 01:08)  HR: 93 (09-29-21 @ 05:53) (85 - 100)  BP: 111/72 (09-29-21 @ 05:53) (111/72 - 137/71)  RR: 18 (09-29-21 @ 05:53) (18 - 18)  SpO2: 95% (09-29-21 @ 05:53) (95% - 98%)  Wt(kg): --  CAPILLARY BLOOD GLUCOSE      POCT Blood Glucose.: 144 mg/dL (29 Sep 2021 06:26)  POCT Blood Glucose.: 116 mg/dL (29 Sep 2021 02:01)  POCT Blood Glucose.: 155 mg/dL (28 Sep 2021 21:30)  POCT Blood Glucose.: 158 mg/dL (28 Sep 2021 17:31)  POCT Blood Glucose.: 127 mg/dL (28 Sep 2021 14:23)  POCT Blood Glucose.: 88 mg/dL (28 Sep 2021 09:59)    I&O's Detail    28 Sep 2021 07:01  -  29 Sep 2021 07:00  --------------------------------------------------------  IN:    Fat Emulsion (Fish Oil &amp; Plant Based) 20% Infusion: 135.2 mL    TPN (Total Parenteral Nutrition): 754 mL  Total IN: 889.2 mL    OUT:    Nasogastric/Oral tube (mL): 300 mL    Oral Fluid: 0 mL    Voided (mL): 800 mL  Total OUT: 1100 mL    Total NET: -210.8 mL          PHYSICAL EXAM:  GEN: resting in bed, well appearing  RESP: no increased WOB  ABD: soft, non-distended, slightly tender to palpation in epigastric region, without rebound tenderness or guarding. Incisions are c/d/i  EXTR: warm, well-perfused, no edema  ______________________________________________  LABS:  CBC Full  -  ( 29 Sep 2021 07:18 )  WBC Count : 8.77 K/uL  RBC Count : 2.88 M/uL  Hemoglobin : 9.4 g/dL  Hematocrit : 27.5 %  Platelet Count - Automated : 375 K/uL  Mean Cell Volume : 95.5 fl  Mean Cell Hemoglobin : 32.6 pg  Mean Cell Hemoglobin Concentration : 34.2 gm/dL  Auto Neutrophil # : 6.10 K/uL  Auto Lymphocyte # : 1.60 K/uL  Auto Monocyte # : 0.83 K/uL  Auto Eosinophil # : 0.15 K/uL  Auto Basophil # : 0.03 K/uL  Auto Neutrophil % : 69.6 %  Auto Lymphocyte % : 18.2 %  Auto Monocyte % : 9.5 %  Auto Eosinophil % : 1.7 %  Auto Basophil % : 0.3 %    09-29    141  |  104  |  16  ----------------------------<  138<H>  4.0   |  24  |  0.50    Ca    9.0      29 Sep 2021 07:11  Phos  3.3     09-29  Mg     2.4     09-29    TPro  6.0  /  Alb  3.1<L>  /  TBili  0.5  /  DBili  x   /  AST  14  /  ALT  17  /  AlkPhos  136<H>  09-29    _____________________________________________  RADIOLOGY:     SURGERY  Pager: #[phone]    STATUS POST: [place]    POST OPERATIVE DAY #15    INTERVAL EVENTS/SUBJECTIVE: No acute events overnight. Patient is tolerating her mechanical soft diet and is passing gas and stool. She is ambulating. Says she gets sleepy during the day due to insomnia at night.  ______________________________________________  OBJECTIVE:   T(C): 36.8 (09-29-21 @ 05:53), Max: 37.3 (09-29-21 @ 01:08)  HR: 93 (09-29-21 @ 05:53) (85 - 100)  BP: 111/72 (09-29-21 @ 05:53) (111/72 - 137/71)  RR: 18 (09-29-21 @ 05:53) (18 - 18)  SpO2: 95% (09-29-21 @ 05:53) (95% - 98%)  Wt(kg): --  CAPILLARY BLOOD GLUCOSE      POCT Blood Glucose.: 144 mg/dL (29 Sep 2021 06:26)  POCT Blood Glucose.: 116 mg/dL (29 Sep 2021 02:01)  POCT Blood Glucose.: 155 mg/dL (28 Sep 2021 21:30)  POCT Blood Glucose.: 158 mg/dL (28 Sep 2021 17:31)  POCT Blood Glucose.: 127 mg/dL (28 Sep 2021 14:23)  POCT Blood Glucose.: 88 mg/dL (28 Sep 2021 09:59)    I&O's Detail    28 Sep 2021 07:01  -  29 Sep 2021 07:00  --------------------------------------------------------  IN:    Fat Emulsion (Fish Oil &amp; Plant Based) 20% Infusion: 135.2 mL    TPN (Total Parenteral Nutrition): 754 mL  Total IN: 889.2 mL    OUT:    Nasogastric/Oral tube (mL): 300 mL    Oral Fluid: 0 mL    Voided (mL): 800 mL  Total OUT: 1100 mL    Total NET: -210.8 mL          PHYSICAL EXAM:  GEN: resting in bed, well appearing  RESP: no increased WOB  ABD: soft, non-distended, slightly tender to palpation in epigastric region, without rebound tenderness or guarding. Incisions are c/d/i  EXTR: warm, well-perfused, no edema  ______________________________________________  LABS:  CBC Full  -  ( 29 Sep 2021 07:18 )  WBC Count : 8.77 K/uL  RBC Count : 2.88 M/uL  Hemoglobin : 9.4 g/dL  Hematocrit : 27.5 %  Platelet Count - Automated : 375 K/uL  Mean Cell Volume : 95.5 fl  Mean Cell Hemoglobin : 32.6 pg  Mean Cell Hemoglobin Concentration : 34.2 gm/dL  Auto Neutrophil # : 6.10 K/uL  Auto Lymphocyte # : 1.60 K/uL  Auto Monocyte # : 0.83 K/uL  Auto Eosinophil # : 0.15 K/uL  Auto Basophil # : 0.03 K/uL  Auto Neutrophil % : 69.6 %  Auto Lymphocyte % : 18.2 %  Auto Monocyte % : 9.5 %  Auto Eosinophil % : 1.7 %  Auto Basophil % : 0.3 %    09-29    141  |  104  |  16  ----------------------------<  138<H>  4.0   |  24  |  0.50    Ca    9.0      29 Sep 2021 07:11  Phos  3.3     09-29  Mg     2.4     09-29    TPro  6.0  /  Alb  3.1<L>  /  TBili  0.5  /  DBili  x   /  AST  14  /  ALT  17  /  AlkPhos  136<H>  09-29    _____________________________________________  RADIOLOGY:

## 2021-10-02 NOTE — PROGRESS NOTE ADULT - SUBJECTIVE AND OBJECTIVE BOX
C A R D I O L O G Y  **********************************     DATE OF SERVICE: 10-02-21          acetaminophen  IVPB .. 750 milliGRAM(s) IV Intermittent every 6 hours PRN  artificial tears (preservative free) Ophthalmic Solution 1 Drop(s) Both EYES two times a day PRN  chlorhexidine 2% Cloths 1 Application(s) Topical <User Schedule>  enoxaparin Injectable 40 milliGRAM(s) SubCutaneous daily  erythromycin   IVPB 200 milliGRAM(s) IV Intermittent every 8 hours  influenza   Vaccine 0.5 milliLiter(s) IntraMuscular once  insulin lispro (ADMELOG) corrective regimen sliding scale   SubCutaneous every 4 hours  metoclopramide Injectable 10 milliGRAM(s) IV Push three times a day  ondansetron    Tablet 4 milliGRAM(s) Oral every 6 hours PRN  oxyCODONE    IR 2.5 milliGRAM(s) Oral every 4 hours  pantoprazole  Injectable 40 milliGRAM(s) IV Push two times a day  Parenteral Nutrition - Adult 1 Each TPN Continuous <Continuous>  sodium chloride 0.9% lock flush 10 milliLiter(s) IV Push every 1 hour PRN                            9.2    7.93  )-----------( 303      ( 02 Oct 2021 07:39 )             28.9       Hemoglobin: 9.2 g/dL (10-02 @ 07:39)  Hemoglobin: 8.8 g/dL (10-01 @ 07:27)  Hemoglobin: 9.1 g/dL (09-30 @ 07:17)  Hemoglobin: 9.4 g/dL (09-29 @ 07:18)  Hemoglobin: 8.7 g/dL (09-28 @ 07:12)      10-02    134<L>  |  100  |  23  ----------------------------<  120<H>  4.6   |  22  |  0.52    Ca    8.8      02 Oct 2021 07:36  Phos  3.3     10-02  Mg     2.4     10-02    TPro  5.9<L>  /  Alb  3.1<L>  /  TBili  0.4  /  DBili  x   /  AST  59<H>  /  ALT  51<H>  /  AlkPhos  122<H>  10-02    Creatinine Trend: 0.52<--, 0.56<--, 0.53<--, 0.50<--, 0.48<--, 0.49<--    COAGS:           T(C): 36.7 (10-02-21 @ 09:24), Max: 36.9 (10-02-21 @ 06:22)  HR: 98 (10-02-21 @ 09:24) (97 - 102)  BP: 101/69 (10-02-21 @ 09:24) (101/67 - 128/81)  RR: 18 (10-02-21 @ 09:24) (18 - 18)  SpO2: 98% (10-02-21 @ 09:24) (98% - 99%)  Wt(kg): --    I&O's Summary    01 Oct 2021 07:01  -  02 Oct 2021 07:00  --------------------------------------------------------  IN: 2954.4 mL / OUT: 0 mL / NET: 2954.4 mL    02 Oct 2021 07:01  -  02 Oct 2021 09:41  --------------------------------------------------------  IN: 150 mL / OUT: 0 mL / NET: 150 mL      Gen: Appears well in NAD  HEENT:  (-)icterus (-)pallor  CV: N S1 S2 1/6 PATRICIA (+)2 Pulses B/l  Resp:  Clear to auscultation B/L, normal effort  GI: (+) BS Soft, NT, ND  Lymph:  (-)Edema, (-)obvious lymphadenopathy  Skin: Warm to touch, Normal turgor  Psych: Appropriate mood and affect    TELEMETRY: None	      ASSESSMENT/PLAN: 76y Female with Hx of RA (on MTX), HLD, appendectomy, and L renal cell CA (s/p L nephrectomy 2019) who presented with transaminitis and an abnormal MRCP showing dilated PD and CBD, found to have a 1.7x1.8cm hypoechoic pancreatic head lesion on EUS on 9/9. She was taken to the OR for a Whipple on 9/17.    - No evidence of clinical HF or anginal symptoms  - TTE noted with normal LV function  - Pain management per primary team  - Surgery follow up appreciated  - No further inpatient cardiac w/u planned

## 2021-10-02 NOTE — PROGRESS NOTE ADULT - ASSESSMENT
76y Female with Hx of RA (on MTX), HLD, appendectomy, and L renal cell CA (s/p L nephrectomy 2019) who presented with transaminitis and an abnormal MRCP showing dilated PD and CBD, found to have a 1.7x1.8cm hypoechoic pancreatic head lesion on EUS on 9/9. She was taken to the OR on 9/17 for a Whipple on 9/17 and transferred to the SICU extubated post-operatively for hemodynamic monitoring. Patient had NGT placed (9/26) due to severely distended stomach on AXR, now POD 15, recovering on the floor with likely delayed gastric emptying.     Plan:  - Continue TPN, clear liquids, advanced to mechanical soft in pm after abd xray ordered  - Cont. IV erythromycin, reglan for promotility  - Pain relief: Tylenol IV  - DVT ppx      Red Team Surgery  [id number]    76y Female with Hx of RA (on MTX), HLD, appendectomy, and L renal cell CA (s/p L nephrectomy 2019) who presented with transaminitis and an abnormal MRCP showing dilated PD and CBD, found to have a 1.7x1.8cm hypoechoic pancreatic head lesion on EUS on 9/9. She was taken to the OR on 9/17 for a Whipple on 9/17 and transferred to the SICU extubated post-operatively for hemodynamic monitoring. Patient had NGT placed (9/26) due to severely distended stomach on AXR, now POD 15, recovering on the floor with likely delayed gastric emptying.     Plan:  - Continue TPN  - Diet: mechanical soft  - Cont. IV erythromycin, reglan for promotility  - Pain control  - melatonin for insomnia  - DVT ppx      Red Team Surgery  [id number]

## 2021-10-03 LAB
ALBUMIN SERPL ELPH-MCNC: 2.7 G/DL — LOW (ref 3.3–5)
ALP SERPL-CCNC: 111 U/L — SIGNIFICANT CHANGE UP (ref 40–120)
ALT FLD-CCNC: 45 U/L — SIGNIFICANT CHANGE UP (ref 10–45)
ANION GAP SERPL CALC-SCNC: 9 MMOL/L — SIGNIFICANT CHANGE UP (ref 5–17)
AST SERPL-CCNC: 33 U/L — SIGNIFICANT CHANGE UP (ref 10–40)
BASOPHILS # BLD AUTO: 0.03 K/UL — SIGNIFICANT CHANGE UP (ref 0–0.2)
BASOPHILS NFR BLD AUTO: 0.4 % — SIGNIFICANT CHANGE UP (ref 0–2)
BILIRUB SERPL-MCNC: 0.4 MG/DL — SIGNIFICANT CHANGE UP (ref 0.2–1.2)
BUN SERPL-MCNC: 22 MG/DL — SIGNIFICANT CHANGE UP (ref 7–23)
CA-I BLD-SCNC: 1.24 MMOL/L — SIGNIFICANT CHANGE UP (ref 1.15–1.33)
CALCIUM SERPL-MCNC: 8.5 MG/DL — SIGNIFICANT CHANGE UP (ref 8.4–10.5)
CHLORIDE SERPL-SCNC: 101 MMOL/L — SIGNIFICANT CHANGE UP (ref 96–108)
CO2 SERPL-SCNC: 23 MMOL/L — SIGNIFICANT CHANGE UP (ref 22–31)
CREAT SERPL-MCNC: 0.63 MG/DL — SIGNIFICANT CHANGE UP (ref 0.5–1.3)
EOSINOPHIL # BLD AUTO: 0.26 K/UL — SIGNIFICANT CHANGE UP (ref 0–0.5)
EOSINOPHIL NFR BLD AUTO: 3.4 % — SIGNIFICANT CHANGE UP (ref 0–6)
GLUCOSE BLDC GLUCOMTR-MCNC: 105 MG/DL — HIGH (ref 70–99)
GLUCOSE BLDC GLUCOMTR-MCNC: 120 MG/DL — HIGH (ref 70–99)
GLUCOSE BLDC GLUCOMTR-MCNC: 129 MG/DL — HIGH (ref 70–99)
GLUCOSE BLDC GLUCOMTR-MCNC: 143 MG/DL — HIGH (ref 70–99)
GLUCOSE SERPL-MCNC: 121 MG/DL — HIGH (ref 70–99)
HCT VFR BLD CALC: 25.7 % — LOW (ref 34.5–45)
HGB BLD-MCNC: 8.2 G/DL — LOW (ref 11.5–15.5)
IMM GRANULOCYTES NFR BLD AUTO: 0.3 % — SIGNIFICANT CHANGE UP (ref 0–1.5)
LYMPHOCYTES # BLD AUTO: 1.31 K/UL — SIGNIFICANT CHANGE UP (ref 1–3.3)
LYMPHOCYTES # BLD AUTO: 17.2 % — SIGNIFICANT CHANGE UP (ref 13–44)
MAGNESIUM SERPL-MCNC: 2.2 MG/DL — SIGNIFICANT CHANGE UP (ref 1.6–2.6)
MCHC RBC-ENTMCNC: 31.9 GM/DL — LOW (ref 32–36)
MCHC RBC-ENTMCNC: 31.9 PG — SIGNIFICANT CHANGE UP (ref 27–34)
MCV RBC AUTO: 100 FL — SIGNIFICANT CHANGE UP (ref 80–100)
MONOCYTES # BLD AUTO: 0.96 K/UL — HIGH (ref 0–0.9)
MONOCYTES NFR BLD AUTO: 12.6 % — SIGNIFICANT CHANGE UP (ref 2–14)
NEUTROPHILS # BLD AUTO: 5.03 K/UL — SIGNIFICANT CHANGE UP (ref 1.8–7.4)
NEUTROPHILS NFR BLD AUTO: 66.1 % — SIGNIFICANT CHANGE UP (ref 43–77)
NRBC # BLD: 0 /100 WBCS — SIGNIFICANT CHANGE UP (ref 0–0)
PHOSPHATE SERPL-MCNC: 3.9 MG/DL — SIGNIFICANT CHANGE UP (ref 2.5–4.5)
PLATELET # BLD AUTO: 230 K/UL — SIGNIFICANT CHANGE UP (ref 150–400)
POTASSIUM SERPL-MCNC: 4.2 MMOL/L — SIGNIFICANT CHANGE UP (ref 3.5–5.3)
POTASSIUM SERPL-SCNC: 4.2 MMOL/L — SIGNIFICANT CHANGE UP (ref 3.5–5.3)
PROT SERPL-MCNC: 5.3 G/DL — LOW (ref 6–8.3)
RBC # BLD: 2.57 M/UL — LOW (ref 3.8–5.2)
RBC # FLD: 15.6 % — HIGH (ref 10.3–14.5)
SODIUM SERPL-SCNC: 133 MMOL/L — LOW (ref 135–145)
TRIGL SERPL-MCNC: 178 MG/DL — HIGH
WBC # BLD: 7.61 K/UL — SIGNIFICANT CHANGE UP (ref 3.8–10.5)
WBC # FLD AUTO: 7.61 K/UL — SIGNIFICANT CHANGE UP (ref 3.8–10.5)

## 2021-10-03 PROCEDURE — 93010 ELECTROCARDIOGRAM REPORT: CPT

## 2021-10-03 PROCEDURE — 99232 SBSQ HOSP IP/OBS MODERATE 35: CPT

## 2021-10-03 PROCEDURE — 74018 RADEX ABDOMEN 1 VIEW: CPT | Mod: 26

## 2021-10-03 RX ORDER — METOCLOPRAMIDE HCL 10 MG
10 TABLET ORAL THREE TIMES A DAY
Refills: 0 | Status: DISCONTINUED | OUTPATIENT
Start: 2021-10-03 | End: 2021-10-04

## 2021-10-03 RX ORDER — ERYTHROMYCIN ETHYLSUCCINATE 400 MG
250 TABLET ORAL EVERY 6 HOURS
Refills: 0 | Status: DISCONTINUED | OUTPATIENT
Start: 2021-10-03 | End: 2021-10-04

## 2021-10-03 RX ORDER — LANOLIN ALCOHOL/MO/W.PET/CERES
10 CREAM (GRAM) TOPICAL AT BEDTIME
Refills: 0 | Status: DISCONTINUED | OUTPATIENT
Start: 2021-10-03 | End: 2021-10-04

## 2021-10-03 RX ORDER — ACETAMINOPHEN 500 MG
650 TABLET ORAL EVERY 6 HOURS
Refills: 0 | Status: DISCONTINUED | OUTPATIENT
Start: 2021-10-03 | End: 2021-10-04

## 2021-10-03 RX ORDER — PANTOPRAZOLE SODIUM 20 MG/1
40 TABLET, DELAYED RELEASE ORAL
Refills: 0 | Status: DISCONTINUED | OUTPATIENT
Start: 2021-10-03 | End: 2021-10-04

## 2021-10-03 RX ADMIN — Medication 250 MILLIGRAM(S): at 13:25

## 2021-10-03 RX ADMIN — Medication 250 MILLIGRAM(S): at 17:48

## 2021-10-03 RX ADMIN — Medication 10 MILLIGRAM(S): at 21:28

## 2021-10-03 RX ADMIN — Medication 250 MILLIGRAM(S): at 08:08

## 2021-10-03 RX ADMIN — Medication 250 MILLIGRAM(S): at 00:53

## 2021-10-03 RX ADMIN — Medication 10 MILLIGRAM(S): at 21:26

## 2021-10-03 RX ADMIN — Medication 10 MILLIGRAM(S): at 13:25

## 2021-10-03 RX ADMIN — PANTOPRAZOLE SODIUM 40 MILLIGRAM(S): 20 TABLET, DELAYED RELEASE ORAL at 17:49

## 2021-10-03 RX ADMIN — PANTOPRAZOLE SODIUM 40 MILLIGRAM(S): 20 TABLET, DELAYED RELEASE ORAL at 05:49

## 2021-10-03 RX ADMIN — ENOXAPARIN SODIUM 40 MILLIGRAM(S): 100 INJECTION SUBCUTANEOUS at 13:25

## 2021-10-03 RX ADMIN — Medication 250 MILLIGRAM(S): at 23:29

## 2021-10-03 RX ADMIN — Medication 10 MILLIGRAM(S): at 05:49

## 2021-10-03 RX ADMIN — CHLORHEXIDINE GLUCONATE 1 APPLICATION(S): 213 SOLUTION TOPICAL at 08:12

## 2021-10-03 NOTE — PROGRESS NOTE ADULT - ASSESSMENT
76y old  Female who presents with a chief complaint of Abdominal Pains/p resection of the pancreatic mass, will recommend:    - continue Rx as per medicine, surgical onc  - on parentral nutrition, monitor LFTs and lytes  - symptom management  - DVT prophylaxis - on lovenox  - all other supportive Rx  - discussed the pathology results, stage T2N2, the need for adjuvant chemotherapy in detail with her and her son in detail but pt is too high risk for it at this time and needs to get better and stronger prior to starting any chemo and they both understand that. They asked a couple of questions and those were answered  - no evidence of def of iron B12, folate, and no hemolysis  - to f/u as outpt after discharge  -will continue to f/u periodically      for questions, please call [phone]

## 2021-10-03 NOTE — PROGRESS NOTE ADULT - SUBJECTIVE AND OBJECTIVE BOX
[facility] NUTRITION SUPPORT-- FOLLOW UP NOTE  --------------------------------------------------------------------------------      24 hour events/subjective:    TPN originally consulted for nutrition support and started TPN on 9/27.  Pt is tolerating without  issues, and denies palpitations, chest pain, shortness of breath. Pt further denies fevers, chills nor night sweats.     Pt tolerated small amounts of Soft Mechanical diet yesterday, reports eating minimal amounts due to lack of appetite. Denies nausea nor vomiting nor abdominal pain.         PAST HISTORY  --------------------------------------------------------------------------------  No significant changes to PMH, PSH, FHx, SHx, unless otherwise noted    ALLERGIES & MEDICATIONS  --------------------------------------------------------------------------------  Allergies    No Known Allergies    Intolerances      Standing Inpatient Medications  chlorhexidine 2% Cloths 1 Application(s) Topical <User Schedule>  dextrose 40% Gel 15 Gram(s) Oral once  dextrose 5%. 1000 milliLiter(s) IV Continuous <Continuous>  dextrose 5%. 1000 milliLiter(s) IV Continuous <Continuous>  dextrose 50% Injectable 25 Gram(s) IV Push once  dextrose 50% Injectable 12.5 Gram(s) IV Push once  dextrose 50% Injectable 25 Gram(s) IV Push once  enoxaparin Injectable 40 milliGRAM(s) SubCutaneous daily  erythromycin     enteric coated 250 milliGRAM(s) Oral every 6 hours  fat emulsion (Fish Oil and Plant Based) 20% Infusion 10.4 mL/Hr IV Continuous <Continuous>  glucagon  Injectable 1 milliGRAM(s) IntraMuscular once  influenza   Vaccine 0.5 milliLiter(s) IntraMuscular once  insulin lispro (ADMELOG) corrective regimen sliding scale   SubCutaneous three times a day before meals  insulin lispro (ADMELOG) corrective regimen sliding scale   SubCutaneous at bedtime  melatonin 5 milliGRAM(s) Oral at bedtime  metoclopramide 10 milliGRAM(s) Oral three times a day  pantoprazole    Tablet 40 milliGRAM(s) Oral two times a day  Parenteral Nutrition - Adult 1 Each TPN Continuous <Continuous>    PRN Inpatient Medications  acetaminophen   Tablet .. 650 milliGRAM(s) Oral every 6 hours PRN  artificial tears (preservative free) Ophthalmic Solution 1 Drop(s) Both EYES two times a day PRN  ondansetron    Tablet 4 milliGRAM(s) Oral every 6 hours PRN  oxyCODONE    IR 2.5 milliGRAM(s) Oral every 4 hours PRN  sodium chloride 0.9% lock flush 10 milliLiter(s) IV Push every 1 hour PRN      REVIEW OF SYSTEMS  --------------------------------------------------------------------------------  Gen: fatigue, fevers/chills, weakness  Skin: No rashes  Head/Eyes/Ears/Mouth: No headache;No sore throat  Respiratory: No dyspnea, cough,   CV: No chest pain, PND, orthopnea  GI: No abdominal pain, diarrhea, constipation, nausea, vomiting  Transplant: No pain  : No increased frequency, dysuria, hematuria, nocturia  MSK: No joint pain/swelling; no back pain; no edema  Neuro: No dizziness/lightheadedness, weakness, seizures, numbness, tingling  Psych: No significant nervousness, anxiety, stress, depression    All other systems were reviewed and are negative, except as noted.    LABS/STUDIES  --------------------------------------------------------------------------------              8.2    7.61  >-----------<  230      [10-03-21 @ 06:11]              25.7     133  |  101  |  22  ----------------------------<  121      [10-03-21 @ 06:11]  4.2   |  23  |  0.63        Ca     8.5     [10-03-21 @ 06:11]      iCa    1.24     [10-03 @ 06:14]      Mg     2.2     [10-03-21 @ 06:11]      Phos  3.9     [10-03-21 @ 06:11]    TPro  5.3  /  Alb  2.7  /  TBili  0.4  /  DBili  x   /  AST  33  /  ALT  45  /  AlkPhos  111  [10-03-21 @ 06:11]          Ca ionized  Creatinine Trend:  POC glucoseGlucose, Serum: 121 mg/dL (10-03-21 @ 06:11)    PrealbuminPrealbumin, Serum: 8 mg/dL (09-28-21 @ 11:24)    Triglycerides  VITALS/PHYSICAL EXAM  --------------------------------------------------------------------------------  T(C): 36.4 (10-03-21 @ 09:31), Max: 36.7 (10-02-21 @ 21:11)  HR: 92 (10-03-21 @ 05:04) (92 - 109)  BP: 108/74 (10-03-21 @ 09:31) (98/65 - 110/61)  RR: 18 (10-03-21 @ 09:31) (18 - 18)  SpO2: 97% (10-03-21 @ 09:31) (96% - 99%)  Wt(kg): --        10-02-21 @ 07:01  -  10-03-21 @ 07:00  --------------------------------------------------------  IN: 1880.8 mL / OUT: 500 mL / NET: 1380.8 mL    10-03-21 @ 07:01  -  10-03-21 @ 09:53  --------------------------------------------------------  IN: 250 mL / OUT: 200 mL / NET: 50 mL        .  Physical Exam:    	Gen: NAD, well-appearing  	HEENT: PERRL,              Neck: Trachea midline, no noted JVD              Chest: non labored breathing, equal chest expansion b/l  	Abd: nondistended, NT soft,  abdominal SOIs are CDI  	UE: Warm, FROM, intact strength; no edeme LUE PICC dressing CDI.  	LE: Warm, FROM, intact strength; no edema  	Neuro: AOx3  	Psych: Normal affect and mood  	Skin: Warm, without rashes  .  .  .    .     [facility] NUTRITION SUPPORT-- FOLLOW UP NOTE  --------------------------------------------------------------------------------      24 hour events/subjective:    TPN originally consulted for nutrition support and started TPN on 9/27.  Pt is tolerating without  issues, and denies palpitations, chest pain, shortness of breath. Pt further denies fevers, chills nor night sweats.     Pt tolerated small amounts of Soft Mechanical diet yesterday, reports eating minimal amounts due to lack of appetite. Denies nausea nor vomiting nor abdominal pain. Reports BM and flatus.  Pt reports not sleeping well and melatonin did not help last night.        PAST HISTORY  --------------------------------------------------------------------------------  No significant changes to PMH, PSH, FHx, SHx, unless otherwise noted    ALLERGIES & MEDICATIONS  --------------------------------------------------------------------------------  Allergies    No Known Allergies    Intolerances      Standing Inpatient Medications  chlorhexidine 2% Cloths 1 Application(s) Topical <User Schedule>  dextrose 40% Gel 15 Gram(s) Oral once  dextrose 5%. 1000 milliLiter(s) IV Continuous <Continuous>  dextrose 5%. 1000 milliLiter(s) IV Continuous <Continuous>  dextrose 50% Injectable 25 Gram(s) IV Push once  dextrose 50% Injectable 12.5 Gram(s) IV Push once  dextrose 50% Injectable 25 Gram(s) IV Push once  enoxaparin Injectable 40 milliGRAM(s) SubCutaneous daily  erythromycin     enteric coated 250 milliGRAM(s) Oral every 6 hours  fat emulsion (Fish Oil and Plant Based) 20% Infusion 10.4 mL/Hr IV Continuous <Continuous>  glucagon  Injectable 1 milliGRAM(s) IntraMuscular once  influenza   Vaccine 0.5 milliLiter(s) IntraMuscular once  insulin lispro (ADMELOG) corrective regimen sliding scale   SubCutaneous three times a day before meals  insulin lispro (ADMELOG) corrective regimen sliding scale   SubCutaneous at bedtime  melatonin 5 milliGRAM(s) Oral at bedtime  metoclopramide 10 milliGRAM(s) Oral three times a day  pantoprazole    Tablet 40 milliGRAM(s) Oral two times a day  Parenteral Nutrition - Adult 1 Each TPN Continuous <Continuous>    PRN Inpatient Medications  acetaminophen   Tablet .. 650 milliGRAM(s) Oral every 6 hours PRN  artificial tears (preservative free) Ophthalmic Solution 1 Drop(s) Both EYES two times a day PRN  ondansetron    Tablet 4 milliGRAM(s) Oral every 6 hours PRN  oxyCODONE    IR 2.5 milliGRAM(s) Oral every 4 hours PRN  sodium chloride 0.9% lock flush 10 milliLiter(s) IV Push every 1 hour PRN      REVIEW OF SYSTEMS  --------------------------------------------------------------------------------  Gen: fatigue, fevers/chills, weakness  Skin: No rashes  Head/Eyes/Ears/Mouth: No headache;No sore throat  Respiratory: No dyspnea, cough,   CV: No chest pain, PND, orthopnea  GI: No abdominal pain, diarrhea, constipation, nausea, vomiting  Transplant: No pain  : No increased frequency, dysuria, hematuria, nocturia  MSK: No joint pain/swelling; no back pain; no edema  Neuro: No dizziness/lightheadedness, weakness, seizures, numbness, tingling  Psych: No significant nervousness, anxiety, stress, depression    All other systems were reviewed and are negative, except as noted.    LABS/STUDIES  --------------------------------------------------------------------------------              8.2    7.61  >-----------<  230      [10-03-21 @ 06:11]              25.7     133  |  101  |  22  ----------------------------<  121      [10-03-21 @ 06:11]  4.2   |  23  |  0.63        Ca     8.5     [10-03-21 @ 06:11]      iCa    1.24     [10-03 @ 06:14]      Mg     2.2     [10-03-21 @ 06:11]      Phos  3.9     [10-03-21 @ 06:11]    TPro  5.3  /  Alb  2.7  /  TBili  0.4  /  DBili  x   /  AST  33  /  ALT  45  /  AlkPhos  111  [10-03-21 @ 06:11]          Ca ionized  Creatinine Trend:  POC glucoseGlucose, Serum: 121 mg/dL (10-03-21 @ 06:11)    PrealbuminPrealbumin, Serum: 8 mg/dL (09-28-21 @ 11:24)    Triglycerides  VITALS/PHYSICAL EXAM  --------------------------------------------------------------------------------  T(C): 36.4 (10-03-21 @ 09:31), Max: 36.7 (10-02-21 @ 21:11)  HR: 92 (10-03-21 @ 05:04) (92 - 109)  BP: 108/74 (10-03-21 @ 09:31) (98/65 - 110/61)  RR: 18 (10-03-21 @ 09:31) (18 - 18)  SpO2: 97% (10-03-21 @ 09:31) (96% - 99%)  Wt(kg): --        10-02-21 @ 07:01  -  10-03-21 @ 07:00  --------------------------------------------------------  IN: 1880.8 mL / OUT: 500 mL / NET: 1380.8 mL    10-03-21 @ 07:01  -  10-03-21 @ 09:53  --------------------------------------------------------  IN: 250 mL / OUT: 200 mL / NET: 50 mL        .  Physical Exam:    	Gen: NAD, well-appearing  	HEENT: PERRL,              Neck: Trachea midline, no noted JVD              Chest: non labored breathing, equal chest expansion b/l  	Abd: nondistended, NT soft,  abdominal SOIs are CDI  	UE: Warm, FROM, intact strength; no edeme LUE PICC dressing CDI.  	LE: Warm, FROM, intact strength; no edema  	Neuro: AOx3  	Psych: Normal affect and mood  	Skin: Warm, without rashes  .  .  .    .

## 2021-10-03 NOTE — PROGRESS NOTE ADULT - SUBJECTIVE AND OBJECTIVE BOX
24h Events:  No acute events overnight.    Subjective:   Patient seen at bedside this AM. Denies n/v, SOB, CP, fevers/chills.    Objective:  Vital Signs  T(C): 36.7 (10-03 @ 01:02), Max: 36.9 (10-02 @ 06:22)  HR: 98 (10-03 @ 01:02) (98 - 109)  BP: 102/67 (10-03 @ 01:02) (101/67 - 110/61)  RR: 18 (10-03 @ 01:02) (18 - 18)  SpO2: 98% (10-03 @ 01:02) (98% - 99%)  10-02-21 @ 07:01  -  10-03-21 @ 03:28  --------------------------------------------------------  IN:  Total IN: 0 mL    OUT:    Voided (mL): 500 mL  Total OUT: 500 mL    Total NET: -500 mL          Physical Exam:  GEN: resting in bed comfortably in NAD  RESP: no increased WOB  ABD: soft, non-distended, slightly tender to palpation in epigastric region, without rebound tenderness or guarding. Incisions are c/d/i  EXTR: warm, well-perfused, no edema  NEURO: awake, alert    Labs:                        9.2    7.93  )-----------( 303      ( 02 Oct 2021 07:39 )             28.9   10-02    134<L>  |  100  |  23  ----------------------------<  120<H>  4.6   |  22  |  0.52    Ca    8.8      02 Oct 2021 07:36  Phos  3.3     10-02  Mg     2.4     10-02    TPro  5.9<L>  /  Alb  3.1<L>  /  TBili  0.4  /  DBili  x   /  AST  59<H>  /  ALT  51<H>  /  AlkPhos  122<H>  10-02    CAPILLARY BLOOD GLUCOSE      POCT Blood Glucose.: 137 mg/dL (02 Oct 2021 21:06)  POCT Blood Glucose.: 131 mg/dL (02 Oct 2021 18:03)  POCT Blood Glucose.: 122 mg/dL (02 Oct 2021 13:35)  POCT Blood Glucose.: 132 mg/dL (02 Oct 2021 10:12)      Imaging:     24h Events:  No acute events overnight.    Subjective:   Patient seen at bedside this AM. Denies n/v, SOB, CP, fevers/chills. Passing gas and had one BM yesterday. No other complaints. Tolerated food well.     Objective:  Vital Signs  T(C): 36.7 (10-03 @ 01:02), Max: 36.9 (10-02 @ 06:22)  HR: 98 (10-03 @ 01:02) (98 - 109)  BP: 102/67 (10-03 @ 01:02) (101/67 - 110/61)  RR: 18 (10-03 @ 01:02) (18 - 18)  SpO2: 98% (10-03 @ 01:02) (98% - 99%)  10-02-21 @ 07:01  -  10-03-21 @ 03:28  --------------------------------------------------------  IN:  Total IN: 0 mL    OUT:    Voided (mL): 500 mL  Total OUT: 500 mL    Total NET: -500 mL          Physical Exam:  GEN: resting in bed comfortably in NAD  RESP: no increased WOB  ABD: soft, non-distended, nontender; without rebound tenderness or guarding. Incisions are c/d/i  EXTR: warm, well-perfused, no edema  NEURO: awake, alert    Labs:                        9.2    7.93  )-----------( 303      ( 02 Oct 2021 07:39 )             28.9   10-02    134<L>  |  100  |  23  ----------------------------<  120<H>  4.6   |  22  |  0.52    Ca    8.8      02 Oct 2021 07:36  Phos  3.3     10-02  Mg     2.4     10-02    TPro  5.9<L>  /  Alb  3.1<L>  /  TBili  0.4  /  DBili  x   /  AST  59<H>  /  ALT  51<H>  /  AlkPhos  122<H>  10-02    CAPILLARY BLOOD GLUCOSE      POCT Blood Glucose.: 137 mg/dL (02 Oct 2021 21:06)  POCT Blood Glucose.: 131 mg/dL (02 Oct 2021 18:03)  POCT Blood Glucose.: 122 mg/dL (02 Oct 2021 13:35)  POCT Blood Glucose.: 132 mg/dL (02 Oct 2021 10:12)      Imaging:

## 2021-10-03 NOTE — PROGRESS NOTE ADULT - SUBJECTIVE AND OBJECTIVE BOX
C A R D I O L O G Y  **********************************     DATE OF SERVICE: 10-03-21              acetaminophen   Tablet .. 650 milliGRAM(s) Oral every 6 hours PRN  artificial tears (preservative free) Ophthalmic Solution 1 Drop(s) Both EYES two times a day PRN  chlorhexidine 2% Cloths 1 Application(s) Topical <User Schedule>  dextrose 40% Gel 15 Gram(s) Oral once  dextrose 5%. 1000 milliLiter(s) IV Continuous <Continuous>  dextrose 5%. 1000 milliLiter(s) IV Continuous <Continuous>  dextrose 50% Injectable 25 Gram(s) IV Push once  dextrose 50% Injectable 12.5 Gram(s) IV Push once  dextrose 50% Injectable 25 Gram(s) IV Push once  enoxaparin Injectable 40 milliGRAM(s) SubCutaneous daily  erythromycin     enteric coated 250 milliGRAM(s) Oral every 6 hours  fat emulsion (Fish Oil and Plant Based) 20% Infusion 10.4 mL/Hr IV Continuous <Continuous>  glucagon  Injectable 1 milliGRAM(s) IntraMuscular once  influenza   Vaccine 0.5 milliLiter(s) IntraMuscular once  insulin lispro (ADMELOG) corrective regimen sliding scale   SubCutaneous three times a day before meals  insulin lispro (ADMELOG) corrective regimen sliding scale   SubCutaneous at bedtime  melatonin 5 milliGRAM(s) Oral at bedtime  metoclopramide 10 milliGRAM(s) Oral three times a day  ondansetron    Tablet 4 milliGRAM(s) Oral every 6 hours PRN  oxyCODONE    IR 2.5 milliGRAM(s) Oral every 4 hours PRN  pantoprazole    Tablet 40 milliGRAM(s) Oral two times a day  Parenteral Nutrition - Adult 1 Each TPN Continuous <Continuous>  sodium chloride 0.9% lock flush 10 milliLiter(s) IV Push every 1 hour PRN                            8.2    7.61  )-----------( 230      ( 03 Oct 2021 06:11 )             25.7       Hemoglobin: 8.2 g/dL (10-03 @ 06:11)  Hemoglobin: 9.2 g/dL (10-02 @ 07:39)  Hemoglobin: 8.8 g/dL (10-01 @ 07:27)  Hemoglobin: 9.1 g/dL (09-30 @ 07:17)  Hemoglobin: 9.4 g/dL (09-29 @ 07:18)      10-03    133<L>  |  101  |  22  ----------------------------<  121<H>  4.2   |  23  |  0.63    Ca    8.5      03 Oct 2021 06:11  Phos  3.9     10-03  Mg     2.2     10-03    TPro  5.3<L>  /  Alb  2.7<L>  /  TBili  0.4  /  DBili  x   /  AST  33  /  ALT  45  /  AlkPhos  111  10-03    Creatinine Trend: 0.63<--, 0.52<--, 0.56<--, 0.53<--, 0.50<--, 0.48<--    COAGS:           T(C): 36.7 (10-03-21 @ 05:04), Max: 36.7 (10-02-21 @ 09:24)  HR: 92 (10-03-21 @ 05:04) (92 - 109)  BP: 98/65 (10-03-21 @ 05:04) (98/65 - 110/61)  RR: 18 (10-03-21 @ 05:04) (18 - 18)  SpO2: 96% (10-03-21 @ 05:04) (96% - 99%)  Wt(kg): --    I&O's Summary    02 Oct 2021 07:01  -  03 Oct 2021 07:00  --------------------------------------------------------  IN: 1880.8 mL / OUT: 500 mL / NET: 1380.8 mL        Gen: Appears well in NAD  HEENT:  (-)icterus (-)pallor  CV: N S1 S2 1/6 PATRICIA (+)2 Pulses B/l  Resp:  Clear to auscultation B/L, normal effort  GI: (+) BS Soft, NT, ND  Lymph:  (-)Edema, (-)obvious lymphadenopathy  Skin: Warm to touch, Normal turgor  Psych: Appropriate mood and affect    TELEMETRY: None	      ASSESSMENT/PLAN: 76y Female with Hx of RA (on MTX), HLD, appendectomy, and L renal cell CA (s/p L nephrectomy 2019) who presented with transaminitis and an abnormal MRCP showing dilated PD and CBD, found to have a 1.7x1.8cm hypoechoic pancreatic head lesion on EUS on 9/9. She was taken to the OR for a Whipple on 9/17.    - No evidence of clinical HF or anginal symptoms  - TTE noted with normal LV function  - Pain management per primary team  - Surgery follow up appreciated  - No further inpatient cardiac w/u planned

## 2021-10-03 NOTE — PROGRESS NOTE ADULT - ASSESSMENT
76y Female with Hx of RA (on MTX), HLD, appendectomy, and L renal cell CA (s/p L nephrectomy 2019) who presented with transaminitis and an abnormal MRCP showing dilated PD and CBD, found to have a 1.7x1.8cm hypoechoic pancreatic head lesion on EUS on 9/9. She was taken to the OR on 9/17 for a Whipple on 9/17 and transferred to the SICU extubated post-operatively for hemodynamic monitoring. Patient had NGT placed (9/26) due to severely distended stomach on AXR, now recovering on the floor with likely delayed gastric emptying.     Plan:  - Continue TPN  - Diet: mechanical soft  - Cont. IV erythromycin, reglan for promotility  - DVT ppx    Red Team Surgery  [id number]    76y Female with Hx of RA (on MTX), HLD, appendectomy, and L renal cell CA (s/p L nephrectomy 2019) who presented with transaminitis and an abnormal MRCP showing dilated PD and CBD, found to have a 1.7x1.8cm hypoechoic pancreatic head lesion on EUS on 9/9. She was taken to the OR on 9/17 for a Whipple on 9/17 and transferred to the SICU extubated post-operatively for hemodynamic monitoring. Patient had NGT placed (9/26) due to severely distended stomach on AXR, now recovering on the floor with likely delayed gastric emptying.     Plan:  - TPN halved   - Diet: mechanical soft  - Cont. erythromycin, reglan for promotility; switched to PO   - DVT ppx  - D/c planning     Red Team Surgery  [id number]

## 2021-10-03 NOTE — PROGRESS NOTE ADULT - ASSESSMENT
77 y/o female with pmhx of rheumatoid arthritis, high cholesterol, appendectomy, and left nephrectomy s/p Whipple with gastroparesis on 9/17. TPN started on 9/27 for nutritional support for Protein Calori Malnutrition for prolonged NPO/NGT. Pt has since been advanced to soft mechanical diet.    TPN Goals:  90g AA, 140g dextrose, 50g lipids    - Pt tolerating minimal amounts of soft mechanical diet. Continue at half goal CHO/AA in TPN and half goal SMOF at 850ml total volume.  - Nutritional Assessment. Pt now on mechanical soft diet, will continue TPN at half goals today.  - PICC in place LUE. Maintenance as per protocol  - Hyperglycemic Control- HgA1C 5.7. Fingersticks noted to be 129-137 over 24hrs. No insulin added as CHO load will continue at half goals today  -Electrolyte Imbalance Risk. -Obtain daily CMP, Mg, phos, iCa. Will replete lytes in TPN bag daily as needed. Hypokalemia - KCl at 40meq KCl. Hypophosphatemia - NaPhos @ 30mmol. Low bicarb - NaAce @ 40meq  - Triglyceridewas 263, will keep at half goal SMOF today.  - Care per surgery.    TPN pager [phone], spectra [phone]  M-F 6A-4P, Weekends and holidays 8A-12P  discussed with Dr. Eckert and Anjelica Tripathi        75 y/o female with pmhx of rheumatoid arthritis, high cholesterol, appendectomy, and left nephrectomy s/p Whipple with gastroparesis on 9/17. TPN started on 9/27 for nutritional support for Protein Calori Malnutrition for prolonged NPO/NGT. Pt has since been advanced to soft mechanical diet.    TPN Goals:  90g AA, 140g dextrose, 50g lipids    - Pt tolerating Soft mechanical diet. DC TPN today  - Nutritional Assessment. Pt now on mechanical soft diet, will discontinue TPN today.  - PICC in place LUE. Maintenance as per protocol  - Hyperglycemic Control- HgA1C 5.7. Fingersticks noted to be 129-137 over 24hrs. Can DC fingersticks once last TPN bag  is completed tonight.  -Electrolyte Imbalance Risk. -Obtain daily CMP, Mg, phos, iCa. Will replete lytes in TPN bag today, after TPN is completed tonight, electrolyte repletions as per Surgery Team. Hypokalemia - KCl at 40meq KCl. Hypophosphatemia - NaPhos @ 30mmol. Low bicarb - NaAce @ 40meq  - Triglyceride was 263, Can stop checking triglycerides as last TPN bag will be completed tonight.  - Care per surgery, spoke [id number] Dipika [id number].    TPN pager [phone], spectra [phone]  M-F 6A-4P, Weekends and holidays 8A-12P  discussed with Dr. Eckert and Anjelica Tripathi

## 2021-10-03 NOTE — PROGRESS NOTE ADULT - SUBJECTIVE AND OBJECTIVE BOX
Pt has been doing OK, has indigestion, on parentral nutrition. No nausea, vomiting, diarrhea or pain abdomen Rest of the detailed ROS unremarkable. Her son is with her today.      Meds:  acetaminophen   Tablet .. 650 milliGRAM(s) Oral every 6 hours PRN  artificial tears (preservative free) Ophthalmic Solution 1 Drop(s) Both EYES two times a day PRN  chlorhexidine 2% Cloths 1 Application(s) Topical <User Schedule>  dextrose 40% Gel 15 Gram(s) Oral once  dextrose 5%. 1000 milliLiter(s) IV Continuous <Continuous>  dextrose 5%. 1000 milliLiter(s) IV Continuous <Continuous>  dextrose 50% Injectable 25 Gram(s) IV Push once  dextrose 50% Injectable 12.5 Gram(s) IV Push once  dextrose 50% Injectable 25 Gram(s) IV Push once  enoxaparin Injectable 40 milliGRAM(s) SubCutaneous daily  erythromycin     enteric coated 250 milliGRAM(s) Oral every 6 hours  fat emulsion (Fish Oil and Plant Based) 20% Infusion 10.4 mL/Hr IV Continuous <Continuous>  glucagon  Injectable 1 milliGRAM(s) IntraMuscular once  influenza   Vaccine 0.5 milliLiter(s) IntraMuscular once  insulin lispro (ADMELOG) corrective regimen sliding scale   SubCutaneous three times a day before meals  insulin lispro (ADMELOG) corrective regimen sliding scale   SubCutaneous at bedtime  melatonin 10 milliGRAM(s) Oral at bedtime  metoclopramide 10 milliGRAM(s) Oral three times a day  ondansetron    Tablet 4 milliGRAM(s) Oral every 6 hours PRN  oxyCODONE    IR 2.5 milliGRAM(s) Oral every 4 hours PRN  pantoprazole    Tablet 40 milliGRAM(s) Oral two times a day  Parenteral Nutrition - Adult 1 Each TPN Continuous <Continuous>  sodium chloride 0.9% lock flush 10 milliLiter(s) IV Push every 1 hour PRN      Vital Signs Last 24 Hrs  T(C): 36.6 (03 Oct 2021 14:29), Max: 36.7 (02 Oct 2021 21:11)  T(F): 97.8 (03 Oct 2021 14:29), Max: 98.1 (03 Oct 2021 01:02)  HR: 109 (03 Oct 2021 14:29) (92 - 109)  BP: 110/74 (03 Oct 2021 14:29) (98/65 - 110/74)  BP(mean): --  RR: 18 (03 Oct 2021 14:29) (18 - 18)  SpO2: 98% (03 Oct 2021 14:29) (96% - 98%)                          8.2    7.61  )-----------( 230      ( 03 Oct 2021 06:11 )             25.7       10-03    133<L>  |  101  |  22  ----------------------------<  121<H>  4.2   |  23  |  0.63    Ca    8.5      03 Oct 2021 06:11  Phos  3.9     10-03  Mg     2.2     10-03    TPro  5.3<L>  /  Alb  2.7<L>  /  TBili  0.4  /  DBili  x   /  AST  33  /  ALT  45  /  AlkPhos  111  10-03        Path: Surgical Pathology Report:   ACCESSION No: [id number]   Patient: SUZY SILVA NANCI   Accession: [id number]   Collected Date/Time: 9/17/2021 11:40 EDT   Received Date/Time: 9/17/2021 11:53 EDT   Surgical Pathology Report - Auth (Verified)   Specimen(s) Submitted   1. Gallbladder   2. Portion of bile duct   3. Product of Whipple pancreatic neck margin   4. Portion of small bowel, stomach gastrojejunostomy   Final Diagnosis   1. Gallbladder, cholecystectomy::   - Gallbladder with mild chronic inflammation   2. Portion of bile duct, excision::   - Portion of bile duct, negative for dysplasia or malignancy   3. Pancreas and neck margin, Whipple resection:   - Invasive pancreatic ductal adenocarcinoma, moderately differentiated   - Vascular and perineural invasion are identified   - (13) out of (41) one lymph nodes involved by adenocarcinoma   - Resection margins negative for dysplasia or malignancy   - See synoptic summary for further details   4. Stomach and small bowel, gastrojejunostomy:   - Portion of stomach and small bowel with no significant   histopathologic findings.   Synoptic Summary   PANCREAS (EXOCRINE)   Applies To: Part 3, Whipple resection   SPECIMEN   Procedure: Pancreaticoduodenectomy (Whipple resection)   TUMOR   Tumor Site: Pancreatic head   Histologic Type: Ductal adenocarcinoma (NOS)   Histologic Grade: G2: Moderately differentiated   Tumor Size: Greatest Dimension (Centimeters) - 2.6 x 2.0 x 2.2 cm   Tumor Extension: Tumor is confined to pancreas   Treatment Effect: No known presurgical therapy   Lymphovascular Invasion: Present   Perineural Invasion: Present   MARGINS   Margins: All margins are uninvolved by invasive carcinoma and high-   grade intraepithelial neoplasia   Margins Examined: Pancreatic neck / parenchymal, Uncinate   (retroperitoneal / superior mesenteric artery), Bile duct, Portal   vein, Proximal (gastric), Distal (duodenal)   Distance of Invasive Carcinoma from Closest Margin: 0.4 cm   Closest Margin:Uncinate (retroperitoneal / superior mesenteric   artery)   LYMPH NODES   Number of Lymph Nodes Involved: 13   Number of Lymph Nodes Examined: 41   PATHOLOGIC STAGE CLASSIFICATION (pTNM, AJCC 8th Edition)   Note: Reporting of pT, pN, and (when applicable) pM categories is based   on information available to the pathologist at the time the report   is issued. As per the AJCC (Chapter 1, 8th Ed.) it is the managing   physician's responsibility to establish the final pathologic stage based   upon all pertinent information, including but potentially not limited to   this pathology report.   TNM Descriptors: Not applicable   Primary Tumor (pT): pT2   Regional Lymph Nodes (pN): pN2   ADDITIONAL FINDINGS   Additional Findings: Chronic pancreatitis   Pancreatic intraepithelial neoplasia   Highest Grade (PanIN): III   Verified by: Malcolm Bell M.D.   (Electronic Signature)   Reported on: 09/29/21 10:37 EDT, [address] [facility] [place] [address]   Phone: [phone] Fax: [phone]

## 2021-10-03 NOTE — PROGRESS NOTE ADULT - NEGATIVE GASTROINTESTINAL SYMPTOMS
indigestion and burning with bad smelling matrerial/no nausea/no vomiting/no diarrhea/no constipation/no abdominal pain
no vomiting/no diarrhea/no constipation

## 2021-10-04 ENCOUNTER — TRANSCRIPTION ENCOUNTER (OUTPATIENT)
Age: 76
End: 2021-10-04

## 2021-10-04 VITALS
OXYGEN SATURATION: 98 % | RESPIRATION RATE: 18 BRPM | HEART RATE: 105 BPM | TEMPERATURE: 98 F | DIASTOLIC BLOOD PRESSURE: 73 MMHG | SYSTOLIC BLOOD PRESSURE: 112 MMHG

## 2021-10-04 LAB
ALBUMIN SERPL ELPH-MCNC: 2.6 G/DL — LOW (ref 3.3–5)
ALP SERPL-CCNC: 99 U/L — SIGNIFICANT CHANGE UP (ref 40–120)
ALT FLD-CCNC: 29 U/L — SIGNIFICANT CHANGE UP (ref 10–45)
ANION GAP SERPL CALC-SCNC: 10 MMOL/L — SIGNIFICANT CHANGE UP (ref 5–17)
AST SERPL-CCNC: 17 U/L — SIGNIFICANT CHANGE UP (ref 10–40)
BILIRUB SERPL-MCNC: 0.4 MG/DL — SIGNIFICANT CHANGE UP (ref 0.2–1.2)
BUN SERPL-MCNC: 16 MG/DL — SIGNIFICANT CHANGE UP (ref 7–23)
CALCIUM SERPL-MCNC: 8.5 MG/DL — SIGNIFICANT CHANGE UP (ref 8.4–10.5)
CHLORIDE SERPL-SCNC: 103 MMOL/L — SIGNIFICANT CHANGE UP (ref 96–108)
CO2 SERPL-SCNC: 24 MMOL/L — SIGNIFICANT CHANGE UP (ref 22–31)
CREAT SERPL-MCNC: 0.65 MG/DL — SIGNIFICANT CHANGE UP (ref 0.5–1.3)
GLUCOSE BLDC GLUCOMTR-MCNC: 100 MG/DL — HIGH (ref 70–99)
GLUCOSE SERPL-MCNC: 102 MG/DL — HIGH (ref 70–99)
HCT VFR BLD CALC: 24.1 % — LOW (ref 34.5–45)
HGB BLD-MCNC: 7.9 G/DL — LOW (ref 11.5–15.5)
MAGNESIUM SERPL-MCNC: 2.3 MG/DL — SIGNIFICANT CHANGE UP (ref 1.6–2.6)
MCHC RBC-ENTMCNC: 32.2 PG — SIGNIFICANT CHANGE UP (ref 27–34)
MCHC RBC-ENTMCNC: 32.8 GM/DL — SIGNIFICANT CHANGE UP (ref 32–36)
MCV RBC AUTO: 98.4 FL — SIGNIFICANT CHANGE UP (ref 80–100)
NRBC # BLD: 0 /100 WBCS — SIGNIFICANT CHANGE UP (ref 0–0)
PHOSPHATE SERPL-MCNC: 3.4 MG/DL — SIGNIFICANT CHANGE UP (ref 2.5–4.5)
PLATELET # BLD AUTO: 212 K/UL — SIGNIFICANT CHANGE UP (ref 150–400)
POTASSIUM SERPL-MCNC: 4 MMOL/L — SIGNIFICANT CHANGE UP (ref 3.5–5.3)
POTASSIUM SERPL-SCNC: 4 MMOL/L — SIGNIFICANT CHANGE UP (ref 3.5–5.3)
PROT SERPL-MCNC: 5 G/DL — LOW (ref 6–8.3)
RBC # BLD: 2.45 M/UL — LOW (ref 3.8–5.2)
RBC # FLD: 15.2 % — HIGH (ref 10.3–14.5)
SODIUM SERPL-SCNC: 137 MMOL/L — SIGNIFICANT CHANGE UP (ref 135–145)
WBC # BLD: 6.09 K/UL — SIGNIFICANT CHANGE UP (ref 3.8–10.5)
WBC # FLD AUTO: 6.09 K/UL — SIGNIFICANT CHANGE UP (ref 3.8–10.5)

## 2021-10-04 PROCEDURE — 99024 POSTOP FOLLOW-UP VISIT: CPT

## 2021-10-04 PROCEDURE — 99232 SBSQ HOSP IP/OBS MODERATE 35: CPT

## 2021-10-04 RX ORDER — SENNA PLUS 8.6 MG/1
1 TABLET ORAL
Qty: 7 | Refills: 0
Start: 2021-10-04 | End: 2021-10-10

## 2021-10-04 RX ORDER — ATORVASTATIN CALCIUM 80 MG/1
10 TABLET, FILM COATED ORAL
Qty: 0 | Refills: 0 | DISCHARGE
Start: 2021-10-04

## 2021-10-04 RX ORDER — ERYTHROMYCIN ETHYLSUCCINATE 400 MG
1 TABLET ORAL
Qty: 28 | Refills: 0
Start: 2021-10-04 | End: 2021-10-10

## 2021-10-04 RX ORDER — PANTOPRAZOLE SODIUM 20 MG/1
1 TABLET, DELAYED RELEASE ORAL
Qty: 14 | Refills: 0
Start: 2021-10-04 | End: 2021-10-10

## 2021-10-04 RX ORDER — ATORVASTATIN CALCIUM 80 MG/1
1 TABLET, FILM COATED ORAL
Qty: 0 | Refills: 0 | DISCHARGE

## 2021-10-04 RX ORDER — FOLIC ACID 0.8 MG
1 TABLET ORAL
Qty: 0 | Refills: 0 | DISCHARGE

## 2021-10-04 RX ORDER — METOCLOPRAMIDE HCL 10 MG
1 TABLET ORAL
Qty: 21 | Refills: 0
Start: 2021-10-04 | End: 2021-10-10

## 2021-10-04 RX ORDER — LANOLIN ALCOHOL/MO/W.PET/CERES
1 CREAM (GRAM) TOPICAL
Qty: 7 | Refills: 0
Start: 2021-10-04 | End: 2021-10-10

## 2021-10-04 RX ORDER — ACETAMINOPHEN 500 MG
2 TABLET ORAL
Qty: 0 | Refills: 0 | DISCHARGE
Start: 2021-10-04

## 2021-10-04 RX ORDER — DOCUSATE SODIUM 100 MG
1 CAPSULE ORAL
Qty: 7 | Refills: 0
Start: 2021-10-04 | End: 2021-10-10

## 2021-10-04 RX ORDER — FOLIC ACID 0.8 MG
1 TABLET ORAL
Qty: 0 | Refills: 0 | DISCHARGE
Start: 2021-10-04

## 2021-10-04 RX ORDER — OXYCODONE HYDROCHLORIDE 5 MG/1
0.5 TABLET ORAL
Qty: 10 | Refills: 0
Start: 2021-10-04 | End: 2021-10-08

## 2021-10-04 RX ADMIN — Medication 10 MILLIGRAM(S): at 05:54

## 2021-10-04 RX ADMIN — CHLORHEXIDINE GLUCONATE 1 APPLICATION(S): 213 SOLUTION TOPICAL at 08:16

## 2021-10-04 RX ADMIN — Medication 250 MILLIGRAM(S): at 05:54

## 2021-10-04 RX ADMIN — PANTOPRAZOLE SODIUM 40 MILLIGRAM(S): 20 TABLET, DELAYED RELEASE ORAL at 05:55

## 2021-10-04 RX ADMIN — Medication 250 MILLIGRAM(S): at 12:37

## 2021-10-04 RX ADMIN — OXYCODONE HYDROCHLORIDE 2.5 MILLIGRAM(S): 5 TABLET ORAL at 01:20

## 2021-10-04 RX ADMIN — OXYCODONE HYDROCHLORIDE 2.5 MILLIGRAM(S): 5 TABLET ORAL at 01:50

## 2021-10-04 NOTE — DISCHARGE NOTE NURSING/CASE MANAGEMENT/SOCIAL WORK - PATIENT PORTAL LINK FT
You can access the FollowMyHealth Patient Portal offered by [facility] by registering at the following website: [email or web address]. By joining [facility]’s FollowMyHealth portal, you will also be able to view your health information using other applications (apps) compatible with our system.

## 2021-10-04 NOTE — PROGRESS NOTE ADULT - NUTRITIONAL ASSESSMENT
This patient has been assessed with a concern for Malnutrition and has been determined to have a diagnosis/diagnoses of Moderate protein-calorie malnutrition.    This patient is being managed with:   Diet Clear Liquid-  Entered: Sep 22 2021 11:59AM    
This patient has been assessed with a concern for Malnutrition and has been determined to have a diagnosis/diagnoses of Moderate protein-calorie malnutrition.    This patient is being managed with:   Diet NPO-  Entered: Sep 17 2021  7:21PM    
This patient has been assessed with a concern for Malnutrition and has been determined to have a diagnosis/diagnoses of Moderate protein-calorie malnutrition.    This patient is being managed with:   Diet NPO-  Entered: Sep 17 2021  7:21PM    
This patient has been assessed with a concern for Malnutrition and has been determined to have a diagnosis/diagnoses of Moderate protein-calorie malnutrition.    This patient is being managed with:   Diet NPO-  With Ice Chips/Sips of Water  Entered: Sep 19 2021  3:31PM    
This patient has been assessed with a concern for Malnutrition and has been determined to have a diagnosis/diagnoses of Moderate protein-calorie malnutrition.    This patient is being managed with:   Diet Soft-  Entered: Sep 13 2021 11:58AM    
This patient has been assessed with a concern for Malnutrition and has been determined to have a diagnosis/diagnoses of Moderate protein-calorie malnutrition.    This patient is being managed with:   Parenteral Nutrition - Adult-  Entered: Sep 28 2021  5:00PM    fat emulsion (Fish Oil and Plant Based) 20% Infusion-[Known as SMOFLIPID 20% Infusion]  25 Gram(s) in IV Solution 125 milliLiter(s) infuse at 10.4 mL/Hr  Dose Rate: 10.4 mL/Hr Infuse Over: 12 Hours  Administration Instructions: Use 1.2 micron in-line filter  Entered: Sep 28 2021  5:00PM    Diet NPO-  Entered: Sep 26 2021  5:26PM    
This patient has been assessed with a concern for Malnutrition and has been determined to have a diagnosis/diagnoses of Moderate protein-calorie malnutrition.    This patient is being managed with:   Diet Clear Liquid-  Entered: Sep 20 2021  8:28AM    
This patient has been assessed with a concern for Malnutrition and has been determined to have a diagnosis/diagnoses of Moderate protein-calorie malnutrition.    This patient is being managed with:   Diet Clear Liquid-  Entered: Sep 20 2021  8:28AM    
This patient has been assessed with a concern for Malnutrition and has been determined to have a diagnosis/diagnoses of Moderate protein-calorie malnutrition.    This patient is being managed with:   Diet Mechanical Soft-  Entered: Sep 21 2021  7:20PM    
This patient has been assessed with a concern for Malnutrition and has been determined to have a diagnosis/diagnoses of Moderate protein-calorie malnutrition.    This patient is being managed with:   Diet NPO-  Entered: Sep 17 2021  7:21PM    
This patient has been assessed with a concern for Malnutrition and has been determined to have a diagnosis/diagnoses of Moderate protein-calorie malnutrition.    This patient is being managed with:   Diet NPO-  Entered: Sep 17 2021  7:21PM    
This patient has been assessed with a concern for Malnutrition and has been determined to have a diagnosis/diagnoses of Moderate protein-calorie malnutrition.    This patient is being managed with:   Diet Regular-  Dysphagia 2 Mechanical Soft Thin Liquids ([id number])  Entered: Sep 25 2021 10:59AM    
This patient has been assessed with a concern for Malnutrition and has been determined to have a diagnosis/diagnoses of Moderate protein-calorie malnutrition.    This patient is being managed with:   Diet Regular-  Dysphagia 2 Mechanical Soft Thin Liquids ([id number])  Entered: Sep 25 2021 10:59AM    
This patient has been assessed with a concern for Malnutrition and has been determined to have a diagnosis/diagnoses of Moderate protein-calorie malnutrition.    This patient is being managed with:   Parenteral Nutrition - Adult-  Entered: Oct  1 2021  5:00PM    Diet Mechanical Soft-  Entered: Oct  1 2021 11:47AM    
This patient has been assessed with a concern for Malnutrition and has been determined to have a diagnosis/diagnoses of Moderate protein-calorie malnutrition.    This patient is being managed with:   Parenteral Nutrition - Adult-  Entered: Oct  1 2021  5:00PM    Diet Mechanical Soft-  Entered: Oct  1 2021 11:47AM    fat emulsion (Fish Oil and Plant Based) 20% Infusion-[Known as SMOFLIPID 20% Infusion]  50 Gram(s) in IV Solution 250 milliLiter(s) infuse at 20.8 mL/Hr  Dose Rate: 20.8 mL/Hr Infuse Over: 12 Hours  Administration Instructions: Use 1.2 micron in-line filter  Entered: Oct  1 2021  9:23AM    
This patient has been assessed with a concern for Malnutrition and has been determined to have a diagnosis/diagnoses of Moderate protein-calorie malnutrition.    This patient is being managed with:   Parenteral Nutrition - Adult-  Entered: Oct  2 2021 11:29AM    fat emulsion (Fish Oil and Plant Based) 20% Infusion-[Known as SMOFLIPID 20% Infusion]  25 Gram(s) in IV Solution 125 milliLiter(s) infuse at 10.4 mL/Hr  Dose Rate: 10.4 mL/Hr Infuse Over: 12 Hours  Administration Instructions: Use 1.2 micron in-line filter  Entered: Oct  2 2021 11:29AM    Diet Mechanical Soft-  Entered: Oct  1 2021 11:47AM    
This patient has been assessed with a concern for Malnutrition and has been determined to have a diagnosis/diagnoses of Moderate protein-calorie malnutrition.    This patient is being managed with:   Diet NPO-  Entered: Sep 26 2021  5:26PM    
This patient has been assessed with a concern for Malnutrition and has been determined to have a diagnosis/diagnoses of Moderate protein-calorie malnutrition.    This patient is being managed with:   Diet Regular-  Dysphagia 2 Mechanical Soft Thin Liquids ([id number])  Entered: Sep 25 2021 10:59AM    
This patient has been assessed with a concern for Malnutrition and has been determined to have a diagnosis/diagnoses of Moderate protein-calorie malnutrition.    This patient is being managed with:   Parenteral Nutrition - Adult-  Entered: Oct  2 2021 11:29AM    fat emulsion (Fish Oil and Plant Based) 20% Infusion-[Known as SMOFLIPID 20% Infusion]  25 Gram(s) in IV Solution 125 milliLiter(s) infuse at 10.4 mL/Hr  Dose Rate: 10.4 mL/Hr Infuse Over: 12 Hours  Administration Instructions: Use 1.2 micron in-line filter  Entered: Oct  2 2021 11:29AM    Diet Mechanical Soft-  Entered: Oct  1 2021 11:47AM    
This patient has been assessed with a concern for Malnutrition and has been determined to have a diagnosis/diagnoses of Moderate protein-calorie malnutrition.    This patient is being managed with:   fat emulsion (Fish Oil and Plant Based) 20% Infusion-[Known as SMOFLIPID 20% Infusion]  25 Gram(s) in IV Solution 125 milliLiter(s) infuse at 10.4 mL/Hr  Dose Rate: 10.4 mL/Hr Infuse Over: 12 Hours  Administration Instructions: Use 1.2 micron in-line filter  Entered: Oct  2 2021 11:29AM    Diet Mechanical Soft-  Entered: Oct  1 2021 11:47AM      This patient has been assessed with a concern for Malnutrition and has been determined to have a diagnosis/diagnoses of Moderate protein-calorie malnutrition.    This patient is being managed with:   fat emulsion (Fish Oil and Plant Based) 20% Infusion-[Known as SMOFLIPID 20% Infusion]  25 Gram(s) in IV Solution 125 milliLiter(s) infuse at 10.4 mL/Hr  Dose Rate: 10.4 mL/Hr Infuse Over: 12 Hours  Administration Instructions: Use 1.2 micron in-line filter  Entered: Oct  2 2021 11:29AM    Diet Mechanical Soft-  Entered: Oct  1 2021 11:47AM

## 2021-10-04 NOTE — PROGRESS NOTE ADULT - ASSESSMENT
CARDIOLOGY ATTENDING    Patient seen and examined. Agree with above. No further inpatient cardiac workup needed.      Alan Jenkins M.D., [facility]  Cardiac Electrophysiology  [facility]  [address] [place] [address], [address] [place] [address]  [email or web address]    office [phone]  pager [phone]

## 2021-10-04 NOTE — DISCHARGE NOTE PROVIDER - HOSPITAL COURSE
77 y/o female with pmhx of rheumatoid arthritis, high cholesterol, appendectomy, and left nephrectomy presenting with abdominal pain of several months sent in by PCP (Dr. Parnell; [phone]) for MRI findings significant for dilated pancreatic/bile ducts and elevated bili/liver enzymes requesting ERCP. Abdominal pain localized to RUQ with intermittent radiation to back. States that pain is mild, not requiring pain medication, no associated fevers/chills, n/v/d, cough, rashes, or changes in urination. Reports intermittent mild itching and darker urine than usual. Labs notable for elevated LFTs and bilirubin.    : Patient started on cholestyramine 4g BID for pruritis. 	  	  : Patient underwent EUS with GI. Placed on 80mg IVP of pantoprazole then continuous infusion for upper GI bleed. Also managed with epinephrine injection and hemostatic clip placement, with no bleeding at the end of the procedure. Findings included subtle pancreatic head hypoechoic lesion (1.7x1.8cm), biopsied for histology; dilated CBD proximal to pancreatic head, dilated PD. No stent was placed at this time due to free flowing bile from the ampulla.     : CT pancreas protocol to characterize pancreas and vessels: suggestive of pancreatic head adenocarcinoma. No local invasion of the nearby portal vein, SMV, hepatic artery, OR GDA. Mild mass effect on the IVC and the right renal vein by distended CBD and pancreatic head mass .     : Surgical oncology consulted for management and possible operative intervention.     : CT chest for staginmm nonspecific nodule within periphery of basilar LLL, possibly benign intrapulmonary lymph node; recommend follow up chest CT in 3 months. No evidence of metastatic disease.     : Patient underwent diagnostic laparoscopy with Whipple procedure; negative for gross metastasis. Cholecystectomy, hepaticojejunostomy, pancreaticojejunostomy, and retrogastric and antecolic gastrojejunostomy performed with VIOLA drains x2, right posterior and left anterior to HJ anastomosis.   Patient transferred to SICU postoperatively for hemodynamic monitoring.      : Patient tolerating procedure well with VIOLA drains with serosanguinous output, NGT with appropriate gastric content. Pain well controlled.     : Patient advanced along the Whipple pathway; Maxwell removed and passed TOV. NGT clamp trial, successful and discontinued. Heme/onc consulted, with recommendations to follow up outpatient after discharge.     : Patient advanced to clear liquid diet, tolerated well.     : Patient started to pass gas, with no bowel movements yet. Patient has been ambulating.     : Prevena vac removed on POD5. Patient began to have episodes of nausea starting 6PM. Given zofran, reglan, without resolution of nausea, which resulted in 200cc green emesis. Abdomen soft and nondistended, AXR showing nonobstructive bowel gas pattern. Lower volume Micky drain removed.     : Patient off PCA and switched to PO pain meds.    : Patient continues to have emesis and endorsing nausea, consistent with mild gastroparesis. Given 1L fluid bolus.      : Patient given enema and miralax for constipation. Started on mechanical soft diet.     : Digital rectal exam showing no evidence of stool impaction. Patient reported passing has and having 1 BM last night. However, AXR showing severe distension of the stomach. Mild distension of bowel loops unchanged. NGT was placed with immediate return of 2L fluid.     : Patient with another 800cc fluid overnight from NGT, still reporting passing gas, no pain in abdomen. Started on 1/2 to 1 repletions for NGT as well as 1L bolus. Promotility agents and pain medications switched to IV. CTAP abdomen pelvis IV with PO contrast down NGT showing dilated fluid-filled pancreaticobiliary limb with mildly thickened wall. Jejunum distal to GJ anastomosis is collapsed. Appears to be mild edema of the walla t the level of the GJ anastomosis.     : Patient started on TPN for nutrition.     : NGT output decreasing, underwent clamp trial and had NGT removed. Patient was reporting dysuria and urgency when urinating, 2x UA negative. However bladder scan showed large volume residual urine. Patient was catheterized and then passed TOV in the evening. Patient's diet advanced from sips to clears as tolerated.      : Patient tolerated clear liquids well, advanced to mechanical soft diet.     10/1: AXR with nonobstructive bowel gas pattern but with enteritis.     10/2: TPN was halved. Patient started on melatonin for insomnia.      10/3: TPN off. Melatonin dosage increased. Patient had episode of tachycardia, repeat AXR similar to 10/1 reading.     10/4: On day of discharge, patient was passing gas, tolerating diet, with pain well controlled and without nausea or vomiting. Patient is hemodynamically stable and ready for discharge.

## 2021-10-04 NOTE — PROGRESS NOTE ADULT - SUBJECTIVE AND OBJECTIVE BOX
C A R D I O L O G Y  **********************************     DATE OF SERVICE: 10-04-21         Denies chest pain or SOB. Review of systems otherwise (-)      MEDICATIONS  (STANDING):  chlorhexidine 2% Cloths 1 Application(s) Topical <User Schedule>  dextrose 40% Gel 15 Gram(s) Oral once  dextrose 5%. 1000 milliLiter(s) (50 mL/Hr) IV Continuous <Continuous>  dextrose 5%. 1000 milliLiter(s) (100 mL/Hr) IV Continuous <Continuous>  dextrose 50% Injectable 25 Gram(s) IV Push once  dextrose 50% Injectable 12.5 Gram(s) IV Push once  dextrose 50% Injectable 25 Gram(s) IV Push once  enoxaparin Injectable 40 milliGRAM(s) SubCutaneous daily  erythromycin     enteric coated 250 milliGRAM(s) Oral every 6 hours  glucagon  Injectable 1 milliGRAM(s) IntraMuscular once  influenza   Vaccine 0.5 milliLiter(s) IntraMuscular once  insulin lispro (ADMELOG) corrective regimen sliding scale   SubCutaneous three times a day before meals  insulin lispro (ADMELOG) corrective regimen sliding scale   SubCutaneous at bedtime  melatonin 10 milliGRAM(s) Oral at bedtime  metoclopramide 10 milliGRAM(s) Oral three times a day  pantoprazole    Tablet 40 milliGRAM(s) Oral two times a day    MEDICATIONS  (PRN):  acetaminophen   Tablet .. 650 milliGRAM(s) Oral every 6 hours PRN Mild Pain (1 - 3)  artificial tears (preservative free) Ophthalmic Solution 1 Drop(s) Both EYES two times a day PRN Dry Eyes  ondansetron    Tablet 4 milliGRAM(s) Oral every 6 hours PRN Nausea  oxyCODONE    IR 2.5 milliGRAM(s) Oral every 4 hours PRN Moderate Pain (4 - 6)  sodium chloride 0.9% lock flush 10 milliLiter(s) IV Push every 1 hour PRN Pre/post blood products, medications, blood draw, and to maintain line patency      LABS:                          7.9    6.09  )-----------( 212      ( 04 Oct 2021 06:40 )             24.1     Hemoglobin: 7.9 g/dL (10-04 @ 06:40)  Hemoglobin: 8.2 g/dL (10-03 @ 06:11)  Hemoglobin: 9.2 g/dL (10-02 @ 07:39)  Hemoglobin: 8.8 g/dL (10-01 @ 07:27)  Hemoglobin: 9.1 g/dL (09-30 @ 07:17)    10-04    137  |  103  |  16  ----------------------------<  102<H>  4.0   |  24  |  0.65    Ca    8.5      04 Oct 2021 06:37  Phos  3.4     10-04  Mg     2.3     10-04    TPro  5.0<L>  /  Alb  2.6<L>  /  TBili  0.4  /  DBili  x   /  AST  17  /  ALT  29  /  AlkPhos  99  10-04    Creatinine Trend: 0.65<--, 0.63<--, 0.52<--, 0.56<--, 0.53<--, 0.50<--             10-03-21 @ 07:01  -  10-04-21 @ 07:00  --------------------------------------------------------  IN: 1510 mL / OUT: 1075 mL / NET: 435 mL    10-04-21 @ 07:01  -  10-04-21 @ 12:18  --------------------------------------------------------  IN: 250 mL / OUT: 0 mL / NET: 250 mL        PHYSICAL EXAM  Vital Signs Last 24 Hrs  T(C): 36.8 (04 Oct 2021 10:40), Max: 36.8 (03 Oct 2021 21:32)  T(F): 98.2 (04 Oct 2021 10:40), Max: 98.2 (03 Oct 2021 21:32)  HR: 105 (04 Oct 2021 10:40) (90 - 109)  BP: 115/72 (04 Oct 2021 10:40) (100/62 - 115/72)  BP(mean): --  RR: 18 (04 Oct 2021 10:40) (18 - 18)  SpO2: 93% (04 Oct 2021 10:40) (93% - 99%)        Gen: Appears well in NAD  HEENT:  (-)icterus (-)pallor  CV: N S1 S2 1/6 PATRICIA (+)2 Pulses B/l  Resp:  Clear to auscultation B/L, normal effort  GI: (+) BS Soft, NT, ND  Lymph:  (-)Edema, (-)obvious lymphadenopathy  Skin: Warm to touch, Normal turgor  Psych: Appropriate mood and affect    TELEMETRY: None	      ASSESSMENT/PLAN: 76y Female with Hx of RA (on MTX), HLD, appendectomy, and L renal cell CA (s/p L nephrectomy 2019) who presented with transaminitis and an abnormal MRCP showing dilated PD and CBD, found to have a 1.7x1.8cm hypoechoic pancreatic head lesion on EUS on 9/9. She was taken to the OR for a Whipple on 9/17.    - No evidence of clinical HF or anginal symptoms  - TTE noted with normal LV function  - Surgery follow up  - No further inpatient cardiac w/u planned  - D/C planning per primary team    Kyrie Torres PA-C  Pager: [phone]

## 2021-10-04 NOTE — PROGRESS NOTE ADULT - ASSESSMENT
76y Female with Hx of RA (on MTX), HLD, appendectomy, and L renal cell CA (s/p L nephrectomy 2019) who presented with transaminitis and an abnormal MRCP showing dilated PD and CBD, found to have a 1.7x1.8cm hypoechoic pancreatic head lesion on EUS on 9/9. She was taken to the OR on 9/17 for a Whipple on 9/17 and transferred to the SICU extubated post-operatively for hemodynamic monitoring. Patient had NGT placed (9/26) due to severely distended stomach on AXR, now recovering on the floor with likely delayed gastric emptying.     Plan:  - TPN halved   - Diet: mechanical soft  - Cont. erythromycin, reglan for promotility; switched to PO   - DVT ppx  - D/c planning     Red Team Surgery  [id number]  76y Female with Hx of RA (on MTX), HLD, appendectomy, and L renal cell CA (s/p L nephrectomy 2019) who presented with transaminitis and an abnormal MRCP showing dilated PD and CBD, found to have a 1.7x1.8cm hypoechoic pancreatic head lesion on EUS on 9/9. She was taken to the OR on 9/17 for a Whipple on 9/17 and transferred to the SICU extubated post-operatively for hemodynamic monitoring. Patient had NGT placed (9/26) due to severely distended stomach on AXR, now recovering on the floor with likely delayed gastric emptying.     Plan:  - TPN off  - Diet: mechanical soft  - Cont. erythromycin, reglan for promotility; switched to PO   - DVT ppx  - D/c planning: will go home with bowel regimen, colace and senna     Red Team Surgery  [id number]

## 2021-10-04 NOTE — DISCHARGE NOTE PROVIDER - CARE PROVIDER_API CALL
Joseph Rainey)  Surgery  [address] [place] [address]  Phone: [phone]  Fax: [phone]  Follow Up Time:

## 2021-10-04 NOTE — DISCHARGE NOTE PROVIDER - NSDCCPTREATMENT_GEN_ALL_CORE_FT
PRINCIPAL PROCEDURE  Procedure: Diagnostic laparoscopy with Whipple procedure  Findings and Treatment:

## 2021-10-04 NOTE — DISCHARGE NOTE NURSING/CASE MANAGEMENT/SOCIAL WORK - NSDCVIVACCINE_GEN_ALL_CORE_FT
Tdap; 11-Apr-2015 07:16; Dasia Barajas (RN); [employer]; [id number]; IntraMuscular; Deltoid Left.; 0.5 milliLiter(s); VIS (VIS Published: 09-May-2013, VIS Presented: 11-Apr-2015);

## 2021-10-04 NOTE — PROGRESS NOTE ADULT - ASSESSMENT
77 y/o female with pmhx of rheumatoid arthritis, high cholesterol, appendectomy, and left nephrectomy s/p Whipple with gastroparesis on 9/17. TPN started on 9/27 for nutritional support for Protein Calori Malnutrition for prolonged NPO/NGT. Pt has since been advanced to soft mechanical diet.    TPN Goals:  90g AA, 140g dextrose, 50g lipids    - Pt tolerating Soft mechanical diet. DC'd TPN yesterday  - Nutritional Assessment. Pt now on mechanical soft diet and tolerating, pt meeting her nutritional needs enterally--> TPN no longer indicated.  - PICC in place LUE. Maintenance as per protocol. Please remove PICC upon discharge.  - Hyperglycemic Control- HgA1C 5.7. Fingersticks noted to be 129-143 over 24hrs.   -Electrolyte Imbalance Risk.  Please review CMP, Mg, Phos, iCa. Electrolyte repletions as per Surgery Team. Prior h/o Hypokalemia, Hypophosphatemia and Low bicarb ---> now resolved s/p TPN adminstration.  - Triglyceride was 263, Can stop checking triglycerides.  - Care as per surgery,.    TPN pager [phone], spectra [phone]  M-F 6A-4P, Weekends and holidays 8A-12P  discussed with Dr. Eckert and Anjelica Tripathi

## 2021-10-04 NOTE — PROGRESS NOTE ADULT - SUBJECTIVE AND OBJECTIVE BOX
[facility] NUTRITION SUPPORT--  Attending/ PA FOLLOW UP NOTE      24 hour events/subjective:      TPN originally consulted for nutrition support and started TPN on 9/27.  Pt is tolerating without  issues, and denies palpitations, chest pain, shortness of breath. Pt further denies fevers, chills nor night sweats.     Pt tolerated small amounts of Soft Mechanical diet yesterday, reports eating minimal amounts due to lack of appetite. Denies nausea nor vomiting nor abdominal pain. Reports BM and flatus.  Pt reports slept a little better last night after the melatonin dose was increased. TPN dc'd yesterday, plan for discharge home later today.        PAST HISTORY  --------------------------------------------------------------------------------  No significant changes to PMH, PSH, FHx, SHx, unless otherwise noted    ALLERGIES & MEDICATIONS  --------------------------------------------------------------------------------  Allergies    No Known Allergies    Intolerances      Standing Inpatient Medications  chlorhexidine 2% Cloths 1 Application(s) Topical <User Schedule>  dextrose 40% Gel 15 Gram(s) Oral once  dextrose 5%. 1000 milliLiter(s) IV Continuous <Continuous>  dextrose 5%. 1000 milliLiter(s) IV Continuous <Continuous>  dextrose 50% Injectable 25 Gram(s) IV Push once  dextrose 50% Injectable 12.5 Gram(s) IV Push once  dextrose 50% Injectable 25 Gram(s) IV Push once  enoxaparin Injectable 40 milliGRAM(s) SubCutaneous daily  erythromycin     enteric coated 250 milliGRAM(s) Oral every 6 hours  glucagon  Injectable 1 milliGRAM(s) IntraMuscular once  influenza   Vaccine 0.5 milliLiter(s) IntraMuscular once  insulin lispro (ADMELOG) corrective regimen sliding scale   SubCutaneous three times a day before meals  insulin lispro (ADMELOG) corrective regimen sliding scale   SubCutaneous at bedtime  melatonin 10 milliGRAM(s) Oral at bedtime  metoclopramide 10 milliGRAM(s) Oral three times a day  pantoprazole    Tablet 40 milliGRAM(s) Oral two times a day    PRN Inpatient Medications  acetaminophen   Tablet .. 650 milliGRAM(s) Oral every 6 hours PRN  artificial tears (preservative free) Ophthalmic Solution 1 Drop(s) Both EYES two times a day PRN  ondansetron    Tablet 4 milliGRAM(s) Oral every 6 hours PRN  oxyCODONE    IR 2.5 milliGRAM(s) Oral every 4 hours PRN  sodium chloride 0.9% lock flush 10 milliLiter(s) IV Push every 1 hour PRN      REVIEW OF SYSTEMS  --------------------------------------------------------------------------------  Gen: as per HPI  Skin: No rashes  Head/Eyes/Ears/Mouth: No headache;No sore throat  Respiratory: No dyspnea, cough,   CV: No chest pain, PND, orthopnea  GI: as per HPI  : No increased frequency, dysuria, hematuria, nocturia  MSK: No joint pain/swelling; no back pain; no edema  Neuro: No dizziness/lightheadedness, weakness, seizures, numbness, tingling  Psych: No significant nervousness, anxiety, stress, depression    All other systems were reviewed and are negative, except as noted.      LABS/STUDIES  --------------------------------------------------------------------------------              7.9    6.09  >-----------<  212      [10-04-21 @ 06:40]              24.1     137  |  103  |  16  ----------------------------<  102      [10-04-21 @ 06:37]  4.0   |  24  |  0.65        Ca     8.5     [10-04-21 @ 06:37]      iCa    1.24     [10-03 @ 06:14]      Mg     2.3     [10-04-21 @ 06:37]      Phos  3.4     [10-04-21 @ 06:37]    TPro  5.0  /  Alb  2.6  /  TBili  0.4  /  DBili  x   /  AST  17  /  ALT  29  /  AlkPhos  99  [10-04-21 @ 06:37]            Glucose, Serum: 102 mg/dL (10-04-21 @ 06:37)    Prealbumin, Serum: 8 mg/dL (09-28-21 @ 11:24)          10-03-21 @ 07:01  -  10-04-21 @ 07:00  --------------------------------------------------------  IN: 1510 mL / OUT: 1075 mL / NET: 435 mL    10-04-21 @ 07:01  -  10-04-21 @ 11:54  --------------------------------------------------------  IN: 250 mL / OUT: 0 mL / NET: 250 mL        VITALS/PHYSICAL EXAM  --------------------------------------------------------------------------------  T(C): 36.8 (10-04-21 @ 10:40), Max: 36.8 (10-03-21 @ 21:32)  HR: 105 (10-04-21 @ 10:40) (90 - 109)  BP: 115/72 (10-04-21 @ 10:40) (100/62 - 115/72)  RR: 18 (10-04-21 @ 10:40) (18 - 18)  SpO2: 93% (10-04-21 @ 10:40) (93% - 99%)  Wt(kg): --    Physical Exam:    	Gen: NAD, well-appearing  	HEENT: PERRL,              Neck: Trachea midline, no noted JVD              Chest: non labored breathing, equal chest expansion b/l  	Abd: nondistended, NT soft,  abdominal SOIs are CDI  	UE: Warm, FROM, intact strength; no edeme LUE PICC dressing CDI.  	LE: Warm, FROM, intact strength; no edema  	Neuro: AOx3  	Psych: Normal affect and mood  	Skin: Warm, without rashes  .  .  .

## 2021-10-04 NOTE — DISCHARGE NOTE PROVIDER - NSDCCPCAREPLAN_GEN_ALL_CORE_FT
PRINCIPAL DISCHARGE DIAGNOSIS  Diagnosis: Pancreatic mass  Assessment and Plan of Treatment:       SECONDARY DISCHARGE DIAGNOSES  Diagnosis: Transaminitis  Assessment and Plan of Treatment:

## 2021-10-04 NOTE — DISCHARGE NOTE PROVIDER - NSDCMRMEDTOKEN_GEN_ALL_CORE_FT
atorvastatin 10 mg oral tablet: 1 tab(s) orally once a day    NOTE: CURRENTLY ON HOLD. PATIENT LAST DOSE JULY 2021  folic acid 1 mg oral tablet: 1 tab(s) orally once a day    NOTE: CURRENTLY ON HOLD. PATIENT LAST DOSE JULY 2021  methotrexate 2.5 mg oral tablet: 6 tab(s) orally once a week wednesdays    NOTE: CURRENTLY ON HOLD. PATIENT LAST DOSE JULY 2021  Systane ophthalmic solution: 1 drop(s) to each affected eye , As Needed   acetaminophen 325 mg oral tablet: 2 tab(s) orally every 6 hours, As needed, Mild Pain (1 - 3)  methotrexate 2.5 mg oral tablet: 6 tab(s) orally once a week wednesdays    NOTE: CURRENTLY ON HOLD. PATIENT LAST DOSE JULY 2021  oxyCODONE 5 mg oral tablet: 0.5 tab(s) orally every 6 hours MDD:2  Systane ophthalmic solution: 1 drop(s) to each affected eye , As Needed   acetaminophen 325 mg oral tablet: 2 tab(s) orally every 6 hours, As needed, Mild Pain (1 - 3)  Colace 100 mg oral capsule: 1 cap(s) orally once a day   erythromycin 250 mg oral delayed release tablet: 1 tab(s) orally every 6 hours  folic acid 1 mg oral tablet: 1 milligram(s) orally once a day  Lipitor 10 mg oral tablet: 10 milligram(s) orally once a day  melatonin 10 mg oral tablet: 1 tab(s) orally once a day (at bedtime)  methotrexate 2.5 mg oral tablet: 6 tab(s) orally once a week wednesdays    NOTE: CURRENTLY ON HOLD. PATIENT LAST DOSE JULY 2021  metoclopramide 10 mg oral tablet: 1 tab(s) orally 3 times a day  oxyCODONE 5 mg oral tablet: 0.5 tab(s) orally every 6 hours MDD:2  pantoprazole 40 mg oral delayed release tablet: 1 tab(s) orally 2 times a day  senna 17.2 mg oral tablet: 1 tab(s) orally once a day   Systane ophthalmic solution: 1 drop(s) to each affected eye , As Needed

## 2021-10-04 NOTE — PROGRESS NOTE ADULT - PROVIDER SPECIALTY LIST ADULT
Anesthesia
Cardiology
Heme/Onc
Nutrition Support
Surgery
Anesthesia
Cardiology
Gastroenterology
Gastroenterology
Internal Medicine
Nutrition Support
Nutrition Support
SICU
Surgery
Gastroenterology
Nutrition Support
Nutrition Support
SICU
Surgery
Cardiology
Internal Medicine
Surgery
Internal Medicine
Heme/Onc

## 2021-10-04 NOTE — PROGRESS NOTE ADULT - SUBJECTIVE AND OBJECTIVE BOX
TEAM Surgery Progress Note  Patient is a 76y old  Female who presents with a chief complaint of Abdominal Pain (16 Sep 2021 11:37)      INTERVAL EVENTS: No acute events overnight.  SUBJECTIVE:     OBJECTIVE:    Vital Signs Last 24 Hrs  T(C): 36.6 (04 Oct 2021 04:55), Max: 36.8 (03 Oct 2021 21:32)  T(F): 97.9 (04 Oct 2021 04:55), Max: 98.2 (03 Oct 2021 21:32)  HR: 90 (04 Oct 2021 04:55) (90 - 109)  BP: 104/70 (04 Oct 2021 04:55) (100/62 - 112/64)  BP(mean): --  RR: 18 (04 Oct 2021 04:55) (18 - 18)  SpO2: 97% (04 Oct 2021 04:55) (97% - 99%)I&O's Detail    02 Oct 2021 07:01  -  03 Oct 2021 07:00  --------------------------------------------------------  IN:    Fat Emulsion (Fish Oil &amp; Plant Based) 20% Infusion: 20.8 mL    IV PiggyBack: 250 mL    Oral Fluid: 960 mL    TPN (Total Parenteral Nutrition): 650 mL  Total IN: 1880.8 mL    OUT:    Voided (mL): 500 mL  Total OUT: 500 mL    Total NET: 1380.8 mL      03 Oct 2021 07:01  -  04 Oct 2021 05:07  --------------------------------------------------------  IN:    Oral Fluid: 930 mL    TPN (Total Parenteral Nutrition): 580 mL  Total IN: 1510 mL    OUT:    Voided (mL): 1075 mL  Total OUT: 1075 mL    Total NET: 435 mL      MEDICATIONS  (STANDING):  chlorhexidine 2% Cloths 1 Application(s) Topical <User Schedule>  dextrose 40% Gel 15 Gram(s) Oral once  dextrose 5%. 1000 milliLiter(s) (100 mL/Hr) IV Continuous <Continuous>  dextrose 5%. 1000 milliLiter(s) (50 mL/Hr) IV Continuous <Continuous>  dextrose 50% Injectable 25 Gram(s) IV Push once  dextrose 50% Injectable 12.5 Gram(s) IV Push once  dextrose 50% Injectable 25 Gram(s) IV Push once  enoxaparin Injectable 40 milliGRAM(s) SubCutaneous daily  erythromycin     enteric coated 250 milliGRAM(s) Oral every 6 hours  fat emulsion (Fish Oil and Plant Based) 20% Infusion 10.4 mL/Hr (10.4 mL/Hr) IV Continuous <Continuous>  glucagon  Injectable 1 milliGRAM(s) IntraMuscular once  influenza   Vaccine 0.5 milliLiter(s) IntraMuscular once  insulin lispro (ADMELOG) corrective regimen sliding scale   SubCutaneous three times a day before meals  insulin lispro (ADMELOG) corrective regimen sliding scale   SubCutaneous at bedtime  melatonin 10 milliGRAM(s) Oral at bedtime  metoclopramide 10 milliGRAM(s) Oral three times a day  pantoprazole    Tablet 40 milliGRAM(s) Oral two times a day    MEDICATIONS  (PRN):  acetaminophen   Tablet .. 650 milliGRAM(s) Oral every 6 hours PRN Mild Pain (1 - 3)  artificial tears (preservative free) Ophthalmic Solution 1 Drop(s) Both EYES two times a day PRN Dry Eyes  ondansetron    Tablet 4 milliGRAM(s) Oral every 6 hours PRN Nausea  oxyCODONE    IR 2.5 milliGRAM(s) Oral every 4 hours PRN Moderate Pain (4 - 6)  sodium chloride 0.9% lock flush 10 milliLiter(s) IV Push every 1 hour PRN Pre/post blood products, medications, blood draw, and to maintain line patency      PHYSICAL EXAM:  GEN: resting in bed comfortably in NAD  RESP: no increased WOB  ABD: soft, non-distended, nontender; without rebound tenderness or guarding. Incisions are c/d/i  EXTR: warm, well-perfused, no edema  NEURO: awake, alert    LABS:                        8.2    7.61  )-----------( 230      ( 03 Oct 2021 06:11 )             25.7     10-03    133<L>  |  101  |  22  ----------------------------<  121<H>  4.2   |  23  |  0.63    Ca    8.5      03 Oct 2021 06:11  Phos  3.9     10-03  Mg     2.2     10-03    TPro  5.3<L>  /  Alb  2.7<L>  /  TBili  0.4  /  DBili  x   /  AST  33  /  ALT  45  /  AlkPhos  111  10-03      LIVER FUNCTIONS - ( 03 Oct 2021 06:11 )  Alb: 2.7 g/dL / Pro: 5.3 g/dL / ALK PHOS: 111 U/L / ALT: 45 U/L / AST: 33 U/L / GGT: x                 IMAGING:     TEAM Surgery Progress Note  Patient is a 76y old  Female who presents with a chief complaint of Abdominal Pain (16 Sep 2021 11:37)      INTERVAL EVENTS: No acute events overnight. Patient had episodes of tachycardia yesterday, was asymptomatic and AXR unremarkable.   SUBJECTIVE: Today patient reports last BM was 2 days ago, still passing gas, tolerating food well though she continues not to eat a lot.     OBJECTIVE:    Vital Signs Last 24 Hrs  T(C): 36.6 (04 Oct 2021 04:55), Max: 36.8 (03 Oct 2021 21:32)  T(F): 97.9 (04 Oct 2021 04:55), Max: 98.2 (03 Oct 2021 21:32)  HR: 90 (04 Oct 2021 04:55) (90 - 109)  BP: 104/70 (04 Oct 2021 04:55) (100/62 - 112/64)  BP(mean): --  RR: 18 (04 Oct 2021 04:55) (18 - 18)  SpO2: 97% (04 Oct 2021 04:55) (97% - 99%)I&O's Detail    02 Oct 2021 07:01  -  03 Oct 2021 07:00  --------------------------------------------------------  IN:    Fat Emulsion (Fish Oil &amp; Plant Based) 20% Infusion: 20.8 mL    IV PiggyBack: 250 mL    Oral Fluid: 960 mL    TPN (Total Parenteral Nutrition): 650 mL  Total IN: 1880.8 mL    OUT:    Voided (mL): 500 mL  Total OUT: 500 mL    Total NET: 1380.8 mL      03 Oct 2021 07:01  -  04 Oct 2021 05:07  --------------------------------------------------------  IN:    Oral Fluid: 930 mL    TPN (Total Parenteral Nutrition): 580 mL  Total IN: 1510 mL    OUT:    Voided (mL): 1075 mL  Total OUT: 1075 mL    Total NET: 435 mL      MEDICATIONS  (STANDING):  chlorhexidine 2% Cloths 1 Application(s) Topical <User Schedule>  dextrose 40% Gel 15 Gram(s) Oral once  dextrose 5%. 1000 milliLiter(s) (100 mL/Hr) IV Continuous <Continuous>  dextrose 5%. 1000 milliLiter(s) (50 mL/Hr) IV Continuous <Continuous>  dextrose 50% Injectable 25 Gram(s) IV Push once  dextrose 50% Injectable 12.5 Gram(s) IV Push once  dextrose 50% Injectable 25 Gram(s) IV Push once  enoxaparin Injectable 40 milliGRAM(s) SubCutaneous daily  erythromycin     enteric coated 250 milliGRAM(s) Oral every 6 hours  fat emulsion (Fish Oil and Plant Based) 20% Infusion 10.4 mL/Hr (10.4 mL/Hr) IV Continuous <Continuous>  glucagon  Injectable 1 milliGRAM(s) IntraMuscular once  influenza   Vaccine 0.5 milliLiter(s) IntraMuscular once  insulin lispro (ADMELOG) corrective regimen sliding scale   SubCutaneous three times a day before meals  insulin lispro (ADMELOG) corrective regimen sliding scale   SubCutaneous at bedtime  melatonin 10 milliGRAM(s) Oral at bedtime  metoclopramide 10 milliGRAM(s) Oral three times a day  pantoprazole    Tablet 40 milliGRAM(s) Oral two times a day    MEDICATIONS  (PRN):  acetaminophen   Tablet .. 650 milliGRAM(s) Oral every 6 hours PRN Mild Pain (1 - 3)  artificial tears (preservative free) Ophthalmic Solution 1 Drop(s) Both EYES two times a day PRN Dry Eyes  ondansetron    Tablet 4 milliGRAM(s) Oral every 6 hours PRN Nausea  oxyCODONE    IR 2.5 milliGRAM(s) Oral every 4 hours PRN Moderate Pain (4 - 6)  sodium chloride 0.9% lock flush 10 milliLiter(s) IV Push every 1 hour PRN Pre/post blood products, medications, blood draw, and to maintain line patency      PHYSICAL EXAM:  GEN: resting in bed comfortably in NAD  RESP: no increased WOB  ABD: soft, non-distended, nontender; without rebound tenderness or guarding. Incisions are c/d/i  EXTR: warm, well-perfused, no edema  NEURO: awake, alert    LABS:                        8.2    7.61  )-----------( 230      ( 03 Oct 2021 06:11 )             25.7     10-03    133<L>  |  101  |  22  ----------------------------<  121<H>  4.2   |  23  |  0.63    Ca    8.5      03 Oct 2021 06:11  Phos  3.9     10-03  Mg     2.2     10-03    TPro  5.3<L>  /  Alb  2.7<L>  /  TBili  0.4  /  DBili  x   /  AST  33  /  ALT  45  /  AlkPhos  111  10-03      LIVER FUNCTIONS - ( 03 Oct 2021 06:11 )  Alb: 2.7 g/dL / Pro: 5.3 g/dL / ALK PHOS: 111 U/L / ALT: 45 U/L / AST: 33 U/L / GGT: x                 IMAGING:

## 2021-10-04 NOTE — DISCHARGE NOTE PROVIDER - DETAILS OF MALNUTRITION DIAGNOSIS/DIAGNOSES
This patient has been assessed with a concern for Malnutrition and was treated during this hospitalization for the following Nutrition diagnosis/diagnoses:     -  09/13/2021: Moderate protein-calorie malnutrition

## 2021-10-18 ENCOUNTER — APPOINTMENT (OUTPATIENT)
Dept: SURGICAL ONCOLOGY | Facility: CLINIC | Age: 76
End: 2021-10-18
Payer: MEDICARE

## 2021-10-18 VITALS
HEART RATE: 95 BPM | RESPIRATION RATE: 17 BRPM | DIASTOLIC BLOOD PRESSURE: 77 MMHG | WEIGHT: 112 LBS | BODY MASS INDEX: 21.99 KG/M2 | HEIGHT: 60 IN | SYSTOLIC BLOOD PRESSURE: 127 MMHG | OXYGEN SATURATION: 99 %

## 2021-10-18 DIAGNOSIS — Z80.0 FAMILY HISTORY OF MALIGNANT NEOPLASM OF DIGESTIVE ORGANS: ICD-10-CM

## 2021-10-18 DIAGNOSIS — Z85.528 PERSONAL HISTORY OF OTHER MALIGNANT NEOPLASM OF KIDNEY: ICD-10-CM

## 2021-10-18 DIAGNOSIS — Z86.39 PERSONAL HISTORY OF OTHER ENDOCRINE, NUTRITIONAL AND METABOLIC DISEASE: ICD-10-CM

## 2021-10-18 DIAGNOSIS — Z87.39 PERSONAL HISTORY OF OTHER DISEASES OF THE MUSCULOSKELETAL SYSTEM AND CONNECTIVE TISSUE: ICD-10-CM

## 2021-10-18 PROCEDURE — 99024 POSTOP FOLLOW-UP VISIT: CPT

## 2021-10-18 RX ORDER — ERYTHROMYCIN 250 MG/1
250 TABLET, FILM COATED ORAL 4 TIMES DAILY
Qty: 120 | Refills: 0 | Status: ACTIVE | COMMUNITY
Start: 2021-10-18 | End: 1900-01-01

## 2021-10-18 RX ORDER — METHYLPREDNISOLONE 4 MG/1
4 TABLET ORAL
Qty: 1 | Refills: 0 | Status: ACTIVE | COMMUNITY
Start: 2021-10-18 | End: 1900-01-01

## 2021-10-18 RX ORDER — METOCLOPRAMIDE 10 MG/1
10 TABLET ORAL 4 TIMES DAILY
Qty: 120 | Refills: 1 | Status: ACTIVE | COMMUNITY
Start: 2021-10-18 | End: 1900-01-01

## 2021-10-18 NOTE — CONSULT LETTER
[Dear  ___] : Dear  [unfilled], [Consult Letter:] : I had the pleasure of evaluating your patient, [unfilled]. [Please see my note below.] : Please see my note below. [Consult Closing:] : Thank you very much for allowing me to participate in the care of this patient.  If you have any questions, please do not hesitate to contact me. [Sincerely,] : Sincerely, [FreeTextEntry2] : Joesph Prado MD  [FreeTextEntry3] : Joseph Rainey MD\par Surgical Oncology\par [facility]\par Office: [phone]\par Cell: [phone]\par  [DrEverton  ___] : Dr. TRIPATHI

## 2021-10-18 NOTE — HISTORY OF PRESENT ILLNESS
[de-identified] : Lucas Milton is a pleasant 76 year old female who presents today for an initial post operative visit. She was seen as an in-house consult at [facility] on 9/9/21.She is s/p diagnostic laparoscopy with Whipple procedure on 9/17/21. Final pathology revealed a diagnosis of : Gallbladder, cholecystectomy: gallbladder with mild chronic inflammation; Portion of bile duct, excision:portion of bile duct, negative for dysplasia or malignancy; Pancreas and neck margin, Whipple resection: invasive pancreatic ductal adenocarcinoma, moderately differentiated, vascular and perineural invasion are identified, (13) out of (41) one lymph nodes involved by adenocarcinoma, resection margins negative for dysplasia or malignancy; Stomach and small bowel, gastrojejunostomy: portion of stomach and small bowel with no significant histopathologic findings.\par \par Today, she denies pain, fever, or chills. She has a full body rash which she states started a few days after returning home from the hospital. She states her  has been checking her vitals and  her HR has been increased. Her primary care doctor prescribed her Creon. She has been experiencing symptoms of reflux. She has poor appetite, she has lost 4 lbs over the last two weeks. She denies nausea or vomiting. She denies diarrhea, she states she is often constipated. \par \par She was sent in to the ED by her doctor  on 9/7/21 due to transaminitis and an abnormal MRCP showing a dilated PD and CBD. Patient reported poor appetite and unintentional 6lb weight loss since July. She denied abdominal pain, fevers or chills. Reports that she noticed her urine was becoming darker and her skin was jaundiced. She had outpatient labs done by her PCP which showed a transaminitis and subsequently had an abdominal ultrasound which was suspicious for lesions in her pancreas. Patient underwent an outpatient MRCP which noted a dilated PD and CBD. She was sent in for further evaluation. Patient underwent an EUS on 9/9/21 which showed a hypoechoic lesion in the pancreatic head, not involving the portal vein, measuring 1.7x1.8cm which was biopsied. No stents were placed given that bile was free-flowing. Patient had a CT pancreatic protocol 9/11/21 that again showed a heterogeneously enhancing pancreatic head mass measuring 2.2x2.6x2.2cm with a dilated PD to\par 1.2cm and CBD of 1.9cm. Since admission, tbili has downtrended from 3.9->1, Alk phos 922->482, AST//376->84/127. Ca 19-9 50.\par \par She had a CT Chest 9/13/21 with the following findings, 4 mm nonspecific nodule within the periphery of basilar left lower lobe possibly a benign intrapulmonary lymph node. Recommend follow-up chest CT in 3 months. Post operatively, she underwent a repeat CT abdomen and pelvis 9/27/21 due to severe abdominal distention. This study noted new small left pleural effusion. Status post interval Whipple procedure. Dilated fluid-filled pancreatico-biliary limb with mildly thickened wall. The jejunum distal to the GJ anastomosis is collapsed. There appears to be mild edema of the wall at the level of the GJ anastomosis. New small volume of ascites. A 4.1 x 1.8 cm fluid and gas collection is noted in the left lateral abdominal wall. A fat-containing left lateral abdominal wall hernia was present in this location on prior CT dated 9/11/2021.\par \par Her past medical history  includes rheumatoid arthritis (on MTX), HLD, appendectomy and left renal cell cancer s/p left nephrectomy (2019). She states her brother also had pancreatic cancer and states he had a lesion in the middle of his pancreas which was resected 5 years ago without need for chemotherapy and he is currently doing well. Patient states her last colonoscopy was 2 years ago and reportedly normal. She also had upper endoscopies every 2 years for "gastric emptying" problems.\par \par  [Recurrence of disease] : no recurrence of disease [No] : no [TextBox_4] : first post op visit

## 2021-10-18 NOTE — REASON FOR VISIT
[Pancreatic Cancer] : pancreatic cancer [Post-Op] : a post-op for [FreeTextEntry2] : s/p diagnostic laparoscopy with Whipple procedure 9/17/21

## 2021-10-18 NOTE — PHYSICAL EXAM
[Normal] : supple, no neck mass and thyroid not enlarged [Normal Neck Lymph Nodes] : normal neck lymph nodes  [Normal Supraclavicular Lymph Nodes] : normal supraclavicular lymph nodes [Normal Groin Lymph Nodes] : normal groin lymph nodes [Normal Axillary Lymph Nodes] : normal axillary lymph nodes [Normal] : oriented to person, place and time, with appropriate affect [de-identified] : urticaria across body

## 2021-10-18 NOTE — ASSESSMENT
[FreeTextEntry1] : S/p whipple \par   Recovering appropriately\par   Discussed need for multiple small meals\par   Continue Reglan, Erythromycin, and PPI.  Trial of miralax\par   F/u w Medical Oncology (Dr. Scanlon) and Rad Onc\par   Will need genetic testing\par   Will need port

## 2021-10-26 PROCEDURE — 82962 GLUCOSE BLOOD TEST: CPT

## 2021-10-26 PROCEDURE — 85027 COMPLETE CBC AUTOMATED: CPT

## 2021-10-26 PROCEDURE — 86900 BLOOD TYPING SEROLOGIC ABO: CPT

## 2021-10-26 PROCEDURE — 84134 ASSAY OF PREALBUMIN: CPT

## 2021-10-26 PROCEDURE — 83036 HEMOGLOBIN GLYCOSYLATED A1C: CPT

## 2021-10-26 PROCEDURE — 88172 CYTP DX EVAL FNA 1ST EA SITE: CPT

## 2021-10-26 PROCEDURE — 83690 ASSAY OF LIPASE: CPT

## 2021-10-26 PROCEDURE — 82947 ASSAY GLUCOSE BLOOD QUANT: CPT

## 2021-10-26 PROCEDURE — C9399: CPT

## 2021-10-26 PROCEDURE — 86850 RBC ANTIBODY SCREEN: CPT

## 2021-10-26 PROCEDURE — C1769: CPT

## 2021-10-26 PROCEDURE — 80053 COMPREHEN METABOLIC PANEL: CPT

## 2021-10-26 PROCEDURE — 84132 ASSAY OF SERUM POTASSIUM: CPT

## 2021-10-26 PROCEDURE — 83010 ASSAY OF HAPTOGLOBIN QUANT: CPT

## 2021-10-26 PROCEDURE — 85025 COMPLETE CBC W/AUTO DIFF WBC: CPT

## 2021-10-26 PROCEDURE — 74018 RADEX ABDOMEN 1 VIEW: CPT

## 2021-10-26 PROCEDURE — 87086 URINE CULTURE/COLONY COUNT: CPT

## 2021-10-26 PROCEDURE — U0003: CPT

## 2021-10-26 PROCEDURE — 88309 TISSUE EXAM BY PATHOLOGIST: CPT

## 2021-10-26 PROCEDURE — 74177 CT ABD & PELVIS W/CONTRAST: CPT

## 2021-10-26 PROCEDURE — C1751: CPT

## 2021-10-26 PROCEDURE — 82248 BILIRUBIN DIRECT: CPT

## 2021-10-26 PROCEDURE — 82746 ASSAY OF FOLIC ACID SERUM: CPT

## 2021-10-26 PROCEDURE — 88173 CYTOPATH EVAL FNA REPORT: CPT

## 2021-10-26 PROCEDURE — 88305 TISSUE EXAM BY PATHOLOGIST: CPT

## 2021-10-26 PROCEDURE — 82378 CARCINOEMBRYONIC ANTIGEN: CPT

## 2021-10-26 PROCEDURE — 85014 HEMATOCRIT: CPT

## 2021-10-26 PROCEDURE — 85045 AUTOMATED RETICULOCYTE COUNT: CPT

## 2021-10-26 PROCEDURE — 88331 PATH CONSLTJ SURG 1 BLK 1SPC: CPT

## 2021-10-26 PROCEDURE — 82150 ASSAY OF AMYLASE: CPT

## 2021-10-26 PROCEDURE — C1889: CPT

## 2021-10-26 PROCEDURE — U0005: CPT

## 2021-10-26 PROCEDURE — 81003 URINALYSIS AUTO W/O SCOPE: CPT

## 2021-10-26 PROCEDURE — 85730 THROMBOPLASTIN TIME PARTIAL: CPT

## 2021-10-26 PROCEDURE — 80048 BASIC METABOLIC PNL TOTAL CA: CPT

## 2021-10-26 PROCEDURE — 83550 IRON BINDING TEST: CPT

## 2021-10-26 PROCEDURE — 82565 ASSAY OF CREATININE: CPT

## 2021-10-26 PROCEDURE — 81001 URINALYSIS AUTO W/SCOPE: CPT

## 2021-10-26 PROCEDURE — 85018 HEMOGLOBIN: CPT

## 2021-10-26 PROCEDURE — 82728 ASSAY OF FERRITIN: CPT

## 2021-10-26 PROCEDURE — 99285 EMERGENCY DEPT VISIT HI MDM: CPT

## 2021-10-26 PROCEDURE — 82330 ASSAY OF CALCIUM: CPT

## 2021-10-26 PROCEDURE — 71250 CT THORAX DX C-: CPT

## 2021-10-26 PROCEDURE — 93306 TTE W/DOPPLER COMPLETE: CPT

## 2021-10-26 PROCEDURE — 80061 LIPID PANEL: CPT

## 2021-10-26 PROCEDURE — 86769 SARS-COV-2 COVID-19 ANTIBODY: CPT

## 2021-10-26 PROCEDURE — 84100 ASSAY OF PHOSPHORUS: CPT

## 2021-10-26 PROCEDURE — 83605 ASSAY OF LACTIC ACID: CPT

## 2021-10-26 PROCEDURE — 82803 BLOOD GASES ANY COMBINATION: CPT

## 2021-10-26 PROCEDURE — 86923 COMPATIBILITY TEST ELECTRIC: CPT

## 2021-10-26 PROCEDURE — 82247 BILIRUBIN TOTAL: CPT

## 2021-10-26 PROCEDURE — 84478 ASSAY OF TRIGLYCERIDES: CPT

## 2021-10-26 PROCEDURE — C1773: CPT

## 2021-10-26 PROCEDURE — 97161 PT EVAL LOW COMPLEX 20 MIN: CPT

## 2021-10-26 PROCEDURE — 36600 WITHDRAWAL OF ARTERIAL BLOOD: CPT

## 2021-10-26 PROCEDURE — 85610 PROTHROMBIN TIME: CPT

## 2021-10-26 PROCEDURE — 88304 TISSUE EXAM BY PATHOLOGIST: CPT

## 2021-10-26 PROCEDURE — 83540 ASSAY OF IRON: CPT

## 2021-10-26 PROCEDURE — 74330 X-RAY BILE/PANC ENDOSCOPY: CPT

## 2021-10-26 PROCEDURE — 82435 ASSAY OF BLOOD CHLORIDE: CPT

## 2021-10-26 PROCEDURE — 86301 IMMUNOASSAY TUMOR CA 19-9: CPT

## 2021-10-26 PROCEDURE — 82787 IGG 1 2 3 OR 4 EACH: CPT

## 2021-10-26 PROCEDURE — 71045 X-RAY EXAM CHEST 1 VIEW: CPT

## 2021-10-26 PROCEDURE — 93005 ELECTROCARDIOGRAM TRACING: CPT

## 2021-10-26 PROCEDURE — 84295 ASSAY OF SERUM SODIUM: CPT

## 2021-10-26 PROCEDURE — 86901 BLOOD TYPING SEROLOGIC RH(D): CPT

## 2021-10-26 PROCEDURE — 83615 LACTATE (LD) (LDH) ENZYME: CPT

## 2021-10-26 PROCEDURE — 83735 ASSAY OF MAGNESIUM: CPT

## 2021-10-26 PROCEDURE — 36569 INSJ PICC 5 YR+ W/O IMAGING: CPT

## 2021-10-26 PROCEDURE — 82607 VITAMIN B-12: CPT

## 2021-11-04 ENCOUNTER — LABORATORY RESULT (OUTPATIENT)
Age: 76
End: 2021-11-04

## 2021-11-04 ENCOUNTER — APPOINTMENT (OUTPATIENT)
Dept: SURGICAL ONCOLOGY | Facility: CLINIC | Age: 76
End: 2021-11-04
Payer: MEDICARE

## 2021-11-04 VITALS
SYSTOLIC BLOOD PRESSURE: 120 MMHG | HEART RATE: 93 BPM | BODY MASS INDEX: 21.2 KG/M2 | RESPIRATION RATE: 16 BRPM | WEIGHT: 108 LBS | OXYGEN SATURATION: 99 % | TEMPERATURE: 97.1 F | HEIGHT: 60 IN | DIASTOLIC BLOOD PRESSURE: 77 MMHG

## 2021-11-04 PROCEDURE — 99024 POSTOP FOLLOW-UP VISIT: CPT

## 2021-11-05 ENCOUNTER — APPOINTMENT (OUTPATIENT)
Dept: DISASTER EMERGENCY | Facility: CLINIC | Age: 76
End: 2021-11-05

## 2021-11-05 ENCOUNTER — LABORATORY RESULT (OUTPATIENT)
Age: 76
End: 2021-11-05

## 2021-11-05 ENCOUNTER — APPOINTMENT (OUTPATIENT)
Dept: INTERVENTIONAL RADIOLOGY/VASCULAR | Facility: CLINIC | Age: 76
End: 2021-11-05
Payer: MEDICARE

## 2021-11-05 PROCEDURE — 99443: CPT

## 2021-11-08 ENCOUNTER — OUTPATIENT (OUTPATIENT)
Dept: OUTPATIENT SERVICES | Facility: HOSPITAL | Age: 76
LOS: 1 days | End: 2021-11-08
Payer: MEDICARE

## 2021-11-08 ENCOUNTER — RESULT REVIEW (OUTPATIENT)
Age: 76
End: 2021-11-08

## 2021-11-08 DIAGNOSIS — C80.1 MALIGNANT (PRIMARY) NEOPLASM, UNSPECIFIED: ICD-10-CM

## 2021-11-08 DIAGNOSIS — Z90.49 ACQUIRED ABSENCE OF OTHER SPECIFIED PARTS OF DIGESTIVE TRACT: Chronic | ICD-10-CM

## 2021-11-08 PROCEDURE — 76937 US GUIDE VASCULAR ACCESS: CPT | Mod: 26

## 2021-11-08 PROCEDURE — 36561 INSERT TUNNELED CV CATH: CPT

## 2021-11-08 PROCEDURE — 77001 FLUOROGUIDE FOR VEIN DEVICE: CPT | Mod: 26,GC

## 2021-11-09 NOTE — HISTORY OF PRESENT ILLNESS
[No] : no [de-identified] : Lucas Milton is a pleasant 76 year old female who returns for postop care. She was seen as an in-house consult at [facility] on 9/9/21.She is s/p diagnostic laparoscopy with Whipple procedure on 9/17/21. Final pathology revealed a diagnosis of : Gallbladder, cholecystectomy: gallbladder with mild chronic inflammation; Portion of bile duct, excision:portion of bile duct, negative for dysplasia or malignancy; Pancreas and neck margin, Whipple resection: invasive pancreatic ductal adenocarcinoma, moderately differentiated, vascular and perineural invasion are identified, (13) out of (41) one lymph nodes involved by adenocarcinoma, resection margins negative for dysplasia or malignancy; Stomach and small bowel, gastrojejunostomy: portion of stomach and small bowel with no significant histopathologic findings.\par \par She was sent in to the ED by her doctor  on 9/7/21 due to transaminitis and an abnormal MRCP showing a dilated PD and CBD. Patient reported poor appetite and unintentional 6lb weight loss since July. She denied abdominal pain, fevers or chills. Reports that she noticed her urine was becoming darker and her skin was jaundiced. She had outpatient labs done by her PCP which showed a transaminitis and subsequently had an abdominal ultrasound which was suspicious for lesions in her pancreas. Patient underwent an outpatient MRCP which noted a dilated PD and CBD. She was sent in for further evaluation. Patient underwent an EUS on 9/9/21 which showed a hypoechoic lesion in the pancreatic head, not involving the portal vein, measuring 1.7x1.8cm which was biopsied. No stents were placed given that bile was free-flowing. Patient had a CT pancreatic protocol 9/11/21 that again showed a heterogeneously enhancing pancreatic head mass measuring 2.2x2.6x2.2cm with a dilated PD to\par 1.2cm and CBD of 1.9cm. Since admission, tbili has downtrended from 3.9->1, Alk phos 922->482, AST//376->84/127. Ca 19-9 50.\par \par She had a CT Chest 9/13/21 with the following findings, 4 mm nonspecific nodule within the periphery of basilar left lower lobe possibly a benign intrapulmonary lymph node. Recommend follow-up chest CT in 3 months. Post operatively, she underwent a repeat CT abdomen and pelvis 9/27/21 due to severe abdominal distention. This study noted new small left pleural effusion. Status post interval Whipple procedure. Dilated fluid-filled pancreatico-biliary limb with mildly thickened wall. The jejunum distal to the GJ anastomosis is collapsed. There appears to be mild edema of the wall at the level of the GJ anastomosis. New small volume of ascites. A 4.1 x 1.8 cm fluid and gas collection is noted in the left lateral abdominal wall. A fat-containing left lateral abdominal wall hernia was present in this location on prior CT dated 9/11/2021.\par \par Her past medical history  includes rheumatoid arthritis (on MTX), HLD, appendectomy and left renal cell cancer s/p left nephrectomy (2019). She states her brother also had pancreatic cancer and states he had a lesion in the middle of his pancreas which was resected 5 years ago without need for chemotherapy and he is currently doing well. Patient states her last colonoscopy was 2 years ago and reportedly normal. She also had upper endoscopies every 2 years for "gastric emptying" problems.\par \par 11/4/21- Patient is scheduled for a Mediport placement with Dr. Faria on Monday 11/8/21.  She will follow up with Dr. Scanlon to discuss chemotherapy on 11/17/21.  She states that she is improving overall.  Her appetite is improved and she is eating more and drinking ensure supplements.  Her rash resolved after she stopped erythromycin.\par \par  [Recurrence of disease] : no recurrence of disease [TextBox_4] : first post op visit

## 2021-11-09 NOTE — ASSESSMENT
[FreeTextEntry1] : Lucas Polanco is making appropriate recovery.  She is slated to have a Mediport inserted in the near future.  She will also be following up with Dr. Scanlon to begin adjuvant systemic therapy.

## 2021-11-09 NOTE — CONSULT LETTER
[Dear  ___] : Dear  [unfilled], [Consult Letter:] : I had the pleasure of evaluating your patient, [unfilled]. [Please see my note below.] : Please see my note below. [Consult Closing:] : Thank you very much for allowing me to participate in the care of this patient.  If you have any questions, please do not hesitate to contact me. [Sincerely,] : Sincerely, [DrEverton  ___] : Dr. TRIPATHI [FreeTextEntry2] : Joesph Prado MD  [FreeTextEntry3] : Joseph Rainey MD\par Surgical Oncology\par [facility]\par Office: [phone]\par Cell: [phone]\par

## 2021-11-15 DIAGNOSIS — Z45.2 ENCOUNTER FOR ADJUSTMENT AND MANAGEMENT OF VASCULAR ACCESS DEVICE: ICD-10-CM

## 2021-11-15 DIAGNOSIS — C25.9 MALIGNANT NEOPLASM OF PANCREAS, UNSPECIFIED: ICD-10-CM

## 2021-11-16 ENCOUNTER — APPOINTMENT (OUTPATIENT)
Dept: SURGICAL ONCOLOGY | Facility: AMBULATORY SURGERY CENTER | Age: 76
End: 2021-11-16

## 2022-01-13 ENCOUNTER — APPOINTMENT (OUTPATIENT)
Dept: SURGICAL ONCOLOGY | Facility: CLINIC | Age: 77
End: 2022-01-13
Payer: MEDICARE

## 2022-01-13 VITALS
SYSTOLIC BLOOD PRESSURE: 124 MMHG | OXYGEN SATURATION: 98 % | DIASTOLIC BLOOD PRESSURE: 80 MMHG | HEIGHT: 64 IN | RESPIRATION RATE: 15 BRPM | WEIGHT: 104 LBS | HEART RATE: 93 BPM | TEMPERATURE: 97.2 F | BODY MASS INDEX: 17.75 KG/M2

## 2022-01-13 PROCEDURE — 99213 OFFICE O/P EST LOW 20 MIN: CPT

## 2022-01-14 NOTE — ASSESSMENT
[FreeTextEntry1] : Ms. Milton will continue on systemic therapy.  She will follow-up with us in 3 months.

## 2022-01-14 NOTE — REASON FOR VISIT
[Follow-Up Visit] : a follow-up visit for [Pancreatic Cancer] : pancreatic cancer [FreeTextEntry2] : s/p diagnostic laparoscopy with Whipple procedure 9/17/21

## 2022-01-14 NOTE — PHYSICAL EXAM
[Normal] : supple, no neck mass and thyroid not enlarged [Normal Neck Lymph Nodes] : normal neck lymph nodes  [Normal Supraclavicular Lymph Nodes] : normal supraclavicular lymph nodes [Normal] : oriented to person, place and time, with appropriate affect [de-identified] : Midline incision is well-healed

## 2022-01-14 NOTE — CONSULT LETTER
[Dear  ___] : Dear  [unfilled], [Consult Letter:] : I had the pleasure of evaluating your patient, [unfilled]. [Please see my note below.] : Please see my note below. [Consult Closing:] : Thank you very much for allowing me to participate in the care of this patient.  If you have any questions, please do not hesitate to contact me. [Sincerely,] : Sincerely, [DrEverton  ___] : Dr. TRIPATHI [FreeTextEntry2] : Joesph Prado MD  [FreeTextEntry3] : Joseph Rainey MD\par Surgical Oncology\par [facility]\par Office: [phone]\par Cell: [phone]\par

## 2022-01-14 NOTE — HISTORY OF PRESENT ILLNESS
[No] : no [Yes] : Patient received adjuvant chemotherapy [de-identified] : Lucas Milton is a pleasant 76 year old female who returns for follow up. She was seen as an in-house consult at [facility] on 9/9/21.She is s/p diagnostic laparoscopy with Whipple procedure on 9/17/21. Final pathology revealed a diagnosis of : Gallbladder, cholecystectomy: gallbladder with mild chronic inflammation; Portion of bile duct, excision:portion of bile duct, negative for dysplasia or malignancy; Pancreas and neck margin, Whipple resection: invasive pancreatic ductal adenocarcinoma, moderately differentiated, vascular and perineural invasion are identified, (13) out of (41) one lymph nodes involved by adenocarcinoma, resection margins negative for dysplasia or malignancy; Stomach and small bowel, gastrojejunostomy: portion of stomach and small bowel with no significant histopathologic findings.\par \par She was sent in to the ED by her doctor  on 9/7/21 due to transaminitis and an abnormal MRCP showing a dilated PD and CBD. Patient reported poor appetite and unintentional 6lb weight loss since July. She denied abdominal pain, fevers or chills. Reports that she noticed her urine was becoming darker and her skin was jaundiced. She had outpatient labs done by her PCP which showed a transaminitis and subsequently had an abdominal ultrasound which was suspicious for lesions in her pancreas. Patient underwent an outpatient MRCP which noted a dilated PD and CBD. She was sent in for further evaluation. Patient underwent an EUS on 9/9/21 which showed a hypoechoic lesion in the pancreatic head, not involving the portal vein, measuring 1.7x1.8cm which was biopsied. No stents were placed given that bile was free-flowing. Patient had a CT pancreatic protocol 9/11/21 that again showed a heterogeneously enhancing pancreatic head mass measuring 2.2x2.6x2.2cm with a dilated PD to\par 1.2cm and CBD of 1.9cm. Since admission, tbili has downtrended from 3.9->1, Alk phos 922->482, AST//376->84/127. Ca 19-9 50.\par \par She had a CT Chest 9/13/21 with the following findings, 4 mm nonspecific nodule within the periphery of basilar left lower lobe possibly a benign intrapulmonary lymph node. Recommend follow-up chest CT in 3 months. Post operatively, she underwent a repeat CT abdomen and pelvis 9/27/21 due to severe abdominal distention. This study noted new small left pleural effusion. Status post interval Whipple procedure. Dilated fluid-filled pancreatico-biliary limb with mildly thickened wall. The jejunum distal to the GJ anastomosis is collapsed. There appears to be mild edema of the wall at the level of the GJ anastomosis. New small volume of ascites. A 4.1 x 1.8 cm fluid and gas collection is noted in the left lateral abdominal wall. A fat-containing left lateral abdominal wall hernia was present in this location on prior CT dated 9/11/2021.\par \par Her past medical history  includes rheumatoid arthritis (on MTX), HLD, appendectomy and left renal cell cancer s/p left nephrectomy (2019). She states her brother also had pancreatic cancer and states he had a lesion in the middle of his pancreas which was resected 5 years ago without need for chemotherapy and he is currently doing well. Patient states her last colonoscopy was 2 years ago and reportedly normal. She also had upper endoscopies every 2 years for "gastric emptying" problems.\par \par 11/4/21- Patient is scheduled for a Mediport placement with Dr. Faria on Monday 11/8/21.  She will follow up with Dr. Scanlon to discuss chemotherapy on 11/17/21.  She states that she is improving overall.  Her appetite is improved and she is eating more and drinking ensure supplements.  Her rash resolved after she stopped erythromycin.\par \par 1/13/22- Patient underwent successful Mediport placement on 11/8/21.  She began chemotherapy with Dr. Scanlon...........\par \par  [Recurrence of disease] : no recurrence of disease [TextBox_4] : 11/4/21 [Was adjuvant therapy delayed due to surgical complications?] : Adjuvant therapy was not delayed due to surgical complications [5-FU based] : 5-FU based [TextBox_17] : FOLFIRINOX

## 2022-04-14 ENCOUNTER — APPOINTMENT (OUTPATIENT)
Dept: SURGICAL ONCOLOGY | Facility: CLINIC | Age: 77
End: 2022-04-14
Payer: MEDICARE

## 2022-04-14 VITALS
SYSTOLIC BLOOD PRESSURE: 114 MMHG | OXYGEN SATURATION: 97 % | HEART RATE: 99 BPM | HEIGHT: 64 IN | DIASTOLIC BLOOD PRESSURE: 68 MMHG | RESPIRATION RATE: 17 BRPM | TEMPERATURE: 96.8 F | WEIGHT: 105 LBS | BODY MASS INDEX: 17.93 KG/M2

## 2022-04-14 PROCEDURE — 99214 OFFICE O/P EST MOD 30 MIN: CPT

## 2022-04-14 NOTE — HISTORY OF PRESENT ILLNESS
[No] : no [Yes] : Patient received adjuvant chemotherapy [5-FU based] : 5-FU based [de-identified] : Lucas Milton is a pleasant 76 year old female who returns for follow up. She was seen as an in-house consult at [facility] on 9/9/21.She is s/p diagnostic laparoscopy with Whipple procedure on 9/17/21. Final pathology revealed a diagnosis of : Gallbladder, cholecystectomy: gallbladder with mild chronic inflammation; Portion of bile duct, excision:portion of bile duct, negative for dysplasia or malignancy; Pancreas and neck margin, Whipple resection: invasive pancreatic ductal adenocarcinoma, moderately differentiated, vascular and perineural invasion are identified, (13) out of (41) one lymph nodes involved by adenocarcinoma, resection margins negative for dysplasia or malignancy; Stomach and small bowel, gastrojejunostomy: portion of stomach and small bowel with no significant histopathologic findings.\par \par She was sent in to the ED by her doctor  on 9/7/21 due to transaminitis and an abnormal MRCP showing a dilated PD and CBD. Patient reported poor appetite and unintentional 6lb weight loss since July. She denied abdominal pain, fevers or chills. Reports that she noticed her urine was becoming darker and her skin was jaundiced. She had outpatient labs done by her PCP which showed a transaminitis and subsequently had an abdominal ultrasound which was suspicious for lesions in her pancreas. Patient underwent an outpatient MRCP which noted a dilated PD and CBD. She was sent in for further evaluation. Patient underwent an EUS on 9/9/21 which showed a hypoechoic lesion in the pancreatic head, not involving the portal vein, measuring 1.7x1.8cm which was biopsied. No stents were placed given that bile was free-flowing. Patient had a CT pancreatic protocol 9/11/21 that again showed a heterogeneously enhancing pancreatic head mass measuring 2.2x2.6x2.2cm with a dilated PD to\par 1.2cm and CBD of 1.9cm. Since admission, tbili has downtrended from 3.9->1, Alk phos 922->482, AST//376->84/127. Ca 19-9 50.\par \par She had a CT Chest 9/13/21 with the following findings, 4 mm nonspecific nodule within the periphery of basilar left lower lobe possibly a benign intrapulmonary lymph node. Recommend follow-up chest CT in 3 months. Post operatively, she underwent a repeat CT abdomen and pelvis 9/27/21 due to severe abdominal distention. This study noted new small left pleural effusion. Status post interval Whipple procedure. Dilated fluid-filled pancreatico-biliary limb with mildly thickened wall. The jejunum distal to the GJ anastomosis is collapsed. There appears to be mild edema of the wall at the level of the GJ anastomosis. New small volume of ascites. A 4.1 x 1.8 cm fluid and gas collection is noted in the left lateral abdominal wall. A fat-containing left lateral abdominal wall hernia was present in this location on prior CT dated 9/11/2021.\par \par Her past medical history  includes rheumatoid arthritis (on MTX), HLD, appendectomy and left renal cell cancer s/p left nephrectomy (2019). She states her brother also had pancreatic cancer and states he had a lesion in the middle of his pancreas which was resected 5 years ago without need for chemotherapy and he is currently doing well. Patient states her last colonoscopy was 2 years ago and reportedly normal. She also had upper endoscopies every 2 years for "gastric emptying" problems.\par \par 11/4/21- Patient is scheduled for a Mediport placement with Dr. Faria on Monday 11/8/21.  She will follow up with Dr. Scanlon to discuss chemotherapy on 11/17/21.  She states that she is improving overall.  Her appetite is improved and she is eating more and drinking ensure supplements.  Her rash resolved after she stopped erythromycin.\par \par 1/13/22- Patient underwent successful Mediport placement on 11/8/21.  She began chemotherapy with Dr. Scanlon.\par \par 4/14/22- Mrs Milton has been continue with FOLFIRINOX therapy and is undergoing cycle 8.  She has developed left ankle pain.  She reports that there was concern for a liver lesion seen on imaging.  She seems to be in good spirits. [Recurrence of disease] : no recurrence of disease [TextBox_4] : 1/13/2022 [Was adjuvant therapy delayed due to surgical complications?] : Adjuvant therapy was not delayed due to surgical complications [TextBox_17] : FOLFIRINOX

## 2022-04-14 NOTE — PHYSICAL EXAM
[Normal] : supple, no neck mass and thyroid not enlarged [Normal Neck Lymph Nodes] : normal neck lymph nodes  [Normal Supraclavicular Lymph Nodes] : normal supraclavicular lymph nodes [Normal Groin Lymph Nodes] : normal groin lymph nodes [Normal Axillary Lymph Nodes] : normal axillary lymph nodes [Normal] : oriented to person, place and time, with appropriate affect [de-identified] : incision well healed.

## 2022-04-14 NOTE — CONSULT LETTER
[Dear  ___] : Dear  [unfilled], [Consult Letter:] : I had the pleasure of evaluating your patient, [unfilled]. [Please see my note below.] : Please see my note below. [Consult Closing:] : Thank you very much for allowing me to participate in the care of this patient.  If you have any questions, please do not hesitate to contact me. [Sincerely,] : Sincerely, [FreeTextEntry3] : Joseph Rainey MD\par Surgical Oncology\par [facility]\par Office: [phone]\par Cell: [phone]\par  [FreeTextEntry2] : Joesph Prado MD  [DrEverton  ___] : Dr. TRIPATHI

## 2022-04-14 NOTE — ASSESSMENT
[FreeTextEntry1] : Lucas Polanco will follow up with me in 3 months.  We will try to get her recent imaging for review.

## 2022-07-14 ENCOUNTER — APPOINTMENT (OUTPATIENT)
Dept: SURGICAL ONCOLOGY | Facility: CLINIC | Age: 77
End: 2022-07-14

## 2022-07-28 ENCOUNTER — APPOINTMENT (OUTPATIENT)
Dept: SURGICAL ONCOLOGY | Facility: CLINIC | Age: 77
End: 2022-07-28

## 2022-07-28 VITALS
DIASTOLIC BLOOD PRESSURE: 74 MMHG | TEMPERATURE: 96.7 F | HEART RATE: 90 BPM | SYSTOLIC BLOOD PRESSURE: 121 MMHG | BODY MASS INDEX: 18.27 KG/M2 | OXYGEN SATURATION: 98 % | HEIGHT: 64 IN | RESPIRATION RATE: 16 BRPM | WEIGHT: 107 LBS

## 2022-07-28 PROCEDURE — 99214 OFFICE O/P EST MOD 30 MIN: CPT

## 2022-07-28 NOTE — CONSULT LETTER
[FreeTextEntry2] : Joesph Prado MD  [FreeTextEntry3] : Joseph Rainey MD\par Surgical Oncology\par [facility]\par Office: [phone]\par Cell: [phone]\par

## 2022-07-28 NOTE — HISTORY OF PRESENT ILLNESS
[de-identified] : Lucas Milton is a pleasant 76 year old female who returns for follow up, s/p Whipple on 9/17/21\par \par She was seen as an in-house consult at [facility] on 9/9/21. \par She was sent in to the ED by her doctor  on 9/7/21 due to transaminitis and an abnormal MRCP showing a dilated PD and CBD. Patient reported poor appetite and unintentional 6lb weight loss since July. She denied abdominal pain, fevers or chills. Reports that she noticed her urine was becoming darker and her skin was jaundiced. \par Patient underwent an EUS on 9/9/21 which showed a hypoechoic lesion in the pancreatic head, not involving the portal vein, measuring 1.7 x 1.8 cm which was biopsied. No stents were placed given that bile was free-flowing. \par \par Patient had a CT pancreatic protocol 9/11/21 that again showed a heterogeneously enhancing pancreatic head mass measuring 2.2x2.6x2.2cm with a dilated PD to 1.2cm and CBD of 1.9cm. \par \par She is s/p diagnostic laparoscopy with Whipple procedure on 9/17/21. \par Final pathology revealed a diagnosis of : Gallbladder, cholecystectomy: gallbladder with mild chronic inflammation; Portion of bile duct, excision:portion of bile duct, negative for dysplasia or malignancy; Pancreas and neck margin, Whipple resection: invasive pancreatic ductal adenocarcinoma, moderately differentiated, vascular and perineural invasion are identified, (13) out of (41) one lymph nodes involved by adenocarcinoma, resection margins negative for dysplasia or malignancy; Stomach and small bowel, gastrojejunostomy: portion of stomach and small bowel with no significant histopathologic findings.\par \par Her past medical history  includes rheumatoid arthritis (on MTX), HLD, appendectomy and left renal cell cancer s/p left nephrectomy (2019). She states her brother also had pancreatic cancer and states he had a lesion in the middle of his pancreas which was resected 5 years ago without need for chemotherapy and he is currently doing well. Patient states her last colonoscopy was 2 years ago and reportedly normal. She also had upper endoscopies every 2 years for "gastric emptying" problems.\par \par She established care with Dr. Scanlon ([facility]) after mediport placement in IR, and has been on FOLFIRINOX\par \par 4/14/22- Mrs Milton has been continue with FOLFIRINOX therapy and is undergoing cycle 8.  She has developed left ankle pain.  She reports that there was concern for a liver lesion seen on imaging.  She seems to be in good spirits.\par \par Per Dr. Scanlon's note on 5/31/2022, Mrs Milton had a PET/CT on 5/23/2022 with findings of diffuse osseous activity likely related to marrow hyperproliferation secondary to recent chemo, otherwise no evidence of FDG malignant disease. Subcentimeter pulmonary nodular densities which are below the resolution of PET. [Recurrence of disease] : no recurrence of disease [TextBox_4] : 1/13/2022 [Was adjuvant therapy delayed due to surgical complications?] : Adjuvant therapy was not delayed due to surgical complications [TextBox_17] : FOLFIRINOX

## 2022-08-31 ENCOUNTER — EMERGENCY (EMERGENCY)
Facility: HOSPITAL | Age: 77
LOS: 1 days | Discharge: HOME | End: 2022-08-31
Attending: EMERGENCY MEDICINE
Payer: MEDICARE

## 2022-08-31 VITALS
TEMPERATURE: 98 F | RESPIRATION RATE: 18 BRPM | OXYGEN SATURATION: 99 % | WEIGHT: 100.97 LBS | HEIGHT: 61 IN | SYSTOLIC BLOOD PRESSURE: 122 MMHG | DIASTOLIC BLOOD PRESSURE: 75 MMHG | HEART RATE: 79 BPM

## 2022-08-31 VITALS
DIASTOLIC BLOOD PRESSURE: 72 MMHG | HEART RATE: 70 BPM | SYSTOLIC BLOOD PRESSURE: 129 MMHG | OXYGEN SATURATION: 98 % | TEMPERATURE: 98 F | RESPIRATION RATE: 18 BRPM

## 2022-08-31 DIAGNOSIS — Z90.49 ACQUIRED ABSENCE OF OTHER SPECIFIED PARTS OF DIGESTIVE TRACT: Chronic | ICD-10-CM

## 2022-08-31 LAB
ALBUMIN SERPL ELPH-MCNC: 3.8 G/DL — SIGNIFICANT CHANGE UP (ref 3.3–5)
ALP SERPL-CCNC: 124 U/L — HIGH (ref 40–120)
ALT FLD-CCNC: 17 U/L — SIGNIFICANT CHANGE UP (ref 10–45)
ANION GAP SERPL CALC-SCNC: 11 MMOL/L — SIGNIFICANT CHANGE UP (ref 5–17)
APPEARANCE UR: CLEAR — SIGNIFICANT CHANGE UP
APTT BLD: 32.4 SEC — SIGNIFICANT CHANGE UP (ref 27.5–35.5)
AST SERPL-CCNC: 48 U/L — HIGH (ref 10–40)
BACTERIA # UR AUTO: NEGATIVE — SIGNIFICANT CHANGE UP
BASOPHILS # BLD AUTO: 0 K/UL — SIGNIFICANT CHANGE UP (ref 0–0.2)
BASOPHILS NFR BLD AUTO: 0 % — SIGNIFICANT CHANGE UP (ref 0–2)
BILIRUB SERPL-MCNC: 0.5 MG/DL — SIGNIFICANT CHANGE UP (ref 0.2–1.2)
BILIRUB UR-MCNC: NEGATIVE — SIGNIFICANT CHANGE UP
BLD GP AB SCN SERPL QL: NEGATIVE — SIGNIFICANT CHANGE UP
BUN SERPL-MCNC: 7 MG/DL — SIGNIFICANT CHANGE UP (ref 7–23)
CALCIUM SERPL-MCNC: 9.4 MG/DL — SIGNIFICANT CHANGE UP (ref 8.4–10.5)
CHLORIDE SERPL-SCNC: 107 MMOL/L — SIGNIFICANT CHANGE UP (ref 96–108)
CO2 SERPL-SCNC: 22 MMOL/L — SIGNIFICANT CHANGE UP (ref 22–31)
COLOR SPEC: COLORLESS — SIGNIFICANT CHANGE UP
CREAT SERPL-MCNC: 0.66 MG/DL — SIGNIFICANT CHANGE UP (ref 0.5–1.3)
DIFF PNL FLD: NEGATIVE — SIGNIFICANT CHANGE UP
EGFR: 90 ML/MIN/1.73M2 — SIGNIFICANT CHANGE UP
EOSINOPHIL # BLD AUTO: 0 K/UL — SIGNIFICANT CHANGE UP (ref 0–0.5)
EOSINOPHIL NFR BLD AUTO: 0 % — SIGNIFICANT CHANGE UP (ref 0–6)
EPI CELLS # UR: 0 /HPF — SIGNIFICANT CHANGE UP
GAS PNL BLDV: SIGNIFICANT CHANGE UP
GLUCOSE SERPL-MCNC: 98 MG/DL — SIGNIFICANT CHANGE UP (ref 70–99)
GLUCOSE UR QL: NEGATIVE — SIGNIFICANT CHANGE UP
HCT VFR BLD CALC: 32.9 % — LOW (ref 34.5–45)
HGB BLD-MCNC: 10.6 G/DL — LOW (ref 11.5–15.5)
HYALINE CASTS # UR AUTO: 0 /LPF — SIGNIFICANT CHANGE UP (ref 0–2)
INR BLD: 1.02 RATIO — SIGNIFICANT CHANGE UP (ref 0.88–1.16)
KETONES UR-MCNC: SIGNIFICANT CHANGE UP
LEUKOCYTE ESTERASE UR-ACNC: NEGATIVE — SIGNIFICANT CHANGE UP
LIDOCAIN IGE QN: 9 U/L — SIGNIFICANT CHANGE UP (ref 7–60)
LYMPHOCYTES # BLD AUTO: 0.2 K/UL — LOW (ref 1–3.3)
LYMPHOCYTES # BLD AUTO: 7 % — LOW (ref 13–44)
MCHC RBC-ENTMCNC: 32.2 GM/DL — SIGNIFICANT CHANGE UP (ref 32–36)
MCHC RBC-ENTMCNC: 34.1 PG — HIGH (ref 27–34)
MCV RBC AUTO: 105.8 FL — HIGH (ref 80–100)
MONOCYTES # BLD AUTO: 0.32 K/UL — SIGNIFICANT CHANGE UP (ref 0–0.9)
MONOCYTES NFR BLD AUTO: 11.3 % — SIGNIFICANT CHANGE UP (ref 2–14)
NEUTROPHILS # BLD AUTO: 2.29 K/UL — SIGNIFICANT CHANGE UP (ref 1.8–7.4)
NEUTROPHILS NFR BLD AUTO: 79.1 % — HIGH (ref 43–77)
NITRITE UR-MCNC: NEGATIVE — SIGNIFICANT CHANGE UP
OB PNL STL: NEGATIVE — SIGNIFICANT CHANGE UP
PH UR: 6.5 — SIGNIFICANT CHANGE UP (ref 5–8)
PLATELET # BLD AUTO: 121 K/UL — LOW (ref 150–400)
POTASSIUM SERPL-MCNC: 5 MMOL/L — SIGNIFICANT CHANGE UP (ref 3.5–5.3)
POTASSIUM SERPL-SCNC: 5 MMOL/L — SIGNIFICANT CHANGE UP (ref 3.5–5.3)
PROT SERPL-MCNC: 7 G/DL — SIGNIFICANT CHANGE UP (ref 6–8.3)
PROT UR-MCNC: NEGATIVE — SIGNIFICANT CHANGE UP
PROTHROM AB SERPL-ACNC: 11.8 SEC — SIGNIFICANT CHANGE UP (ref 10.5–13.4)
RBC # BLD: 3.11 M/UL — LOW (ref 3.8–5.2)
RBC # FLD: 15 % — HIGH (ref 10.3–14.5)
RBC CASTS # UR COMP ASSIST: 0 /HPF — SIGNIFICANT CHANGE UP (ref 0–4)
RH IG SCN BLD-IMP: POSITIVE — SIGNIFICANT CHANGE UP
SARS-COV-2 RNA SPEC QL NAA+PROBE: SIGNIFICANT CHANGE UP
SODIUM SERPL-SCNC: 140 MMOL/L — SIGNIFICANT CHANGE UP (ref 135–145)
SP GR SPEC: 1.01 — SIGNIFICANT CHANGE UP (ref 1.01–1.02)
UROBILINOGEN FLD QL: NEGATIVE — SIGNIFICANT CHANGE UP
WBC # BLD: 2.83 K/UL — LOW (ref 3.8–10.5)
WBC # FLD AUTO: 2.83 K/UL — LOW (ref 3.8–10.5)
WBC UR QL: 0 /HPF — SIGNIFICANT CHANGE UP (ref 0–5)

## 2022-08-31 PROCEDURE — 85025 COMPLETE CBC W/AUTO DIFF WBC: CPT

## 2022-08-31 PROCEDURE — 83735 ASSAY OF MAGNESIUM: CPT

## 2022-08-31 PROCEDURE — 74177 CT ABD & PELVIS W/CONTRAST: CPT | Mod: MA

## 2022-08-31 PROCEDURE — 82330 ASSAY OF CALCIUM: CPT

## 2022-08-31 PROCEDURE — 85018 HEMOGLOBIN: CPT

## 2022-08-31 PROCEDURE — 82803 BLOOD GASES ANY COMBINATION: CPT

## 2022-08-31 PROCEDURE — 84100 ASSAY OF PHOSPHORUS: CPT

## 2022-08-31 PROCEDURE — 99285 EMERGENCY DEPT VISIT HI MDM: CPT

## 2022-08-31 PROCEDURE — U0005: CPT

## 2022-08-31 PROCEDURE — 80053 COMPREHEN METABOLIC PANEL: CPT

## 2022-08-31 PROCEDURE — 87086 URINE CULTURE/COLONY COUNT: CPT

## 2022-08-31 PROCEDURE — 85014 HEMATOCRIT: CPT

## 2022-08-31 PROCEDURE — U0003: CPT

## 2022-08-31 PROCEDURE — 82947 ASSAY GLUCOSE BLOOD QUANT: CPT

## 2022-08-31 PROCEDURE — 85610 PROTHROMBIN TIME: CPT

## 2022-08-31 PROCEDURE — 83605 ASSAY OF LACTIC ACID: CPT

## 2022-08-31 PROCEDURE — 96374 THER/PROPH/DIAG INJ IV PUSH: CPT | Mod: XU

## 2022-08-31 PROCEDURE — 81001 URINALYSIS AUTO W/SCOPE: CPT

## 2022-08-31 PROCEDURE — 86901 BLOOD TYPING SEROLOGIC RH(D): CPT

## 2022-08-31 PROCEDURE — 84132 ASSAY OF SERUM POTASSIUM: CPT

## 2022-08-31 PROCEDURE — 85730 THROMBOPLASTIN TIME PARTIAL: CPT

## 2022-08-31 PROCEDURE — 82272 OCCULT BLD FECES 1-3 TESTS: CPT

## 2022-08-31 PROCEDURE — 86900 BLOOD TYPING SEROLOGIC ABO: CPT

## 2022-08-31 PROCEDURE — 83690 ASSAY OF LIPASE: CPT

## 2022-08-31 PROCEDURE — 99284 EMERGENCY DEPT VISIT MOD MDM: CPT | Mod: 25

## 2022-08-31 PROCEDURE — 84295 ASSAY OF SERUM SODIUM: CPT

## 2022-08-31 PROCEDURE — 74177 CT ABD & PELVIS W/CONTRAST: CPT | Mod: 26,MA

## 2022-08-31 PROCEDURE — 36415 COLL VENOUS BLD VENIPUNCTURE: CPT

## 2022-08-31 PROCEDURE — 82435 ASSAY OF BLOOD CHLORIDE: CPT

## 2022-08-31 PROCEDURE — 86850 RBC ANTIBODY SCREEN: CPT

## 2022-08-31 PROCEDURE — 96375 TX/PRO/DX INJ NEW DRUG ADDON: CPT

## 2022-08-31 RX ORDER — ACETAMINOPHEN 500 MG
675 TABLET ORAL ONCE
Refills: 0 | Status: COMPLETED | OUTPATIENT
Start: 2022-08-31 | End: 2022-08-31

## 2022-08-31 RX ORDER — SODIUM CHLORIDE 9 MG/ML
500 INJECTION INTRAMUSCULAR; INTRAVENOUS; SUBCUTANEOUS ONCE
Refills: 0 | Status: COMPLETED | OUTPATIENT
Start: 2022-08-31 | End: 2022-08-31

## 2022-08-31 RX ORDER — FAMOTIDINE 10 MG/ML
20 INJECTION INTRAVENOUS ONCE
Refills: 0 | Status: COMPLETED | OUTPATIENT
Start: 2022-08-31 | End: 2022-08-31

## 2022-08-31 RX ADMIN — FAMOTIDINE 20 MILLIGRAM(S): 10 INJECTION INTRAVENOUS at 15:20

## 2022-08-31 RX ADMIN — Medication 675 MILLIGRAM(S): at 15:15

## 2022-08-31 RX ADMIN — SODIUM CHLORIDE 500 MILLILITER(S): 9 INJECTION INTRAMUSCULAR; INTRAVENOUS; SUBCUTANEOUS at 12:18

## 2022-08-31 RX ADMIN — Medication 30 MILLILITER(S): at 15:20

## 2022-08-31 RX ADMIN — Medication 270 MILLIGRAM(S): at 14:59

## 2022-08-31 NOTE — ED ADULT NURSE NOTE - NS ED NURSE DC INFO COMPLEXITY
PROVIDER:[TOKEN:[[id number]]] Simple: Patient demonstrates quick and easy understanding/Verbalized Understanding

## 2022-08-31 NOTE — ED ADULT NURSE REASSESSMENT NOTE - NS ED NURSE REASSESS COMMENT FT1
Pt tolerated PO intake at this time. Denies nausea, worsening abdominal pain or vomiting. Cleared for d/c by PA.

## 2022-08-31 NOTE — ED PROVIDER NOTE - PROGRESS NOTE DETAILS
pt with improvement in pain s/p tylenol, maalox and famotidine, tolerated crackers and water without worsening pain. discussed CT findings of gastritis and small fluid in the abdomen, abd soft and nontender, agreeable to d/c with GI cocktail and gastro follow up. all questions answered. -Zulma Moffett PA-C

## 2022-08-31 NOTE — ED PROVIDER NOTE - ATTENDING APP SHARED VISIT CONTRIBUTION OF CARE
Pt is a 77F with a pmh   Her past medical history includes rheumatoid arthritis (on MTX), HLD, appendectomy and left renal cell cancer s/p left nephrectomy s/p Whipple on 9/17/21 for invasive pancreatic ductal adenocarcinoma last chemo in May currently undergoing radiation last treatment Monday. Pt presents with abdominal pain since Monday. Pt denies vomiting last BM this Am. No fevers no urinary sx no chest pain no shortness of breath. States loss of appetite and pain is exacerbated by eating. Able to drink water.     Gen: alert oriented NAD  Resp: CTA BL   Cardiac: RRR   Abdomen: mild ruq ttp and suprapubic ttp no rebound no guarding midline scar noted   Extremities: no LE edema   Neuro: no gross deficits    Due to abdominal pain with extensive cancer history will obtain labs Ct scan and with suprapubic ttp will obtain urine.

## 2022-08-31 NOTE — ED ADULT NURSE NOTE - OBJECTIVE STATEMENT
Pt 76 y/o female, AxOx3, presents to ED ambulatory from home complaining of abdominal pain x 3 days. PMH of Pancreatic CA ( last radiation monday), whipple 2021, L nephrectomy 2019. Pt states she developed abdominal pain monday after radiation treatment. Pt is well appearing, speaking full sentences without difficulty. Breathing spontaneous and unlabored. Upon assessment, abdomen soft and tender to LUQ, suprapubic area,  +strong peripheral pulses, moving all extremities without difficulty, lungs clear. No CVA tenderness, denies urinary symptoms. Endorses dark stools x 1 day but also took pepto bismal. Rating pain 4/10, not requesting pain medication at this time. Pt denies CP, SOB, HA, vision changes, n/v/d, fevers chills. Safety and comfort measures initiated- bed placed in lowest position and side rails raised. Pt oriented to call bell system.

## 2022-08-31 NOTE — ED PROVIDER NOTE - CLINICAL SUMMARY MEDICAL DECISION MAKING FREE TEXT BOX
Due to abdominal pain with extensive cancer history will obtain labs Ct scan rule out infection, gastritis, obstruction and with suprapubic ttp will obtain urine to rule out uti .

## 2022-08-31 NOTE — ED PROVIDER NOTE - PHYSICAL EXAMINATION
A&Ox3, NAD, well appearing  NCAT. PERRL, EOMI.  Lungs CTAB. No w/r/r  Cardiac +S1S2, RRR, No m/r/g.   Abd soft, + generalized abdominal ttp, most tender over lower abdomen, abdomina otherwise soft and nondistended. +BS, no rebound or guarding.   Extremities:, no edema.   Skin without rash.   No focal Deficits  : Performed by GRANT Moffett with Chaperone IVONNE Bowman: rectum soft nontender, no masses no ander blood, stool soft, dark.

## 2022-08-31 NOTE — ED PROVIDER NOTE - NSFOLLOWUPINSTRUCTIONS_ED_ALL_ED_FT
- stay hydrated.   -take all home medications as prescribed  - take tylenol 975mg every 6 hours as needed for pain-take with meals.  -take maalox 30ml three times a day as needed for pain  -take famotidine 20 mg once a day  - follow up with your primary care provider in 1-2 days.  - follow up with your gastroenterologist within the next week  - return if symptoms worsen, fever, have excessive vomiting, you are unable to eat/drink and all other concerns.

## 2022-08-31 NOTE — ED PROVIDER NOTE - OBJECTIVE STATEMENT
78 yo female with pmh kidney CA s/p L nephrectomy, RA on weekly methotrexate, pancreatic CA s/p partial removal last year s/p chemo now on radiation here for worsening constant generalized abdominal pain x 2 days. Pt states after she received radiation treatement 2 days ago she started to develop pain which has been worse after PO intake. Denies nausea/vomiting, diarrhea, constipation, took pepto bismol yesterday for symptoms and today noticed dark stools. No sob, diff breathing, back pain, dysuria, fevers, chills. Of note pt states she has needed platelet transfusion in the past 2/2 thrombocytopenia which is thought to be related to her radiation treatments. 76 yo female with pmh kidney CA s/p L nephrectomy, RA on weekly methotrexate, pancreatic CA s/p whipple last year s/p chemo now on radiation here for worsening constant generalized abdominal pain x 2 days. Pt states after she received radiation treatement 2 days ago she started to develop pain which has been worse after PO intake. Denies nausea/vomiting, diarrhea, constipation, took pepto bismol yesterday for symptoms and today noticed dark stools. No sob, diff breathing, back pain, dysuria, fevers, chills. Of note pt states she has needed platelet transfusion in the past 2/2 thrombocytopenia which is thought to be related to her radiation treatments.

## 2022-08-31 NOTE — ED PROCEDURE NOTE - CPROC ED INDICATIONS1
MEDICATION: TYLENOL & CODEINE  You have been prescribed a pain medication containing codeine and Tylenol (acetaminophen). Codeine is a narcotic and may cause drowsiness. Be sure to take it only as directed.  DIRECTIONS FOR USE:  If this medicine makes your stomach upset, take it with food or milk. Pain medication should be taken only if needed at the times prescribed. If you are not having pain, do not take the medicine unless you are advised to do so by your doctor.  Do not take more than 4 grams (4,000 mg) of acetaminophen (Tylenol) per 24 hours.  WHAT TO WATCH FOR:  POSSIBLE SIDE EFFECTS: Dizziness, drowsiness (Take a smaller dose: break the pill in half or take it less often). Constipation (Drink lots of liquids, use small doses of a mild laxative like milk of magnesia, as needed). Nausea, vomiting or stomach pain (Take the medicine with food or milk; contact your doctor if symptoms persist or become severe). Difficulty passing urine (Contact your doctor or return to this facility if your bladder feels full and you have been unable to pass urine for more than 8 hours). Yellow skin or eyes, dark urine, abdominal pain (Contact your doctor or return to this facility).  ALLERGIC REACTION: Rash, itching, swelling, trouble breathing or swallowing (Contact your doctor or return to this facility promptly).  MEDICAL CONDITIONS: Before starting this medicine, be sure your doctor knows if you have any of the following conditions:  · Prostate enlargement, difficulty passing urine  · Lung disease (asthma, COPD)  · Allergy to aspirin or other salicylates  · History of substance abuse  · Pregnancy or breastfeeding  DRUG INTERACTIONS: This drug may cause increased side effects when taken with alcohol, muscle relaxants, sedatives, tricyclic antidepressants, other products containing acetaminophen, other pain medicines, carbamazepine, rifampin, isoniazid (INH).  WARNINGS:  · This medicine may cause drowsiness or dizziness. Do  not drive, ride a bicycle, or operate dangerous equipment while using this medicine until you know how it will affect you.  · Prolonged use of codeine can be habit forming and may lead to addiction.  · This medicine can slow the breathing in infants under one year of age. Therefore, it must be used with caution. Do not exceed the dosage recommended.  [NOTE: This information topic may not include all directions, precautions, medical conditions, drug/food interactions, and warnings for this drug. Check with your doctor, nurse, or pharmacist for any questions that you may have.]  © 5209-5601 [employer], [address] [place] [address]. All rights reserved. This information is not intended as a substitute for professional medical care. Always follow your healthcare professional's instructions.      Shoulder Fracture  You have a break (fracture) of the shoulder. This may be a small crack in the bone. Or it may be a major break with the broken parts pushed out of position.    If you have only a crack in the bone and no bone fragments are out of place, you will probably be treated with a shoulder immobilizer. This is a special type of sling. Casts are not used for this type of fracture. Your bone should heal in 4 to 6 weeks. More serious injuries may need surgery to put the bones back into the correct position for healing.  Home care  Follow these tips to care for yourself at home:  · Leave the shoulder immobilizer in place. This will support the injured arm at your side. This is the best position for the bone to heal.  · The shoulder immobilizer is adjustable. If it becomes loose, adjust it so that your forearm is level with the ground (horizontal). Your hand should be level with your elbow.  · Apply an ice pack to the injured area for 20 minutes every 1 to 2 hours the first day. You can make an ice pack by putting ice cubes in a plastic bag. Wrap the bag in a towel before putting it on your shoulder.  Continue with ice packs 3 to 4 times a day for the next 2 days. Then use the ice as needed to relieve pain and swelling.  · You may take acetaminophen or ibuprofen to relieve pain, unless another pain medicine was prescribed. If you have chronic liver or kidney disease or ever had a stomach ulcer or GI bleeding, talk with your doctor before using these medicines.  · Don’t take the sling off before your next exam unless you were told to do so.  Follow-up care  Follow up with your doctor in 1 week to be sure the bone is healing properly. A shoulder joint will become stiff if left in a sling for too long. Ask your doctor when it is safe to begin range-of-motion exercises.  When to seek medical care  Get prompt medical care if any of these occur:  · Your fingers become swollen, cold, blue, numb, or tingly  · Your shoulder or upper arm swells a lot or looks very bruised  · The pain in your shoulder gets worse  · The splint breaks  · You have a fever  © 6276-9081 [employer] [address] [place] [address], [place] [address]. All rights reserved. This information is not intended as a substitute for professional medical care. Always follow your healthcare professional's instructions.         Abd. Pain

## 2022-08-31 NOTE — ED PROCEDURE NOTE - PROCEDURE ADDITIONAL DETAILS
Emergency Department Focused Ultrasound performed at patient's bedside for educational purposes. An appropriate follow up study is ordered. -Zulma Moffett PA-C

## 2022-08-31 NOTE — ED PROVIDER NOTE - PATIENT PORTAL LINK FT
You can access the FollowMyHealth Patient Portal offered by [facility] by registering at the following website: [email or web address]. By joining [employer]’s FollowMyHealth portal, you will also be able to view your health information using other applications (apps) compatible with our system.

## 2022-09-01 LAB
CULTURE RESULTS: SIGNIFICANT CHANGE UP
SPECIMEN SOURCE: SIGNIFICANT CHANGE UP

## 2022-10-27 ENCOUNTER — APPOINTMENT (OUTPATIENT)
Dept: SURGICAL ONCOLOGY | Facility: CLINIC | Age: 77
End: 2022-10-27

## 2022-10-27 VITALS
SYSTOLIC BLOOD PRESSURE: 126 MMHG | HEART RATE: 78 BPM | WEIGHT: 106 LBS | DIASTOLIC BLOOD PRESSURE: 74 MMHG | HEIGHT: 64 IN | OXYGEN SATURATION: 98 % | RESPIRATION RATE: 16 BRPM | BODY MASS INDEX: 18.1 KG/M2

## 2022-10-27 PROCEDURE — 99214 OFFICE O/P EST MOD 30 MIN: CPT

## 2022-10-27 NOTE — HISTORY OF PRESENT ILLNESS
[No] : no [Yes] : Patient received adjuvant chemotherapy [5-FU based] : 5-FU based [de-identified] : Lucas Milton is a pleasant 77 year old female who returns for follow up\par \par She was initially seen as an in-house consult at [facility] on 9/9/21. \par She was sent in to the ED by her doctor  on 9/7/21 due to transaminitis and an abnormal MRCP showing a dilated PD and CBD. Patient reported poor appetite and unintentional 6 lb weight loss since July. She denied abdominal pain, fevers or chills. Reports that she noticed her urine was becoming darker and her skin was jaundiced. \par Patient underwent an EUS on 9/9/21 which showed a hypoechoic lesion in the pancreatic head, not involving the portal vein, measuring 1.7 x 1.8 cm which was biopsied. No stents were placed given that bile was free-flowing. \par \par Patient had a CT pancreatic protocol 9/11/21 that again showed a heterogeneously enhancing pancreatic head mass measuring 2.2 x 2.6 x 2.2 cm with a dilated PD to 1.2 cm and CBD of 1.9 cm. \par \par **SURGERY**\par She is s/p diagnostic laparoscopy & Whipple on 9/17/21. \par Final pathology revealed a diagnosis o : Gallbladder, cholecystectomy: gallbladder with mild chronic inflammation; Portion of bile duct, excision:portion of bile duct, negative for dysplasia or malignancy; Pancreas and neck margin, Whipple resection: invasive pancreatic ductal adenocarcinoma, moderately differentiated, vascular and perineural invasion are identified, (13) out of (41) one lymph nodes involved by adenocarcinoma, resection margins negative for dysplasia or malignancy; Stomach and small bowel, gastrojejunostomy: portion of stomach and small bowel with no significant histopathologic findings.\par \par Her past medical history  includes rheumatoid arthritis (on MTX), HLD, appendectomy and left renal cell cancer s/p left nephrectomy (2019). She states her brother also had pancreatic cancer and states he had a lesion in the middle of his pancreas which was resected 5 years ago without need for chemotherapy and he is currently doing well. Patient states her last colonoscopy was 2 years ago and reportedly normal. She also had upper endoscopies every 2 years for "gastric emptying" problems.\par \par She established care with Dr. Scanlon ([facility]) after mediport placement in , and has been on FOLFIRINOX\par \par 4/14/22- Mrs Milton has been continue with FOLFIRINOX therapy and is undergoing cycle 8.  She has developed left ankle pain.  She reports that there was concern for a liver lesion seen on imaging.  She seems to be in good spirits.\par \par Per Dr. Scanlon's note on 5/31/2022, Mrs Milton had a PET/CT on 5/23/2022 with findings of diffuse osseous activity likely related to marrow hyperproliferation secondary to recent chemo, otherwise no evidence of FDG malignant disease. Subcentimeter pulmonary nodular densities which are below the resolution of PET.\par \par Ms. Milton has received 11 doses of FOLFIRINOX as of September 2022, she developed severe anemia & thrombocytopenia while on Xeloda + RT.\par Per Dr. Scanlon's 9/2022 visit, she had a CT that showed no recurrent or metastatic disease. \par \par 10/27/2022:  Mrs. Milton returns with her  to office today.  She is in good spirits.  She has completed systemic therapy and radiation.  She did develop anemia and thrombocytopenia but states that is improved.  Her scans have been without disease progression. [Recurrence of disease] : no recurrence of disease [TextBox_4] : 7/28/2022 [Was adjuvant therapy delayed due to surgical complications?] : Adjuvant therapy was not delayed due to surgical complications [TextBox_17] : FOLFIRINOX

## 2022-10-27 NOTE — CONSULT LETTER
[Dear  ___] : Dear  [unfilled], [Consult Letter:] : I had the pleasure of evaluating your patient, [unfilled]. [Please see my note below.] : Please see my note below. [Consult Closing:] : Thank you very much for allowing me to participate in the care of this patient.  If you have any questions, please do not hesitate to contact me. [Sincerely,] : Sincerely, [DrEverton  ___] : Dr. TRIPATHI [FreeTextEntry2] : Joesph Prado MD  [FreeTextEntry3] : Joseph Rainey MD\par Surgical Oncology\par [facility]\par Office: [phone]\par Cell: [phone]\par

## 2022-10-27 NOTE — PHYSICAL EXAM
[Normal] : supple, no neck mass and thyroid not enlarged [Normal Neck Lymph Nodes] : normal neck lymph nodes  [Normal Supraclavicular Lymph Nodes] : normal supraclavicular lymph nodes [Normal Groin Lymph Nodes] : normal groin lymph nodes [Normal Axillary Lymph Nodes] : normal axillary lymph nodes [Normal] : oriented to person, place and time, with appropriate affect [de-identified] : incision well healed.

## 2022-12-13 ENCOUNTER — APPOINTMENT (OUTPATIENT)
Dept: RADIOLOGY | Facility: IMAGING CENTER | Age: 77
End: 2022-12-13

## 2022-12-13 ENCOUNTER — APPOINTMENT (OUTPATIENT)
Dept: CT IMAGING | Facility: IMAGING CENTER | Age: 77
End: 2022-12-13

## 2022-12-13 ENCOUNTER — OUTPATIENT (OUTPATIENT)
Dept: OUTPATIENT SERVICES | Facility: HOSPITAL | Age: 77
LOS: 1 days | End: 2022-12-13
Payer: MEDICARE

## 2022-12-13 DIAGNOSIS — Z90.49 ACQUIRED ABSENCE OF OTHER SPECIFIED PARTS OF DIGESTIVE TRACT: Chronic | ICD-10-CM

## 2022-12-13 DIAGNOSIS — C64.9 MALIGNANT NEOPLASM OF UNSPECIFIED KIDNEY, EXCEPT RENAL PELVIS: ICD-10-CM

## 2022-12-13 PROCEDURE — 71046 X-RAY EXAM CHEST 2 VIEWS: CPT | Mod: 26

## 2022-12-13 PROCEDURE — 74170 CT ABD WO CNTRST FLWD CNTRST: CPT | Mod: 26

## 2022-12-13 PROCEDURE — 74170 CT ABD WO CNTRST FLWD CNTRST: CPT

## 2022-12-13 PROCEDURE — 71046 X-RAY EXAM CHEST 2 VIEWS: CPT

## 2022-12-23 ENCOUNTER — APPOINTMENT (OUTPATIENT)
Dept: UROLOGY | Facility: CLINIC | Age: 77
End: 2022-12-23

## 2022-12-23 VITALS
HEART RATE: 86 BPM | BODY MASS INDEX: 17.58 KG/M2 | HEIGHT: 64 IN | DIASTOLIC BLOOD PRESSURE: 73 MMHG | SYSTOLIC BLOOD PRESSURE: 121 MMHG | TEMPERATURE: 98.6 F | WEIGHT: 103 LBS | RESPIRATION RATE: 17 BRPM

## 2022-12-23 PROCEDURE — 99213 OFFICE O/P EST LOW 20 MIN: CPT

## 2022-12-23 NOTE — HISTORY OF PRESENT ILLNESS
[FreeTextEntry1] : 77F with hx of pancreatic CA s/p Whipple 2021 chemo and radiation amd hx of left renal CA s/p left nephrectomy 2019 \par Presenting for followup  \par Left radical nephrectomy path: t1a 2.7 cm grade 2 clear cell \par \par CT 12/22 with no recurrence \par CXR 12/22 with right upper hemithorax subpleural soft tissue opacity \par \par Recent CBC with Hgb 8.8\par SCr 0.78

## 2023-02-13 ENCOUNTER — APPOINTMENT (OUTPATIENT)
Dept: CT IMAGING | Facility: IMAGING CENTER | Age: 78
End: 2023-02-13

## 2023-02-16 ENCOUNTER — APPOINTMENT (OUTPATIENT)
Dept: SURGICAL ONCOLOGY | Facility: CLINIC | Age: 78
End: 2023-02-16
Payer: MEDICARE

## 2023-02-16 VITALS
DIASTOLIC BLOOD PRESSURE: 72 MMHG | HEART RATE: 89 BPM | BODY MASS INDEX: 17.24 KG/M2 | OXYGEN SATURATION: 98 % | SYSTOLIC BLOOD PRESSURE: 119 MMHG | WEIGHT: 101 LBS | HEIGHT: 64 IN | RESPIRATION RATE: 15 BRPM

## 2023-02-16 PROCEDURE — 99214 OFFICE O/P EST MOD 30 MIN: CPT

## 2023-02-16 RX ORDER — PANTOPRAZOLE 40 MG/1
40 TABLET, DELAYED RELEASE ORAL TWICE DAILY
Qty: 120 | Refills: 3 | Status: ACTIVE | COMMUNITY
Start: 2023-02-16 | End: 1900-01-01

## 2023-02-16 NOTE — ASSESSMENT
[FreeTextEntry1] : Mrs. Milton will start Protonix BID for her abdominal discomfort.   She will follow up with me in 2 months with repeat imaging.

## 2023-02-16 NOTE — HISTORY OF PRESENT ILLNESS
[No] : no [Yes] : Patient received adjuvant chemotherapy [5-FU based] : 5-FU based [de-identified] : Lucas Milton is a pleasant 77 year old female who returns for follow up\par \par She was initially seen as an in-house consult at [facility] on 9/9/21. \par She was sent in to the ED by her doctor  on 9/7/21 due to transaminitis and an abnormal MRCP showing a dilated PD and CBD. Patient reported poor appetite and unintentional 6 lb weight loss since July. She denied abdominal pain, fevers or chills. Reports that she noticed her urine was becoming darker and her skin was jaundiced. \par Patient underwent an EUS on 9/9/21 which showed a hypoechoic lesion in the pancreatic head, not involving the portal vein, measuring 1.7 x 1.8 cm which was biopsied. No stents were placed given that bile was free-flowing. \par \par Patient had a CT pancreatic protocol 9/11/21 that again showed a heterogeneously enhancing pancreatic head mass measuring 2.2 x 2.6 x 2.2 cm with a dilated PD to 1.2 cm and CBD of 1.9 cm. \par \par **SURGERY**\par She is s/p diagnostic laparoscopy & Whipple on 9/17/21, path:\par moderately differentiated invasive pancreatic adenocarcinoma, 2.6 cm, margins negative, LVI and PNI present, 13/41 LN positive, pT2 pN2\par \par Her past medical history  includes rheumatoid arthritis (on MTX), HLD, appendectomy and left renal cell cancer s/p left nephrectomy (2019). She states her brother also had pancreatic cancer and states he had a lesion in the middle of his pancreas which was resected 5 years ago without need for chemotherapy and he is currently doing well. Patient states her last colonoscopy was 2 years ago and reportedly normal. She also had upper endoscopies every 2 years for "gastric emptying" problems.\par \par She established care with Dr. Scanlon ([facility]) after mediport placement in , and has been on FOLFIRINOX\par \par 4/14/22- Mrs Milton has been continue with FOLFIRINOX therapy and is undergoing cycle 8.  She has developed left ankle pain.  She reports that there was concern for a liver lesion seen on imaging.  She seems to be in good spirits.\par \par Per Dr. Scanlon's note on 5/31/2022, Mrs Milton had a PET/CT on 5/23/2022 with findings of diffuse osseous activity likely related to marrow hyperproliferation secondary to recent chemo, otherwise no evidence of FDG malignant disease. Subcentimeter pulmonary nodular densities which are below the resolution of PET.\par \par Ms. Milton has received 11 doses of FOLFIRINOX as of September 2022, she developed severe anemia & thrombocytopenia while on Xeloda + RT.\par Per Dr. Scanlon's 9/2022 visit, she had a CT that showed no recurrent or metastatic disease. \par \par 10/27/2022:  Mrs. Milton returns with her  to office today.  She is in good spirits.  She has completed systemic therapy and radiation.  She did develop anemia and thrombocytopenia but states that is improved.  Her scans have been without disease progression.\par \par Most recent imaging is a CT abdomen (for urology) in December with no evidence of recurrence \par \par 2/16/2023- Mrs. Milton returns for a follow-up, reports that her last visit with Dr. Scanlon was around 6 weeks ago and remains on surveillance and off adjuvant therapy. She reports intermittent abdominal pain but is unsure if it is prompted by eating particularly oily/greasy foods, reports regular bowels movements, slow weight loss of around 2-5 lbs over the last 3-4 months.  [Recurrence of disease] : no recurrence of disease [TextBox_4] : 7/28/2022 [Was adjuvant therapy delayed due to surgical complications?] : Adjuvant therapy was not delayed due to surgical complications [TextBox_17] : FOLFIRINOX

## 2023-02-16 NOTE — CONSULT LETTER
[Dear  ___] : Dear  [unfilled], [Consult Letter:] : I had the pleasure of evaluating your patient, [unfilled]. [Please see my note below.] : Please see my note below. [Consult Closing:] : Thank you very much for allowing me to participate in the care of this patient.  If you have any questions, please do not hesitate to contact me. [Sincerely,] : Sincerely, [DrEverton  ___] : Dr. TRIPATHI [FreeTextEntry2] : Joesph Prado MD  [FreeTextEntry3] : Joseph Rainey MD\par Surgical Oncology\par [facility]\par Office: [phone]\par Cell: [phone]\par

## 2023-02-16 NOTE — PHYSICAL EXAM
[Normal] : supple, no neck mass and thyroid not enlarged [Normal Neck Lymph Nodes] : normal neck lymph nodes  [Normal Supraclavicular Lymph Nodes] : normal supraclavicular lymph nodes [Normal Groin Lymph Nodes] : normal groin lymph nodes [Normal Axillary Lymph Nodes] : normal axillary lymph nodes [Normal] : oriented to person, place and time, with appropriate affect [de-identified] : incision well healed.

## 2023-04-10 ENCOUNTER — APPOINTMENT (OUTPATIENT)
Dept: CT IMAGING | Facility: IMAGING CENTER | Age: 78
End: 2023-04-10

## 2023-04-17 ENCOUNTER — APPOINTMENT (OUTPATIENT)
Dept: SURGICAL ONCOLOGY | Facility: CLINIC | Age: 78
End: 2023-04-17
Payer: MEDICARE

## 2023-04-17 VITALS
BODY MASS INDEX: 17.24 KG/M2 | HEART RATE: 84 BPM | RESPIRATION RATE: 16 BRPM | HEIGHT: 64 IN | DIASTOLIC BLOOD PRESSURE: 67 MMHG | OXYGEN SATURATION: 99 % | WEIGHT: 101 LBS | SYSTOLIC BLOOD PRESSURE: 108 MMHG

## 2023-04-17 PROCEDURE — 99214 OFFICE O/P EST MOD 30 MIN: CPT

## 2023-04-17 NOTE — HISTORY OF PRESENT ILLNESS
[No] : no [Yes] : Patient received adjuvant chemotherapy [5-FU based] : 5-FU based [de-identified] : Lucas Milton is a pleasant 77 year old female who returns for follow up\par \par She was initially seen as an in-house consult at [facility] on 9/9/21. \par She was sent in to the ED by her doctor  on 9/7/21 due to transaminitis and an abnormal MRCP showing a dilated PD and CBD. Patient reported poor appetite and unintentional 6 lb weight loss since July. She denied abdominal pain, fevers or chills. Reports that she noticed her urine was becoming darker and her skin was jaundiced. \par Patient underwent an EUS on 9/9/21 which showed a hypoechoic lesion in the pancreatic head, not involving the portal vein, measuring 1.7 x 1.8 cm which was biopsied. No stents were placed given that bile was free-flowing. \par \par Patient had a CT pancreatic protocol 9/11/21 that again showed a heterogeneously enhancing pancreatic head mass measuring 2.2 x 2.6 x 2.2 cm with a dilated PD to 1.2 cm and CBD of 1.9 cm. \par \par **SURGERY**\par She is s/p diagnostic laparoscopy & Whipple on 9/17/21, path:\par moderately differentiated invasive pancreatic adenocarcinoma, 2.6 cm, margins negative, LVI and PNI present, 13/41 LN positive, pT2 pN2\par \par Her past medical history  includes rheumatoid arthritis (on MTX), HLD, appendectomy and left renal cell cancer s/p left nephrectomy (2019). She states her brother also had pancreatic cancer and states he had a lesion in the middle of his pancreas which was resected 5 years ago without need for chemotherapy and he is currently doing well. Patient states her last colonoscopy was 2 years ago and reportedly normal. She also had upper endoscopies every 2 years for "gastric emptying" problems.\par \par She established care with Dr. Scanlon ([facility]) after mediport placement in , and has been on FOLFIRINOX\par \par 4/14/22- Mrs Milton has been continue with FOLFIRINOX therapy and is undergoing cycle 8.  She has developed left ankle pain.  She reports that there was concern for a liver lesion seen on imaging.  She seems to be in good spirits.\par \par Per Dr. Scanlon's note on 5/31/2022, Mrs Milton had a PET/CT on 5/23/2022 with findings of diffuse osseous activity likely related to marrow hyperproliferation secondary to recent chemo, otherwise no evidence of FDG malignant disease. Subcentimeter pulmonary nodular densities which are below the resolution of PET.\par \par Ms. Milton has received 11 doses of FOLFIRINOX as of September 2022, she developed severe anemia & thrombocytopenia while on Xeloda + RT.\par Per Dr. Scanlon's 9/2022 visit, she had a CT that showed no recurrent or metastatic disease. \par \par 10/27/2022:  Mrs. Milton returns with her  to office today.  She is in good spirits.  She has completed systemic therapy and radiation.  She did develop anemia and thrombocytopenia but states that is improved.  Her scans have been without disease progression.\par \par Most recent imaging is a CT abdomen (for urology) in December with no evidence of recurrence \par \par 2/16/2023- Mrs. Milton returns for a follow-up, reports that her last visit with Dr. Scanlon was around 6 weeks ago and remains on surveillance and off adjuvant therapy. She reports intermittent abdominal pain but is unsure if it is prompted by eating particularly oily/greasy foods, reports regular bowels movements, slow weight loss of around 2-5 lbs over the last 3-4 months. \par \par CT abd/pelvis 3/30/23: No interval change. s/p Whipple procedure. No CT evidence of local recurrence or metastatic disease in the abdomen or pelvis  [Recurrence of disease] : no recurrence of disease [TextBox_4] : 7/28/2022 [Was adjuvant therapy delayed due to surgical complications?] : Adjuvant therapy was not delayed due to surgical complications [TextBox_17] : FOLFIRINOX

## 2023-04-17 NOTE — CONSULT LETTER
[Dear  ___] : Dear  [unfilled], [Consult Letter:] : I had the pleasure of evaluating your patient, [unfilled]. [Please see my note below.] : Please see my note below. [Consult Closing:] : Thank you very much for allowing me to participate in the care of this patient.  If you have any questions, please do not hesitate to contact me. [Sincerely,] : Sincerely, [FreeTextEntry2] : Joesph Prado MD  [FreeTextEntry3] : Joseph Rainey MD\par Surgical Oncology\par [facility]\par Office: [phone]\par Cell: [phone]\par  [DrEverton  ___] : Dr. TRIPATHI

## 2023-10-19 ENCOUNTER — APPOINTMENT (OUTPATIENT)
Dept: SURGICAL ONCOLOGY | Facility: CLINIC | Age: 78
End: 2023-10-19
Payer: MEDICARE

## 2023-10-26 ENCOUNTER — APPOINTMENT (OUTPATIENT)
Dept: SURGICAL ONCOLOGY | Facility: CLINIC | Age: 78
End: 2023-10-26
Payer: MEDICARE

## 2023-10-26 VITALS
RESPIRATION RATE: 16 BRPM | BODY MASS INDEX: 17.24 KG/M2 | OXYGEN SATURATION: 97 % | WEIGHT: 101 LBS | HEART RATE: 89 BPM | HEIGHT: 64 IN | DIASTOLIC BLOOD PRESSURE: 71 MMHG | SYSTOLIC BLOOD PRESSURE: 120 MMHG

## 2023-10-26 PROCEDURE — 99214 OFFICE O/P EST MOD 30 MIN: CPT

## 2023-12-19 ENCOUNTER — APPOINTMENT (OUTPATIENT)
Dept: UROLOGY | Facility: CLINIC | Age: 78
End: 2023-12-19
Payer: MEDICARE

## 2023-12-19 VITALS
RESPIRATION RATE: 17 BRPM | WEIGHT: 101 LBS | SYSTOLIC BLOOD PRESSURE: 152 MMHG | HEIGHT: 64 IN | TEMPERATURE: 97.8 F | DIASTOLIC BLOOD PRESSURE: 61 MMHG | BODY MASS INDEX: 17.24 KG/M2 | HEART RATE: 76 BPM

## 2023-12-19 DIAGNOSIS — C64.9 MALIGNANT NEOPLASM OF UNSPECIFIED KIDNEY, EXCEPT RENAL PELVIS: ICD-10-CM

## 2023-12-19 PROCEDURE — 99214 OFFICE O/P EST MOD 30 MIN: CPT

## 2023-12-19 NOTE — ASSESSMENT
[FreeTextEntry1] : Left nephrectomy done in 2019 for pT1a FG 2 CT done 10/2023  Neg for recurrence  Dr Adames will reimage in 6 months  Images done by Dr Rainey Will refer to us if necessary

## 2023-12-19 NOTE — HISTORY OF PRESENT ILLNESS
[FreeTextEntry1] : Left partial nephrectomy done in 2019 pT1a FG 2  Whipple done 2021 for  Pancreatic CA  not mets Done by Dr Rainey

## 2023-12-19 NOTE — PHYSICAL EXAM
[Normal Appearance] : normal appearance [Well Groomed] : well groomed [General Appearance - In No Acute Distress] : no acute distress [Edema] : no peripheral edema [Respiration, Rhythm And Depth] : normal respiratory rhythm and effort [Exaggerated Use Of Accessory Muscles For Inspiration] : no accessory muscle use [Abdomen Soft] : soft [Abdomen Tenderness] : non-tender [Costovertebral Angle Tenderness] : no ~M costovertebral angle tenderness [Urinary Bladder Findings] : the bladder was normal on palpation [Normal Station and Gait] : the gait and station were normal for the patient's age [] : no rash [No Focal Deficits] : no focal deficits [Oriented To Time, Place, And Person] : oriented to person, place, and time [Mood] : the mood was normal [Affect] : the affect was normal [No Palpable Adenopathy] : no palpable adenopathy

## 2023-12-26 ENCOUNTER — APPOINTMENT (OUTPATIENT)
Dept: UROLOGY | Facility: CLINIC | Age: 78
End: 2023-12-26

## 2024-01-29 NOTE — ASU PATIENT PROFILE, ADULT - HARM RISK FACTORS
KAMALA FROM HOSPICE CALLED WANTING TO KNOW IF YOU WOULD BE WILLING TO FOLLOW PT IN HOSPICE CARE.PLEASE CALL [phone]   no

## 2024-05-01 PROBLEM — C25.9 ADENOCARCINOMA OF PANCREAS: Status: ACTIVE | Noted: 2021-10-18

## 2024-05-02 ENCOUNTER — NON-APPOINTMENT (OUTPATIENT)
Age: 79
End: 2024-05-02

## 2024-05-02 ENCOUNTER — APPOINTMENT (OUTPATIENT)
Dept: SURGICAL ONCOLOGY | Facility: CLINIC | Age: 79
End: 2024-05-02
Payer: COMMERCIAL

## 2024-05-02 VITALS
BODY MASS INDEX: 17.42 KG/M2 | WEIGHT: 102 LBS | HEIGHT: 64 IN | HEART RATE: 102 BPM | RESPIRATION RATE: 17 BRPM | OXYGEN SATURATION: 98 % | SYSTOLIC BLOOD PRESSURE: 148 MMHG | DIASTOLIC BLOOD PRESSURE: 69 MMHG

## 2024-05-02 DIAGNOSIS — C25.9 MALIGNANT NEOPLASM OF PANCREAS, UNSPECIFIED: ICD-10-CM

## 2024-05-02 PROCEDURE — 99214 OFFICE O/P EST MOD 30 MIN: CPT

## 2024-05-03 NOTE — ASSESSMENT
[FreeTextEntry1] : Lucas will follow-up in 6 months, she will continue close follow-up with medical oncology, and repeat surveillance imaging.   All medical entries were at my, Dr. Joseph Rainey, direction. I have reviewed the chart and agree that the record accurately reflects my personal performance of the history, physical exam, assessment, and plan.  Our office nurse practitioner was present for the duration of the office visit.

## 2024-05-03 NOTE — CONSULT LETTER
[DrEverton ___] : Dr. TRIPATHI [FreeTextEntry2] : Joesph Prado MD  [FreeTextEntry3] : Joseph Rainey MD\par  Surgical Oncology\par  [facility]\par  Office: [phone]\par  Cell: [phone]\par

## 2024-05-03 NOTE — HISTORY OF PRESENT ILLNESS
[de-identified] : Lucas Milton is a pleasant 78-year-old female, primarily Nepali speaking, who returns for follow up.   She was initially seen as an in-house consult at [facility] on 9/9/21.  She was sent into the ED by her doctor on 9/7/21 due to transaminitis and an abnormal MRCP showing a dilated PD and CBD. Patient reported poor appetite and unintentional 6 lb weight loss since July. She denied abdominal pain, fevers or chills. Reports that she noticed her urine was becoming darker and her skin was jaundiced.  Patient underwent an EUS on 9/9/21 which showed a hypoechoic lesion in the pancreatic head, not involving the portal vein, measuring 1.7 x 1.8 cm which was biopsied. No stents were placed given that bile was free-flowing.   Patient had a CT pancreatic protocol 9/11/21 that again showed a heterogeneously enhancing pancreatic head mass measuring 2.2 x 2.6 x 2.2 cm with a dilated PD to 1.2 cm and CBD of 1.9 cm.   **SURGERY** She is s/p diagnostic laparoscopy & Whipple on 9/17/21, path: moderately differentiated invasive pancreatic adenocarcinoma, 2.6 cm, margins negative, LVI and PNI present, 13/41 LN positive, pT2 pN2  Her past medical history includes rheumatoid arthritis (on MTX), HLD, appendectomy and left renal cell cancer s/p left nephrectomy (2019). She states her brother also had pancreatic cancer and states he had a lesion in the middle of his pancreas which was resected 5 years ago without need for chemotherapy and he is currently doing well. Patient states her last colonoscopy was 2 years ago and reportedly normal. She also had upper endoscopies every 2 years for "gastric emptying" problems.  She established care with Dr. Scanlon ([facility]) after mediport placement in , and has been on FOLFIRINOX  4/14/22- Mrs Milton has continued with FOLFIRINOX therapy and is undergoing cycle 8.  She has developed left ankle pain.  She reports that there was concern for a liver lesion seen on imaging.  She seems to be in good spirits.  Per Dr. Scanlon's note on 5/31/2022, Mrs Milton had a PET/CT on 5/23/2022 with findings of diffuse osseous activity likely related to marrow hyperproliferation secondary to recent chemo, otherwise no evidence of FDG malignant disease. Subcentimeter pulmonary nodular densities which are below the resolution of PET.  Ms. Milton has received 11 doses of FOLFIRINOX as of September 2022, she developed severe anemia & thrombocytopenia while on Xeloda + RT. Per Dr. Scanlon's 9/2022 visit, she had a CT that showed no recurrent or metastatic disease.   10/27/2022:  Mrs. Milton returns with her  to office today.  She is in good spirits.  She has completed systemic therapy and radiation.  She did develop anemia and thrombocytopenia but states that is improved.  Her scans have been without disease progression.  CT abdomen (for urology) in December 2022 with no evidence of recurrence   2/16/2023- Mrs. Milton returns for a follow-up, reports that her last visit with Dr. Scanlon was around 6 weeks ago and remains on surveillance and off adjuvant therapy. She reports intermittent abdominal pain but is unsure if it is prompted by eating particularly oily/greasy foods, reports regular bowels movements, slow weight loss of around 2-5 lbs over the last 3-4 months.   CT A/P 3/30/23: No interval change. s/p Whipple procedure. No CT evidence of local recurrence or metastatic disease in the abdomen or pelvis   4/17/2023- Mrs. Milton will follow up in 6 months. She will continue using the ZenPep.  CT A/P 10/5/2023 ([facility]) - postsurgical changes of prior Whipple w/ no findings of new or worsening malignancy recurrence - new moderate superior endplate compression fracture of the T12 vertebral body w/ a question of a nondisplaced fracture through the tip of the T11 spinous process. No discretely visualized underlying bony lesion on the current or prior exam, recommend correlation w/ interval trauma & associated back pain.   10/26/2023 - Mrs. Milton returns for ongoing follow-up, she continues close follow-up with Dr. Scanlon. She reports vague abdominal pain since finishing RT in 2022 that was exacerbated after she pulled a muscle in her back July of this year, since then her abdomen & back pain has been 10/10 -- she reports seeing a spine surgeon in the past but has not followed up with anyone recently regarding recent CT findings. She started Alendronate a couple months ago for her osteoporosis as well.  Mrs. Milton will follow up in 6 months.  She will follow up with her spine surgeon she has seen in the past.  CT A/P 2/27/2024 (@ [facility]) - s/p Whipple w/ stable post op changes - no evidence of local tumor recurrence or metastatic disease within A/P   5/2/2024 - Lucas returns for ongoing follow-up, she continues to do well. She maintain close follow-up with medonc (Dr. Scanlon) & radonc (Dr. Sherman).      [Recurrence of disease] : no recurrence of disease [TextBox_4] : 7/28/2022 [Was adjuvant therapy delayed due to surgical complications?] : Adjuvant therapy was not delayed due to surgical complications [TextBox_17] : FOLFIRINOX

## 2024-07-07 ENCOUNTER — INPATIENT (INPATIENT)
Facility: HOSPITAL | Age: 79
LOS: 3 days | Discharge: HOME | DRG: 864 | End: 2024-07-11
Attending: INTERNAL MEDICINE | Admitting: INTERNAL MEDICINE
Payer: MEDICARE

## 2024-07-07 VITALS
OXYGEN SATURATION: 97 % | DIASTOLIC BLOOD PRESSURE: 74 MMHG | HEART RATE: 114 BPM | TEMPERATURE: 98 F | RESPIRATION RATE: 20 BRPM | SYSTOLIC BLOOD PRESSURE: 120 MMHG | HEIGHT: 60 IN | WEIGHT: 102.07 LBS

## 2024-07-07 DIAGNOSIS — Z90.410 ACQUIRED TOTAL ABSENCE OF PANCREAS: Chronic | ICD-10-CM

## 2024-07-07 DIAGNOSIS — Z90.5 ACQUIRED ABSENCE OF KIDNEY: Chronic | ICD-10-CM

## 2024-07-07 DIAGNOSIS — C25.9 MALIGNANT NEOPLASM OF PANCREAS, UNSPECIFIED: ICD-10-CM

## 2024-07-07 DIAGNOSIS — R50.9 FEVER, UNSPECIFIED: ICD-10-CM

## 2024-07-07 DIAGNOSIS — A41.9 SEPSIS, UNSPECIFIED ORGANISM: ICD-10-CM

## 2024-07-07 DIAGNOSIS — M06.9 RHEUMATOID ARTHRITIS, UNSPECIFIED: ICD-10-CM

## 2024-07-07 DIAGNOSIS — Z90.49 ACQUIRED ABSENCE OF OTHER SPECIFIED PARTS OF DIGESTIVE TRACT: Chronic | ICD-10-CM

## 2024-07-07 DIAGNOSIS — D53.9 NUTRITIONAL ANEMIA, UNSPECIFIED: ICD-10-CM

## 2024-07-07 LAB
ALBUMIN SERPL ELPH-MCNC: 3.7 G/DL — SIGNIFICANT CHANGE UP (ref 3.3–5)
ALP SERPL-CCNC: 77 U/L — SIGNIFICANT CHANGE UP (ref 40–120)
ALT FLD-CCNC: 29 U/L — SIGNIFICANT CHANGE UP (ref 10–45)
ANION GAP SERPL CALC-SCNC: 12 MMOL/L — SIGNIFICANT CHANGE UP (ref 5–17)
APPEARANCE UR: ABNORMAL
APTT BLD: 28.7 SEC — SIGNIFICANT CHANGE UP (ref 24.5–35.6)
AST SERPL-CCNC: 39 U/L — SIGNIFICANT CHANGE UP (ref 10–40)
BACTERIA # UR AUTO: NEGATIVE /HPF — SIGNIFICANT CHANGE UP
BASE EXCESS BLDV CALC-SCNC: -1.7 MMOL/L — SIGNIFICANT CHANGE UP (ref -2–3)
BASE EXCESS BLDV CALC-SCNC: -4.8 MMOL/L — LOW (ref -2–3)
BASOPHILS # BLD AUTO: 0 K/UL — SIGNIFICANT CHANGE UP (ref 0–0.2)
BASOPHILS NFR BLD AUTO: 0 % — SIGNIFICANT CHANGE UP (ref 0–2)
BILIRUB SERPL-MCNC: 0.4 MG/DL — SIGNIFICANT CHANGE UP (ref 0.2–1.2)
BILIRUB UR-MCNC: NEGATIVE — SIGNIFICANT CHANGE UP
BUN SERPL-MCNC: 18 MG/DL — SIGNIFICANT CHANGE UP (ref 7–23)
CA-I SERPL-SCNC: 1.19 MMOL/L — SIGNIFICANT CHANGE UP (ref 1.15–1.33)
CA-I SERPL-SCNC: 1.24 MMOL/L — SIGNIFICANT CHANGE UP (ref 1.15–1.33)
CALCIUM SERPL-MCNC: 9 MG/DL — SIGNIFICANT CHANGE UP (ref 8.4–10.5)
CAST: 2 /LPF — SIGNIFICANT CHANGE UP (ref 0–4)
CHLORIDE BLDV-SCNC: 108 MMOL/L — SIGNIFICANT CHANGE UP (ref 96–108)
CHLORIDE BLDV-SCNC: 110 MMOL/L — HIGH (ref 96–108)
CHLORIDE SERPL-SCNC: 106 MMOL/L — SIGNIFICANT CHANGE UP (ref 96–108)
CO2 BLDV-SCNC: 22 MMOL/L — SIGNIFICANT CHANGE UP (ref 22–26)
CO2 BLDV-SCNC: 26 MMOL/L — SIGNIFICANT CHANGE UP (ref 22–26)
CO2 SERPL-SCNC: 21 MMOL/L — LOW (ref 22–31)
COD CRY URNS QL: PRESENT
COLOR SPEC: YELLOW — SIGNIFICANT CHANGE UP
CREAT SERPL-MCNC: 0.89 MG/DL — SIGNIFICANT CHANGE UP (ref 0.5–1.3)
DIFF PNL FLD: NEGATIVE — SIGNIFICANT CHANGE UP
EGFR: 66 ML/MIN/1.73M2 — SIGNIFICANT CHANGE UP
EOSINOPHIL # BLD AUTO: 0 K/UL — SIGNIFICANT CHANGE UP (ref 0–0.5)
EOSINOPHIL NFR BLD AUTO: 0 % — SIGNIFICANT CHANGE UP (ref 0–6)
FLUAV AG NPH QL: SIGNIFICANT CHANGE UP
FLUBV AG NPH QL: SIGNIFICANT CHANGE UP
GAS PNL BLDV: 137 MMOL/L — SIGNIFICANT CHANGE UP (ref 136–145)
GAS PNL BLDV: 137 MMOL/L — SIGNIFICANT CHANGE UP (ref 136–145)
GAS PNL BLDV: SIGNIFICANT CHANGE UP
GLUCOSE BLDV-MCNC: 130 MG/DL — HIGH (ref 70–99)
GLUCOSE BLDV-MCNC: 99 MG/DL — SIGNIFICANT CHANGE UP (ref 70–99)
GLUCOSE SERPL-MCNC: 136 MG/DL — HIGH (ref 70–99)
GLUCOSE UR QL: NEGATIVE MG/DL — SIGNIFICANT CHANGE UP
HCO3 BLDV-SCNC: 21 MMOL/L — LOW (ref 22–29)
HCO3 BLDV-SCNC: 24 MMOL/L — SIGNIFICANT CHANGE UP (ref 22–29)
HCT VFR BLD CALC: 29.6 % — LOW (ref 34.5–45)
HCT VFR BLDA CALC: 26 % — LOW (ref 34.5–46.5)
HCT VFR BLDA CALC: 28 % — LOW (ref 34.5–46.5)
HGB BLD CALC-MCNC: 8.6 G/DL — LOW (ref 11.7–16.1)
HGB BLD CALC-MCNC: 9.2 G/DL — LOW (ref 11.7–16.1)
HGB BLD-MCNC: 9.7 G/DL — LOW (ref 11.5–15.5)
INR BLD: 0.96 RATIO — SIGNIFICANT CHANGE UP (ref 0.85–1.18)
KETONES UR-MCNC: NEGATIVE MG/DL — SIGNIFICANT CHANGE UP
LACTATE BLDV-MCNC: 1.9 MMOL/L — SIGNIFICANT CHANGE UP (ref 0.5–2)
LACTATE BLDV-MCNC: 2.6 MMOL/L — HIGH (ref 0.5–2)
LEUKOCYTE ESTERASE UR-ACNC: ABNORMAL
LIDOCAIN IGE QN: 5 U/L — LOW (ref 7–60)
LYMPHOCYTES # BLD AUTO: 0.6 K/UL — LOW (ref 1–3.3)
LYMPHOCYTES # BLD AUTO: 17.4 % — SIGNIFICANT CHANGE UP (ref 13–44)
MACROCYTES BLD QL: SIGNIFICANT CHANGE UP
MAGNESIUM SERPL-MCNC: 1.9 MG/DL — SIGNIFICANT CHANGE UP (ref 1.6–2.6)
MANUAL SMEAR VERIFICATION: SIGNIFICANT CHANGE UP
MCHC RBC-ENTMCNC: 32.8 GM/DL — SIGNIFICANT CHANGE UP (ref 32–36)
MCHC RBC-ENTMCNC: 34.8 PG — HIGH (ref 27–34)
MCV RBC AUTO: 106.1 FL — HIGH (ref 80–100)
METAMYELOCYTES # FLD: 0.9 % — HIGH (ref 0–0)
MONOCYTES # BLD AUTO: 0.03 K/UL — SIGNIFICANT CHANGE UP (ref 0–0.9)
MONOCYTES NFR BLD AUTO: 0.9 % — LOW (ref 2–14)
NEUTROPHILS # BLD AUTO: 2.8 K/UL — SIGNIFICANT CHANGE UP (ref 1.8–7.4)
NEUTROPHILS NFR BLD AUTO: 64.3 % — SIGNIFICANT CHANGE UP (ref 43–77)
NEUTS BAND # BLD: 16.5 % — HIGH (ref 0–8)
NITRITE UR-MCNC: NEGATIVE — SIGNIFICANT CHANGE UP
PCO2 BLDV: 42 MMHG — SIGNIFICANT CHANGE UP (ref 39–42)
PCO2 BLDV: 46 MMHG — HIGH (ref 39–42)
PH BLDV: 7.31 — LOW (ref 7.32–7.43)
PH BLDV: 7.33 — SIGNIFICANT CHANGE UP (ref 7.32–7.43)
PH UR: 6 — SIGNIFICANT CHANGE UP (ref 5–8)
PLAT MORPH BLD: NORMAL — SIGNIFICANT CHANGE UP
PLATELET # BLD AUTO: 106 K/UL — LOW (ref 150–400)
PO2 BLDV: 19 MMHG — LOW (ref 25–45)
PO2 BLDV: 41 MMHG — SIGNIFICANT CHANGE UP (ref 25–45)
POTASSIUM BLDV-SCNC: 3.4 MMOL/L — LOW (ref 3.5–5.1)
POTASSIUM BLDV-SCNC: 4.2 MMOL/L — SIGNIFICANT CHANGE UP (ref 3.5–5.1)
POTASSIUM SERPL-MCNC: 4 MMOL/L — SIGNIFICANT CHANGE UP (ref 3.5–5.3)
POTASSIUM SERPL-SCNC: 4 MMOL/L — SIGNIFICANT CHANGE UP (ref 3.5–5.3)
PROT SERPL-MCNC: 6.6 G/DL — SIGNIFICANT CHANGE UP (ref 6–8.3)
PROT UR-MCNC: 30 MG/DL
PROTHROM AB SERPL-ACNC: 10.6 SEC — SIGNIFICANT CHANGE UP (ref 9.5–13)
RBC # BLD: 2.79 M/UL — LOW (ref 3.8–5.2)
RBC # FLD: 14.5 % — SIGNIFICANT CHANGE UP (ref 10.3–14.5)
RBC BLD AUTO: ABNORMAL
RBC CASTS # UR COMP ASSIST: 14 /HPF — HIGH (ref 0–4)
RSV RNA NPH QL NAA+NON-PROBE: SIGNIFICANT CHANGE UP
SAO2 % BLDV: 23.3 % — LOW (ref 67–88)
SAO2 % BLDV: 66.9 % — LOW (ref 67–88)
SARS-COV-2 RNA SPEC QL NAA+PROBE: SIGNIFICANT CHANGE UP
SODIUM SERPL-SCNC: 139 MMOL/L — SIGNIFICANT CHANGE UP (ref 135–145)
SP GR SPEC: 1.02 — SIGNIFICANT CHANGE UP (ref 1–1.03)
SQUAMOUS # UR AUTO: 1 /HPF — SIGNIFICANT CHANGE UP (ref 0–5)
UROBILINOGEN FLD QL: 1 MG/DL — SIGNIFICANT CHANGE UP (ref 0.2–1)
WBC # BLD: 3.46 K/UL — LOW (ref 3.8–10.5)
WBC # FLD AUTO: 3.46 K/UL — LOW (ref 3.8–10.5)
WBC UR QL: 2 /HPF — SIGNIFICANT CHANGE UP (ref 0–5)

## 2024-07-07 PROCEDURE — 74177 CT ABD & PELVIS W/CONTRAST: CPT | Mod: 26,MC

## 2024-07-07 PROCEDURE — 71250 CT THORAX DX C-: CPT | Mod: 26

## 2024-07-07 PROCEDURE — 99223 1ST HOSP IP/OBS HIGH 75: CPT

## 2024-07-07 PROCEDURE — 99285 EMERGENCY DEPT VISIT HI MDM: CPT

## 2024-07-07 RX ORDER — MAGNESIUM, ALUMINUM HYDROXIDE 400-400
30 TABLET,CHEWABLE ORAL EVERY 4 HOURS
Refills: 0 | Status: DISCONTINUED | OUTPATIENT
Start: 2024-07-07 | End: 2024-07-11

## 2024-07-07 RX ORDER — SIMETHICONE 40MG/0.6ML
80 SUSPENSION, DROPS(FINAL DOSAGE FORM)(ML) ORAL ONCE
Refills: 0 | Status: COMPLETED | OUTPATIENT
Start: 2024-07-07 | End: 2024-07-07

## 2024-07-07 RX ORDER — LIPASE/PROTEASE/AMYLASE 4.5-25-20K
2 CAPSULE,DELAYED RELEASE (ENTERIC COATED) ORAL
Refills: 0 | Status: DISCONTINUED | OUTPATIENT
Start: 2024-07-07 | End: 2024-07-11

## 2024-07-07 RX ORDER — PIPERACILLIN SODIUM AND TAZOBACTAM SODIUM 3; .375 G/15ML; G/15ML
3.38 INJECTION, POWDER, LYOPHILIZED, FOR SOLUTION INTRAVENOUS ONCE
Refills: 0 | Status: COMPLETED | OUTPATIENT
Start: 2024-07-07 | End: 2024-07-07

## 2024-07-07 RX ORDER — PIPERACILLIN SODIUM AND TAZOBACTAM SODIUM 3; .375 G/15ML; G/15ML
3.38 INJECTION, POWDER, LYOPHILIZED, FOR SOLUTION INTRAVENOUS EVERY 8 HOURS
Refills: 0 | Status: DISCONTINUED | OUTPATIENT
Start: 2024-07-07 | End: 2024-07-08

## 2024-07-07 RX ORDER — SODIUM CHLORIDE 0.9 % (FLUSH) 0.9 %
1000 SYRINGE (ML) INJECTION ONCE
Refills: 0 | Status: COMPLETED | OUTPATIENT
Start: 2024-07-07 | End: 2024-07-07

## 2024-07-07 RX ORDER — METHOTREXATE 25 MG/.4ML
5 INJECTION, SOLUTION SUBCUTANEOUS
Refills: 0 | DISCHARGE

## 2024-07-07 RX ORDER — ESOMEPRAZOLE SODIUM 40 MG/5ML
1 INJECTION INTRAVENOUS
Refills: 0 | DISCHARGE

## 2024-07-07 RX ORDER — SODIUM CHLORIDE 0.9 % (FLUSH) 0.9 %
1000 SYRINGE (ML) INJECTION
Refills: 0 | Status: DISCONTINUED | OUTPATIENT
Start: 2024-07-07 | End: 2024-07-11

## 2024-07-07 RX ORDER — LIPASE/PROTEASE/AMYLASE 4.5-25-20K
1 CAPSULE,DELAYED RELEASE (ENTERIC COATED) ORAL
Refills: 0 | DISCHARGE

## 2024-07-07 RX ORDER — VANCOMYCIN HYDROCHLORIDE 50 MG/ML
1000 KIT ORAL ONCE
Refills: 0 | Status: COMPLETED | OUTPATIENT
Start: 2024-07-07 | End: 2024-07-07

## 2024-07-07 RX ORDER — ACETAMINOPHEN 325 MG
650 TABLET ORAL EVERY 6 HOURS
Refills: 0 | Status: DISCONTINUED | OUTPATIENT
Start: 2024-07-07 | End: 2024-07-11

## 2024-07-07 RX ORDER — ONDANSETRON HYDROCHLORIDE 2 MG/ML
4 INJECTION INTRAMUSCULAR; INTRAVENOUS EVERY 8 HOURS
Refills: 0 | Status: DISCONTINUED | OUTPATIENT
Start: 2024-07-07 | End: 2024-07-11

## 2024-07-07 RX ADMIN — VANCOMYCIN HYDROCHLORIDE 250 MILLIGRAM(S): KIT at 11:51

## 2024-07-07 RX ADMIN — PIPERACILLIN SODIUM AND TAZOBACTAM SODIUM 3.38 GRAM(S): 3; .375 INJECTION, POWDER, LYOPHILIZED, FOR SOLUTION INTRAVENOUS at 11:38

## 2024-07-07 RX ADMIN — Medication 1000 MILLILITER(S): at 10:04

## 2024-07-07 RX ADMIN — PIPERACILLIN SODIUM AND TAZOBACTAM SODIUM 200 GRAM(S): 3; .375 INJECTION, POWDER, LYOPHILIZED, FOR SOLUTION INTRAVENOUS at 11:08

## 2024-07-07 RX ADMIN — Medication 1000 MILLILITER(S): at 11:45

## 2024-07-07 RX ADMIN — Medication 1000 MILLILITER(S): at 13:58

## 2024-07-07 RX ADMIN — Medication 80 MILLIGRAM(S): at 21:42

## 2024-07-07 RX ADMIN — VANCOMYCIN HYDROCHLORIDE 1000 MILLIGRAM(S): KIT at 13:00

## 2024-07-07 NOTE — H&P ADULT - PROBLEM SELECTOR PLAN 2
a/w thrombocytopenia  hgb appears to be stable and at baseline  no gross bleeding in er, hds otherwise  follow up anemia work up  Monitor hgb w serial CBC; Goal Hb >7 g/dl,  Plt >10k, >20k if septic, >50k if actively bleeding  maintain adequate PIV and active type and screen; transfuse blood product as needed to maintain goal

## 2024-07-07 NOTE — ED PROVIDER NOTE - OBJECTIVE STATEMENT
77 yo F with a PMH of pancreatic adenocarcinoma sp whipple, chemo/RT, renal cell CA sp left nephrectomy, rheumatoid arthritis, HLD, anemia. p/w chills x last night. Reports taking Tylenol for subjective fever. Today felt she had a fever, checked her temp which was 100.2. Started to develop diffuse abd pain and nausea/vomiting x 1 today after eating. Denies chest pain, cough, short of breath, sore throat, dysuria, hematuria, diarrhea, headache, dizziness, myalgias.

## 2024-07-07 NOTE — H&P ADULT - HISTORY OF PRESENT ILLNESS
77yo 46kg f w pmh hld, ra, gastroparesis, pancreatic adenoca s/p whipple + chemo + rt, rcc s/p l nephrectomy, p/w fever + chills; in er, meeting severe sepsis criteria; unclear source; admit to medicine for further mgmt

## 2024-07-07 NOTE — ED ADULT NURSE NOTE - OBJECTIVE STATEMENT
0915 78 yr old  female brought to ER via ambulance on stretcher for further eval and tx  of fever, N/V and abd pain this AM. No c/o abd pain at present. States vomited 6 times this morning. PMH Pancreatic Cancer with surgery done 2 yrs ago. Last chemo 2 yrs ago. A&Ox4. ambulated to bathroom to void.  Dr dutton pt. Denies dizziness, dysuria, cough, congestion or SOB. Droplet isolation maintained. Fall risk precautions maintained

## 2024-07-07 NOTE — H&P ADULT - NSHPPHYSICALEXAM_GEN_ALL_CORE
T(C): 36.9 (07-07-24 @ 22:45), Max: 38.8 (07-07-24 @ 10:00)  HR: 73 (07-07-24 @ 22:45) (73 - 114)  BP: 95/59 (07-07-24 @ 22:45) (90/62 - 120/74)  RR: 18 (07-07-24 @ 22:45) (18 - 20)  SpO2: 99% (07-07-24 @ 22:45) (97% - 100%)  GENERAL: NAD, lying in bed    EYES: EOMI, PERRLA; conjunctiva and sclera clear  ENMT: Moist oral mucosa, no pharyngeal injection or exudates   NECK: Supple, no palpable masses; no JVD  RESPIRATORY: Normal respiratory effort; lungs are clear to auscultation bilaterally  CARDIOVASCULAR: Regular rate and rhythm, normal S1 and S2, no murmur/rub/gallop; No lower extremity edema; Peripheral pulses are 2+ bilaterally  ABDOMEN: Nontender to palpation, normoactive bowel sounds, no rebound/guarding   MUSCULOSKELETAL: no joint swelling or tenderness to palpation  PSYCH: A+O to person, place, and time; affect appropriate  NEUROLOGY: CN 2-12 are intact and symmetric; no gross motor or sensory deficits   SKIN: No rashes; no palpable lesions

## 2024-07-07 NOTE — PATIENT PROFILE ADULT - FALL HARM RISK - HARM RISK INTERVENTIONS

## 2024-07-07 NOTE — ED PROVIDER NOTE - ATTENDING APP SHARED VISIT CONTRIBUTION OF CARE
PMD  Meet Surg Onc, Wasil Onc, Joesph Prado pcp [place]   [id number] Diony  78-year-old female past medical history traumatic adenocarcinoma post Whipple, status post chemo RT rheumatoid arthritis, HLD, renal cell CA/status post left nephrectomy, anemia.  Comes to ER for of illness beginning last night starting with chills, took some Tylenol felt improved.  This morning symptoms returned associated with abdominal pain nausea vomiting.  There is no cough, short of breath, sore throat, dysuria, hematuria, GI bleed.  Abdominal pain has improved somewhat since this morning secondary to the hospital.  Physical exam adult female awake alert NAD  HEENT normocephalic atraumatic.  Chest clear A&P.  CV no rubs gallops murmurs.  Abdomen midline and surgical scar well-healed.  Mild generalized central abdominal tenderness without rebound guarding or masses.  Positive bowel sounds, there is no CVA tenderness  Neuro GCS 15 speech fluent power 5/5 all extr  Rey Palomo MD, Facep

## 2024-07-07 NOTE — H&P ADULT - NSICDXPASTSURGICALHX_GEN_ALL_CORE_FT
PAST SURGICAL HISTORY:  H/O left nephrectomy     H/O Whipple procedure     S/P appendectomy 1969

## 2024-07-07 NOTE — H&P ADULT - PROBLEM SELECTOR PLAN 1
febrile, tachycardic, bandemia, + lactic acidosis (resolved); meets severe sepsis criteria  pan imaging with no clear source  s/p 2 L ns + vanc and zosyn in ER  follow up blood culture, procal, full rvp   Monitor for fever, changes in white count  broad spec empirical abx therapy w zosyn for now  ivf resusci + lytes as needed.

## 2024-07-07 NOTE — H&P ADULT - PROBLEM SELECTOR PLAN 3
h/o panc adenoca s/p whipple + adjuvant chemo + rt  outpatient surg onc documentation reviewed  current imaging showing, "...Areas of scarring architectural distortion with subpleural-based thickening, and bronchiectasis within the right upper and right middle lobes is similar to exam from 9/13/2021. 1.2 cm cavitary lesion in the left lower lobe is similar to exam from 2/27/2024, but new from 9/13/2021. 7 mm mixed solid/groundglass nodule in the right lower lobe is new from 9/13/2021.Two mixed solid and groundglass lesions in the left upper lobe are new from 9/13/2021, the largest of which measures 6 mm"  [facility] consult in am

## 2024-07-07 NOTE — H&P ADULT - NSICDXPASTMEDICALHX_GEN_ALL_CORE_FT
PAST MEDICAL HISTORY:  Arthritis rheumatoid    Gastroparesis     HLD (hyperlipidemia)     Pancreatic cancer     Renal cell carcinoma     Rheumatoid arthritis

## 2024-07-07 NOTE — H&P ADULT - NSHPREVIEWOFSYSTEMS_GEN_ALL_CORE
CONSTITUTIONAL: No fever. no weakness  ENMT:  No sinus or throat pain  RESPIRATORY: No cough, wheezing, chills or hemoptysis; No shortness of breath  CARDIOVASCULAR: No chest pain, palpitations, dizziness, or leg swelling  GASTROINTESTINAL: No abdominal or epigastric pain. + nausea, vomiting, no hematemesis; No diarrhea or constipation. No melena or hematochezia.  GENITOURINARY: No dysuria or incontinence  NEUROLOGICAL: No headaches, memory loss, loss of strength, numbness, or tremors  SKIN: No rashes,  No hives or eczema  ENDOCRINE: No heat or cold intolerance; No hair loss  MUSCULOSKELETAL: No joint pain or swelling; No muscle, back, or extremity pain  PSYCHIATRIC: No depression, anxiety, mood swings, or difficulty sleeping  HEME/LYMPH: No easy bruising, or bleeding gums; no enlarged LN

## 2024-07-07 NOTE — ED PROVIDER NOTE - CLINICAL SUMMARY MEDICAL DECISION MAKING FREE TEXT BOX
Adult female history of pancreatic CA status post Whipple, chemo RT, renal cell CA.  Patient comes to ER with 1 day history of tactile fever Tmax 100.1, with nausea vomiting and abdominal pain.  Exam is notable for mild tenderness without rebound guarding or masses,  There are no urinary symptoms or respiratory symptoms.  Plan CT abdomen pelvis excluding abdominal pathology, fever workup including RVP/flu,  UA, IV fluids.  Reassess.  Rey Palomo MD, Facep Adult female history of pancreatic CA status post Whipple, chemo RT, renal cell CA.  Patient comes to ER with 1 day history of tactile fever Tmax 100.1, with nausea vomiting and abdominal pain.  Exam is notable for mild tenderness without rebound guarding or masses,  There are no urinary symptoms or respiratory symptoms.  Plan CT abdomen pelvis excluding abdominal pathology, fever workup including RVP/flu,  UA, IV fluids.  Reassess.    Rey Palomo MD, Facep

## 2024-07-07 NOTE — ED PROVIDER NOTE - PHYSICAL EXAMINATION
CONSTITUTIONAL: Well appearing and in no apparent distress.  ENT: Airway patent, moist mucous membranes.   EYES: Pupils equal, round and reactive to light. EOMI. Conjunctiva normal appearing.   CARDIAC: Normal rate, regular rhythm.  Heart sounds S1, S2.    RESPIRATORY: Breath sounds clear and equal bilaterally.   GASTROINTESTINAL: Abdomen soft, mild diffuse TTP, not distended. No rebound or guarding. No CVAT bilaterally.   MUSCULOSKELETAL: Spine appears normal.  NEUROLOGICAL: Alert and oriented x3, nonfocal.

## 2024-07-07 NOTE — ED PROVIDER NOTE - PROGRESS NOTE DETAILS
rectal temperature performed 101.9, lactate elevated antibiotics prescribed workup continuing.  Rey Palomo MD, Facep PVR approx 120cc. Esme Harvey PA-C Received signout pending CT results and admission and briefly 78-year-old female with history of pancreatic cancer and nephrectomy presenting with nausea vomiting fevers initially hypotensive responded after pleural fluid boluses - Bin Bonilla MD attending physician Pt updated on CT results. States she was born in [place] and received bcg vaccine. Cavitary lesions documented as new. Will admit for further w/u. Pt placed on isolation precautions. Esme Harvey PA-C

## 2024-07-08 DIAGNOSIS — C64.9 MALIGNANT NEOPLASM OF UNSPECIFIED KIDNEY, EXCEPT RENAL PELVIS: ICD-10-CM

## 2024-07-08 DIAGNOSIS — R78.81 BACTEREMIA: ICD-10-CM

## 2024-07-08 DIAGNOSIS — J98.4 OTHER DISORDERS OF LUNG: ICD-10-CM

## 2024-07-08 LAB
A1C WITH ESTIMATED AVERAGE GLUCOSE RESULT: 5.9 % — HIGH (ref 4–5.6)
ALBUMIN SERPL ELPH-MCNC: 3.2 G/DL — LOW (ref 3.3–5)
ALP SERPL-CCNC: 68 U/L — SIGNIFICANT CHANGE UP (ref 40–120)
ALT FLD-CCNC: 48 U/L — HIGH (ref 10–45)
ANION GAP SERPL CALC-SCNC: 9 MMOL/L — SIGNIFICANT CHANGE UP (ref 5–17)
APTT BLD: 32.9 SEC — SIGNIFICANT CHANGE UP (ref 24.5–35.6)
AST SERPL-CCNC: 56 U/L — HIGH (ref 10–40)
BASOPHILS # BLD AUTO: 0.02 K/UL — SIGNIFICANT CHANGE UP (ref 0–0.2)
BASOPHILS NFR BLD AUTO: 0.3 % — SIGNIFICANT CHANGE UP (ref 0–2)
BILIRUB SERPL-MCNC: 0.3 MG/DL — SIGNIFICANT CHANGE UP (ref 0.2–1.2)
BUN SERPL-MCNC: 12 MG/DL — SIGNIFICANT CHANGE UP (ref 7–23)
CALCIUM SERPL-MCNC: 8.4 MG/DL — SIGNIFICANT CHANGE UP (ref 8.4–10.5)
CHLORIDE SERPL-SCNC: 117 MMOL/L — HIGH (ref 96–108)
CHOLEST SERPL-MCNC: 99 MG/DL — SIGNIFICANT CHANGE UP
CO2 SERPL-SCNC: 21 MMOL/L — LOW (ref 22–31)
CREAT SERPL-MCNC: 0.97 MG/DL — SIGNIFICANT CHANGE UP (ref 0.5–1.3)
CULTURE RESULTS: SIGNIFICANT CHANGE UP
E COLI DNA BLD POS QL NAA+NON-PROBE: SIGNIFICANT CHANGE UP
EGFR: 59 ML/MIN/1.73M2 — LOW
EOSINOPHIL # BLD AUTO: 0.07 K/UL — SIGNIFICANT CHANGE UP (ref 0–0.5)
EOSINOPHIL NFR BLD AUTO: 1.1 % — SIGNIFICANT CHANGE UP (ref 0–6)
ESTIMATED AVERAGE GLUCOSE: 123 MG/DL — HIGH (ref 68–114)
FERRITIN SERPL-MCNC: 114 NG/ML — SIGNIFICANT CHANGE UP (ref 13–330)
FOLATE SERPL-MCNC: 14 NG/ML — SIGNIFICANT CHANGE UP
GLUCOSE SERPL-MCNC: 85 MG/DL — SIGNIFICANT CHANGE UP (ref 70–99)
GRAM STN FLD: ABNORMAL
HAPTOGLOB SERPL-MCNC: 62 MG/DL — SIGNIFICANT CHANGE UP (ref 34–200)
HCT VFR BLD CALC: 28.1 % — LOW (ref 34.5–45)
HDLC SERPL-MCNC: 51 MG/DL — SIGNIFICANT CHANGE UP
HGB BLD-MCNC: 8.5 G/DL — LOW (ref 11.5–15.5)
IMM GRANULOCYTES NFR BLD AUTO: 0.5 % — SIGNIFICANT CHANGE UP (ref 0–0.9)
INR BLD: 1.1 RATIO — SIGNIFICANT CHANGE UP (ref 0.85–1.18)
IRON SATN MFR SERPL: 11 % — LOW (ref 14–50)
IRON SATN MFR SERPL: 22 UG/DL — LOW (ref 30–160)
LDH SERPL L TO P-CCNC: 165 U/L — SIGNIFICANT CHANGE UP (ref 50–242)
LIPID PNL WITH DIRECT LDL SERPL: 35 MG/DL — SIGNIFICANT CHANGE UP
LYMPHOCYTES # BLD AUTO: 1.23 K/UL — SIGNIFICANT CHANGE UP (ref 1–3.3)
LYMPHOCYTES # BLD AUTO: 18.9 % — SIGNIFICANT CHANGE UP (ref 13–44)
MCHC RBC-ENTMCNC: 30.2 GM/DL — LOW (ref 32–36)
MCHC RBC-ENTMCNC: 32.9 PG — SIGNIFICANT CHANGE UP (ref 27–34)
MCV RBC AUTO: 108.9 FL — HIGH (ref 80–100)
METHOD TYPE: SIGNIFICANT CHANGE UP
MONOCYTES # BLD AUTO: 0.37 K/UL — SIGNIFICANT CHANGE UP (ref 0–0.9)
MONOCYTES NFR BLD AUTO: 5.7 % — SIGNIFICANT CHANGE UP (ref 2–14)
NEUTROPHILS # BLD AUTO: 4.8 K/UL — SIGNIFICANT CHANGE UP (ref 1.8–7.4)
NEUTROPHILS NFR BLD AUTO: 73.5 % — SIGNIFICANT CHANGE UP (ref 43–77)
NON HDL CHOLESTEROL: 48 MG/DL — SIGNIFICANT CHANGE UP
NRBC # BLD: 0 /100 WBCS — SIGNIFICANT CHANGE UP (ref 0–0)
PLATELET # BLD AUTO: 108 K/UL — LOW (ref 150–400)
POTASSIUM SERPL-MCNC: 4.4 MMOL/L — SIGNIFICANT CHANGE UP (ref 3.5–5.3)
POTASSIUM SERPL-SCNC: 4.4 MMOL/L — SIGNIFICANT CHANGE UP (ref 3.5–5.3)
PROCALCITONIN SERPL-MCNC: 17.68 NG/ML — HIGH (ref 0.02–0.1)
PROT SERPL-MCNC: 5.7 G/DL — LOW (ref 6–8.3)
PROTHROM AB SERPL-ACNC: 11.5 SEC — SIGNIFICANT CHANGE UP (ref 9.5–13)
RAPID RVP RESULT: SIGNIFICANT CHANGE UP
RBC # BLD: 2.58 M/UL — LOW (ref 3.8–5.2)
RBC # BLD: 2.58 M/UL — LOW (ref 3.8–5.2)
RBC # FLD: 15.2 % — HIGH (ref 10.3–14.5)
RETICS #: 36.4 K/UL — SIGNIFICANT CHANGE UP (ref 25–125)
RETICS/RBC NFR: 1.4 % — SIGNIFICANT CHANGE UP (ref 0.5–2.5)
SARS-COV-2 RNA SPEC QL NAA+PROBE: SIGNIFICANT CHANGE UP
SODIUM SERPL-SCNC: 147 MMOL/L — HIGH (ref 135–145)
SPECIMEN SOURCE: SIGNIFICANT CHANGE UP
TIBC SERPL-MCNC: 206 UG/DL — LOW (ref 220–430)
TRIGL SERPL-MCNC: 54 MG/DL — SIGNIFICANT CHANGE UP
UIBC SERPL-MCNC: 185 UG/DL — SIGNIFICANT CHANGE UP (ref 110–370)
VIT B12 SERPL-MCNC: 673 PG/ML — SIGNIFICANT CHANGE UP (ref 232–1245)
WBC # BLD: 6.52 K/UL — SIGNIFICANT CHANGE UP (ref 3.8–10.5)
WBC # FLD AUTO: 6.52 K/UL — SIGNIFICANT CHANGE UP (ref 3.8–10.5)

## 2024-07-08 PROCEDURE — 99222 1ST HOSP IP/OBS MODERATE 55: CPT

## 2024-07-08 RX ORDER — DEXTROSE MONOHYDRATE AND SODIUM CHLORIDE 5; .3 G/100ML; G/100ML
1000 INJECTION, SOLUTION INTRAVENOUS
Refills: 0 | Status: DISCONTINUED | OUTPATIENT
Start: 2024-07-08 | End: 2024-07-11

## 2024-07-08 RX ORDER — CEFTRIAXONE SODIUM 500 MG
1000 VIAL (EA) INJECTION EVERY 24 HOURS
Refills: 0 | Status: DISCONTINUED | OUTPATIENT
Start: 2024-07-08 | End: 2024-07-11

## 2024-07-08 RX ORDER — METRONIDAZOLE 500 MG/1
500 TABLET ORAL EVERY 12 HOURS
Refills: 0 | Status: DISCONTINUED | OUTPATIENT
Start: 2024-07-08 | End: 2024-07-11

## 2024-07-08 RX ADMIN — PIPERACILLIN SODIUM AND TAZOBACTAM SODIUM 25 GRAM(S): 3; .375 INJECTION, POWDER, LYOPHILIZED, FOR SOLUTION INTRAVENOUS at 13:36

## 2024-07-08 RX ADMIN — DEXTROSE MONOHYDRATE AND SODIUM CHLORIDE 75 MILLILITER(S): 5; .3 INJECTION, SOLUTION INTRAVENOUS at 11:50

## 2024-07-08 RX ADMIN — Medication 2 CAPSULE(S): at 17:51

## 2024-07-08 RX ADMIN — Medication 2 CAPSULE(S): at 11:52

## 2024-07-08 RX ADMIN — PIPERACILLIN SODIUM AND TAZOBACTAM SODIUM 25 GRAM(S): 3; .375 INJECTION, POWDER, LYOPHILIZED, FOR SOLUTION INTRAVENOUS at 05:04

## 2024-07-08 RX ADMIN — Medication 100 MILLIGRAM(S): at 22:56

## 2024-07-08 RX ADMIN — Medication 1 APPLICATION(S): at 11:51

## 2024-07-08 NOTE — CONSULT NOTE ADULT - SUBJECTIVE AND OBJECTIVE BOX
Reason for consult: pancreatic adenoca    HPI:  79yo 46kg f w pmh hld, ra, gastroparesis, pancreatic adenoca s/p whipple + chemo + rt, rcc s/p l nephrectomy, p/w fever + chills; in er, meeting severe sepsis criteria; unclear source; admit to medicine for further mgmt (2024 23:24)    Heme/onc consulted on this 77 y/o female with Pancreatic cancer, s/p Whipple, s/p adjuvant FOLFIRINOX, (has received 11 doses so far) developed severe anemia and TCP while on xeloda + RT and needed to stop Rx, BM showed hypocellularity.   Follows with Dr. Scanlon at [facility]. Has pancytopenia since the chemo and RT. Observe and monitor from pancreatic cancer stand point. 2024 CT CAP:   1. Slight interval increase in size of lower lobe pulmonary nodules partially included on prior abdominal  imaging, the largest of which measures 1.3 cm in diameter and demonstrates central cavitation in the left  lower lobe. These findings are suspicious for worsening pulmonary metastatic disease. Continued close  attention on follow-up recommended.  2. Postsurgical changes of Whipple procedure without discrete evidence for new or worsening metastasis in  the abdomen or pelvis     PAST MEDICAL & SURGICAL HISTORY:  Arthritis  rheumatoid      Pancreatic cancer      Rheumatoid arthritis      HLD (hyperlipidemia)      Renal cell carcinoma      Gastroparesis      S/P appendectomy  1969      H/O Whipple procedure      H/O left nephrectomy          FAMILY HISTORY:      Alochol: Denied  Smoking: Nonsmoker  Drug Use: Denied  Marital Status:         Allergies    No Known Allergies    Intolerances        MEDICATIONS  (STANDING):  chlorhexidine 2% Cloths 1 Application(s) Topical daily  dextrose 5% + sodium chloride 0.45%. 1000 milliLiter(s) (75 mL/Hr) IV Continuous <Continuous>  pancrelipase  (CREON 36,000 Lipase Units) 2 Capsule(s) Oral three times a day with meals  piperacillin/tazobactam IVPB.. 3.375 Gram(s) IV Intermittent every 8 hours  sodium chloride 0.9%. 1000 milliLiter(s) (100 mL/Hr) IV Continuous <Continuous>    MEDICATIONS  (PRN):  acetaminophen     Tablet .. 650 milliGRAM(s) Oral every 6 hours PRN Temp greater or equal to 38C (100.4F), Mild Pain (1 - 3)  aluminum hydroxide/magnesium hydroxide/simethicone Suspension 30 milliLiter(s) Oral every 4 hours PRN Dyspepsia  melatonin 3 milliGRAM(s) Oral at bedtime PRN Insomnia  ondansetron Injectable 4 milliGRAM(s) IV Push every 8 hours PRN Nausea and/or Vomiting      ROS  Fever, chills  No epistaxis, HA, sore throat  No CP, SOB, cough, sputum  No n/v/d, abd pain, melena, hematochezia  No edema  No rash  No anxiety  No back pain, joint pain  No bleeding, bruising  No dysuria, hematuria    T(C): 37.1 (24 @ 04:25), Max: 37.3 (24 @ 14:00)  HR: 64 (24 @ 08:02) (64 - 93)  BP: 106/66 (24 @ 08:02) (90/62 - 119/61)  RR: 18 (24 @ 08:02) (18 - 18)  SpO2: 97% (24 @ 08:02) (95% - 99%)  Wt(kg): --    PE  NAD  Awake, alert  Anicteric, MMM  RRR  CTAB  Abd soft, NT, ND  No c/c/e  No rash grossly  FROM                          8.5    6.52  )-----------( 108      ( 2024 09:00 )             28.1           147<H>  |  117<H>  |  12  ----------------------------<  85  4.4   |  21<L>  |  0.97    Ca    8.4      2024 09:00  Mg     1.9         TPro  5.7<L>  /  Alb  3.2<L>  /  TBili  0.3  /  DBili  x   /  AST  56<H>  /  ALT  48<H>  /  AlkPhos  68      Patient: RICARDO ALMONTE Exam Date: 2024 08:00 AM   / Gender: 1945 , F ACC: [id number]  MRN: [id number] Phys: MD FITZ, JAMES  Location: [facility]  Exam Description: CT ABDOMEN PELVIS W/IV & ORAL CONTRAST  CT CHEST W/IV CONTRAST  Page 1 of 2  Click for Image Link  FINAL REPORT  CLINICAL INFORMATION: Pancreatic cancer restaging.  TECHNIQUE: Volumetric CT imaging was performed through the chest, abdomen, and pelvis following the  intravenous administration of contrast. Reformats were submitted in the axial, coronal, and sagittal planes.  CONTRAST: 100 mL Omnipaque 300  COMPARISON: PET CT (SKULL BASE TO MID THIGH) from 2022 09:00 AM  CT ABDOMEN PELVIS W/IV & ORAL CONTRAST from 10/05/2023 09:31 AM  CT ABDOMEN PELVIS W/IV & ORAL CONTRAST from 2024 10:15 AM  FINDINGS  BASE OF NECK: Partially included soft tissues of the neck base are unremarkable.  CHEST:  LUNGS/PLEURA: Biapical pleural-parenchymal scarring is noted, right worse than left. Nodular foci in the  lateral right upper and anteromedial right middle lobes appear unchanged from the comparison exam. A thickwalled cavitary lesion in the left lower lobe measuring up to 1.4 cm in diameter is new from  and slightly  increased in size from 1.0 cm in 2024 (series 306, image 138). A solid 0.8 cm right lower lobe  pulmonary nodule also appears new from PET/CT and increased in size from 0.5 cm on the most recent  comparison examination of the abdomen. A subpleural density in the anterior mid left upper lobe is new from  prior PET/CT imaging of the chest performed May 2022 (series 306, image 89). Few additional nodular  densities are identified measuring up to 0.8 cm in the right lower lobe, not included in the field of view on prior  abdominal imaging and seemingly new from PET/CT. There is no evidence for superimposed pneumonia or  edema. No pleural effusion or pneumothorax is present.  LARGE AIRWAYS: The central airways are clear. Airway walls are of normal thickness. There is no  bronchiectasis.  VESSELS: The thoracic aorta is normal in caliber and contrast enhancement. The main pulmonary artery is  prominent. The pulmonary arterial tree appears well opacified and without filling defect to suggest embolus.  HEART: Heart size is normal without significant pericardial effusion.  MEDIASTINUM and TEO: There is retained contrast in the lower esophagus suggesting either intra  esophageal stasis or gastroesophageal reflux. No new or enlarging intrathoracic lymph node identified.  AXILLAE: There is no axillary or subpectoral lymph node enlargement.  CHEST WALL: A chemotherapy port is implanted in the right chest wall with catheter tip terminating in the right  atrium.  Patient: RICARDO ALMONTE MRN / : [id number] , 1945  Page 2 of 2  BONES: Moderate to severe superior endplate compression fracture and associated wedge deformity of the  T12 vertebral body is unchanged from recent imaging. There is no acute or aggressive skeletal abnormality in  the chest.  ABDOMEN/PELVIS:  LIVER: The liver is normal in size and contour. Subcentimeter hypodensities of the liver too small to  characterize, but unchanged from 2004. The portal venous system is patent.  BILIARY TRACT: The gallbladder is surgically absent. There is intrahepatic pneumobilia related to prior biliary  tree intervention.  PANCREAS: Note is made of postsurgical changes of prior Whipple procedure. There is pancreatic  parenchymal atrophy. A pancreatic duct stent is in expected position. No ductal dilatation identified.  SPLEEN: The spleen is normal in size and contour.  ADRENALS: The adrenal glands are normal without nodule or mass.  KIDNEYS: The left kidney is surgically absent. The right kidney is normal in size and morphology.  Parenchymal enhancement is normal without suspicious lesion. There is no hydronephrosis or perinephric  fluid collection.  BOWEL: Bowel loops are normal in caliber without evidence for obstruction. Positive enteric contrast opacifies  the stomach, small bowel, and colon to the level of the descending segment. There is moderate formed stool  burden in the colon. There is no suspicious bowel wall thickening or localized inflammatory change.  PERITONEUM: No ascites, free air, or fluid collection. No mesenteric or other upper abdominal adenopathy is  detected by size criteria.  RETROPERITONEUM: There is no retroperitoneal lymph node enlargement.  VESSELS: The abdominal aorta is normal in caliber and contrast enhancement. The IVC and iliac veins are  well distended.  REPRODUCTIVE ORGANS: The uterus is present, but not well assessed by CT.  PELVIC SIDE WALLS and GROIN: There is no iliac chain or inguinal adenopathy by size criteria.  BLADDER: The urinary bladder is mildly distended without focal abnormality.  ABDOMINAL WALL: Superficial soft tissues of the body wall are unremarkable.  BONES: Degenerative changes are worst at L4-L5. There is no acute or aggressive skeletal abnormality in the  abdomen or pelvis.  IMPRESSION:  1. Slight interval increase in size of lower lobe pulmonary nodules partially included on prior abdominal  imaging, the largest of which measures 1.3 cm in diameter and demonstrates central cavitation in the left  lower lobe. These findings are suspicious for worsening pulmonary metastatic disease. Continued close  attention on follow-up recommended.  2. Postsurgical changes of Whipple procedure without discrete evidence for new or worsening metastasis in  the abdomen or pelvis.  Thank you for the opportunity to participate in the care of this patient.  Physician direct line: [phone]  Electronically Signed By: Allan Bender MD  Sign Date: 2024 01:40 PM  Interpreting Radiologist: Allan Bender MD

## 2024-07-08 NOTE — CONSULT NOTE ADULT - ASSESSMENT
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 BCX + for E.coli, CT chest with cavitary and mixed solid/groundglass lesions in the bilateral lower lobes. Pt placed on isolation for r/o TB on admission. Pt reportedly with hx of positive PPD and is s/p bronchoscopy about 10 years ago to r/o TB (which was reportedly negative) prior to initiating treatment for her RA. Denies SOB, cough, CP.     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 BCX + for E.coli, CT chest with cavitary and mixed solid/groundglass lesions in the bilateral lower lobes. Pt placed on isolation for r/o TB on admission. Pt reportedly with hx of positive PPD and is s/p bronchoscopy about 10 years ago to r/o TB (which was reportedly negative) prior to initiating treatment for her RA. Denies SOB, cough, CP.     Antimicrobials:  -Vancomycin 1g x1 dose on 7/7  -Zosyn (7/7-current)    Assessment:    #Sepsis  -Pt febrile in the ED with tachycardia  -WBC 3.46 with 16.5% bands on admission with procal of 17.68  -UA neg, BCX 4/4 + for E.Coli   CT A/P showing Mild pneumobilia is similar to prior exam. Has a stent on imaging (unsure of when it was placed)      #Cavitary lung lesion   -Cavitary and mixed solid/groundglass lesions in the bilateral lower lobes are new/increased in size from 2/27/2024 and not visualized on CT from 9/13/2021  -Per pt, she has a hx of PPD in the past and is s/p bronchoscopy about 10 years ago to r/o TB (which was reportedly negative) prior to initiating treatment for her RA. She did not get treated for latent TB    Recommendations:  -Bacteremia likely GI source, would recommend GI consult for pneumobilia   -Recommend d/c zosyn and starting pt on Ceftriaxone 1g QD and flagyl PO 500mg Q12 for E.Coli bacteremia  -With her cavitary lesion and hx of + PPD, would need to r/o TB with AFB sputum cx x3. Keep on isolation  -Pulm concern for septic emboli, TTE pending     Case discussed w/ Dr. Ashutosh Cuadra  ID Fellow  77yo 46kg f w pmh hld, ra, gastroparesis, pancreatic adenoca s/p whipple + chemo + rt, rcc s/p l nephrectomy, p/w fever + chills; in er, meeting severe sepsis criteria. BCX + for E.coli, CT chest with cavitary and mixed solid/groundglass lesions in the bilateral lower lobes. Pt placed on isolation for r/o TB on admission. Pt reportedly with hx of positive PPD and is s/p bronchoscopy about 10 years ago to r/o TB (which was reportedly negative) prior to initiating treatment for her RA. Denies SOB, cough, CP.     Antimicrobials:  -Vancomycin 1g x1 dose on 7/7  -Zosyn (7/7-current)    Assessment:    #Sepsis  -Pt febrile in the ED with tachycardia  -WBC 3.46 with 16.5% bands on admission with procal of 17.68  -UA neg, BCX 4/4 + for E.Coli   CT A/P showing Mild pneumobilia is similar to prior exam. Has a stent on imaging (unsure of when it was placed)      #Cavitary lung lesion   -Cavitary and mixed solid/groundglass lesions in the bilateral lower lobes are new/increased in size from 2/27/2024 and not visualized on CT from 9/13/2021  -Per pt, she has a hx of PPD in the past and is s/p bronchoscopy about 10 years ago to r/o TB (which was reportedly negative) prior to initiating treatment for her RA. She did not get treated for latent TB    Recommendations:  -Bacteremia likely GI source, would recommend GI consult for pneumobilia   -Recommend d/c zosyn and starting pt on Ceftriaxone 1g QD and flagyl PO 500mg Q12 for E.Coli bacteremia  -With her cavitary lesion and hx of + PPD, would need to r/o TB with AFB sputum cx x3. Keep on isolation  -Pulm concern for septic emboli, TTE pending     Case discussed w/ Dr. Boykin. Discussed w/ AVTAR ELLIS Fellow

## 2024-07-08 NOTE — CONSULT NOTE ADULT - ASSESSMENT
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    Pancreatic cancer stage III, pancytopenia  - Follows with Dr. Scanlon. S/p Whipple, s/p adjuvant FOLFIRINOX, (has received 11 doses so far), has pancytopenia since the chemo and RT.  - As of her last visit on 5/2024, Observe and monitor from pancreatic cancer stand point.   - Baseline hgb 8-10, platelets 100-130. F/u iron studies, B12, folate  - 06/27/2024 CT CAP: Slight interval increase in size of lower lobe pulmonary nodules partially included on prior abdominal imaging, the largest of which measures 1.3 cm in diameter and demonstrates central cavitation in the left lower lobe. These findings are suspicious for worsening pulmonary metastatic disease. Continued close attention on follow-up recommended. Postsurgical changes of Whipple procedure without discrete evidence for new or worsening metastasis in the abdomen or pelvis.  - Recommend transfusion for hemoglobin < 7, platelets < 10k or < 50k w/ bleeding    Bacteremia, R/o TB  - Positive blood cultures w/ GNR. Started antibiotics, ID and pulm consulted  - CT CAP: Cavitary and mixed solid/groundglass lesions in the bilateral lower lobes are new/increased in size from 2/27/2024 and not visualized on CT from 9/13/2021. Etiology of these nodules is unclear. However, in the setting of known   malignancy, may represent metastatic disease. Infectious or inflammatory etiology also considered. Consider nonemergent PET/CT and short-term imaging follow-up in 3 months is advised.  - Per pulm, f/u recommendations. Doubt TB, checking TTE    Will continue to follow.    Kj Pineda PA-C  Hematology/Oncology  [facility]  [phone] (office)

## 2024-07-08 NOTE — PHYSICAL THERAPY INITIAL EVALUATION ADULT - PERTINENT HX OF CURRENT PROBLEM, REHAB EVAL
79yo 46kg f w pmh hld, ra, gastroparesis, pancreatic adenoca s/p whipple + chemo + rt, rcc s/p l nephrectomy, p/w fever + chills; in er, meeting severe sepsis criteria; unclear source; admit to medicine for further mgmt. CT ABD/Pelvis/chest 7/7, possible mets to lungs, s/p whipple, mild pneumobilia. ECG (-).

## 2024-07-08 NOTE — PROVIDER CONTACT NOTE (CRITICAL VALUE NOTIFICATION) - ACTION/TREATMENT ORDERED:
Coronary artery disease involving native coronary artery of native heart without angina pectoris NP notified. No interventions ordered.

## 2024-07-08 NOTE — CONSULT NOTE ADULT - PROBLEM SELECTOR RECOMMENDATION 9
Cavitary and mixed solid/groundglass lesions in the bilateral lower lobes   -Doubt TB  -?Septic emboli  -Check TTE   -Abx as per ID  -Suggest repeat CT chest non contrast in 7-10 days, if no change would consider bronchoscopy at that point

## 2024-07-08 NOTE — OCCUPATIONAL THERAPY INITIAL EVALUATION ADULT - ADDITIONAL COMMENTS
Per pt. report, lives In two family home on second floor with . Per pt. report approx 13 steps to enter. PTA pt. fully independent with ADL/IADLs with no use of device for ambulation.

## 2024-07-08 NOTE — OCCUPATIONAL THERAPY INITIAL EVALUATION ADULT - VISUAL ASSESSMENT: VISUAL NEGLECT
SUBJECTIVE:   CC: Radha Temple is an 43 year old woman who presents for preventive health visit.     Healthy Habits:     Getting at least 3 servings of Calcium per day:  Yes    Bi-annual eye exam:  NO    Dental care twice a year:  Yes    Sleep apnea or symptoms of sleep apnea:  Excessive snoring    Diet:  Regular (no restrictions)    Frequency of exercise:  2-3 days/week    Duration of exercise:  15-30 minutes    Taking medications regularly:  Yes    Medication side effects:  None    PHQ-2 Total Score: 0    Additional concerns today:  No    Having issues with sleep. Tried 's trazodone (very small amount of 150 mg tablet) and it helped.   Taking Sertraline 25 mg daily instead of 50 mg daily because it was causing her to feel very anxious. Now anxiety is much better on the smaller dose.       Today's PHQ-2 Score:   PHQ-2 ( 1999 Pfizer) 2/27/2020   Q1: Little interest or pleasure in doing things 0   Q2: Feeling down, depressed or hopeless 0   PHQ-2 Score 0   Q1: Little interest or pleasure in doing things Not at all   Q2: Feeling down, depressed or hopeless Not at all   PHQ-2 Score 0       Abuse: Current or Past(Physical, Sexual or Emotional)- No  Do you feel safe in your environment? Yes        Social History     Tobacco Use     Smoking status: Never Smoker     Smokeless tobacco: Never Used   Substance Use Topics     Alcohol use: Yes     Comment: 1 glass of wine 5 days a week     If you drink alcohol do you typically have >3 drinks per day or >7 drinks per week? No    Alcohol Use 2/27/2020   Prescreen: >3 drinks/day or >7 drinks/week? No   Prescreen: >3 drinks/day or >7 drinks/week? -       Reviewed orders with patient.  Reviewed health maintenance and updated orders accordingly - Yes  Labs reviewed in EPIC        Pertinent mammograms are reviewed under the imaging tab.  History of abnormal Pap smear: NO - age 30-65 PAP every 5 years with negative HPV co-testing recommended  PAP / HPV Latest Ref Rng & Units  "7/27/2018   PAP - NIL   HPV 16 DNA NEG:Negative Negative   HPV 18 DNA NEG:Negative Negative   OTHER HR HPV NEG:Negative Negative     Reviewed and updated as needed this visit by clinical staff  Tobacco  Allergies  Meds  Problems  Med Hx  Surg Hx  Fam Hx  Soc Hx          Reviewed and updated as needed this visit by Provider  Tobacco  Allergies  Meds  Problems  Med Hx  Surg Hx  Fam Hx          History reviewed. No pertinent past medical history.   Past Surgical History:   Procedure Laterality Date     RHINOPLASTY      age 13     OB History   No obstetric history on file.       Review of Systems  CONSTITUTIONAL: NEGATIVE for fever, chills, change in weight  INTEGUMENTARU/SKIN: NEGATIVE for worrisome rashes, moles or lesions  EYES: NEGATIVE for vision changes or irritation  ENT: NEGATIVE for ear, mouth and throat problems  RESP: NEGATIVE for significant cough or SOB  BREAST: NEGATIVE for masses, tenderness or discharge  CV: NEGATIVE for chest pain, palpitations or peripheral edema  GI: NEGATIVE for nausea, abdominal pain, heartburn, or change in bowel habits  : NEGATIVE for unusual urinary or vaginal symptoms. Periods are regular.  MUSCULOSKELETAL: NEGATIVE for significant arthralgias or myalgia  NEURO: NEGATIVE for weakness, dizziness or paresthesias  HEME/ALLERGY/IMMUNE: NEGATIVE for bleeding problems  PSYCHIATRIC: NEGATIVE for changes in mood or affect     OBJECTIVE:   /82 (Patient Position: Sitting, Cuff Size: Adult Regular)   Pulse 86   Temp 97.9  F (36.6  C)   Resp 14   Ht 1.575 m (5' 2\")   Wt 49.9 kg (110 lb)   LMP 01/30/2020 (Approximate)   SpO2 100%   Breastfeeding No   BMI 20.12 kg/m    Physical Exam  GENERAL: healthy, alert and no distress  EYES: Eyes grossly normal to inspection, PERRL and conjunctivae and sclerae normal  HENT: ear canals and TM's normal, nose and mouth without ulcers or lesions  NECK: no adenopathy, no asymmetry, masses, or scars and thyroid normal to " palpation  RESP: lungs clear to auscultation - no rales, rhonchi or wheezes  BREAST: normal without masses, tenderness or nipple discharge and no palpable axillary masses or adenopathy  CV: regular rate and rhythm, normal S1 S2, no S3 or S4, no murmur, click or rub, no peripheral edema and peripheral pulses strong  ABDOMEN: soft, nontender, no hepatosplenomegaly, no masses and bowel sounds normal   (female): deferred   MS: no gross musculoskeletal defects noted, no edema  SKIN: no suspicious lesions or rashes  NEURO: Normal strength and tone, mentation intact and speech normal  PSYCH: mentation appears normal, affect normal/bright    Diagnostic Test Results:  Labs reviewed in Epic    ASSESSMENT/PLAN:   Radha was seen today for physical and imm/inj.    Diagnoses and all orders for this visit:    Routine general medical examination at a health care facility    TESHA (generalized anxiety disorder)  -     sertraline (ZOLOFT) 25 MG tablet; Take 1 tablet (25 mg) by mouth daily  -     traZODone (DESYREL) 50 MG tablet; Take 1 tablet (50 mg) by mouth At Bedtime    Encounter for surveillance of contraceptive pills  -     levonorgestrel-ethinyl estradiol (NORDETTE) 0.15-30 MG-MCG tablet; Take 1 tablet by mouth daily    Other insomnia  -     traZODone (DESYREL) 50 MG tablet; Take 1 tablet (50 mg) by mouth At Bedtime    Need for prophylactic vaccination and inoculation against influenza  -     INFLUENZA VACCINE IM > 6 MONTHS VALENT IIV4 [78307]  -     Vaccine Administration, Initial [45779]      Preventive cares:  Flu vaccine updated    OCPs:  Refilled OCPs    TESHA:  Changed Sertraline to 25 mg daily--works well.  RTC in 6 months for routine me check    Insomnia: new, uncontrolled  Rx Trazodone 50 mg.   Will have her start at 25 mg nightly (may increase up to 50 mg nightly).  RTC in 3 months to re-check med.         COUNSELING:  Reviewed preventive health counseling, as reflected in patient instructions  Special attention given  "to:        Regular exercise       Healthy diet/nutrition       Vision screening       Hearing screening       Immunizations    Vaccinated for: Influenza         Contraception       Colon cancer screening       HIV screeninx in teen years, 1x in adult years, and at intervals if high risk       The 10-year ASCVD risk score (Sweetie HAINES Jr., et al., 2013) is: 0.7%    Values used to calculate the score:      Age: 43 years      Sex: Female      Is Non- : No      Diabetic: No      Tobacco smoker: No      Systolic Blood Pressure: 116 mmHg      Is BP treated: No      HDL Cholesterol: 45 mg/dL      Total Cholesterol: 170 mg/dL    Estimated body mass index is 20.12 kg/m  as calculated from the following:    Height as of this encounter: 1.575 m (5' 2\").    Weight as of this encounter: 49.9 kg (110 lb).         reports that she has never smoked. She has never used smokeless tobacco.      Counseling Resources:  ATP IV Guidelines  Pooled Cohorts Equation Calculator  Breast Cancer Risk Calculator  FRAX Risk Assessment  ICSI Preventive Guidelines  Dietary Guidelines for Americans,   USDA's MyPlate  ASA Prophylaxis  Lung CA Screening    Nellie Mckeon, DO  [facility]  " not observed

## 2024-07-08 NOTE — PROGRESS NOTE ADULT - SUBJECTIVE AND OBJECTIVE BOX
Name of Patient : SUZY SILVA  MRN: [id number]      Subjective: Patient seen and examined. No new events except as noted.     REVIEW OF SYSTEMS:    CONSTITUTIONAL: No weakness, fevers or chills  EYES/ENT: No visual changes;  No vertigo or throat pain   NECK: No pain or stiffness  RESPIRATORY: No cough, wheezing, hemoptysis; No shortness of breath  CARDIOVASCULAR: No chest pain or palpitations  GASTROINTESTINAL: No abdominal or epigastric pain. No nausea, vomiting, or hematemesis; No diarrhea or constipation. No melena or hematochezia.  GENITOURINARY: No dysuria, frequency or hematuria  NEUROLOGICAL: No numbness or weakness  SKIN: No itching, burning, rashes, or lesions   All other review of systems is negative unless indicated above.    MEDICATIONS:  MEDICATIONS  (STANDING):  cefTRIAXone   IVPB 1000 milliGRAM(s) IV Intermittent every 24 hours  chlorhexidine 2% Cloths 1 Application(s) Topical daily  dextrose 5% + sodium chloride 0.45%. 1000 milliLiter(s) (75 mL/Hr) IV Continuous <Continuous>  metroNIDAZOLE    Tablet 500 milliGRAM(s) Oral every 12 hours  pancrelipase  (CREON 36,000 Lipase Units) 2 Capsule(s) Oral three times a day with meals  sodium chloride 0.9%. 1000 milliLiter(s) (100 mL/Hr) IV Continuous <Continuous>      PHYSICAL EXAM:  T(C): 36.9 (07-09-24 @ 00:04), Max: 37.1 (07-08-24 @ 04:25)  HR: 74 (07-09-24 @ 00:04) (64 - 75)  BP: 128/75 (07-09-24 @ 00:04) (102/66 - 128/75)  RR: 18 (07-09-24 @ 00:04) (18 - 18)  SpO2: 98% (07-09-24 @ 00:04) (95% - 100%)  Wt(kg): --  I&O's Summary    07 Jul 2024 07:01  -  08 Jul 2024 07:00  --------------------------------------------------------  IN: 200 mL / OUT: 0 mL / NET: 200 mL          Appearance: Normal	  HEENT:  PERRLA   Lymphatic: No lymphadenopathy   Cardiovascular: Normal S1 S2, no JVD  Respiratory: normal effort , clear  Gastrointestinal:  Soft, Non-tender  Skin: No rashes,  warm to touch  Psychiatry:  Mood & affect appropriate  Musculuskeletal: No edema    recent labs, Imaging and EKGs personally reviewed     07-07-24 @ 07:01  -  07-08-24 @ 07:00  --------------------------------------------------------  IN: 200 mL / OUT: 0 mL / NET: 200 mL                          8.5    6.52  )-----------( 108      ( 08 Jul 2024 09:00 )             28.1               07-08    147<H>  |  117<H>  |  12  ----------------------------<  85  4.4   |  21<L>  |  0.97    Ca    8.4      08 Jul 2024 09:00  Mg     1.9     07-07    TPro  5.7<L>  /  Alb  3.2<L>  /  TBili  0.3  /  DBili  x   /  AST  56<H>  /  ALT  48<H>  /  AlkPhos  68  07-08    PT/INR - ( 08 Jul 2024 09:00 )   PT: 11.5 sec;   INR: 1.10 ratio         PTT - ( 08 Jul 2024 09:00 )  PTT:32.9 sec                   Urinalysis Basic - ( 08 Jul 2024 09:00 )    Color: x / Appearance: x / SG: x / pH: x  Gluc: 85 mg/dL / Ketone: x  / Bili: x / Urobili: x   Blood: x / Protein: x / Nitrite: x   Leuk Esterase: x / RBC: x / WBC x   Sq Epi: x / Non Sq Epi: x / Bacteria: x

## 2024-07-08 NOTE — PROVIDER CONTACT NOTE (CRITICAL VALUE NOTIFICATION) - SITUATION
Blood culture 7/7 growth in anaerobic bottle gram negative rods  7/7 Growth in anaerobic and aerobic gram negative rods

## 2024-07-08 NOTE — CONSULT NOTE ADULT - ATTENDING COMMENTS
77yo 46kg f w pmh hld, ra, gastroparesis, pancreatic adenoca s/p whipple + chemo + rt, rcc s/p l nephrectomy, p/w fever + chills; in er, meeting severe sepsis criteria. BCX + for E.coli, CT chest with cavitary and mixed solid/groundglass lesions in the bilateral lower lobes. Pt placed on isolation for r/o TB on admission. Pt reportedly with hx of positive PPD and is s/p bronchoscopy about 10 years ago to r/o TB (which was reportedly negative) prior to initiating treatment for her RA. Denies SOB, cough, CP.     Antimicrobials:  -Vancomycin 1g x1 dose on 7/7  -Zosyn (7/7-current)    Assessment:    #Sepsis, bacteremia   -Pt febrile in the ED with tachycardia  -WBC 3.46 with 16.5% bands on admission with procal of 17.68  -UA neg, BCX 4/4 + for E.Coli   CT A/P showing Mild pneumobilia is similar to prior exam. Has a stent on imaging (unsure of when it was placed)      #Cavitary lung lesion   -Cavitary and mixed solid/groundglass lesions in the bilateral lower lobes are new/increased in size from 2/27/2024 and not visualized on CT from 9/13/2021  -Per pt, she has a hx of PPD in the past and is s/p bronchoscopy about 10 years ago to r/o TB (which was reportedly negative) prior to initiating treatment for her RA. She did not get treated for latent TB    Recommendations:  -Bacteremia likely GI source, would recommend GI consult for pneumobilia, pt also with pancreatic stent ? duration.  -Recommend d/c zosyn and starting pt on Ceftriaxone 1g QD and flagyl PO 500mg Q12 for E.Coli bacteremia and cover anaerobes   -With her cavitary lesion and hx of + PPD, would need to r/o TB with AFB sputum cx x3. Keep on isolation. pt denies ever being treated for latent TB. Though clinical suspicion very low.   -Pulm on board.   -TTE pending   - repeat blood cx  - trend cbc for leucopenia.     Plan discussed with Medicine Attending.     Mark Soria  Please contact through MS Teams   If no response or past 5 pm/weekend call [phone].

## 2024-07-08 NOTE — OCCUPATIONAL THERAPY INITIAL EVALUATION ADULT - PERTINENT HX OF CURRENT PROBLEM, REHAB EVAL
77 yo F with a PMH of pancreatic adenocarcinoma sp whipple, chemo/RT, renal cell CA sp left nephrectomy, rheumatoid arthritis, HLD, anemia. p/w chills x last night. Reports taking Tylenol for subjective fever. Today felt she had a fever, checked her temp which was 100.2. Started to develop diffuse abd pain and nausea/vomiting x 1 today after eating. Denies chest pain, cough, short of breath, sore throat, dysuria, hematuria, diarrhea, headache, dizziness, myalgias. In ER, meeting severe sepsis criteria; unclear source; admitted to medicine for further mgmt.    CT: abdoment+ pelvis: Cavitary and mixed solid/groundglass lesions in the visualized lower lungs are new/increased in size from 2/27/2024.Status post Whipple. No evidence of bowel obstruction.Mild pneumobilia is similar to prior exam.

## 2024-07-08 NOTE — CONSULT NOTE ADULT - ASSESSMENT
77 y/o F with PMH of HLD, RA, gastroparesis, pancreatic adenoca s/p whipple + chemo + R RCC s/p l nephrectomy. Presents with fevers, chills. BC + GNR. CT chest with cavitary and mixed solid/groundglass lesions in the bilateral lower lobes. Pt placed on isolation for r/o TB on admission. Pt reportedly with hx of positive PPD and is s/p bronchoscopy about 10 years ago to r/o TB (which was reportedly negative) prior to initiating treatment for her RA. Denies SOB, cough, CP.

## 2024-07-08 NOTE — CONSULT NOTE ADULT - SUBJECTIVE AND OBJECTIVE BOX
Patient is a 78y old  Female who presents with a chief complaint of fever + chills (08 Jul 2024 10:28)    HPI:  79yo 46kg f w pmh hld, ra, gastroparesis, pancreatic adenoca s/p whipple + chemo + rt, rcc s/p l nephrectomy, p/w fever + chills; in er, meeting severe sepsis criteria; unclear source; admit to medicine for further mgmt (07 Jul 2024 23:24)       REVIEW OF SYSTEMS  Constitutional: No fevers, chills, weight loss or fatigue   Skin: No rash, no phlebitis	  Eyes: No discharge	  ENMT: No sore throat, oral thrush, ulcers or exudate  Respiratory: No cough, no SOB  Cardiovascular:  No chest pain, palpitations or edema   Gastrointestinal: No pain, nausea, vomiting, diarrhea or constipation	  Genitourinary: No dysuria, discharge or flank pain  MSK: No arthralgias or back pain   Neurological: No HA, no weakness, no seizures, no AMS       prior hospital charts reviewed [V]  primary team notes reviewed [V]  other consultant notes reviewed [V]    PAST MEDICAL & SURGICAL HISTORY:  Arthritis  rheumatoid      Pancreatic cancer      Rheumatoid arthritis      HLD (hyperlipidemia)      Renal cell carcinoma      Gastroparesis      S/P appendectomy  1969      H/O Whipple procedure      H/O left nephrectomy          SOCIAL HISTORY:  - Denied smoking/vaping/alcohol/recreational drug use    FAMILY HISTORY:      Allergies  No Known Allergies        ANTIMICROBIALS:  piperacillin/tazobactam IVPB.. 3.375 every 8 hours      ANTIMICROBIALS (past 90 days):  MEDICATIONS  (STANDING):  piperacillin/tazobactam IVPB..   25 mL/Hr IV Intermittent (07-08-24 @ 05:04)    piperacillin/tazobactam IVPB...   200 mL/Hr IV Intermittent (07-07-24 @ 11:08)    vancomycin  IVPB.   250 mL/Hr IV Intermittent (07-07-24 @ 11:51)        OTHER MEDS:   MEDICATIONS  (STANDING):  acetaminophen     Tablet .. 650 every 6 hours PRN  aluminum hydroxide/magnesium hydroxide/simethicone Suspension 30 every 4 hours PRN  melatonin 3 at bedtime PRN  ondansetron Injectable 4 every 8 hours PRN  pancrelipase  (CREON 36,000 Lipase Units) 2 three times a day with meals      VITALS:  Vital Signs Last 24 Hrs  T(F): 98.7 (07-08-24 @ 04:25), Max: 101.9 (07-07-24 @ 10:00)    Vital Signs Last 24 Hrs  HR: 64 (07-08-24 @ 08:02) (64 - 100)  BP: 106/66 (07-08-24 @ 08:02) (90/62 - 119/61)  RR: 18 (07-08-24 @ 08:02)  SpO2: 97% (07-08-24 @ 08:02) (95% - 99%)  Wt(kg): --    EXAM:  General: Patient in no acute distress   HEENT: NCAT, EOMI, PERRL, no oral lesions  CV: S1+S2, no m/r/g appreciated   Lungs: No respiratory distress, CTAB  Abd: Soft, nontender, no guarding, no rebound tenderness, + bowel sounds   Ext: No cyanosis, no edema  Neuro: Alert and oriented, no focal deficits, CN II-XII grossly intact   Skin: No rash   IV: No phlebitis      Labs:                        8.5    6.52  )-----------( 108      ( 08 Jul 2024 09:00 )             28.1     07-08    147<H>  |  117<H>  |  12  ----------------------------<  85  4.4   |  21<L>  |  0.97    Ca    8.4      08 Jul 2024 09:00  Mg     1.9     07-07    TPro  5.7<L>  /  Alb  3.2<L>  /  TBili  0.3  /  DBili  x   /  AST  56<H>  /  ALT  48<H>  /  AlkPhos  68  07-08      WBC Trend:  WBC Count: 6.52 (07-08-24 @ 09:00)  WBC Count: 3.46 (07-07-24 @ 09:56)      Auto Neutrophil #: 4.80 K/uL (07-08-24 @ 09:00)  Auto Neutrophil #: 2.80 K/uL (07-07-24 @ 09:56)  Band Neutrophils %: 16.5 % (07-07-24 @ 09:56)      Creatine Trend:  Creatinine: 0.97 (07-08)  Creatinine: 0.89 (07-07)      Liver Biochemical Testing Trend:  Alanine Aminotransferase (ALT/SGPT): 48 *H* (07-08)  Alanine Aminotransferase (ALT/SGPT): 29 (07-07)  Aspartate Aminotransferase (AST/SGOT): 56 (07-08-24 @ 09:00)  Aspartate Aminotransferase (AST/SGOT): 39 (07-07-24 @ 09:56)  Bilirubin Total: 0.3 (07-08)  Bilirubin Total: 0.4 (07-07)      Trend LDH  07-08-24 @ 09:00  165  09-20-21 @ 07:10  165      Auto Eosinophil %: 1.1 % (07-08-24 @ 09:00)  Auto Eosinophil %: 0.0 % (07-07-24 @ 09:56)      Urinalysis Basic - ( 08 Jul 2024 09:00 )    Color: x / Appearance: x / SG: x / pH: x  Gluc: 85 mg/dL / Ketone: x  / Bili: x / Urobili: x   Blood: x / Protein: x / Nitrite: x   Leuk Esterase: x / RBC: x / WBC x   Sq Epi: x / Non Sq Epi: x / Bacteria: x        MICROBIOLOGY:        Culture - Blood (collected 07 Jul 2024 10:40)  Source: .Blood Blood-Peripheral  Preliminary Report:    Growth in anaerobic bottle: Gram Negative Rods    Growth in aerobic bottle: Gram Negative Rods    Direct identification is available within approximately 3-5    hours either by Blood Panel Multiplexed PCR or Direct    MALDI-TOF. Details: [email or web address]  Organism: Blood Culture PCR  Organism: Blood Culture PCR    Sensitivities:      Method Type: PCR      -  Escherichia coli: Detec    Culture - Blood (collected 07 Jul 2024 09:40)  Source: .Blood Blood-Peripheral  Preliminary Report:    Growth in anaerobic bottle: Gram Negative Rods    Culture - Urine (collected 31 Aug 2022 14:09)  Source: Clean Catch Clean Catch (Midstream)  Final Report:    <10,000 CFU/mL Normal Urogenital Carmen                        Rapid RVP Result: NotDetec (07-07 @ 09:56)          Procalcitonin: 17.68 (07-08)          Lactate Dehydrogenase, Serum: 165 (07-08)        Blood Gas Venous - Lactate: 1.9 (07-07 @ 11:50)  Blood Gas Venous - Lactate: 2.6 (07-07 @ 09:45)        RADIOLOGY:  imaging below personally reviewed   Patient is a 78y old  Female who presents with a chief complaint of fever + chills (08 Jul 2024 10:28)    HPI:  77yo 46kg f w pmh hld, ra, gastroparesis, pancreatic adenoca s/p whipple + chemo + rt, rcc s/p l nephrectomy, p/w fever + chills; in er, meeting severe sepsis criteria; unclear source; admit to medicine for further mgmt (07 Jul 2024 23:24). Pt states that she was was feeling bad after going out to eat on Saturday nights. She vomited twice        REVIEW OF SYSTEMS  Constitutional: No fevers, chills, weight loss or fatigue   Skin: No rash, no phlebitis	  Eyes: No discharge	  ENMT: No sore throat, oral thrush, ulcers or exudate  Respiratory: No cough, no SOB  Cardiovascular:  No chest pain, palpitations or edema   Gastrointestinal: No pain, nausea, vomiting, diarrhea or constipation	  Genitourinary: No dysuria, discharge or flank pain  MSK: No arthralgias or back pain   Neurological: No HA, no weakness, no seizures, no AMS       prior hospital charts reviewed [V]  primary team notes reviewed [V]  other consultant notes reviewed [V]    PAST MEDICAL & SURGICAL HISTORY:  Arthritis  rheumatoid      Pancreatic cancer      Rheumatoid arthritis      HLD (hyperlipidemia)      Renal cell carcinoma      Gastroparesis      S/P appendectomy  1969      H/O Whipple procedure      H/O left nephrectomy          SOCIAL HISTORY:  - Denied smoking/vaping/alcohol/recreational drug use    FAMILY HISTORY:      Allergies  No Known Allergies        ANTIMICROBIALS:  piperacillin/tazobactam IVPB.. 3.375 every 8 hours      ANTIMICROBIALS (past 90 days):  MEDICATIONS  (STANDING):  piperacillin/tazobactam IVPB..   25 mL/Hr IV Intermittent (07-08-24 @ 05:04)    piperacillin/tazobactam IVPB...   200 mL/Hr IV Intermittent (07-07-24 @ 11:08)    vancomycin  IVPB.   250 mL/Hr IV Intermittent (07-07-24 @ 11:51)        OTHER MEDS:   MEDICATIONS  (STANDING):  acetaminophen     Tablet .. 650 every 6 hours PRN  aluminum hydroxide/magnesium hydroxide/simethicone Suspension 30 every 4 hours PRN  melatonin 3 at bedtime PRN  ondansetron Injectable 4 every 8 hours PRN  pancrelipase  (CREON 36,000 Lipase Units) 2 three times a day with meals      VITALS:  Vital Signs Last 24 Hrs  T(F): 98.7 (07-08-24 @ 04:25), Max: 101.9 (07-07-24 @ 10:00)    Vital Signs Last 24 Hrs  HR: 64 (07-08-24 @ 08:02) (64 - 100)  BP: 106/66 (07-08-24 @ 08:02) (90/62 - 119/61)  RR: 18 (07-08-24 @ 08:02)  SpO2: 97% (07-08-24 @ 08:02) (95% - 99%)  Wt(kg): --    EXAM:  General: Patient in no acute distress   HEENT: NCAT, EOMI, PERRL, no oral lesions  CV: S1+S2, no m/r/g appreciated   Lungs: No respiratory distress, CTAB  Abd: Soft, nontender, no guarding, no rebound tenderness, + bowel sounds   Ext: No cyanosis, no edema  Neuro: Alert and oriented, no focal deficits, CN II-XII grossly intact   Skin: No rash   IV: No phlebitis      Labs:                        8.5    6.52  )-----------( 108      ( 08 Jul 2024 09:00 )             28.1     07-08    147<H>  |  117<H>  |  12  ----------------------------<  85  4.4   |  21<L>  |  0.97    Ca    8.4      08 Jul 2024 09:00  Mg     1.9     07-07    TPro  5.7<L>  /  Alb  3.2<L>  /  TBili  0.3  /  DBili  x   /  AST  56<H>  /  ALT  48<H>  /  AlkPhos  68  07-08      WBC Trend:  WBC Count: 6.52 (07-08-24 @ 09:00)  WBC Count: 3.46 (07-07-24 @ 09:56)      Auto Neutrophil #: 4.80 K/uL (07-08-24 @ 09:00)  Auto Neutrophil #: 2.80 K/uL (07-07-24 @ 09:56)  Band Neutrophils %: 16.5 % (07-07-24 @ 09:56)      Creatine Trend:  Creatinine: 0.97 (07-08)  Creatinine: 0.89 (07-07)      Liver Biochemical Testing Trend:  Alanine Aminotransferase (ALT/SGPT): 48 *H* (07-08)  Alanine Aminotransferase (ALT/SGPT): 29 (07-07)  Aspartate Aminotransferase (AST/SGOT): 56 (07-08-24 @ 09:00)  Aspartate Aminotransferase (AST/SGOT): 39 (07-07-24 @ 09:56)  Bilirubin Total: 0.3 (07-08)  Bilirubin Total: 0.4 (07-07)      Trend LDH  07-08-24 @ 09:00  165  09-20-21 @ 07:10  165      Auto Eosinophil %: 1.1 % (07-08-24 @ 09:00)  Auto Eosinophil %: 0.0 % (07-07-24 @ 09:56)      Urinalysis Basic - ( 08 Jul 2024 09:00 )    Color: x / Appearance: x / SG: x / pH: x  Gluc: 85 mg/dL / Ketone: x  / Bili: x / Urobili: x   Blood: x / Protein: x / Nitrite: x   Leuk Esterase: x / RBC: x / WBC x   Sq Epi: x / Non Sq Epi: x / Bacteria: x        MICROBIOLOGY:        Culture - Blood (collected 07 Jul 2024 10:40)  Source: .Blood Blood-Peripheral  Preliminary Report:    Growth in anaerobic bottle: Gram Negative Rods    Growth in aerobic bottle: Gram Negative Rods    Direct identification is available within approximately 3-5    hours either by Blood Panel Multiplexed PCR or Direct    MALDI-TOF. Details: [email or web address]  Organism: Blood Culture PCR  Organism: Blood Culture PCR    Sensitivities:      Method Type: PCR      -  Escherichia coli: Detec    Culture - Blood (collected 07 Jul 2024 09:40)  Source: .Blood Blood-Peripheral  Preliminary Report:    Growth in anaerobic bottle: Gram Negative Rods    Culture - Urine (collected 31 Aug 2022 14:09)  Source: Clean Catch Clean Catch (Midstream)  Final Report:    <10,000 CFU/mL Normal Urogenital Carmen                        Rapid RVP Result: NotDetec (07-07 @ 09:56)          Procalcitonin: 17.68 (07-08)          Lactate Dehydrogenase, Serum: 165 (07-08)        Blood Gas Venous - Lactate: 1.9 (07-07 @ 11:50)  Blood Gas Venous - Lactate: 2.6 (07-07 @ 09:45)        RADIOLOGY:  imaging below personally reviewed   Patient is a 78y old  Female who presents with a chief complaint of fever + chills (08 Jul 2024 10:28)    HPI:  79yo 46kg f w pmh hld, ra, gastroparesis, pancreatic adenoca s/p whipple + chemo + rt, rcc s/p l nephrectomy, p/w fever + chills; in er, meeting severe sepsis criteria; unclear source; admit to medicine for further mgmt (07 Jul 2024 23:24). Pt states that she was feeling bad after going out to eat on Saturday nights. She vomited twice on Sunday and was having chills which prompted her to come to the ED. Pt now feleing better, denies any cough, SOB, N/V or abdominal pain        REVIEW OF SYSTEMS  Constitutional: No fevers, chills, weight loss or fatigue   Skin: No rash, no phlebitis	  Eyes: No discharge	  ENMT: No sore throat, oral thrush, ulcers or exudate  Respiratory: No cough, no SOB  Cardiovascular:  No chest pain, palpitations or edema   Gastrointestinal: No pain, nausea, vomiting, diarrhea or constipation	  Genitourinary: No dysuria, discharge or flank pain  MSK: No arthralgias or back pain   Neurological: No HA, no weakness, no seizures, no AMS       prior hospital charts reviewed [V]  primary team notes reviewed [V]  other consultant notes reviewed [V]    PAST MEDICAL & SURGICAL HISTORY:  Arthritis  rheumatoid      Pancreatic cancer      Rheumatoid arthritis      HLD (hyperlipidemia)      Renal cell carcinoma      Gastroparesis      S/P appendectomy  1969      H/O Whipple procedure      H/O left nephrectomy          SOCIAL HISTORY:  - Denied smoking/vaping/alcohol/recreational drug use    FAMILY HISTORY:      Allergies  No Known Allergies        ANTIMICROBIALS:  piperacillin/tazobactam IVPB.. 3.375 every 8 hours      ANTIMICROBIALS (past 90 days):  MEDICATIONS  (STANDING):  piperacillin/tazobactam IVPB..   25 mL/Hr IV Intermittent (07-08-24 @ 05:04)    piperacillin/tazobactam IVPB...   200 mL/Hr IV Intermittent (07-07-24 @ 11:08)    vancomycin  IVPB.   250 mL/Hr IV Intermittent (07-07-24 @ 11:51)        OTHER MEDS:   MEDICATIONS  (STANDING):  acetaminophen     Tablet .. 650 every 6 hours PRN  aluminum hydroxide/magnesium hydroxide/simethicone Suspension 30 every 4 hours PRN  melatonin 3 at bedtime PRN  ondansetron Injectable 4 every 8 hours PRN  pancrelipase  (CREON 36,000 Lipase Units) 2 three times a day with meals      VITALS:  Vital Signs Last 24 Hrs  T(F): 98.7 (07-08-24 @ 04:25), Max: 101.9 (07-07-24 @ 10:00)    Vital Signs Last 24 Hrs  HR: 64 (07-08-24 @ 08:02) (64 - 100)  BP: 106/66 (07-08-24 @ 08:02) (90/62 - 119/61)  RR: 18 (07-08-24 @ 08:02)  SpO2: 97% (07-08-24 @ 08:02) (95% - 99%)  Wt(kg): --    EXAM:  General: Patient in no acute distress   HEENT: NCAT, EOMI, PERRL, no oral lesions  CV: S1+S2, no m/r/g appreciated   Lungs: No respiratory distress, CTAB  Abd: Soft, nontender, no guarding, no rebound tenderness, + bowel sounds   Ext: No cyanosis, no edema  Neuro: Alert and oriented, no focal deficits, CN II-XII grossly intact   Skin: No rash   IV: No phlebitis      Labs:                        8.5    6.52  )-----------( 108      ( 08 Jul 2024 09:00 )             28.1     07-08    147<H>  |  117<H>  |  12  ----------------------------<  85  4.4   |  21<L>  |  0.97    Ca    8.4      08 Jul 2024 09:00  Mg     1.9     07-07    TPro  5.7<L>  /  Alb  3.2<L>  /  TBili  0.3  /  DBili  x   /  AST  56<H>  /  ALT  48<H>  /  AlkPhos  68  07-08      WBC Trend:  WBC Count: 6.52 (07-08-24 @ 09:00)  WBC Count: 3.46 (07-07-24 @ 09:56)      Auto Neutrophil #: 4.80 K/uL (07-08-24 @ 09:00)  Auto Neutrophil #: 2.80 K/uL (07-07-24 @ 09:56)  Band Neutrophils %: 16.5 % (07-07-24 @ 09:56)      Creatine Trend:  Creatinine: 0.97 (07-08)  Creatinine: 0.89 (07-07)      Liver Biochemical Testing Trend:  Alanine Aminotransferase (ALT/SGPT): 48 *H* (07-08)  Alanine Aminotransferase (ALT/SGPT): 29 (07-07)  Aspartate Aminotransferase (AST/SGOT): 56 (07-08-24 @ 09:00)  Aspartate Aminotransferase (AST/SGOT): 39 (07-07-24 @ 09:56)  Bilirubin Total: 0.3 (07-08)  Bilirubin Total: 0.4 (07-07)      Trend LDH  07-08-24 @ 09:00  165  09-20-21 @ 07:10  165      Auto Eosinophil %: 1.1 % (07-08-24 @ 09:00)  Auto Eosinophil %: 0.0 % (07-07-24 @ 09:56)      Urinalysis Basic - ( 08 Jul 2024 09:00 )    Color: x / Appearance: x / SG: x / pH: x  Gluc: 85 mg/dL / Ketone: x  / Bili: x / Urobili: x   Blood: x / Protein: x / Nitrite: x   Leuk Esterase: x / RBC: x / WBC x   Sq Epi: x / Non Sq Epi: x / Bacteria: x        MICROBIOLOGY:        Culture - Blood (collected 07 Jul 2024 10:40)  Source: .Blood Blood-Peripheral  Preliminary Report:    Growth in anaerobic bottle: Gram Negative Rods    Growth in aerobic bottle: Gram Negative Rods    Direct identification is available within approximately 3-5    hours either by Blood Panel Multiplexed PCR or Direct    MALDI-TOF. Details: [email or web address]  Organism: Blood Culture PCR  Organism: Blood Culture PCR    Sensitivities:      Method Type: PCR      -  Escherichia coli: Detec    Culture - Blood (collected 07 Jul 2024 09:40)  Source: .Blood Blood-Peripheral  Preliminary Report:    Growth in anaerobic bottle: Gram Negative Rods    Culture - Urine (collected 31 Aug 2022 14:09)  Source: Clean Catch Clean Catch (Midstream)  Final Report:    <10,000 CFU/mL Normal Urogenital Carmen                        Rapid RVP Result: NotDetec (07-07 @ 09:56)          Procalcitonin: 17.68 (07-08)          Lactate Dehydrogenase, Serum: 165 (07-08)        Blood Gas Venous - Lactate: 1.9 (07-07 @ 11:50)  Blood Gas Venous - Lactate: 2.6 (07-07 @ 09:45)        RADIOLOGY:  imaging below personally reviewed   Patient is a 78y old  Female who presents with a chief complaint of fever + chills (08 Jul 2024 10:28)    HPI:  77yo 46kg f w pmh hld, ra, gastroparesis, pancreatic adenoca s/p whipple + chemo + rt, rcc s/p l nephrectomy, p/w fever + chills; in er, meeting severe sepsis criteria; unclear source; admit to medicine for further mgmt (07 Jul 2024 23:24). Pt states that she was feeling bad after going out to eat on Saturday nights. She vomited twice on Sunday and was having chills which prompted her to come to the ED. Pt now feleing better, denies any cough, SOB, N/V or abdominal pain        REVIEW OF SYSTEMS  Constitutional: No fevers, chills, weight loss or fatigue   Skin: No rash, no phlebitis	  Eyes: No discharge	  ENMT: No sore throat, oral thrush, ulcers or exudate  Respiratory: No cough, no SOB  Cardiovascular:  No chest pain, palpitations or edema   Gastrointestinal: No pain, nausea, vomiting, diarrhea or constipation	  Genitourinary: No dysuria, discharge or flank pain  MSK: No arthralgias or back pain   Neurological: No HA, no weakness, no seizures, no AMS       prior hospital charts reviewed [V]  primary team notes reviewed [V]  other consultant notes reviewed [V]    PAST MEDICAL & SURGICAL HISTORY:  Arthritis  rheumatoid      Pancreatic cancer      Rheumatoid arthritis      HLD (hyperlipidemia)      Renal cell carcinoma      Gastroparesis      S/P appendectomy  1969      H/O Whipple procedure      H/O left nephrectomy          SOCIAL HISTORY:  - Denied smoking/vaping/alcohol/recreational drug use    FAMILY HISTORY:      Allergies  No Known Allergies        ANTIMICROBIALS:  piperacillin/tazobactam IVPB.. 3.375 every 8 hours      ANTIMICROBIALS (past 90 days):  MEDICATIONS  (STANDING):  piperacillin/tazobactam IVPB..   25 mL/Hr IV Intermittent (07-08-24 @ 05:04)    piperacillin/tazobactam IVPB...   200 mL/Hr IV Intermittent (07-07-24 @ 11:08)    vancomycin  IVPB.   250 mL/Hr IV Intermittent (07-07-24 @ 11:51)        OTHER MEDS:   MEDICATIONS  (STANDING):  acetaminophen     Tablet .. 650 every 6 hours PRN  aluminum hydroxide/magnesium hydroxide/simethicone Suspension 30 every 4 hours PRN  melatonin 3 at bedtime PRN  ondansetron Injectable 4 every 8 hours PRN  pancrelipase  (CREON 36,000 Lipase Units) 2 three times a day with meals      VITALS:  Vital Signs Last 24 Hrs  T(F): 98.7 (07-08-24 @ 04:25), Max: 101.9 (07-07-24 @ 10:00)    Vital Signs Last 24 Hrs  HR: 64 (07-08-24 @ 08:02) (64 - 100)  BP: 106/66 (07-08-24 @ 08:02) (90/62 - 119/61)  RR: 18 (07-08-24 @ 08:02)  SpO2: 97% (07-08-24 @ 08:02) (95% - 99%)  Wt(kg): --    EXAM:  General: Patient in no acute distress   HEENT: NCAT, EOMI, PERRL, no oral lesions  CV: S1+S2, no m/r/g appreciated   Lungs: No respiratory distress, CTAB  Abd: Soft, nontender, no guarding, no rebound tenderness, + bowel sounds   Ext: No cyanosis, no edema  Neuro: Alert and oriented, no focal deficits, CN II-XII grossly intact   Skin: No rash   IV: No phlebitis      Labs:                        8.5    6.52  )-----------( 108      ( 08 Jul 2024 09:00 )             28.1     07-08    147<H>  |  117<H>  |  12  ----------------------------<  85  4.4   |  21<L>  |  0.97    Ca    8.4      08 Jul 2024 09:00  Mg     1.9     07-07    TPro  5.7<L>  /  Alb  3.2<L>  /  TBili  0.3  /  DBili  x   /  AST  56<H>  /  ALT  48<H>  /  AlkPhos  68  07-08      WBC Trend:  WBC Count: 6.52 (07-08-24 @ 09:00)  WBC Count: 3.46 (07-07-24 @ 09:56)      Auto Neutrophil #: 4.80 K/uL (07-08-24 @ 09:00)  Auto Neutrophil #: 2.80 K/uL (07-07-24 @ 09:56)  Band Neutrophils %: 16.5 % (07-07-24 @ 09:56)      Creatine Trend:  Creatinine: 0.97 (07-08)  Creatinine: 0.89 (07-07)      Liver Biochemical Testing Trend:  Alanine Aminotransferase (ALT/SGPT): 48 *H* (07-08)  Alanine Aminotransferase (ALT/SGPT): 29 (07-07)  Aspartate Aminotransferase (AST/SGOT): 56 (07-08-24 @ 09:00)  Aspartate Aminotransferase (AST/SGOT): 39 (07-07-24 @ 09:56)  Bilirubin Total: 0.3 (07-08)  Bilirubin Total: 0.4 (07-07)      Trend LDH  07-08-24 @ 09:00  165  09-20-21 @ 07:10  165      Auto Eosinophil %: 1.1 % (07-08-24 @ 09:00)  Auto Eosinophil %: 0.0 % (07-07-24 @ 09:56)      Urinalysis Basic - ( 08 Jul 2024 09:00 )    Color: x / Appearance: x / SG: x / pH: x  Gluc: 85 mg/dL / Ketone: x  / Bili: x / Urobili: x   Blood: x / Protein: x / Nitrite: x   Leuk Esterase: x / RBC: x / WBC x   Sq Epi: x / Non Sq Epi: x / Bacteria: x        MICROBIOLOGY:        Culture - Blood (collected 07 Jul 2024 10:40)  Source: .Blood Blood-Peripheral  Preliminary Report:    Growth in anaerobic bottle: Gram Negative Rods    Growth in aerobic bottle: Gram Negative Rods    Direct identification is available within approximately 3-5    hours either by Blood Panel Multiplexed PCR or Direct    MALDI-TOF. Details: [email or web address]  Organism: Blood Culture PCR  Organism: Blood Culture PCR    Sensitivities:      Method Type: PCR      -  Escherichia coli: Detec    Culture - Blood (collected 07 Jul 2024 09:40)  Source: .Blood Blood-Peripheral  Preliminary Report:    Growth in anaerobic bottle: Gram Negative Rods    Culture - Urine (collected 31 Aug 2022 14:09)  Source: Clean Catch Clean Catch (Midstream)  Final Report:    <10,000 CFU/mL Normal Urogenital Carmen                        Rapid RVP Result: NotDetec (07-07 @ 09:56)          Procalcitonin: 17.68 (07-08)          Lactate Dehydrogenase, Serum: 165 (07-08)        Blood Gas Venous - Lactate: 1.9 (07-07 @ 11:50)  Blood Gas Venous - Lactate: 2.6 (07-07 @ 09:45)        RADIOLOGY:  imaging below personally reviewed  < from: CT Chest No Cont (07.07.24 @ 19:49) >  IMPRESSION:    Cavitary and mixed solid/groundglass lesions in the bilateral lower lobes   are new/increased in size from 2/27/2024 and not visualized on CT from   9/13/2021.    Etiology of these nodules is unclear. However, in the setting of known   malignancy, may represent metastatic disease. Infectious or inflammatory   etiology also considered. Consider nonemergent PET/CT and short-term   imaging follow-up in 3 months is advised.    < end of copied text >    < from: CT Abdomen and Pelvis w/ IV Cont (07.07.24 @ 14:53) >  IMPRESSION:  Cavitary and mixed solid/groundglass lesions in the visualized lower   lungs are new/increased in sizefrom 2/27/2024.    Status post Whipple. No evidence of bowel obstruction.    Mild pneumobilia is similar to prior exam.    < end of copied text >     Patient is a 78y old  Female who presents with a chief complaint of fever + chills (08 Jul 2024 10:28)    HPI:  79yo 46kg f w pmh hld, ra, gastroparesis, pancreatic adenoca s/p whipple + chemo + rt, rcc s/p l nephrectomy, p/w fever + chills; in er, meeting severe sepsis criteria; unclear source; admit to medicine for further mgmt (07 Jul 2024 23:24). Pt states that she was feeling bad after going out to eat on Saturday nights. She vomited twice on Sunday and was having chills which prompted her to come to the ED. Pt now feleing better, denies any cough, SOB, N/V or abdominal pain        REVIEW OF SYSTEMS  Constitutional: + fevers, chills,  Skin: No rash, no phlebitis	  Eyes: No discharge	  ENMT: No sore throat, oral thrush,   Respiratory: No cough, no SOB  Cardiovascular:  No chest pain,    Gastrointestinal: No pain, + nausea, vomiting, 	  Genitourinary: No dysuria, discharge or flank pain  MSK: No arthralgias or back pain   Neurological: No HA,  no AMS   Extremities: No edema     prior hospital charts reviewed [V]  primary team notes reviewed [V]  other consultant notes reviewed [V]      PAST MEDICAL & SURGICAL HISTORY:  Arthritis  rheumatoid      Pancreatic cancer      Rheumatoid arthritis      HLD (hyperlipidemia)      Renal cell carcinoma      Gastroparesis      S/P appendectomy  1969      H/O Whipple procedure      H/O left nephrectomy          SOCIAL HISTORY:  - lives with family.       FAMILY HISTORY:  Brother: pancreatic cancer.         Allergies  No Known Allergies        ANTIMICROBIALS:  piperacillin/tazobactam IVPB.. 3.375 every 8 hours      ANTIMICROBIALS (past 90 days):  MEDICATIONS  (STANDING):  piperacillin/tazobactam IVPB..   25 mL/Hr IV Intermittent (07-08-24 @ 05:04)    piperacillin/tazobactam IVPB...   200 mL/Hr IV Intermittent (07-07-24 @ 11:08)    vancomycin  IVPB.   250 mL/Hr IV Intermittent (07-07-24 @ 11:51)        OTHER MEDS:   MEDICATIONS  (STANDING):  acetaminophen     Tablet .. 650 every 6 hours PRN  aluminum hydroxide/magnesium hydroxide/simethicone Suspension 30 every 4 hours PRN  melatonin 3 at bedtime PRN  ondansetron Injectable 4 every 8 hours PRN  pancrelipase  (CREON 36,000 Lipase Units) 2 three times a day with meals      VITALS:  Vital Signs Last 24 Hrs  T(F): 98.7 (07-08-24 @ 04:25), Max: 101.9 (07-07-24 @ 10:00)    Vital Signs Last 24 Hrs  HR: 64 (07-08-24 @ 08:02) (64 - 100)  BP: 106/66 (07-08-24 @ 08:02) (90/62 - 119/61)  RR: 18 (07-08-24 @ 08:02)  SpO2: 97% (07-08-24 @ 08:02) (95% - 99%)  Wt(kg): --    EXAM:  General: Patient in no acute distress   Eyes: no discharge   ENT: no oral lesions  CV: S1+S2, normal    Lungs: No respiratory distress, CTAB  Abd: Soft, nontender  : No hartley   Ext: No edema  Neuro: Alert and oriented, no focal deficits,   Skin: No rash   IV: No phlebitis        Labs:                        8.5    6.52  )-----------( 108      ( 08 Jul 2024 09:00 )             28.1     07-08    147<H>  |  117<H>  |  12  ----------------------------<  85  4.4   |  21<L>  |  0.97    Ca    8.4      08 Jul 2024 09:00  Mg     1.9     07-07    TPro  5.7<L>  /  Alb  3.2<L>  /  TBili  0.3  /  DBili  x   /  AST  56<H>  /  ALT  48<H>  /  AlkPhos  68  07-08      WBC Trend:  WBC Count: 6.52 (07-08-24 @ 09:00)  WBC Count: 3.46 (07-07-24 @ 09:56)      Auto Neutrophil #: 4.80 K/uL (07-08-24 @ 09:00)  Auto Neutrophil #: 2.80 K/uL (07-07-24 @ 09:56)  Band Neutrophils %: 16.5 % (07-07-24 @ 09:56)      Creatine Trend:  Creatinine: 0.97 (07-08)  Creatinine: 0.89 (07-07)      Liver Biochemical Testing Trend:  Alanine Aminotransferase (ALT/SGPT): 48 *H* (07-08)  Alanine Aminotransferase (ALT/SGPT): 29 (07-07)  Aspartate Aminotransferase (AST/SGOT): 56 (07-08-24 @ 09:00)  Aspartate Aminotransferase (AST/SGOT): 39 (07-07-24 @ 09:56)  Bilirubin Total: 0.3 (07-08)  Bilirubin Total: 0.4 (07-07)      Trend LDH  07-08-24 @ 09:00  165  09-20-21 @ 07:10  165      Auto Eosinophil %: 1.1 % (07-08-24 @ 09:00)  Auto Eosinophil %: 0.0 % (07-07-24 @ 09:56)      Urinalysis Basic - ( 08 Jul 2024 09:00 )    Color: x / Appearance: x / SG: x / pH: x  Gluc: 85 mg/dL / Ketone: x  / Bili: x / Urobili: x   Blood: x / Protein: x / Nitrite: x   Leuk Esterase: x / RBC: x / WBC x   Sq Epi: x / Non Sq Epi: x / Bacteria: x        MICROBIOLOGY:        Culture - Blood (collected 07 Jul 2024 10:40)  Source: .Blood Blood-Peripheral  Preliminary Report:    Growth in anaerobic bottle: Gram Negative Rods    Growth in aerobic bottle: Gram Negative Rods    Direct identification is available within approximately 3-5    hours either by Blood Panel Multiplexed PCR or Direct    MALDI-TOF. Details: [email or web address]  Organism: Blood Culture PCR  Organism: Blood Culture PCR    Sensitivities:      Method Type: PCR      -  Escherichia coli: Detec    Culture - Blood (collected 07 Jul 2024 09:40)  Source: .Blood Blood-Peripheral  Preliminary Report:    Growth in anaerobic bottle: Gram Negative Rods    Culture - Urine (collected 31 Aug 2022 14:09)  Source: Clean Catch Clean Catch (Midstream)  Final Report:    <10,000 CFU/mL Normal Urogenital Carmen        Rapid RVP Result: NotDetec (07-07 @ 09:56)    Procalcitonin: 17.68 (07-08)    Lactate Dehydrogenase, Serum: 165 (07-08)        Blood Gas Venous - Lactate: 1.9 (07-07 @ 11:50)  Blood Gas Venous - Lactate: 2.6 (07-07 @ 09:45)        RADIOLOGY:  imaging below personally reviewed    < from: CT Chest No Cont (07.07.24 @ 19:49) >  IMPRESSION:    Cavitary and mixed solid/groundglass lesions in the bilateral lower lobes   are new/increased in size from 2/27/2024 and not visualized on CT from   9/13/2021.    Etiology of these nodules is unclear. However, in the setting of known   malignancy, may represent metastatic disease. Infectious or inflammatory   etiology also considered. Consider nonemergent PET/CT and short-term   imaging follow-up in 3 months is advised.        < from: CT Abdomen and Pelvis w/ IV Cont (07.07.24 @ 14:53) >  IMPRESSION:  Cavitary and mixed solid/groundglass lesions in the visualized lower   lungs are new/increased in sizefrom 2/27/2024.    Status post Whipple. No evidence of bowel obstruction.    Mild pneumobilia is similar to prior exam.

## 2024-07-08 NOTE — PROVIDER CONTACT NOTE (MEDICATION) - ASSESSMENT
Pt angry, yelling at RN she wants to be dc'd and does not understand why she is on isolation  States she feels better this morning and only got sick from "bad restaurant", reporting feeling much better now, demanding to be dc'd home.  Refusing am creon dose

## 2024-07-08 NOTE — CONSULT NOTE ADULT - SUBJECTIVE AND OBJECTIVE BOX
PULMONARY CONSULT    HPI: 79 y/o F with PMH of HLD, RA, gastroparesis, pancreatic adenoca s/p whipple + chemo + R RCC s/p l nephrectomy. Presents with fevers, chills. BC + GNR.           PAST MEDICAL & SURGICAL HISTORY:  Arthritis rheumatoid  Pancreatic cancer  Rheumatoid arthritis  HLD (hyperlipidemia)  Renal cell carcinoma  Gastroparesis  S/P appendectomy 1969  H/O Whipple procedure  H/O left nephrectomy      Allergies  No Known Allergies    FAMILY HISTORY:    Social history:     Review of Systems:  CONSTITUTIONAL: Per above   EYES: No eye pain, visual disturbances, or discharge  ENMT:  No difficulty hearing, tinnitus, vertigo; No sinus or throat pain  NECK: No pain or stiffness  RESPIRATORY: Per above  CARDIOVASCULAR: No chest pain, palpitations, dizziness, or leg swelling  GASTROINTESTINAL: No abdominal or epigastric pain. No nausea, vomiting, or hematemesis; No diarrhea or constipation. No melena or hematochezia.  GENITOURINARY: No dysuria, frequency, hematuria, or incontinence  NEUROLOGICAL: No headaches, memory loss, loss of strength, numbness, or tremors  SKIN: No itching, burning, rashes, or lesions   MUSCULOSKELETAL: No joint pain or swelling; No muscle, back, or extremity pain  PSYCHIATRIC: No depression, anxiety, mood swings, or difficulty sleeping      Medications:  MEDICATIONS  (STANDING):  dextrose 5% + sodium chloride 0.45%. 1000 milliLiter(s) (75 mL/Hr) IV Continuous <Continuous>  pancrelipase  (CREON 36,000 Lipase Units) 2 Capsule(s) Oral three times a day with meals  piperacillin/tazobactam IVPB.. 3.375 Gram(s) IV Intermittent every 8 hours  sodium chloride 0.9%. 1000 milliLiter(s) (100 mL/Hr) IV Continuous <Continuous>    MEDICATIONS  (PRN):  acetaminophen     Tablet .. 650 milliGRAM(s) Oral every 6 hours PRN Temp greater or equal to 38C (100.4F), Mild Pain (1 - 3)  aluminum hydroxide/magnesium hydroxide/simethicone Suspension 30 milliLiter(s) Oral every 4 hours PRN Dyspepsia  melatonin 3 milliGRAM(s) Oral at bedtime PRN Insomnia  ondansetron Injectable 4 milliGRAM(s) IV Push every 8 hours PRN Nausea and/or Vomiting            Vital Signs Last 24 Hrs  T(C): 37.1 (08 Jul 2024 04:25), Max: 37.4 (07 Jul 2024 11:50)  T(F): 98.7 (08 Jul 2024 04:25), Max: 99.4 (07 Jul 2024 11:50)  HR: 64 (08 Jul 2024 08:02) (64 - 103)  BP: 106/66 (08 Jul 2024 08:02) (90/62 - 119/61)  BP(mean): --  RR: 18 (08 Jul 2024 08:02) (18 - 20)  SpO2: 97% (08 Jul 2024 08:02) (95% - 99%)    Parameters below as of 08 Jul 2024 08:02  Patient On (Oxygen Delivery Method): room air          VBG pH 7.31 07-07 @ 11:50  VBG pCO2 42 07-07 @ 11:50  VBG O2 sat 66.9 07-07 @ 11:50  VBG lactate 1.9 07-07 @ 11:50  VBG pH 7.33 07-07 @ 09:45  VBG pCO2 46 07-07 @ 09:45  VBG O2 sat 23.3 07-07 @ 09:45  VBG lactate 2.6 07-07 @ 09:45        07-07 @ 07:01  -  07-08 @ 07:00  --------------------------------------------------------  IN: 200 mL / OUT: 0 mL / NET: 200 mL          LABS:                        8.5    6.52  )-----------( 108      ( 08 Jul 2024 09:00 )             28.1     07-08    147<H>  |  117<H>  |  12  ----------------------------<  85  4.4   |  21<L>  |  0.97    Ca    8.4      08 Jul 2024 09:00  Mg     1.9     07-07    TPro  5.7<L>  /  Alb  3.2<L>  /  TBili  0.3  /  DBili  x   /  AST  56<H>  /  ALT  48<H>  /  AlkPhos  68  07-08      Culture - Blood (collected 07-07-24 @ 10:40)  Source: .Blood Blood-Peripheral  Gram Stain (07-08-24 @ 02:02):    Growth in anaerobic bottle: Gram Negative Rods    Growth in aerobic bottle: Gram Negative Rods  Preliminary Report (07-08-24 @ 02:02):    Growth in anaerobic bottle: Gram Negative Rods    Growth in aerobic bottle: Gram Negative Rods    Direct identification is available within approximately 3-5    hours either by Blood Panel Multiplexed PCR or Direct    MALDI-TOF. Details: [email or web address]  Organism: Blood Culture PCR (07-08-24 @ 04:11)  Organism: Blood Culture PCR (07-08-24 @ 04:11)      Method Type: PCR      -  Escherichia coli: Detec          CAPILLARY BLOOD GLUCOSE        PT/INR - ( 08 Jul 2024 09:00 )   PT: 11.5 sec;   INR: 1.10 ratio         PTT - ( 08 Jul 2024 09:00 )  PTT:32.9 sec  Urinalysis Basic - ( 08 Jul 2024 09:00 )    Color: x / Appearance: x / SG: x / pH: x  Gluc: 85 mg/dL / Ketone: x  / Bili: x / Urobili: x   Blood: x / Protein: x / Nitrite: x   Leuk Esterase: x / RBC: x / WBC x   Sq Epi: x / Non Sq Epi: x / Bacteria: x      Procalcitonin: 17.68 ng/mL (07-08-24 @ 09:00)                CULTURES:      (collected 07-07-24 @ 10:40)  Source: .Blood Blood-Peripheral  Gram Stain (07-08-24 @ 02:02):    Growth in anaerobic bottle: Gram Negative Rods    Growth in aerobic bottle: Gram Negative Rods  Preliminary Report (07-08-24 @ 02:02):    Growth in anaerobic bottle: Gram Negative Rods    Growth in aerobic bottle: Gram Negative Rods    Direct identification is available within approximately 3-5    hours either by Blood Panel Multiplexed PCR or Direct    MALDI-TOF. Details: [email or web address]  Organism: Blood Culture PCR (07-08-24 @ 04:11)  Organism: Blood Culture PCR (07-08-24 @ 04:11)      Method Type: PCR      -  Escherichia coli: Detec     (collected 07-07-24 @ 09:40)  Source: .Blood Blood-Peripheral  Gram Stain (07-08-24 @ 02:01):    Growth in anaerobic bottle: Gram Negative Rods  Preliminary Report (07-08-24 @ 02:01):    Growth in anaerobic bottle: Gram Negative Rods                      Physical Examination:    General: No acute distress.      HEENT: Pupils equal, reactive to light.  Symmetric.    PULM: Clear to auscultation bilaterally, no significant sputum production    CVS: S1, S2    ABD: Soft, nondistended, nontender, normoactive bowel sounds, no masses    EXT: No edema, nontender    SKIN: Warm and well perfused, no rashes noted.    NEURO: Alert, oriented, interactive, nonfocal    RADIOLOGY REVIEWED  CT chest:    < from: CT Chest No Cont (07.07.24 @ 19:49) >  FINDINGS: Absence of intravenous contrast limits evaluation of   vasculature and visceral organs.    LUNGS AND LARGE AIRWAYS: Patent central airways. Areas of scarring   architectural distortion with subpleural-based thickening, and   bronchiectasis within the right upper and right middle lobes is similar   to exam from 9/13/2021. 1.2 cm cavitary lesion in the left lower lobe   (301, 78) is similar to exam from 2/27/2024, but new from 9/13/2021. 7 mm   mixed solid/groundglass nodule in the right lower lobe (301, 86) is new   from 9/13/2021.Two mixed solid and groundglass lesions in the left upper   lobe are new from 9/13/2021, the largest of which measures 6 mm (301,   73). Few scattered calcified granulomas.  PLEURA:Trace bilateral pleural effusion or thickening.  VESSELS:  Aorta and coronary artery calcifications.  HEART: Heart size is normal. No pericardial effusion.  MEDIASTINUM AND TEO: Calcified mediastinal and hilar lymph nodes.  CHEST WALL AND LOWER NECK: Right chest wall MediPort with tip in the   right atrium.  VISUALIZED UPPER ABDOMEN: For more detailed findings of the abdomen   please refer to CT of the abdomen and pelvis report from same day.  BONES: Degenerative changes. Stable advanced remote T12 compression   deformity.    IMPRESSION:    Cavitary and mixed solid/groundglass lesions in the bilateral lower lobes   are new/increased in size from 2/27/2024 and not visualized on CT from   9/13/2021.    Etiology of these nodules is unclear. However, in the setting of known   malignancy, may represent metastatic disease. Infectious or inflammatory   etiology also considered. Consider nonemergent PET/CT and short-term   imaging follow-up in 3 months is advised.    < end of copied text >   PULMONARY CONSULT    HPI: 77 y/o F with PMH of HLD, RA, gastroparesis, pancreatic adenoca s/p whipple + chemo + R RCC s/p l nephrectomy. Presents with fevers, chills. BC + GNR. CT chest with cavitary and mixed solid/groundglass lesions in the bilateral lower lobes. Pt placed on isolation for r/o TB on admission. Pt reportedly with hx of positive PPD and is s/p bronchoscopy about 10 years ago to r/o TB (which was reportedly negative) prior to initiating treatment for her RA. Denies SOB, cough, CP.       PAST MEDICAL & SURGICAL HISTORY:  Arthritis rheumatoid  Pancreatic cancer  Rheumatoid arthritis  HLD (hyperlipidemia)  Renal cell carcinoma  Gastroparesis  S/P appendectomy 1969  H/O Whipple procedure  H/O left nephrectomy      Allergies  No Known Allergies    FAMILY HISTORY: brother - RCC    Social history: never a smoker     Review of Systems:  CONSTITUTIONAL: Per above   EYES: No eye pain, visual disturbances, or discharge  ENMT:  No difficulty hearing, tinnitus, vertigo; No sinus or throat pain  NECK: No pain or stiffness  RESPIRATORY: Per above  CARDIOVASCULAR: No chest pain, palpitations, dizziness, or leg swelling  GASTROINTESTINAL: No abdominal or epigastric pain. No nausea, vomiting, or hematemesis; No diarrhea or constipation. No melena or hematochezia.  GENITOURINARY: No dysuria, frequency, hematuria, or incontinence  NEUROLOGICAL: No headaches, memory loss, loss of strength, numbness, or tremors  SKIN: No itching, burning, rashes, or lesions   MUSCULOSKELETAL: No joint pain or swelling; No muscle, back, or extremity pain  PSYCHIATRIC: No depression, anxiety, mood swings, or difficulty sleeping      Medications:  MEDICATIONS  (STANDING):  dextrose 5% + sodium chloride 0.45%. 1000 milliLiter(s) (75 mL/Hr) IV Continuous <Continuous>  pancrelipase  (CREON 36,000 Lipase Units) 2 Capsule(s) Oral three times a day with meals  piperacillin/tazobactam IVPB.. 3.375 Gram(s) IV Intermittent every 8 hours  sodium chloride 0.9%. 1000 milliLiter(s) (100 mL/Hr) IV Continuous <Continuous>    MEDICATIONS  (PRN):  acetaminophen     Tablet .. 650 milliGRAM(s) Oral every 6 hours PRN Temp greater or equal to 38C (100.4F), Mild Pain (1 - 3)  aluminum hydroxide/magnesium hydroxide/simethicone Suspension 30 milliLiter(s) Oral every 4 hours PRN Dyspepsia  melatonin 3 milliGRAM(s) Oral at bedtime PRN Insomnia  ondansetron Injectable 4 milliGRAM(s) IV Push every 8 hours PRN Nausea and/or Vomiting            Vital Signs Last 24 Hrs  T(C): 37.1 (08 Jul 2024 04:25), Max: 37.4 (07 Jul 2024 11:50)  T(F): 98.7 (08 Jul 2024 04:25), Max: 99.4 (07 Jul 2024 11:50)  HR: 64 (08 Jul 2024 08:02) (64 - 103)  BP: 106/66 (08 Jul 2024 08:02) (90/62 - 119/61)  BP(mean): --  RR: 18 (08 Jul 2024 08:02) (18 - 20)  SpO2: 97% (08 Jul 2024 08:02) (95% - 99%)    Parameters below as of 08 Jul 2024 08:02  Patient On (Oxygen Delivery Method): room air          VBG pH 7.31 07-07 @ 11:50  VBG pCO2 42 07-07 @ 11:50  VBG O2 sat 66.9 07-07 @ 11:50  VBG lactate 1.9 07-07 @ 11:50  VBG pH 7.33 07-07 @ 09:45  VBG pCO2 46 07-07 @ 09:45  VBG O2 sat 23.3 07-07 @ 09:45  VBG lactate 2.6 07-07 @ 09:45        07-07 @ 07:01  -  07-08 @ 07:00  --------------------------------------------------------  IN: 200 mL / OUT: 0 mL / NET: 200 mL          LABS:                        8.5    6.52  )-----------( 108      ( 08 Jul 2024 09:00 )             28.1     07-08    147<H>  |  117<H>  |  12  ----------------------------<  85  4.4   |  21<L>  |  0.97    Ca    8.4      08 Jul 2024 09:00  Mg     1.9     07-07    TPro  5.7<L>  /  Alb  3.2<L>  /  TBili  0.3  /  DBili  x   /  AST  56<H>  /  ALT  48<H>  /  AlkPhos  68  07-08      Culture - Blood (collected 07-07-24 @ 10:40)  Source: .Blood Blood-Peripheral  Gram Stain (07-08-24 @ 02:02):    Growth in anaerobic bottle: Gram Negative Rods    Growth in aerobic bottle: Gram Negative Rods  Preliminary Report (07-08-24 @ 02:02):    Growth in anaerobic bottle: Gram Negative Rods    Growth in aerobic bottle: Gram Negative Rods    Direct identification is available within approximately 3-5    hours either by Blood Panel Multiplexed PCR or Direct    MALDI-TOF. Details: [email or web address]  Organism: Blood Culture PCR (07-08-24 @ 04:11)  Organism: Blood Culture PCR (07-08-24 @ 04:11)      Method Type: PCR      -  Escherichia coli: Detec          CAPILLARY BLOOD GLUCOSE        PT/INR - ( 08 Jul 2024 09:00 )   PT: 11.5 sec;   INR: 1.10 ratio         PTT - ( 08 Jul 2024 09:00 )  PTT:32.9 sec  Urinalysis Basic - ( 08 Jul 2024 09:00 )    Color: x / Appearance: x / SG: x / pH: x  Gluc: 85 mg/dL / Ketone: x  / Bili: x / Urobili: x   Blood: x / Protein: x / Nitrite: x   Leuk Esterase: x / RBC: x / WBC x   Sq Epi: x / Non Sq Epi: x / Bacteria: x      Procalcitonin: 17.68 ng/mL (07-08-24 @ 09:00)                CULTURES:      (collected 07-07-24 @ 10:40)  Source: .Blood Blood-Peripheral  Gram Stain (07-08-24 @ 02:02):    Growth in anaerobic bottle: Gram Negative Rods    Growth in aerobic bottle: Gram Negative Rods  Preliminary Report (07-08-24 @ 02:02):    Growth in anaerobic bottle: Gram Negative Rods    Growth in aerobic bottle: Gram Negative Rods    Direct identification is available within approximately 3-5    hours either by Blood Panel Multiplexed PCR or Direct    MALDI-TOF. Details: [email or web address]  Organism: Blood Culture PCR (07-08-24 @ 04:11)  Organism: Blood Culture PCR (07-08-24 @ 04:11)      Method Type: PCR      -  Escherichia coli: Detec     (collected 07-07-24 @ 09:40)  Source: .Blood Blood-Peripheral  Gram Stain (07-08-24 @ 02:01):    Growth in anaerobic bottle: Gram Negative Rods  Preliminary Report (07-08-24 @ 02:01):    Growth in anaerobic bottle: Gram Negative Rods                      Physical Examination:    General: No acute distress.      HEENT: Pupils equal, reactive to light.  Symmetric.    PULM: Clear to auscultation bilaterally, no significant sputum production    CVS: S1, S2    ABD: Soft, nondistended, nontender, normoactive bowel sounds, no masses    EXT: No edema, nontender    SKIN: Warm and well perfused, no rashes noted.    NEURO: Alert, oriented, interactive, nonfocal    RADIOLOGY REVIEWED  CT chest:    < from: CT Chest No Cont (07.07.24 @ 19:49) >  FINDINGS: Absence of intravenous contrast limits evaluation of   vasculature and visceral organs.    LUNGS AND LARGE AIRWAYS: Patent central airways. Areas of scarring   architectural distortion with subpleural-based thickening, and   bronchiectasis within the right upper and right middle lobes is similar   to exam from 9/13/2021. 1.2 cm cavitary lesion in the left lower lobe   (301, 78) is similar to exam from 2/27/2024, but new from 9/13/2021. 7 mm   mixed solid/groundglass nodule in the right lower lobe (301, 86) is new   from 9/13/2021.Two mixed solid and groundglass lesions in the left upper   lobe are new from 9/13/2021, the largest of which measures 6 mm (301,   73). Few scattered calcified granulomas.  PLEURA:Trace bilateral pleural effusion or thickening.  VESSELS:  Aorta and coronary artery calcifications.  HEART: Heart size is normal. No pericardial effusion.  MEDIASTINUM AND ETO: Calcified mediastinal and hilar lymph nodes.  CHEST WALL AND LOWER NECK: Right chest wall MediPort with tip in the   right atrium.  VISUALIZED UPPER ABDOMEN: For more detailed findings of the abdomen   please refer to CT of the abdomen and pelvis report from same day.  BONES: Degenerative changes. Stable advanced remote T12 compression   deformity.    IMPRESSION:    Cavitary and mixed solid/groundglass lesions in the bilateral lower lobes   are new/increased in size from 2/27/2024 and not visualized on CT from   9/13/2021.    Etiology of these nodules is unclear. However, in the setting of known   malignancy, may represent metastatic disease. Infectious or inflammatory   etiology also considered. Consider nonemergent PET/CT and short-term   imaging follow-up in 3 months is advised.    < end of copied text >

## 2024-07-09 LAB
-  AMPICILLIN/SULBACTAM: SIGNIFICANT CHANGE UP
-  AMPICILLIN: SIGNIFICANT CHANGE UP
-  AZTREONAM: SIGNIFICANT CHANGE UP
-  CEFAZOLIN: SIGNIFICANT CHANGE UP
-  CEFEPIME: SIGNIFICANT CHANGE UP
-  CEFOXITIN: SIGNIFICANT CHANGE UP
-  CEFTRIAXONE: SIGNIFICANT CHANGE UP
-  CIPROFLOXACIN: SIGNIFICANT CHANGE UP
-  ERTAPENEM: SIGNIFICANT CHANGE UP
-  GENTAMICIN: SIGNIFICANT CHANGE UP
-  IMIPENEM: SIGNIFICANT CHANGE UP
-  LEVOFLOXACIN: SIGNIFICANT CHANGE UP
-  MEROPENEM: SIGNIFICANT CHANGE UP
-  PIPERACILLIN/TAZOBACTAM: SIGNIFICANT CHANGE UP
-  TOBRAMYCIN: SIGNIFICANT CHANGE UP
-  TRIMETHOPRIM/SULFAMETHOXAZOLE: SIGNIFICANT CHANGE UP
ALBUMIN SERPL ELPH-MCNC: 3.4 G/DL — SIGNIFICANT CHANGE UP (ref 3.3–5)
ALP SERPL-CCNC: 70 U/L — SIGNIFICANT CHANGE UP (ref 40–120)
ALT FLD-CCNC: 42 U/L — SIGNIFICANT CHANGE UP (ref 10–45)
ANION GAP SERPL CALC-SCNC: 10 MMOL/L — SIGNIFICANT CHANGE UP (ref 5–17)
APTT BLD: 33 SEC — SIGNIFICANT CHANGE UP (ref 24.5–35.6)
AST SERPL-CCNC: 39 U/L — SIGNIFICANT CHANGE UP (ref 10–40)
BASOPHILS # BLD AUTO: 0.02 K/UL — SIGNIFICANT CHANGE UP (ref 0–0.2)
BASOPHILS NFR BLD AUTO: 0.4 % — SIGNIFICANT CHANGE UP (ref 0–2)
BILIRUB SERPL-MCNC: 0.2 MG/DL — SIGNIFICANT CHANGE UP (ref 0.2–1.2)
BUN SERPL-MCNC: 8 MG/DL — SIGNIFICANT CHANGE UP (ref 7–23)
CALCIUM SERPL-MCNC: 8.9 MG/DL — SIGNIFICANT CHANGE UP (ref 8.4–10.5)
CHLORIDE SERPL-SCNC: 112 MMOL/L — HIGH (ref 96–108)
CO2 SERPL-SCNC: 20 MMOL/L — LOW (ref 22–31)
CREAT SERPL-MCNC: 0.82 MG/DL — SIGNIFICANT CHANGE UP (ref 0.5–1.3)
CULTURE RESULTS: ABNORMAL
CULTURE RESULTS: ABNORMAL
EGFR: 73 ML/MIN/1.73M2 — SIGNIFICANT CHANGE UP
EOSINOPHIL # BLD AUTO: 0.09 K/UL — SIGNIFICANT CHANGE UP (ref 0–0.5)
EOSINOPHIL NFR BLD AUTO: 2 % — SIGNIFICANT CHANGE UP (ref 0–6)
GAMMA INTERFERON BACKGROUND BLD IA-ACNC: 0.12 IU/ML — SIGNIFICANT CHANGE UP
GLUCOSE SERPL-MCNC: 99 MG/DL — SIGNIFICANT CHANGE UP (ref 70–99)
HCT VFR BLD CALC: 28.6 % — LOW (ref 34.5–45)
HGB BLD-MCNC: 8.9 G/DL — LOW (ref 11.5–15.5)
IMM GRANULOCYTES NFR BLD AUTO: 0.2 % — SIGNIFICANT CHANGE UP (ref 0–0.9)
INR BLD: 1 RATIO — SIGNIFICANT CHANGE UP (ref 0.85–1.18)
LYMPHOCYTES # BLD AUTO: 1.27 K/UL — SIGNIFICANT CHANGE UP (ref 1–3.3)
LYMPHOCYTES # BLD AUTO: 27.7 % — SIGNIFICANT CHANGE UP (ref 13–44)
M TB IFN-G BLD-IMP: NEGATIVE — SIGNIFICANT CHANGE UP
M TB IFN-G CD4+ BCKGRND COR BLD-ACNC: 0 IU/ML — SIGNIFICANT CHANGE UP
M TB IFN-G CD4+CD8+ BCKGRND COR BLD-ACNC: 0 IU/ML — SIGNIFICANT CHANGE UP
MCHC RBC-ENTMCNC: 31.1 GM/DL — LOW (ref 32–36)
MCHC RBC-ENTMCNC: 33.5 PG — SIGNIFICANT CHANGE UP (ref 27–34)
MCV RBC AUTO: 107.5 FL — HIGH (ref 80–100)
METHOD TYPE: SIGNIFICANT CHANGE UP
MONOCYTES # BLD AUTO: 0.41 K/UL — SIGNIFICANT CHANGE UP (ref 0–0.9)
MONOCYTES NFR BLD AUTO: 8.9 % — SIGNIFICANT CHANGE UP (ref 2–14)
MRSA PCR RESULT.: SIGNIFICANT CHANGE UP
NEUTROPHILS # BLD AUTO: 2.79 K/UL — SIGNIFICANT CHANGE UP (ref 1.8–7.4)
NEUTROPHILS NFR BLD AUTO: 60.8 % — SIGNIFICANT CHANGE UP (ref 43–77)
NRBC # BLD: 0 /100 WBCS — SIGNIFICANT CHANGE UP (ref 0–0)
ORGANISM # SPEC MICROSCOPIC CNT: ABNORMAL
PLATELET # BLD AUTO: 116 K/UL — LOW (ref 150–400)
POTASSIUM SERPL-MCNC: 3.8 MMOL/L — SIGNIFICANT CHANGE UP (ref 3.5–5.3)
POTASSIUM SERPL-SCNC: 3.8 MMOL/L — SIGNIFICANT CHANGE UP (ref 3.5–5.3)
PROT SERPL-MCNC: 6.3 G/DL — SIGNIFICANT CHANGE UP (ref 6–8.3)
PROTHROM AB SERPL-ACNC: 10.5 SEC — SIGNIFICANT CHANGE UP (ref 9.5–13)
QUANT TB PLUS MITOGEN MINUS NIL: 0.87 IU/ML — SIGNIFICANT CHANGE UP
RBC # BLD: 2.66 M/UL — LOW (ref 3.8–5.2)
RBC # FLD: 15.3 % — HIGH (ref 10.3–14.5)
S AUREUS DNA NOSE QL NAA+PROBE: SIGNIFICANT CHANGE UP
SODIUM SERPL-SCNC: 142 MMOL/L — SIGNIFICANT CHANGE UP (ref 135–145)
SPECIMEN SOURCE: SIGNIFICANT CHANGE UP
SPECIMEN SOURCE: SIGNIFICANT CHANGE UP
WBC # BLD: 4.59 K/UL — SIGNIFICANT CHANGE UP (ref 3.8–10.5)
WBC # FLD AUTO: 4.59 K/UL — SIGNIFICANT CHANGE UP (ref 3.8–10.5)

## 2024-07-09 PROCEDURE — 99222 1ST HOSP IP/OBS MODERATE 55: CPT

## 2024-07-09 PROCEDURE — 99232 SBSQ HOSP IP/OBS MODERATE 35: CPT

## 2024-07-09 RX ORDER — SODIUM CHLORIDE 0.9 % (FLUSH) 0.9 %
4 SYRINGE (ML) INJECTION EVERY 8 HOURS
Refills: 0 | Status: COMPLETED | OUTPATIENT
Start: 2024-07-09 | End: 2024-07-10

## 2024-07-09 RX ADMIN — Medication 2 CAPSULE(S): at 08:35

## 2024-07-09 RX ADMIN — METRONIDAZOLE 500 MILLIGRAM(S): 500 TABLET ORAL at 06:55

## 2024-07-09 RX ADMIN — METRONIDAZOLE 500 MILLIGRAM(S): 500 TABLET ORAL at 18:12

## 2024-07-09 RX ADMIN — Medication 4 MILLILITER(S): at 14:55

## 2024-07-09 RX ADMIN — Medication 4 MILLILITER(S): at 23:01

## 2024-07-09 RX ADMIN — Medication 2 CAPSULE(S): at 13:25

## 2024-07-09 RX ADMIN — Medication 1 APPLICATION(S): at 13:25

## 2024-07-09 RX ADMIN — Medication 2 CAPSULE(S): at 18:12

## 2024-07-09 RX ADMIN — Medication 100 MILLIGRAM(S): at 22:38

## 2024-07-09 NOTE — CONSULT NOTE ADULT - ASSESSMENT
78yoPanc[age]ic cancer stage III s/p whipple + chemo + rt,     Agree w ID w/u    Clinically suggestive of infective etiology over tumor progression currently.     Cont w ABX per ID    PD stent can be removed endoscopically, if concern persists.      Will follow.     Thanks

## 2024-07-09 NOTE — PROGRESS NOTE ADULT - SUBJECTIVE AND OBJECTIVE BOX
Patient is a 78y old  Female who presents with a chief complaint of fever + chills (09 Jul 2024 13:55)    Patient seen and examined at bedside, spoke w/ son over the phone.   MEDICATIONS  (STANDING):  cefTRIAXone   IVPB 1000 milliGRAM(s) IV Intermittent every 24 hours  chlorhexidine 2% Cloths 1 Application(s) Topical daily  dextrose 5% + sodium chloride 0.45%. 1000 milliLiter(s) (75 mL/Hr) IV Continuous <Continuous>  metroNIDAZOLE    Tablet 500 milliGRAM(s) Oral every 12 hours  pancrelipase  (CREON 36,000 Lipase Units) 2 Capsule(s) Oral three times a day with meals  sodium chloride 0.9%. 1000 milliLiter(s) (100 mL/Hr) IV Continuous <Continuous>    MEDICATIONS  (PRN):  acetaminophen     Tablet .. 650 milliGRAM(s) Oral every 6 hours PRN Temp greater or equal to 38C (100.4F), Mild Pain (1 - 3)  aluminum hydroxide/magnesium hydroxide/simethicone Suspension 30 milliLiter(s) Oral every 4 hours PRN Dyspepsia  melatonin 3 milliGRAM(s) Oral at bedtime PRN Insomnia  ondansetron Injectable 4 milliGRAM(s) IV Push every 8 hours PRN Nausea and/or Vomiting  sodium chloride 7% Inhalation 4 milliLiter(s) Inhalation every 8 hours PRN sputum induction    Vital Signs Last 24 Hrs  T(C): 36.3 (09 Jul 2024 13:48), Max: 37 (08 Jul 2024 20:45)  T(F): 97.4 (09 Jul 2024 13:48), Max: 98.6 (08 Jul 2024 20:45)  HR: 80 (09 Jul 2024 13:48) (68 - 80)  BP: 134/76 (09 Jul 2024 13:48) (102/66 - 134/76)  BP(mean): --  RR: 18 (09 Jul 2024 13:48) (18 - 18)  SpO2: 98% (09 Jul 2024 13:48) (98% - 100%)    Parameters below as of 09 Jul 2024 13:48  Patient On (Oxygen Delivery Method): room air    PE  NAD  Awake, alert  Anicteric, MMM  No c/c/e  No rash grossly  FROM                          8.9    4.59  )-----------( 116      ( 09 Jul 2024 06:40 )             28.6       07-09    142  |  112<H>  |  8   ----------------------------<  99  3.8   |  20<L>  |  0.82    Ca    8.9      09 Jul 2024 06:40    TPro  6.3  /  Alb  3.4  /  TBili  0.2  /  DBili  x   /  AST  39  /  ALT  42  /  AlkPhos  70  07-09

## 2024-07-09 NOTE — DIETITIAN INITIAL EVALUATION ADULT - ORAL INTAKE PTA/DIET HISTORY
PTA per pt  -Intake: fair typical intake, consuming 3 meals/day, though noting appetite varies and she sometimes eats smaller meals, no recent changes in appetite; does not follow any therapeutic diets at home though avoids sweets and salty foods as well as pork and chicken out of preference  -Chewing/Swallowing: denies hx of difficulty   -Allergies/Intolerances: confirms NKFA   -Vitamins/Supplements: multivitamin, folic acid, and calcium, pt requesting to take vitamins in-house, RD to speak to team

## 2024-07-09 NOTE — PROGRESS NOTE ADULT - ASSESSMENT
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, rcc s/p l nephrectomy, p/w fever + chills; in er, meeting severe sepsis criteria; unclear source; admit to medicine for further mgmt       Severe sepsis, Gram Neg Bacteremia   - Pt febrile, tachycardic, bandemia, + lactic acidosis (resolved); meets severe sepsis criteria  - CT C w/ scarring, bronchiectasis in RU and RML similar to 2021 per read, 1.2 CM Cavitary lesion in LLL, 7mm RLL nodule, TONIA nodules, calcified granulomas / mediastinal and hilar LN, advanced t12 compression deformity - read as stable   - CT A/P w/ liver lesion, mild pneumobilia (read as similar to prior), S/P partial resection of pancreas w/ stent in midportion of duodenum of pancreatobiliary limb, S/P L sided nephrectomy, S/P Whipple  - S/P 2 L NS + vanc and zosyn in ER  - RVP negative  - Procal elevated, trend   - BCx2 w/ GNR, E. Coli, F/u repeat BCx2 in lab  - C/w Rocephin / Flagyl for now for ABX  - Trend CBC, temp curve, VS and monitor patient  - ID eval consulted; F/u recs    Cavitary Lung Lesion   - CT as above  - C/w Rocephin and Flagyl  - Pending AFB sputum culture X 3 per ID --> Hypertonic saline to induce sputum as patient without sputum   - Isolation as per protocol to R/O TB  - TTE pending   - Repeat CT chest in 7-10 days as per pulm, if unchanged, patient will require bronchoscopy   - Pulm and ID evals appreciated; F/u recs    Macrocytic anemia, Thrombocytopenia  - Monitor and trend Hgb.   - Anemia labs noted  - Maintain active T/S; Transfuse for Hgb < 7.0, Plt < 10K or < 50 K w/ bleeding/ procedures   - Heme/Onc eval appreciated; F/u recs     HyperNa  - Na level with improvement to WNL. Continue to monitor and trend  - PO hydration as tolerated  - Avoid overcorrection > 6-8 mEq in 24 hours    Pancreatic Adenocarcinoma.   - H/O panc adenoca s/p whipple + adjuvant chemo + rt  - CT C/A/P as above   - On Pancrelipase   - Heme/Onc eval appreciated; F/u recs     Rheumatoid arthritis.   - Home methotrexate on hold for now  - Outpatient follow up     Pre-DM  - A1C of 5.9   - Diet and lifestyle modifications  - Repeat A1C as an outpatient     PPX      Discussed in detail with son via telephone at the bedside. Discussed with Attending and ACP.    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, rcc s/p l nephrectomy, p/w fever + chills; in er, meeting severe sepsis criteria; unclear source; admit to medicine for further mgmt       Severe sepsis, Gram Neg Bacteremia   - Pt febrile, tachycardic, bandemia, + lactic acidosis (resolved); meets severe sepsis criteria  - CT C w/ scarring, bronchiectasis in RU and RML similar to 2021 per read, 1.2 CM Cavitary lesion in LLL, 7mm RLL nodule, TONIA nodules, calcified granulomas / mediastinal and hilar LN, advanced t12 compression deformity - read as stable   - CT A/P w/ liver lesion, mild pneumobilia (read as similar to prior), S/P partial resection of pancreas w/ stent in midportion of duodenum of pancreatobiliary limb, S/P L sided nephrectomy, S/P Whipple  - S/P 2 L NS + vanc and zosyn in ER  - RVP negative  - Procal elevated, trend   - BCx2 w/ GNR, E. Coli, F/u repeat BCx2 in lab  - C/w Rocephin / Flagyl for now for ABX  - Trend CBC, temp curve, VS and monitor patient  - ID eval consulted; F/u recs  - GI eval consulted; F/u recs     Cavitary Lung Lesion   - CT as above  - C/w Rocephin and Flagyl  - Pending AFB sputum culture X 3 per ID --> Hypertonic saline to induce sputum as patient without sputum   - Isolation as per protocol to R/O TB  - TTE pending   - Repeat CT chest in 7-10 days as per pulm, if unchanged, patient will require bronchoscopy   - Pulm and ID evals appreciated; F/u recs    Macrocytic anemia, Thrombocytopenia  - Monitor and trend Hgb.   - Anemia labs noted  - Maintain active T/S; Transfuse for Hgb < 7.0, Plt < 10K or < 50 K w/ bleeding/ procedures   - Heme/Onc eval appreciated; F/u recs     HyperNa  - Na level with improvement to WNL. Continue to monitor and trend  - PO hydration as tolerated  - Avoid overcorrection > 6-8 mEq in 24 hours    Pancreatic Adenocarcinoma.   - H/O panc adenoca s/p whipple + adjuvant chemo + rt  - CT C/A/P as above   - On Pancrelipase   - Heme/Onc eval appreciated; F/u recs     Rheumatoid arthritis.   - Home methotrexate on hold for now  - Outpatient follow up     Pre-DM  - A1C of 5.9   - Diet and lifestyle modifications  - Repeat A1C as an outpatient     PPX      Discussed in detail with son via telephone at the bedside. Discussed with Attending and ACP.

## 2024-07-09 NOTE — PROGRESS NOTE ADULT - SUBJECTIVE AND OBJECTIVE BOX
Follow-up Pulm Progress Note    No new respiratory events overnight.  Denies SOB/CP.     Medications:  MEDICATIONS  (STANDING):  cefTRIAXone   IVPB 1000 milliGRAM(s) IV Intermittent every 24 hours  chlorhexidine 2% Cloths 1 Application(s) Topical daily  dextrose 5% + sodium chloride 0.45%. 1000 milliLiter(s) (75 mL/Hr) IV Continuous <Continuous>  metroNIDAZOLE    Tablet 500 milliGRAM(s) Oral every 12 hours  pancrelipase  (CREON 36,000 Lipase Units) 2 Capsule(s) Oral three times a day with meals  sodium chloride 0.9%. 1000 milliLiter(s) (100 mL/Hr) IV Continuous <Continuous>    MEDICATIONS  (PRN):  acetaminophen     Tablet .. 650 milliGRAM(s) Oral every 6 hours PRN Temp greater or equal to 38C (100.4F), Mild Pain (1 - 3)  aluminum hydroxide/magnesium hydroxide/simethicone Suspension 30 milliLiter(s) Oral every 4 hours PRN Dyspepsia  melatonin 3 milliGRAM(s) Oral at bedtime PRN Insomnia  ondansetron Injectable 4 milliGRAM(s) IV Push every 8 hours PRN Nausea and/or Vomiting  sodium chloride 7% Inhalation 4 milliLiter(s) Inhalation every 8 hours PRN sputum induction          Vital Signs Last 24 Hrs  T(C): 36.6 (09 Jul 2024 09:14), Max: 37 (08 Jul 2024 20:45)  T(F): 97.8 (09 Jul 2024 09:14), Max: 98.6 (08 Jul 2024 20:45)  HR: 71 (09 Jul 2024 09:14) (68 - 75)  BP: 128/68 (09 Jul 2024 09:14) (102/66 - 128/75)  BP(mean): --  RR: 18 (09 Jul 2024 09:14) (18 - 18)  SpO2: 100% (09 Jul 2024 09:14) (98% - 100%)    Parameters below as of 09 Jul 2024 09:14  Patient On (Oxygen Delivery Method): room air          VBG pH 7.31 07-07 @ 11:50    VBG pCO2 42 07-07 @ 11:50    VBG O2 sat 66.9 07-07 @ 11:50    VBG lactate 1.9 07-07 @ 11:50            LABS:                        8.9    4.59  )-----------( 116      ( 09 Jul 2024 06:40 )             28.6     07-09    142  |  112<H>  |  8   ----------------------------<  99  3.8   |  20<L>  |  0.82    Ca    8.9      09 Jul 2024 06:40    TPro  6.3  /  Alb  3.4  /  TBili  0.2  /  DBili  x   /  AST  39  /  ALT  42  /  AlkPhos  70  07-09          CAPILLARY BLOOD GLUCOSE        PT/INR - ( 09 Jul 2024 06:40 )   PT: 10.5 sec;   INR: 1.00 ratio         PTT - ( 09 Jul 2024 06:40 )  PTT:33.0 sec  Urinalysis Basic - ( 09 Jul 2024 06:40 )    Color: x / Appearance: x / SG: x / pH: x  Gluc: 99 mg/dL / Ketone: x  / Bili: x / Urobili: x   Blood: x / Protein: x / Nitrite: x   Leuk Esterase: x / RBC: x / WBC x   Sq Epi: x / Non Sq Epi: x / Bacteria: x      Procalcitonin: 17.68 ng/mL (07-08-24 @ 09:00)              CULTURES:    Culture - Blood (collected 07-07-24 @ 10:40)  Source: .Blood Blood-Peripheral  Gram Stain (07-08-24 @ 02:02):    Growth in anaerobic bottle: Gram Negative Rods    Growth in aerobic bottle: Gram Negative Rods  Preliminary Report (07-08-24 @ 23:06):    Growth in aerobic and anaerobic bottles: Escherichia coli    Direct identification is available within approximately 3-5    hours either by Blood Panel Multiplexed PCR or Direct    MALDI-TOF. Details: [email or web address]  Organism: Blood Culture PCR (07-08-24 @ 04:11)  Organism: Blood Culture PCR (07-08-24 @ 04:11)      Method Type: PCR      -  Escherichia coli: Detec    Culture - Blood (collected 07-07-24 @ 09:40)  Source: .Blood Blood-Peripheral  Gram Stain (07-08-24 @ 02:01):    Growth in anaerobic bottle: Gram Negative Rods  Preliminary Report (07-08-24 @ 02:01):    Growth in anaerobic bottle: Gram Negative Rods        Culture - Urine (collected 07-07-24 @ 10:57)  Source: Clean Catch Clean Catch (Midstream)  Final Report (07-08-24 @ 16:07):    <10,000 CFU/mL Normal Urogenital Carmen                                Physical Examination:  PULM: Clear to auscultation bilaterally, no significant sputum production  CVS: S1, S2 heard      RADIOLOGY REVIEWED  CT chest:    < from: CT Chest No Cont (07.07.24 @ 19:49) >  FINDINGS: Absence of intravenous contrast limits evaluation of   vasculature and visceral organs.    LUNGS AND LARGE AIRWAYS: Patent central airways. Areas of scarring   architectural distortion with subpleural-based thickening, and   bronchiectasis within the right upper and right middle lobes is similar   to exam from 9/13/2021. 1.2 cm cavitary lesion in the left lower lobe   (301, 78) is similar to exam from 2/27/2024, but new from 9/13/2021. 7 mm   mixed solid/groundglass nodule in the right lower lobe (301, 86) is new   from 9/13/2021.Two mixed solid and groundglass lesions in the left upper   lobe are new from 9/13/2021, the largest of which measures 6 mm (301,   73). Few scattered calcified granulomas.  PLEURA:Trace bilateral pleural effusion or thickening.  VESSELS:  Aorta and coronary artery calcifications.  HEART: Heart size is normal. No pericardial effusion.  MEDIASTINUM AND TEO: Calcified mediastinal and hilar lymph nodes.  CHEST WALL AND LOWER NECK: Right chest wall MediPort with tip in the   right atrium.  VISUALIZED UPPER ABDOMEN: For more detailed findings of the abdomen   please refer to CT of the abdomen and pelvis report from same day.  BONES: Degenerative changes. Stable advanced remote T12 compression   deformity.    IMPRESSION:    Cavitary and mixed solid/groundglass lesions in the bilateral lower lobes   are new/increased in size from 2/27/2024 and not visualized on CT from   9/13/2021.    Etiology of these nodules is unclear. However, in the setting of known   malignancy, may represent metastatic disease. Infectious or inflammatory   etiology also considered. Consider nonemergent PET/CT and short-term   imaging follow-up in 3 months is advised.    < end of copied text >

## 2024-07-09 NOTE — DIETITIAN INITIAL EVALUATION ADULT - ENERGY INTAKE
Pt reporting fair-good intake in-house, eating ~75% of trays. Has been ordering her own meals. Declines oral protein supplements at this time./Adequate (%)

## 2024-07-09 NOTE — PROGRESS NOTE ADULT - ASSESSMENT
77 y/o F with PMH of HLD, RA, gastroparesis, pancreatic adenoca s/p whipple + chemo + R RCC s/p l nephrectomy. Presents with fevers, chills. BC + GNR. CT chest with cavitary and mixed solid/groundglass lesions in the bilateral lower lobes. Pt placed on isolation for r/o TB on admission. Pt reportedly with hx of positive PPD and is s/p bronchoscopy about 10 years ago to r/o TB (which was reportedly negative) prior to initiating treatment for her RA.

## 2024-07-09 NOTE — PROGRESS NOTE ADULT - SUBJECTIVE AND OBJECTIVE BOX
Name of Patient : SUZY SILVA  MRN: [id number]  Date of visit: 07-09-24 @ 13:55      Subjective: Patient seen and examined. No new events except as noted.   Patient seen earlier this AM. Heme/Onc at the bedside. Son providing translation via telephone at the patient's bedside.   Patient states that last night she was cold. Denies chills, body aches at time of encounter.   Repeat cultures in lab    REVIEW OF SYSTEMS:    CONSTITUTIONAL: Generalized weakness; Afebrile   EYES/ENT: No visual changes;  No vertigo or throat pain   NECK: No pain or stiffness  RESPIRATORY: No cough, wheezing, hemoptysis; No shortness of breath  CARDIOVASCULAR: No chest pain or palpitations  GASTROINTESTINAL: No abdominal or epigastric pain. No nausea, vomiting, or hematemesis; No diarrhea or constipation. No melena or hematochezia.  GENITOURINARY: No dysuria, frequency or hematuria  NEUROLOGICAL: No numbness or weakness  SKIN: No itching, burning, rashes, or lesions   All other review of systems is negative unless indicated above.    MEDICATIONS:  MEDICATIONS  (STANDING):  cefTRIAXone   IVPB 1000 milliGRAM(s) IV Intermittent every 24 hours  chlorhexidine 2% Cloths 1 Application(s) Topical daily  dextrose 5% + sodium chloride 0.45%. 1000 milliLiter(s) (75 mL/Hr) IV Continuous <Continuous>  metroNIDAZOLE    Tablet 500 milliGRAM(s) Oral every 12 hours  pancrelipase  (CREON 36,000 Lipase Units) 2 Capsule(s) Oral three times a day with meals  sodium chloride 0.9%. 1000 milliLiter(s) (100 mL/Hr) IV Continuous <Continuous>      PHYSICAL EXAM:  T(C): 36.3 (07-09-24 @ 13:48), Max: 37 (07-08-24 @ 20:45)  HR: 80 (07-09-24 @ 13:48) (68 - 80)  BP: 134/76 (07-09-24 @ 13:48) (102/66 - 134/76)  RR: 18 (07-09-24 @ 13:48) (18 - 18)  SpO2: 98% (07-09-24 @ 13:48) (98% - 100%)  Wt(kg): --  I&O's Summary    09 Jul 2024 07:01  -  09 Jul 2024 13:55  --------------------------------------------------------  IN: 640 mL / OUT: 0 mL / NET: 640 mL          Appearance: Awake, sitting OOB TC 	  HEENT:  Eyes are open   Lymphatic: No lymphadenopathy grossly   Cardiovascular: Normal    Respiratory: normal effort , clear  Gastrointestinal:  Soft, Non-tender  Skin: No rashes,  warm to touch  Psychiatry:  Mood & affect appropriate  Musculoskeletal: No edema       07-09-24 @ 07:01  -  07-09-24 @ 13:55  --------------------------------------------------------  IN: 640 mL / OUT: 0 mL / NET: 640 mL                              8.9    4.59  )-----------( 116      ( 09 Jul 2024 06:40 )             28.6               07-09    142  |  112<H>  |  8   ----------------------------<  99  3.8   |  20<L>  |  0.82    Ca    8.9      09 Jul 2024 06:40    TPro  6.3  /  Alb  3.4  /  TBili  0.2  /  DBili  x   /  AST  39  /  ALT  42  /  AlkPhos  70  07-09    PT/INR - ( 09 Jul 2024 06:40 )   PT: 10.5 sec;   INR: 1.00 ratio         PTT - ( 09 Jul 2024 06:40 )  PTT:33.0 sec                   Urinalysis Basic - ( 09 Jul 2024 06:40 )    Color: x / Appearance: x / SG: x / pH: x  Gluc: 99 mg/dL / Ketone: x  / Bili: x / Urobili: x   Blood: x / Protein: x / Nitrite: x   Leuk Esterase: x / RBC: x / WBC x   Sq Epi: x / Non Sq Epi: x / Bacteria: x          Culture - Urine (07.07.24 @ 10:57)   Specimen Source: Clean Catch Clean Catch (Midstream)  Culture Results: <10,000 CFU/mL Normal Urogenital Carmen    Culture - Blood (07.07.24 @ 10:40)   Gram Stain: Growth in anaerobic bottle: Gram Negative Rods   Growth in aerobic bottle: Gram Negative Rods  - Escherichia coli: Detec  Specimen Source: .Blood Blood-Peripheral  Organism: Blood Culture PCR  Culture Results:   Growth in aerobic and anaerobic bottles: Escherichia coli     Culture - Blood (07.07.24 @ 09:40)   Gram Stain: Growth in anaerobic bottle: Gram Negative Rods  Specimen Source: .Blood Blood-Peripheral  Culture Results: Growth in anaerobic bottle: Gram Negative Rods        < from: CT Chest No Cont (07.07.24 @ 19:49) >    ACC: [id number] EXAM:  CT CHEST   ORDERED BY:  MOODY FRANCO     PROCEDURE DATE:  07/07/2024          INTERPRETATION:  CLINICAL INFORMATION: Follow-up cavitary lesions.    History of pancreatic adenocarcinoma status post Whipple.    COMPARISON: CT chest 9/13/2021 and CT abdomen and pelvis 2/27/2024    CONTRAST/COMPLICATIONS:  IV Contrast: NONE  Oral Contrast: NONE  Complications: None reported at time of study completion    PROCEDURE:  CT of the Chest was performed.  Sagittal and coronal reformats were performed.    FINDINGS: Absence of intravenous contrast limits evaluation of   vasculature and visceral organs.    LUNGS AND LARGE AIRWAYS: Patent central airways. Areas of scarring   architectural distortion with subpleural-based thickening, and   bronchiectasis within the right upper and right middle lobes is similar   to exam from 9/13/2021. 1.2 cm cavitary lesion in the left lower lobe   (301, 78) is similar to exam from 2/27/2024, but new from 9/13/2021. 7 mm   mixed solid/groundglass nodule in the right lower lobe (301, 86) is new   from 9/13/2021.Two mixed solid and groundglass lesions in the left upper   lobe are new from 9/13/2021, the largest of which measures 6 mm (301,   73). Few scattered calcified granulomas.  PLEURA:Trace bilateral pleural effusion or thickening.  VESSELS:  Aorta and coronary artery calcifications.  HEART: Heart size is normal. No pericardial effusion.  MEDIASTINUM AND TEO: Calcified mediastinal and hilar lymph nodes.  CHEST WALL AND LOWER NECK: Right chest wall MediPort with tip in the   right atrium.  VISUALIZED UPPER ABDOMEN: For more detailed findings of the abdomen   please refer to CT of the abdomen and pelvis report from same day.  BONES: Degenerative changes. Stable advanced remote T12 compression   deformity.    IMPRESSION:    Cavitary and mixed solid/groundglass lesions in the bilateral lower lobes   are new/increased in size from 2/27/2024 and not visualized on CT from   9/13/2021.    Etiology of these nodules is unclear. However, in the setting of known   malignancy, may represent metastatic disease. Infectious or inflammatory   etiology also considered. Consider nonemergent PET/CT and short-term   imaging follow-up in 3 months is advised.        --- End of Report ---          RADHA BURGOS MD; Resident Radiologist  This document has been electronically signed.  OLIVIER LUO MD; Attending Radiologist  This document has been electronically signed. Jul 7 2024  8:34PM    < end of copied text >              < from: CT Abdomen and Pelvis w/ IV Cont (07.07.24 @ 14:53) >    ACC: [id number] EXAM:  CT ABDOMEN AND PELVIS IC   ORDERED BY:  MOODY FRANCO     PROCEDURE DATE:  07/07/2024          INTERPRETATION:  CLINICAL INFORMATION: Abdominal pain, nausea, vomiting   and fever. History of pancreatic adenocarcinoma status post Whipple.   History of renal cell carcinoma status post left-sided nephrectomy.    COMPARISON: CT of the abdomen and pelvis 2/27/2024    CONTRAST/COMPLICATIONS:  IV Contrast: Omnipaque 350  90 cc administered   10 cc discarded  Oral Contrast: NONE  Complications: None reported at time of study completion    PROCEDURE:  CT of the Abdomen and Pelvis was performed.  Sagittal and coronal reformats were performed.    FINDINGS:  LOWER CHEST: 1.2 cm cavitary lesion in the left lower lobe (301, 4)is   similar in size to exam from 2/27/2024. 7 mm mixed solid/groundglass in   the right lower lobe (301, 8), is increased in size from 2/27/2022 when   it measured 5 mm. 6 mm mixed solid and groundglass nodule in the   visualized left upper lobe (301, 2) not definitively visualized on exam   from 2/27/2024.    LIVER: Subcentimeter hypodense foci in the right hepatic lobe are   unchanged from 9/11/2021.  BILE DUCTS: Mild pneumobilia, similar to priors.  GALLBLADDER: Cholecystectomy.  SPLEEN: Within normal limits.  PANCREAS: Status post partial resection of the pancreas with stent   terminating in the midportion of the duodenum of the pancreatobiliary   limb. Atrophic remnant pancreas.  ADRENALS: Within normal limits.  KIDNEYS/URETERS: Status post left-sided nephrectomy. Stable trace   fullness right renal pelvis    BLADDER: Within normal limits.  REPRODUCTIVE ORGANS: Linear metallic density in the retrovesicular space,   of unclear etiology and previously seen in the pouch of Issa. Uterus   and adnexa are otherwise within normal limits.    BOWEL: Patient is status post Whipple with partial gastrectomy and left   upper quadrant gastrojejunal anastomosis. No bowel obstruction. Appendix   is not visualized.  PERITONEUM/RETROPERITONEUM: Small amount of fluid in the fariba hepatis is   similar to prior exams dating back to 8/31/2022.  VESSELS: Within normal limits.  LYMPH NODES: No lymphadenopathy.  ABDOMINAL WALL: Postsurgical changes.  BONES: Severe compression deformity at the level of T12, similar to exam   from 2/27/2024. Sclerotic focus at the level of L4 is unchanged from   9/11/2021. Degenerative changes.    IMPRESSION:  Cavitary and mixed solid/groundglass lesions in the visualized lower   lungs are new/increased in sizefrom 2/27/2024.    Status post Whipple. No evidence of bowel obstruction.    Mild pneumobilia is similar to prior exam.        --- End of Report ---          RADHA BURGOS MD; Resident Radiologist  This document has been electronically signed.  HINA NAVARRO MD; Attending Radiologist  This document has been electronically signed. Jul 7 2024  3:36PM    < end of copied text >

## 2024-07-09 NOTE — DIETITIAN INITIAL EVALUATION ADULT - NSFNSGIIOFT_GEN_A_CORE
I&O's Detail    09 Jul 2024 07:01  -  09 Jul 2024 12:56  --------------------------------------------------------  IN:    Oral Fluid: 280 mL  Total IN: 280 mL    OUT:  Total OUT: 0 mL    Total NET: 280 mL

## 2024-07-09 NOTE — DIETITIAN INITIAL EVALUATION ADULT - PROBLEM SELECTOR PLAN 3
h/o panc adenoca s/p whipple + adjuvant chemo + rt  outpatient surg onc documentation reviewed  current imaging showing, "...Areas of scarring architectural distortion with subpleural-based thickening, and bronchiectasis within the right upper and right middle lobes is similar to exam from 9/13/2021. 1.2 cm cavitary lesion in the left lower lobe is similar to exam from 2/27/2024, but new from 9/13/2021. 7 mm mixed solid/groundglass nodule in the right lower lobe is new from 9/13/2021.Two mixed solid and groundglass lesions in the left upper lobe are new from 9/13/2021, the largest of which measures 6 mm"  [facility] consult in am

## 2024-07-09 NOTE — PROGRESS NOTE ADULT - ASSESSMENT
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 BCX + for E.coli, CT chest with cavitary and mixed solid/groundglass lesions in the bilateral lower lobes. Pt placed on isolation for r/o TB on admission. Pt reportedly with hx of positive PPD and is s/p bronchoscopy about 10 years ago to r/o TB (which was reportedly negative) prior to initiating treatment for her RA. Denies SOB, cough, CP.     Antimicrobials:  - CTX 1 gm Q24H   - PO Flagyl 500 mg Q12H     # E.coli blood stream infection likely GI source  # Cavitary and mixed solid/groundglass lesions in the bilateral lower lobes in an immunosuppressed patient. ( Infectious vs TB vs worsening malignancy)  # Suspected endocarditis ?  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 Has a stent on imaging (unsure of when it was placed)    #Cavitary lung lesion   -Cavitary and mixed solid/groundglass lesions in the bilateral lower lobes are new/increased in size from 2/27/2024 and not visualized on CT from 9/13/2021  -Per pt, she has a hx of PPD in the past and is s/p bronchoscopy about 10 years ago to r/o TB (which was reportedly negative) prior to initiating treatment for her RA. She did not get treated for latent TB    Recommendations:  -Bacteremia likely GI source, would recommend GI consult for pneumobilia, pt also with pancreatic stent ? duration.  -Recommend to continue Ceftriaxone 1g QD and flagyl PO 500mg Q12 for E.Coli bacteremia and cover anaerobes   -With her cavitary lesion and hx of + PPD, would need to r/o TB with AFB sputum cx x3. Keep on isolation. pt denies ever being treated for latent TB. Though clinical suspicion very low.   - Appreciate pulmonary recommendations.   - TTE pending   - repeat blood cultures.   - trend cbc for leucopenia. 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 BCX + for E.coli, CT chest with cavitary and mixed solid/groundglass lesions in the bilateral lower lobes. Pt placed on isolation for r/o TB on admission. Pt reportedly with hx of positive PPD as a student in [place], She reports no treatment ever for latent TB. She also reports bronchoscopy about 10 years ago to r/o TB (which was reportedly negative) prior to initiating treatment for her RA. She reports receiving Humira >10 years ago. Denies any close contact with anyone with active TB.     Antimicrobials:  - CTX 1 gm Q24H   - PO Flagyl 500 mg Q12H     # E. coli blood stream infection likely GI source  # Cavitary and mixed solid/groundglass lesions in the bilateral lower lobes in an immunosuppressed patient. (Infectious vs TB vs worsening malignancy)  # Suspected endocarditis ?  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 Has a stent on imaging (unsure of when it was placed)  - On CTX and Metronidazole now.   - Pending susceptibilities and repeat blood cultures     #Cavitary lung lesion   -Cavitary and mixed solid/groundglass lesions in the bilateral lower lobes are new/increased in size from 2/27/2024 and not visualized on CT from 9/13/2021  -Per pt, she has a hx of PPD in the past and is s/p bronchoscopy about 10 years ago to r/o TB (which was reportedly negative) prior to initiating treatment for her RA. She did not get treated for latent TB.    Recommendations:  -Bacteremia likely GI source, would recommend GI consult for pneumobilia, pt also with pancreatic stent ? duration.  -Recommend to continue Ceftriaxone 1g QD and flagyl PO 500mg Q12 bacteremia and cover anaerobes   -With her cavitary lesion and hx of + PPD, would need to r/o TB with AFB sputum cx x3. Keep on isolation. pt denies ever being treated for latent TB. Though clinical suspicion very low.   - Appreciate pulmonary recommendations.   - TTE pending   - repeat blood cultures   - trend WBC, fever curve and hemodynamic status.  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 BCX + for E.coli, CT chest with cavitary and mixed solid/groundglass lesions in the bilateral lower lobes. Pt placed on isolation for r/o TB on admission. Pt reportedly with hx of positive PPD as a student in [place], She reports no treatment ever for latent TB. She also reports bronchoscopy about 10 years ago to r/o TB (which was reportedly negative) prior to initiating treatment for her RA. She reports receiving Humira >10 years ago. Denies any close contact with anyone with active TB.     Antimicrobials:  - CTX 1 gm Q24H   - PO Flagyl 500 mg Q12H     # E. coli blood stream infection likely GI source  # Cavitary and mixed solid/groundglass lesions in the bilateral lower lobes in an immunosuppressed patient. (Infectious vs TB vs worsening malignancy)  # Suspected endocarditis ?  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 Has a stent on imaging (unsure of when it was placed)  - On CTX and Metronidazole now.   - Pending susceptibilities and repeat blood cultures     #Cavitary lung lesion   -Cavitary and mixed solid/groundglass lesions in the bilateral lower lobes are new/increased in size from 2/27/2024 and not visualized on CT from 9/13/2021  -Per pt, she has a hx of PPD in the past and is s/p bronchoscopy about 10 years ago to r/o TB (which was reportedly negative) prior to initiating treatment for her RA. She did not get treated for latent TB.  - Quantiferon TB gold- negative    Recommendations:  -Bacteremia likely GI source, would recommend GI consult for pneumobilia, pt also with pancreatic stent ? duration.  -Recommend to continue Ceftriaxone 1g QD and flagyl PO 500mg Q12 bacteremia and cover anaerobes   -With her cavitary lesion and hx of + PPD, would need to r/o TB with AFB sputum cx x3. Get induced sputum.   - Keep on isolation. pt denies ever being treated for latent TB. Though clinical suspicion for TB is very low.   - Appreciate pulmonary recommendations  - TTE pending   - repeat blood cultures   - trend WBC, fever curve and hemodynamic status.  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 BCX + for E.coli, CT chest with cavitary and mixed solid/groundglass lesions in the bilateral lower lobes. Pt placed on isolation for r/o TB on admission. Pt reportedly with hx of positive PPD as a student in [place], She reports no treatment ever for latent TB. She also reports bronchoscopy about 10 years ago to r/o TB (which was reportedly negative) prior to initiating treatment for her RA. She reports receiving Humira >10 years ago. Denies any close contact with anyone with active TB.     Antimicrobials:  - CTX 1 gm Q24H   - PO Flagyl 500 mg Q12H     # E. coli blood stream infection likely GI source  # Cavitary and mixed solid/groundglass lesions in the bilateral lower lobes in an immunosuppressed patient. (Infectious vs TB vs worsening malignancy)  # Leucopenia       Assessment:    #E.coli Bacteremia   BCX 4/4 + for E.Coli   CT A/P showing Mild pneumobilia is similar to prior exam. Has a stent on imaging (unsure of when it was placed)      #Cavitary lung lesion   -Per pt, she has a hx of PPD in the past and is s/p bronchoscopy about 10 years ago to r/o TB (which was reportedly negative) prior to initiating treatment for her RA. She did not get treated for latent TB.  - Quantiferon TB gold- negative      Recommendations:  -Bacteremia likely GI source, would recommend GI consult, pt with pancreatic duct stent   -continue Ceftriaxone 1g QD and flagyl PO 500mg Q12 bacteremia and cover anaerobes   -With her cavitary lesion and hx of + PPD, would need to r/o TB with AFB sputum cx x3. Get induced sputum. AFB sputum x 1 in lab.   -pt denies ever being treated for latent TB. Though clinical suspicion for TB is very low.   - Appreciate pulmonary recommendations  - TTE pending,   - repeat blood cultures, in lab  - trend WBC, no leucocytosis     Mark Soria  Please contact through MS Teams   If no response or past 5 pm/weekend call [phone].

## 2024-07-09 NOTE — CONSULT NOTE ADULT - SUBJECTIVE AND OBJECTIVE BOX
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  Feeling better.    ROS: as above       PAST MEDICAL & SURGICAL HISTORY:  Arthritis  rheumatoid      Pancreatic cancer      Rheumatoid arthritis      HLD (hyperlipidemia)      Renal cell carcinoma      Gastroparesis      S/P appendectomy  1969      H/O Whipple procedure      H/O left nephrectomy          SOCIAL HISTORY:    FAMILY HISTORY:      MEDICATIONS  (STANDING):  cefTRIAXone   IVPB 1000 milliGRAM(s) IV Intermittent every 24 hours  chlorhexidine 2% Cloths 1 Application(s) Topical daily  dextrose 5% + sodium chloride 0.45%. 1000 milliLiter(s) (75 mL/Hr) IV Continuous <Continuous>  metroNIDAZOLE    Tablet 500 milliGRAM(s) Oral every 12 hours  pancrelipase  (CREON 36,000 Lipase Units) 2 Capsule(s) Oral three times a day with meals  sodium chloride 0.9%. 1000 milliLiter(s) (100 mL/Hr) IV Continuous <Continuous>    MEDICATIONS  (PRN):  acetaminophen     Tablet .. 650 milliGRAM(s) Oral every 6 hours PRN Temp greater or equal to 38C (100.4F), Mild Pain (1 - 3)  aluminum hydroxide/magnesium hydroxide/simethicone Suspension 30 milliLiter(s) Oral every 4 hours PRN Dyspepsia  melatonin 3 milliGRAM(s) Oral at bedtime PRN Insomnia  ondansetron Injectable 4 milliGRAM(s) IV Push every 8 hours PRN Nausea and/or Vomiting  sodium chloride 7% Inhalation 4 milliLiter(s) Inhalation every 8 hours PRN sputum induction      Allergies    No Known Allergies    Intolerances        Vital Signs Last 24 Hrs  T(C): 36.3 (09 Jul 2024 13:48), Max: 37 (08 Jul 2024 20:45)  T(F): 97.4 (09 Jul 2024 13:48), Max: 98.6 (08 Jul 2024 20:45)  HR: 80 (09 Jul 2024 13:48) (68 - 80)  BP: 134/76 (09 Jul 2024 13:48) (102/66 - 134/76)  BP(mean): --  RR: 18 (09 Jul 2024 13:48) (18 - 18)  SpO2: 98% (09 Jul 2024 13:48) (98% - 100%)    Parameters below as of 09 Jul 2024 13:48  Patient On (Oxygen Delivery Method): room air        PHYSICAL EXAM  General: adult in NAD  HEENT: clear oropharynx, anicteric sclera, pink conjunctiva  Neck: supple  CV: normal S1/S2 with no murmur rubs or gallops  Lungs: positive air movement b/l ant lungs,clear to auscultation, no wheezes, no rales  Abdomen: soft non-tender non-distended, no hepatosplenomegaly  Ext: no clubbing cyanosis or edema  Skin: no rashes and no petechiae  Neuro: alert and oriented X 4, no focal deficits      LABS:                          8.9    4.59  )-----------( 116      ( 09 Jul 2024 06:40 )             28.6         Mean Cell Volume : 107.5 fl  Mean Cell Hemoglobin : 33.5 pg  Mean Cell Hemoglobin Concentration : 31.1 gm/dL  Auto Neutrophil # : 2.79 K/uL  Auto Lymphocyte # : 1.27 K/uL  Auto Monocyte # : 0.41 K/uL  Auto Eosinophil # : 0.09 K/uL  Auto Basophil # : 0.02 K/uL  Auto Neutrophil % : 60.8 %  Auto Lymphocyte % : 27.7 %  Auto Monocyte % : 8.9 %  Auto Eosinophil % : 2.0 %  Auto Basophil % : 0.4 %      Serial CBC's  07-09 @ 06:40  Hct-28.6 / Hgb-8.9 / Plat-116 / RBC-2.66 / WBC-4.59  Serial CBC's  07-08 @ 09:00  Hct-28.1 / Hgb-8.5 / Plat-108 / RBC-2.58 / WBC-6.52  Serial CBC's  07-07 @ 09:56  Hct-29.6 / Hgb-9.7 / Plat-106 / RBC-2.79 / WBC-3.46      07-09    142  |  112<H>  |  8   ----------------------------<  99  3.8   |  20<L>  |  0.82    Ca    8.9      09 Jul 2024 06:40    TPro  6.3  /  Alb  3.4  /  TBili  0.2  /  DBili  x   /  AST  39  /  ALT  42  /  AlkPhos  70  07-09      PT/INR - ( 09 Jul 2024 06:40 )   PT: 10.5 sec;   INR: 1.00 ratio         PTT - ( 09 Jul 2024 06:40 )  PTT:33.0 sec    Iron - Total Binding Capacity.: 206 ug/dL (07-08 @ 09:00)  Ferritin: 114 ng/mL (07-08 @ 09:00)  Reticulocyte Percent: 1.4 % (07-08 @ 09:00)  Vitamin B12, Serum: 673 pg/mL (07-08 @ 09:00)  Folate, Serum: 14.0 ng/mL (07-08 @ 09:00)              RADIOLOGY & ADDITIONAL STUDIES:  p< from: CT Chest No Cont (07.07.24 @ 19:49) >    IMPRESSION:    Cavitary and mixed solid/groundglass lesions in the bilateral lower lobes   are new/increased in size from 2/27/2024 and not visualized on CT from   9/13/2021.    Etiology of these nodules is unclear. However, in the setting of known   malignancy, may represent metastatic disease. Infectious or inflammatory   etiology also considered. Consider nonemergent PET/CT and short-term   imaging follow-up in 3 months is advised.      < end of copied text >

## 2024-07-09 NOTE — DIETITIAN INITIAL EVALUATION ADULT - REASON INDICATOR FOR ASSESSMENT
Consult for nutrition assessment/education   Sources: Medical Record, patient  Chart reviewed, events noted.

## 2024-07-09 NOTE — DIETITIAN INITIAL EVALUATION ADULT - PHYSCIAL ASSESSMENT
Pt endorses UBW of 103 pounds and denies any recent wt changes, noting she has been this thin for a while in setting of cancers.    Current dosing wt: 102 pounds (7/7)  No daily wts in chart to assess.   Wt hx per Kendy SETHI in pounds: 102 (5/3), 101 (12/2023), 101 (10/2023), 103 (12/2022)    No recent wt changes noted, RD to continue to monitor wt as able   IBW: 100 pounds    Nutrition focused physical exam performed with verbal consent from the pt

## 2024-07-09 NOTE — DIETITIAN INITIAL EVALUATION ADULT - NSICDXPASTSURGICALHX_GEN_ALL_CORE_FT
7 PAST SURGICAL HISTORY:  H/O left nephrectomy     H/O Whipple procedure     S/P appendectomy 1969

## 2024-07-09 NOTE — DIETITIAN INITIAL EVALUATION ADULT - PERTINENT MEDS FT
MEDICATIONS  (STANDING):  cefTRIAXone   IVPB 1000 milliGRAM(s) IV Intermittent every 24 hours  chlorhexidine 2% Cloths 1 Application(s) Topical daily  dextrose 5% + sodium chloride 0.45%. 1000 milliLiter(s) (75 mL/Hr) IV Continuous <Continuous>  metroNIDAZOLE    Tablet 500 milliGRAM(s) Oral every 12 hours  pancrelipase  (CREON 36,000 Lipase Units) 2 Capsule(s) Oral three times a day with meals  sodium chloride 0.9%. 1000 milliLiter(s) (100 mL/Hr) IV Continuous <Continuous>    MEDICATIONS  (PRN):  acetaminophen     Tablet .. 650 milliGRAM(s) Oral every 6 hours PRN Temp greater or equal to 38C (100.4F), Mild Pain (1 - 3)  aluminum hydroxide/magnesium hydroxide/simethicone Suspension 30 milliLiter(s) Oral every 4 hours PRN Dyspepsia  melatonin 3 milliGRAM(s) Oral at bedtime PRN Insomnia  ondansetron Injectable 4 milliGRAM(s) IV Push every 8 hours PRN Nausea and/or Vomiting  sodium chloride 7% Inhalation 4 milliLiter(s) Inhalation every 8 hours PRN sputum induction

## 2024-07-09 NOTE — PROGRESS NOTE ADULT - ASSESSMENT
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    Pancreatic cancer stage III, pancytopenia  - Follows with Dr. Scanlon. S/p Whipple, s/p adjuvant FOLFIRINOX, (has received 11 doses so far), has pancytopenia since the chemo and RT.  - As of her last visit on 5/2024, Observe and monitor from pancreatic cancer stand point.   - Baseline hgb 8-10, platelets 100-130. F/u iron studies, B12, folate  - 06/27/2024 CT CAP: Slight interval increase in size of lower lobe pulmonary nodules partially included on prior abdominal imaging, the largest of which measures 1.3 cm in diameter and demonstrates central cavitation in the left lower lobe. These findings are suspicious for worsening pulmonary metastatic disease. Continued close attention on follow-up recommended. Postsurgical changes of Whipple procedure without discrete evidence for new or worsening metastasis in the abdomen or pelvis.   - Recommend transfusion for hemoglobin < 7, platelets < 10k or < 50k w/ bleeding    Bacteremia, R/o TB  - Positive blood cultures w/ E. coli. Continues antibiotics, f/u recommendations per ID/pulm  - CT CAP: Cavitary and mixed solid/groundglass lesions in the bilateral lower lobes are new/increased in size from 2/27/2024 and not visualized on CT from 9/13/2021. Etiology of these nodules is unclear. However, in the setting of known   malignancy, may represent metastatic disease. Infectious or inflammatory etiology also considered. Consider nonemergent PET/CT and short-term imaging follow-up in 3 months is advised.  - Per pulm, unlikely TB, recommend repeat CT chest in 7-10 days  - Per ID, bacteremia likely GI source, would recommend GI consult for pneumobilia, pt also with pancreatic stent ? duration.    Will continue to follow.    Kj Pineda PA-C  Hematology/Oncology  [facility]  [phone] (office)

## 2024-07-09 NOTE — DIETITIAN INITIAL EVALUATION ADULT - OTHER CALCULATIONS
Calculations based on dosing wt with consideration for malignancy, malnutrition, reported active lifestyle (30-60 minute walks each day per pt)  - Fluid needs deferred to team

## 2024-07-09 NOTE — DIETITIAN INITIAL EVALUATION ADULT - PERTINENT LABORATORY DATA
07-09    142  |  112<H>  |  8   ----------------------------<  99  3.8   |  20<L>  |  0.82    Ca    8.9      09 Jul 2024 06:40    TPro  6.3  /  Alb  3.4  /  TBili  0.2  /  DBili  x   /  AST  39  /  ALT  42  /  AlkPhos  70  07-09  A1C with Estimated Average Glucose Result: 5.9 % (07-08-24 @ 09:00)   I personally spent

## 2024-07-09 NOTE — DIETITIAN INITIAL EVALUATION ADULT - ADD RECOMMEND
1) Continue regular diet free of therapeutic restrictions, defer diet texture to team   2) Malnutrition sticker placed in chart  3) Add multivitamin/folic acid daily for micronutrient coverage pending no medical contraindications   4) Monitor nutritional intake, diet tolerance, labs, hydration, GI function, skin integrity and wt trends

## 2024-07-09 NOTE — DIETITIAN INITIAL EVALUATION ADULT - NSFNSGIASSESSMENTFT_GEN_A_CORE
Denies N/V and endorses she had been having constipation, now resolved. Last BM this AM per pt (7/9)  - Ordered for zofran, aluminum hydroxide/mg hydroxide/simethicone suspension

## 2024-07-09 NOTE — PROGRESS NOTE ADULT - SUBJECTIVE AND OBJECTIVE BOX
Ct chest from 2021: 4 mm nonspecific nodule within the periphery of basilar left lower lobe possibly a benign intrapulmonary lymph node. Recommend follow-up chest CT in 3 months. 78y old  Female who presents with a chief complaint of fever + chills (09 Jul 2024 09:15)        Interval history:        MICRO DATA:   07/07 UCX: NG  07/07 BCX: E.coli     Allergies:   No Known Allergies    Antimicrobials:    cefTRIAXone   IVPB 1000 milliGRAM(s) IV Intermittent every 24 hours  metroNIDAZOLE    Tablet 500 milliGRAM(s) Oral every 12 hours    REVIEW OF SYSTEMS:    No chest pain   No cough, no SOB  No N/V/D, no abdominal pain  No dysuria   No rash.     Vital Signs Last 24 Hrs  T(C): 36.6 (07-09-24 @ 09:14), Max: 37 (07-08-24 @ 20:45)  T(F): 97.8 (07-09-24 @ 09:14), Max: 98.6 (07-08-24 @ 20:45)  HR: 71 (07-09-24 @ 09:14) (68 - 75)  BP: 128/68 (07-09-24 @ 09:14) (102/66 - 128/75)  BP(mean): --  RR: 18 (07-09-24 @ 09:14) (18 - 18)  SpO2: 100% (07-09-24 @ 09:14) (98% - 100%)    PHYSICAL EXAM:  Pt in no acute distress, alert, awake.   No icterus  non distended abdomen  no edema LE   no phlebitis                             8.9    4.59  )-----------( 116      ( 09 Jul 2024 06:40 )             28.6   07-09    142  |  112<H>  |  8   ----------------------------<  99  3.8   |  20<L>  |  0.82    Ca    8.9      09 Jul 2024 06:40  Mg     1.9     07-07    TPro  6.3  /  Alb  3.4  /  TBili  0.2  /  DBili  x   /  AST  39  /  ALT  42  /  AlkPhos  70  07-09      LIVER FUNCTIONS - ( 09 Jul 2024 06:40 )  Alb: 3.4 g/dL / Pro: 6.3 g/dL / ALK PHOS: 70 U/L / ALT: 42 U/L / AST: 39 U/L / GGT: x               Culture - Urine (collected 07 Jul 2024 10:57)  Source: Clean Catch Clean Catch (Midstream)  Final Report (08 Jul 2024 16:07):    <10,000 CFU/mL Normal Urogenital Carmen    Culture - Blood (collected 07 Jul 2024 10:40)  Source: .Blood Blood-Peripheral  Gram Stain (08 Jul 2024 02:02):    Growth in anaerobic bottle: Gram Negative Rods    Growth in aerobic bottle: Gram Negative Rods  Preliminary Report (08 Jul 2024 23:06):    Growth in aerobic and anaerobic bottles: Escherichia coli    Direct identification is available within approximately 3-5    hours either by Blood Panel Multiplexed PCR or Direct    MALDI-TOF. Details: [email or web address]  Organism: Blood Culture PCR (08 Jul 2024 04:11)  Organism: Blood Culture PCR (08 Jul 2024 04:11)    Culture - Blood (collected 07 Jul 2024 09:40)  Source: .Blood Blood-Peripheral  Gram Stain (08 Jul 2024 02:01):    Growth in anaerobic bottle: Gram Negative Rods  Preliminary Report (08 Jul 2024 02:01):    Growth in anaerobic bottle: Gram Negative Rods        Radiology:  CT chest 07/07:   IMPRESSION:    Cavitary and mixed solid/groundglass lesions in the bilateral lower lobes   are new/increased in size from 2/27/2024 and not visualized on CT from   9/13/2021.    LUNGS AND LARGE AIRWAYS: Patent central airways. Areas of scarring   architectural distortion with subpleural-based thickening, and   bronchiectasis within the right upper and right middle lobes is similar   to exam from 9/13/2021. 1.2 cm cavitary lesion in the left lower lobe   (301, 78) is similar to exam from 2/27/2024, but new from 9/13/2021. 7 mm   mixed solid/groundglass nodule in the right lower lobe (301, 86) is new   from 9/13/2021.Two mixed solid and groundglass lesions in the left upper   lobe are new from 9/13/2021, the largest of which measures 6 mm (301,   73). Few scattered calcified granulomas.  PLEURA: Trace bilateral pleural effusion or thickening.  VESSELS:  Aorta and coronary artery calcifications.  HEART: Heart size is normal. No pericardial effusion.  MEDIASTINUM AND TEO: Calcified mediastinal and hilar lymph nodes.  CHEST WALL AND LOWER NECK: Right chest wall MediPort with tip in the   right atrium.    CT AP:   IMPRESSION:  Cavitary and mixed solid/groundglass lesions in the visualized lower   lungs are new/increased in size from 2/27/2024.    Status post Whipple. No evidence of bowel obstruction.    Mild pneumobilia is similar to prior exam.    Ct chest from 2021: 4 mm nonspecific nodule within the periphery of basilar left lower lobe possibly a benign intrapulmonary lymph node. Recommend follow-up chest CT in 3 months.     78y old  Female who presents with a chief complaint of fever + chills (09 Jul 2024 09:15)        Interval history:  Patient denies any complaints today. Denies any cough or sputum production. Is doing well. Reports no abdominal pain, no fevers or chills.   Continues to be on Airborne isolation.   Tolerating CTX and Metro    MICRO DATA:   07/07 UCX: NG  07/07 BCX: E.coli ( 4/4)     Allergies:   No Known Allergies    Antimicrobials:    cefTRIAXone   IVPB 1000 milliGRAM(s) IV Intermittent every 24 hours  metroNIDAZOLE    Tablet 500 milliGRAM(s) Oral every 12 hours    REVIEW OF SYSTEMS:    No chest pain   No cough, no SOB  No N/V/D, no abdominal pain  No dysuria   No rash.     Vital Signs Last 24 Hrs  T(C): 36.6 (07-09-24 @ 09:14), Max: 37 (07-08-24 @ 20:45)  T(F): 97.8 (07-09-24 @ 09:14), Max: 98.6 (07-08-24 @ 20:45)  HR: 71 (07-09-24 @ 09:14) (68 - 75)  BP: 128/68 (07-09-24 @ 09:14) (102/66 - 128/75)  BP(mean): --  RR: 18 (07-09-24 @ 09:14) (18 - 18)  SpO2: 100% (07-09-24 @ 09:14) (98% - 100%)    PHYSICAL EXAM:  Pt in no acute distress, alert, awake.   No icterus  non distended abdomen  no edema LE   no phlebitis                             8.9    4.59  )-----------( 116      ( 09 Jul 2024 06:40 )             28.6   07-09    142  |  112<H>  |  8   ----------------------------<  99  3.8   |  20<L>  |  0.82    Ca    8.9      09 Jul 2024 06:40  Mg     1.9     07-07    TPro  6.3  /  Alb  3.4  /  TBili  0.2  /  DBili  x   /  AST  39  /  ALT  42  /  AlkPhos  70  07-09      LIVER FUNCTIONS - ( 09 Jul 2024 06:40 )  Alb: 3.4 g/dL / Pro: 6.3 g/dL / ALK PHOS: 70 U/L / ALT: 42 U/L / AST: 39 U/L / GGT: x               Culture - Urine (collected 07 Jul 2024 10:57)  Source: Clean Catch Clean Catch (Midstream)  Final Report (08 Jul 2024 16:07):    <10,000 CFU/mL Normal Urogenital Carmen    Culture - Blood (collected 07 Jul 2024 10:40)  Source: .Blood Blood-Peripheral  Gram Stain (08 Jul 2024 02:02):    Growth in anaerobic bottle: Gram Negative Rods    Growth in aerobic bottle: Gram Negative Rods  Preliminary Report (08 Jul 2024 23:06):    Growth in aerobic and anaerobic bottles: Escherichia coli    Direct identification is available within approximately 3-5    hours either by Blood Panel Multiplexed PCR or Direct    MALDI-TOF. Details: [email or web address]  Organism: Blood Culture PCR (08 Jul 2024 04:11)  Organism: Blood Culture PCR (08 Jul 2024 04:11)    Culture - Blood (collected 07 Jul 2024 09:40)  Source: .Blood Blood-Peripheral  Gram Stain (08 Jul 2024 02:01):    Growth in anaerobic bottle: Gram Negative Rods  Preliminary Report (08 Jul 2024 02:01):    Growth in anaerobic bottle: Gram Negative Rods        Radiology:  CT chest 07/07:   IMPRESSION:    Cavitary and mixed solid/groundglass lesions in the bilateral lower lobes   are new/increased in size from 2/27/2024 and not visualized on CT from   9/13/2021.    LUNGS AND LARGE AIRWAYS: Patent central airways. Areas of scarring   architectural distortion with subpleural-based thickening, and   bronchiectasis within the right upper and right middle lobes is similar   to exam from 9/13/2021. 1.2 cm cavitary lesion in the left lower lobe   (301, 78) is similar to exam from 2/27/2024, but new from 9/13/2021. 7 mm   mixed solid/groundglass nodule in the right lower lobe (301, 86) is new   from 9/13/2021.Two mixed solid and groundglass lesions in the left upper   lobe are new from 9/13/2021, the largest of which measures 6 mm (301,   73). Few scattered calcified granulomas.  PLEURA: Trace bilateral pleural effusion or thickening.  VESSELS:  Aorta and coronary artery calcifications.  HEART: Heart size is normal. No pericardial effusion.  MEDIASTINUM AND TEO: Calcified mediastinal and hilar lymph nodes.  CHEST WALL AND LOWER NECK: Right chest wall MediPort with tip in the   right atrium.    CT AP:   IMPRESSION:  Cavitary and mixed solid/groundglass lesions in the visualized lower   lungs are new/increased in size from 2/27/2024.    Status post Whipple. No evidence of bowel obstruction.    Mild pneumobilia is similar to prior exam.    Ct chest from 2021: 4 mm nonspecific nodule within the periphery of basilar left lower lobe possibly a benign intrapulmonary lymph node. Recommend follow-up chest CT in 3 months.     78y old  Female who presents with a chief complaint of fever + chills (09 Jul 2024 09:15)    Interval history:  Patient denies any complaints today. Denies any cough or sputum production. Is doing well. Reports no abdominal pain, no fevers or chills.   Continues to be on Airborne isolation.   Tolerating CTX and Metronidazole.   She reports having taking humira 10 years ago.   PPD was checked when she was a student and came back positive for which she never received treatment but reports that prior to Humira/RA treatment administration a /Bronchoscopy was performed which was normal.     MICRO DATA:   07/07 UCX: NG  07/07 BCX: E.coli ( 4/4) Sensitivities pending    Allergies:   No Known Allergies    Antimicrobials:    cefTRIAXone   IVPB 1000 milliGRAM(s) IV Intermittent every 24 hours  metroNIDAZOLE    Tablet 500 milliGRAM(s) Oral every 12 hours    REVIEW OF SYSTEMS:    No chest pain   No cough, no SOB  No N/V/D, no abdominal pain  No dysuria   No rash.     Vital Signs Last 24 Hrs  T(C): 36.6 (07-09-24 @ 09:14), Max: 37 (07-08-24 @ 20:45)  T(F): 97.8 (07-09-24 @ 09:14), Max: 98.6 (07-08-24 @ 20:45)  HR: 71 (07-09-24 @ 09:14) (68 - 75)  BP: 128/68 (07-09-24 @ 09:14) (102/66 - 128/75)  BP(mean): --  RR: 18 (07-09-24 @ 09:14) (18 - 18)  SpO2: 100% (07-09-24 @ 09:14) (98% - 100%)    PHYSICAL EXAM:  Pt in no acute distress, alert, awake.   No icterus  non distended abdomen  no edema LE   no phlebitis                             8.9    4.59  )-----------( 116      ( 09 Jul 2024 06:40 )             28.6   07-09    142  |  112<H>  |  8   ----------------------------<  99  3.8   |  20<L>  |  0.82    Ca    8.9      09 Jul 2024 06:40  Mg     1.9     07-07    TPro  6.3  /  Alb  3.4  /  TBili  0.2  /  DBili  x   /  AST  39  /  ALT  42  /  AlkPhos  70  07-09      LIVER FUNCTIONS - ( 09 Jul 2024 06:40 )  Alb: 3.4 g/dL / Pro: 6.3 g/dL / ALK PHOS: 70 U/L / ALT: 42 U/L / AST: 39 U/L / GGT: x               Culture - Urine (collected 07 Jul 2024 10:57)  Source: Clean Catch Clean Catch (Midstream)  Final Report (08 Jul 2024 16:07):    <10,000 CFU/mL Normal Urogenital Carmen    Culture - Blood (collected 07 Jul 2024 10:40)  Source: .Blood Blood-Peripheral  Gram Stain (08 Jul 2024 02:02):    Growth in anaerobic bottle: Gram Negative Rods    Growth in aerobic bottle: Gram Negative Rods  Preliminary Report (08 Jul 2024 23:06):    Growth in aerobic and anaerobic bottles: Escherichia coli    Direct identification is available within approximately 3-5    hours either by Blood Panel Multiplexed PCR or Direct    MALDI-TOF. Details: [email or web address]  Organism: Blood Culture PCR (08 Jul 2024 04:11)  Organism: Blood Culture PCR (08 Jul 2024 04:11)    Culture - Blood (collected 07 Jul 2024 09:40)  Source: .Blood Blood-Peripheral  Gram Stain (08 Jul 2024 02:01):    Growth in anaerobic bottle: Gram Negative Rods  Preliminary Report (08 Jul 2024 02:01):    Growth in anaerobic bottle: Gram Negative Rods        Radiology:  CT chest 07/07:   IMPRESSION:    Cavitary and mixed solid/groundglass lesions in the bilateral lower lobes   are new/increased in size from 2/27/2024 and not visualized on CT from   9/13/2021.    LUNGS AND LARGE AIRWAYS: Patent central airways. Areas of scarring   architectural distortion with subpleural-based thickening, and   bronchiectasis within the right upper and right middle lobes is similar   to exam from 9/13/2021. 1.2 cm cavitary lesion in the left lower lobe   (301, 78) is similar to exam from 2/27/2024, but new from 9/13/2021. 7 mm   mixed solid/groundglass nodule in the right lower lobe (301, 86) is new   from 9/13/2021.Two mixed solid and groundglass lesions in the left upper   lobe are new from 9/13/2021, the largest of which measures 6 mm (301,   73). Few scattered calcified granulomas.  PLEURA: Trace bilateral pleural effusion or thickening.  VESSELS:  Aorta and coronary artery calcifications.  HEART: Heart size is normal. No pericardial effusion.  MEDIASTINUM AND TEO: Calcified mediastinal and hilar lymph nodes.  CHEST WALL AND LOWER NECK: Right chest wall MediPort with tip in the   right atrium.    CT AP:   IMPRESSION:  Cavitary and mixed solid/groundglass lesions in the visualized lower   lungs are new/increased in size from 2/27/2024.    Status post Whipple. No evidence of bowel obstruction.    Mild pneumobilia is similar to prior exam.    Ct chest from 2021: 4 mm nonspecific nodule within the periphery of basilar left lower lobe possibly a benign intrapulmonary lymph node. Recommend follow-up chest CT in 3 months.     78y old  Female who presents with a chief complaint of fever + chills (09 Jul 2024 09:15)    Interval history:  Patient denies any complaints today. Denies any cough or sputum production. Is doing well. Reports no abdominal pain, no fevers or chills.   Tolerating CTX and Metronidazole.   She reports having taking humira 10 years ago for 3 years   PPD was checked when she was a student and came back positive for which she never received treatment but reports that prior to Humira/RA treatment administration a /Bronchoscopy was performed which was normal.       MICRO DATA:   07/07 UCX: NG  07/07 BCX: E.coli ( 4/4) Sensitivities pending    Allergies:   No Known Allergies    Antimicrobials:    cefTRIAXone   IVPB 1000 milliGRAM(s) IV Intermittent every 24 hours  metroNIDAZOLE    Tablet 500 milliGRAM(s) Oral every 12 hours      REVIEW OF SYSTEMS:  No chest pain   No SOB  No abdominal pain  No rash.       Vital Signs Last 24 Hrs  T(C): 36.6 (07-09-24 @ 09:14), Max: 37 (07-08-24 @ 20:45)  T(F): 97.8 (07-09-24 @ 09:14), Max: 98.6 (07-08-24 @ 20:45)  HR: 71 (07-09-24 @ 09:14) (68 - 75)  BP: 128/68 (07-09-24 @ 09:14) (102/66 - 128/75)  BP(mean): --  RR: 18 (07-09-24 @ 09:14) (18 - 18)  SpO2: 100% (07-09-24 @ 09:14) (98% - 100%)      PHYSICAL EXAM:  Pt in no acute distress, alert, awake.   breathing comfortably   non distended abdomen  no edema LE   no phlebitis                             8.9    4.59  )-----------( 116      ( 09 Jul 2024 06:40 )             28.6   07-09    142  |  112<H>  |  8   ----------------------------<  99  3.8   |  20<L>  |  0.82    Ca    8.9      09 Jul 2024 06:40  Mg     1.9     07-07    TPro  6.3  /  Alb  3.4  /  TBili  0.2  /  DBili  x   /  AST  39  /  ALT  42  /  AlkPhos  70  07-09      LIVER FUNCTIONS - ( 09 Jul 2024 06:40 )  Alb: 3.4 g/dL / Pro: 6.3 g/dL / ALK PHOS: 70 U/L / ALT: 42 U/L / AST: 39 U/L / GGT: x               Culture - Urine (collected 07 Jul 2024 10:57)  Source: Clean Catch Clean Catch (Midstream)  Final Report (08 Jul 2024 16:07):    <10,000 CFU/mL Normal Urogenital Carmen    Culture - Blood (collected 07 Jul 2024 10:40)  Source: .Blood Blood-Peripheral  Gram Stain (08 Jul 2024 02:02):    Growth in anaerobic bottle: Gram Negative Rods    Growth in aerobic bottle: Gram Negative Rods  Preliminary Report (08 Jul 2024 23:06):    Growth in aerobic and anaerobic bottles: Escherichia coli    Direct identification is available within approximately 3-5    hours either by Blood Panel Multiplexed PCR or Direct    MALDI-TOF. Details: [email or web address]  Organism: Blood Culture PCR (08 Jul 2024 04:11)  Organism: Blood Culture PCR (08 Jul 2024 04:11)    Culture - Blood (collected 07 Jul 2024 09:40)  Source: .Blood Blood-Peripheral  Gram Stain (08 Jul 2024 02:01):    Growth in anaerobic bottle: Gram Negative Rods  Preliminary Report (08 Jul 2024 02:01):    Growth in anaerobic bottle: Gram Negative Rods        Radiology:  CT chest 07/07:   IMPRESSION:    Cavitary and mixed solid/groundglass lesions in the bilateral lower lobes   are new/increased in size from 2/27/2024 and not visualized on CT from   9/13/2021.    LUNGS AND LARGE AIRWAYS: Patent central airways. Areas of scarring   architectural distortion with subpleural-based thickening, and   bronchiectasis within the right upper and right middle lobes is similar   to exam from 9/13/2021. 1.2 cm cavitary lesion in the left lower lobe   (301, 78) is similar to exam from 2/27/2024, but new from 9/13/2021. 7 mm   mixed solid/groundglass nodule in the right lower lobe (301, 86) is new   from 9/13/2021.Two mixed solid and groundglass lesions in the left upper   lobe are new from 9/13/2021, the largest of which measures 6 mm (301,   73). Few scattered calcified granulomas.  PLEURA: Trace bilateral pleural effusion or thickening.  VESSELS:  Aorta and coronary artery calcifications.  HEART: Heart size is normal. No pericardial effusion.  MEDIASTINUM AND TEO: Calcified mediastinal and hilar lymph nodes.  CHEST WALL AND LOWER NECK: Right chest wall MediPort with tip in the   right atrium.    CT AP:   IMPRESSION:  Cavitary and mixed solid/groundglass lesions in the visualized lower   lungs are new/increased in size from 2/27/2024.    Status post Whipple. No evidence of bowel obstruction.    Mild pneumobilia is similar to prior exam.    Ct chest from 2021: 4 mm nonspecific nodule within the periphery of basilar left lower lobe possibly a benign intrapulmonary lymph node. Recommend follow-up chest CT in 3 months.

## 2024-07-09 NOTE — DIETITIAN INITIAL EVALUATION ADULT - OTHER INFO
- Pt admitted with fever, meeting sepsis criteria, ordered for empirical antibiotics, found to have bacteremia, ID recommending GI consult for pneumobilia as "bacteremia likely of GI source," (7/8), per ID this AM, pt with e. coli blood stream infection  - Oncology: hx of pancreatic cancer s/p whipple, chemo, and RT, hx of RCC s/p L nephrectomy, oncology following  ---> Ordered for creon  - Noted with lesions in the bilateral lower lobes, on airborne precautions to rule out TB vs. worsening malignancy vs. infection (ID 7/9), pulm following  - IVF: dextrose 5% + NaCl 0.45%, NaCl 0.9%

## 2024-07-09 NOTE — DIETITIAN INITIAL EVALUATION ADULT - PERSON TAUGHT/METHOD
Provided education on meeting adequate protein-energy needs, emphasized the importance of adequate calorie and protein intake with current illness. Recommended small, frequent meals, snacking on nutrient dense foods, consuming oral nutrition supplements. Encouraged prioritizing protein foods and consuming adequate amounts of protein at each meal for preservation of lean muscle mass. Obtained food preferences, will honor as able. Pt aware RD remains available./verbal instruction/patient instructed

## 2024-07-10 ENCOUNTER — RESULT REVIEW (OUTPATIENT)
Age: 79
End: 2024-07-10

## 2024-07-10 LAB
ALBUMIN SERPL ELPH-MCNC: 3.3 G/DL — SIGNIFICANT CHANGE UP (ref 3.3–5)
ALP SERPL-CCNC: 74 U/L — SIGNIFICANT CHANGE UP (ref 40–120)
ALT FLD-CCNC: 34 U/L — SIGNIFICANT CHANGE UP (ref 10–45)
ANION GAP SERPL CALC-SCNC: 11 MMOL/L — SIGNIFICANT CHANGE UP (ref 5–17)
ANION GAP SERPL CALC-SCNC: 12 MMOL/L — SIGNIFICANT CHANGE UP (ref 5–17)
ANISOCYTOSIS BLD QL: SIGNIFICANT CHANGE UP
AST SERPL-CCNC: 30 U/L — SIGNIFICANT CHANGE UP (ref 10–40)
BASOPHILS # BLD AUTO: 0 K/UL — SIGNIFICANT CHANGE UP (ref 0–0.2)
BASOPHILS NFR BLD AUTO: 0 % — SIGNIFICANT CHANGE UP (ref 0–2)
BILIRUB SERPL-MCNC: 0.2 MG/DL — SIGNIFICANT CHANGE UP (ref 0.2–1.2)
BUN SERPL-MCNC: 13 MG/DL — SIGNIFICANT CHANGE UP (ref 7–23)
BUN SERPL-MCNC: 14 MG/DL — SIGNIFICANT CHANGE UP (ref 7–23)
CALCIUM SERPL-MCNC: 8.9 MG/DL — SIGNIFICANT CHANGE UP (ref 8.4–10.5)
CALCIUM SERPL-MCNC: 8.9 MG/DL — SIGNIFICANT CHANGE UP (ref 8.4–10.5)
CHLORIDE SERPL-SCNC: 109 MMOL/L — HIGH (ref 96–108)
CHLORIDE SERPL-SCNC: 110 MMOL/L — HIGH (ref 96–108)
CO2 SERPL-SCNC: 18 MMOL/L — LOW (ref 22–31)
CO2 SERPL-SCNC: 19 MMOL/L — LOW (ref 22–31)
CREAT SERPL-MCNC: 0.76 MG/DL — SIGNIFICANT CHANGE UP (ref 0.5–1.3)
CREAT SERPL-MCNC: 0.82 MG/DL — SIGNIFICANT CHANGE UP (ref 0.5–1.3)
EGFR: 73 ML/MIN/1.73M2 — SIGNIFICANT CHANGE UP
EGFR: 80 ML/MIN/1.73M2 — SIGNIFICANT CHANGE UP
EOSINOPHIL # BLD AUTO: 0.17 K/UL — SIGNIFICANT CHANGE UP (ref 0–0.5)
EOSINOPHIL NFR BLD AUTO: 5.2 % — SIGNIFICANT CHANGE UP (ref 0–6)
FUNGITELL: <31 PG/ML — SIGNIFICANT CHANGE UP
GLUCOSE SERPL-MCNC: 100 MG/DL — HIGH (ref 70–99)
GLUCOSE SERPL-MCNC: 103 MG/DL — HIGH (ref 70–99)
HCT VFR BLD CALC: 26.5 % — LOW (ref 34.5–45)
HCT VFR BLD CALC: 26.7 % — LOW (ref 34.5–45)
HGB BLD-MCNC: 8.9 G/DL — LOW (ref 11.5–15.5)
HGB BLD-MCNC: 8.9 G/DL — LOW (ref 11.5–15.5)
LYMPHOCYTES # BLD AUTO: 0.87 K/UL — LOW (ref 1–3.3)
LYMPHOCYTES # BLD AUTO: 26.1 % — SIGNIFICANT CHANGE UP (ref 13–44)
MACROCYTES BLD QL: SIGNIFICANT CHANGE UP
MAGNESIUM SERPL-MCNC: 2.2 MG/DL — SIGNIFICANT CHANGE UP (ref 1.6–2.6)
MANUAL SMEAR VERIFICATION: SIGNIFICANT CHANGE UP
MCHC RBC-ENTMCNC: 33.3 GM/DL — SIGNIFICANT CHANGE UP (ref 32–36)
MCHC RBC-ENTMCNC: 33.6 GM/DL — SIGNIFICANT CHANGE UP (ref 32–36)
MCHC RBC-ENTMCNC: 34.1 PG — HIGH (ref 27–34)
MCHC RBC-ENTMCNC: 34.1 PG — HIGH (ref 27–34)
MCV RBC AUTO: 101.5 FL — HIGH (ref 80–100)
MCV RBC AUTO: 102.3 FL — HIGH (ref 80–100)
MONOCYTES # BLD AUTO: 0.32 K/UL — SIGNIFICANT CHANGE UP (ref 0–0.9)
MONOCYTES NFR BLD AUTO: 9.6 % — SIGNIFICANT CHANGE UP (ref 2–14)
NEUTROPHILS # BLD AUTO: 1.96 K/UL — SIGNIFICANT CHANGE UP (ref 1.8–7.4)
NEUTROPHILS NFR BLD AUTO: 58.2 % — SIGNIFICANT CHANGE UP (ref 43–77)
NEUTS BAND # BLD: 0.9 % — SIGNIFICANT CHANGE UP (ref 0–8)
NIGHT BLUE STAIN TISS: SIGNIFICANT CHANGE UP
NIGHT BLUE STAIN TISS: SIGNIFICANT CHANGE UP
NRBC # BLD: 0 /100 WBCS — SIGNIFICANT CHANGE UP (ref 0–0)
OVALOCYTES BLD QL SMEAR: SLIGHT — SIGNIFICANT CHANGE UP
PHOSPHATE SERPL-MCNC: 3.1 MG/DL — SIGNIFICANT CHANGE UP (ref 2.5–4.5)
PLAT MORPH BLD: NORMAL — SIGNIFICANT CHANGE UP
PLATELET # BLD AUTO: 77 K/UL — LOW (ref 150–400)
PLATELET # BLD AUTO: 96 K/UL — LOW (ref 150–400)
POIKILOCYTOSIS BLD QL AUTO: SLIGHT — SIGNIFICANT CHANGE UP
POTASSIUM SERPL-MCNC: 4.1 MMOL/L — SIGNIFICANT CHANGE UP (ref 3.5–5.3)
POTASSIUM SERPL-MCNC: 4.1 MMOL/L — SIGNIFICANT CHANGE UP (ref 3.5–5.3)
POTASSIUM SERPL-SCNC: 4.1 MMOL/L — SIGNIFICANT CHANGE UP (ref 3.5–5.3)
POTASSIUM SERPL-SCNC: 4.1 MMOL/L — SIGNIFICANT CHANGE UP (ref 3.5–5.3)
PROCALCITONIN SERPL-MCNC: 6.01 NG/ML — HIGH (ref 0.02–0.1)
PROT SERPL-MCNC: 6.1 G/DL — SIGNIFICANT CHANGE UP (ref 6–8.3)
RBC # BLD: 2.61 M/UL — LOW (ref 3.8–5.2)
RBC # BLD: 2.61 M/UL — LOW (ref 3.8–5.2)
RBC # FLD: 14.6 % — HIGH (ref 10.3–14.5)
RBC # FLD: 14.7 % — HIGH (ref 10.3–14.5)
RBC BLD AUTO: ABNORMAL
SODIUM SERPL-SCNC: 139 MMOL/L — SIGNIFICANT CHANGE UP (ref 135–145)
SODIUM SERPL-SCNC: 140 MMOL/L — SIGNIFICANT CHANGE UP (ref 135–145)
SPECIMEN SOURCE: SIGNIFICANT CHANGE UP
SPECIMEN SOURCE: SIGNIFICANT CHANGE UP
TARGETS BLD QL SMEAR: SLIGHT — SIGNIFICANT CHANGE UP
WBC # BLD: 3.32 K/UL — LOW (ref 3.8–10.5)
WBC # BLD: 3.5 K/UL — LOW (ref 3.8–10.5)
WBC # FLD AUTO: 3.32 K/UL — LOW (ref 3.8–10.5)
WBC # FLD AUTO: 3.5 K/UL — LOW (ref 3.8–10.5)

## 2024-07-10 PROCEDURE — 93306 TTE W/DOPPLER COMPLETE: CPT | Mod: 26

## 2024-07-10 RX ADMIN — Medication 100 MILLIGRAM(S): at 22:57

## 2024-07-10 RX ADMIN — Medication 2 CAPSULE(S): at 12:51

## 2024-07-10 RX ADMIN — Medication 2 CAPSULE(S): at 17:25

## 2024-07-10 RX ADMIN — Medication 4 MILLILITER(S): at 07:35

## 2024-07-10 RX ADMIN — METRONIDAZOLE 500 MILLIGRAM(S): 500 TABLET ORAL at 05:05

## 2024-07-10 RX ADMIN — Medication 2 CAPSULE(S): at 07:35

## 2024-07-10 RX ADMIN — METRONIDAZOLE 500 MILLIGRAM(S): 500 TABLET ORAL at 17:25

## 2024-07-10 RX ADMIN — Medication 1 APPLICATION(S): at 12:52

## 2024-07-10 NOTE — CONSULT NOTE ADULT - ASSESSMENT
full consult to follow     1. Gram (-) bacteremia   CT appreciated; pneumobilia noted on CT expected post whipple/PD stent  no evidence of biliary obstruction, liver enzymes and bilirubin in normal range   cont abx  agree w/ID workup   surgery input appreciated     2. Pancreatic cancer Stg III  s/p whipple, chemo and radiation     3. r/o TB  per ID    77yo F h/o RA, Gastroparesis, Panncreatic Adenocarcinoma s/p whipple, PD stent, Chemo, Radiation, s/p L Nephrectomy and HLD p/w fever and chills found to be septic.     1. Gram (-) bacteremia   CT appreciated; pneumobilia noted on CT expected post whipple/PD stent  no evidence of biliary obstruction, liver enzymes and bilirubin in normal range   low suspicion for gi etiology   cont abx  agree w/ID workup   surgery input appreciated   tolerating diet     2. Pancreatic cancer Stg III  s/p whipple, chemo and radiation     3. Cavitary lesion of lung.   TB being ruled out  per ID and Pulm         The plan of care was discussed with the physician assistant and modifications were made to the notation where appropriate. Differential diagnosis and plan of care discussed with patient after the evaluation  35 minutes spent on total encounter of which more than fifty percent of the encounter was spent counseling and/or coordinating care by the attending physician.    [facility]  Leon Schofield D.O.  [address] [place] [address]   77yo F h/o RA, Gastroparesis, Panncreatic Adenocarcinoma s/p whipple, PD stent, Chemo, Radiation, s/p L Nephrectomy and HLD p/w fever and chills found to be septic.     1. Gram (-) bacteremia   CT appreciated; pneumobilia noted on CT expected post whipple  no evidence of biliary obstruction, liver enzymes and bilirubin in normal range   no indication for ERCP at this time   cont abx  ID and surgery recs appreciated     2. Pancreatic cancer Stg III  s/p whipple, chemo and radiation     3. Cavitary lesion of lung.   TB being ruled out  per ID and Pulm         The plan of care was discussed with the physician assistant and modifications were made to the notation where appropriate. Differential diagnosis and plan of care discussed with patient after the evaluation  35 minutes spent on total encounter of which more than fifty percent of the encounter was spent counseling and/or coordinating care by the attending physician.    [facility] [place]  Leon Schofield D.O.  [address] [place] [address]

## 2024-07-10 NOTE — PROGRESS NOTE ADULT - ASSESSMENT
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, rcc s/p l nephrectomy, p/w fever + chills; in er, meeting severe sepsis criteria; unclear source; admit to medicine for further mgmt       Severe sepsis, Gram Neg Bacteremia   - Pt febrile, tachycardic, bandemia, + lactic acidosis (resolved); meets severe sepsis criteria  - CT C w/ scarring, bronchiectasis in RU and RML similar to 2021 per read, 1.2 CM Cavitary lesion in LLL, 7mm RLL nodule, TONIA nodules, calcified granulomas / mediastinal and hilar LN, advanced t12 compression deformity - read as stable   - CT A/P w/ liver lesion, mild pneumobilia (read as similar to prior), S/P partial resection of pancreas w/ stent in midportion of duodenum of pancreatobiliary limb, S/P L sided nephrectomy, S/P Whipple  - S/P 2 L NS + vanc and zosyn in ER  - RVP negative  - Procal elevated, down-trending   - BCx2 w/ GNR, E. Coli, S to Rocephin, F/u repeat BCx2 w/ NGTD; F/u final   - C/w Rocephin / Flagyl for now for ABX per ID   - Trend CBC, temp curve, VS and monitor patient  - Per Surgery - stent can be removed endoscopically if waranted   - Per GI - C/w ABX   - ID, GI and Surgery evals appreciated; F/u recs      Cavitary Lung Lesion   - CT as above  - C/w Rocephin and Flagyl  - Pending AFB sputum culture X 3 per ID --> Hypertonic saline to induce sputum as patient without sputum   - Isolation as per protocol to R/O TB  - TTE pending   - Repeat CT chest in 7-10 days as per pulm, if unchanged, patient will require bronchoscopy   - Pulm and ID evals appreciated; F/u recs    Macrocytic anemia, Thrombocytopenia  - Monitor and trend Hgb.   - Anemia labs noted  - Maintain active T/S; Transfuse for Hgb < 7.0, Plt < 10K or < 50 K w/ bleeding/ procedures   - Heme/Onc eval appreciated; F/u recs     HyperNa  - Na level with improvement to WNL. Continue to monitor and trend  - PO hydration as tolerated  - Avoid overcorrection > 6-8 mEq in 24 hours    Pancreatic Adenocarcinoma.   - H/O panc adenoca s/p whipple + adjuvant chemo + rt  - CT C/A/P as above   - On Pancrelipase   - Heme/Onc eval appreciated; F/u recs     Rheumatoid arthritis.   - Home methotrexate on hold for now  - Outpatient follow up     Pre-DM  - A1C of 5.9   - Diet and lifestyle modifications  - Repeat A1C as an outpatient     PPX      Discussed with Attending and ACP.

## 2024-07-10 NOTE — PROGRESS NOTE ADULT - ASSESSMENT
79 y/o F with PMH of HLD, RA, gastroparesis, pancreatic adenoca s/p whipple + chemo + R RCC s/p l nephrectomy. Presents with fevers, chills. BC + GNR. CT chest with cavitary and mixed solid/groundglass lesions in the bilateral lower lobes. Pt placed on isolation for r/o TB on admission. Pt reportedly with hx of positive PPD and is s/p bronchoscopy about 10 years ago to r/o TB (which was reportedly negative) prior to initiating treatment for her RA.

## 2024-07-10 NOTE — PROGRESS NOTE ADULT - SUBJECTIVE AND OBJECTIVE BOX
Name of Patient : SUZY SILVA  MRN: [id number]  Date of visit: 07-10-24 @ 14:00      Subjective: Patient seen and examined. No new events except as noted.   Patient seen earlier this Am. Sitting OOB TC. Offers no new complaints. Denies cough, congestion or dyspnea.    REVIEW OF SYSTEMS:    CONSTITUTIONAL: Generalized weakness; Afebrile   EYES/ENT: No visual changes;  No vertigo or throat pain   NECK: No pain or stiffness  RESPIRATORY: No cough, wheezing, hemoptysis; No shortness of breath  CARDIOVASCULAR: No chest pain or palpitations  GASTROINTESTINAL: No abdominal or epigastric pain. No nausea, vomiting, or hematemesis; No diarrhea or constipation. No melena or hematochezia.  GENITOURINARY: No dysuria, frequency or hematuria  NEUROLOGICAL: No numbness or weakness  SKIN: No itching, burning, rashes, or lesions   All other review of systems is negative unless indicated above.    MEDICATIONS:  MEDICATIONS  (STANDING):  cefTRIAXone   IVPB 1000 milliGRAM(s) IV Intermittent every 24 hours  chlorhexidine 2% Cloths 1 Application(s) Topical daily  dextrose 5% + sodium chloride 0.45%. 1000 milliLiter(s) (75 mL/Hr) IV Continuous <Continuous>  metroNIDAZOLE    Tablet 500 milliGRAM(s) Oral every 12 hours  pancrelipase  (CREON 36,000 Lipase Units) 2 Capsule(s) Oral three times a day with meals  sodium chloride 0.9%. 1000 milliLiter(s) (100 mL/Hr) IV Continuous <Continuous>      PHYSICAL EXAM:  T(C): 36.3 (07-10-24 @ 09:04), Max: 36.7 (07-10-24 @ 00:16)  HR: 84 (07-10-24 @ 09:04) (68 - 84)  BP: 127/71 (07-10-24 @ 09:04) (116/64 - 144/75)  RR: 18 (07-10-24 @ 09:04) (18 - 18)  SpO2: 97% (07-10-24 @ 09:04) (97% - 100%)  Wt(kg): --  I&O's Summary    09 Jul 2024 07:01  -  10 Jul 2024 07:00  --------------------------------------------------------  IN: 640 mL / OUT: 0 mL / NET: 640 mL    10 Jul 2024 07:01  -  10 Jul 2024 14:00  --------------------------------------------------------  IN: 640 mL / OUT: 0 mL / NET: 640 mL      Appearance: Awake, sitting OOB TC 	  HEENT:  Eyes are open   Lymphatic: No lymphadenopathy grossly   Cardiovascular: Normal    Respiratory: normal effort , clear  Gastrointestinal:  Soft, Non-tender  Skin: No rashes,  warm to touch  Psychiatry:  Mood & affect appropriate  Musculoskeletal: No edema       07-09-24 @ 07:01  -  07-10-24 @ 07:00  --------------------------------------------------------  IN: 640 mL / OUT: 0 mL / NET: 640 mL    07-10-24 @ 07:01  -  07-10-24 @ 14:00  --------------------------------------------------------  IN: 640 mL / OUT: 0 mL / NET: 640 mL                                8.9    3.50  )-----------( 96       ( 10 Jul 2024 06:34 )             26.7               07-10    140  |  110<H>  |  14  ----------------------------<  100<H>  4.1   |  18<L>  |  0.76    Ca    8.9      10 Jul 2024 06:35  Phos  3.1     07-10  Mg     2.2     07-10    TPro  6.1  /  Alb  3.3  /  TBili  0.2  /  DBili  x   /  AST  30  /  ALT  34  /  AlkPhos  74  07-10    PT/INR - ( 09 Jul 2024 06:40 )   PT: 10.5 sec;   INR: 1.00 ratio         PTT - ( 09 Jul 2024 06:40 )  PTT:33.0 sec                   Urinalysis Basic - ( 10 Jul 2024 06:35 )    Color: x / Appearance: x / SG: x / pH: x  Gluc: 100 mg/dL / Ketone: x  / Bili: x / Urobili: x   Blood: x / Protein: x / Nitrite: x   Leuk Esterase: x / RBC: x / WBC x   Sq Epi: x / Non Sq Epi: x / Bacteria: x        Culture - Blood (07.09.24 @ 06:40)   Specimen Source: .Blood Blood  Culture Results:   No growth at 24 hours    Culture - Blood (07.09.24 @ 06:40)   Specimen Source: .Blood Blood  Culture Results:   No growth at 24 hours    Culture - Blood (07.07.24 @ 10:40)   - Escherichia coli: Detec  Gram Stain:   Growth in anaerobic bottle: Gram Negative Rods   Growth in aerobic bottle: Gram Negative Rods  - Ceftriaxone: S <=1    Specimen Source: .Blood Blood-Peripheral  Organism: Blood Culture PCR  Organism: Escherichia coli  Culture Results:   Growth in aerobic and anaerobic bottles: Escherichia coli   Direct identification is available within approximately 3-5   hours either by Blood Panel Multiplexed PCR or Direct   MALDI-TOF. Details: [email or web address]  Organism Identification: Blood Culture PCR   Escherichia coli

## 2024-07-10 NOTE — CONSULT NOTE ADULT - SUBJECTIVE AND OBJECTIVE BOX
Chief Complaint:  Patient is a 78y old  Female who presents with a chief complaint of fever + chills (10 Jul 2024 09:30)    Arthritis    Pancreatic cancer    Rheumatoid arthritis    HLD (hyperlipidemia)    Renal cell carcinoma    Gastroparesis    S/P appendectomy    H/O Whipple procedure    H/O left nephrectomy       HPI:  77yo F h/o RA, Gastroparesis, Panncreatic Adenocarcinoma s/p whipple, PD stent, Chemo, Radiation, s/p L Nephrectomy and HLD p/w fever and chills. Pt states that she was feeling bad after going out to eat on Saturday night, vomited twice on Sunday and was having chills which prompted her to come to the ED. She is now feeling better, denies any cough, SOB, N/V or abdominal pain. GI consulted for gram (-) bacteremia with concern for GI source. The patient endorses that she has no abdominal pain, no further vomiting, no diarrhea, no jaundice, no pruritis. She is tolerating PO intake per her normal. She most recently had an EGD/Colonoscopy about 2 years ago that was unremarkable per the patient. She feels back to her baseline and believes this all to be related to the food she ate at the restaurant.       No Known Allergies      acetaminophen     Tablet .. 650 milliGRAM(s) Oral every 6 hours PRN  aluminum hydroxide/magnesium hydroxide/simethicone Suspension 30 milliLiter(s) Oral every 4 hours PRN  cefTRIAXone   IVPB 1000 milliGRAM(s) IV Intermittent every 24 hours  chlorhexidine 2% Cloths 1 Application(s) Topical daily  dextrose 5% + sodium chloride 0.45%. 1000 milliLiter(s) IV Continuous <Continuous>  melatonin 3 milliGRAM(s) Oral at bedtime PRN  metroNIDAZOLE    Tablet 500 milliGRAM(s) Oral every 12 hours  ondansetron Injectable 4 milliGRAM(s) IV Push every 8 hours PRN  pancrelipase  (CREON 36,000 Lipase Units) 2 Capsule(s) Oral three times a day with meals  sodium chloride 0.9%. 1000 milliLiter(s) IV Continuous <Continuous>        FAMILY HISTORY:        Review of Systems:    General:  No wt loss, fevers, chills, night sweats, fatigue  Eyes:  Good vision, no reported pain  ENT:  No sore throat, pain, runny nose, dysphagia  CV:  No pain, palpitations, no lightheadedness  Resp:  No dyspnea, cough, tachypnea, wheezing  GI: per hpi  :  No pain, bleeding, incontinence, nocturia  Muscle:  No pain, weakness  Neuro:  No weakness, tingling, memory problems  Psych:  No fatigue, insomnia, mood problems, depression  Endocrine:  No polyuria, polydypsia, cold/heat intolerance  Heme:  No petechiae, ecchymosis, easy bruisability  Skin:  No rash, tattoos, scars, edema    Relevant Family History:   n/c    Relevant Social History: n/c      Physical Exam:    Vital Signs:  Vital Signs Last 24 Hrs  T(C): 36.3 (10 Jul 2024 09:04), Max: 36.7 (10 Jul 2024 00:16)  T(F): 97.4 (10 Jul 2024 09:04), Max: 98 (10 Jul 2024 00:16)  HR: 84 (10 Jul 2024 09:04) (68 - 84)  BP: 127/71 (10 Jul 2024 09:04) (116/64 - 144/75)  BP(mean): --  RR: 18 (10 Jul 2024 09:04) (18 - 18)  SpO2: 97% (10 Jul 2024 09:04) (97% - 100%)    Parameters below as of 10 Jul 2024 09:04  Patient On (Oxygen Delivery Method): room air      Daily     Daily     General:  Appears stated age, well-groomed, nad  HEENT:  NC/AT,  conjunctivae clear and pink, no thyromegaly, nodules, adenopathy, no JVD  Chest:  Full & symmetric excursion, no increased effort, breath sounds clear  Cardiovascular:  Regular rhythm, S1, S2, no murmur/rub/S3/S4, no abdominal bruit, no edema  Abdomen:  Soft, non-tender, non-distended, normoactive bowel sounds,  no masses ,no hepatosplenomeagaly, no signs of chronic liver disease  Extremities:  no cyanosis,clubbing or edema  Skin:  No rash/erythema/ecchymoses/petechiae/wounds/abscess/warm/dry  Neuro/Psych:  A&O x3 , no asterixis, no tremor, no encephalopathy    Laboratory:                            8.9    3.50  )-----------( 96       ( 10 Jul 2024 06:34 )             26.7     07-10    140  |  110<H>  |  14  ----------------------------<  100<H>  4.1   |  18<L>  |  0.76    Ca    8.9      10 Jul 2024 06:35  Phos  3.1     07-10  Mg     2.2     07-10    TPro  6.1  /  Alb  3.3  /  TBili  0.2  /  DBili  x   /  AST  30  /  ALT  34  /  AlkPhos  74  07-10    LIVER FUNCTIONS - ( 10 Jul 2024 06:35 )  Alb: 3.3 g/dL / Pro: 6.1 g/dL / ALK PHOS: 74 U/L / ALT: 34 U/L / AST: 30 U/L / GGT: x           PT/INR - ( 09 Jul 2024 06:40 )   PT: 10.5 sec;   INR: 1.00 ratio         PTT - ( 09 Jul 2024 06:40 )  PTT:33.0 sec  Urinalysis Basic - ( 10 Jul 2024 06:35 )    Color: x / Appearance: x / SG: x / pH: x  Gluc: 100 mg/dL / Ketone: x  / Bili: x / Urobili: x   Blood: x / Protein: x / Nitrite: x   Leuk Esterase: x / RBC: x / WBC x   Sq Epi: x / Non Sq Epi: x / Bacteria: x        Imaging:

## 2024-07-10 NOTE — PROGRESS NOTE ADULT - SUBJECTIVE AND OBJECTIVE BOX
Follow-up Pulm Progress Note    No new respiratory events overnight.  Denies SOB/CP.     Medications:  MEDICATIONS  (STANDING):  cefTRIAXone   IVPB 1000 milliGRAM(s) IV Intermittent every 24 hours  chlorhexidine 2% Cloths 1 Application(s) Topical daily  dextrose 5% + sodium chloride 0.45%. 1000 milliLiter(s) (75 mL/Hr) IV Continuous <Continuous>  metroNIDAZOLE    Tablet 500 milliGRAM(s) Oral every 12 hours  pancrelipase  (CREON 36,000 Lipase Units) 2 Capsule(s) Oral three times a day with meals  sodium chloride 0.9%. 1000 milliLiter(s) (100 mL/Hr) IV Continuous <Continuous>    MEDICATIONS  (PRN):  acetaminophen     Tablet .. 650 milliGRAM(s) Oral every 6 hours PRN Temp greater or equal to 38C (100.4F), Mild Pain (1 - 3)  aluminum hydroxide/magnesium hydroxide/simethicone Suspension 30 milliLiter(s) Oral every 4 hours PRN Dyspepsia  melatonin 3 milliGRAM(s) Oral at bedtime PRN Insomnia  ondansetron Injectable 4 milliGRAM(s) IV Push every 8 hours PRN Nausea and/or Vomiting          Vital Signs Last 24 Hrs  T(C): 36.6 (10 Jul 2024 05:03), Max: 36.7 (10 Jul 2024 00:16)  T(F): 97.9 (10 Jul 2024 05:03), Max: 98 (10 Jul 2024 00:16)  HR: 71 (10 Jul 2024 05:03) (68 - 80)  BP: 116/64 (10 Jul 2024 05:03) (116/64 - 144/75)  BP(mean): --  RR: 18 (10 Jul 2024 05:03) (18 - 18)  SpO2: 98% (10 Jul 2024 05:03) (98% - 100%)    Parameters below as of 10 Jul 2024 05:03  Patient On (Oxygen Delivery Method): room air              07-09 @ 07:01  -  07-10 @ 07:00  --------------------------------------------------------  IN: 640 mL / OUT: 0 mL / NET: 640 mL          LABS:                        8.9    3.50  )-----------( 96       ( 10 Jul 2024 06:34 )             26.7     07-10    140  |  110<H>  |  14  ----------------------------<  100<H>  4.1   |  18<L>  |  0.76    Ca    8.9      10 Jul 2024 06:35  Phos  3.1     07-10  Mg     2.2     07-10    TPro  6.1  /  Alb  3.3  /  TBili  0.2  /  DBili  x   /  AST  30  /  ALT  34  /  AlkPhos  74  07-10          CAPILLARY BLOOD GLUCOSE        PT/INR - ( 09 Jul 2024 06:40 )   PT: 10.5 sec;   INR: 1.00 ratio         PTT - ( 09 Jul 2024 06:40 )  PTT:33.0 sec  Urinalysis Basic - ( 10 Jul 2024 06:35 )    Color: x / Appearance: x / SG: x / pH: x  Gluc: 100 mg/dL / Ketone: x  / Bili: x / Urobili: x   Blood: x / Protein: x / Nitrite: x   Leuk Esterase: x / RBC: x / WBC x   Sq Epi: x / Non Sq Epi: x / Bacteria: x      Procalcitonin: 6.01 ng/mL (07-10-24 @ 06:35)  Procalcitonin: 17.68 ng/mL (07-08-24 @ 09:00)                  CULTURES:     Culture - Blood (collected 07-07-24 @ 10:40)  Source: .Blood Blood-Peripheral  Gram Stain (07-08-24 @ 02:02):    Growth in anaerobic bottle: Gram Negative Rods    Growth in aerobic bottle: Gram Negative Rods  Final Report (07-09-24 @ 18:08):    Growth in aerobic and anaerobic bottles: Escherichia coli    Direct identification is available within approximately 3-5    hours either by Blood Panel Multiplexed PCR or Direct    MALDI-TOF. Details: [email or web address]  Organism: Blood Culture PCR  Escherichia coli (07-09-24 @ 18:08)  Organism: Escherichia coli (07-09-24 @ 18:08)      Method Type: CHAI      -  Ampicillin: R >16 These ampicillin results predict results for amoxicillin      -  Ampicillin/Sulbactam: R >16/8      -  Aztreonam: S <=4      -  Cefazolin: I 4      -  Cefepime: S <=2      -  Cefoxitin: S <=8      -  Ceftriaxone: S <=1      -  Ciprofloxacin: S <=0.25      -  Ertapenem: S <=0.5      -  Gentamicin: S <=2      -  Imipenem: S <=1      -  Levofloxacin: S <=0.5      -  Meropenem: S <=1      -  Piperacillin/Tazobactam: S <=8      -  Tobramycin: S <=2      -  Trimethoprim/Sulfamethoxazole: S <=0.5/9.5  Organism: Blood Culture PCR (07-09-24 @ 18:08)      Method Type: PCR      -  Escherichia coli: Detec    Culture - Blood (collected 07-07-24 @ 09:40)  Source: .Blood Blood-Peripheral  Gram Stain (07-08-24 @ 02:01):    Growth in anaerobic bottle: Gram Negative Rods  Final Report (07-09-24 @ 17:21):    Growth in anaerobic bottle: Escherichia coli    See previous culture [id number]        Culture - Urine (collected 07-07-24 @ 10:57)  Source: Clean Catch Clean Catch (Midstream)  Final Report (07-08-24 @ 16:07):    <10,000 CFU/mL Normal Urogenital Carmen              Physical Examination:  PULM: Clear to auscultation bilaterally, no significant sputum production  CVS: S1, S2 heard      RADIOLOGY REVIEWED  CT chest:    < from: CT Chest No Cont (07.07.24 @ 19:49) >  FINDINGS: Absence of intravenous contrast limits evaluation of   vasculature and visceral organs.    LUNGS AND LARGE AIRWAYS: Patent central airways. Areas of scarring   architectural distortion with subpleural-based thickening, and   bronchiectasis within the right upper and right middle lobes is similar   to exam from 9/13/2021. 1.2 cm cavitary lesion in the left lower lobe   (301, 78) is similar to exam from 2/27/2024, but new from 9/13/2021. 7 mm   mixed solid/groundglass nodule in the right lower lobe (301, 86) is new   from 9/13/2021.Two mixed solid and groundglass lesions in the left upper   lobe are new from 9/13/2021, the largest of which measures 6 mm (301,   73). Few scattered calcified granulomas.  PLEURA:Trace bilateral pleural effusion or thickening.  VESSELS:  Aorta and coronary artery calcifications.  HEART: Heart size is normal. No pericardial effusion.  MEDIASTINUM AND TEO: Calcified mediastinal and hilar lymph nodes.  CHEST WALL AND LOWER NECK: Right chest wall MediPort with tip in the   right atrium.  VISUALIZED UPPER ABDOMEN: For more detailed findings of the abdomen   please refer to CT of the abdomen and pelvis report from same day.  BONES: Degenerative changes. Stable advanced remote T12 compression   deformity.    IMPRESSION:    Cavitary and mixed solid/groundglass lesions in the bilateral lower lobes   are new/increased in size from 2/27/2024 and not visualized on CT from   9/13/2021.    Etiology of these nodules is unclear. However, in the setting of known   malignancy, may represent metastatic disease. Infectious or inflammatory   etiology also considered. Consider nonemergent PET/CT and short-term   imaging follow-up in 3 months is advised.    < end of copied text >

## 2024-07-11 ENCOUNTER — TRANSCRIPTION ENCOUNTER (OUTPATIENT)
Age: 79
End: 2024-07-11

## 2024-07-11 VITALS
DIASTOLIC BLOOD PRESSURE: 73 MMHG | TEMPERATURE: 98 F | RESPIRATION RATE: 18 BRPM | SYSTOLIC BLOOD PRESSURE: 131 MMHG | HEART RATE: 68 BPM | OXYGEN SATURATION: 98 %

## 2024-07-11 LAB
ALBUMIN SERPL ELPH-MCNC: 3.6 G/DL — SIGNIFICANT CHANGE UP (ref 3.3–5)
ALP SERPL-CCNC: 80 U/L — SIGNIFICANT CHANGE UP (ref 40–120)
ALT FLD-CCNC: 31 U/L — SIGNIFICANT CHANGE UP (ref 10–45)
ANION GAP SERPL CALC-SCNC: 9 MMOL/L — SIGNIFICANT CHANGE UP (ref 5–17)
AST SERPL-CCNC: 23 U/L — SIGNIFICANT CHANGE UP (ref 10–40)
BASOPHILS # BLD AUTO: 0.02 K/UL — SIGNIFICANT CHANGE UP (ref 0–0.2)
BASOPHILS NFR BLD AUTO: 0.7 % — SIGNIFICANT CHANGE UP (ref 0–2)
BILIRUB SERPL-MCNC: 0.3 MG/DL — SIGNIFICANT CHANGE UP (ref 0.2–1.2)
BUN SERPL-MCNC: 11 MG/DL — SIGNIFICANT CHANGE UP (ref 7–23)
CALCIUM SERPL-MCNC: 9.4 MG/DL — SIGNIFICANT CHANGE UP (ref 8.4–10.5)
CHLORIDE SERPL-SCNC: 107 MMOL/L — SIGNIFICANT CHANGE UP (ref 96–108)
CO2 SERPL-SCNC: 22 MMOL/L — SIGNIFICANT CHANGE UP (ref 22–31)
CREAT SERPL-MCNC: 0.74 MG/DL — SIGNIFICANT CHANGE UP (ref 0.5–1.3)
EGFR: 82 ML/MIN/1.73M2 — SIGNIFICANT CHANGE UP
EOSINOPHIL # BLD AUTO: 0.06 K/UL — SIGNIFICANT CHANGE UP (ref 0–0.5)
EOSINOPHIL NFR BLD AUTO: 2.2 % — SIGNIFICANT CHANGE UP (ref 0–6)
GLUCOSE SERPL-MCNC: 107 MG/DL — HIGH (ref 70–99)
HCT VFR BLD CALC: 28.9 % — LOW (ref 34.5–45)
HGB BLD-MCNC: 9.1 G/DL — LOW (ref 11.5–15.5)
IMM GRANULOCYTES NFR BLD AUTO: 0.4 % — SIGNIFICANT CHANGE UP (ref 0–0.9)
LYMPHOCYTES # BLD AUTO: 1.06 K/UL — SIGNIFICANT CHANGE UP (ref 1–3.3)
LYMPHOCYTES # BLD AUTO: 38.3 % — SIGNIFICANT CHANGE UP (ref 13–44)
MAGNESIUM SERPL-MCNC: 2.1 MG/DL — SIGNIFICANT CHANGE UP (ref 1.6–2.6)
MCHC RBC-ENTMCNC: 31.5 GM/DL — LOW (ref 32–36)
MCHC RBC-ENTMCNC: 33.2 PG — SIGNIFICANT CHANGE UP (ref 27–34)
MCV RBC AUTO: 105.5 FL — HIGH (ref 80–100)
MONOCYTES # BLD AUTO: 0.32 K/UL — SIGNIFICANT CHANGE UP (ref 0–0.9)
MONOCYTES NFR BLD AUTO: 11.6 % — SIGNIFICANT CHANGE UP (ref 2–14)
NEUTROPHILS # BLD AUTO: 1.3 K/UL — LOW (ref 1.8–7.4)
NEUTROPHILS NFR BLD AUTO: 46.8 % — SIGNIFICANT CHANGE UP (ref 43–77)
NIGHT BLUE STAIN TISS: SIGNIFICANT CHANGE UP
NRBC # BLD: 0 /100 WBCS — SIGNIFICANT CHANGE UP (ref 0–0)
PHOSPHATE SERPL-MCNC: 3.1 MG/DL — SIGNIFICANT CHANGE UP (ref 2.5–4.5)
PLATELET # BLD AUTO: 111 K/UL — LOW (ref 150–400)
POTASSIUM SERPL-MCNC: 4.4 MMOL/L — SIGNIFICANT CHANGE UP (ref 3.5–5.3)
POTASSIUM SERPL-SCNC: 4.4 MMOL/L — SIGNIFICANT CHANGE UP (ref 3.5–5.3)
PROT SERPL-MCNC: 6.5 G/DL — SIGNIFICANT CHANGE UP (ref 6–8.3)
RBC # BLD: 2.74 M/UL — LOW (ref 3.8–5.2)
RBC # FLD: 15.1 % — HIGH (ref 10.3–14.5)
SODIUM SERPL-SCNC: 138 MMOL/L — SIGNIFICANT CHANGE UP (ref 135–145)
SPECIMEN SOURCE: SIGNIFICANT CHANGE UP
WBC # BLD: 2.77 K/UL — LOW (ref 3.8–10.5)
WBC # FLD AUTO: 2.77 K/UL — LOW (ref 3.8–10.5)

## 2024-07-11 PROCEDURE — 87206 SMEAR FLUORESCENT/ACID STAI: CPT

## 2024-07-11 PROCEDURE — 99232 SBSQ HOSP IP/OBS MODERATE 35: CPT

## 2024-07-11 PROCEDURE — 96365 THER/PROPH/DIAG IV INF INIT: CPT

## 2024-07-11 PROCEDURE — 80061 LIPID PANEL: CPT

## 2024-07-11 PROCEDURE — 93005 ELECTROCARDIOGRAM TRACING: CPT

## 2024-07-11 PROCEDURE — 82607 VITAMIN B-12: CPT

## 2024-07-11 PROCEDURE — 82746 ASSAY OF FOLIC ACID SERUM: CPT

## 2024-07-11 PROCEDURE — 87040 BLOOD CULTURE FOR BACTERIA: CPT

## 2024-07-11 PROCEDURE — 93306 TTE W/DOPPLER COMPLETE: CPT

## 2024-07-11 PROCEDURE — 87150 DNA/RNA AMPLIFIED PROBE: CPT

## 2024-07-11 PROCEDURE — 87641 MR-STAPH DNA AMP PROBE: CPT

## 2024-07-11 PROCEDURE — 87116 MYCOBACTERIA CULTURE: CPT

## 2024-07-11 PROCEDURE — 87449 NOS EACH ORGANISM AG IA: CPT

## 2024-07-11 PROCEDURE — 83735 ASSAY OF MAGNESIUM: CPT

## 2024-07-11 PROCEDURE — 84145 PROCALCITONIN (PCT): CPT

## 2024-07-11 PROCEDURE — 87086 URINE CULTURE/COLONY COUNT: CPT

## 2024-07-11 PROCEDURE — 85027 COMPLETE CBC AUTOMATED: CPT

## 2024-07-11 PROCEDURE — 85045 AUTOMATED RETICULOCYTE COUNT: CPT

## 2024-07-11 PROCEDURE — 82947 ASSAY GLUCOSE BLOOD QUANT: CPT

## 2024-07-11 PROCEDURE — 96367 TX/PROPH/DG ADDL SEQ IV INF: CPT

## 2024-07-11 PROCEDURE — 82728 ASSAY OF FERRITIN: CPT

## 2024-07-11 PROCEDURE — 82803 BLOOD GASES ANY COMBINATION: CPT

## 2024-07-11 PROCEDURE — 97165 OT EVAL LOW COMPLEX 30 MIN: CPT

## 2024-07-11 PROCEDURE — 87637 SARSCOV2&INF A&B&RSV AMP PRB: CPT

## 2024-07-11 PROCEDURE — 83540 ASSAY OF IRON: CPT

## 2024-07-11 PROCEDURE — 83550 IRON BINDING TEST: CPT

## 2024-07-11 PROCEDURE — 85014 HEMATOCRIT: CPT

## 2024-07-11 PROCEDURE — 84295 ASSAY OF SERUM SODIUM: CPT

## 2024-07-11 PROCEDURE — 86480 TB TEST CELL IMMUN MEASURE: CPT

## 2024-07-11 PROCEDURE — 85025 COMPLETE CBC W/AUTO DIFF WBC: CPT

## 2024-07-11 PROCEDURE — 83036 HEMOGLOBIN GLYCOSYLATED A1C: CPT

## 2024-07-11 PROCEDURE — 80048 BASIC METABOLIC PNL TOTAL CA: CPT

## 2024-07-11 PROCEDURE — 83615 LACTATE (LD) (LDH) ENZYME: CPT

## 2024-07-11 PROCEDURE — 81001 URINALYSIS AUTO W/SCOPE: CPT

## 2024-07-11 PROCEDURE — 97162 PT EVAL MOD COMPLEX 30 MIN: CPT

## 2024-07-11 PROCEDURE — 94640 AIRWAY INHALATION TREATMENT: CPT

## 2024-07-11 PROCEDURE — 84132 ASSAY OF SERUM POTASSIUM: CPT

## 2024-07-11 PROCEDURE — 0225U NFCT DS DNA&RNA 21 SARSCOV2: CPT

## 2024-07-11 PROCEDURE — 80053 COMPREHEN METABOLIC PANEL: CPT

## 2024-07-11 PROCEDURE — 83690 ASSAY OF LIPASE: CPT

## 2024-07-11 PROCEDURE — 82435 ASSAY OF BLOOD CHLORIDE: CPT

## 2024-07-11 PROCEDURE — 84100 ASSAY OF PHOSPHORUS: CPT

## 2024-07-11 PROCEDURE — 71250 CT THORAX DX C-: CPT | Mod: MC

## 2024-07-11 PROCEDURE — 85610 PROTHROMBIN TIME: CPT

## 2024-07-11 PROCEDURE — 83010 ASSAY OF HAPTOGLOBIN QUANT: CPT

## 2024-07-11 PROCEDURE — 87077 CULTURE AEROBIC IDENTIFY: CPT

## 2024-07-11 PROCEDURE — 85730 THROMBOPLASTIN TIME PARTIAL: CPT

## 2024-07-11 PROCEDURE — 87015 SPECIMEN INFECT AGNT CONCNTJ: CPT

## 2024-07-11 PROCEDURE — 85018 HEMOGLOBIN: CPT

## 2024-07-11 PROCEDURE — 74177 CT ABD & PELVIS W/CONTRAST: CPT | Mod: MC

## 2024-07-11 PROCEDURE — 83605 ASSAY OF LACTIC ACID: CPT

## 2024-07-11 PROCEDURE — 82330 ASSAY OF CALCIUM: CPT

## 2024-07-11 PROCEDURE — 36415 COLL VENOUS BLD VENIPUNCTURE: CPT

## 2024-07-11 PROCEDURE — 99285 EMERGENCY DEPT VISIT HI MDM: CPT

## 2024-07-11 PROCEDURE — 87640 STAPH A DNA AMP PROBE: CPT

## 2024-07-11 PROCEDURE — 87186 SC STD MICRODIL/AGAR DIL: CPT

## 2024-07-11 RX ORDER — TRAZODONE HYDROCHLORIDE 50 MG/1
1 TABLET, FILM COATED ORAL
Refills: 0 | DISCHARGE

## 2024-07-11 RX ORDER — CITALOPRAM 10 MG/1
1 TABLET, FILM COATED ORAL
Refills: 0 | DISCHARGE

## 2024-07-11 RX ORDER — FOLIC ACID
1 POWDER (GRAM) MISCELLANEOUS
Refills: 0 | DISCHARGE

## 2024-07-11 RX ORDER — ACETAMINOPHEN 325 MG
2 TABLET ORAL
Qty: 0 | Refills: 0 | DISCHARGE
Start: 2024-07-11

## 2024-07-11 RX ORDER — CEFPODOXIME PROXETIL 50 MG/5 ML
1 SUSPENSION, RECONSTITUTED, ORAL (ML) ORAL
Qty: 16 | Refills: 0
Start: 2024-07-11 | End: 2024-07-18

## 2024-07-11 RX ORDER — LIPASE/PROTEASE/AMYLASE 4.5-25-20K
2 CAPSULE,DELAYED RELEASE (ENTERIC COATED) ORAL
Qty: 0 | Refills: 0 | DISCHARGE
Start: 2024-07-11

## 2024-07-11 RX ORDER — MAGNESIUM, ALUMINUM HYDROXIDE 400-400
30 TABLET,CHEWABLE ORAL
Qty: 0 | Refills: 0 | DISCHARGE
Start: 2024-07-11

## 2024-07-11 RX ORDER — METRONIDAZOLE 500 MG/1
1 TABLET ORAL
Qty: 16 | Refills: 0
Start: 2024-07-11 | End: 2024-07-18

## 2024-07-11 RX ADMIN — Medication 650 MILLIGRAM(S): at 06:18

## 2024-07-11 RX ADMIN — Medication 2 CAPSULE(S): at 08:21

## 2024-07-11 RX ADMIN — Medication 2 CAPSULE(S): at 17:22

## 2024-07-11 RX ADMIN — METRONIDAZOLE 500 MILLIGRAM(S): 500 TABLET ORAL at 17:23

## 2024-07-11 RX ADMIN — Medication 2 CAPSULE(S): at 12:04

## 2024-07-11 RX ADMIN — Medication 650 MILLIGRAM(S): at 05:18

## 2024-07-11 RX ADMIN — METRONIDAZOLE 500 MILLIGRAM(S): 500 TABLET ORAL at 05:09

## 2024-07-11 NOTE — DISCHARGE NOTE PROVIDER - NSDCMRMEDTOKEN_GEN_ALL_CORE_FT
acetaminophen 325 mg oral tablet: 2 tab(s) orally every 6 hours As needed Temp greater or equal to 38C (100.4F), Mild Pain (1 - 3)  aluminum hydroxide-magnesium hydroxide 200 mg-200 mg/5 mL oral suspension: 30 milliliter(s) orally every 4 hours As needed Dyspepsia  cefpodoxime 200 mg oral tablet: 1 tab(s) orally 2 times a day complete on 7/19  citalopram 10 mg oral tablet: 1 tab(s) orally once a day  folic acid 1 mg oral tablet: 1 tab(s) orally once a day  melatonin 3 mg oral tablet: 1 tab(s) orally once a day (at bedtime) As needed Insomnia  metroNIDAZOLE 500 mg oral tablet: 1 tab(s) orally every 12 hours complete on 7/19  pancrelipase 36,000 units-114,000 units-180,000 units oral delayed release capsule: 2 cap(s) orally 3 times a day (with meals)  traZODone 50 mg oral tablet: 1 tab(s) orally once a day (at bedtime)   acetaminophen 325 mg oral tablet: 2 tab(s) orally every 6 hours As needed Temp greater or equal to 38C (100.4F), Mild Pain (1 - 3)  aluminum hydroxide-magnesium hydroxide 200 mg-200 mg/5 mL oral suspension: 30 milliliter(s) orally every 4 hours As needed Dyspepsia  cefpodoxime 200 mg oral tablet: 1 tab(s) orally 2 times a day complete on 7/19  melatonin 3 mg oral tablet: 1 tab(s) orally once a day (at bedtime) As needed Insomnia  metroNIDAZOLE 500 mg oral tablet: 1 tab(s) orally every 12 hours complete on 7/19  pancrelipase 36,000 units-114,000 units-180,000 units oral delayed release capsule: 2 cap(s) orally 3 times a day (with meals)

## 2024-07-11 NOTE — PROGRESS NOTE ADULT - PROVIDER SPECIALTY LIST ADULT
Infectious Disease
Infectious Disease
Internal Medicine
Internal Medicine
Pulmonology
Internal Medicine
Pulmonology
Heme/Onc
Heme/Onc
Gastroenterology
Internal Medicine
Pulmonology

## 2024-07-11 NOTE — PROGRESS NOTE ADULT - SUBJECTIVE AND OBJECTIVE BOX
Follow-up Pulm Progress Note    No new respiratory events overnight.  Denies SOB/CP.     Medications:  MEDICATIONS  (STANDING):  cefTRIAXone   IVPB 1000 milliGRAM(s) IV Intermittent every 24 hours  chlorhexidine 2% Cloths 1 Application(s) Topical daily  dextrose 5% + sodium chloride 0.45%. 1000 milliLiter(s) (75 mL/Hr) IV Continuous <Continuous>  metroNIDAZOLE    Tablet 500 milliGRAM(s) Oral every 12 hours  pancrelipase  (CREON 36,000 Lipase Units) 2 Capsule(s) Oral three times a day with meals  sodium chloride 0.9%. 1000 milliLiter(s) (100 mL/Hr) IV Continuous <Continuous>    MEDICATIONS  (PRN):  acetaminophen     Tablet .. 650 milliGRAM(s) Oral every 6 hours PRN Temp greater or equal to 38C (100.4F), Mild Pain (1 - 3)  aluminum hydroxide/magnesium hydroxide/simethicone Suspension 30 milliLiter(s) Oral every 4 hours PRN Dyspepsia  melatonin 3 milliGRAM(s) Oral at bedtime PRN Insomnia  ondansetron Injectable 4 milliGRAM(s) IV Push every 8 hours PRN Nausea and/or Vomiting          Vital Signs Last 24 Hrs  T(C): 37 (11 Jul 2024 04:25), Max: 37.2 (11 Jul 2024 00:02)  T(F): 98.6 (11 Jul 2024 04:25), Max: 98.9 (11 Jul 2024 00:02)  HR: 80 (11 Jul 2024 04:25) (76 - 88)  BP: 150/80 (11 Jul 2024 04:25) (115/72 - 150/80)  BP(mean): --  RR: 18 (11 Jul 2024 04:25) (18 - 18)  SpO2: 98% (11 Jul 2024 04:25) (95% - 98%)    Parameters below as of 11 Jul 2024 04:25  Patient On (Oxygen Delivery Method): room air              07-10 @ 07:01  -  07-11 @ 07:00  --------------------------------------------------------  IN: 640 mL / OUT: 0 mL / NET: 640 mL          LABS:                        9.1    2.77  )-----------( 111      ( 11 Jul 2024 07:24 )             28.9     07-11    138  |  107  |  11  ----------------------------<  107<H>  4.4   |  22  |  0.74    Ca    9.4      11 Jul 2024 07:20  Phos  3.1     07-11  Mg     2.1     07-11    TPro  6.5  /  Alb  3.6  /  TBili  0.3  /  DBili  x   /  AST  23  /  ALT  31  /  AlkPhos  80  07-11          CAPILLARY BLOOD GLUCOSE          Urinalysis Basic - ( 11 Jul 2024 07:20 )    Color: x / Appearance: x / SG: x / pH: x  Gluc: 107 mg/dL / Ketone: x  / Bili: x / Urobili: x   Blood: x / Protein: x / Nitrite: x   Leuk Esterase: x / RBC: x / WBC x   Sq Epi: x / Non Sq Epi: x / Bacteria: x      Procalcitonin: 6.01 ng/mL (07-10-24 @ 06:35)                  CULTURES:     Culture - Blood (collected 07-09-24 @ 06:40)  Source: .Blood Blood  Preliminary Report (07-10-24 @ 11:01):    No growth at 24 hours    Culture - Blood (collected 07-09-24 @ 06:40)  Source: .Blood Blood  Preliminary Report (07-10-24 @ 11:01):    No growth at 24 hours    Culture - Blood (collected 07-07-24 @ 10:40)  Source: .Blood Blood-Peripheral  Gram Stain (07-08-24 @ 02:02):    Growth in anaerobic bottle: Gram Negative Rods    Growth in aerobic bottle: Gram Negative Rods  Final Report (07-09-24 @ 18:08):    Growth in aerobic and anaerobic bottles: Escherichia coli    Direct identification is available within approximately 3-5    hours either by Blood Panel Multiplexed PCR or Direct    MALDI-TOF. Details: [email or web address]  Organism: Blood Culture PCR  Escherichia coli (07-09-24 @ 18:08)  Organism: Escherichia coli (07-09-24 @ 18:08)      Method Type: CHAI      -  Ampicillin: R >16 These ampicillin results predict results for amoxicillin      -  Ampicillin/Sulbactam: R >16/8      -  Aztreonam: S <=4      -  Cefazolin: I 4      -  Cefepime: S <=2      -  Cefoxitin: S <=8      -  Ceftriaxone: S <=1      -  Ciprofloxacin: S <=0.25      -  Ertapenem: S <=0.5      -  Gentamicin: S <=2      -  Imipenem: S <=1      -  Levofloxacin: S <=0.5      -  Meropenem: S <=1      -  Piperacillin/Tazobactam: S <=8      -  Tobramycin: S <=2      -  Trimethoprim/Sulfamethoxazole: S <=0.5/9.5  Organism: Blood Culture PCR (07-09-24 @ 18:08)      Method Type: PCR      -  Escherichia coli: Detec    Culture - Blood (collected 07-07-24 @ 09:40)  Source: .Blood Blood-Peripheral  Gram Stain (07-08-24 @ 02:01):    Growth in anaerobic bottle: Gram Negative Rods  Final Report (07-09-24 @ 17:21):    Growth in anaerobic bottle: Escherichia coli    See previous culture [id number]        Culture - Urine (collected 07-07-24 @ 10:57)  Source: Clean Catch Clean Catch (Midstream)  Final Report (07-08-24 @ 16:07):    <10,000 CFU/mL Normal Urogenital Carmen            Culture - Acid Fast - Sputum w/Smear (collected 07-10-24 @ 13:52)  Source: .Sputum Sputum    Culture - Acid Fast - Sputum w/Smear (collected 07-09-24 @ 23:48)  Source: .Sputum Sputum    Culture - Acid Fast - Sputum w/Smear (collected 07-09-24 @ 15:48)  Source: .Sputum Sputum  Preliminary Report (07-10-24 @ 23:09):    Culture is being performed.                      Physical Examination:  PULM: Clear to auscultation bilaterally, no significant sputum production  CVS: S1, S2 heard      RADIOLOGY REVIEWED  CT chest:    < from: CT Chest No Cont (07.07.24 @ 19:49) >  FINDINGS: Absence of intravenous contrast limits evaluation of   vasculature and visceral organs.    LUNGS AND LARGE AIRWAYS: Patent central airways. Areas of scarring   architectural distortion with subpleural-based thickening, and   bronchiectasis within the right upper and right middle lobes is similar   to exam from 9/13/2021. 1.2 cm cavitary lesion in the left lower lobe   (301, 78) is similar to exam from 2/27/2024, but new from 9/13/2021. 7 mm   mixed solid/groundglass nodule in the right lower lobe (301, 86) is new   from 9/13/2021.Two mixed solid and groundglass lesions in the left upper   lobe are new from 9/13/2021, the largest of which measures 6 mm (301,   73). Few scattered calcified granulomas.  PLEURA:Trace bilateral pleural effusion or thickening.  VESSELS:  Aorta and coronary artery calcifications.  HEART: Heart size is normal. No pericardial effusion.  MEDIASTINUM AND TEO: Calcified mediastinal and hilar lymph nodes.  CHEST WALL AND LOWER NECK: Right chest wall MediPort with tip in the   right atrium.  VISUALIZED UPPER ABDOMEN: For more detailed findings of the abdomen   please refer to CT of the abdomen and pelvis report from same day.  BONES: Degenerative changes. Stable advanced remote T12 compression   deformity.    IMPRESSION:    Cavitary and mixed solid/groundglass lesions in the bilateral lower lobes   are new/increased in size from 2/27/2024 and not visualized on CT from   9/13/2021.    Etiology of these nodules is unclear. However, in the setting of known   malignancy, may represent metastatic disease. Infectious or inflammatory   etiology also considered. Consider nonemergent PET/CT and short-term   imaging follow-up in 3 months is advised.    < end of copied text >

## 2024-07-11 NOTE — PROGRESS NOTE ADULT - SUBJECTIVE AND OBJECTIVE BOX
INTERVAL HPI/OVERNIGHT EVENTS:    denies n/v/d/c, abdominal pain, melena or brbpr     MEDICATIONS  (STANDING):  cefTRIAXone   IVPB 1000 milliGRAM(s) IV Intermittent every 24 hours  chlorhexidine 2% Cloths 1 Application(s) Topical daily  dextrose 5% + sodium chloride 0.45%. 1000 milliLiter(s) (75 mL/Hr) IV Continuous <Continuous>  metroNIDAZOLE    Tablet 500 milliGRAM(s) Oral every 12 hours  pancrelipase  (CREON 36,000 Lipase Units) 2 Capsule(s) Oral three times a day with meals  sodium chloride 0.9%. 1000 milliLiter(s) (100 mL/Hr) IV Continuous <Continuous>    MEDICATIONS  (PRN):  acetaminophen     Tablet .. 650 milliGRAM(s) Oral every 6 hours PRN Temp greater or equal to 38C (100.4F), Mild Pain (1 - 3)  aluminum hydroxide/magnesium hydroxide/simethicone Suspension 30 milliLiter(s) Oral every 4 hours PRN Dyspepsia  melatonin 3 milliGRAM(s) Oral at bedtime PRN Insomnia  ondansetron Injectable 4 milliGRAM(s) IV Push every 8 hours PRN Nausea and/or Vomiting      Allergies    No Known Allergies    Intolerances        Review of Systems:    General:  No wt loss, fevers, chills, night sweats, fatigue   Eyes:  Good vision, no reported pain  ENT:  No sore throat, pain, runny nose, dysphagia  CV:  No pain, palpitations, hypo/hypertension  Resp:  No dyspnea, cough, tachypnea, wheezing  GI:  No pain, No nausea, No vomiting, No diarrhea, No constipation, No weight loss, No fever, No pruritis, No rectal bleeding, No melena, No dysphagia  :  No pain, bleeding, incontinence, nocturia  Muscle:  No pain, weakness  Neuro:  No weakness, tingling, memory problems  Psych:  No fatigue, insomnia, mood problems, depression  Endocrine:  No polyuria, polydypsia, cold/heat intolerance  Heme:  No petechiae, ecchymosis, easy bruisability  Skin:  No rash, tattoos, scars, edema      Vital Signs Last 24 Hrs  T(C): 37 (11 Jul 2024 04:25), Max: 37.2 (11 Jul 2024 00:02)  T(F): 98.6 (11 Jul 2024 04:25), Max: 98.9 (11 Jul 2024 00:02)  HR: 80 (11 Jul 2024 04:25) (76 - 88)  BP: 150/80 (11 Jul 2024 04:25) (115/72 - 150/80)  BP(mean): --  RR: 18 (11 Jul 2024 04:25) (18 - 18)  SpO2: 98% (11 Jul 2024 04:25) (95% - 98%)    Parameters below as of 11 Jul 2024 04:25  Patient On (Oxygen Delivery Method): room air        PHYSICAL EXAM:    Constitutional: NAD  HEENT: EOMI, throat clear  Neck: No LAD, supple  Respiratory: CTA and P  Cardiovascular: S1 and S2, RRR, no M  Gastrointestinal: BS+, soft, NT/ND, neg HSM,  Extremities: No peripheral edema, neg clubbing, cyanosis  Vascular: 2+ peripheral pulses  Neurological: A/O   Psychiatric: Normal mood, normal affect  Skin: No rashes      LABS:                        9.1    2.77  )-----------( 111      ( 11 Jul 2024 07:24 )             28.9     07-11    138  |  107  |  11  ----------------------------<  107<H>  4.4   |  22  |  0.74    Ca    9.4      11 Jul 2024 07:20  Phos  3.1     07-11  Mg     2.1     07-11    TPro  6.5  /  Alb  3.6  /  TBili  0.3  /  DBili  x   /  AST  23  /  ALT  31  /  AlkPhos  80  07-11      Urinalysis Basic - ( 11 Jul 2024 07:20 )    Color: x / Appearance: x / SG: x / pH: x  Gluc: 107 mg/dL / Ketone: x  / Bili: x / Urobili: x   Blood: x / Protein: x / Nitrite: x   Leuk Esterase: x / RBC: x / WBC x   Sq Epi: x / Non Sq Epi: x / Bacteria: x        RADIOLOGY & ADDITIONAL TESTS:

## 2024-07-11 NOTE — DISCHARGE NOTE NURSING/CASE MANAGEMENT/SOCIAL WORK - PATIENT PORTAL LINK FT
You can access the FollowMyHealth Patient Portal offered by [facility] by registering at the following website: [email or web address]. By joining [employer]’s FollowMyHealth portal, you will also be able to view your health information using other applications (apps) compatible with our system.

## 2024-07-11 NOTE — DISCHARGE NOTE PROVIDER - NSDCFUSCHEDAPPT_GEN_ALL_CORE_FT
Joseph Rainey  [facility]  SURGONC [address] [place] [address]  Scheduled Appointment: 09/19/2024

## 2024-07-11 NOTE — DISCHARGE NOTE PROVIDER - CARE PROVIDERS DIRECT ADDRESSES
,DirectAddress_Unknown,[email or web address] ,DirectAddress_Unknown,[email or web address],DirectAddress_Unknown,DirectAddress_Unknown

## 2024-07-11 NOTE — PROGRESS NOTE ADULT - PROBLEM SELECTOR PLAN 2
BC +E.coli  -Abx as per ID.
BC +E.coli  -Abx as per ID.
BC +E.coli  -F/u BC NGTD   -Abx as per ID.
a/w thrombocytopenia  hgb appears to be stable and at baseline  no gross bleeding in er, hds otherwise  follow up anemia work up  Monitor hgb w serial CBC; Goal Hb >7 g/dl,  Plt >10k, >20k if septic, >50k if actively bleeding  maintain adequate PIV and active type and screen; transfuse blood product as needed to maintain goal

## 2024-07-11 NOTE — DISCHARGE NOTE PROVIDER - CARE PROVIDER_API CALL
Joesph Prado  Endocrinology/Metab/Diabetes  [address] [place] [address]  Phone: [phone]  Fax: [phone]  Follow Up Time: Routine    Eris Bright  Urology  [address] [place] [address], [address] [address]  Phone: [phone]  Fax: [phone]  Follow Up Time: Routine   Joesph Prado  Endocrinology/Metab/Diabetes  [address] [place] [address]  Phone: [phone]  Fax: [phone]  Follow Up Time: Routine    Eris Bright  Urology  [address] [facility], [address]  [place] [address]  Phone: [phone]  Fax: [phone]  Follow Up Time: Routine    kyree bina  [address] [facility] [address] [address]  Phone: [phone]  Fax: (   )    -  Follow Up Time: Routine    Francis Parnell  Gastroenterology  [address] [place] [address]  Phone: [phone]  Fax: [phone]  Follow Up Time: Routine

## 2024-07-11 NOTE — PROGRESS NOTE ADULT - REASON FOR ADMISSION
fever + chills

## 2024-07-11 NOTE — DISCHARGE NOTE PROVIDER - HOSPITAL COURSE
HPI:  77yo 46kg f w pmh hld, ra, gastroparesis, pancreatic adenoca s/p whipple + chemo + rt, rcc s/p l nephrectomy, p/w fever + chills; in er, meeting severe sepsis criteria; unclear source; admit to medicine for further mgmt (07 Jul 2024 23:24)    Hospital Course:  #Severe sepsis, Gram Neg Bacteremia   - Pt febrile, tachycardic, bandemia, + lactic acidosis (resolved); meets severe sepsis criteria  - CT C w/ scarring, bronchiectasis in RU and RML similar to 2021 per read, 1.2 CM Cavitary lesion in LLL, 7mm RLL nodule, TONIA nodules, calcified granulomas / mediastinal and hilar LN, advanced t12 compression deformity - read as stable   - CT A/P w/ liver lesion, mild pneumobilia (read as similar to prior), S/P partial resection of pancreas w/ stent in midportion of duodenum of pancreatobiliary limb, S/P L sided nephrectomy, S/P Whipple  - S/P 2 L NS + vanc and zosyn in ER  - RVP negative  - Procal elevated, down-trending   - BCx2 w/ GNR, E. Coli, S to Rocephin, F/u repeat BCx2 w/ NGTD; F/u final   - C/w Rocephin / Flagyl for now for ABX per ID - will be discharge on cefpodoxime and flagyl until 07/19    #Cavitary Lung Lesion    Plan: Cavitary and mixed solid/ground glass lesions in the bilateral lower lobes   -?Septic emboli. TTE with no vegetations noted.   -Abx as per ID  - AFB x 3 negative, d/c isolation.  -Suggest repeat CT chest non contrast 7-10 days from prior, if no change would consider bronchoscopy at that point.    #Macrocytic anemia, Thrombocytopenia  - Anemia labs noted  - Heme/Onc eval     #HyperNa  - Na level with improvement to WNL. Continue to monitor and trend  - PO hydration as tolerated    #Pancreatic Adenocarcinoma.   - H/O panc adenoca s/p whipple + adjuvant chemo + rt  - CT C/A/P as above   - On Pancrelipase     #Rheumatoid arthritis.   - Home methotrexate on hold for now  - Outpatient follow up     #Pre-DM  - A1C of 5.9   - Diet and lifestyle modifications  - Repeat A1C as an outpatient     Important Medication Changes and Reason:  Home methotrexate on hold   Active or Pending Issues Requiring Follow-up:  Cavitary Lung Lesion f/u with pcp   Rheumatoid arthritis f/u with pcp   Advanced Directives:   [ x] Full code  [ ] DNR  [ ] Hospice    Discharge Diagnoses:  Pancreatic Adenocarcinoma.   Cavitary Lung Lesion  Gram Neg Bacteremia   Rheumatoid arthritis  Pre-DM  Macrocytic anemia        Discharge/Dispo/Med rec discussed with attending  ____. Patient medically cleared for discharge ____ with outpatient follow up with PCP,___,____.

## 2024-07-11 NOTE — PROGRESS NOTE ADULT - NS ATTEND AMEND GEN_ALL_CORE FT
Agree with above assessment and plan. Discharge today to follow up with Dr Scanlon in clinic.
Pt care and plan discussed and reviewed with PA. Plan as outlined above edited by me to reflect our discussion. Advanced care planning/advanced directives discussed with patient/family. DNR status including forceful chest compressions to attempt to restart the heart, ventilator support/artificial breathing, electric shock, artificial nutrition, health care proxy, Molst form all discussed with pt. More than 50% of the visit was spent counseling and/or coordinating care by the attending physician.
management of bacteremia, and source ongoing  could be due to recent GE, ID following  TB less likely, will r/o  Continue otherwise current management per the above plan

## 2024-07-11 NOTE — PROGRESS NOTE ADULT - PROBLEM SELECTOR PLAN 1
Cavitary and mixed solid/groundglass lesions in the bilateral lower lobes   -?Septic emboli. TTE with no vegetations noted.   -Abx as per ID  -Doubt TB. AFB x 3 negative, d/c isolation.  -Suggest repeat CT chest non contrast 7-10 days from prior, if no change would consider bronchoscopy at that point.
Cavitary and mixed solid/groundglass lesions in the bilateral lower lobes   -?Septic emboli  -Check TTE   -Abx as per ID  -Doubt TB. AFB cultures x 3 per ID. Pt with no sputum production - sodium chloride 7% q8h x3 PRN ordered for sputum induction.   -Suggest repeat CT chest non contrast in 7-10 days, if no change would consider bronchoscopy at that point.
febrile, tachycardic, bandemia, + lactic acidosis (resolved); meets severe sepsis criteria  pan imaging with no clear source  s/p 2 L ns + vanc and zosyn in ER  follow up blood culture, procal, full rvp   Monitor for fever, changes in white count  broad spec empirical abx therapy w zosyn for now  ivf resusci + lytes as needed.    gram negative bacteremia  ID eval called   repeat Cx in AM  Pulm eval called
Cavitary and mixed solid/groundglass lesions in the bilateral lower lobes   -?Septic emboli  -Check TTE   -Abx as per ID  -Doubt TB. AFB cultures x 3 per ID. Pt able to produce sputum with sputum induction. AFB x 2 sent, 1 more specimen to be sent. F/u AFB results.  -Suggest repeat CT chest non contrast in 7-10 days, if no change would consider bronchoscopy at that point.

## 2024-07-11 NOTE — DISCHARGE NOTE PROVIDER - NSDCCPCAREPLAN_GEN_ALL_CORE_FT
PRINCIPAL DISCHARGE DIAGNOSIS  Diagnosis: Fever  Assessment and Plan of Treatment: You were found to have a fever, heart rate was elevated, bandemia noted on labs, + lactic acidosis (resolved); meeting severe sepsis criteria.   - CT C w/ scarring, bronchiectasis in RU and RML similar to 2021 per read, 1.2 CM Cavitary lesion in LLL, 7mm RLL nodule, TONIA nodules, calcified granulomas / mediastinal and hilar LN, advanced t12 compression deformity - read as stable   - CT A/P w/ liver lesion, mild pneumobilia (read as similar to prior), S/P partial resection of pancreas w/ stent in midportion of duodenum of pancreatobiliary limb, S/P L sided nephrectomy, S/P Whipple  - You received IV antibiotics and will be switched to po pills to complete treatment until 07/19/24.      SECONDARY DISCHARGE DIAGNOSES  Diagnosis: Cavitary lesion of lung  Assessment and Plan of Treatment: You were found to have Cavitary and mixed solid/ground glass lesions in the bilateral lower lobes    TTE with no vegetations noted.   -Antibiotics as per ID  - AFB x 3 negative, isolation was d/c'd  -Suggest repeat CT chest non contrast 7-10 days from prior    Diagnosis: Rheumatoid arthritis  Assessment and Plan of Treatment: Your methrotrexate is on hold. You will follow up outpatient .    Diagnosis: Pancreatic adenocarcinoma  Assessment and Plan of Treatment: You have history of pancreatic adenocarcinoma s/p whipple + adjuvant chemo + rt  - CT A/P w/ liver lesion, mild pneumobilia (read as similar to prior), S/P partial resection of pancreas w/ stent in midportion of duodenum of pancreatobiliary limb, S/P L sided nephrectomy, S/P Whipple  - On Pancrelipase    Diagnosis: Pre-existing type 2 diabetes mellitus  Assessment and Plan of Treatment: You were found to have Pre-Diabetes, A1C of 5.9 , Diet and lifestyle modifications  - Repeat A1C as an outpatient

## 2024-07-11 NOTE — DISCHARGE NOTE NURSING/CASE MANAGEMENT/SOCIAL WORK - NSDCPEFALRISK_GEN_ALL_CORE
For information on Fall & Injury Prevention, visit: [email or web address] OR  [email or web address] OR  https://www.cdc.gov/steadi/patient.html

## 2024-07-11 NOTE — DISCHARGE NOTE PROVIDER - PROVIDER TOKENS
PROVIDER:[TOKEN:[[id number]],FOLLOWUP:[Routine]],PROVIDER:[TOKEN:[[id number]],FOLLOWUP:[Routine]] PROVIDER:[TOKEN:[[id number]],FOLLOWUP:[Routine]],PROVIDER:[TOKEN:[[id number]],FOLLOWUP:[Routine]],FREE:[LAST:[kyree],FIRST:[bina],PHONE:[phone]],FAX:[(   )    -],ADDRESS:[[address] [place] [address]],FOLLOWUP:[Routine]],PROVIDER:[TOKEN:[[id number]],FOLLOWUP:[Routine]]

## 2024-07-11 NOTE — PROGRESS NOTE ADULT - PROBLEM SELECTOR PLAN 3
h/o panc adenoca s/p whipple + adjuvant chemo + rt  outpatient surg onc documentation reviewed  current imaging showing, "...Areas of scarring architectural distortion with subpleural-based thickening, and bronchiectasis within the right upper and right middle lobes is similar to exam from 9/13/2021. 1.2 cm cavitary lesion in the left lower lobe is similar to exam from 2/27/2024, but new from 9/13/2021. 7 mm mixed solid/groundglass nodule in the right lower lobe is new from 9/13/2021.Two mixed solid and groundglass lesions in the left upper lobe are new from 9/13/2021, the largest of which measures 6 mm"  [facility] consult called
by hx  -Management per primary team.

## 2024-07-11 NOTE — PROGRESS NOTE ADULT - ASSESSMENT
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, rcc s/p l nephrectomy, p/w fever + chills; in er, meeting severe sepsis criteria; unclear source; admit to medicine for further mgmt       Severe sepsis, Gram Neg Bacteremia   - Pt febrile, tachycardic, bandemia, + lactic acidosis (resolved); meets severe sepsis criteria  - CT C w/ scarring, bronchiectasis in RU and RML similar to 2021 per read, 1.2 CM Cavitary lesion in LLL, 7mm RLL nodule, TONIA nodules, calcified granulomas / mediastinal and hilar LN, advanced t12 compression deformity - read as stable   - CT A/P w/ liver lesion, mild pneumobilia (read as similar to prior), S/P partial resection of pancreas w/ stent in midportion of duodenum of pancreatobiliary limb, S/P L sided nephrectomy, S/P Whipple  - S/P 2 L NS + vanc and zosyn in ER  - RVP negative  - Procal elevated, down-trending   - BCx2 w/ GNR, E. Coli, S to Rocephin, F/u repeat BCx2 w/ NGTD; F/u final   - C/w Rocephin / Flagyl for now for ABX per ID   - Trend CBC, temp curve, VS and monitor patient  - Per Surgery - stent can be removed endoscopically if waranted   - Per GI - C/w ABX   - ID, GI and Surgery evals appreciated; F/u recs      Cavitary Lung Lesion   - CT as above  - C/w Rocephin and Flagyl  - Pending AFB sputum culture X 3 per ID --> Hypertonic saline to induce sputum as patient without sputum   - Isolation as per protocol to R/O TB -> AFB X 3 negative   - TTE  w/ EF 60 %, Trace pericardial effusion (Repeat TTE as an outpatient advised)  - Repeat CT chest in 7-10 days as per pulm, if unchanged, patient will require bronchoscopy   - Pulm and ID evals appreciated; F/u recs    Macrocytic anemia, Thrombocytopenia  - Monitor and trend Hgb.   - Anemia labs noted  - Maintain active T/S; Transfuse for Hgb < 7.0, Plt < 10K or < 50 K w/ bleeding/ procedures   - Heme/Onc eval appreciated; F/u recs     HyperNa  - Na level with improvement to WNL. Continue to monitor and trend  - PO hydration as tolerated  - Avoid overcorrection > 6-8 mEq in 24 hours    Pancreatic Adenocarcinoma.   - H/O panc adenoca s/p whipple + adjuvant chemo + rt  - CT C/A/P as above   - On Pancrelipase   - Heme/Onc eval appreciated; F/u recs     Rheumatoid arthritis.   - Home methotrexate on hold for now  - Outpatient follow up     Pre-DM  - A1C of 5.9   - Diet and lifestyle modifications  - Repeat A1C as an outpatient     PPX      Discussed with Attending and ACP.    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, rcc s/p l nephrectomy, p/w fever + chills; in er, meeting severe sepsis criteria; unclear source; admit to medicine for further mgmt       Severe sepsis, Gram Neg Bacteremia   - Pt febrile, tachycardic, bandemia, + lactic acidosis (resolved); meets severe sepsis criteria  - CT C w/ scarring, bronchiectasis in RU and RML similar to 2021 per read, 1.2 CM Cavitary lesion in LLL, 7mm RLL nodule, TONIA nodules, calcified granulomas / mediastinal and hilar LN, advanced t12 compression deformity - read as stable   - CT A/P w/ liver lesion, mild pneumobilia (read as similar to prior), S/P partial resection of pancreas w/ stent in midportion of duodenum of pancreatobiliary limb, S/P L sided nephrectomy, S/P Whipple  - S/P 2 L NS + vanc and zosyn  - RVP negative  - Procal elevated, down-trending   - BCx2 w/ GNR, E. Coli, S to Rocephin, F/u repeat BCx2 w/ NGTD; F/u final   - C/w Rocephin / Flagyl for now for ABX per ID   - Trend CBC, temp curve, VS and monitor patient  - Per Surgery - stent can be removed endoscopically if waranted   - Per GI - C/w ABX   - ID, GI and Surgery evals appreciated; F/u recs      Cavitary Lung Lesion   - CT as above  - C/w Rocephin and Flagyl  - Pending AFB sputum culture X 3 per ID --> Hypertonic saline to induce sputum as patient without sputum   - Isolation as per protocol to R/O TB -> AFB X 3 negative   - TTE  w/ EF 60 %, Trace pericardial effusion (Repeat TTE as an outpatient advised)  - Repeat CT chest in 7-10 days as per pulm, if unchanged, patient will require bronchoscopy   - Pulm and ID evals appreciated; F/u recs    Macrocytic anemia, Thrombocytopenia  - Monitor and trend Hgb.   - Anemia labs noted  - Maintain active T/S; Transfuse for Hgb < 7.0, Plt < 10K or < 50 K w/ bleeding/ procedures   - Heme/Onc eval appreciated; F/u recs     HyperNa  - Na level with improvement to WNL. Continue to monitor and trend  - PO hydration as tolerated  - Avoid overcorrection > 6-8 mEq in 24 hours    Pancreatic Adenocarcinoma.   - H/O panc adenoca s/p whipple + adjuvant chemo + rt  - CT C/A/P as above   - On Pancrelipase   - Heme/Onc eval appreciated; F/u recs     Rheumatoid arthritis.   - Home methotrexate on hold for now  - Outpatient follow up     Pre-DM  - A1C of 5.9   - Diet and lifestyle modifications  - Repeat A1C as an outpatient     PPX      Discussed with Attending and ACP.

## 2024-07-11 NOTE — DISCHARGE NOTE NURSING/CASE MANAGEMENT/SOCIAL WORK - NSDCVIVACCINE_GEN_ALL_CORE_FT
Tdap; 11-Apr-2015 07:16; Dasia Barajas (RN); [employer]; [id number]; IntraMuscular; Deltoid Left.; 0.5 milliLiter(s); VIS (VIS Published: 09-May-2013, VIS Presented: 11-Apr-2015);

## 2024-07-11 NOTE — PROGRESS NOTE ADULT - ASSESSMENT
77yo F h/o RA, Gastroparesis, Panncreatic Adenocarcinoma s/p whipple, PD stent, Chemo, Radiation, s/p L Nephrectomy and HLD p/w fever and chills found to be septic.     1. Gram (-) bacteremia   CT appreciated; pneumobilia noted on CT expected post whipple  no evidence of biliary obstruction, liver enzymes and bilirubin in normal range   no indication for ERCP at this time   abx per id  ID and surgery recs appreciated     2. Pancreatic cancer Stg III  s/p whipple, chemo and radiation     3. Cavitary lesion of lung  AFB x 3 negative, doubt TB  per ID and Pulm         The plan of care was discussed with the physician assistant and modifications were made to the notation where appropriate. Differential diagnosis and plan of care discussed with patient after the evaluation  35 minutes spent on total encounter of which more than fifty percent of the encounter was spent counseling and/or coordinating care by the attending physician.    [facility]  Leon Schofield D.O.  [address] [place] [address]

## 2024-07-11 NOTE — PROGRESS NOTE ADULT - ASSESSMENT
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    Pancreatic cancer stage III, pancytopenia  - Follows with Dr. Scanlon. S/p Whipple, s/p adjuvant FOLFIRINOX, (has received 11 doses so far), has pancytopenia since the chemo and RT.  - As of her last visit on 5/2024, Observe and monitor from pancreatic cancer stand point.   - Baseline hgb 8-10, platelets 100-130. F/u iron studies, B12, folate  - 06/27/2024 CT CAP: Slight interval increase in size of lower lobe pulmonary nodules partially included on prior abdominal imaging, the largest of which measures 1.3 cm in diameter and demonstrates central cavitation in the left lower lobe. These findings are suspicious for worsening pulmonary metastatic disease. Continued close attention on follow-up recommended. Postsurgical changes of Whipple procedure without discrete evidence for new or worsening metastasis in the abdomen or pelvis.   - Recommend transfusion for hemoglobin < 7, platelets < 10k or < 50k w/ bleeding    Bacteremia, R/o TB  - Positive blood cultures w/ E. coli. Continues antibiotics, f/u recommendations per ID/pulm  - CT CAP: Cavitary and mixed solid/groundglass lesions in the bilateral lower lobes are new/increased in size from 2/27/2024 and not visualized on CT from 9/13/2021. Etiology of these nodules is unclear. However, in the setting of known   malignancy, may represent metastatic disease. Infectious or inflammatory etiology also considered. Consider nonemergent PET/CT and short-term imaging follow-up in 3 months is advised.  - Per pulm, unlikely TB, recommend repeat CT chest in 7-10 days, plan for bronch if no improvement noted on repeat imaging  - Per ID, bacteremia likely GI source  - GI consulted, per team pneumobilia noted on CT expected post whipple. No evidence of biliary obstruction, liver enzymes and bilirubin in normal range, per GI no indication for ERCP at this time     Discharge planning in progress  Will continue to follow.    Elsa Ramos NP  Hematology/ Oncology  [facility]  [phone] (office)  [phone] (alt office)  Evenings and weekends please call MD on call or office

## 2024-07-11 NOTE — PROGRESS NOTE ADULT - SUBJECTIVE AND OBJECTIVE BOX
Name of Patient : SUZY SILVA  MRN: [id number]  Date of visit: 07-11-24 @ 17:21      Subjective: Patient seen and examined. No new events except as noted.   Patient seen earlier this AM. Sitting OOB TC. Reports feeling better. Denies cough, congestion or dyspnea.  S/P TTE. AFB negative     REVIEW OF SYSTEMS:    CONSTITUTIONAL: Generalized weakness; Afebrile   EYES/ENT: No visual changes;  No vertigo or throat pain   NECK: No pain or stiffness  RESPIRATORY: No cough, wheezing, hemoptysis; No shortness of breath  CARDIOVASCULAR: No chest pain or palpitations  GASTROINTESTINAL: No abdominal or epigastric pain. No nausea, vomiting, or hematemesis; No diarrhea or constipation. No melena or hematochezia.  GENITOURINARY: No dysuria, frequency or hematuria  NEUROLOGICAL: No numbness or weakness  SKIN: No itching, burning, rashes, or lesions   All other review of systems is negative unless indicated above.    MEDICATIONS:  MEDICATIONS  (STANDING):  cefTRIAXone   IVPB 1000 milliGRAM(s) IV Intermittent every 24 hours  chlorhexidine 2% Cloths 1 Application(s) Topical daily  dextrose 5% + sodium chloride 0.45%. 1000 milliLiter(s) (75 mL/Hr) IV Continuous <Continuous>  metroNIDAZOLE    Tablet 500 milliGRAM(s) Oral every 12 hours  pancrelipase  (CREON 36,000 Lipase Units) 2 Capsule(s) Oral three times a day with meals  sodium chloride 0.9%. 1000 milliLiter(s) (100 mL/Hr) IV Continuous <Continuous>      PHYSICAL EXAM:  T(C): 36.7 (07-11-24 @ 12:44), Max: 37.2 (07-11-24 @ 00:02)  HR: 68 (07-11-24 @ 12:44) (68 - 88)  BP: 131/73 (07-11-24 @ 12:44) (115/72 - 150/80)  RR: 18 (07-11-24 @ 12:44) (18 - 18)  SpO2: 98% (07-11-24 @ 12:44) (98% - 98%)  Wt(kg): --  I&O's Summary    10 Jul 2024 07:01  -  11 Jul 2024 07:00  --------------------------------------------------------  IN: 640 mL / OUT: 0 mL / NET: 640 mL    11 Jul 2024 07:01  -  11 Jul 2024 17:21  --------------------------------------------------------  IN: 720 mL / OUT: 0 mL / NET: 720 mL            Appearance: Awake, sitting OOB TC 	  HEENT:  Eyes are open   Lymphatic: No lymphadenopathy grossly   Cardiovascular: Normal    Respiratory: normal effort , clear  Gastrointestinal:  Soft, Non-tender  Skin: No rashes,  warm to touch  Psychiatry:  Mood & affect appropriate  Musculoskeletal: No edema      07-10-24 @ 07:01  -  07-11-24 @ 07:00  --------------------------------------------------------  IN: 640 mL / OUT: 0 mL / NET: 640 mL    07-11-24 @ 07:01  -  07-11-24 @ 17:21  --------------------------------------------------------  IN: 720 mL / OUT: 0 mL / NET: 720 mL                          9.1    2.77  )-----------( 111      ( 11 Jul 2024 07:24 )             28.9               07-11    138  |  107  |  11  ----------------------------<  107<H>  4.4   |  22  |  0.74    Ca    9.4      11 Jul 2024 07:20  Phos  3.1     07-11  Mg     2.1     07-11    TPro  6.5  /  Alb  3.6  /  TBili  0.3  /  DBili  x   /  AST  23  /  ALT  31  /  AlkPhos  80  07-11                       Urinalysis Basic - ( 11 Jul 2024 07:20 )    Color: x / Appearance: x / SG: x / pH: x  Gluc: 107 mg/dL / Ketone: x  / Bili: x / Urobili: x   Blood: x / Protein: x / Nitrite: x   Leuk Esterase: x / RBC: x / WBC x   Sq Epi: x / Non Sq Epi: x / Bacteria: x            Culture - Acid Fast - Sputum w/Smear . (07.10.24 @ 13:52)   Specimen Source: .Sputum Sputum  Acid Fast Bacilli Smear:   No acid-fast bacilli seen by fluorochrome stain    Culture - Acid Fast - Sputum w/Smear . (07.09.24 @ 23:48)   Specimen Source: .Sputum Sputum  Acid Fast Bacilli Smear:   No acid-fast bacilli seen by fluorochrome stain    Culture - Acid Fast - Sputum w/Smear . (07.09.24 @ 15:48)   Specimen Source: .Sputum Sputum  Acid Fast Bacilli Smear:   No acid-fast bacilli seen by fluorochrome stain  Culture Results:   Culture is being performed.    Culture - Blood (07.09.24 @ 06:40)   Specimen Source: .Blood Blood  Culture Results:   No growth at 48 Hours    Culture - Blood (07.09.24 @ 06:40)   Specimen Source: .Blood Blood  Culture Results:   No growth at 48 Hours    < from: TTE W or WO Ultrasound Enhancing Agent (07.10.24 @ 12:09) >  CONCLUSIONS:      1. Left ventricular systolic function is normal with an ejection fraction of 60 % by Green's method of disks.   2. The right ventricle is not well visualized. normal right ventricular systolic function. Tricuspid annular plane systolic excursion (TAPSE) is 2.6 cm (normal >=1.7 cm).   3. Normal left and right atrial size.   4. No significant valvular disease.   5. Trace pericardial effusion noted adjacent to the anterior right ventricle withno evidence of hemodynamic compromise (or echocardiographic evidence of cardiac tamponade).    < end of copied text >

## 2024-07-11 NOTE — PROGRESS NOTE ADULT - PROBLEM SELECTOR PLAN 4
by hx  -Management per heme/onc
by hx  -Management per heme/onc
hold home mtx for now in lieu of underlying infection
by hx  -Management per heme/onc

## 2024-07-11 NOTE — PROGRESS NOTE ADULT - ASSESSMENT
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 BCX + for E.coli, CT chest with cavitary and mixed solid/groundglass lesions in the bilateral lower lobes. Pt placed on isolation for r/o TB on admission. Pt reportedly with hx of positive PPD as a student in [place], She reports no treatment ever for latent TB. She also reports bronchoscopy about 10 years ago to r/o TB (which was reportedly negative) prior to initiating treatment for her RA. She reports receiving Humira >10 years ago. Denies any close contact with anyone with active TB.     Antimicrobials:  - CTX 1 gm Q24H   - PO Flagyl 500 mg Q12H     # E. coli blood stream infection likely GI source  # Cavitary and mixed solid/groundglass lesions in the bilateral lower lobes in an immunosuppressed patient. (Infectious vs TB vs worsening malignancy)  # Leucopenia       Assessment:    #E.coli Bacteremia   BCX 4/4 + for E.Coli   CT A/P showing Mild pneumobilia is similar to prior exam. Has a stent on imaging (unsure of when it was placed)      #Cavitary lung lesion   -Per pt, she has a hx of PPD in the past and is s/p bronchoscopy about 10 years ago to r/o TB (which was reportedly negative) prior to initiating treatment for her RA. She did not get treated for latent TB.  - Quantiferon TB gold- negative      Recommendations:  -Bacteremia likely GI source, s/p GI consult, pt with pancreatic duct stent, no intervention recommended.  -continue Ceftriaxone 1g QD and flagyl PO 500mg Q12 bacteremia and cover anaerobes   -With her cavitary lesion and hx of + PPD, TB r/o , AFB sputum smear x3 negative, off iolation  -pt denies ever being treated for latent TB, discussed with her will benefit from getting latent TB therapy.   - TTE noted   - repeat blood cultures, NTD  - trend WBC, no leucocytosis   - can transition to po cefpodoxime 200 mg q12h with flagyl 500 mg q12h to complete therapy until 7/19/24.     Plan discussed with Medicine ACP     Mark Soria  Please contact through MS Teams   If no response or past 5 pm/weekend call [phone].

## 2024-07-11 NOTE — PROGRESS NOTE ADULT - SUBJECTIVE AND OBJECTIVE BOX
Patient is a 78y old  Female who presents with a chief complaint of fever + chills (11 Jul 2024 14:14)    Patient seen and examined  No overnight events  No new complaints offered    MEDICATIONS  (STANDING):  cefTRIAXone   IVPB 1000 milliGRAM(s) IV Intermittent every 24 hours  chlorhexidine 2% Cloths 1 Application(s) Topical daily  dextrose 5% + sodium chloride 0.45%. 1000 milliLiter(s) (75 mL/Hr) IV Continuous <Continuous>  metroNIDAZOLE    Tablet 500 milliGRAM(s) Oral every 12 hours  pancrelipase  (CREON 36,000 Lipase Units) 2 Capsule(s) Oral three times a day with meals  sodium chloride 0.9%. 1000 milliLiter(s) (100 mL/Hr) IV Continuous <Continuous>    MEDICATIONS  (PRN):  acetaminophen     Tablet .. 650 milliGRAM(s) Oral every 6 hours PRN Temp greater or equal to 38C (100.4F), Mild Pain (1 - 3)  aluminum hydroxide/magnesium hydroxide/simethicone Suspension 30 milliLiter(s) Oral every 4 hours PRN Dyspepsia  melatonin 3 milliGRAM(s) Oral at bedtime PRN Insomnia  ondansetron Injectable 4 milliGRAM(s) IV Push every 8 hours PRN Nausea and/or Vomiting      Vital Signs Last 24 Hrs  T(C): 36.7 (11 Jul 2024 12:44), Max: 37.2 (11 Jul 2024 00:02)  T(F): 98.1 (11 Jul 2024 12:44), Max: 98.9 (11 Jul 2024 00:02)  HR: 68 (11 Jul 2024 12:44) (68 - 88)  BP: 131/73 (11 Jul 2024 12:44) (115/72 - 150/80)  BP(mean): --  RR: 18 (11 Jul 2024 12:44) (18 - 18)  SpO2: 98% (11 Jul 2024 12:44) (98% - 98%)    Parameters below as of 11 Jul 2024 12:44  Patient On (Oxygen Delivery Method): room air        PE  Awake, alert  Anicteric  RRR  CTAB  Abd soft, NT, ND  No c/c                      9.1    2.77  )-----------( 111      ( 11 Jul 2024 07:24 )             28.9       07-11    138  |  107  |  11  ----------------------------<  107<H>  4.4   |  22  |  0.74    Ca    9.4      11 Jul 2024 07:20  Phos  3.1     07-11  Mg     2.1     07-11    TPro  6.5  /  Alb  3.6  /  TBili  0.3  /  DBili  x   /  AST  23  /  ALT  31  /  AlkPhos  80  07-11

## 2024-07-11 NOTE — PROGRESS NOTE ADULT - SUBJECTIVE AND OBJECTIVE BOX
78yPatient is a 78y old  Female who presents with a chief complaint of fever + chills (11 Jul 2024 17:21)      Interval history:  Afebrile, feeling well.     Allergies:   No Known Allergies    Antimicrobials:  cefTRIAXone   IVPB 1000 milliGRAM(s) IV Intermittent every 24 hours  metroNIDAZOLE    Tablet 500 milliGRAM(s) Oral every 12 hours      REVIEW OF SYSTEMS:  No chest pain   No SOB  No abdominal pain  No rash.         Vital Signs Last 24 Hrs  T(C): 36.7 (07-11-24 @ 12:44), Max: 37.2 (07-11-24 @ 00:02)  T(F): 98.1 (07-11-24 @ 12:44), Max: 98.9 (07-11-24 @ 00:02)  HR: 68 (07-11-24 @ 12:44) (68 - 88)  BP: 131/73 (07-11-24 @ 12:44) (115/72 - 150/80)  BP(mean): --  RR: 18 (07-11-24 @ 12:44) (18 - 18)  SpO2: 98% (07-11-24 @ 12:44) (98% - 98%)      PHYSICAL EXAM:  Pt in no acute distress, alert, awake.   breathing comfortably, eating   non distended abdomen  no edema LE   no phlebitis                             9.1    2.77  )-----------( 111      ( 11 Jul 2024 07:24 )             28.9   07-11    138  |  107  |  11  ----------------------------<  107<H>  4.4   |  22  |  0.74    Ca    9.4      11 Jul 2024 07:20  Phos  3.1     07-11  Mg     2.1     07-11    TPro  6.5  /  Alb  3.6  /  TBili  0.3  /  DBili  x   /  AST  23  /  ALT  31  /  AlkPhos  80  07-11      LIVER FUNCTIONS - ( 11 Jul 2024 07:20 )  Alb: 3.6 g/dL / Pro: 6.5 g/dL / ALK PHOS: 80 U/L / ALT: 31 U/L / AST: 23 U/L / GGT: x               Culture - Acid Fast - Sputum w/Smear (collected 10 Jul 2024 13:52)  Source: .Sputum Sputum    Culture - Acid Fast - Sputum w/Smear (collected 09 Jul 2024 23:48)  Source: .Sputum Sputum    Culture - Acid Fast - Sputum w/Smear (collected 09 Jul 2024 15:48)  Source: .Sputum Sputum  Preliminary Report (10 Jul 2024 23:09):    Culture is being performed.    Culture - Blood (collected 09 Jul 2024 06:40)  Source: .Blood Blood  Preliminary Report (11 Jul 2024 11:01):    No growth at 48 Hours    Culture - Blood (collected 09 Jul 2024 06:40)  Source: .Blood Blood  Preliminary Report (11 Jul 2024 11:01):    No growth at 48 Hours

## 2024-07-14 LAB
CULTURE RESULTS: SIGNIFICANT CHANGE UP
CULTURE RESULTS: SIGNIFICANT CHANGE UP
SPECIMEN SOURCE: SIGNIFICANT CHANGE UP
SPECIMEN SOURCE: SIGNIFICANT CHANGE UP

## 2024-09-19 ENCOUNTER — APPOINTMENT (OUTPATIENT)
Dept: SURGICAL ONCOLOGY | Facility: CLINIC | Age: 79
End: 2024-09-19
Payer: MEDICARE

## 2024-09-19 VITALS
DIASTOLIC BLOOD PRESSURE: 72 MMHG | BODY MASS INDEX: 17.42 KG/M2 | HEART RATE: 81 BPM | SYSTOLIC BLOOD PRESSURE: 150 MMHG | HEIGHT: 64 IN | WEIGHT: 102 LBS | OXYGEN SATURATION: 99 %

## 2024-09-19 DIAGNOSIS — C25.9 MALIGNANT NEOPLASM OF PANCREAS, UNSPECIFIED: ICD-10-CM

## 2024-09-19 PROBLEM — C64.9 MALIGNANT NEOPLASM OF UNSPECIFIED KIDNEY, EXCEPT RENAL PELVIS: Chronic | Status: ACTIVE | Noted: 2024-07-07

## 2024-09-19 PROBLEM — M06.9 RHEUMATOID ARTHRITIS, UNSPECIFIED: Chronic | Status: ACTIVE | Noted: 2024-07-07

## 2024-09-19 PROCEDURE — 99214 OFFICE O/P EST MOD 30 MIN: CPT

## 2024-09-19 NOTE — HISTORY OF PRESENT ILLNESS
[No] : no [Yes] : Patient received adjuvant chemotherapy [5-FU based] : 5-FU based [de-identified] : Lucas Milton is a pleasant 79-year-old female, primarily Uzbek speaking, who returns for follow up.   She was initially seen as an in-house consult at [facility] on 9/9/21.  She was sent into the ED by her doctor on 9/7/21 due to transaminitis and an abnormal MRCP showing a dilated PD and CBD. Patient reported poor appetite and unintentional 6 lb weight loss since July. She denied abdominal pain, fevers or chills. Reports that she noticed her urine was becoming darker and her skin was jaundiced.  Patient underwent an EUS on 9/9/21 which showed a hypoechoic lesion in the pancreatic head, not involving the portal vein, measuring 1.7 x 1.8 cm which was biopsied. No stents were placed given that bile was free-flowing.   Patient had a CT pancreatic protocol 9/11/21 that again showed a heterogeneously enhancing pancreatic head mass measuring 2.2 x 2.6 x 2.2 cm with a dilated PD to 1.2 cm and CBD of 1.9 cm.   **SURGERY** She is s/p diagnostic laparoscopy & Whipple on 9/17/21, path: moderately differentiated invasive pancreatic adenocarcinoma, 2.6 cm, margins negative, LVI and PNI present, 13/41 LN positive, pT2 pN2  Her past medical history includes rheumatoid arthritis (on MTX), HLD, appendectomy and left renal cell cancer s/p left nephrectomy (2019). She states her brother also had pancreatic cancer and states he had a lesion in the middle of his pancreas which was resected 5 years ago without need for chemotherapy and he is currently doing well. Patient states her last colonoscopy was 2 years ago and reportedly normal. She also had upper endoscopies every 2 years for "gastric emptying" problems.  She established care with Dr. Scanlon ([facility]) after mediport placement in , and has been on FOLFIRINOX  4/14/22- Mrs Milton has continued with FOLFIRINOX therapy and is undergoing cycle 8.  She has developed left ankle pain.  She reports that there was concern for a liver lesion seen on imaging.  She seems to be in good spirits.  Per Dr. Scanlon's note on 5/31/2022, Mrs Milton had a PET/CT on 5/23/2022 with findings of diffuse osseous activity likely related to marrow hyperproliferation secondary to recent chemo, otherwise no evidence of FDG malignant disease. Subcentimeter pulmonary nodular densities which are below the resolution of PET.  Ms. Milton has received 11 doses of FOLFIRINOX as of September 2022, she developed severe anemia & thrombocytopenia while on Xeloda + RT. Per Dr. Scanlon's 9/2022 visit, she had a CT that showed no recurrent or metastatic disease.   10/27/2022:  Mrs. Milton returns with her  to office today.  She is in good spirits.  She has completed systemic therapy and radiation.  She did develop anemia and thrombocytopenia but states that is improved.  Her scans have been without disease progression.  CT abdomen (for urology) in December 2022 with no evidence of recurrence   2/16/2023- Mrs. Milton returns for a follow-up, reports that her last visit with Dr. Scanlon was around 6 weeks ago and remains on surveillance and off adjuvant therapy. She reports intermittent abdominal pain but is unsure if it is prompted by eating particularly oily/greasy foods, reports regular bowels movements, slow weight loss of around 2-5 lbs over the last 3-4 months.   CT A/P 3/30/23: No interval change. s/p Whipple procedure. No CT evidence of local recurrence or metastatic disease in the abdomen or pelvis   4/17/2023- Mrs. Milton will follow up in 6 months. She will continue using the ZenPep.  CT A/P 10/5/2023 ([facility]) - postsurgical changes of prior Whipple w/ no findings of new or worsening malignancy recurrence - new moderate superior endplate compression fracture of the T12 vertebral body w/ a question of a nondisplaced fracture through the tip of the T11 spinous process. No discretely visualized underlying bony lesion on the current or prior exam, recommend correlation w/ interval trauma & associated back pain.   10/26/2023 - Mrs. Milton returns for ongoing follow-up, she continues close follow-up with Dr. Scanlon. She reports vague abdominal pain since finishing RT in 2022 that was exacerbated after she pulled a muscle in her back July of this year, since then her abdomen & back pain has been 10/10 -- she reports seeing a spine surgeon in the past but has not followed up with anyone recently regarding recent CT findings. She started Alendronate a couple months ago for her osteoporosis as well.  Mrs. Milton will follow up in 6 months.  She will follow up with her spine surgeon she has seen in the past.  CT A/P 2/27/2024 (@ [facility]) - s/p Whipple w/ stable post op changes - no evidence of local tumor recurrence or metastatic disease within A/P   5/2/2024 - Lucas returns for ongoing follow-up, she continues to do well. She maintain close follow-up with medon (Dr. Scanlon) & radon (Dr. Sherman). Lucas will follow-up in 6 months, she will continue close follow-up with medical oncology, and repeat surveillance imaging.  CT C/A/P ([facility]) 6/27/2024 - Slight interval increase in size of lower lobe pulmonary nodules partially included on prior abdominal imaging, the largest of which measures 1.3 cm in diameter and demonstrates central cavitation in the left lower lobe -> findings are suspicious for worsening pulmonary metastatic disease.  Continued close attention on follow-up recommended. - Postsurgical changes of Whipple procedure without discrete evidence for new or worsening metastasis in the abdomen or pelvis  Lucas was admitted to [facility] w/ sepsis in from 7/7-7/11/2024, treated with ABT and recovered well.   CT C/A/P 7/7/2024 ([facility] ED) - cavitary & mixed solid/groundglass lesions in the lower lungs are new/increased in size from 2/2024 - s/p Whipple, no evidence of bowel obstruction - mild pneumobilia similar to prior   9/19/2024 - Lucas returns for a follow-up visit, accompanied by her son & . She is doing well overall but does currently note some epigastric discomfort that is intermittent at times - usually relieved with Zenpep and ambulation, she has not done either today. Since discharge from the hospital, she's been feeling well overall, has not had her repeat CT of the chest but it was ordered by Dr. Scanlon last week.    [Recurrence of disease] : no recurrence of disease [TextBox_4] : 7/28/2022 [Was adjuvant therapy delayed due to surgical complications?] : Adjuvant therapy was not delayed due to surgical complications [TextBox_17] : FOLFIRINOX

## 2024-09-19 NOTE — ASSESSMENT
[FreeTextEntry1] :  Mrs. Milton will get a follow up CT Chest in the near future.  She is scheduled to follow-up with Dr. Scanlon in early October.  She will follow-up with us in 6 months.  She will also follow-up with Dr. Prado regarding her antihypertensive medications.  She will also see GI regarding her epigastric discomfort.   All medical entries were at my, Dr. Joseph Rainey, direction. I have reviewed the chart and agree that the record accurately reflects my personal performance of the history, physical exam, assessment, and plan.  Our office nurse practitioner was present for the duration of the office visit.

## 2024-09-19 NOTE — CONSULT LETTER
[Dear  ___] : Dear  [unfilled], [Consult Letter:] : I had the pleasure of evaluating your patient, [unfilled]. [Please see my note below.] : Please see my note below. [Consult Closing:] : Thank you very much for allowing me to participate in the care of this patient.  If you have any questions, please do not hesitate to contact me. [Sincerely,] : Sincerely, [DrEverton  ___] : Dr. TRIPATHI [DrEverton ___] : Dr. TRIPATHI [FreeTextEntry2] : Joesph Prado MD  [FreeTextEntry3] : Joseph Rainey MD\par  Surgical Oncology\par  [facility]\par  Office: [phone]\par  Cell: [phone]\par

## 2024-09-19 NOTE — PHYSICAL EXAM
[Normal] : supple, no neck mass and thyroid not enlarged [Normal Neck Lymph Nodes] : normal neck lymph nodes  [Normal Supraclavicular Lymph Nodes] : normal supraclavicular lymph nodes [Normal Groin Lymph Nodes] : normal groin lymph nodes [Normal Axillary Lymph Nodes] : normal axillary lymph nodes [Normal] : oriented to person, place and time, with appropriate affect [de-identified] : incision well healed.  No hernia

## 2024-09-19 NOTE — HISTORY OF PRESENT ILLNESS
[No] : no [Yes] : Patient received adjuvant chemotherapy [5-FU based] : 5-FU based [de-identified] : Lucas Milton is a pleasant 79-year-old female, primarily Yakut speaking, who returns for follow up.   She was initially seen as an in-house consult at [facility] on 9/9/21.  She was sent into the ED by her doctor on 9/7/21 due to transaminitis and an abnormal MRCP showing a dilated PD and CBD. Patient reported poor appetite and unintentional 6 lb weight loss since July. She denied abdominal pain, fevers or chills. Reports that she noticed her urine was becoming darker and her skin was jaundiced.  Patient underwent an EUS on 9/9/21 which showed a hypoechoic lesion in the pancreatic head, not involving the portal vein, measuring 1.7 x 1.8 cm which was biopsied. No stents were placed given that bile was free-flowing.   Patient had a CT pancreatic protocol 9/11/21 that again showed a heterogeneously enhancing pancreatic head mass measuring 2.2 x 2.6 x 2.2 cm with a dilated PD to 1.2 cm and CBD of 1.9 cm.   **SURGERY** She is s/p diagnostic laparoscopy & Whipple on 9/17/21, path: moderately differentiated invasive pancreatic adenocarcinoma, 2.6 cm, margins negative, LVI and PNI present, 13/41 LN positive, pT2 pN2  Her past medical history includes rheumatoid arthritis (on MTX), HLD, appendectomy and left renal cell cancer s/p left nephrectomy (2019). She states her brother also had pancreatic cancer and states he had a lesion in the middle of his pancreas which was resected 5 years ago without need for chemotherapy and he is currently doing well. Patient states her last colonoscopy was 2 years ago and reportedly normal. She also had upper endoscopies every 2 years for "gastric emptying" problems.  She established care with Dr. Scanlon ([facility]) after mediport placement in , and has been on FOLFIRINOX  4/14/22- Mrs Milton has continued with FOLFIRINOX therapy and is undergoing cycle 8.  She has developed left ankle pain.  She reports that there was concern for a liver lesion seen on imaging.  She seems to be in good spirits.  Per Dr. Scanlon's note on 5/31/2022, Mrs Milton had a PET/CT on 5/23/2022 with findings of diffuse osseous activity likely related to marrow hyperproliferation secondary to recent chemo, otherwise no evidence of FDG malignant disease. Subcentimeter pulmonary nodular densities which are below the resolution of PET.  Ms. Milton has received 11 doses of FOLFIRINOX as of September 2022, she developed severe anemia & thrombocytopenia while on Xeloda + RT. Per Dr. Scanlon's 9/2022 visit, she had a CT that showed no recurrent or metastatic disease.   10/27/2022:  Mrs. Milton returns with her  to office today.  She is in good spirits.  She has completed systemic therapy and radiation.  She did develop anemia and thrombocytopenia but states that is improved.  Her scans have been without disease progression.  CT abdomen (for urology) in December 2022 with no evidence of recurrence   2/16/2023- Mrs. Milton returns for a follow-up, reports that her last visit with Dr. Scanlon was around 6 weeks ago and remains on surveillance and off adjuvant therapy. She reports intermittent abdominal pain but is unsure if it is prompted by eating particularly oily/greasy foods, reports regular bowels movements, slow weight loss of around 2-5 lbs over the last 3-4 months.   CT A/P 3/30/23: No interval change. s/p Whipple procedure. No CT evidence of local recurrence or metastatic disease in the abdomen or pelvis   4/17/2023- Mrs. Milton will follow up in 6 months. She will continue using the ZenPep.  CT A/P 10/5/2023 ([facility]) - postsurgical changes of prior Whipple w/ no findings of new or worsening malignancy recurrence - new moderate superior endplate compression fracture of the T12 vertebral body w/ a question of a nondisplaced fracture through the tip of the T11 spinous process. No discretely visualized underlying bony lesion on the current or prior exam, recommend correlation w/ interval trauma & associated back pain.   10/26/2023 - Mrs. Milton returns for ongoing follow-up, she continues close follow-up with Dr. Scanlon. She reports vague abdominal pain since finishing RT in 2022 that was exacerbated after she pulled a muscle in her back July of this year, since then her abdomen & back pain has been 10/10 -- she reports seeing a spine surgeon in the past but has not followed up with anyone recently regarding recent CT findings. She started Alendronate a couple months ago for her osteoporosis as well.  Mrs. Milton will follow up in 6 months.  She will follow up with her spine surgeon she has seen in the past.  CT A/P 2/27/2024 (@ [facility]) - s/p Whipple w/ stable post op changes - no evidence of local tumor recurrence or metastatic disease within A/P   5/2/2024 - Lucas returns for ongoing follow-up, she continues to do well. She maintain close follow-up with medon (Dr. Scanlon) & radon (Dr. Sherman). Lucas will follow-up in 6 months, she will continue close follow-up with medical oncology, and repeat surveillance imaging.  CT C/A/P ([facility]) 6/27/2024 - Slight interval increase in size of lower lobe pulmonary nodules partially included on prior abdominal imaging, the largest of which measures 1.3 cm in diameter and demonstrates central cavitation in the left lower lobe -> findings are suspicious for worsening pulmonary metastatic disease.  Continued close attention on follow-up recommended. - Postsurgical changes of Whipple procedure without discrete evidence for new or worsening metastasis in the abdomen or pelvis  Lucas was admitted to [facility] w/ sepsis in from 7/7-7/11/2024, treated with ABT and recovered well.   CT C/A/P 7/7/2024 ([facility] ED) - cavitary & mixed solid/groundglass lesions in the lower lungs are new/increased in size from 2/2024 - s/p Whipple, no evidence of bowel obstruction - mild pneumobilia similar to prior   9/19/2024 - Lucas returns for a follow-up visit, accompanied by her son & . She is doing well overall but does currently note some epigastric discomfort that is intermittent at times - usually relieved with Zenpep and ambulation, she has not done either today. Since discharge from the hospital, she's been feeling well overall, has not had her repeat CT of the chest but it was ordered by Dr. Scanlon last week.    [Recurrence of disease] : no recurrence of disease [TextBox_4] : 7/28/2022 [Was adjuvant therapy delayed due to surgical complications?] : Adjuvant therapy was not delayed due to surgical complications [TextBox_17] : FOLFIRINOX

## 2024-09-19 NOTE — CONSULT LETTER
[Dear  ___] : Dear  [unfilled], [Consult Letter:] : I had the pleasure of evaluating your patient, [unfilled]. [Please see my note below.] : Please see my note below. [Consult Closing:] : Thank you very much for allowing me to participate in the care of this patient.  If you have any questions, please do not hesitate to contact me. [Sincerely,] : Sincerely, [DrEverton  ___] : Dr. TRIPATHI [DrEverton ___] : Dr. TRIPATHI [FreeTextEntry2] : Joesph Prado MD  [FreeTextEntry3] : Joseph Rainey MD\par  Surgical Oncology\par  [facility]\par  Office: [phone]\par  Cell: [phone]\par

## 2024-09-19 NOTE — PHYSICAL EXAM
[Normal] : supple, no neck mass and thyroid not enlarged [Normal Neck Lymph Nodes] : normal neck lymph nodes  [Normal Supraclavicular Lymph Nodes] : normal supraclavicular lymph nodes [Normal Groin Lymph Nodes] : normal groin lymph nodes [Normal Axillary Lymph Nodes] : normal axillary lymph nodes [Normal] : oriented to person, place and time, with appropriate affect [de-identified] : incision well healed.  No hernia

## 2024-10-04 PROBLEM — C25.9 MALIGNANT NEOPLASM OF PANCREAS, UNSPECIFIED: Chronic | Status: ACTIVE | Noted: 2024-07-07

## 2024-10-04 PROBLEM — K31.84 GASTROPARESIS: Chronic | Status: ACTIVE | Noted: 2024-07-07

## 2024-10-04 PROBLEM — E78.5 HYPERLIPIDEMIA, UNSPECIFIED: Chronic | Status: ACTIVE | Noted: 2024-07-07

## 2024-10-08 ENCOUNTER — APPOINTMENT (OUTPATIENT)
Dept: THORACIC SURGERY | Facility: CLINIC | Age: 79
End: 2024-10-08
Payer: MEDICARE

## 2024-10-08 ENCOUNTER — APPOINTMENT (OUTPATIENT)
Dept: PULMONOLOGY | Facility: CLINIC | Age: 79
End: 2024-10-08

## 2024-10-08 VITALS
WEIGHT: 102 LBS | OXYGEN SATURATION: 99 % | HEART RATE: 74 BPM | BODY MASS INDEX: 17.42 KG/M2 | RESPIRATION RATE: 18 BRPM | SYSTOLIC BLOOD PRESSURE: 175 MMHG | HEIGHT: 64 IN | DIASTOLIC BLOOD PRESSURE: 90 MMHG

## 2024-10-08 DIAGNOSIS — Z01.818 ENCOUNTER FOR OTHER PREPROCEDURAL EXAMINATION: ICD-10-CM

## 2024-10-08 PROCEDURE — 94729 DIFFUSING CAPACITY: CPT

## 2024-10-08 PROCEDURE — 94726 PLETHYSMOGRAPHY LUNG VOLUMES: CPT

## 2024-10-08 PROCEDURE — 94010 BREATHING CAPACITY TEST: CPT

## 2024-10-08 PROCEDURE — 99204 OFFICE O/P NEW MOD 45 MIN: CPT

## 2024-10-11 ENCOUNTER — APPOINTMENT (OUTPATIENT)
Dept: NUCLEAR MEDICINE | Facility: IMAGING CENTER | Age: 79
End: 2024-10-11

## 2024-10-11 ENCOUNTER — OUTPATIENT (OUTPATIENT)
Dept: OUTPATIENT SERVICES | Facility: HOSPITAL | Age: 79
LOS: 1 days | End: 2024-10-11
Payer: MEDICARE

## 2024-10-11 DIAGNOSIS — Z90.49 ACQUIRED ABSENCE OF OTHER SPECIFIED PARTS OF DIGESTIVE TRACT: Chronic | ICD-10-CM

## 2024-10-11 DIAGNOSIS — C25.9 MALIGNANT NEOPLASM OF PANCREAS, UNSPECIFIED: ICD-10-CM

## 2024-10-11 DIAGNOSIS — R91.8 OTHER NONSPECIFIC ABNORMAL FINDING OF LUNG FIELD: ICD-10-CM

## 2024-10-11 DIAGNOSIS — Z90.5 ACQUIRED ABSENCE OF KIDNEY: Chronic | ICD-10-CM

## 2024-10-11 DIAGNOSIS — Z90.410 ACQUIRED TOTAL ABSENCE OF PANCREAS: Chronic | ICD-10-CM

## 2024-10-11 LAB
M TB IFN-G BLD-IMP: NEGATIVE
QUANTIFERON TB PLUS MITOGEN MINUS NIL: 0.76 IU/ML
QUANTIFERON TB PLUS NIL: 0.04 IU/ML
QUANTIFERON TB PLUS TB1 MINUS NIL: 0 IU/ML
QUANTIFERON TB PLUS TB2 MINUS NIL: 0 IU/ML

## 2024-10-11 PROCEDURE — 78815 PET IMAGE W/CT SKULL-THIGH: CPT

## 2024-10-11 PROCEDURE — 78815 PET IMAGE W/CT SKULL-THIGH: CPT | Mod: 26,PI

## 2024-10-11 PROCEDURE — A9552: CPT

## 2024-10-15 ENCOUNTER — APPOINTMENT (OUTPATIENT)
Dept: THORACIC SURGERY | Facility: CLINIC | Age: 79
End: 2024-10-15
Payer: MEDICARE

## 2024-10-15 VITALS
SYSTOLIC BLOOD PRESSURE: 128 MMHG | DIASTOLIC BLOOD PRESSURE: 65 MMHG | BODY MASS INDEX: 17.07 KG/M2 | OXYGEN SATURATION: 96 % | WEIGHT: 100 LBS | HEART RATE: 87 BPM | RESPIRATION RATE: 17 BRPM | HEIGHT: 64 IN

## 2024-10-15 DIAGNOSIS — R91.8 OTHER NONSPECIFIC ABNORMAL FINDING OF LUNG FIELD: ICD-10-CM

## 2024-10-15 PROCEDURE — G2211 COMPLEX E/M VISIT ADD ON: CPT

## 2024-10-15 PROCEDURE — 99214 OFFICE O/P EST MOD 30 MIN: CPT

## 2024-10-15 RX ORDER — AMLODIPINE BESYLATE 5 MG/1
5 TABLET ORAL
Refills: 0 | Status: ACTIVE | COMMUNITY

## 2024-11-08 ENCOUNTER — OUTPATIENT (OUTPATIENT)
Dept: OUTPATIENT SERVICES | Facility: HOSPITAL | Age: 79
LOS: 1 days | End: 2024-11-08

## 2024-11-08 VITALS
TEMPERATURE: 97 F | HEIGHT: 61 IN | DIASTOLIC BLOOD PRESSURE: 73 MMHG | HEART RATE: 79 BPM | WEIGHT: 102.07 LBS | SYSTOLIC BLOOD PRESSURE: 125 MMHG | RESPIRATION RATE: 16 BRPM | OXYGEN SATURATION: 99 %

## 2024-11-08 DIAGNOSIS — R91.8 OTHER NONSPECIFIC ABNORMAL FINDING OF LUNG FIELD: ICD-10-CM

## 2024-11-08 DIAGNOSIS — Z98.890 OTHER SPECIFIED POSTPROCEDURAL STATES: Chronic | ICD-10-CM

## 2024-11-08 DIAGNOSIS — Z90.49 ACQUIRED ABSENCE OF OTHER SPECIFIED PARTS OF DIGESTIVE TRACT: Chronic | ICD-10-CM

## 2024-11-08 DIAGNOSIS — Z90.5 ACQUIRED ABSENCE OF KIDNEY: Chronic | ICD-10-CM

## 2024-11-08 DIAGNOSIS — Z92.3 PERSONAL HISTORY OF IRRADIATION: Chronic | ICD-10-CM

## 2024-11-08 DIAGNOSIS — I10 ESSENTIAL (PRIMARY) HYPERTENSION: ICD-10-CM

## 2024-11-08 DIAGNOSIS — Z92.21 PERSONAL HISTORY OF ANTINEOPLASTIC CHEMOTHERAPY: Chronic | ICD-10-CM

## 2024-11-08 DIAGNOSIS — Z90.410 ACQUIRED TOTAL ABSENCE OF PANCREAS: Chronic | ICD-10-CM

## 2024-11-08 LAB
ALBUMIN SERPL ELPH-MCNC: 4 G/DL — SIGNIFICANT CHANGE UP (ref 3.3–5)
ALP SERPL-CCNC: 68 U/L — SIGNIFICANT CHANGE UP (ref 40–120)
ALT FLD-CCNC: 18 U/L — SIGNIFICANT CHANGE UP (ref 4–33)
ANION GAP SERPL CALC-SCNC: 11 MMOL/L — SIGNIFICANT CHANGE UP (ref 7–14)
AST SERPL-CCNC: 29 U/L — SIGNIFICANT CHANGE UP (ref 4–32)
BILIRUB SERPL-MCNC: 0.3 MG/DL — SIGNIFICANT CHANGE UP (ref 0.2–1.2)
BLD GP AB SCN SERPL QL: NEGATIVE — SIGNIFICANT CHANGE UP
BUN SERPL-MCNC: 15 MG/DL — SIGNIFICANT CHANGE UP (ref 7–23)
CALCIUM SERPL-MCNC: 9.5 MG/DL — SIGNIFICANT CHANGE UP (ref 8.4–10.5)
CHLORIDE SERPL-SCNC: 106 MMOL/L — SIGNIFICANT CHANGE UP (ref 98–107)
CO2 SERPL-SCNC: 25 MMOL/L — SIGNIFICANT CHANGE UP (ref 22–31)
CREAT SERPL-MCNC: 0.8 MG/DL — SIGNIFICANT CHANGE UP (ref 0.5–1.3)
EGFR: 74 ML/MIN/1.73M2 — SIGNIFICANT CHANGE UP
GLUCOSE SERPL-MCNC: 181 MG/DL — HIGH (ref 70–99)
HCT VFR BLD CALC: 29.4 % — LOW (ref 34.5–45)
HGB BLD-MCNC: 9.6 G/DL — LOW (ref 11.5–15.5)
MCHC RBC-ENTMCNC: 32.7 G/DL — SIGNIFICANT CHANGE UP (ref 32–36)
MCHC RBC-ENTMCNC: 33.7 PG — SIGNIFICANT CHANGE UP (ref 27–34)
MCV RBC AUTO: 103.2 FL — HIGH (ref 80–100)
PLATELET # BLD AUTO: 152 K/UL — SIGNIFICANT CHANGE UP (ref 150–400)
POTASSIUM SERPL-MCNC: 4.7 MMOL/L — SIGNIFICANT CHANGE UP (ref 3.5–5.3)
POTASSIUM SERPL-SCNC: 4.7 MMOL/L — SIGNIFICANT CHANGE UP (ref 3.5–5.3)
PROT SERPL-MCNC: 7.1 G/DL — SIGNIFICANT CHANGE UP (ref 6–8.3)
RBC # BLD: 2.85 M/UL — LOW (ref 3.8–5.2)
RBC # FLD: 15.9 % — HIGH (ref 10.3–14.5)
RH IG SCN BLD-IMP: POSITIVE — SIGNIFICANT CHANGE UP
RH IG SCN BLD-IMP: POSITIVE — SIGNIFICANT CHANGE UP
SODIUM SERPL-SCNC: 142 MMOL/L — SIGNIFICANT CHANGE UP (ref 135–145)
WBC # BLD: 2.98 K/UL — LOW (ref 3.8–10.5)
WBC # FLD AUTO: 2.98 K/UL — LOW (ref 3.8–10.5)

## 2024-11-08 RX ORDER — OMEPRAZOLE 10 MG
1 CAPSULE,DELAYED RELEASE (ENTERIC COATED) ORAL
Refills: 0 | DISCHARGE

## 2024-11-08 RX ORDER — PANCRELIPASE 16800; 98400; 56800 [USP'U]/1; [USP'U]/1; [USP'U]/1
1 CAPSULE, DELAYED RELEASE ORAL
Refills: 0 | DISCHARGE

## 2024-11-08 RX ORDER — AMLODIPINE BESYLATE 10 MG
1 TABLET ORAL
Refills: 0 | DISCHARGE

## 2024-11-08 RX ORDER — METHOTREXATE SODIUM/PF 25 MG/ML
5 VIAL (ML) INJECTION
Refills: 0 | DISCHARGE

## 2024-11-08 NOTE — H&P PST ADULT - PROBLEM SELECTOR PLAN 3
Pre-op Delirium Screening Questionnaire:    Patient eligible for jase risk screen age>75? Yes (if >75 then done)  Health care proxy paperwork given to patient? Yes (all patients should be given the packet to fill out at home and return on day of surgery to pre-op RN)    Impaired mobility (ie: uses cane, walker, wheelchair, or assist device)? no  Known dementia diagnosis? no  Impaired functional status (METS<4)? no  Malnutrition BMI<20? Yes    Surgeon notified via email regarding screening results.

## 2024-11-08 NOTE — H&P PST ADULT - NSANTHGENDERRD_ENT_A_CORE
Goal Outcome Evaluation:  Plan of Care Reviewed With: patient        Progress: improving  Outcome Evaluation: Resident alert, oriented, able to make needs known to staff. Transfers with SBA and cane to bathroom. Medicated for prn pain x2, effective. Treatment done to knee and rt thumb as ordered, Resident tolerated well. Sling to rt arm whil up. Resident is requesting to go home Saturday. Rehab meeting in am, where they will discuss patient's progress. Spouse at BS during am hours. Resident conversant with spouse and staff. Resident pleasant and cooperative.          No

## 2024-11-08 NOTE — H&P PST ADULT - NSICDXPASTSURGICALHX_GEN_ALL_CORE_FT
PAST SURGICAL HISTORY:  H/O left nephrectomy     H/O therapeutic radiation     H/O Whipple procedure     History of chemotherapy     History of vascular access device     S/P appendectomy 1969

## 2024-11-08 NOTE — H&P PST ADULT - NSICDXPASTMEDICALHX_GEN_ALL_CORE_FT
PAST MEDICAL HISTORY:  Anemia     Arthritis rheumatoid    Gastroparesis     HLD (hyperlipidemia)     HTN (hypertension)     Lung nodules     Pancreatic cancer     Renal cell carcinoma     Rheumatoid arthritis

## 2024-11-08 NOTE — H&P PST ADULT - NSHP PST SURGERY DATE_DT_GEN_A_CORE
[FreeTextEntry1] : POCT glucose testing and Hemoglobin A1c was carried out today given diabetes diagnosis. Blood will be drawn in office today.  This note was written by Kiana Duke on 07/22/2024 acting as medical scribe for Dr. Santo Chou. I, Dr. Santo Chou, have read and attest that all the information, medical decision making and discharge instructions within are true and accurate.
15-Nov-2024

## 2024-11-08 NOTE — H&P PST ADULT - PRIMARY CARE PROVIDER
Joesph Rashid  PCP  [phone]                                                                                                                                                                                                    Dr Bright urology [phone]

## 2024-11-08 NOTE — H&P PST ADULT - HISTORY OF PRESENT ILLNESS
80 y/o female, never a smoker, w/ hx of HTN HLD RA (on methotrexate) left renal carcinoma (left partial nephrectomy 2019) pancreatic Cancer (s/p diagnostic laparoscopy & Whipple on 9/17/21 w/ Dr. Rainey), s/p 11 cycles of FOLFIRINOX (9/22) as well as RT (8/22), hospitalized for sepsis 7/2024, workup imaging demonstrating Cavitary & mixed solid/groundglass lesions in the lower lungs are new/increased in size from 2/2024. Pt is scheduled for flexible bronchoscopy left video assisted thoracoscopy left upper lobe wedge resection left lower lobe resection possible basal segmentectomy possible lower lobectomy for 11/15/24  ?

## 2024-11-08 NOTE — H&P PST ADULT - PROBLEM SELECTOR PLAN 1
Patient tentatively scheduled for flexible bronchoscopy left video assisted thoracoscopy left upper lobe wedge resection left lower lobe resection possible basal segmentectomy possible lower lobectomy for 11/15/24. Pre-op instructions provided. Pt given verbal and written instructions with teach back on chlorhexidine shampoo and pepcid. Pt verbalized understanding with return demonstration.     PST CBC T&S ABO CMP done    Cardiac evaluation reviewed in chart (10/15/24)  "She is at a low level cardiac risk for this surgery"

## 2024-11-08 NOTE — H&P PST ADULT - GENERAL GENITOURINARY SYMPTOMS
h/o left renal cell carcinoma, partial left nephrectomy 2019, f/u with Dr Bright  12/2023 ([facility]) CT done 10/2023 Neg for recurrence

## 2024-11-08 NOTE — H&P PST ADULT - LAST ECHOCARDIOGRAM
9/2024 mild AI normal LVEF (as per cardiology evaluation). She had an event monitor in 11/2023 that showed rare PVCs

## 2024-11-08 NOTE — H&P PST ADULT - PROBLEM/PLAN-2
pt's PMD is dr. Laurie Barraza (geriatrics) arranged apt for July 24 th at 9am.  Pt's son states he can take her to apt
DISPLAY PLAN FREE TEXT

## 2024-11-09 LAB
BASOPHILS # BLD AUTO: 0.03 K/UL — SIGNIFICANT CHANGE UP (ref 0–0.2)
BASOPHILS NFR BLD AUTO: 0.9 % — SIGNIFICANT CHANGE UP (ref 0–2)
EOSINOPHIL # BLD AUTO: 0.05 K/UL — SIGNIFICANT CHANGE UP (ref 0–0.5)
EOSINOPHIL NFR BLD AUTO: 1.7 % — SIGNIFICANT CHANGE UP (ref 0–6)
LYMPHOCYTES # BLD AUTO: 0.88 K/UL — LOW (ref 1–3.3)
LYMPHOCYTES # BLD AUTO: 29.6 % — SIGNIFICANT CHANGE UP (ref 13–44)
MONOCYTES # BLD AUTO: 0.26 K/UL — SIGNIFICANT CHANGE UP (ref 0–0.9)
MONOCYTES NFR BLD AUTO: 8.7 % — SIGNIFICANT CHANGE UP (ref 2–14)
NEUTROPHILS # BLD AUTO: 1.76 K/UL — LOW (ref 1.8–7.4)
NEUTROPHILS NFR BLD AUTO: 59.1 % — SIGNIFICANT CHANGE UP (ref 43–77)

## 2024-11-15 ENCOUNTER — TRANSCRIPTION ENCOUNTER (OUTPATIENT)
Age: 79
End: 2024-11-15

## 2024-11-15 ENCOUNTER — APPOINTMENT (OUTPATIENT)
Dept: THORACIC SURGERY | Facility: HOSPITAL | Age: 79
End: 2024-11-15

## 2024-11-15 ENCOUNTER — INPATIENT (INPATIENT)
Facility: HOSPITAL | Age: 79
LOS: 1 days | Discharge: HOME | End: 2024-11-17
Attending: THORACIC SURGERY (CARDIOTHORACIC VASCULAR SURGERY) | Admitting: THORACIC SURGERY (CARDIOTHORACIC VASCULAR SURGERY)
Payer: MEDICARE

## 2024-11-15 VITALS
HEIGHT: 61 IN | OXYGEN SATURATION: 100 % | SYSTOLIC BLOOD PRESSURE: 151 MMHG | RESPIRATION RATE: 16 BRPM | HEART RATE: 79 BPM | TEMPERATURE: 98 F | DIASTOLIC BLOOD PRESSURE: 78 MMHG | WEIGHT: 102.07 LBS

## 2024-11-15 DIAGNOSIS — R91.8 OTHER NONSPECIFIC ABNORMAL FINDING OF LUNG FIELD: ICD-10-CM

## 2024-11-15 DIAGNOSIS — Z92.21 PERSONAL HISTORY OF ANTINEOPLASTIC CHEMOTHERAPY: Chronic | ICD-10-CM

## 2024-11-15 DIAGNOSIS — Z90.49 ACQUIRED ABSENCE OF OTHER SPECIFIED PARTS OF DIGESTIVE TRACT: Chronic | ICD-10-CM

## 2024-11-15 DIAGNOSIS — Z92.3 PERSONAL HISTORY OF IRRADIATION: Chronic | ICD-10-CM

## 2024-11-15 DIAGNOSIS — Z98.890 OTHER SPECIFIED POSTPROCEDURAL STATES: Chronic | ICD-10-CM

## 2024-11-15 DIAGNOSIS — Z90.5 ACQUIRED ABSENCE OF KIDNEY: Chronic | ICD-10-CM

## 2024-11-15 DIAGNOSIS — Z90.410 ACQUIRED TOTAL ABSENCE OF PANCREAS: Chronic | ICD-10-CM

## 2024-11-15 LAB
NIGHT BLUE STAIN TISS: SIGNIFICANT CHANGE UP
SPECIMEN SOURCE: SIGNIFICANT CHANGE UP

## 2024-11-15 PROCEDURE — 88305 TISSUE EXAM BY PATHOLOGIST: CPT | Mod: 26

## 2024-11-15 PROCEDURE — 88331 PATH CONSLTJ SURG 1 BLK 1SPC: CPT | Mod: 26

## 2024-11-15 PROCEDURE — 88307 TISSUE EXAM BY PATHOLOGIST: CPT | Mod: 26

## 2024-11-15 PROCEDURE — 88360 TUMOR IMMUNOHISTOCHEM/MANUAL: CPT | Mod: 26

## 2024-11-15 PROCEDURE — 99233 SBSQ HOSP IP/OBS HIGH 50: CPT

## 2024-11-15 PROCEDURE — 88309 TISSUE EXAM BY PATHOLOGIST: CPT | Mod: 26

## 2024-11-15 PROCEDURE — 32669 THORACOSCOPY REMOVE SEGMENT: CPT | Mod: LT

## 2024-11-15 PROCEDURE — 71045 X-RAY EXAM CHEST 1 VIEW: CPT | Mod: 26

## 2024-11-15 PROCEDURE — 32674 THORACOSCOPY LYMPH NODE EXC: CPT

## 2024-11-15 DEVICE — CHEST DRAIN THORACIC ARGYLE PVC 20FR STRAIGHT: Type: IMPLANTABLE DEVICE | Site: LEFT | Status: FUNCTIONAL

## 2024-11-15 DEVICE — STAPLER ETHICON GST ECHELON 45MM GREEN RELOAD: Type: IMPLANTABLE DEVICE | Site: LEFT | Status: FUNCTIONAL

## 2024-11-15 DEVICE — STAPLER ETHICON ECHELON ENDOPATH GRIP SURFACE 45MM BLACK RELOAD: Type: IMPLANTABLE DEVICE | Site: LEFT | Status: FUNCTIONAL

## 2024-11-15 DEVICE — SURGICEL FIBRILLAR 2 X 4": Type: IMPLANTABLE DEVICE | Site: LEFT | Status: FUNCTIONAL

## 2024-11-15 DEVICE — CLIP APPLIER COVIDIEN ENDOCLIP III 5MM: Type: IMPLANTABLE DEVICE | Site: LEFT | Status: FUNCTIONAL

## 2024-11-15 DEVICE — VISTASEAL FIBRIN HUMAN 10ML: Type: IMPLANTABLE DEVICE | Site: LEFT | Status: FUNCTIONAL

## 2024-11-15 DEVICE — STAPLER ETHICON ECHELON ENDOPATH VASCULAR 35MM WHITE RELOAD: Type: IMPLANTABLE DEVICE | Site: LEFT | Status: FUNCTIONAL

## 2024-11-15 RX ORDER — HEPARIN SODIUM 1000 [USP'U]/ML
2500 INJECTION INTRAVENOUS; SUBCUTANEOUS EVERY 12 HOURS
Refills: 0 | Status: DISCONTINUED | OUTPATIENT
Start: 2024-11-15 | End: 2024-11-17

## 2024-11-15 RX ORDER — NALOXONE HYDROCHLORIDE 0.4 MG/ML
0.1 INJECTION, SOLUTION INTRAMUSCULAR; INTRAVENOUS; SUBCUTANEOUS
Refills: 0 | Status: DISCONTINUED | OUTPATIENT
Start: 2024-11-15 | End: 2024-11-16

## 2024-11-15 RX ORDER — LIPASE/PROTEASE/AMYLASE 10K-37.5K
1 CAPSULE,DELAYED RELEASE (ENTERIC COATED) ORAL
Refills: 0 | Status: DISCONTINUED | OUTPATIENT
Start: 2024-11-15 | End: 2024-11-17

## 2024-11-15 RX ORDER — TETRACAINE HYDROCHLORIDE 5 MG/ML
1 SOLUTION OPHTHALMIC ONCE
Refills: 0 | Status: DISCONTINUED | OUTPATIENT
Start: 2024-11-15 | End: 2024-11-15

## 2024-11-15 RX ORDER — SODIUM CHLORIDE 9 G/1000ML
1000 INJECTION, SOLUTION INTRAVENOUS
Refills: 0 | Status: DISCONTINUED | OUTPATIENT
Start: 2024-11-15 | End: 2024-11-16

## 2024-11-15 RX ORDER — HYDROMORPHONE/SOD CHLOR,ISO/PF 2 MG/10 ML
0.5 SYRINGE (ML) INJECTION
Refills: 0 | Status: DISCONTINUED | OUTPATIENT
Start: 2024-11-15 | End: 2024-11-16

## 2024-11-15 RX ORDER — LIDOCAINE HYDROCHLORIDE 20 MG/ML
1 JELLY TOPICAL AT BEDTIME
Refills: 0 | Status: DISCONTINUED | OUTPATIENT
Start: 2024-11-15 | End: 2024-11-17

## 2024-11-15 RX ORDER — HYDROMORPHONE/SOD CHLOR,ISO/PF 2 MG/10 ML
30 SYRINGE (ML) INJECTION
Refills: 0 | Status: DISCONTINUED | OUTPATIENT
Start: 2024-11-15 | End: 2024-11-16

## 2024-11-15 RX ORDER — FOLIC ACID 1 MG/1
1 TABLET ORAL DAILY
Refills: 0 | Status: DISCONTINUED | OUTPATIENT
Start: 2024-11-15 | End: 2024-11-17

## 2024-11-15 RX ORDER — AMLODIPINE BESYLATE 10 MG/1
5 TABLET ORAL DAILY
Refills: 0 | Status: DISCONTINUED | OUTPATIENT
Start: 2024-11-15 | End: 2024-11-15

## 2024-11-15 RX ORDER — SODIUM CHLORIDE 9 G/1000ML
1000 INJECTION, SOLUTION INTRAVENOUS
Refills: 0 | Status: DISCONTINUED | OUTPATIENT
Start: 2024-11-15 | End: 2024-11-15

## 2024-11-15 RX ORDER — METOCLOPRAMIDE HCL 10 MG
10 TABLET ORAL EVERY 8 HOURS
Refills: 0 | Status: COMPLETED | OUTPATIENT
Start: 2024-11-15 | End: 2024-11-16

## 2024-11-15 RX ORDER — POLYETHYLENE GLYCOL 3350 17 G/17G
17 POWDER, FOR SOLUTION ORAL DAILY
Refills: 0 | Status: DISCONTINUED | OUTPATIENT
Start: 2024-11-15 | End: 2024-11-17

## 2024-11-15 RX ORDER — ACETAMINOPHEN 500 MG/5ML
650 LIQUID (ML) ORAL ONCE
Refills: 0 | Status: COMPLETED | OUTPATIENT
Start: 2024-11-15 | End: 2024-11-15

## 2024-11-15 RX ORDER — HEPARIN SODIUM 1000 [USP'U]/ML
2500 INJECTION INTRAVENOUS; SUBCUTANEOUS ONCE
Refills: 0 | Status: COMPLETED | OUTPATIENT
Start: 2024-11-15 | End: 2024-11-15

## 2024-11-15 RX ORDER — ONDANSETRON HCL/PF 4 MG/2 ML
4 VIAL (ML) INJECTION EVERY 6 HOURS
Refills: 0 | Status: DISCONTINUED | OUTPATIENT
Start: 2024-11-15 | End: 2024-11-17

## 2024-11-15 RX ORDER — ACETAMINOPHEN 500 MG/5ML
700 LIQUID (ML) ORAL EVERY 6 HOURS
Refills: 0 | Status: COMPLETED | OUTPATIENT
Start: 2024-11-15 | End: 2024-11-16

## 2024-11-15 RX ORDER — SENNA 187 MG
2 TABLET ORAL AT BEDTIME
Refills: 0 | Status: DISCONTINUED | OUTPATIENT
Start: 2024-11-15 | End: 2024-11-17

## 2024-11-15 RX ADMIN — Medication 4 MILLIGRAM(S): at 19:44

## 2024-11-15 RX ADMIN — HEPARIN SODIUM 2500 UNIT(S): 1000 INJECTION INTRAVENOUS; SUBCUTANEOUS at 23:00

## 2024-11-15 RX ADMIN — Medication 2 TABLET(S): at 22:58

## 2024-11-15 RX ADMIN — Medication 30 MILLILITER(S): at 19:30

## 2024-11-15 RX ADMIN — Medication 10 MILLIGRAM(S): at 22:59

## 2024-11-15 RX ADMIN — HEPARIN SODIUM 2500 UNIT(S): 1000 INJECTION INTRAVENOUS; SUBCUTANEOUS at 10:06

## 2024-11-15 RX ADMIN — Medication 30 MILLILITER(S): at 13:55

## 2024-11-15 RX ADMIN — Medication 1 DROP(S): at 17:25

## 2024-11-15 RX ADMIN — SODIUM CHLORIDE 30 MILLILITER(S): 9 INJECTION, SOLUTION INTRAVENOUS at 19:31

## 2024-11-15 RX ADMIN — Medication 1 DROP(S): at 23:03

## 2024-11-15 RX ADMIN — LIDOCAINE HYDROCHLORIDE 1 PATCH: 20 JELLY TOPICAL at 22:57

## 2024-11-15 RX ADMIN — Medication 1 CAPSULE(S): at 17:10

## 2024-11-15 RX ADMIN — Medication 280 MILLIGRAM(S): at 16:30

## 2024-11-15 RX ADMIN — Medication 1 APPLICATION(S): at 16:30

## 2024-11-15 RX ADMIN — Medication 650 MILLIGRAM(S): at 10:07

## 2024-11-16 ENCOUNTER — TRANSCRIPTION ENCOUNTER (OUTPATIENT)
Age: 79
End: 2024-11-16

## 2024-11-16 LAB
A1C WITH ESTIMATED AVERAGE GLUCOSE RESULT: 5.2 % — SIGNIFICANT CHANGE UP (ref 4–5.6)
ADD ON TEST-SPECIMEN IN LAB: SIGNIFICANT CHANGE UP
ANION GAP SERPL CALC-SCNC: 16 MMOL/L — HIGH (ref 7–14)
BUN SERPL-MCNC: 14 MG/DL — SIGNIFICANT CHANGE UP (ref 7–23)
CALCIUM SERPL-MCNC: 8.1 MG/DL — LOW (ref 8.4–10.5)
CHLORIDE SERPL-SCNC: 97 MMOL/L — LOW (ref 98–107)
CO2 SERPL-SCNC: 17 MMOL/L — LOW (ref 22–31)
CREAT SERPL-MCNC: 0.64 MG/DL — SIGNIFICANT CHANGE UP (ref 0.5–1.3)
EGFR: 89 ML/MIN/1.73M2 — SIGNIFICANT CHANGE UP
EGFR: 89 ML/MIN/1.73M2 — SIGNIFICANT CHANGE UP
ESTIMATED AVERAGE GLUCOSE: 103 — SIGNIFICANT CHANGE UP
GLUCOSE BLDC GLUCOMTR-MCNC: 105 MG/DL — HIGH (ref 70–99)
GLUCOSE BLDC GLUCOMTR-MCNC: 125 MG/DL — HIGH (ref 70–99)
GLUCOSE BLDC GLUCOMTR-MCNC: 171 MG/DL — HIGH (ref 70–99)
GLUCOSE BLDC GLUCOMTR-MCNC: 180 MG/DL — HIGH (ref 70–99)
GLUCOSE SERPL-MCNC: 245 MG/DL — HIGH (ref 70–99)
GRAM STN FLD: SIGNIFICANT CHANGE UP
HCT VFR BLD CALC: 27.3 % — LOW (ref 34.5–45)
HGB BLD-MCNC: 9.2 G/DL — LOW (ref 11.5–15.5)
MAGNESIUM SERPL-MCNC: 1.8 MG/DL — SIGNIFICANT CHANGE UP (ref 1.6–2.6)
MCHC RBC-ENTMCNC: 33.7 G/DL — SIGNIFICANT CHANGE UP (ref 32–36)
MCHC RBC-ENTMCNC: 34.2 PG — HIGH (ref 27–34)
MCV RBC AUTO: 101.5 FL — HIGH (ref 80–100)
MRSA PCR RESULT.: SIGNIFICANT CHANGE UP
NRBC # BLD AUTO: 0 K/UL — SIGNIFICANT CHANGE UP (ref 0–0)
NRBC # BLD: 0 /100 WBCS — SIGNIFICANT CHANGE UP (ref 0–0)
NRBC # FLD: 0 K/UL — SIGNIFICANT CHANGE UP (ref 0–0)
NRBC BLD-RTO: 0 /100 WBCS — SIGNIFICANT CHANGE UP (ref 0–0)
PHOSPHATE SERPL-MCNC: 2.8 MG/DL — SIGNIFICANT CHANGE UP (ref 2.5–4.5)
PLATELET # BLD AUTO: 121 K/UL — LOW (ref 150–400)
POTASSIUM SERPL-MCNC: 4.4 MMOL/L — SIGNIFICANT CHANGE UP (ref 3.5–5.3)
POTASSIUM SERPL-SCNC: 4.4 MMOL/L — SIGNIFICANT CHANGE UP (ref 3.5–5.3)
RBC # BLD: 2.69 M/UL — LOW (ref 3.8–5.2)
RBC # FLD: 14.2 % — SIGNIFICANT CHANGE UP (ref 10.3–14.5)
S AUREUS DNA NOSE QL NAA+PROBE: SIGNIFICANT CHANGE UP
SODIUM SERPL-SCNC: 130 MMOL/L — LOW (ref 135–145)
SPECIMEN SOURCE: SIGNIFICANT CHANGE UP
WBC # BLD: 6.09 K/UL — SIGNIFICANT CHANGE UP (ref 3.8–10.5)
WBC # FLD AUTO: 6.09 K/UL — SIGNIFICANT CHANGE UP (ref 3.8–10.5)

## 2024-11-16 PROCEDURE — 99233 SBSQ HOSP IP/OBS HIGH 50: CPT

## 2024-11-16 PROCEDURE — 71045 X-RAY EXAM CHEST 1 VIEW: CPT | Mod: 26

## 2024-11-16 RX ORDER — DEXTROSE 50 % IN WATER 50 %
12.5 SYRINGE (ML) INTRAVENOUS ONCE
Refills: 0 | Status: DISCONTINUED | OUTPATIENT
Start: 2024-11-16 | End: 2024-11-17

## 2024-11-16 RX ORDER — OXYCODONE HYDROCHLORIDE 30 MG/1
5 TABLET ORAL
Refills: 0 | Status: DISCONTINUED | OUTPATIENT
Start: 2024-11-16 | End: 2024-11-17

## 2024-11-16 RX ORDER — GLUCAGON 3 MG/1
1 POWDER NASAL ONCE
Refills: 0 | Status: DISCONTINUED | OUTPATIENT
Start: 2024-11-16 | End: 2024-11-17

## 2024-11-16 RX ORDER — INSULIN LISPRO 100 U/ML
INJECTION, SOLUTION INTRAVENOUS; SUBCUTANEOUS
Refills: 0 | Status: DISCONTINUED | OUTPATIENT
Start: 2024-11-16 | End: 2024-11-17

## 2024-11-16 RX ORDER — MAGNESIUM SULFATE 500 MG/ML
2 SYRINGE (ML) INJECTION ONCE
Refills: 0 | Status: COMPLETED | OUTPATIENT
Start: 2024-11-16 | End: 2024-11-16

## 2024-11-16 RX ORDER — DEXTROSE 50 % IN WATER 50 %
15 SYRINGE (ML) INTRAVENOUS ONCE
Refills: 0 | Status: DISCONTINUED | OUTPATIENT
Start: 2024-11-16 | End: 2024-11-17

## 2024-11-16 RX ORDER — DEXTROSE 50 % IN WATER 50 %
25 SYRINGE (ML) INTRAVENOUS ONCE
Refills: 0 | Status: DISCONTINUED | OUTPATIENT
Start: 2024-11-16 | End: 2024-11-17

## 2024-11-16 RX ORDER — INSULIN LISPRO 100 U/ML
INJECTION, SOLUTION INTRAVENOUS; SUBCUTANEOUS AT BEDTIME
Refills: 0 | Status: DISCONTINUED | OUTPATIENT
Start: 2024-11-16 | End: 2024-11-17

## 2024-11-16 RX ORDER — SODIUM CHLORIDE 9 G/1000ML
1000 INJECTION, SOLUTION INTRAVENOUS
Refills: 0 | Status: DISCONTINUED | OUTPATIENT
Start: 2024-11-16 | End: 2024-11-17

## 2024-11-16 RX ORDER — OXYCODONE HYDROCHLORIDE 30 MG/1
10 TABLET ORAL
Refills: 0 | Status: DISCONTINUED | OUTPATIENT
Start: 2024-11-16 | End: 2024-11-17

## 2024-11-16 RX ORDER — METOPROLOL SUCCINATE 50 MG/1
12.5 TABLET, EXTENDED RELEASE ORAL EVERY 12 HOURS
Refills: 0 | Status: DISCONTINUED | OUTPATIENT
Start: 2024-11-16 | End: 2024-11-17

## 2024-11-16 RX ADMIN — Medication 280 MILLIGRAM(S): at 11:42

## 2024-11-16 RX ADMIN — Medication 10 MILLIGRAM(S): at 14:12

## 2024-11-16 RX ADMIN — Medication 280 MILLIGRAM(S): at 00:15

## 2024-11-16 RX ADMIN — Medication 30 MILLILITER(S): at 07:28

## 2024-11-16 RX ADMIN — Medication 1 CAPSULE(S): at 11:41

## 2024-11-16 RX ADMIN — HEPARIN SODIUM 2500 UNIT(S): 1000 INJECTION INTRAVENOUS; SUBCUTANEOUS at 21:28

## 2024-11-16 RX ADMIN — Medication 1 CAPSULE(S): at 08:22

## 2024-11-16 RX ADMIN — Medication 1 CAPSULE(S): at 16:48

## 2024-11-16 RX ADMIN — OXYCODONE HYDROCHLORIDE 5 MILLIGRAM(S): 30 TABLET ORAL at 20:41

## 2024-11-16 RX ADMIN — LIDOCAINE HYDROCHLORIDE 1 PATCH: 20 JELLY TOPICAL at 07:57

## 2024-11-16 RX ADMIN — OXYCODONE HYDROCHLORIDE 5 MILLIGRAM(S): 30 TABLET ORAL at 12:32

## 2024-11-16 RX ADMIN — Medication 700 MILLIGRAM(S): at 12:35

## 2024-11-16 RX ADMIN — METOPROLOL SUCCINATE 12.5 MILLIGRAM(S): 50 TABLET, EXTENDED RELEASE ORAL at 19:27

## 2024-11-16 RX ADMIN — Medication 2 TABLET(S): at 21:29

## 2024-11-16 RX ADMIN — Medication 1 APPLICATION(S): at 12:33

## 2024-11-16 RX ADMIN — FOLIC ACID 1 MILLIGRAM(S): 1 TABLET ORAL at 11:41

## 2024-11-16 RX ADMIN — Medication 280 MILLIGRAM(S): at 06:12

## 2024-11-16 RX ADMIN — OXYCODONE HYDROCHLORIDE 5 MILLIGRAM(S): 30 TABLET ORAL at 13:00

## 2024-11-16 RX ADMIN — LIDOCAINE HYDROCHLORIDE 1 PATCH: 20 JELLY TOPICAL at 21:29

## 2024-11-16 RX ADMIN — Medication 1 DROP(S): at 02:01

## 2024-11-16 RX ADMIN — INSULIN LISPRO 1: 100 INJECTION, SOLUTION INTRAVENOUS; SUBCUTANEOUS at 07:50

## 2024-11-16 RX ADMIN — Medication 10 MILLIGRAM(S): at 06:08

## 2024-11-16 RX ADMIN — LIDOCAINE HYDROCHLORIDE 1 PATCH: 20 JELLY TOPICAL at 10:36

## 2024-11-16 RX ADMIN — Medication 25 GRAM(S): at 06:59

## 2024-11-16 RX ADMIN — SODIUM CHLORIDE 30 MILLILITER(S): 9 INJECTION, SOLUTION INTRAVENOUS at 07:56

## 2024-11-16 RX ADMIN — INSULIN LISPRO 1: 100 INJECTION, SOLUTION INTRAVENOUS; SUBCUTANEOUS at 11:43

## 2024-11-16 RX ADMIN — POLYETHYLENE GLYCOL 3350 17 GRAM(S): 17 POWDER, FOR SOLUTION ORAL at 11:41

## 2024-11-16 RX ADMIN — HEPARIN SODIUM 2500 UNIT(S): 1000 INJECTION INTRAVENOUS; SUBCUTANEOUS at 10:41

## 2024-11-16 RX ADMIN — OXYCODONE HYDROCHLORIDE 5 MILLIGRAM(S): 30 TABLET ORAL at 20:11

## 2024-11-17 VITALS
DIASTOLIC BLOOD PRESSURE: 90 MMHG | SYSTOLIC BLOOD PRESSURE: 132 MMHG | OXYGEN SATURATION: 97 % | HEART RATE: 104 BPM | TEMPERATURE: 98 F | RESPIRATION RATE: 20 BRPM

## 2024-11-17 LAB
ANION GAP SERPL CALC-SCNC: 9 MMOL/L — SIGNIFICANT CHANGE UP (ref 7–14)
BUN SERPL-MCNC: 18 MG/DL — SIGNIFICANT CHANGE UP (ref 7–23)
CALCIUM SERPL-MCNC: 8.7 MG/DL — SIGNIFICANT CHANGE UP (ref 8.4–10.5)
CHLORIDE SERPL-SCNC: 106 MMOL/L — SIGNIFICANT CHANGE UP (ref 98–107)
CO2 SERPL-SCNC: 24 MMOL/L — SIGNIFICANT CHANGE UP (ref 22–31)
CREAT SERPL-MCNC: 0.75 MG/DL — SIGNIFICANT CHANGE UP (ref 0.5–1.3)
EGFR: 80 ML/MIN/1.73M2 — SIGNIFICANT CHANGE UP
EGFR: 80 ML/MIN/1.73M2 — SIGNIFICANT CHANGE UP
GLUCOSE BLDC GLUCOMTR-MCNC: 110 MG/DL — HIGH (ref 70–99)
GLUCOSE SERPL-MCNC: 144 MG/DL — HIGH (ref 70–99)
HCT VFR BLD CALC: 28.2 % — LOW (ref 34.5–45)
HGB BLD-MCNC: 9.3 G/DL — LOW (ref 11.5–15.5)
MAGNESIUM SERPL-MCNC: 2.4 MG/DL — SIGNIFICANT CHANGE UP (ref 1.6–2.6)
MCHC RBC-ENTMCNC: 33 G/DL — SIGNIFICANT CHANGE UP (ref 32–36)
MCHC RBC-ENTMCNC: 33.2 PG — SIGNIFICANT CHANGE UP (ref 27–34)
MCV RBC AUTO: 100.7 FL — HIGH (ref 80–100)
NRBC # BLD AUTO: 0 K/UL — SIGNIFICANT CHANGE UP (ref 0–0)
NRBC # BLD: 0 /100 WBCS — SIGNIFICANT CHANGE UP (ref 0–0)
NRBC # FLD: 0 K/UL — SIGNIFICANT CHANGE UP (ref 0–0)
NRBC BLD-RTO: 0 /100 WBCS — SIGNIFICANT CHANGE UP (ref 0–0)
PHOSPHATE SERPL-MCNC: 1.5 MG/DL — LOW (ref 2.5–4.5)
PLATELET # BLD AUTO: 132 K/UL — LOW (ref 150–400)
POTASSIUM SERPL-MCNC: 3.9 MMOL/L — SIGNIFICANT CHANGE UP (ref 3.5–5.3)
POTASSIUM SERPL-SCNC: 3.9 MMOL/L — SIGNIFICANT CHANGE UP (ref 3.5–5.3)
RBC # BLD: 2.8 M/UL — LOW (ref 3.8–5.2)
RBC # FLD: 15.1 % — HIGH (ref 10.3–14.5)
SODIUM SERPL-SCNC: 139 MMOL/L — SIGNIFICANT CHANGE UP (ref 135–145)
WBC # BLD: 5.39 K/UL — SIGNIFICANT CHANGE UP (ref 3.8–10.5)
WBC # FLD AUTO: 5.39 K/UL — SIGNIFICANT CHANGE UP (ref 3.8–10.5)

## 2024-11-17 PROCEDURE — 71045 X-RAY EXAM CHEST 1 VIEW: CPT | Mod: 26

## 2024-11-17 PROCEDURE — 99233 SBSQ HOSP IP/OBS HIGH 50: CPT

## 2024-11-17 RX ORDER — METOCLOPRAMIDE HCL 10 MG
10 TABLET ORAL ONCE
Refills: 0 | Status: COMPLETED | OUTPATIENT
Start: 2024-11-17 | End: 2024-11-17

## 2024-11-17 RX ORDER — OXYCODONE HYDROCHLORIDE 30 MG/1
1 TABLET ORAL
Qty: 12 | Refills: 0
Start: 2024-11-17

## 2024-11-17 RX ORDER — POTASSIUM PHOSPHATE, MONOBASIC POTASSIUM PHOSPHATE, DIBASIC INJECTION, 236; 224 MG/ML; MG/ML
15 SOLUTION, CONCENTRATE INTRAVENOUS ONCE
Refills: 0 | Status: COMPLETED | OUTPATIENT
Start: 2024-11-17 | End: 2024-11-17

## 2024-11-17 RX ADMIN — Medication 1 CAPSULE(S): at 08:13

## 2024-11-17 RX ADMIN — Medication 10 MILLIGRAM(S): at 08:32

## 2024-11-17 RX ADMIN — LIDOCAINE HYDROCHLORIDE 1 PATCH: 20 JELLY TOPICAL at 08:03

## 2024-11-17 RX ADMIN — METOPROLOL SUCCINATE 12.5 MILLIGRAM(S): 50 TABLET, EXTENDED RELEASE ORAL at 06:49

## 2024-11-17 RX ADMIN — LIDOCAINE HYDROCHLORIDE 1 PATCH: 20 JELLY TOPICAL at 09:35

## 2024-11-17 RX ADMIN — POTASSIUM PHOSPHATE, MONOBASIC POTASSIUM PHOSPHATE, DIBASIC INJECTION, 62.5 MILLIMOLE(S): 236; 224 SOLUTION, CONCENTRATE INTRAVENOUS at 08:25

## 2024-11-18 PROBLEM — R91.8 OTHER NONSPECIFIC ABNORMAL FINDING OF LUNG FIELD: Chronic | Status: ACTIVE | Noted: 2024-11-08

## 2024-11-18 PROBLEM — I10 ESSENTIAL (PRIMARY) HYPERTENSION: Chronic | Status: ACTIVE | Noted: 2024-11-08

## 2024-11-18 PROBLEM — D64.9 ANEMIA, UNSPECIFIED: Chronic | Status: ACTIVE | Noted: 2024-11-08

## 2024-11-20 LAB
CULTURE RESULTS: SIGNIFICANT CHANGE UP
SPECIMEN SOURCE: SIGNIFICANT CHANGE UP

## 2024-11-26 LAB — SURGICAL PATHOLOGY STUDY: SIGNIFICANT CHANGE UP

## 2024-12-03 ENCOUNTER — OUTPATIENT (OUTPATIENT)
Dept: OUTPATIENT SERVICES | Facility: HOSPITAL | Age: 79
LOS: 1 days | End: 2024-12-03
Payer: MEDICARE

## 2024-12-03 ENCOUNTER — APPOINTMENT (OUTPATIENT)
Dept: RADIOLOGY | Facility: HOSPITAL | Age: 79
End: 2024-12-03

## 2024-12-03 ENCOUNTER — RESULT REVIEW (OUTPATIENT)
Age: 79
End: 2024-12-03

## 2024-12-03 ENCOUNTER — NON-APPOINTMENT (OUTPATIENT)
Age: 79
End: 2024-12-03

## 2024-12-03 ENCOUNTER — APPOINTMENT (OUTPATIENT)
Dept: THORACIC SURGERY | Facility: CLINIC | Age: 79
End: 2024-12-03
Payer: MEDICARE

## 2024-12-03 VITALS
BODY MASS INDEX: 17.42 KG/M2 | OXYGEN SATURATION: 99 % | DIASTOLIC BLOOD PRESSURE: 84 MMHG | HEART RATE: 86 BPM | WEIGHT: 102 LBS | SYSTOLIC BLOOD PRESSURE: 159 MMHG | RESPIRATION RATE: 17 BRPM | HEIGHT: 64 IN

## 2024-12-03 DIAGNOSIS — Z92.21 PERSONAL HISTORY OF ANTINEOPLASTIC CHEMOTHERAPY: Chronic | ICD-10-CM

## 2024-12-03 DIAGNOSIS — R91.8 OTHER NONSPECIFIC ABNORMAL FINDING OF LUNG FIELD: ICD-10-CM

## 2024-12-03 DIAGNOSIS — Z90.49 ACQUIRED ABSENCE OF OTHER SPECIFIED PARTS OF DIGESTIVE TRACT: Chronic | ICD-10-CM

## 2024-12-03 DIAGNOSIS — Z90.410 ACQUIRED TOTAL ABSENCE OF PANCREAS: Chronic | ICD-10-CM

## 2024-12-03 DIAGNOSIS — C25.9 MALIGNANT NEOPLASM OF PANCREAS, UNSPECIFIED: ICD-10-CM

## 2024-12-03 DIAGNOSIS — C78.00 SECONDARY MALIGNANT NEOPLASM OF UNSPECIFIED LUNG: ICD-10-CM

## 2024-12-03 DIAGNOSIS — Z98.890 OTHER SPECIFIED POSTPROCEDURAL STATES: Chronic | ICD-10-CM

## 2024-12-03 DIAGNOSIS — J90 PLEURAL EFFUSION, NOT ELSEWHERE CLASSIFIED: ICD-10-CM

## 2024-12-03 DIAGNOSIS — Z90.5 ACQUIRED ABSENCE OF KIDNEY: Chronic | ICD-10-CM

## 2024-12-03 PROCEDURE — 71046 X-RAY EXAM CHEST 2 VIEWS: CPT | Mod: 26

## 2024-12-03 PROCEDURE — 99024 POSTOP FOLLOW-UP VISIT: CPT

## 2024-12-03 RX ORDER — OMEPRAZOLE 40 MG/1
40 CAPSULE, DELAYED RELEASE ORAL
Refills: 0 | Status: ACTIVE | COMMUNITY

## 2024-12-03 RX ORDER — PANCRELIPASE LIPASE, PANCRELIPASE PROTEASE, PANCRELIPASE AMYLASE 252600; 60000; 189600 [USP'U]/1; [USP'U]/1; [USP'U]/1
CAPSULE, DELAYED RELEASE ORAL
Refills: 0 | Status: ACTIVE | COMMUNITY

## 2024-12-14 LAB
CULTURE RESULTS: SIGNIFICANT CHANGE UP
SPECIMEN SOURCE: SIGNIFICANT CHANGE UP

## 2024-12-17 ENCOUNTER — NON-APPOINTMENT (OUTPATIENT)
Age: 79
End: 2024-12-17

## 2024-12-17 ENCOUNTER — APPOINTMENT (OUTPATIENT)
Dept: UROLOGY | Facility: CLINIC | Age: 79
End: 2024-12-17
Payer: MEDICARE

## 2024-12-17 VITALS
RESPIRATION RATE: 17 BRPM | TEMPERATURE: 97.9 F | SYSTOLIC BLOOD PRESSURE: 131 MMHG | BODY MASS INDEX: 16.22 KG/M2 | DIASTOLIC BLOOD PRESSURE: 80 MMHG | HEIGHT: 64 IN | WEIGHT: 95 LBS | HEART RATE: 99 BPM

## 2024-12-17 DIAGNOSIS — N28.89 OTHER SPECIFIED DISORDERS OF KIDNEY AND URETER: ICD-10-CM

## 2024-12-17 PROCEDURE — G2211 COMPLEX E/M VISIT ADD ON: CPT

## 2024-12-17 PROCEDURE — 99212 OFFICE O/P EST SF 10 MIN: CPT

## 2024-12-23 NOTE — PROVIDER CONTACT NOTE (CRITICAL VALUE NOTIFICATION) - PERSON GIVING RESULT:
Encounter Date: 12/23/2024       History     Chief Complaint   Patient presents with    Constipation     Patient presents to the ED with c/o constipation that started yesterday. Patient states it feels like pressure in his abdomen. LBM was yesterday     Patient is a 40-year-old  male who presents to the emergency department with complaint of constipation and abdominal pain.  Patient states he had a bowel movement yesterday but it was small.  He states he has been eating less because he has thrush.  He was seen last night at [facility] for thrush and given a prescription for nystatin but has not gotten this filled.  He has the paper prescription with him. He has a history of HIV.  He denies nausea, vomiting, diarrhea, blood in stool, fever.      Review of patient's allergies indicates:  No Known Allergies  Past Medical History:   Diagnosis Date    Human immunodeficiency virus (HIV) disease      History reviewed. No pertinent surgical history.  No family history on file.  Social History     Tobacco Use    Smoking status: Every Day     Types: Cigarettes    Smokeless tobacco: Never   Substance Use Topics    Alcohol use: Not Currently    Drug use: Yes     Types: Marijuana     Review of Systems   All other systems reviewed and are negative.      Physical Exam     Initial Vitals [12/23/24 0929]   BP Pulse Resp Temp SpO2   118/83 102 20 97.4 °F (36.3 °C) 99 %      MAP       --         Physical Exam    Constitutional: He appears well-developed. He is not diaphoretic. No distress.   HENT:   Head: Normocephalic.   Thrush to tongue   Eyes: Pupils are equal, round, and reactive to light.   Neck: Neck supple.   Cardiovascular:  Normal rate.           Pulmonary/Chest: Breath sounds normal. No respiratory distress.   Abdominal: Abdomen is soft. He exhibits no distension. Bowel sounds are decreased. There is no abdominal tenderness. There is no rebound and no guarding.   Musculoskeletal:      Cervical back: Neck 
supple.     Neurological: He is alert and oriented to person, place, and time. GCS score is 15. GCS eye subscore is 4. GCS verbal subscore is 5. GCS motor subscore is 6.   Skin: Skin is warm and dry.   Psychiatric: He has a normal mood and affect. Thought content normal.         Medical Screening Exam   See Full Note    ED Course   Procedures  Labs Reviewed   COMPREHENSIVE METABOLIC PANEL - Abnormal       Result Value    Sodium 138      Potassium 4.1      Chloride 102      CO2 26      Anion Gap 14      Glucose 75      BUN 10      Creatinine 0.96      BUN/Creatinine Ratio 10      Calcium 9.5      Total Protein 9.1 (*)     Albumin 4.1      Globulin 5.0 (*)     A/G Ratio 0.8      Bilirubin, Total 0.5      Alk Phos 79      ALT 11      AST 39 (*)     eGFR 102     CBC WITH DIFFERENTIAL - Abnormal    WBC 2.29 (*)     RBC 4.62      Hemoglobin 14.2      Hematocrit 44.3      MCV 95.9      MCH 30.7      MCHC 32.1      RDW 11.7      Platelet Count 204      MPV 10.1      Neutrophils % 48.9 (*)     Lymphocytes % 27.1      Monocytes % 15.3 (*)     Eosinophils % 7.0 (*)     Basophils % 1.3 (*)     Immature Granulocytes % 0.4      nRBC, Auto 0.0      Neutrophils, Abs 1.12 (*)     Lymphocytes, Absolute 0.62 (*)     Monocytes, Absolute 0.35      Eosinophils, Absolute 0.16      Basophils, Absolute 0.03      Immature Granulocytes, Absolute 0.01      nRBC, Absolute 0.00      Diff Type Manual     URINALYSIS, REFLEX TO URINE CULTURE    Color, UA Colorless      Clarity, UA Clear      pH, UA 6.0      Leukocytes, UA Negative      Nitrites, UA Negative      Protein, UA Negative      Glucose, UA Normal      Ketones, UA Negative      Urobilinogen, UA Normal      Bilirubin, UA Negative      Blood, UA Negative      Specific [place], UA 1.001     CBC W/ AUTO DIFFERENTIAL    Narrative:     The following orders were created for panel order CBC auto differential.  Procedure                               Abnormality         Status                   
  ---------                               -----------         ------                     CBC with Differential[[id number]]       Abnormal            Final result               Manual Differential[[id number]]                             In process                   Please view results for these tests on the individual orders.   MANUAL DIFFERENTIAL          Imaging Results              X-Ray KUB (Final result)  Result time 12/23/24 10:15:11      Final result by Trav Lee MD (12/23/24 10:15:11)                   Impression:      Nonobstructive bowel gas pattern.      Electronically signed by: Trav Lee MD  Date:    12/23/2024  Time:    10:15               Narrative:    EXAMINATION:  XR KUB    CLINICAL HISTORY:  constipation;    TECHNIQUE:  Single AP View of the abdomen was performed.    COMPARISON:  None.    FINDINGS:  Bowel gas pattern is nonobstructive.  No pathologic calcifications are detected.  No large volume fecal burden.  Bones are unremarkable.                                       Medications - No data to display  Medical Decision Making  40-year-old male here with constipation.  He has taken over-the-counter for constipation.  He is HIV positive and has thrush.  We will check some basic lab, urine, KUB to confirm constipation or rule out any other obvious etiologies and treat for constipation pending workup.    Pt updated on all test results and plan.    Amount and/or Complexity of Data Reviewed  Labs: ordered.  Radiology: ordered.                                      Clinical Impression:   Final diagnoses:  [K59.00] Constipation, unspecified constipation type (Primary)        ED Disposition Condition    Discharge Stable        Miralax 1 capful every day.  Drink plenty of water.  Get your nystatin (thrush medicine) filled & use as directed.  Follow-up with your primary care provider in a couple of days if no better.  Follow up with your HIV doctor as directed.  Return to the ER for new or 
[facility]/ [facility]
worsening symptoms.  ED Prescriptions    None       Follow-up Information       Follow up With Specialties Details Why Contact Info    Primary care provider  Call in 2 days      [facility] - Emergency Department Emergency Medicine  As needed, If symptoms worsen [address] [place] [id number] [phone]             Christiana Gilliam, TOMP  12/23/24 0636    
Miroslava Connell
Indu Lela

## 2025-01-01 LAB
CULTURE RESULTS: SIGNIFICANT CHANGE UP
SPECIMEN SOURCE: SIGNIFICANT CHANGE UP

## 2025-01-08 ENCOUNTER — APPOINTMENT (OUTPATIENT)
Facility: CLINIC | Age: 80
End: 2025-01-08
Payer: MEDICARE

## 2025-01-08 VITALS
WEIGHT: 100.13 LBS | BODY MASS INDEX: 17.19 KG/M2 | SYSTOLIC BLOOD PRESSURE: 116 MMHG | OXYGEN SATURATION: 97 % | HEART RATE: 85 BPM | DIASTOLIC BLOOD PRESSURE: 74 MMHG | RESPIRATION RATE: 14 BRPM

## 2025-01-08 DIAGNOSIS — R91.1 SOLITARY PULMONARY NODULE: ICD-10-CM

## 2025-01-08 PROCEDURE — 99214 OFFICE O/P EST MOD 30 MIN: CPT

## 2025-01-08 PROCEDURE — G2211 COMPLEX E/M VISIT ADD ON: CPT

## 2025-01-22 ENCOUNTER — INPATIENT (INPATIENT)
Facility: HOSPITAL | Age: 80
LOS: 6 days | Discharge: HOME | DRG: 810 | End: 2025-01-29
Attending: STUDENT IN AN ORGANIZED HEALTH CARE EDUCATION/TRAINING PROGRAM | Admitting: STUDENT IN AN ORGANIZED HEALTH CARE EDUCATION/TRAINING PROGRAM
Payer: MEDICARE

## 2025-01-22 VITALS
SYSTOLIC BLOOD PRESSURE: 114 MMHG | RESPIRATION RATE: 18 BRPM | HEIGHT: 60 IN | HEART RATE: 102 BPM | TEMPERATURE: 100 F | WEIGHT: 93.04 LBS | OXYGEN SATURATION: 97 % | DIASTOLIC BLOOD PRESSURE: 69 MMHG

## 2025-01-22 DIAGNOSIS — Z90.410 ACQUIRED TOTAL ABSENCE OF PANCREAS: Chronic | ICD-10-CM

## 2025-01-22 DIAGNOSIS — Z92.3 PERSONAL HISTORY OF IRRADIATION: Chronic | ICD-10-CM

## 2025-01-22 DIAGNOSIS — Z90.49 ACQUIRED ABSENCE OF OTHER SPECIFIED PARTS OF DIGESTIVE TRACT: Chronic | ICD-10-CM

## 2025-01-22 DIAGNOSIS — Z90.5 ACQUIRED ABSENCE OF KIDNEY: Chronic | ICD-10-CM

## 2025-01-22 DIAGNOSIS — Z92.21 PERSONAL HISTORY OF ANTINEOPLASTIC CHEMOTHERAPY: Chronic | ICD-10-CM

## 2025-01-22 DIAGNOSIS — Z98.890 OTHER SPECIFIED POSTPROCEDURAL STATES: Chronic | ICD-10-CM

## 2025-01-22 LAB
ALBUMIN SERPL ELPH-MCNC: 3.3 G/DL — SIGNIFICANT CHANGE UP (ref 3.3–5)
ALP SERPL-CCNC: 134 U/L — HIGH (ref 40–120)
ALT FLD-CCNC: 50 U/L — HIGH (ref 10–45)
ANION GAP SERPL CALC-SCNC: 11 MMOL/L — SIGNIFICANT CHANGE UP (ref 5–17)
ANISOCYTOSIS BLD QL: SLIGHT — SIGNIFICANT CHANGE UP
APTT BLD: 36.7 SEC — HIGH (ref 24.5–35.6)
AST SERPL-CCNC: 60 U/L — HIGH (ref 10–40)
BASOPHILS # BLD AUTO: 0 K/UL — SIGNIFICANT CHANGE UP (ref 0–0.2)
BASOPHILS NFR BLD AUTO: 0 % — SIGNIFICANT CHANGE UP (ref 0–2)
BILIRUB SERPL-MCNC: 0.3 MG/DL — SIGNIFICANT CHANGE UP (ref 0.2–1.2)
BUN SERPL-MCNC: 17 MG/DL — SIGNIFICANT CHANGE UP (ref 7–23)
CALCIUM SERPL-MCNC: 9.1 MG/DL — SIGNIFICANT CHANGE UP (ref 8.4–10.5)
CHLORIDE SERPL-SCNC: 102 MMOL/L — SIGNIFICANT CHANGE UP (ref 96–108)
CO2 SERPL-SCNC: 19 MMOL/L — LOW (ref 22–31)
CREAT SERPL-MCNC: 0.75 MG/DL — SIGNIFICANT CHANGE UP (ref 0.5–1.3)
EGFR: 80 ML/MIN/1.73M2 — SIGNIFICANT CHANGE UP
ELLIPTOCYTES BLD QL SMEAR: SLIGHT — SIGNIFICANT CHANGE UP
EOSINOPHIL # BLD AUTO: 0.02 K/UL — SIGNIFICANT CHANGE UP (ref 0–0.5)
EOSINOPHIL NFR BLD AUTO: 4 % — SIGNIFICANT CHANGE UP (ref 0–6)
FLUAV AG NPH QL: SIGNIFICANT CHANGE UP
FLUBV AG NPH QL: SIGNIFICANT CHANGE UP
GAS PNL BLDV: SIGNIFICANT CHANGE UP
GLUCOSE SERPL-MCNC: 156 MG/DL — HIGH (ref 70–99)
HCT VFR BLD CALC: 24.1 % — LOW (ref 34.5–45)
HGB BLD-MCNC: 8.1 G/DL — LOW (ref 11.5–15.5)
INR BLD: 1.21 RATIO — HIGH (ref 0.85–1.16)
LACTATE SERPL-SCNC: 1.2 MMOL/L — SIGNIFICANT CHANGE UP (ref 0.5–2)
LYMPHOCYTES # BLD AUTO: 0.35 K/UL — LOW (ref 1–3.3)
LYMPHOCYTES # BLD AUTO: 68 % — HIGH (ref 13–44)
MACROCYTES BLD QL: SLIGHT — SIGNIFICANT CHANGE UP
MANUAL SMEAR VERIFICATION: SIGNIFICANT CHANGE UP
MCHC RBC-ENTMCNC: 33.2 PG — SIGNIFICANT CHANGE UP (ref 27–34)
MCHC RBC-ENTMCNC: 33.6 G/DL — SIGNIFICANT CHANGE UP (ref 32–36)
MCV RBC AUTO: 98.8 FL — SIGNIFICANT CHANGE UP (ref 80–100)
MONOCYTES # BLD AUTO: 0.1 K/UL — SIGNIFICANT CHANGE UP (ref 0–0.9)
MONOCYTES NFR BLD AUTO: 20 % — HIGH (ref 2–14)
NEUTROPHILS # BLD AUTO: 0.04 K/UL — LOW (ref 1.8–7.4)
NEUTROPHILS NFR BLD AUTO: 8 % — LOW (ref 43–77)
NRBC # BLD: 0 /100 WBCS — SIGNIFICANT CHANGE UP (ref 0–0)
NRBC BLD-RTO: 0 /100 WBCS — SIGNIFICANT CHANGE UP (ref 0–0)
OVALOCYTES BLD QL SMEAR: SLIGHT — SIGNIFICANT CHANGE UP
PLAT MORPH BLD: NORMAL — SIGNIFICANT CHANGE UP
PLATELET # BLD AUTO: 74 K/UL — LOW (ref 150–400)
POIKILOCYTOSIS BLD QL AUTO: SLIGHT — SIGNIFICANT CHANGE UP
POTASSIUM SERPL-MCNC: 4 MMOL/L — SIGNIFICANT CHANGE UP (ref 3.5–5.3)
POTASSIUM SERPL-SCNC: 4 MMOL/L — SIGNIFICANT CHANGE UP (ref 3.5–5.3)
PROT SERPL-MCNC: 6.2 G/DL — SIGNIFICANT CHANGE UP (ref 6–8.3)
PROTHROM AB SERPL-ACNC: 13.8 SEC — HIGH (ref 9.9–13.4)
RBC # BLD: 2.44 M/UL — LOW (ref 3.8–5.2)
RBC # FLD: 14.6 % — HIGH (ref 10.3–14.5)
RBC BLD AUTO: SIGNIFICANT CHANGE UP
RSV RNA NPH QL NAA+NON-PROBE: SIGNIFICANT CHANGE UP
SARS-COV-2 RNA SPEC QL NAA+PROBE: SIGNIFICANT CHANGE UP
SCHISTOCYTES BLD QL AUTO: SLIGHT — SIGNIFICANT CHANGE UP
SODIUM SERPL-SCNC: 132 MMOL/L — LOW (ref 135–145)
WBC # BLD: 0.52 K/UL — CRITICAL LOW (ref 3.8–10.5)
WBC # FLD AUTO: 0.52 K/UL — CRITICAL LOW (ref 3.8–10.5)

## 2025-01-22 PROCEDURE — 99285 EMERGENCY DEPT VISIT HI MDM: CPT

## 2025-01-22 PROCEDURE — 71045 X-RAY EXAM CHEST 1 VIEW: CPT | Mod: 26

## 2025-01-22 PROCEDURE — 74177 CT ABD & PELVIS W/CONTRAST: CPT | Mod: 26

## 2025-01-22 RX ORDER — PIPERACILLIN SODIUM AND TAZOBACTAM SODIUM 2; 250 G/50ML; MG/50ML
3.38 INJECTION, POWDER, FOR SOLUTION INTRAVENOUS ONCE
Refills: 0 | Status: COMPLETED | OUTPATIENT
Start: 2025-01-22 | End: 2025-01-22

## 2025-01-22 RX ORDER — VANCOMYCIN HYDROCHLORIDE 50 MG/ML
1000 KIT ORAL ONCE
Refills: 0 | Status: COMPLETED | OUTPATIENT
Start: 2025-01-22 | End: 2025-01-22

## 2025-01-22 NOTE — ED PROVIDER NOTE - OBJECTIVE STATEMENT
79-year-old female with past medical history of pancreatic CA s/p Whipple with pulmonary mets Left VATS, Left Upper Lobe wedge resection and Left Lower Lobe basilar segmentectomy on 11/15/2024 on chemo, L partial nephrectomy (2019), HTN, RA presents to ED for fever  (Tmax 104), nasal congestion, and dry cough x 1 day.  Took Tylenol with little to no relief.  Noted recent sick contact with  who is flu positive.  Of note patient with routine outpatient labs significant for leukopenia has been receiving injections prior to schedule chemo injection 1/23.  Contacted oncologist today who instructed patient to go to ED for concerns of neutropenic fever (oncologist, Dr. Linton of [facility]).  Denies nausea, vomit, chest pain, SOB, abdominal pain, hematuria, dysuria, urinary urgency, sore throat, change in bowel movement.

## 2025-01-22 NOTE — ED PROVIDER NOTE - RAPID ASSESSMENT
79-year-old female with past medical history of pancreatic CA s/p Whipple with pulmonary mets Left VATS, Left Upper Lobe wedge resection and Left Lower Lobe basilar segmentectomy on 11/15/2024 on chemo, L partial nephrectomy (2019), HTN, RA p.w fever patient endorsing fever for the past 2 days.  Tmax 104.  Patient also endorsing cough which has been chronic and intermittent diffuse abdominal discomfort.  Patient contacted her oncologist who instructed her to come to the ER for further workup.   is sick at home with URI symptoms.    **Patient was rapidly assessed by me, Ruddy Colbert PA-C. A limited history was obtained. The patient will be seen and further examined/worked up in the main ED and their care will be completed by the main ED team. Receiving team will follow up on labs, analgesia, any clinical imaging, and perform reassessment and disposition of the patient as clinically indicated. All decisions regarding the progression of care will be made at their discretion.

## 2025-01-22 NOTE — ED PROVIDER NOTE - ATTENDING CONTRIBUTION TO CARE
I, Andrew Garner, performed a history and physical exam of the patient and discussed their management with the resident provider. I reviewed the resident provider's note and agree with the documented findings and plan of care. I was present and available for all procedures.    Patient presenting with neutropenic fever likely from recent chemotherapy initiation at 8 blood x 2 otherwise neutrophil count still very low fever started last night peak of 104 Fahrenheit orally discussed with family and patient will require admission blood cultures growing broad-spectrum antibiotics broadened until RVP screening blood work admission for further management.  Discussed with patient and family at bedside agreed with plan unlikely ACS PE pneumothorax dissection AAA pneumonia    Well appearing and in NAD, head normal appearing atraumatic, trachea midline, no respiratory distress, lungs cta bilaterally, rrr no murmurs, soft NT ND abdomen, no visible extremity deformities, Alert and oriented, non focal neuro exam, skin warm and dry, normal affect and mood, no leg swelling, calf ttp or jvd

## 2025-01-22 NOTE — ED PROVIDER NOTE - CLINICAL SUMMARY MEDICAL DECISION MAKING FREE TEXT BOX
Afebrile hemodynamically stable female presents to ED for viral URI symptoms x 1 day with known history of CA.  Patient otherwise well-appearing.  Not in acute distress.  Clear breath sounds bilaterally satting well on RA.  Soft nontender nontender abdomen.  No lower extreme edema or calf tenderness.  Clear oropharynx with no tonsillar exudate or swelling.  Patient Q doc noted to be neutropenic with elevated LFTs and negative viral panel.  CTAP just reveals slightly increased bilateral lower lobe nodules and new 7 mm hypodense lesion in hepatic segment.  Pending remainder labs will admit patient to medicine for neutropenic fever and started on empiric antibiotics.

## 2025-01-22 NOTE — ED ADULT TRIAGE NOTE - CHIEF COMPLAINT QUOTE
fever and chills starting last night. highest 104F. last chemo 1/16. hx pancreatic CA with mets to lungs.

## 2025-01-23 DIAGNOSIS — Z29.9 ENCOUNTER FOR PROPHYLACTIC MEASURES, UNSPECIFIED: ICD-10-CM

## 2025-01-23 DIAGNOSIS — D61.818 OTHER PANCYTOPENIA: ICD-10-CM

## 2025-01-23 DIAGNOSIS — C25.9 MALIGNANT NEOPLASM OF PANCREAS, UNSPECIFIED: ICD-10-CM

## 2025-01-23 DIAGNOSIS — A41.9 SEPSIS, UNSPECIFIED ORGANISM: ICD-10-CM

## 2025-01-23 LAB
-  COAGULASE NEGATIVE STAPHYLOCOCCUS: SIGNIFICANT CHANGE UP
-  CTX-M RESISTANCE MARKER: SIGNIFICANT CHANGE UP
-  ESBL: SIGNIFICANT CHANGE UP
-  K. PNEUMONIAE GROUP: SIGNIFICANT CHANGE UP
APPEARANCE UR: CLEAR — SIGNIFICANT CHANGE UP
BILIRUB UR-MCNC: NEGATIVE — SIGNIFICANT CHANGE UP
COLOR SPEC: YELLOW — SIGNIFICANT CHANGE UP
DIFF PNL FLD: NEGATIVE — SIGNIFICANT CHANGE UP
GLUCOSE UR QL: NEGATIVE MG/DL — SIGNIFICANT CHANGE UP
GRAM STN FLD: ABNORMAL
KETONES UR-MCNC: NEGATIVE MG/DL — SIGNIFICANT CHANGE UP
LEUKOCYTE ESTERASE UR-ACNC: NEGATIVE — SIGNIFICANT CHANGE UP
METHOD TYPE: SIGNIFICANT CHANGE UP
METHOD TYPE: SIGNIFICANT CHANGE UP
NITRITE UR-MCNC: NEGATIVE — SIGNIFICANT CHANGE UP
PH UR: 5.5 — SIGNIFICANT CHANGE UP (ref 5–8)
PROT UR-MCNC: SIGNIFICANT CHANGE UP MG/DL
SP GR SPEC: >1.03 — HIGH (ref 1–1.03)
SPECIMEN SOURCE: SIGNIFICANT CHANGE UP
SPECIMEN SOURCE: SIGNIFICANT CHANGE UP
UROBILINOGEN FLD QL: 1 MG/DL — SIGNIFICANT CHANGE UP (ref 0.2–1)

## 2025-01-23 PROCEDURE — G0545: CPT

## 2025-01-23 PROCEDURE — 99223 1ST HOSP IP/OBS HIGH 75: CPT

## 2025-01-23 RX ORDER — ERTAPENEM SODIUM 1 G/1
1000 INJECTION, POWDER, LYOPHILIZED, FOR SOLUTION INTRAMUSCULAR; INTRAVENOUS EVERY 24 HOURS
Refills: 0 | Status: DISCONTINUED | OUTPATIENT
Start: 2025-01-24 | End: 2025-01-28

## 2025-01-23 RX ORDER — ACETAMINOPHEN 160 MG/5ML
650 SUSPENSION ORAL EVERY 6 HOURS
Refills: 0 | Status: DISCONTINUED | OUTPATIENT
Start: 2025-01-23 | End: 2025-01-29

## 2025-01-23 RX ORDER — MECOBAL/LEVOMEFOLAT CA/B6 PHOS 2-3-35 MG
1 TABLET ORAL DAILY
Refills: 0 | Status: DISCONTINUED | OUTPATIENT
Start: 2025-01-23 | End: 2025-01-29

## 2025-01-23 RX ORDER — ACETAMINOPHEN 160 MG/5ML
625 SUSPENSION ORAL ONCE
Refills: 0 | Status: COMPLETED | OUTPATIENT
Start: 2025-01-23 | End: 2025-01-23

## 2025-01-23 RX ORDER — PIPERACILLIN SODIUM AND TAZOBACTAM SODIUM 2; 250 G/50ML; MG/50ML
3.38 INJECTION, POWDER, FOR SOLUTION INTRAVENOUS EVERY 8 HOURS
Refills: 0 | Status: DISCONTINUED | OUTPATIENT
Start: 2025-01-23 | End: 2025-01-23

## 2025-01-23 RX ORDER — ERTAPENEM SODIUM 1 G/1
1000 INJECTION, POWDER, LYOPHILIZED, FOR SOLUTION INTRAMUSCULAR; INTRAVENOUS ONCE
Refills: 0 | Status: COMPLETED | OUTPATIENT
Start: 2025-01-23 | End: 2025-01-23

## 2025-01-23 RX ORDER — LIPASE/PROTEASE/AMYLASE 4K-25K-20K
1 CAPSULE,DELAYED RELEASE (ENTERIC COATED) ORAL
Refills: 0 | Status: DISCONTINUED | OUTPATIENT
Start: 2025-01-23 | End: 2025-01-29

## 2025-01-23 RX ORDER — ERTAPENEM SODIUM 1 G/1
INJECTION, POWDER, LYOPHILIZED, FOR SOLUTION INTRAMUSCULAR; INTRAVENOUS
Refills: 0 | Status: DISCONTINUED | OUTPATIENT
Start: 2025-01-23 | End: 2025-01-28

## 2025-01-23 RX ORDER — MECOBAL/LEVOMEFOLAT CA/B6 PHOS 2-3-35 MG
1 TABLET ORAL
Refills: 0 | DISCHARGE

## 2025-01-23 RX ORDER — FOLIC ACID 1 MG
1 TABLET ORAL DAILY
Refills: 0 | Status: DISCONTINUED | OUTPATIENT
Start: 2025-01-23 | End: 2025-01-29

## 2025-01-23 RX ORDER — BACTERIOSTATIC SODIUM CHLORIDE 0.9 %
1000 VIAL (ML) INJECTION ONCE
Refills: 0 | Status: COMPLETED | OUTPATIENT
Start: 2025-01-23 | End: 2025-01-23

## 2025-01-23 RX ORDER — FILGRASTIM-SNDZ 300 UG/.5ML
300 INJECTION, SOLUTION INTRAVENOUS; SUBCUTANEOUS DAILY
Refills: 0 | Status: DISCONTINUED | OUTPATIENT
Start: 2025-01-23 | End: 2025-01-26

## 2025-01-23 RX ADMIN — Medication 1000 MILLILITER(S): at 00:43

## 2025-01-23 RX ADMIN — ERTAPENEM SODIUM 120 MILLIGRAM(S): 1 INJECTION, POWDER, LYOPHILIZED, FOR SOLUTION INTRAMUSCULAR; INTRAVENOUS at 13:23

## 2025-01-23 RX ADMIN — FILGRASTIM-SNDZ 300 MICROGRAM(S): 300 INJECTION, SOLUTION INTRAVENOUS; SUBCUTANEOUS at 11:20

## 2025-01-23 RX ADMIN — ACETAMINOPHEN 250 MILLIGRAM(S): 160 SUSPENSION ORAL at 01:23

## 2025-01-23 RX ADMIN — PIPERACILLIN SODIUM AND TAZOBACTAM SODIUM 25 GRAM(S): 2; 250 INJECTION, POWDER, FOR SOLUTION INTRAVENOUS at 07:54

## 2025-01-23 RX ADMIN — PIPERACILLIN SODIUM AND TAZOBACTAM SODIUM 200 GRAM(S): 2; 250 INJECTION, POWDER, FOR SOLUTION INTRAVENOUS at 00:01

## 2025-01-23 RX ADMIN — ACETAMINOPHEN 650 MILLIGRAM(S): 160 SUSPENSION ORAL at 08:06

## 2025-01-23 RX ADMIN — ACETAMINOPHEN 650 MILLIGRAM(S): 160 SUSPENSION ORAL at 12:31

## 2025-01-23 RX ADMIN — Medication 1 TABLET(S): at 11:20

## 2025-01-23 RX ADMIN — VANCOMYCIN HYDROCHLORIDE 250 MILLIGRAM(S): KIT at 00:43

## 2025-01-23 RX ADMIN — Medication 1 MILLIGRAM(S): at 11:20

## 2025-01-23 RX ADMIN — ACETAMINOPHEN 625 MILLIGRAM(S): 160 SUSPENSION ORAL at 12:30

## 2025-01-23 NOTE — H&P ADULT - PROBLEM SELECTOR PLAN 3
Likely in setting of recent chemo treatment  - Heme Onc consult ([facility]) regarding continuing zarxio for neutropenia

## 2025-01-23 NOTE — PATIENT PROFILE ADULT - NSPROHMSYMPCOND_GEN_A_NUR
PROVIDER:[TOKEN:[[id number]],FOLLOWUP:[2 weeks]] PROVIDER:[TOKEN:[[id number]],FOLLOWUP:[1 week]],FREE:[LAST:[Primary Care Physician],PHONE:[(   )    -],FAX:[(   )    -],FOLLOWUP:[1 week]] cancer/cardiovascular

## 2025-01-23 NOTE — CONSULT NOTE ADULT - ASSESSMENT
79 year old, Estonian speaking, female with hx of pancreatic cancer s/p Whipple in 2021, pulmonary mets s/p L VATS, TONIA wedge resection and LLL basilar segmentectomy in 11/2024, L partial nephrectomy (2019), HTN, RA, presenting with fevers for the last 2 days    Pancreatic ca  --follows with Dr. Ludwig Scanlon of [facility]  --w/ metastatic spread to lung  --s/p multiple lines of treatment, currently on C1 gem/abraxane LD: 01/16  --Zarxio given for count recovery on 1/15, 1/20, 1/21, and 1/22  --no plan for treatment inpatient and/or rehab  --ongoing care after discharge    Pancytopenia  --2/2 malignancy and treatment  --on Nplate 45mcg, LD 1/16, next dose scheduled for 02/06  --no nutritional deficiencies noted on outpatient labs from 01/02    Neutropenic fevers  --peripheral blood cultures w/ GNR, will recommend culturing pts MediPort  --will give Zarxio  inpatient to assist w/ count recovery  --on IV abx, will recommend ID consult    will follow    Elsa Ramos NP  Hematology/ Oncology  [facility]  [phone] (office)  [phone] (alt office)  Evenings and weekends please call MD on call or office

## 2025-01-23 NOTE — H&P ADULT - PROBLEM SELECTOR PLAN 5
DVT ppx - lovenox subq  Diet - regular  Dispo - pending DVT ppx - SCDs for now given thrombocytopenia, anemia  Diet - regular  Dispo - pending

## 2025-01-23 NOTE — H&P ADULT - HISTORY OF PRESENT ILLNESS
79 year old, Upper sorbian speaking, female with hx of pancreatic cancer s/p Whippe in 2021, pulmonary mets s/p L VATS, TONIA wedge resection and LLL basilar segmentectomy in 11/2024, L partial nephrectomy (2019), HTN, RA, presenting with fevers for the last 2 days.  79 year old, Malay speaking, female with hx of pancreatic cancer s/p Whipple in 2021, pulmonary mets s/p L VATS, TONIA wedge resection and LLL basilar segmentectomy in 11/2024, L partial nephrectomy (2019), HTN, RA, presenting with fevers for the last 2 days. Patient has been receiving chemo treatment weekly (2 doses so far). She was noted to have low WBC, thought to be worsened by methotrexate, so medication has been held. Pt usually takes methotrexate for her RA. She was also recently started on Zarxio given low WBC. Pt's  recently sick with flu. She developed fever up to 104. Pt was advised by her oncologist to come to hospital for further evaluation. At this time, patient without significant complaints. Denies any pain. No nausea, vomiting, constipation, diarrhea. No chest pain, SOB. Pt is ambulatory. In ED, patient noted to be septic. Labs notable for neutropenia. Pt given vancomycin and zosyn. Pt admitted for further management.

## 2025-01-23 NOTE — H&P ADULT - NSHPREVIEWOFSYSTEMS_GEN_ALL_CORE
Review of Systems:   CONSTITUTIONAL: +fever, weight loss  EYES: No eye pain, visual disturbances, or discharge  ENMT:  No difficulty hearing, tinnitus, vertigo; No sinus or throat pain  RESPIRATORY: No SOB. No cough, wheezing, chills or hemoptysis  CARDIOVASCULAR: No chest pain, palpitations, dizziness, or leg swelling  GASTROINTESTINAL: No abdominal or epigastric pain. No nausea, vomiting, or hematemesis; No diarrhea or constipation. No melena or hematochezia.  GENITOURINARY: No dysuria, frequency, hematuria, or incontinence  NEUROLOGICAL: No headaches, memory loss, loss of strength, numbness, or tremors  SKIN: No itching, burning, rashes, or lesions   MUSCULOSKELETAL: No joint pain or swelling; No muscle, back pain  HEME/LYMPH: No easy bruising, or bleeding gums

## 2025-01-23 NOTE — H&P ADULT - NSHPLABSRESULTS_GEN_ALL_CORE
01-22    132[L]  |  102  |  17  ----------------------------<  156[H]  4.0   |  19[L]  |  0.75    Ca    9.1      22 Jan 2025 20:59    TPro  6.2  /  Alb  3.3  /  TBili  0.3  /  DBili  x   /  AST  60[H]  /  ALT  50[H]  /  AlkPhos  134[H]  01-22          PT/INR - ( 22 Jan 2025 20:59 )   PT: 13.8 sec;   INR: 1.21 ratio         PTT - ( 22 Jan 2025 20:59 )  PTT:36.7 sec              Urinalysis Basic - ( 23 Jan 2025 01:06 )    Color: Yellow / Appearance: Clear / SG: >1.030 / pH: x  Gluc: x / Ketone: Negative mg/dL  / Bili: Negative / Urobili: 1.0 mg/dL   Blood: x / Protein: Trace mg/dL / Nitrite: Negative   Leuk Esterase: Negative / RBC: x / WBC x   Sq Epi: x / Non Sq Epi: x / Bacteria: x                              8.1    0.52  )-----------( 74       ( 22 Jan 2025 20:59 )             24.1     CAPILLARY BLOOD GLUCOSE        Blood Gas Source Venous: Venous (01-22-25 @ 20:36) 01-22    132[L]  |  102  |  17  ----------------------------<  156[H]  4.0   |  19[L]  |  0.75    Ca    9.1      22 Jan 2025 20:59    TPro  6.2  /  Alb  3.3  /  TBili  0.3  /  DBili  x   /  AST  60[H]  /  ALT  50[H]  /  AlkPhos  134[H]  01-22      PT/INR - ( 22 Jan 2025 20:59 )   PT: 13.8 sec;   INR: 1.21 ratio         PTT - ( 22 Jan 2025 20:59 )  PTT:36.7 sec              Urinalysis Basic - ( 23 Jan 2025 01:06 )    Color: Yellow / Appearance: Clear / SG: >1.030 / pH: x  Gluc: x / Ketone: Negative mg/dL  / Bili: Negative / Urobili: 1.0 mg/dL   Blood: x / Protein: Trace mg/dL / Nitrite: Negative   Leuk Esterase: Negative / RBC: x / WBC x   Sq Epi: x / Non Sq Epi: x / Bacteria: x                              8.1    0.52  )-----------( 74       ( 22 Jan 2025 20:59 )             24.1     CAPILLARY BLOOD GLUCOSE        Blood Gas Source Venous: Venous (01-22-25 @ 20:36)

## 2025-01-23 NOTE — PATIENT PROFILE ADULT - FALL HARM RISK - HARM RISK INTERVENTIONS

## 2025-01-23 NOTE — H&P ADULT - ASSESSMENT
79 year old, Irish speaking, female with hx of pancreatic cancer s/p Whipple in 2021, pulmonary mets s/p L VATS, TONIA wedge resection and LLL basilar segmentectomy in 11/2024, L partial nephrectomy (2019), HTN, RA, presenting with fevers, found to have neutropenic sepsis, now admitted for further infectious workup and management.

## 2025-01-23 NOTE — PROVIDER CONTACT NOTE (CRITICAL VALUE NOTIFICATION) - TEST AND RESULT REPORTED:
BCx from 12/23 growth in aerobic bottle, gram positive cocci in clusters
Blood culture results: gram negative rods in anaerobic and aerobic

## 2025-01-23 NOTE — H&P ADULT - PROBLEM SELECTOR PLAN 2
Hx of pancreatic cancer on chemotherapy  - Continue pancrelipase   - Outpatient follow up with oncologist for continued treatment

## 2025-01-23 NOTE — PATIENT PROFILE ADULT - FUNCTIONAL ASSESSMENT - BASIC MOBILITY 6.
3-calculated by average/Not able to assess (calculate score using [facility] averaging method)

## 2025-01-23 NOTE — H&P ADULT - NSHPPHYSICALEXAM_GEN_ALL_CORE
Vital Signs Last 24 Hrs  T(C): 38.1 (23 Jan 2025 01:27), Max: 38.1 (23 Jan 2025 01:27)  T(F): 100.5 (23 Jan 2025 01:27), Max: 100.5 (23 Jan 2025 01:27)  HR: 122 (23 Jan 2025 01:27) (102 - 122)  BP: 105/58 (23 Jan 2025 01:27) (105/58 - 114/69)  BP(mean): 72 (23 Jan 2025 01:27) (72 - 72)  RR: 20 (23 Jan 2025 01:27) (18 - 20)  SpO2: 99% (23 Jan 2025 01:27) (97% - 99%)    Parameters below as of 23 Jan 2025 01:27  Patient On (Oxygen Delivery Method): room air        CONSTITUTIONAL: Well-groomed, in no apparent distress  EYES: No conjunctival or scleral injection, non-icteric; PERRLA and symmetric  ENMT: No external nasal lesions; nasal mucosa not inflamed; oral mucosa with moist membranes  RESPIRATORY: Breathing comfortably; lungs CTA without wheeze/rhonchi/rales  CARDIOVASCULAR: +S1S2, RRR, no M/G/R; no lower extremity edema  GASTROINTESTINAL: No palpable masses or tenderness, +BS throughout, no rebound/guarding  MUSCULOSKELETAL: Normal gait and station; no digital clubbing or cyanosis; no paraspinal tenderness; no malalignment of extremities  SKIN: No rashes or ulcers noted; no subcutaneous nodules or induration palpable  NEUROLOGIC: CN grossly intact; sensation intact in LEs b/l to light touch; normal strength and tone  PSYCHIATRIC: A+O x 3; mood and affect appropriate; appropriate insight and judgment

## 2025-01-23 NOTE — CONSULT NOTE ADULT - NS ATTEND AMEND GEN_ALL_CORE FT
Agree with above assessment and plan.   Pancreatic cancer s/p C1 gem/abraxane LD 1/16/25 here for fevers found to have GNR bacteremia pseudomonas ESBL. Pancytopenia secondary to chemotherapy also receiving nplate outpatient which she will need to continue as above. She will need growth factor along with abx and IVF. ID consultation. Follow up with Dr Scanlon at [facility] after discharge. No plan for inpatient chemotherapy.

## 2025-01-23 NOTE — ED ADULT NURSE REASSESSMENT NOTE - NS ED NURSE REASSESS COMMENT FT1
Pt received from IVONNE Villafana in [facility] at change of shift, A&Ox3, breathing spontaneously, unlabored & w/o distress on room air. Pt received from IVONNE Villafana in [facility] at change of shift, A&Ox3, breathing spontaneously, unlabored & w/o distress on room air. Breakfast tray provided. Pt found to be febrile and tachy, ACP aware. Medicated with PRN. Pt aware of plan of care no questions at this time.

## 2025-01-23 NOTE — CONSULT NOTE ADULT - SUBJECTIVE AND OBJECTIVE BOX
Reason for consult: pancreatic ca    HPI:  79 year old, Maltese speaking, female with hx of pancreatic cancer s/p Whipple in 2021, pulmonary mets s/p L VATS, TONIA wedge resection and LLL basilar segmentectomy in 11/2024, L partial nephrectomy (2019), HTN, RA, presenting with fevers for the last 2 days. Patient has been receiving chemo treatment weekly (2 doses so far). She was noted to have low WBC, thought to be worsened by methotrexate, so medication has been held. Pt usually takes methotrexate for her RA. She was also recently started on Zarxio given low WBC. Pt's  recently sick with flu. She developed fever up to 104. Pt was advised by her oncologist to come to hospital for further evaluation. At this time, patient without significant complaints. Denies any pain. No nausea, vomiting, constipation, diarrhea. No chest pain, SOB. Pt is ambulatory. In ED, patient noted to be septic. Labs notable for neutropenia. Pt given vancomycin and zosyn. Pt admitted for further management.  (23 Jan 2025 02:55)    Hematology/Oncology called to see patient who follows with Dr Scanlon of [facility] for management of pancreatic ca    PAST MEDICAL & SURGICAL HISTORY:  Arthritis  rheumatoid      Pancreatic cancer      Rheumatoid arthritis      HLD (hyperlipidemia)      Renal cell carcinoma      Gastroparesis      HTN (hypertension)      Lung nodules      Anemia      S/P appendectomy  1969      H/O Whipple procedure      H/O left nephrectomy      History of chemotherapy      H/O therapeutic radiation      History of vascular access device          FAMILY HISTORY:      Alochol: Denied  Smoking: Nonsmoker  Drug Use: Denied  Marital Status:         Allergies    No Known Allergies    Intolerances        MEDICATIONS  (STANDING):  ertapenem  IVPB      filgrastim-sndz (ZARXIO) Injectable 300 MICROGram(s) SubCutaneous daily  folic acid 1 milliGRAM(s) Oral daily  multivitamin 1 Tablet(s) Oral daily  pancrelipase  (CREON 24,000 Lipase Units) 1 Capsule(s) Oral three times a day with meals    MEDICATIONS  (PRN):  acetaminophen     Tablet .. 650 milliGRAM(s) Oral every 6 hours PRN Temp greater or equal to 38C (100.4F), Mild Pain (1 - 3)      ROS  No fever, sweats, chills  No epistaxis, HA, sore throat  No CP, SOB, cough, sputum  No n/v/d, abd pain, melena, hematochezia  No edema  No rash  No anxiety  No back pain, joint pain  No bleeding, bruising  No dysuria, hematuria    T(C): 36.2 (01-23-25 @ 15:09), Max: 38.1 (01-23-25 @ 01:27)  HR: 99 (01-23-25 @ 15:09) (95 - 125)  BP: 90/56 (01-23-25 @ 15:09) (90/56 - 122/72)  RR: 18 (01-23-25 @ 15:09) (16 - 20)  SpO2: 95% (01-23-25 @ 15:09) (95% - 99%)  Wt(kg): --    PE  NAD  Awake, alert  Anicteric, MMM  RRR  CTAB  Abd soft, NT, ND  No c/c/e  No rash grossly  FROM                          8.1    0.52  )-----------( 74       ( 22 Jan 2025 20:59 )             24.1       01-22    132[L]  |  102  |  17  ----------------------------<  156[H]  4.0   |  19[L]  |  0.75    Ca    9.1      22 Jan 2025 20:59    TPro  6.2  /  Alb  3.3  /  TBili  0.3  /  DBili  x   /  AST  60[H]  /  ALT  50[H]  /  AlkPhos  134[H]  01-22  
Patient is a 79y old  Female who presents with a chief complaint of   HPI:  79-year-old female with a history of pancreatic cancer status post Whipple procedure in 2021, now with pulmonary metastases status post left-sided VATS procedure, left upper lobe wedge resection and left lower lobe basilar segment ectomy in November 2024, left partial nephrectomy in 2019, hypertension, rheumatoid arthritis who was admitted to hospital due to fevers.    Patient with findings of pulmonary metastases requiring surgical intervention and chemotherapy.  Most recent chemotherapy last week.  Receives chemotherapy through a right-sided chest port.  Patient previously had noted to have leukopenia and was given Zarxio.  Recent sick contact,  with flu.  Reportedly with a fever up to 104 Fahrenheit at which point patient was referred to the ED for further management.    Patient admitted for neutropenic fever.  Started on vancomycin and piperacillin/tazobactam.  Since admission, patient afebrile.  Otherwise hemodynamically stable on room air.  Labs show pancytopenia with an absolute neutrophil count of 0.04.  BMP with renal function within normal limits, , AST 60, ALT 50.  Urinalysis without strong evidence for infection.  RVP negative on 1/22/2024.  Blood cultures from 1/22/2025 with ESBL Klebsiella.  And 1/23/2025 with coagulase-negative staph in 1 out of 2 bottles.  CT abdomen/pelvis on 1/22/2025 shows no evidence for intra-abdominal pathology, slight increase in bilateral lower lobe nodules, new 7 mm hyperdense lesion in the liver.        prior hospital charts reviewed [ x ]  primary team notes reviewed [ x ]  other consultant notes reviewed [x  ]    PAST MEDICAL & SURGICAL HISTORY:  Arthritis  rheumatoid      Pancreatic cancer      Rheumatoid arthritis      HLD (hyperlipidemia)      Renal cell carcinoma      Gastroparesis      HTN (hypertension)      Lung nodules      Anemia      S/P appendectomy  1969      H/O Whipple procedure      H/O left nephrectomy      History of chemotherapy      H/O therapeutic radiation      History of vascular access device          Allergies  No Known Allergies    ANTIMICROBIALS (past 90 days)  MEDICATIONS  (STANDING):    ertapenem  IVPB   120 mL/Hr IV Intermittent (01-23-25 @ 13:23)    piperacillin/tazobactam IVPB..   25 mL/Hr IV Intermittent (01-23-25 @ 07:54)    piperacillin/tazobactam IVPB...   200 mL/Hr IV Intermittent (01-23-25 @ 00:01)    vancomycin  IVPB.   250 mL/Hr IV Intermittent (01-23-25 @ 00:43)        ertapenem  IVPB      MEDICATIONS  (STANDING):  acetaminophen     Tablet .. 650 every 6 hours PRN  filgrastim-sndz (ZARXIO) Injectable 300 daily  pancrelipase  (CREON 24,000 Lipase Units) 1 three times a day with meals    SOCIAL HISTORY:     Lives at home.   FAMILY HISTORY:  No pertinent family history in first degree relatives  REVIEW OF SYSTEMS  [  ] ROS unobtainable because:    [x  ] All other systems negative except as noted below:	    Constitutional:  [x ] fever [x ] chills  [ ] weight loss  [ ] weakness  Skin:  [ ] rash [ ] phlebitis	  Eyes: [ ] icterus [ ] pain  [ ] discharge	  ENMT: [ ] sore throat  [ ] thrush [ ] ulcers [ ] exudates  Respiratory: [ ] dyspnea [ ] hemoptysis [ ] cough [ ] sputum	  Cardiovascular:  [ ] chest pain [ ] palpitations [ ] edema	  Gastrointestinal:  [ ] nausea [ ] vomiting [ ] diarrhea [ ] constipation [ ] pain	  Genitourinary:  [ ] dysuria [ ] frequency [ ] hematuria [ ] discharge [ ] flank pain  [ ] incontinence  Musculoskeletal:  [ ] myalgias [ ] arthralgias [ ] arthritis  [ ] back pain  Neurological:  [ ] headache [ ] seizures  [ ] confusion/altered mental status  Psychiatric:  [ ] anxiety [ ] depression	  Hematology/Lymphatics:  [ ] lymphadenopathy  Endocrine:  [ ] adrenal [ ] thyroid  Allergic/Immunologic:	 [ ] transplant [ ] seasonal    Vital Signs Last 24 Hrs  T(F): 98.8 (01-23-25 @ 20:23), Max: 100.5 (01-23-25 @ 01:27)  Vital Signs Last 24 Hrs  HR: 72 (01-23-25 @ 17:30) (72 - 125)  BP: 117/72 (01-23-25 @ 20:23) (90/56 - 122/72)  RR: 17 (01-23-25 @ 20:23)  SpO2: 98% (01-23-25 @ 20:23) (95% - 99%)  Wt(kg): --    PHYSICAL EXAM:  Constitutional: comfortable, no distress  HEAD/EYES: anicteric, no conjunctival injection  ENT:  supple, no thrush  Cardiovascular:   normal S1, S2, no murmur, no edema  Respiratory:  clear BS bilaterally, no wheezes, no rales  GI:  soft, non-tender, normal bowel sounds  :  no hartley, no CVA tenderness  Musculoskeletal:  no synovitis, normal ROM  Neurologic: awake and alert, normal strength, no focal findings  Skin: R chest port iwthout evidence for infection, no phlebitis  Heme/Onc: no lymphadenopathy   Psychiatric:  awake, alert, appropriate mood                            8.1    0.52  )-----------( 74       ( 22 Jan 2025 20:59 )             24.1   01-22    132[L]  |  102  |  17  ----------------------------<  156[H]  4.0   |  19[L]  |  0.75    Ca    9.1      22 Jan 2025 20:59    TPro  6.2  /  Alb  3.3  /  TBili  0.3  /  DBili  x   /  AST  60[H]  /  ALT  50[H]  /  AlkPhos  134[H]  01-22    Urinalysis Basic - ( 23 Jan 2025 01:06 )    Color: Yellow / Appearance: Clear / SG: >1.030 / pH: x  Gluc: x / Ketone: Negative mg/dL  / Bili: Negative / Urobili: 1.0 mg/dL   Blood: x / Protein: Trace mg/dL / Nitrite: Negative   Leuk Esterase: Negative / RBC: x / WBC x   Sq Epi: x / Non Sq Epi: x / Bacteria: x    MICROBIOLOGY:  Culture - Blood (collected 23 Jan 2025 01:20)  Source: .Blood BLOOD  Gram Stain (23 Jan 2025 18:53):    Growth in aerobic bottle: Gram Positive Cocci in Clusters  Preliminary Report (23 Jan 2025 18:53):    Growth in aerobic bottle: Gram Positive Cocci in Clusters    Direct identification is available within approximately 3-5    hours either by Blood Panel Multiplexed PCR or Direct    MALDI-TOF. Details: [email or web address]  Organism: Blood Culture PCR (23 Jan 2025 20:16)  Organism: Blood Culture PCR (23 Jan 2025 20:16)      Method Type: PCR      -  Coagulase negative Staphylococcus: Detec    Culture - Blood (collected 22 Jan 2025 20:36)  Source: .Blood BLOOD  Gram Stain (23 Jan 2025 11:08):    Growth in anaerobic bottle: Gram Negative Rods    Growth in aerobic bottle: Gram Negative Rods  Preliminary Report (23 Jan 2025 11:08):    Growth in anaerobic bottle: Gram Negative Rods    Growth in aerobic bottle: Gram Negative Rods    Direct identification is available within approximately 3-5    hours either by Blood Panel Multiplexed PCR or Direct    MALDI-TOF. Details: [email or web address]  Organism: Blood Culture PCR (23 Jan 2025 12:06)  Organism: Blood Culture PCR (23 Jan 2025 12:06)      Method Type: PCR      -  K. pneumoniae group: Detec (K. pneumoniae, K. quasipneumoniae, K. variicola)      -  ESBL: Detec      -  CTX-M Resistance Marker: Detec              Rapid RVP Result: NotDetec (01-22 @ 20:57)      RADIOLOGY:  imaging below personally reviewed and agree with findings

## 2025-01-23 NOTE — ED ADULT NURSE REASSESSMENT NOTE - NS ED NURSE REASSESS COMMENT FT1
Report received from IVONNE SILVA. pt a&ox3 breathing spontaneous and unlabored on RA. IV site flushing without difficulty. Pt made aware of care plan.

## 2025-01-23 NOTE — H&P ADULT - PROBLEM SELECTOR PLAN 1
Neutropenia likely due to chemotherapy. Concern for infection give immuno compromised status.   - S/p vancomycin and zosyn in ED  - Will continue zosyn for empiric treatment  - F/u blood cultures  - UA neg for UTI  - CXR without evidence of pna  - RVP neg for flu, RSV, covid  - Monitor for fever

## 2025-01-23 NOTE — CONSULT NOTE ADULT - ASSESSMENT
79-year-old female with a history of pancreatic cancer status post Whipple procedure in 2021, now with pulmonary metastases status post left-sided VATS procedure, left upper lobe wedge resection and left lower lobe basilar segment ectomy in November 2024, left partial nephrectomy in 2019, hypertension, rheumatoid arthritis who was admitted to hospital due to fevers.    #Klebsiella bacteremia  #Neutropenic fever  #Presence of right sided chest port  #Pancytopenia  #Metastatic pancreatic cancer  #Coagulase-negative staph positive in blood culture    Recommendations  Continue ertapenem given ESBL Klebsiella  Unclear source, may be intra-abdominal given abnormal anatomy due to previous surgery however CT reassuring for no abscess/fluid collection/other pathology  Multiple thoracic surgeries as well - obtain CT scan  Patient also with right-sided chest port, if blood cultures persistently positive with Klebsiella growth–will consider removal  Patient now with coagulase-negative staph in 1 out of 2 bottles, favor contamination at this time  Obtain repeat blood cultures  Oncology input  Guarded prognosis, goals of care discussion recommended  Follow fever curve and WBC can    Yefri Graham MD  Division of Infectious Diseases

## 2025-01-23 NOTE — PHARMACOTHERAPY INTERVENTION NOTE - COMMENTS
SUZY SILVA, 79y Female with K. pneumoniae bacteremia, PCR detected CTX-M (ESBL) resistance gene, source unclear at this time, patient presented with fever and neutropenia. Patient was started on zosyn empirically prior to blood culture results.    Recommendation(s):  Given zosyn is not adequate for ESBL coverage, suggest changing abx to carbapenem therapy (ertapenem 1 gram IV Q24H or meropenem 1 gram IV Q12H) for adequate bacteremia coverage.    With kind regards,  Reji Yanez, PharmD, BCIDP  Infectious Diseases Clinical Pharmacist  Available on Microsoft Teams  .

## 2025-01-24 LAB
CULTURE RESULTS: ABNORMAL
CULTURE RESULTS: SIGNIFICANT CHANGE UP
MRSA PCR RESULT.: SIGNIFICANT CHANGE UP
ORGANISM # SPEC MICROSCOPIC CNT: ABNORMAL
ORGANISM # SPEC MICROSCOPIC CNT: ABNORMAL
S AUREUS DNA NOSE QL NAA+PROBE: SIGNIFICANT CHANGE UP
SPECIMEN SOURCE: SIGNIFICANT CHANGE UP
SPECIMEN SOURCE: SIGNIFICANT CHANGE UP

## 2025-01-24 PROCEDURE — 99232 SBSQ HOSP IP/OBS MODERATE 35: CPT

## 2025-01-24 PROCEDURE — G0545: CPT

## 2025-01-24 RX ORDER — ACETAMINOPHEN 160 MG/5ML
675 SUSPENSION ORAL ONCE
Refills: 0 | Status: COMPLETED | OUTPATIENT
Start: 2025-01-24 | End: 2025-01-24

## 2025-01-24 RX ORDER — ANTISEPTIC SURGICAL SCRUB 0.04 MG/ML
1 SOLUTION TOPICAL DAILY
Refills: 0 | Status: DISCONTINUED | OUTPATIENT
Start: 2025-01-24 | End: 2025-01-29

## 2025-01-24 RX ADMIN — Medication 1 CAPSULE(S): at 12:06

## 2025-01-24 RX ADMIN — Medication 1 TABLET(S): at 12:06

## 2025-01-24 RX ADMIN — Medication 1 MILLIGRAM(S): at 12:06

## 2025-01-24 RX ADMIN — ANTISEPTIC SURGICAL SCRUB 1 APPLICATION(S): 0.04 SOLUTION TOPICAL at 12:07

## 2025-01-24 RX ADMIN — Medication 1 CAPSULE(S): at 18:05

## 2025-01-24 RX ADMIN — FILGRASTIM-SNDZ 300 MICROGRAM(S): 300 INJECTION, SOLUTION INTRAVENOUS; SUBCUTANEOUS at 13:31

## 2025-01-24 RX ADMIN — Medication 1 CAPSULE(S): at 09:25

## 2025-01-24 RX ADMIN — ACETAMINOPHEN 270 MILLIGRAM(S): 160 SUSPENSION ORAL at 06:19

## 2025-01-24 RX ADMIN — ERTAPENEM SODIUM 120 MILLIGRAM(S): 1 INJECTION, POWDER, LYOPHILIZED, FOR SOLUTION INTRAMUSCULAR; INTRAVENOUS at 13:36

## 2025-01-24 NOTE — PROVIDER CONTACT NOTE (SEPSIS SCREENING) - BACKGROUND:
Patient admitted with neutropenic Fever iso ESBL Klebsiella Bacteremia on ertapenem. BCX positive- growth in aerobic bottle gram positive cocci in clusters

## 2025-01-24 NOTE — PROGRESS NOTE ADULT - ASSESSMENT
79 year old, Arabic speaking, female with hx of pancreatic cancer s/p Whipple in 2021, pulmonary mets s/p L VATS, TONIA wedge resection and LLL basilar segmentectomy in 11/2024, L partial nephrectomy (2019), HTN, RA, presenting with fevers for the last 2 days    Pancreatic ca  --[facility]  --w/ metastatic spread to lung  --s/p multiple lines of treatment, currently on C1 gem/abraxane LD: 01/16  --Zarxio given for count recovery on 1/15, 1/20, 1/21, and 1/22  --no plan for treatment inpatient and/or rehab  --ongoing care after discharge    Pancytopenia  --2/2 malignancy and treatment  --on Nplate 45mcg, LD 1/16, next dose scheduled for 02/06  --no nutritional deficiencies noted on outpatient labs from 01/02    Neutropenic fevers  --afebrile since admission  --peripheral blood cultures w/ GNR and GPC in clusters   --ID following, recommending MP removal if blood cultures persistently positive with Klebsiella  --will give Zarxio  inpatient to assist w/ count recovery, check daily CBC w/ diff  --c/o IV abx     will follow    Elsa Ramos NP  Hematology/ Oncology  [facility]  [phone] (office)  [phone] (alt office)  Evenings and weekends please call MD on call or office

## 2025-01-24 NOTE — PROGRESS NOTE ADULT - SUBJECTIVE AND OBJECTIVE BOX
Patient is a 79y old  Female who presents with a chief complaint of Per chart, Pt is a 78 y/o F with PMH: "pancreatic cancer s/p Whipple in 2021, pulmonary mets s/p L VATS, TONIA wedge resection and LLL basilar segmentectomy in 11/2024, L partial nephrectomy (2019), HTN, RA, presenting with fevers for the last 2 days." (24 Jan 2025 10:05)    No acute overnight events  No new complaints offered  Patient seen and examined    MEDICATIONS  (STANDING):  chlorhexidine 2% Cloths 1 Application(s) Topical daily  ertapenem  IVPB      ertapenem  IVPB 1000 milliGRAM(s) IV Intermittent every 24 hours  filgrastim-sndz (ZARXIO) Injectable 300 MICROGram(s) SubCutaneous daily  folic acid 1 milliGRAM(s) Oral daily  multivitamin 1 Tablet(s) Oral daily  pancrelipase  (CREON 24,000 Lipase Units) 1 Capsule(s) Oral three times a day with meals    MEDICATIONS  (PRN):  acetaminophen     Tablet .. 650 milliGRAM(s) Oral every 6 hours PRN Temp greater or equal to 38C (100.4F), Mild Pain (1 - 3)        Vital Signs Last 24 Hrs  T(C): 37.3 (24 Jan 2025 05:15), Max: 37.3 (24 Jan 2025 00:36)  T(F): 99.1 (24 Jan 2025 05:15), Max: 99.2 (24 Jan 2025 00:36)  HR: 102 (24 Jan 2025 07:00) (72 - 120)  BP: 105/65 (24 Jan 2025 05:15) (90/56 - 117/72)  BP(mean): 67 (23 Jan 2025 15:09) (67 - 67)  RR: 18 (24 Jan 2025 05:15) (17 - 18)  SpO2: 97% (24 Jan 2025 05:15) (95% - 99%)    Parameters below as of 24 Jan 2025 05:15  Patient On (Oxygen Delivery Method): room air      PE  Awake, alert  Anicteric, MMM  RRR  CTAB  Abd soft, NT, ND  No c/c                        8.1    0.52  )-----------( 74       ( 22 Jan 2025 20:59 )             24.1       01-22    132[L]  |  102  |  17  ----------------------------<  156[H]  4.0   |  19[L]  |  0.75    Ca    9.1      22 Jan 2025 20:59    TPro  6.2  /  Alb  3.3  /  TBili  0.3  /  DBili  x   /  AST  60[H]  /  ALT  50[H]  /  AlkPhos  134[H]  01-22

## 2025-01-24 NOTE — DIETITIAN INITIAL EVALUATION ADULT - PERTINENT MEDS FT
MEDICATIONS  (STANDING):  chlorhexidine 2% Cloths 1 Application(s) Topical daily  ertapenem  IVPB      ertapenem  IVPB 1000 milliGRAM(s) IV Intermittent every 24 hours  filgrastim-sndz (ZARXIO) Injectable 300 MICROGram(s) SubCutaneous daily  folic acid 1 milliGRAM(s) Oral daily  multivitamin 1 Tablet(s) Oral daily  pancrelipase  (CREON 24,000 Lipase Units) 1 Capsule(s) Oral three times a day with meals    MEDICATIONS  (PRN):  acetaminophen     Tablet .. 650 milliGRAM(s) Oral every 6 hours PRN Temp greater or equal to 38C (100.4F), Mild Pain (1 - 3)

## 2025-01-24 NOTE — PROGRESS NOTE ADULT - SUBJECTIVE AND OBJECTIVE BOX
Follow Up:  bacteremia    Interval History/ROS:    Overnight: No acute events.  Patient remains afebrile.  Otherwise hemodynamically stable on room air.  Latest labs show pancytopenia.    Patient seen examined at bedside.  Comfortable.  Denies any new pain or discomfort.    Allergies  No Known Allergies        ANTIMICROBIALS:  ertapenem  IVPB    ertapenem  IVPB 1000 every 24 hours      OTHER MEDS:  MEDICATIONS  (STANDING):  acetaminophen     Tablet .. 650 every 6 hours PRN  filgrastim-sndz (ZARXIO) Injectable 300 daily  pancrelipase  (CREON 24,000 Lipase Units) 1 three times a day with meals      Vital Signs Last 24 Hrs  T(C): 36.7 (24 Jan 2025 14:01), Max: 37.3 (24 Jan 2025 00:36)  T(F): 98 (24 Jan 2025 14:01), Max: 99.2 (24 Jan 2025 00:36)  HR: 109 (24 Jan 2025 14:01) (72 - 120)  BP: 102/67 (24 Jan 2025 14:01) (92/59 - 117/72)  BP(mean): --  RR: 18 (24 Jan 2025 14:01) (17 - 18)  SpO2: 95% (24 Jan 2025 14:01) (95% - 99%)    Parameters below as of 24 Jan 2025 14:01  Patient On (Oxygen Delivery Method): room air        PHYSICAL EXAM:  Constitutional: comfortable, no distress  HEAD/EYES: anicteric, no conjunctival injection  ENT:  supple, no thrush  Cardiovascular:   normal S1, S2, no murmur, no edema  Respiratory:  clear BS bilaterally, no wheezes, no rales  GI:  soft, non-tender, normal bowel sounds  :  no hartley, no CVA tenderness  Musculoskeletal:  no synovitis, normal ROM  Neurologic: awake and alert, normal strength, no focal findings  Skin: R chest port iwthout evidence for infection, no phlebitis  Heme/Onc: no lymphadenopathy   Psychiatric:  awake, alert, appropriate mood                              8.1    0.52  )-----------( 74       ( 22 Jan 2025 20:59 )             24.1       01-22    132[L]  |  102  |  17  ----------------------------<  156[H]  4.0   |  19[L]  |  0.75    Ca    9.1      22 Jan 2025 20:59    TPro  6.2  /  Alb  3.3  /  TBili  0.3  /  DBili  x   /  AST  60[H]  /  ALT  50[H]  /  AlkPhos  134[H]  01-22      Urinalysis Basic - ( 23 Jan 2025 01:06 )    Color: Yellow / Appearance: Clear / SG: >1.030 / pH: x  Gluc: x / Ketone: Negative mg/dL  / Bili: Negative / Urobili: 1.0 mg/dL   Blood: x / Protein: Trace mg/dL / Nitrite: Negative   Leuk Esterase: Negative / RBC: x / WBC x   Sq Epi: x / Non Sq Epi: x / Bacteria: x        MICROBIOLOGY:  v    Culture - Blood (collected 23 Jan 2025 01:20)  Source: .Blood BLOOD  Gram Stain (23 Jan 2025 18:53):    Growth in aerobic bottle: Gram Positive Cocci in Clusters  Final Report (24 Jan 2025 14:05):    Growth in aerobic bottle: Staphylococcus haemolyticus    Isolation of Coagulase negative Staphylococcus from single blood culture    sets may represent    contamination. Contact the Microbiology Department at [phone] if    susceptibility testing is needed.    clinically indicated.    Direct identification is available within approximately 3-5    hours either by Blood Panel Multiplexed PCR or Direct    MALDI-TOF. Details: [email or web address]  Organism: Blood Culture PCR (24 Jan 2025 14:05)  Organism: Blood Culture PCR (24 Jan 2025 14:05)      Method Type: PCR      -  Coagulase negative Staphylococcus: Detec    Culture - Urine (collected 23 Jan 2025 01:06)  Source: Clean Catch Clean Catch (Midstream)  Final Report (24 Jan 2025 07:39):    <10,000 CFU/mL Normal Urogenital Carmen    Culture - Blood (collected 22 Jan 2025 20:36)  Source: .Blood BLOOD  Gram Stain (23 Jan 2025 11:08):    Growth in anaerobic bottle: Gram Negative Rods    Growth in aerobic bottle: Gram Negative Rods  Preliminary Report (24 Jan 2025 11:56):    Growth in aerobic and anaerobic bottles: Klebsiella pneumoniae    Direct identification is available within approximately 3-5    hours either by Blood Panel Multiplexed PCR or Direct    MALDI-TOF. Details: [email or web address]  Organism: Blood Culture PCR (23 Jan 2025 12:06)  Organism: Blood Culture PCR (23 Jan 2025 12:06)      Method Type: PCR      -  K. pneumoniae group: Detec (K. pneumoniae, K. quasipneumoniae, K. variicola)      -  ESBL: Detec      -  CTX-M Resistance Marker: Detec              Rapid RVP Result: NotDetec (01-22 @ 20:57)        RADIOLOGY:  Imaging reviewed

## 2025-01-24 NOTE — DIETITIAN INITIAL EVALUATION ADULT - ENERGY INTAKE
Fair (50-75%) Pt reports feeling hungry today, waiting for breakfast tray during interview. Food preferences discussed. Pt amenable to trial of high protein gelatein to supplement diet. Not amenable to other oral nutrition supplements at this time. Labs reviewed, hyponatremia noted. On creon 2/2 pancreatic cancer. Stressed importance of small, frequent meals and sips to ease tolerance and promote intake. Discussed protein sources. Pt receptive.

## 2025-01-24 NOTE — PROGRESS NOTE ADULT - PROBLEM SELECTOR PLAN 3
Likely in setting of recent chemo treatment  - Heme Onc following   - continue with zarxio for neutropenia  - monitor CBC w/diff daily

## 2025-01-24 NOTE — DIETITIAN INITIAL EVALUATION ADULT - EDUCATION DIETARY MODIFICATIONS
small frequent meals and snacks to maximize intake, oral nutrition supplements, protein sources; adequate protein/calorie intake/(1) partially meets; needs review/practice/verbalization

## 2025-01-24 NOTE — DIETITIAN INITIAL EVALUATION ADULT - NSFNSADHERENCEPTAFT_GEN_A_CORE
Pt denies following a strict therapeutic diet PTA but states she tries to avoid too much sugar, salt, or greasy/fried foods. HbA1c 5.2% (11/16/24), adequate glycemic control.

## 2025-01-24 NOTE — DIETITIAN INITIAL EVALUATION ADULT - ORAL INTAKE PTA/DIET HISTORY
Chart reviewed, events noted. Pt speaks Mongolian and English, declined offer to call  preferring to conduct interview in English. Pt reports eating 3 meals/day at home but small quantities of food. Dislikes oral nutrition supplements (tried in the past). Denies issues chewing/swallowing. NKFA. Eats mostly vegetarian diet, but states she has tried to eat more meat recently due to doctor's recommendation.

## 2025-01-24 NOTE — DIETITIAN INITIAL EVALUATION ADULT - PERTINENT LABORATORY DATA
01-22    132[L]  |  102  |  17  ----------------------------<  156[H]  4.0   |  19[L]  |  0.75    Ca    9.1      22 Jan 2025 20:59    TPro  6.2  /  Alb  3.3  /  TBili  0.3  /  DBili  x   /  AST  60[H]  /  ALT  50[H]  /  AlkPhos  134[H]  01-22  A1C with Estimated Average Glucose Result: 5.2 % (11-16-24 @ 03:55)  A1C with Estimated Average Glucose Result: 5.9 % (07-08-24 @ 09:00)

## 2025-01-24 NOTE — DIETITIAN INITIAL EVALUATION ADULT - SIGNS/SYMPTOMS
<75% PO x > 1 month, moderate to severe muscle loss, mild to moderate fat loss metastatic pancreatic cancer on chemo

## 2025-01-24 NOTE — PROGRESS NOTE ADULT - SUBJECTIVE AND OBJECTIVE BOX
[facility] Division of Hospital Medicine  Robby Adair MD  Available via MS Teams     SUBJECTIVE / OVERNIGHT EVENTS: No acute events overnight. Patient feeling well overall. No chest pain or SOB. States that sometimes she sweats. No new complaints or concerns at this time. Interviewed with Faroese  ID# [id number].     Review of Systems:   CONSTITUTIONAL: No fever   EYES: No eye pain, visual disturbances, or discharge  ENMT: No difficulty hearing   RESPIRATORY: No SOB. No cough  CARDIOVASCULAR: No chest pain   GASTROINTESTINAL: No abdominal or epigastric pain. No nausea, vomiting, or hematemesis; No diarrhea   GENITOURINARY: No dysuria   NEUROLOGICAL: No headache   SKIN: No itching    MUSCULOSKELETAL: No joint pain or swelling; No muscle or back pain  PSYCHIATRIC: No depression or anxiety  HEME/LYMPH: No easy bruising or bleeding gums    MEDICATIONS  (STANDING):  chlorhexidine 2% Cloths 1 Application(s) Topical daily  ertapenem  IVPB      ertapenem  IVPB 1000 milliGRAM(s) IV Intermittent every 24 hours  filgrastim-sndz (ZARXIO) Injectable 300 MICROGram(s) SubCutaneous daily  folic acid 1 milliGRAM(s) Oral daily  multivitamin 1 Tablet(s) Oral daily  pancrelipase  (CREON 24,000 Lipase Units) 1 Capsule(s) Oral three times a day with meals    MEDICATIONS  (PRN):  acetaminophen     Tablet .. 650 milliGRAM(s) Oral every 6 hours PRN Temp greater or equal to 38C (100.4F), Mild Pain (1 - 3)      I&O's Summary    24 Jan 2025 07:01  -  24 Jan 2025 13:44  --------------------------------------------------------  IN: 240 mL / OUT: 0 mL / NET: 240 mL      PHYSICAL EXAM:  Vital Signs Last 24 Hrs  T(C): 37.3 (24 Jan 2025 05:15), Max: 37.3 (24 Jan 2025 00:36)  T(F): 99.1 (24 Jan 2025 05:15), Max: 99.2 (24 Jan 2025 00:36)  HR: 102 (24 Jan 2025 07:00) (72 - 120)  BP: 105/65 (24 Jan 2025 05:15) (90/56 - 117/72)  BP(mean): 67 (23 Jan 2025 15:09) (67 - 67)  RR: 18 (24 Jan 2025 05:15) (17 - 18)  SpO2: 97% (24 Jan 2025 05:15) (95% - 99%)    Parameters below as of 24 Jan 2025 05:15  Patient On (Oxygen Delivery Method): room air      CONSTITUTIONAL: NAD, well-developed   EYES: PERRLA; conjunctiva and sclera clear  ENMT: Moist oral mucosa, no pharyngeal injection or exudates   NECK: Supple   RESPIRATORY: Normal respiratory effort; lungs are clear to auscultation bilaterally  CARDIOVASCULAR: Regular rate and rhythm, normal S1 and S2   ABDOMEN: Nontender to palpation, normoactive bowel sounds   MUSCULOSKELETAL: no clubbing or cyanosis of digits; no joint swelling or tenderness to palpation  PSYCH: A+O to person, place, and time; affect appropriate  NEUROLOGY: no gross sensory deficits   SKIN: No rashes     LABS:                        8.1    0.52  )-----------( 74       ( 22 Jan 2025 20:59 )             24.1     01-22    132[L]  |  102  |  17  ----------------------------<  156[H]  4.0   |  19[L]  |  0.75    Ca    9.1      22 Jan 2025 20:59    TPro  6.2  /  Alb  3.3  /  TBili  0.3  /  DBili  x   /  AST  60[H]  /  ALT  50[H]  /  AlkPhos  134[H]  01-22    PT/INR - ( 22 Jan 2025 20:59 )   PT: 13.8 sec;   INR: 1.21 ratio         PTT - ( 22 Jan 2025 20:59 )  PTT:36.7 sec      Urinalysis Basic - ( 23 Jan 2025 01:06 )    Color: Yellow / Appearance: Clear / SG: >1.030 / pH: x  Gluc: x / Ketone: Negative mg/dL  / Bili: Negative / Urobili: 1.0 mg/dL   Blood: x / Protein: Trace mg/dL / Nitrite: Negative   Leuk Esterase: Negative / RBC: x / WBC x   Sq Epi: x / Non Sq Epi: x / Bacteria: x        Culture - Blood (collected 23 Jan 2025 01:20)  Source: .Blood BLOOD  Gram Stain (23 Jan 2025 18:53):    Growth in aerobic bottle: Gram Positive Cocci in Clusters  Preliminary Report (23 Jan 2025 18:53):    Growth in aerobic bottle: Gram Positive Cocci in Clusters    Direct identification is available within approximately 3-5    hours either by Blood Panel Multiplexed PCR or Direct    MALDI-TOF. Details: [email or web address]  Organism: Blood Culture PCR (23 Jan 2025 20:16)  Organism: Blood Culture PCR (23 Jan 2025 20:16)    Culture - Urine (collected 23 Jan 2025 01:06)  Source: Clean Catch Clean Catch (Midstream)  Final Report (24 Jan 2025 07:39):    <10,000 CFU/mL Normal Urogenital Carmen    Culture - Blood (collected 22 Jan 2025 20:36)  Source: .Blood BLOOD  Gram Stain (23 Jan 2025 11:08):    Growth in anaerobic bottle: Gram Negative Rods    Growth in aerobic bottle: Gram Negative Rods  Preliminary Report (24 Jan 2025 11:56):    Growth in aerobic and anaerobic bottles: Klebsiella pneumoniae    Direct identification is available within approximately 3-5    hours either by Blood Panel Multiplexed PCR or Direct    MALDI-TOF. Details: [email or web address]  Organism: Blood Culture PCR (23 Jan 2025 12:06)  Organism: Blood Culture PCR (23 Jan 2025 12:06)      SARS-CoV-2: NotDetec (22 Jan 2025 20:57)      RADIOLOGY & ADDITIONAL TESTS:  New Imaging Personally Reviewed Today:  New Electrocardiogram Personally Reviewed Today:  Other Results Reviewed Today:   Prior or Outpatient Records Reviewed Today with Summary:    COORDINATION OF CARE:  Consultant Communication and Details of Discussion (where applicable):

## 2025-01-24 NOTE — DIETITIAN INITIAL EVALUATION ADULT - OTHER INFO
dosing wt: 44.9 kg (1/23)  wt hx per Kendy HIE: 45.4 kg (1/8), 46.3 kg (12/4/24), 45.4 kg (10/16/24), 46.3 kg (7/8/24), 45.8 kg (12/19/23)  reported UBW: 46.4 kg, endorses wt loss since resuming chemo  *Pt with 3% wt loss x 6 months, not clinically significant

## 2025-01-24 NOTE — PROGRESS NOTE ADULT - PROBLEM SELECTOR PLAN 2
Hx of pancreatic cancer on chemotherapy  - Continue pancrelipase   - heme/onc recs appreciated   - Outpatient follow up with oncologist for continued treatment

## 2025-01-24 NOTE — PROGRESS NOTE ADULT - ASSESSMENT
79-year-old female with a history of pancreatic cancer status post Whipple procedure in 2021, now with pulmonary metastases status post left-sided VATS procedure, left upper lobe wedge resection and left lower lobe basilar segment ectomy in November 2024, left partial nephrectomy in 2019, hypertension, rheumatoid arthritis who was admitted to hospital due to fevers.    #Klebsiella bacteremia  #Neutropenic fever  #Presence of right sided chest port  #Pancytopenia  #Metastatic pancreatic cancer  #Coagulase-negative staph positive in blood culture    Recommendations  Continue ertapenem given ESBL Klebsiella  Unclear source, may be intra-abdominal given abnormal anatomy due to previous surgery however CT reassuring for no abscess/fluid collection/other pathology  Multiple thoracic surgeries as well - obtain CT scan  Patient also with right-sided chest port, if blood cultures persistently positive with Klebsiella growth–  consider removal  Patient now with coagulase-negative staph in 1 out of 2 bottles, favor contamination at this time  Follow repeat blood cultures  Oncology input  Guarded prognosis, goals of care discussion recommended  Follow fever curve and WBC count    Yefri Graham MD  Division of Infectious Diseases

## 2025-01-24 NOTE — PROGRESS NOTE ADULT - ASSESSMENT
79 year old, Mongolian speaking, female with hx of pancreatic cancer s/p Whipple in 2021, pulmonary mets s/p L VATS, TONIA wedge resection and LLL basilar segmentectomy in 11/2024, L partial nephrectomy (2019), HTN, RA, presenting with fevers, found to have neutropenic sepsis, now admitted for further infectious workup and management.

## 2025-01-24 NOTE — DIETITIAN INITIAL EVALUATION ADULT - ADD RECOMMEND
1) continue regular diet  2) recommend prosource gelatein plus 1x/day to supplement diet  3) Malnutrition sticker placed, RD to follow-up as per protocol  4) continue MVI daily  5) Monitor PO intake, weight, labs, skin, GI status, diet

## 2025-01-25 LAB
-  AMPICILLIN/SULBACTAM: SIGNIFICANT CHANGE UP
-  AMPICILLIN: SIGNIFICANT CHANGE UP
-  AZTREONAM: SIGNIFICANT CHANGE UP
-  CEFAZOLIN: SIGNIFICANT CHANGE UP
-  CEFEPIME: SIGNIFICANT CHANGE UP
-  CEFTRIAXONE: SIGNIFICANT CHANGE UP
-  CIPROFLOXACIN: SIGNIFICANT CHANGE UP
-  ERTAPENEM: SIGNIFICANT CHANGE UP
-  GENTAMICIN: SIGNIFICANT CHANGE UP
-  IMIPENEM: SIGNIFICANT CHANGE UP
-  LEVOFLOXACIN: SIGNIFICANT CHANGE UP
-  MEROPENEM: SIGNIFICANT CHANGE UP
-  PIPERACILLIN/TAZOBACTAM: SIGNIFICANT CHANGE UP
-  TOBRAMYCIN: SIGNIFICANT CHANGE UP
-  TRIMETHOPRIM/SULFAMETHOXAZOLE: SIGNIFICANT CHANGE UP
ACANTHOCYTES BLD QL SMEAR: SLIGHT — SIGNIFICANT CHANGE UP
ADD ON TEST-SPECIMEN IN LAB: SIGNIFICANT CHANGE UP
ALBUMIN SERPL ELPH-MCNC: 2.6 G/DL — LOW (ref 3.3–5)
ALP SERPL-CCNC: 113 U/L — SIGNIFICANT CHANGE UP (ref 40–120)
ALT FLD-CCNC: 25 U/L — SIGNIFICANT CHANGE UP (ref 10–45)
ANION GAP SERPL CALC-SCNC: 11 MMOL/L — SIGNIFICANT CHANGE UP (ref 5–17)
ANISOCYTOSIS BLD QL: SLIGHT — SIGNIFICANT CHANGE UP
AST SERPL-CCNC: 15 U/L — SIGNIFICANT CHANGE UP (ref 10–40)
BASOPHILS # BLD AUTO: 0.05 K/UL — SIGNIFICANT CHANGE UP (ref 0–0.2)
BASOPHILS NFR BLD AUTO: 0.9 % — SIGNIFICANT CHANGE UP (ref 0–2)
BILIRUB SERPL-MCNC: 0.2 MG/DL — SIGNIFICANT CHANGE UP (ref 0.2–1.2)
BUN SERPL-MCNC: 15 MG/DL — SIGNIFICANT CHANGE UP (ref 7–23)
BURR CELLS BLD QL SMEAR: PRESENT — SIGNIFICANT CHANGE UP
CALCIUM SERPL-MCNC: 8.8 MG/DL — SIGNIFICANT CHANGE UP (ref 8.4–10.5)
CHLORIDE SERPL-SCNC: 107 MMOL/L — SIGNIFICANT CHANGE UP (ref 96–108)
CO2 SERPL-SCNC: 22 MMOL/L — SIGNIFICANT CHANGE UP (ref 22–31)
CREAT SERPL-MCNC: 0.92 MG/DL — SIGNIFICANT CHANGE UP (ref 0.5–1.3)
CULTURE RESULTS: ABNORMAL
EGFR: 63 ML/MIN/1.73M2 — SIGNIFICANT CHANGE UP
ELLIPTOCYTES BLD QL SMEAR: SLIGHT — SIGNIFICANT CHANGE UP
EOSINOPHIL # BLD AUTO: 0.05 K/UL — SIGNIFICANT CHANGE UP (ref 0–0.5)
EOSINOPHIL NFR BLD AUTO: 0.9 % — SIGNIFICANT CHANGE UP (ref 0–6)
GIANT PLATELETS BLD QL SMEAR: PRESENT — SIGNIFICANT CHANGE UP
GLUCOSE SERPL-MCNC: 96 MG/DL — SIGNIFICANT CHANGE UP (ref 70–99)
HCT VFR BLD CALC: 22.3 % — LOW (ref 34.5–45)
HGB BLD-MCNC: 7.3 G/DL — LOW (ref 11.5–15.5)
LYMPHOCYTES # BLD AUTO: 0.69 K/UL — LOW (ref 1–3.3)
LYMPHOCYTES # BLD AUTO: 11.5 % — LOW (ref 13–44)
MACROCYTES BLD QL: SLIGHT — SIGNIFICANT CHANGE UP
MAGNESIUM SERPL-MCNC: 2.1 MG/DL — SIGNIFICANT CHANGE UP (ref 1.6–2.6)
MANUAL SMEAR VERIFICATION: SIGNIFICANT CHANGE UP
MCHC RBC-ENTMCNC: 32.4 PG — SIGNIFICANT CHANGE UP (ref 27–34)
MCHC RBC-ENTMCNC: 32.7 G/DL — SIGNIFICANT CHANGE UP (ref 32–36)
MCV RBC AUTO: 99.1 FL — SIGNIFICANT CHANGE UP (ref 80–100)
METAMYELOCYTES # FLD: 1.8 % — HIGH (ref 0–0)
METAMYELOCYTES NFR BLD: 1.8 % — HIGH (ref 0–0)
METHOD TYPE: SIGNIFICANT CHANGE UP
MONOCYTES # BLD AUTO: 0.58 K/UL — SIGNIFICANT CHANGE UP (ref 0–0.9)
MONOCYTES NFR BLD AUTO: 9.7 % — SIGNIFICANT CHANGE UP (ref 2–14)
MYELOCYTES NFR BLD: 0.9 % — HIGH (ref 0–0)
NEUTROPHILS # BLD AUTO: 4.32 K/UL — SIGNIFICANT CHANGE UP (ref 1.8–7.4)
NEUTROPHILS NFR BLD AUTO: 68.1 % — SIGNIFICANT CHANGE UP (ref 43–77)
NEUTS BAND # BLD: 3.5 % — SIGNIFICANT CHANGE UP (ref 0–8)
NEUTS BAND NFR BLD: 3.5 % — SIGNIFICANT CHANGE UP (ref 0–8)
ORGANISM # SPEC MICROSCOPIC CNT: ABNORMAL
PHOSPHATE SERPL-MCNC: 3.1 MG/DL — SIGNIFICANT CHANGE UP (ref 2.5–4.5)
PLAT MORPH BLD: NORMAL — SIGNIFICANT CHANGE UP
PLATELET # BLD AUTO: 105 K/UL — LOW (ref 150–400)
POIKILOCYTOSIS BLD QL AUTO: SLIGHT — SIGNIFICANT CHANGE UP
POTASSIUM SERPL-MCNC: 4 MMOL/L — SIGNIFICANT CHANGE UP (ref 3.5–5.3)
POTASSIUM SERPL-SCNC: 4 MMOL/L — SIGNIFICANT CHANGE UP (ref 3.5–5.3)
PROT SERPL-MCNC: 5.4 G/DL — LOW (ref 6–8.3)
RBC # BLD: 2.25 M/UL — LOW (ref 3.8–5.2)
RBC # FLD: 15.7 % — HIGH (ref 10.3–14.5)
RBC BLD AUTO: ABNORMAL
SCHISTOCYTES BLD QL AUTO: SLIGHT — SIGNIFICANT CHANGE UP
SODIUM SERPL-SCNC: 140 MMOL/L — SIGNIFICANT CHANGE UP (ref 135–145)
SPECIMEN SOURCE: SIGNIFICANT CHANGE UP
TOXIC GRANULES BLD QL SMEAR: PRESENT — SIGNIFICANT CHANGE UP
VARIANT LYMPHS # BLD: 2.7 % — SIGNIFICANT CHANGE UP (ref 0–6)
VARIANT LYMPHS NFR BLD MANUAL: 2.7 % — SIGNIFICANT CHANGE UP (ref 0–6)
WBC # BLD: 6.03 K/UL — SIGNIFICANT CHANGE UP (ref 3.8–10.5)
WBC # FLD AUTO: 6.03 K/UL — SIGNIFICANT CHANGE UP (ref 3.8–10.5)

## 2025-01-25 PROCEDURE — G0545: CPT

## 2025-01-25 PROCEDURE — 99232 SBSQ HOSP IP/OBS MODERATE 35: CPT

## 2025-01-25 RX ADMIN — Medication 1 CAPSULE(S): at 17:29

## 2025-01-25 RX ADMIN — Medication 1 MILLIGRAM(S): at 09:35

## 2025-01-25 RX ADMIN — ERTAPENEM SODIUM 120 MILLIGRAM(S): 1 INJECTION, POWDER, LYOPHILIZED, FOR SOLUTION INTRAMUSCULAR; INTRAVENOUS at 12:31

## 2025-01-25 RX ADMIN — Medication 1 TABLET(S): at 09:35

## 2025-01-25 RX ADMIN — ANTISEPTIC SURGICAL SCRUB 1 APPLICATION(S): 0.04 SOLUTION TOPICAL at 09:36

## 2025-01-25 RX ADMIN — Medication 1 CAPSULE(S): at 09:37

## 2025-01-25 RX ADMIN — FILGRASTIM-SNDZ 300 MICROGRAM(S): 300 INJECTION, SOLUTION INTRAVENOUS; SUBCUTANEOUS at 12:31

## 2025-01-25 RX ADMIN — Medication 1 CAPSULE(S): at 12:30

## 2025-01-25 NOTE — PROGRESS NOTE ADULT - ASSESSMENT
79 year old, Lithuanian speaking, female with hx of pancreatic cancer s/p Whipple in 2021, pulmonary mets s/p L VATS, TONIA wedge resection and LLL basilar segmentectomy in 11/2024, L partial nephrectomy (2019), HTN, RA, presenting with fevers for the last 2 days    Pancreatic ca  --[facility]  --w/ metastatic spread to lung  --s/p multiple lines of treatment, currently on C1 gem/abraxane LD: 01/16  --Zarxio given for count recovery, please continue for one more day. Can d/c on 1/26  --no plan for treatment inpatient and/or rehab  --ongoing care after discharge    Pancytopenia  --2/2 malignancy and treatment  --on Nplate 45mcg, LD 1/16, next dose scheduled for 02/06  --no nutritional deficiencies noted on outpatient labs from 01/02    Neutropenic fevers  --afebrile since admission  --peripheral blood cultures w/ GNR and GPC in clusters   --ID following, recommending MP removal if blood cultures persistently positive with Klebsiella  -- Neutropenia has resolved with zarxio, one more dose today 1/25 then can d/c   --c/o IV abx     will follow    Yannick Taylor MD   Hematology/ Oncology  [facility]  [phone] (office)  [phone] (alt office)  Evenings and weekends please call MD on call or office

## 2025-01-25 NOTE — PROGRESS NOTE ADULT - SUBJECTIVE AND OBJECTIVE BOX
Follow Up:  bacteremia    Interval History/ROS:  Overnight: No acute events.  Patient remains afebrile.  Otherwise hemodynamically stable on room air.  Latest labs show no leukocytosis, normalized neutrophil count, normalized WBC.  BMP with renal function within normal limits, Paddock function within normal limits.  Blood cultures from 1/24/2025 are negative to date.  Nares negative.    Patient seen examined at bedside.  No new complaints.    Allergies  No Known Allergies        ANTIMICROBIALS:  ertapenem  IVPB    ertapenem  IVPB 1000 every 24 hours      OTHER MEDS:  MEDICATIONS  (STANDING):  acetaminophen     Tablet .. 650 every 6 hours PRN  filgrastim-sndz (ZARXIO) Injectable 300 daily  pancrelipase  (CREON 24,000 Lipase Units) 1 three times a day with meals      Vital Signs Last 24 Hrs  T(C): 36.7 (25 Jan 2025 14:49), Max: 37 (25 Jan 2025 05:26)  T(F): 98 (25 Jan 2025 14:49), Max: 98.6 (25 Jan 2025 05:26)  HR: 95 (25 Jan 2025 14:49) (92 - 106)  BP: 105/66 (25 Jan 2025 14:49) (102/66 - 121/71)  BP(mean): --  RR: 18 (25 Jan 2025 14:49) (18 - 18)  SpO2: 98% (25 Jan 2025 14:49) (98% - 99%)    Parameters below as of 25 Jan 2025 14:49  Patient On (Oxygen Delivery Method): room air        PHYSICAL EXAM:  Constitutional: comfortable, no distress  HEAD/EYES: anicteric, no conjunctival injection  ENT:  supple, no thrush  Cardiovascular:   normal S1, S2, no murmur, no edema  Respiratory:  clear BS bilaterally, no wheezes, no rales  GI:  soft, non-tender, normal bowel sounds  :  no hartley, no CVA tenderness  Musculoskeletal:  no synovitis, normal ROM  Neurologic: awake and alert, normal strength, no focal findings  Skin: R chest port iwthout evidence for infection, no phlebitis  Heme/Onc: no lymphadenopathy   Psychiatric:  awake, alert, appropriate mood                                    7.3    6.03  )-----------( 105      ( 25 Jan 2025 07:13 )             22.3       01-25    140  |  107  |  15  ----------------------------<  96  4.0   |  22  |  0.92    Ca    8.8      25 Jan 2025 07:13  Phos  3.1     01-25  Mg     2.1     01-25    TPro  5.4[L]  /  Alb  2.6[L]  /  TBili  0.2  /  DBili  x   /  AST  15  /  ALT  25  /  AlkPhos  113  01-25      Urinalysis Basic - ( 25 Jan 2025 07:13 )    Color: x / Appearance: x / SG: x / pH: x  Gluc: 96 mg/dL / Ketone: x  / Bili: x / Urobili: x   Blood: x / Protein: x / Nitrite: x   Leuk Esterase: x / RBC: x / WBC x   Sq Epi: x / Non Sq Epi: x / Bacteria: x        MICROBIOLOGY:  v    Culture - Blood (collected 24 Jan 2025 07:13)  Source: .Blood BLOOD  Preliminary Report (25 Jan 2025 12:01):    No growth at 24 hours    Culture - Blood (collected 24 Jan 2025 07:13)  Source: .Blood BLOOD  Preliminary Report (25 Jan 2025 12:01):    No growth at 24 hours    Culture - Blood (collected 23 Jan 2025 01:20)  Source: .Blood BLOOD  Gram Stain (23 Jan 2025 18:53):    Growth in aerobic bottle: Gram Positive Cocci in Clusters  Final Report (24 Jan 2025 14:05):    Growth in aerobic bottle: Staphylococcus haemolyticus    Isolation of Coagulase negative Staphylococcus from single blood culture    sets may represent    contamination. Contact the Microbiology Department at [phone] if    susceptibility testing is needed.    clinically indicated.    Direct identification is available within approximately 3-5    hours either by Blood Panel Multiplexed PCR or Direct    MALDI-TOF. Details: [email or web address]  Organism: Blood Culture PCR (24 Jan 2025 14:05)  Organism: Blood Culture PCR (24 Jan 2025 14:05)      -  Coagulase negative Staphylococcus: Detec      Method Type: PCR    Culture - Urine (collected 23 Jan 2025 01:06)  Source: Clean Catch Clean Catch (Midstream)  Final Report (24 Jan 2025 07:39):    <10,000 CFU/mL Normal Urogenital Carmen    Culture - Blood (collected 22 Jan 2025 20:36)  Source: .Blood BLOOD  Gram Stain (23 Jan 2025 11:08):    Growth in anaerobic bottle: Gram Negative Rods    Growth in aerobic bottle: Gram Negative Rods  Final Report (25 Jan 2025 11:08):    Growth in aerobic and anaerobic bottles: Klebsiella pneumoniae ESBL    Direct identification is available within approximately 3-5    hours either by Blood Panel Multiplexed PCR or Direct    MALDI-TOF. Details: [email or web address]  Organism: Blood Culture PCR  Klebsiella pneumoniae ESBL (25 Jan 2025 11:08)  Organism: Klebsiella pneumoniae ESBL (25 Jan 2025 11:08)      -  Levofloxacin: S <=0.5      -  Tobramycin: S <=2      -  Aztreonam: R >16      -  Gentamicin: S <=2      -  Cefazolin: R >16      -  Cefepime: R >16      -  Piperacillin/Tazobactam: R <=8      -  Ciprofloxacin: I 0.5      -  Imipenem: S <=1      -  Ceftriaxone: R >32      -  Ampicillin: R >16 These ampicillin results predict results for amoxicillin      Method Type: CHAI      -  Meropenem: S <=1      -  Ampicillin/Sulbactam: R 8/4      -  Trimethoprim/Sulfamethoxazole: R >2/38      -  Ertapenem: S <=0.5  Organism: Blood Culture PCR (25 Jan 2025 11:08)      -  CTX-M Resistance Marker: Detec      Method Type: PCR      -  K. pneumoniae group: Detec (K. pneumoniae, K. quasipneumoniae, K. variicola)      -  ESBL: Detec              Rapid RVP Result: Mei (01-22 @ 20:57)        RADIOLOGY:  Imaging reviewed

## 2025-01-25 NOTE — PROGRESS NOTE ADULT - SUBJECTIVE AND OBJECTIVE BOX
Luis Daniel Banda M.D.  Division of Hospital Medicine   Available via MS Teams    Patient is a 79y old  Female who presents with a chief complaint of fevers (24 Jan 2025 12:16)      SUBJECTIVE / OVERNIGHT EVENTS: No new events ON. Afebrile    ADDITIONAL REVIEW OF SYSTEMS:    MEDICATIONS  (STANDING):  chlorhexidine 2% Cloths 1 Application(s) Topical daily  ertapenem  IVPB      ertapenem  IVPB 1000 milliGRAM(s) IV Intermittent every 24 hours  filgrastim-sndz (ZARXIO) Injectable 300 MICROGram(s) SubCutaneous daily  folic acid 1 milliGRAM(s) Oral daily  multivitamin 1 Tablet(s) Oral daily  pancrelipase  (CREON 24,000 Lipase Units) 1 Capsule(s) Oral three times a day with meals    MEDICATIONS  (PRN):  acetaminophen     Tablet .. 650 milliGRAM(s) Oral every 6 hours PRN Temp greater or equal to 38C (100.4F), Mild Pain (1 - 3)      CAPILLARY BLOOD GLUCOSE        I&O's Summary    24 Jan 2025 07:01  -  25 Jan 2025 07:00  --------------------------------------------------------  IN: 770 mL / OUT: 0 mL / NET: 770 mL        PHYSICAL EXAM:  Vital Signs Last 24 Hrs  T(C): 37 (25 Jan 2025 05:26), Max: 37 (25 Jan 2025 05:26)  T(F): 98.6 (25 Jan 2025 05:26), Max: 98.6 (25 Jan 2025 05:26)  HR: 100 (25 Jan 2025 05:26) (92 - 106)  BP: 108/68 (25 Jan 2025 05:26) (102/66 - 121/71)  BP(mean): --  RR: 18 (25 Jan 2025 05:26) (18 - 18)  SpO2: 98% (25 Jan 2025 05:26) (98% - 99%)    Parameters below as of 25 Jan 2025 05:26  Patient On (Oxygen Delivery Method): room air        CONSTITUTIONAL: NAD, well-developed  RESPIRATORY: Normal respiratory effort; lungs are clear to auscultation bilaterally  CARDIOVASCULAR: Regular rate and rhythm, normal S1 and S2, no murmur/rub/gallop; No lower extremity edema; Peripheral pulses are 2+ bilaterally  ABDOMEN: Nontender to palpation, normoactive bowel sounds, no rebound/guarding; No hepatosplenomegaly  MUSCLOSKELETAL: no clubbing or cyanosis of digits; no joint swelling or tenderness to palpation  PSYCH: A+O to person, place, and time; affect appropriate    LABS:                        7.3    6.03  )-----------( 105      ( 25 Jan 2025 07:13 )             22.3     01-25    140  |  107  |  15  ----------------------------<  96  4.0   |  22  |  0.92    Ca    8.8      25 Jan 2025 07:13  Phos  3.1     01-25  Mg     2.1     01-25    TPro  5.4[L]  /  Alb  2.6[L]  /  TBili  0.2  /  DBili  x   /  AST  15  /  ALT  25  /  AlkPhos  113  01-25          Urinalysis Basic - ( 25 Jan 2025 07:13 )    Color: x / Appearance: x / SG: x / pH: x  Gluc: 96 mg/dL / Ketone: x  / Bili: x / Urobili: x   Blood: x / Protein: x / Nitrite: x   Leuk Esterase: x / RBC: x / WBC x   Sq Epi: x / Non Sq Epi: x / Bacteria: x        Culture - Blood (collected 24 Jan 2025 07:13)  Source: .Blood BLOOD  Preliminary Report (25 Jan 2025 12:01):    No growth at 24 hours    Culture - Blood (collected 24 Jan 2025 07:13)  Source: .Blood BLOOD  Preliminary Report (25 Jan 2025 12:01):    No growth at 24 hours    Culture - Blood (collected 23 Jan 2025 01:20)  Source: .Blood BLOOD  Gram Stain (23 Jan 2025 18:53):    Growth in aerobic bottle: Gram Positive Cocci in Clusters  Final Report (24 Jan 2025 14:05):    Growth in aerobic bottle: Staphylococcus haemolyticus    Isolation of Coagulase negative Staphylococcus from single blood culture    sets may represent    contamination. Contact the Microbiology Department at [phone] if    susceptibility testing is needed.    clinically indicated.    Direct identification is available within approximately 3-5    hours either by Blood Panel Multiplexed PCR or Direct    MALDI-TOF. Details: [email or web address]  Organism: Blood Culture PCR (24 Jan 2025 14:05)  Organism: Blood Culture PCR (24 Jan 2025 14:05)    Culture - Urine (collected 23 Jan 2025 01:06)  Source: Clean Catch Clean Catch (Midstream)  Final Report (24 Jan 2025 07:39):    <10,000 CFU/mL Normal Urogenital Carmen    Culture - Blood (collected 22 Jan 2025 20:36)  Source: .Blood BLOOD  Gram Stain (23 Jan 2025 11:08):    Growth in anaerobic bottle: Gram Negative Rods    Growth in aerobic bottle: Gram Negative Rods  Final Report (25 Jan 2025 11:08):    Growth in aerobic and anaerobic bottles: Klebsiella pneumoniae ESBL    Direct identification is available within approximately 3-5    hours either by Blood Panel Multiplexed PCR or Direct    MALDI-TOF. Details: [email or web address]  Organism: Blood Culture PCR  Klebsiella pneumoniae ESBL (25 Jan 2025 11:08)  Organism: Klebsiella pneumoniae ESBL (25 Jan 2025 11:08)  Organism: Blood Culture PCR (25 Jan 2025 11:08)        RADIOLOGY & ADDITIONAL TESTS:  Results Reviewed:   Imaging Personally Reviewed:  Electrocardiogram Personally Reviewed:    COORDINATION OF CARE:  Care Discussed with Consultants/Other Providers [Y/N]:  Prior or Outpatient Records Reviewed [Y/N]:

## 2025-01-25 NOTE — PROGRESS NOTE ADULT - SUBJECTIVE AND OBJECTIVE BOX
INTERVAL HPI/OVERNIGHT EVENTS:  Patient S&E at bedside. No o/n events. Patient feeling well, no complaints. WBC rebounded with zarxio.     VITAL SIGNS:  T(F): 98.6 (01-25-25 @ 05:26)  HR: 100 (01-25-25 @ 05:26)  BP: 108/68 (01-25-25 @ 05:26)  RR: 18 (01-25-25 @ 05:26)  SpO2: 98% (01-25-25 @ 05:26)  Wt(kg): --    PHYSICAL EXAM:    Constitutional: NAD  Eyes: EOMI, sclera non-icteric  Neck: supple  Respiratory: CTAB, no wheezes or crackles   Cardiovascular: RRR  Gastrointestinal: soft, NTND, + BS  Extremities: no cyanosis, clubbing or edema   Neurological: awake and alert      MEDICATIONS  (STANDING):  chlorhexidine 2% Cloths 1 Application(s) Topical daily  ertapenem  IVPB      ertapenem  IVPB 1000 milliGRAM(s) IV Intermittent every 24 hours  filgrastim-sndz (ZARXIO) Injectable 300 MICROGram(s) SubCutaneous daily  folic acid 1 milliGRAM(s) Oral daily  multivitamin 1 Tablet(s) Oral daily  pancrelipase  (CREON 24,000 Lipase Units) 1 Capsule(s) Oral three times a day with meals    MEDICATIONS  (PRN):  acetaminophen     Tablet .. 650 milliGRAM(s) Oral every 6 hours PRN Temp greater or equal to 38C (100.4F), Mild Pain (1 - 3)      Allergies    No Known Allergies    Intolerances        LABS:                        7.3    6.03  )-----------( 105      ( 25 Jan 2025 07:13 )             22.3     01-25    140  |  107  |  15  ----------------------------<  96  4.0   |  22  |  0.92    Ca    8.8      25 Jan 2025 07:13  Phos  3.1     01-25  Mg     2.1     01-25    TPro  5.4[L]  /  Alb  2.6[L]  /  TBili  0.2  /  DBili  x   /  AST  15  /  ALT  25  /  AlkPhos  113  01-25      Urinalysis Basic - ( 25 Jan 2025 07:13 )    Color: x / Appearance: x / SG: x / pH: x  Gluc: 96 mg/dL / Ketone: x  / Bili: x / Urobili: x   Blood: x / Protein: x / Nitrite: x   Leuk Esterase: x / RBC: x / WBC x   Sq Epi: x / Non Sq Epi: x / Bacteria: x        RADIOLOGY & ADDITIONAL TESTS:  Studies reviewed.

## 2025-01-25 NOTE — PROGRESS NOTE ADULT - ASSESSMENT
79-year-old female with a history of pancreatic cancer status post Whipple procedure in 2021, now with pulmonary metastases status post left-sided VATS procedure, left upper lobe wedge resection and left lower lobe basilar segment ectomy in November 2024, left partial nephrectomy in 2019, hypertension, rheumatoid arthritis who was admitted to hospital due to fevers.    #Klebsiella bacteremia  #Neutropenic fever  #Presence of right sided chest port  #Pancytopenia  #Metastatic pancreatic cancer  #Coagulase-negative staph positive in blood culture    Recommendations  Continue ertapenem given ESBL Klebsiella  Unclear source, may be intra-abdominal given abnormal anatomy due to previous surgery however CT reassuring for no abscess/fluid collection/other pathology  Multiple thoracic surgeries as well - obtain CT chest scan  Patient also with right-sided chest port, if blood cultures persistently positive with Klebsiella growth–  consider removal  Patient now with coagulase-negative staph in 1 out of 2 bottles, favor contamination at this time  Follow repeat blood cultures, negative to date 1/24/25  Oncology input  Guarded prognosis, goals of care discussion recommended  Follow fever curve and WBC count    Yefri Graham MD  Division of Infectious Diseases

## 2025-01-25 NOTE — PROGRESS NOTE ADULT - ASSESSMENT
79 year old, Nepali speaking, female with hx of pancreatic cancer s/p Whipple in 2021, pulmonary mets s/p L VATS, TONIA wedge resection and LLL basilar segmentectomy in 11/2024, L partial nephrectomy (2019), HTN, RA, presenting with fevers, found to have neutropenic sepsis, now admitted for further infectious workup and management.

## 2025-01-26 LAB
ALBUMIN SERPL ELPH-MCNC: 2.6 G/DL — LOW (ref 3.3–5)
ALP SERPL-CCNC: 128 U/L — HIGH (ref 40–120)
ALT FLD-CCNC: 23 U/L — SIGNIFICANT CHANGE UP (ref 10–45)
ANION GAP SERPL CALC-SCNC: 12 MMOL/L — SIGNIFICANT CHANGE UP (ref 5–17)
AST SERPL-CCNC: 18 U/L — SIGNIFICANT CHANGE UP (ref 10–40)
BASOPHILS # BLD AUTO: 0.08 K/UL — SIGNIFICANT CHANGE UP (ref 0–0.2)
BASOPHILS NFR BLD AUTO: 0.6 % — SIGNIFICANT CHANGE UP (ref 0–2)
BILIRUB SERPL-MCNC: 0.2 MG/DL — SIGNIFICANT CHANGE UP (ref 0.2–1.2)
BLD GP AB SCN SERPL QL: NEGATIVE — SIGNIFICANT CHANGE UP
BUN SERPL-MCNC: 12 MG/DL — SIGNIFICANT CHANGE UP (ref 7–23)
CALCIUM SERPL-MCNC: 8.8 MG/DL — SIGNIFICANT CHANGE UP (ref 8.4–10.5)
CHLORIDE SERPL-SCNC: 106 MMOL/L — SIGNIFICANT CHANGE UP (ref 96–108)
CO2 SERPL-SCNC: 21 MMOL/L — LOW (ref 22–31)
CREAT SERPL-MCNC: 0.88 MG/DL — SIGNIFICANT CHANGE UP (ref 0.5–1.3)
EGFR: 66 ML/MIN/1.73M2 — SIGNIFICANT CHANGE UP
EOSINOPHIL # BLD AUTO: 0.1 K/UL — SIGNIFICANT CHANGE UP (ref 0–0.5)
EOSINOPHIL NFR BLD AUTO: 0.8 % — SIGNIFICANT CHANGE UP (ref 0–6)
GLUCOSE SERPL-MCNC: 90 MG/DL — SIGNIFICANT CHANGE UP (ref 70–99)
HCT VFR BLD CALC: 22.3 % — LOW (ref 34.5–45)
HGB BLD-MCNC: 7.1 G/DL — LOW (ref 11.5–15.5)
IMM GRANULOCYTES NFR BLD AUTO: 6.9 % — HIGH (ref 0–0.9)
LYMPHOCYTES # BLD AUTO: 1.2 K/UL — SIGNIFICANT CHANGE UP (ref 1–3.3)
LYMPHOCYTES # BLD AUTO: 9.6 % — LOW (ref 13–44)
MAGNESIUM SERPL-MCNC: 2.1 MG/DL — SIGNIFICANT CHANGE UP (ref 1.6–2.6)
MCHC RBC-ENTMCNC: 31.6 PG — SIGNIFICANT CHANGE UP (ref 27–34)
MCHC RBC-ENTMCNC: 31.8 G/DL — LOW (ref 32–36)
MCV RBC AUTO: 99.1 FL — SIGNIFICANT CHANGE UP (ref 80–100)
MONOCYTES # BLD AUTO: 0.93 K/UL — HIGH (ref 0–0.9)
MONOCYTES NFR BLD AUTO: 7.5 % — SIGNIFICANT CHANGE UP (ref 2–14)
NEUTROPHILS # BLD AUTO: 9.29 K/UL — HIGH (ref 1.8–7.4)
NEUTROPHILS NFR BLD AUTO: 74.6 % — SIGNIFICANT CHANGE UP (ref 43–77)
NRBC # BLD: 0 /100 WBCS — SIGNIFICANT CHANGE UP (ref 0–0)
NRBC BLD-RTO: 0 /100 WBCS — SIGNIFICANT CHANGE UP (ref 0–0)
PHOSPHATE SERPL-MCNC: 3.4 MG/DL — SIGNIFICANT CHANGE UP (ref 2.5–4.5)
PLATELET # BLD AUTO: 127 K/UL — LOW (ref 150–400)
POTASSIUM SERPL-MCNC: 3.8 MMOL/L — SIGNIFICANT CHANGE UP (ref 3.5–5.3)
POTASSIUM SERPL-SCNC: 3.8 MMOL/L — SIGNIFICANT CHANGE UP (ref 3.5–5.3)
PROT SERPL-MCNC: 5.3 G/DL — LOW (ref 6–8.3)
RBC # BLD: 2.25 M/UL — LOW (ref 3.8–5.2)
RBC # FLD: 15.8 % — HIGH (ref 10.3–14.5)
RH IG SCN BLD-IMP: POSITIVE — SIGNIFICANT CHANGE UP
SODIUM SERPL-SCNC: 139 MMOL/L — SIGNIFICANT CHANGE UP (ref 135–145)
WBC # BLD: 12.46 K/UL — HIGH (ref 3.8–10.5)
WBC # FLD AUTO: 12.46 K/UL — HIGH (ref 3.8–10.5)

## 2025-01-26 PROCEDURE — 99232 SBSQ HOSP IP/OBS MODERATE 35: CPT

## 2025-01-26 RX ADMIN — ERTAPENEM SODIUM 120 MILLIGRAM(S): 1 INJECTION, POWDER, LYOPHILIZED, FOR SOLUTION INTRAMUSCULAR; INTRAVENOUS at 12:35

## 2025-01-26 RX ADMIN — Medication 1 CAPSULE(S): at 17:22

## 2025-01-26 RX ADMIN — Medication 1 CAPSULE(S): at 09:06

## 2025-01-26 RX ADMIN — Medication 1 MILLIGRAM(S): at 12:37

## 2025-01-26 RX ADMIN — Medication 1 CAPSULE(S): at 12:37

## 2025-01-26 RX ADMIN — Medication 1 TABLET(S): at 12:37

## 2025-01-26 RX ADMIN — ANTISEPTIC SURGICAL SCRUB 1 APPLICATION(S): 0.04 SOLUTION TOPICAL at 12:36

## 2025-01-26 NOTE — PROGRESS NOTE ADULT - SUBJECTIVE AND OBJECTIVE BOX
Patient is a 79y old  Female who presents with a chief complaint of fevers (26 Jan 2025 10:09)    Patient OOB in chair, enjoying breakfast  Afebrile since admission  No acute overnight events and/or complaints offered     MEDICATIONS  (STANDING):  chlorhexidine 2% Cloths 1 Application(s) Topical daily  ertapenem  IVPB      ertapenem  IVPB 1000 milliGRAM(s) IV Intermittent every 24 hours  folic acid 1 milliGRAM(s) Oral daily  multivitamin 1 Tablet(s) Oral daily  pancrelipase  (CREON 24,000 Lipase Units) 1 Capsule(s) Oral three times a day with meals    MEDICATIONS  (PRN):  acetaminophen     Tablet .. 650 milliGRAM(s) Oral every 6 hours PRN Temp greater or equal to 38C (100.4F), Mild Pain (1 - 3)      Vital Signs Last 24 Hrs  T(C): 36.8 (26 Jan 2025 05:15), Max: 36.8 (26 Jan 2025 05:15)  T(F): 98.2 (26 Jan 2025 05:15), Max: 98.2 (26 Jan 2025 05:15)  HR: 84 (26 Jan 2025 05:15) (84 - 98)  BP: 101/65 (26 Jan 2025 05:15) (101/65 - 105/66)  BP(mean): --  RR: 18 (26 Jan 2025 05:15) (18 - 18)  SpO2: 98% (26 Jan 2025 05:15) (98% - 98%)    Parameters below as of 26 Jan 2025 05:15  Patient On (Oxygen Delivery Method): room air      PE  Awake, alert  Anicteric, MMM  RRR  CTAB  Abd soft, NT, ND  No c/c                        7.1    12.46 )-----------( 127      ( 26 Jan 2025 07:13 )             22.3       01-26    139  |  106  |  12  ----------------------------<  90  3.8   |  21[L]  |  0.88    Ca    8.8      26 Jan 2025 07:13  Phos  3.4     01-26  Mg     2.1     01-26    TPro  5.3[L]  /  Alb  2.6[L]  /  TBili  0.2  /  DBili  x   /  AST  18  /  ALT  23  /  AlkPhos  128[H]  01-26

## 2025-01-26 NOTE — PROGRESS NOTE ADULT - ASSESSMENT
79 year old, Maltese speaking, female with hx of pancreatic cancer s/p Whipple in 2021, pulmonary mets s/p L VATS, TONIA wedge resection and LLL basilar segmentectomy in 11/2024, L partial nephrectomy (2019), HTN, RA, presenting with fevers for the last 2 days    Pancreatic ca  --[facility]  --w/ metastatic spread to lung  --s/p multiple lines of treatment, currently on C1 gem/abraxane LD: 01/16  --Zarxio given for count recovery, please continue for one more day. Can d/c on 1/26  --no plan for treatment inpatient and/or rehab  --ongoing care after discharge    Pancytopenia  --2/2 malignancy and treatment  --on Nplate 45mcg, LD 1/16, next dose scheduled for 02/06  --no nutritional deficiencies noted on outpatient labs from 01/02    Neutropenic fevers  --afebrile since admission  --peripheral blood cultures w/ GNR and GPC in clusters   --ID following, recommending MP removal if blood cultures persistently positive with Klebsiella, repeat blood cultures NTD  --Neutropenia has resolved with zarxio, one more dose today 1/25 then can d/c   --c/o IV abx     will follow    Elsa Ramos NP  Hematology/ Oncology  [facility]  [phone] (office)  [phone] (alt office)  Evenings and weekends please call MD on call or office

## 2025-01-26 NOTE — PROGRESS NOTE ADULT - SUBJECTIVE AND OBJECTIVE BOX
Luis Daniel Banda M.D.  Division of Hospital Medicine   Available via MS Teams    Patient is a 79y old  Female who presents with a chief complaint of fevers (25 Jan 2025 14:35)      SUBJECTIVE / OVERNIGHT EVENTS:  Afebrile.   Repeat Blood cx Negative.   Hb 7.1 today    ADDITIONAL REVIEW OF SYSTEMS:    MEDICATIONS  (STANDING):  chlorhexidine 2% Cloths 1 Application(s) Topical daily  ertapenem  IVPB      ertapenem  IVPB 1000 milliGRAM(s) IV Intermittent every 24 hours  folic acid 1 milliGRAM(s) Oral daily  multivitamin 1 Tablet(s) Oral daily  pancrelipase  (CREON 24,000 Lipase Units) 1 Capsule(s) Oral three times a day with meals    MEDICATIONS  (PRN):  acetaminophen     Tablet .. 650 milliGRAM(s) Oral every 6 hours PRN Temp greater or equal to 38C (100.4F), Mild Pain (1 - 3)      CAPILLARY BLOOD GLUCOSE        I&O's Summary      PHYSICAL EXAM:  Vital Signs Last 24 Hrs  T(C): 36.8 (26 Jan 2025 05:15), Max: 36.8 (26 Jan 2025 05:15)  T(F): 98.2 (26 Jan 2025 05:15), Max: 98.2 (26 Jan 2025 05:15)  HR: 84 (26 Jan 2025 05:15) (84 - 98)  BP: 101/65 (26 Jan 2025 05:15) (101/65 - 105/66)  BP(mean): --  RR: 18 (26 Jan 2025 05:15) (18 - 18)  SpO2: 98% (26 Jan 2025 05:15) (98% - 98%)    Parameters below as of 26 Jan 2025 05:15  Patient On (Oxygen Delivery Method): room air        CONSTITUTIONAL: NAD, well-developed  RESPIRATORY: Normal respiratory effort; lungs are clear to auscultation bilaterally  CARDIOVASCULAR: Regular rate and rhythm, normal S1 and S2, no murmur/rub/gallop; No lower extremity edema; Peripheral pulses are 2+ bilaterally  ABDOMEN: Nontender to palpation, normoactive bowel sounds, no rebound/guarding; No hepatosplenomegaly  MUSCLOSKELETAL: no clubbing or cyanosis of digits; no joint swelling or tenderness to palpation  PSYCH: A+O to person, place, and time; affect appropriate  LABS:                        7.1    12.46 )-----------( 127      ( 26 Jan 2025 07:13 )             22.3     01-26    139  |  106  |  12  ----------------------------<  90  3.8   |  21[L]  |  0.88    Ca    8.8      26 Jan 2025 07:13  Phos  3.4     01-26  Mg     2.1     01-26    TPro  5.3[L]  /  Alb  2.6[L]  /  TBili  0.2  /  DBili  x   /  AST  18  /  ALT  23  /  AlkPhos  128[H]  01-26          Urinalysis Basic - ( 26 Jan 2025 07:13 )    Color: x / Appearance: x / SG: x / pH: x  Gluc: 90 mg/dL / Ketone: x  / Bili: x / Urobili: x   Blood: x / Protein: x / Nitrite: x   Leuk Esterase: x / RBC: x / WBC x   Sq Epi: x / Non Sq Epi: x / Bacteria: x        Culture - Blood (collected 24 Jan 2025 07:13)  Source: .Blood BLOOD  Preliminary Report (25 Jan 2025 12:01):    No growth at 24 hours    Culture - Blood (collected 24 Jan 2025 07:13)  Source: .Blood BLOOD  Preliminary Report (25 Jan 2025 12:01):    No growth at 24 hours        RADIOLOGY & ADDITIONAL TESTS:  Results Reviewed:   Imaging Personally Reviewed:  Electrocardiogram Personally Reviewed:    COORDINATION OF CARE:  Care Discussed with Consultants/Other Providers [Y/N]:  Prior or Outpatient Records Reviewed [Y/N]:   Luis Daniel Banda M.D.  Division of Hospital Medicine   Available via MS Teams    Patient is a 79y old  Female who presents with a chief complaint of fevers (25 Jan 2025 14:35)      SUBJECTIVE / OVERNIGHT EVENTS:  Feels Good  Afebrile.   Repeat Blood cx Negative.   Hb 7.1 today    ADDITIONAL REVIEW OF SYSTEMS:    MEDICATIONS  (STANDING):  chlorhexidine 2% Cloths 1 Application(s) Topical daily  ertapenem  IVPB      ertapenem  IVPB 1000 milliGRAM(s) IV Intermittent every 24 hours  folic acid 1 milliGRAM(s) Oral daily  multivitamin 1 Tablet(s) Oral daily  pancrelipase  (CREON 24,000 Lipase Units) 1 Capsule(s) Oral three times a day with meals    MEDICATIONS  (PRN):  acetaminophen     Tablet .. 650 milliGRAM(s) Oral every 6 hours PRN Temp greater or equal to 38C (100.4F), Mild Pain (1 - 3)      CAPILLARY BLOOD GLUCOSE        I&O's Summary      PHYSICAL EXAM:  Vital Signs Last 24 Hrs  T(C): 36.8 (26 Jan 2025 05:15), Max: 36.8 (26 Jan 2025 05:15)  T(F): 98.2 (26 Jan 2025 05:15), Max: 98.2 (26 Jan 2025 05:15)  HR: 84 (26 Jan 2025 05:15) (84 - 98)  BP: 101/65 (26 Jan 2025 05:15) (101/65 - 105/66)  BP(mean): --  RR: 18 (26 Jan 2025 05:15) (18 - 18)  SpO2: 98% (26 Jan 2025 05:15) (98% - 98%)    Parameters below as of 26 Jan 2025 05:15  Patient On (Oxygen Delivery Method): room air        CONSTITUTIONAL: NAD, well-developed  RESPIRATORY: Normal respiratory effort; lungs are clear to auscultation bilaterally  CARDIOVASCULAR: Regular rate and rhythm, normal S1 and S2, no murmur/rub/gallop; No lower extremity edema; Peripheral pulses are 2+ bilaterally  ABDOMEN: Nontender to palpation, normoactive bowel sounds, no rebound/guarding; No hepatosplenomegaly  MUSCLOSKELETAL: no clubbing or cyanosis of digits; no joint swelling or tenderness to palpation  PSYCH: A+O to person, place, and time; affect appropriate  LABS:                        7.1    12.46 )-----------( 127      ( 26 Jan 2025 07:13 )             22.3     01-26    139  |  106  |  12  ----------------------------<  90  3.8   |  21[L]  |  0.88    Ca    8.8      26 Jan 2025 07:13  Phos  3.4     01-26  Mg     2.1     01-26    TPro  5.3[L]  /  Alb  2.6[L]  /  TBili  0.2  /  DBili  x   /  AST  18  /  ALT  23  /  AlkPhos  128[H]  01-26          Urinalysis Basic - ( 26 Jan 2025 07:13 )    Color: x / Appearance: x / SG: x / pH: x  Gluc: 90 mg/dL / Ketone: x  / Bili: x / Urobili: x   Blood: x / Protein: x / Nitrite: x   Leuk Esterase: x / RBC: x / WBC x   Sq Epi: x / Non Sq Epi: x / Bacteria: x        Culture - Blood (collected 24 Jan 2025 07:13)  Source: .Blood BLOOD  Preliminary Report (25 Jan 2025 12:01):    No growth at 24 hours    Culture - Blood (collected 24 Jan 2025 07:13)  Source: .Blood BLOOD  Preliminary Report (25 Jan 2025 12:01):    No growth at 24 hours        RADIOLOGY & ADDITIONAL TESTS:  Results Reviewed:   Imaging Personally Reviewed:  Electrocardiogram Personally Reviewed:    COORDINATION OF CARE:  Care Discussed with Consultants/Other Providers [Y/N]:  Prior or Outpatient Records Reviewed [Y/N]:

## 2025-01-26 NOTE — PROGRESS NOTE ADULT - PROBLEM SELECTOR PLAN 3
Likely in setting of recent chemo treatment  - Heme Onc following   - continue with ZARXIO (GCSF) for neutropenia- per oncology  - on Nplate (Romiplostim) 45mcg, LD 1/16, next dose scheduled for 02/06 Likely in setting of recent chemo treatment  - Heme Onc following   - continue with ZARXIO (GCSF) for neutropenia- per oncology  - on Nplate (Romiplostim) 45mcg, LD 1/16, next dose scheduled for 02/06.  - On folic acid, MVI.

## 2025-01-26 NOTE — PROGRESS NOTE ADULT - PROBLEM SELECTOR PLAN 2
Follows with Dr. Ludwig Scanlon of [facility]  w/ metastatic spread to lung  s/p multiple lines of treatment.  no plan for treatment inpatient and/or rehab per oncology. Follows with Dr. Ludwig Scanlon of [facility]  w/ metastatic spread to lung  s/p multiple lines of treatment.  no plan for treatment inpatient and/or rehab per oncology.  Pancrelipase.

## 2025-01-26 NOTE — PROGRESS NOTE ADULT - ASSESSMENT
79 year old, Vietnamese speaking, female with hx of pancreatic cancer s/p Whipple in 2021, pulmonary mets s/p L VATS, TONIA wedge resection and LLL basilar segmentectomy in 11/2024, L partial nephrectomy (2019), HTN, RA, presenting with fevers, found to have neutropenic sepsis, now admitted for further infectious workup and management.

## 2025-01-27 LAB
ALBUMIN SERPL ELPH-MCNC: 2.7 G/DL — LOW (ref 3.3–5)
ALP SERPL-CCNC: 110 U/L — SIGNIFICANT CHANGE UP (ref 40–120)
ALT FLD-CCNC: 22 U/L — SIGNIFICANT CHANGE UP (ref 10–45)
ANION GAP SERPL CALC-SCNC: 9 MMOL/L — SIGNIFICANT CHANGE UP (ref 5–17)
ANISOCYTOSIS BLD QL: SLIGHT — SIGNIFICANT CHANGE UP
AST SERPL-CCNC: 20 U/L — SIGNIFICANT CHANGE UP (ref 10–40)
BASOPHILS # BLD AUTO: 0.06 K/UL — SIGNIFICANT CHANGE UP (ref 0–0.2)
BASOPHILS NFR BLD AUTO: 0.9 % — SIGNIFICANT CHANGE UP (ref 0–2)
BILIRUB SERPL-MCNC: 0.1 MG/DL — LOW (ref 0.2–1.2)
BUN SERPL-MCNC: 12 MG/DL — SIGNIFICANT CHANGE UP (ref 7–23)
CALCIUM SERPL-MCNC: 8.8 MG/DL — SIGNIFICANT CHANGE UP (ref 8.4–10.5)
CHLORIDE SERPL-SCNC: 108 MMOL/L — SIGNIFICANT CHANGE UP (ref 96–108)
CO2 SERPL-SCNC: 24 MMOL/L — SIGNIFICANT CHANGE UP (ref 22–31)
CREAT SERPL-MCNC: 0.85 MG/DL — SIGNIFICANT CHANGE UP (ref 0.5–1.3)
EGFR: 69 ML/MIN/1.73M2 — SIGNIFICANT CHANGE UP
EOSINOPHIL # BLD AUTO: 0.18 K/UL — SIGNIFICANT CHANGE UP (ref 0–0.5)
EOSINOPHIL NFR BLD AUTO: 2.6 % — SIGNIFICANT CHANGE UP (ref 0–6)
GIANT PLATELETS BLD QL SMEAR: PRESENT — SIGNIFICANT CHANGE UP
GLUCOSE SERPL-MCNC: 102 MG/DL — HIGH (ref 70–99)
HCT VFR BLD CALC: 21.4 % — LOW (ref 34.5–45)
HGB BLD-MCNC: 7.1 G/DL — LOW (ref 11.5–15.5)
LYMPHOCYTES # BLD AUTO: 0.54 K/UL — LOW (ref 1–3.3)
LYMPHOCYTES # BLD AUTO: 7.9 % — LOW (ref 13–44)
MACROCYTES BLD QL: SLIGHT — SIGNIFICANT CHANGE UP
MAGNESIUM SERPL-MCNC: 2.3 MG/DL — SIGNIFICANT CHANGE UP (ref 1.6–2.6)
MANUAL SMEAR VERIFICATION: SIGNIFICANT CHANGE UP
MCHC RBC-ENTMCNC: 32.7 PG — SIGNIFICANT CHANGE UP (ref 27–34)
MCHC RBC-ENTMCNC: 33.2 G/DL — SIGNIFICANT CHANGE UP (ref 32–36)
MCV RBC AUTO: 98.6 FL — SIGNIFICANT CHANGE UP (ref 80–100)
MONOCYTES # BLD AUTO: 0.78 K/UL — SIGNIFICANT CHANGE UP (ref 0–0.9)
MONOCYTES NFR BLD AUTO: 11.4 % — SIGNIFICANT CHANGE UP (ref 2–14)
NEUTROPHILS # BLD AUTO: 5.31 K/UL — SIGNIFICANT CHANGE UP (ref 1.8–7.4)
NEUTROPHILS NFR BLD AUTO: 73.7 % — SIGNIFICANT CHANGE UP (ref 43–77)
NEUTS BAND # BLD: 3.5 % — SIGNIFICANT CHANGE UP (ref 0–8)
NEUTS BAND NFR BLD: 3.5 % — SIGNIFICANT CHANGE UP (ref 0–8)
PHOSPHATE SERPL-MCNC: 3.5 MG/DL — SIGNIFICANT CHANGE UP (ref 2.5–4.5)
PLAT MORPH BLD: NORMAL — SIGNIFICANT CHANGE UP
PLATELET # BLD AUTO: 161 K/UL — SIGNIFICANT CHANGE UP (ref 150–400)
POTASSIUM SERPL-MCNC: 3.9 MMOL/L — SIGNIFICANT CHANGE UP (ref 3.5–5.3)
POTASSIUM SERPL-SCNC: 3.9 MMOL/L — SIGNIFICANT CHANGE UP (ref 3.5–5.3)
PROT SERPL-MCNC: 5.3 G/DL — LOW (ref 6–8.3)
RBC # BLD: 2.17 M/UL — LOW (ref 3.8–5.2)
RBC # FLD: 15.9 % — HIGH (ref 10.3–14.5)
RBC BLD AUTO: SIGNIFICANT CHANGE UP
SODIUM SERPL-SCNC: 141 MMOL/L — SIGNIFICANT CHANGE UP (ref 135–145)
WBC # BLD: 6.88 K/UL — SIGNIFICANT CHANGE UP (ref 3.8–10.5)
WBC # FLD AUTO: 6.88 K/UL — SIGNIFICANT CHANGE UP (ref 3.8–10.5)

## 2025-01-27 PROCEDURE — G0545: CPT

## 2025-01-27 PROCEDURE — 99232 SBSQ HOSP IP/OBS MODERATE 35: CPT

## 2025-01-27 PROCEDURE — 99233 SBSQ HOSP IP/OBS HIGH 50: CPT

## 2025-01-27 RX ADMIN — Medication 1 CAPSULE(S): at 17:38

## 2025-01-27 RX ADMIN — Medication 1 MILLIGRAM(S): at 11:49

## 2025-01-27 RX ADMIN — ANTISEPTIC SURGICAL SCRUB 1 APPLICATION(S): 0.04 SOLUTION TOPICAL at 11:52

## 2025-01-27 RX ADMIN — Medication 1 CAPSULE(S): at 08:40

## 2025-01-27 RX ADMIN — Medication 1 CAPSULE(S): at 11:49

## 2025-01-27 RX ADMIN — ERTAPENEM SODIUM 120 MILLIGRAM(S): 1 INJECTION, POWDER, LYOPHILIZED, FOR SOLUTION INTRAMUSCULAR; INTRAVENOUS at 13:00

## 2025-01-27 RX ADMIN — Medication 1 TABLET(S): at 11:49

## 2025-01-27 NOTE — PROGRESS NOTE ADULT - NSPROGADDITIONALINFOA_GEN_ALL_CORE
updated son  spoke with pt via Italian   spoke with ID attending
Discussed with ACP, Farheen Faulkner

## 2025-01-27 NOTE — PROGRESS NOTE ADULT - SUBJECTIVE AND OBJECTIVE BOX
Follow Up:  bacteremia    Interval History/ROS:  Overnight: No acute events.  Patient remains afebrile.  Otherwise hemodynamically stable on room air.  Latest labs show no leukocytosis, anemia 7.1/21.4, BMP with renal function within normal limits, hepatic function within normal limits.  Blood cultures from 1/24/2025 remain negative to date.    Seen examined at bedside.  No new complaints.    Allergies  No Known Allergies        ANTIMICROBIALS:  ertapenem  IVPB    ertapenem  IVPB 1000 every 24 hours      OTHER MEDS:  MEDICATIONS  (STANDING):  acetaminophen     Tablet .. 650 every 6 hours PRN  pancrelipase  (CREON 24,000 Lipase Units) 1 three times a day with meals      Vital Signs Last 24 Hrs  T(C): 36.7 (27 Jan 2025 05:15), Max: 36.7 (26 Jan 2025 13:28)  T(F): 98 (27 Jan 2025 05:15), Max: 98 (26 Jan 2025 13:28)  HR: 85 (27 Jan 2025 05:15) (85 - 97)  BP: 114/70 (27 Jan 2025 05:15) (103/68 - 114/70)  BP(mean): --  RR: 18 (27 Jan 2025 05:15) (18 - 18)  SpO2: 98% (27 Jan 2025 05:15) (98% - 98%)    Parameters below as of 27 Jan 2025 05:15  Patient On (Oxygen Delivery Method): room air        PHYSICAL EXAM:  Constitutional: comfortable, no distress  Cardiovascular:   normal S1, S2, no murmur, no edema  Respiratory:  clear BS bilaterally, no wheezes, no rales  GI:  soft, non-tender, normal bowel sounds  :  no hartley, no CVA tenderness  Musculoskeletal:  no synovitis, normal ROM  Neurologic: awake and alert, normal strength, no focal findings  Skin: R chest port iwthout evidence for infection, no phlebitis  Heme/Onc: no lymphadenopathy   Psychiatric:  awake, alert, appropriate mood                                7.1    6.88  )-----------( 161      ( 27 Jan 2025 07:22 )             21.4       01-27    141  |  108  |  12  ----------------------------<  102[H]  3.9   |  24  |  0.85    Ca    8.8      27 Jan 2025 07:20  Phos  3.5     01-27  Mg     2.3     01-27    TPro  5.3[L]  /  Alb  2.7[L]  /  TBili  0.1[L]  /  DBili  x   /  AST  20  /  ALT  22  /  AlkPhos  110  01-27      Urinalysis Basic - ( 27 Jan 2025 07:20 )    Color: x / Appearance: x / SG: x / pH: x  Gluc: 102 mg/dL / Ketone: x  / Bili: x / Urobili: x   Blood: x / Protein: x / Nitrite: x   Leuk Esterase: x / RBC: x / WBC x   Sq Epi: x / Non Sq Epi: x / Bacteria: x        MICROBIOLOGY:  v    Culture - Blood (collected 24 Jan 2025 07:13)  Source: .Blood BLOOD  Preliminary Report (26 Jan 2025 12:00):    No growth at 48 Hours    Culture - Blood (collected 24 Jan 2025 07:13)  Source: .Blood BLOOD  Preliminary Report (26 Jan 2025 12:00):    No growth at 48 Hours    Culture - Blood (collected 23 Jan 2025 01:20)  Source: .Blood BLOOD  Gram Stain (23 Jan 2025 18:53):    Growth in aerobic bottle: Gram Positive Cocci in Clusters  Final Report (24 Jan 2025 14:05):    Growth in aerobic bottle: Staphylococcus haemolyticus    Isolation of Coagulase negative Staphylococcus from single blood culture    sets may represent    contamination. Contact the Microbiology Department at [phone] if    susceptibility testing is needed.    clinically indicated.    Direct identification is available within approximately 3-5    hours either by Blood Panel Multiplexed PCR or Direct    MALDI-TOF. Details: [email or web address]  Organism: Blood Culture PCR (24 Jan 2025 14:05)  Organism: Blood Culture PCR (24 Jan 2025 14:05)      Method Type: PCR      -  Coagulase negative Staphylococcus: Detec    Culture - Urine (collected 23 Jan 2025 01:06)  Source: Clean Catch Clean Catch (Midstream)  Final Report (24 Jan 2025 07:39):    <10,000 CFU/mL Normal Urogenital Carmen    Culture - Blood (collected 22 Jan 2025 20:36)  Source: .Blood BLOOD  Gram Stain (23 Jan 2025 11:08):    Growth in anaerobic bottle: Gram Negative Rods    Growth in aerobic bottle: Gram Negative Rods  Final Report (25 Jan 2025 11:08):    Growth in aerobic and anaerobic bottles: Klebsiella pneumoniae ESBL    Direct identification is available within approximately 3-5    hours either by Blood Panel Multiplexed PCR or Direct    MALDI-TOF. Details: [email or web address]  Organism: Blood Culture PCR  Klebsiella pneumoniae ESBL (25 Jan 2025 11:08)  Organism: Klebsiella pneumoniae ESBL (25 Jan 2025 11:08)      Method Type: CHAI      -  Ampicillin: R >16 These ampicillin results predict results for amoxicillin      -  Ampicillin/Sulbactam: R 8/4      -  Aztreonam: R >16      -  Cefazolin: R >16      -  Cefepime: R >16      -  Ceftriaxone: R >32      -  Ciprofloxacin: I 0.5      -  Ertapenem: S <=0.5      -  Gentamicin: S <=2      -  Imipenem: S <=1      -  Levofloxacin: S <=0.5      -  Meropenem: S <=1      -  Piperacillin/Tazobactam: R <=8      -  Tobramycin: S <=2      -  Trimethoprim/Sulfamethoxazole: R >2/38  Organism: Blood Culture PCR (25 Jan 2025 11:08)      Method Type: PCR      -  K. pneumoniae group: Detec (K. pneumoniae, K. quasipneumoniae, K. variicola)      -  ESBL: Detec      -  CTX-M Resistance Marker: Detec              Rapid RVP Result: Mei (01-22 @ 20:57)        RADIOLOGY:  Imaging reviewed

## 2025-01-27 NOTE — PROGRESS NOTE ADULT - PROBLEM SELECTOR PLAN 2
Follows with Dr. Ludwig Scanlon of [facility]  w/ metastatic spread to lung  s/p multiple lines of treatment.  no plan for treatment inpatient and/or rehab per oncology.  Pancrelipase.

## 2025-01-27 NOTE — PROGRESS NOTE ADULT - SUBJECTIVE AND OBJECTIVE BOX
Patient is a 79y old  Female who presents with a chief complaint of fevers (26 Jan 2025 10:09)    Patient seen and examined at bedside, sitting in chair eating breakfast. Feeling well, no new complaints.     MEDICATIONS  (STANDING):  chlorhexidine 2% Cloths 1 Application(s) Topical daily  ertapenem  IVPB      ertapenem  IVPB 1000 milliGRAM(s) IV Intermittent every 24 hours  folic acid 1 milliGRAM(s) Oral daily  multivitamin 1 Tablet(s) Oral daily  pancrelipase  (CREON 24,000 Lipase Units) 1 Capsule(s) Oral three times a day with meals    MEDICATIONS  (PRN):  acetaminophen     Tablet .. 650 milliGRAM(s) Oral every 6 hours PRN Temp greater or equal to 38C (100.4F), Mild Pain (1 - 3)    Vital Signs Last 24 Hrs  T(C): 36.7 (27 Jan 2025 05:15), Max: 36.7 (26 Jan 2025 13:28)  T(F): 98 (27 Jan 2025 05:15), Max: 98 (26 Jan 2025 13:28)  HR: 85 (27 Jan 2025 05:15) (85 - 97)  BP: 114/70 (27 Jan 2025 05:15) (103/68 - 114/70)  BP(mean): --  RR: 18 (27 Jan 2025 05:15) (18 - 18)  SpO2: 98% (27 Jan 2025 05:15) (98% - 98%)    Parameters below as of 27 Jan 2025 05:15  Patient On (Oxygen Delivery Method): room air    PE  NAD  Awake, alert  Anicteric, MMM  RRR  CTAB  Abd soft, NT, ND  No c/c/e  No rash grossly                        7.1    6.88  )-----------( 161      ( 27 Jan 2025 07:22 )             21.4       01-27    141  |  108  |  12  ----------------------------<  102[H]  3.9   |  24  |  0.85    Ca    8.8      27 Jan 2025 07:20  Phos  3.5     01-27  Mg     2.3     01-27    TPro  5.3[L]  /  Alb  2.7[L]  /  TBili  0.1[L]  /  DBili  x   /  AST  20  /  ALT  22  /  AlkPhos  110  01-27

## 2025-01-27 NOTE — PROGRESS NOTE ADULT - ASSESSMENT
Patient states she took 2 tylenol this morning.   79 year old, Ukrainian speaking, female with hx of pancreatic cancer s/p Whipple in 2021, pulmonary mets s/p L VATS, TONIA wedge resection and LLL basilar segmentectomy in 11/2024, L partial nephrectomy (2019), HTN, RA, presenting with fevers, found to have neutropenic sepsis, now admitted for further infectious workup and management.

## 2025-01-27 NOTE — PROGRESS NOTE ADULT - SUBJECTIVE AND OBJECTIVE BOX
PROGRESS NOTE:   Vivian Lopez, DO  Hospitalist  Teams  After 5pm/weekends or if no answer ext: [phone]      Patient is a 79y old  Female who presents with a chief complaint of fevers (26 Jan 2025 10:09)      SUBJECTIVE / OVERNIGHT EVENTS: HELNE, spoke via Maori  #[id number]    ADDITIONAL REVIEW OF SYSTEMS:  no fever or chill sno n/v/d    MEDICATIONS  (STANDING):  chlorhexidine 2% Cloths 1 Application(s) Topical daily  ertapenem  IVPB      ertapenem  IVPB 1000 milliGRAM(s) IV Intermittent every 24 hours  folic acid 1 milliGRAM(s) Oral daily  multivitamin 1 Tablet(s) Oral daily  pancrelipase  (CREON 24,000 Lipase Units) 1 Capsule(s) Oral three times a day with meals    MEDICATIONS  (PRN):  acetaminophen     Tablet .. 650 milliGRAM(s) Oral every 6 hours PRN Temp greater or equal to 38C (100.4F), Mild Pain (1 - 3)      CAPILLARY BLOOD GLUCOSE        I&O's Summary    27 Jan 2025 07:01  -  27 Jan 2025 16:01  --------------------------------------------------------  IN: 480 mL / OUT: 0 mL / NET: 480 mL        PHYSICAL EXAM:  Vital Signs Last 24 Hrs  T(C): 36.9 (27 Jan 2025 13:26), Max: 36.9 (27 Jan 2025 13:26)  T(F): 98.5 (27 Jan 2025 13:26), Max: 98.5 (27 Jan 2025 13:26)  HR: 99 (27 Jan 2025 13:26) (85 - 99)  BP: 100/55 (27 Jan 2025 13:26) (100/55 - 114/70)  BP(mean): --  RR: 18 (27 Jan 2025 13:26) (18 - 18)  SpO2: 98% (27 Jan 2025 13:26) (98% - 98%)    Parameters below as of 27 Jan 2025 13:26  Patient On (Oxygen Delivery Method): room air        CONSTITUTIONAL: NAD, well-developed  MUSCLOSKELETAL: no clubbing or cyanosis of digits; no joint swelling or tenderness to palpation  PSYCH: A+O to person, place, and time; affect appropriate    LABS:                        7.1    6.88  )-----------( 161      ( 27 Jan 2025 07:22 )             21.4     01-27    141  |  108  |  12  ----------------------------<  102[H]  3.9   |  24  |  0.85    Ca    8.8      27 Jan 2025 07:20  Phos  3.5     01-27  Mg     2.3     01-27    TPro  5.3[L]  /  Alb  2.7[L]  /  TBili  0.1[L]  /  DBili  x   /  AST  20  /  ALT  22  /  AlkPhos  110  01-27          Urinalysis Basic - ( 27 Jan 2025 07:20 )    Color: x / Appearance: x / SG: x / pH: x  Gluc: 102 mg/dL / Ketone: x  / Bili: x / Urobili: x   Blood: x / Protein: x / Nitrite: x   Leuk Esterase: x / RBC: x / WBC x   Sq Epi: x / Non Sq Epi: x / Bacteria: x          RADIOLOGY & ADDITIONAL TESTS:  Results Reviewed:   Imaging Personally Reviewed:  Electrocardiogram Personally Reviewed:    COORDINATION OF CARE:  Care Discussed with Consultants/Other Providers [Y/N]:  Prior or Outpatient Records Reviewed [Y/N]:

## 2025-01-27 NOTE — PROGRESS NOTE ADULT - PROBLEM SELECTOR PLAN 3
Likely in setting of recent chemo treatment  - Heme Onc following   - continue with ZARXIO (GCSF) for neutropenia- per oncology  - on Nplate (Romiplostim) 45mcg, LD 1/16, next dose scheduled for 02/06.  - On folic acid, MVI.

## 2025-01-27 NOTE — PROGRESS NOTE ADULT - ASSESSMENT
79 year old, Divehi speaking, female with hx of pancreatic cancer s/p Whipple in 2021, pulmonary mets s/p L VATS, TONIA wedge resection and LLL basilar segmentectomy in 11/2024, L partial nephrectomy (2019), HTN, RA, presenting with fevers for the last 2 days    Pancreatic ca  --[facility]  --w/ metastatic spread to lung  --s/p multiple lines of treatment, currently on C1 gem/abraxane LD: 01/16  --Zarxio given for count recovery between 1/23-1/26.   --no plan for treatment inpatient and/or rehab  --ongoing care after discharge    Pancytopenia  --2/2 malignancy and treatment  --on Nplate 45mcg, LD 1/16, next dose scheduled for 02/06  --no nutritional deficiencies noted on outpatient labs from 01/02    Neutropenic fevers  --afebrile since admission  --peripheral blood cultures w/ GNR and GPC in clusters   --ID following, recommending MP removal if blood cultures persistently positive with Klebsiella. 1/24 repeat blood cultures NTD  --Neutropenia has resolved with zarxio  --Plan for 7 day course of IV abx    will follow    Kj Pineda PA-C  Hematology/Oncology  [facility]  [phone] (office)  Evenings and weekends please call MD on call or office

## 2025-01-27 NOTE — PROGRESS NOTE ADULT - ASSESSMENT
79-year-old female with a history of pancreatic cancer status post Whipple procedure in 2021, now with pulmonary metastases status post left-sided VATS procedure, left upper lobe wedge resection and left lower lobe basilar segment ectomy in November 2024, left partial nephrectomy in 2019, hypertension, rheumatoid arthritis who was admitted to hospital due to fevers.    #Klebsiella bacteremia  #Neutropenic fever  #Presence of right sided chest port  #Pancytopenia  #Metastatic pancreatic cancer  #Coagulase-negative staph positive in blood culture    Recommendations  Continue ertapenem given ESBL Klebsiella  Unclear source, may be intra-abdominal given abnormal anatomy due to previous surgery however CT reassuring for no abscess/fluid collection/other pathology  Multiple thoracic surgeries as well - obtain CT chest scan  Patient also with right-sided chest port, if blood cultures persistently positive with Klebsiella growth–  consider removal  Patient now with coagulase-negative staph in 1 out of 2 bottles, favor contamination at this time  Follow repeat blood cultures, negative to date 1/24/25  Plan for 7 days of therapy, end date: 2/29/24  Unfortunately no PO options available therefore plan to complete therapy with ertapenem  Will need midline if discharged  Oncology input  Guarded prognosis, goals of care discussion recommended  Follow fever curve and WBC count    Yefri Graham MD  Division of Infectious Diseases

## 2025-01-28 ENCOUNTER — TRANSCRIPTION ENCOUNTER (OUTPATIENT)
Age: 80
End: 2025-01-28

## 2025-01-28 PROCEDURE — 99232 SBSQ HOSP IP/OBS MODERATE 35: CPT

## 2025-01-28 PROCEDURE — G0545: CPT

## 2025-01-28 RX ORDER — FOLIC ACID 1 MG
1 TABLET ORAL
Qty: 0 | Refills: 0 | DISCHARGE
Start: 2025-01-28

## 2025-01-28 RX ORDER — ERTAPENEM SODIUM 1 G/1
1000 INJECTION, POWDER, LYOPHILIZED, FOR SOLUTION INTRAMUSCULAR; INTRAVENOUS ONCE
Refills: 0 | Status: COMPLETED | OUTPATIENT
Start: 2025-01-29 | End: 2025-01-29

## 2025-01-28 RX ADMIN — ANTISEPTIC SURGICAL SCRUB 1 APPLICATION(S): 0.04 SOLUTION TOPICAL at 11:23

## 2025-01-28 RX ADMIN — Medication 1 CAPSULE(S): at 13:02

## 2025-01-28 RX ADMIN — Medication 1 CAPSULE(S): at 17:34

## 2025-01-28 RX ADMIN — Medication 1 CAPSULE(S): at 07:39

## 2025-01-28 RX ADMIN — ERTAPENEM SODIUM 120 MILLIGRAM(S): 1 INJECTION, POWDER, LYOPHILIZED, FOR SOLUTION INTRAMUSCULAR; INTRAVENOUS at 13:02

## 2025-01-28 RX ADMIN — Medication 1 TABLET(S): at 11:23

## 2025-01-28 RX ADMIN — Medication 1 MILLIGRAM(S): at 11:23

## 2025-01-28 NOTE — PROGRESS NOTE ADULT - PROBLEM SELECTOR PLAN 5
DVT ppx - SCDs for now given thrombocytopenia, anemia  Diet - regular    Dispo - pending repeat blood cx; ID recs
DVT ppx - SCDs for now given thrombocytopenia, anemia  Diet - regular  Dispo - pending repeat blood cx; ID recs
DVT ppx - SCDs for now given thrombocytopenia, anemia  Diet - regular  Dispo - final dose of abx ertapenem 1/29 then Dc
DVT ppx - SCDs for now given thrombocytopenia, anemia  Diet - regular  Dispo - final dose of abx ertapenem 1/29 then Dc
DVT ppx - SCDs for now given thrombocytopenia, anemia  Diet - regular    Dispo - pending repeat blood cx; ID recs

## 2025-01-28 NOTE — DISCHARGE NOTE PROVIDER - PROVIDER TOKENS
PROVIDER:[TOKEN:[[id number]],FOLLOWUP:[1 week]],FREE:[LAST:[Dr Johan Pink],PHONE:[phone]],FAX:[(   )    -],FOLLOWUP:[1 week]]

## 2025-01-28 NOTE — PROGRESS NOTE ADULT - SUBJECTIVE AND OBJECTIVE BOX
Follow Up:  bacteremia    Interval History/ROS:  Overnight: No acute events.  Patient remains afebrile.  Otherwise hemodynamically stable on room air.  Latest labs show resolved leukocytosis, anemia 7.1/21.4, BMP with renal function within normal limits, hepatic function within normal limits.  Blood cultures from 1/24/2025 are negative.    Patient seen examined at bedside.  No new complaints.    Allergies  No Known Allergies        ANTIMICROBIALS:  ertapenem  IVPB    ertapenem  IVPB 1000 every 24 hours      OTHER MEDS:  MEDICATIONS  (STANDING):  acetaminophen     Tablet .. 650 every 6 hours PRN  pancrelipase  (CREON 24,000 Lipase Units) 1 three times a day with meals      Vital Signs Last 24 Hrs  T(C): 36.8 (28 Jan 2025 04:56), Max: 37.4 (27 Jan 2025 21:01)  T(F): 98.2 (28 Jan 2025 04:56), Max: 99.3 (27 Jan 2025 21:01)  HR: 89 (28 Jan 2025 04:56) (89 - 99)  BP: 112/67 (28 Jan 2025 04:56) (100/55 - 112/67)  BP(mean): --  RR: 17 (28 Jan 2025 04:56) (17 - 18)  SpO2: 96% (28 Jan 2025 04:56) (96% - 98%)    Parameters below as of 28 Jan 2025 04:56  Patient On (Oxygen Delivery Method): room air        PHYSICAL EXAM:  Constitutional: comfortable, no distress  Cardiovascular:   normal S1, S2, no murmur, no edema  Respiratory:  clear BS bilaterally, no wheezes, no rales  GI:  soft, non-tender, normal bowel sounds  :  no hartley, no CVA tenderness  Musculoskeletal:  no synovitis, normal ROM  Neurologic: awake and alert, normal strength, no focal findings  Skin: R chest port iwthout evidence for infection, no phlebitis  Heme/Onc: no lymphadenopathy   Psychiatric:  awake, alert, appropriate mood                                7.1    6.88  )-----------( 161      ( 27 Jan 2025 07:22 )             21.4       01-27    141  |  108  |  12  ----------------------------<  102[H]  3.9   |  24  |  0.85    Ca    8.8      27 Jan 2025 07:20  Phos  3.5     01-27  Mg     2.3     01-27    TPro  5.3[L]  /  Alb  2.7[L]  /  TBili  0.1[L]  /  DBili  x   /  AST  20  /  ALT  22  /  AlkPhos  110  01-27      Urinalysis Basic - ( 27 Jan 2025 07:20 )    Color: x / Appearance: x / SG: x / pH: x  Gluc: 102 mg/dL / Ketone: x  / Bili: x / Urobili: x   Blood: x / Protein: x / Nitrite: x   Leuk Esterase: x / RBC: x / WBC x   Sq Epi: x / Non Sq Epi: x / Bacteria: x        MICROBIOLOGY:  v    Culture - Blood (collected 24 Jan 2025 07:13)  Source: .Blood BLOOD  Preliminary Report (28 Jan 2025 12:00):    No growth at 4 days    Culture - Blood (collected 24 Jan 2025 07:13)  Source: .Blood BLOOD  Preliminary Report (28 Jan 2025 12:00):    No growth at 4 days              Rapid RVP Result: NotDetec (01-22 @ 20:57)        RADIOLOGY:  Imagign reviewed

## 2025-01-28 NOTE — DISCHARGE NOTE PROVIDER - CARE PROVIDER_API CALL
Ludwig Scanlon  Hematology  [address] [place] [id number]  Phone: [phone]  Fax: [phone]  Follow Up Time: 1 week    Dr Johan Pink,   Phone: [phone]  Fax: (   )    -  Follow Up Time: 1 week

## 2025-01-28 NOTE — PROGRESS NOTE ADULT - PROBLEM SELECTOR PLAN 4
- Continue to hold methotrexate for now given acute infection.
- Continue to hold methotrexate for now given acute infection.
- Continue to hold methotrexate for now
- Continue to hold methotrexate for now
- Continue to hold methotrexate for now given acute infection.

## 2025-01-28 NOTE — PROGRESS NOTE ADULT - TIME BILLING
review of labs, imaging, notes, discussion of plan with patient, family, and consultant
- Ordering, reviewing, and interpreting labs, testing, and imaging.  - Independently obtaining a review of systems and performing a physical exam  - Reviewing consultant documentation/recommendations in addition to discussing plan of care with consultants.  - Counselling and educating patient and family regarding interpretation of aforementioned items and plan of care.
review of labs, imaging, notes, discussion of plan with patient, family, and consultant
- Ordering, reviewing, and interpreting labs, testing, and imaging.  - Independently obtaining a review of systems and performing a physical exam  - Reviewing consultant documentation/recommendations in addition to discussing plan of care with consultants.  - Counselling and educating patient and family regarding interpretation of aforementioned items and plan of care.

## 2025-01-28 NOTE — PROGRESS NOTE ADULT - ASSESSMENT
79-year-old female with a history of pancreatic cancer status post Whipple procedure in 2021, now with pulmonary metastases status post left-sided VATS procedure, left upper lobe wedge resection and left lower lobe basilar segment ectomy in November 2024, left partial nephrectomy in 2019, hypertension, rheumatoid arthritis who was admitted to hospital due to fevers.    #Klebsiella bacteremia  #Neutropenic fever  #Presence of right sided chest port  #Pancytopenia  #Metastatic pancreatic cancer  #Coagulase-negative staph positive in blood culture    Recommendations  Continue ertapenem given ESBL Klebsiella  Unclear source, may be intra-abdominal given abnormal anatomy due to previous surgery however CT reassuring for no abscess/fluid collection/other pathology  Multiple thoracic surgeries as well - obtain CT chest scan  Patient with coagulase-negative staph in 1 out of 2 bottles, favor contamination at this time  Follow repeat blood cultures, negative to date 1/24/25  Plan for 7 days of therapy, end date: 2/29/24  Unfortunately no PO options available therefore plan to complete therapy with ertapenem  Will need midline if discharged  Oncology input  Patient will be at elevated risk for additional bacteremia given port - due to ESBL klebsiella will treat without removal   Guarded prognosis, goals of care discussion recommended  Follow fever curve and WBC count    ID to sign off. Please contact as issues arise.    Yefri Graham MD  Division of Infectious Diseases 79-year-old female with a history of pancreatic cancer status post Whipple procedure in 2021, now with pulmonary metastases status post left-sided VATS procedure, left upper lobe wedge resection and left lower lobe basilar segment ectomy in November 2024, left partial nephrectomy in 2019, hypertension, rheumatoid arthritis who was admitted to hospital due to fevers.    #Klebsiella bacteremia  #Neutropenic fever  #Presence of right sided chest port  #Pancytopenia  #Metastatic pancreatic cancer  #Coagulase-negative staph positive in blood culture    Recommendations  Continue ertapenem given ESBL Klebsiella  Unclear source, may be intra-abdominal given abnormal anatomy due to previous surgery however CT reassuring for no abscess/fluid collection/other pathology  Multiple thoracic surgeries as well  Patient with coagulase-negative staph in 1 out of 2 bottles, favor contamination at this time  Follow repeat blood cultures, negative to date 1/24/25  Plan for 7 days of therapy, end date: 2/29/24  Unfortunately no PO options available therefore plan to complete therapy with ertapenem  Will need midline if discharged  Oncology input  Patient will be at elevated risk for additional bacteremia given port - due to ESBL klebsiella will treat without removal   Guarded prognosis, goals of care discussion recommended  Follow fever curve and WBC count    ID to sign off. Please contact as issues arise.    Yefri Graham MD  Division of Infectious Diseases

## 2025-01-28 NOTE — PROGRESS NOTE ADULT - SUBJECTIVE AND OBJECTIVE BOX
Patient is a 79y old  Female who presents with a chief complaint of fevers (26 Jan 2025 10:09)    Patient seen and examined at bedside, feeling well. Continues on abx.    MEDICATIONS  (STANDING):  chlorhexidine 2% Cloths 1 Application(s) Topical daily  ertapenem  IVPB      ertapenem  IVPB 1000 milliGRAM(s) IV Intermittent every 24 hours  folic acid 1 milliGRAM(s) Oral daily  multivitamin 1 Tablet(s) Oral daily  pancrelipase  (CREON 24,000 Lipase Units) 1 Capsule(s) Oral three times a day with meals    MEDICATIONS  (PRN):  acetaminophen     Tablet .. 650 milliGRAM(s) Oral every 6 hours PRN Temp greater or equal to 38C (100.4F), Mild Pain (1 - 3)    Vital Signs Last 24 Hrs  T(C): 36.8 (28 Jan 2025 04:56), Max: 37.4 (27 Jan 2025 21:01)  T(F): 98.2 (28 Jan 2025 04:56), Max: 99.3 (27 Jan 2025 21:01)  HR: 89 (28 Jan 2025 04:56) (89 - 99)  BP: 112/67 (28 Jan 2025 04:56) (100/55 - 112/67)  BP(mean): --  RR: 17 (28 Jan 2025 04:56) (17 - 18)  SpO2: 96% (28 Jan 2025 04:56) (96% - 98%)    Parameters below as of 28 Jan 2025 04:56  Patient On (Oxygen Delivery Method): room air    PE  NAD  Awake, alert  Anicteric, MMM  RRR  CTAB  Abd soft, NT, ND  No c/c/e  No rash grossly                        7.1    6.88  )-----------( 161      ( 27 Jan 2025 07:22 )             21.4       01-27    141  |  108  |  12  ----------------------------<  102[H]  3.9   |  24  |  0.85    Ca    8.8      27 Jan 2025 07:20  Phos  3.5     01-27  Mg     2.3     01-27    TPro  5.3[L]  /  Alb  2.7[L]  /  TBili  0.1[L]  /  DBili  x   /  AST  20  /  ALT  22  /  AlkPhos  110  01-27

## 2025-01-28 NOTE — DISCHARGE NOTE PROVIDER - DETAILS OF MALNUTRITION DIAGNOSIS/DIAGNOSES
This patient has been assessed with a concern for Malnutrition and was treated during this hospitalization for the following Nutrition diagnosis/diagnoses:     -  01/24/2025: Severe protein-calorie malnutrition

## 2025-01-28 NOTE — DISCHARGE NOTE PROVIDER - NSDCMRMEDTOKEN_GEN_ALL_CORE_FT
folic acid 1 mg oral tablet: 1 tab(s) orally once a day  Multiple Vitamins oral tablet: 1 tab(s) orally once a day  Zenpep 40,000 units-126,000 units-168,000 units oral delayed release capsule: 1 cap(s) orally 3 times a day PRN

## 2025-01-28 NOTE — PROGRESS NOTE ADULT - SUBJECTIVE AND OBJECTIVE BOX
PROGRESS NOTE:   Vivian Lopez, DO  Hospitalist  Teams  After 5pm/weekends or if no answer ext: [phone]      Patient is a 79y old  Female who presents with a chief complaint of fevers (26 Jan 2025 10:09)      SUBJECTIVE / OVERNIGHT EVENTS:  HELEN    ADDITIONAL REVIEW OF SYSTEMS:  no fever or chills no n/v/d    MEDICATIONS  (STANDING):  chlorhexidine 2% Cloths 1 Application(s) Topical daily  ertapenem  IVPB      ertapenem  IVPB 1000 milliGRAM(s) IV Intermittent every 24 hours  folic acid 1 milliGRAM(s) Oral daily  multivitamin 1 Tablet(s) Oral daily  pancrelipase  (CREON 24,000 Lipase Units) 1 Capsule(s) Oral three times a day with meals    MEDICATIONS  (PRN):  acetaminophen     Tablet .. 650 milliGRAM(s) Oral every 6 hours PRN Temp greater or equal to 38C (100.4F), Mild Pain (1 - 3)      CAPILLARY BLOOD GLUCOSE        I&O's Summary    27 Jan 2025 07:01  -  28 Jan 2025 07:00  --------------------------------------------------------  IN: 480 mL / OUT: 0 mL / NET: 480 mL    28 Jan 2025 07:01  -  28 Jan 2025 15:49  --------------------------------------------------------  IN: 480 mL / OUT: 0 mL / NET: 480 mL        PHYSICAL EXAM:  Vital Signs Last 24 Hrs  T(C): 37.2 (28 Jan 2025 12:40), Max: 37.4 (27 Jan 2025 21:01)  T(F): 98.9 (28 Jan 2025 12:40), Max: 99.3 (27 Jan 2025 21:01)  HR: 100 (28 Jan 2025 12:40) (89 - 100)  BP: 105/69 (28 Jan 2025 12:40) (102/66 - 112/67)  BP(mean): --  RR: 17 (28 Jan 2025 12:40) (17 - 18)  SpO2: 97% (28 Jan 2025 12:40) (96% - 97%)    Parameters below as of 28 Jan 2025 12:40  Patient On (Oxygen Delivery Method): room air        CONSTITUTIONAL: NAD, well-developed  PSYCH: A+O to person, place, and time; affect appropriate    LABS:                        7.1    6.88  )-----------( 161      ( 27 Jan 2025 07:22 )             21.4     01-27    141  |  108  |  12  ----------------------------<  102[H]  3.9   |  24  |  0.85    Ca    8.8      27 Jan 2025 07:20  Phos  3.5     01-27  Mg     2.3     01-27    TPro  5.3[L]  /  Alb  2.7[L]  /  TBili  0.1[L]  /  DBili  x   /  AST  20  /  ALT  22  /  AlkPhos  110  01-27          Urinalysis Basic - ( 27 Jan 2025 07:20 )    Color: x / Appearance: x / SG: x / pH: x  Gluc: 102 mg/dL / Ketone: x  / Bili: x / Urobili: x   Blood: x / Protein: x / Nitrite: x   Leuk Esterase: x / RBC: x / WBC x   Sq Epi: x / Non Sq Epi: x / Bacteria: x          RADIOLOGY & ADDITIONAL TESTS:  Results Reviewed:   Imaging Personally Reviewed:  Electrocardiogram Personally Reviewed:    COORDINATION OF CARE:  Care Discussed with Consultants/Other Providers [Y/N]:  Prior or Outpatient Records Reviewed [Y/N]:

## 2025-01-28 NOTE — DISCHARGE NOTE PROVIDER - ATTENDING DISCHARGE PHYSICAL EXAMINATION:
Completes treatment for her ESBL klebsiella of unknown source this morning then can DC home with outpt f/u for ongoing treatment of her pancreatic cancer.  No new symptoms otherwise feeling well

## 2025-01-28 NOTE — DISCHARGE NOTE PROVIDER - HOSPITAL COURSE
HPI:  79 year old, Urdu speaking, female with hx of pancreatic cancer s/p Whipple in 2021, pulmonary mets s/p L VATS, TONIA wedge resection and LLL basilar segmentectomy in 11/2024, L partial nephrectomy (2019), HTN, RA, presenting with fevers for the last 2 days. Patient has been receiving chemo treatment weekly (2 doses so far). She was noted to have low WBC, thought to be worsened by methotrexate, so medication has been held. Pt usually takes methotrexate for her RA. She was also recently started on Zarxio given low WBC. Pt's  recently sick with flu. She developed fever up to 104. Pt was advised by her oncologist to come to hospital for further evaluation. At this time, patient without significant complaints. Denies any pain. No nausea, vomiting, constipation, diarrhea. No chest pain, SOB. Pt is ambulatory. In ED, patient noted to be septic. Labs notable for neutropenia. Pt given vancomycin and zosyn. Pt admitted for further management.  (23 Jan 2025 02:55)    Hospital Course: Patient presented with fevers and was found to have neutropenic sepsis. Patient was admitted for further infectious workup and management. Neutropenic sepsis was attributed to Klebsiella bacteremia, with an unclear source. Blood cultures also showed Staphylococcus bacteremia in one of two sets, considered a contaminant. Patient was treated with ertapenem due to ESBL producing Klebsiella. Follow-up blood cultures were negative. A CT of the abdomen and pelvis was reassuring, showing no abscess, fluid collection, or other concerning pathology. UA and chest x-ray were unremarkable. Patient to complete a course of ertapenem on 1/29. Pancreatic cancer with pulmonary metastases is followed by oncologist (Dr. Ludwig Scanlon). No further cancer treatment was initiated during this hospitalization. Pancytopenia was thought to be secondary to recent chemotherapy. Hematology/Oncology continued to follow her. Patient received ZARXIO for neutropenia and Nplate for thrombocytopenia. Methotrexate was held for her rheumatoid arthritis due to the acute infection.      Discharge/Dispo/Med rec discussed with attending  ____. Patient medically cleared for discharge ____ with outpatient follow     Important Medication Changes and Reason:    Active or Pending Issues Requiring Follow-up:  - continue close follow up with oncologist Dr. Ludwig Scanlon    Advanced Directives:   [x] Full code  [ ] DNR  [ ] Hospice    Discharge Diagnoses:         HPI:  79 year old, Hebrew speaking, female with hx of pancreatic cancer s/p Whipple in 2021, pulmonary mets s/p L VATS, TONIA wedge resection and LLL basilar segmentectomy in 11/2024, L partial nephrectomy (2019), HTN, RA, presenting with fevers for the last 2 days. Patient has been receiving chemo treatment weekly (2 doses so far). She was noted to have low WBC, thought to be worsened by methotrexate, so medication has been held. Pt usually takes methotrexate for her RA. She was also recently started on Zarxio given low WBC. Pt's  recently sick with flu. She developed fever up to 104. Pt was advised by her oncologist to come to hospital for further evaluation. At this time, patient without significant complaints. Denies any pain. No nausea, vomiting, constipation, diarrhea. No chest pain, SOB. Pt is ambulatory. In ED, patient noted to be septic. Labs notable for neutropenia. Pt given vancomycin and zosyn. Pt admitted for further management.  (23 Jan 2025 02:55)    Hospital Course: Patient presented with fevers and was found to have neutropenic sepsis. Patient was admitted for further infectious workup and management. Neutropenic sepsis was attributed to Klebsiella bacteremia, with an unclear source. Blood cultures also showed Staphylococcus bacteremia in one of two sets, considered a contaminant. Patient was treated with ertapenem due to ESBL producing Klebsiella. Follow-up blood cultures were negative. A CT of the abdomen and pelvis was reassuring, showing no abscess, fluid collection, or other concerning pathology. UA and chest x-ray were unremarkable. Patient to complete a course of ertapenem on 1/29. Pancreatic cancer with pulmonary metastases is followed by oncologist (Dr. Ludwig Scanlon). No further cancer treatment was initiated during this hospitalization. Pancytopenia was thought to be secondary to recent chemotherapy. Hematology/Oncology continued to follow her. Patient received ZARXIO for neutropenia and Nplate for thrombocytopenia. Methotrexate was held for her rheumatoid arthritis due to the acute infection.      Discharge/Dispo/Med rec discussed with attending Dr. Lopez Patient medically cleared for discharge with outpatient follow     Important Medication Changes and Reason:    Active or Pending Issues Requiring Follow-up:  - continue close follow up with oncologist Dr. Ludwig Scanlon    Advanced Directives:   [x] Full code  [ ] DNR  [ ] Hospice    Discharge Diagnoses:    Klebsiella Bacteremia  active pancreatic cancer  neutropenic fever  anemia of chronic disease     HPI:  79 year old, English speaking, female with hx of pancreatic cancer s/p Whipple in 2021, pulmonary mets s/p L VATS, TONIA wedge resection and LLL basilar segmentectomy in 11/2024, L partial nephrectomy (2019), HTN, RA, presenting with fevers for the last 2 days. Patient has been receiving chemo treatment weekly (2 doses so far). She was noted to have low WBC, thought to be worsened by methotrexate, so medication has been held. Pt usually takes methotrexate for her RA. She was also recently started on Zarxio given low WBC. Pt's  recently sick with flu. She developed fever up to 104. Pt was advised by her oncologist to come to hospital for further evaluation. At this time, patient without significant complaints. Denies any pain. No nausea, vomiting, constipation, diarrhea. No chest pain, SOB. Pt is ambulatory. In ED, patient noted to be septic. Labs notable for neutropenia. Pt given vancomycin and zosyn. Pt admitted for further management.  (23 Jan 2025 02:55)    Hospital Course: Patient presented with fevers and was found to have neutropenic sepsis. Patient was admitted for further infectious workup and management. Neutropenic sepsis was attributed to Klebsiella bacteremia, with an unclear source. Blood cultures also showed Staphylococcus bacteremia in one of two sets, considered a contaminant. Patient was treated with ertapenem due to ESBL producing Klebsiella. Follow-up blood cultures were negative. A CT of the abdomen and pelvis was reassuring, showing no abscess, fluid collection, or other concerning pathology. UA and chest x-ray were unremarkable. Patient to complete a course of ertapenem on 1/29. Pancreatic cancer with pulmonary metastases is followed by oncologist (Dr. Ludwig Scanlon). No further cancer treatment was initiated during this hospitalization. Pancytopenia was thought to be secondary to recent chemotherapy. Hematology/Oncology continued to follow her. Patient received ZARXIO for neutropenia and Nplate for thrombocytopenia. Methotrexate was held for her rheumatoid arthritis due to the acute infection - may be resumed upon discharge    Discharge/Dispo/Med rec discussed with attending Dr. Lopez Patient medically cleared for discharge with outpatient follow     Important Medication Changes and Reason: see med rec    Active or Pending Issues Requiring Follow-up:  - continue close follow up with oncologist Dr. Ludwig Scanlon    Advanced Directives:   [x] Full code  [ ] DNR  [ ] Hospice    Discharge Diagnoses:    Klebsiella Bacteremia  active pancreatic cancer  neutropenic fever  anemia of chronic disease

## 2025-01-28 NOTE — PROGRESS NOTE ADULT - PROBLEM SELECTOR PLAN 2
Follows with Dr. Ludwig Scanlon of [facility]  w/ metastatic spread to lung  s/p multiple lines of treatment.  no plan for treatment inpatient and/or rehab per oncology.  Pancrelipase.

## 2025-01-28 NOTE — PROGRESS NOTE ADULT - NUTRITIONAL ASSESSMENT
This patient has been assessed with a concern for Malnutrition and has been determined to have a diagnosis/diagnoses of Severe protein-calorie malnutrition.    This patient is being managed with:   Diet Regular-  Entered: Jan 23 2025  3:59AM  
This patient has been assessed with a concern for Malnutrition and has been determined to have a diagnosis/diagnoses of Severe protein-calorie malnutrition.    This patient is being managed with:   Diet Regular-  Prosource Gelatein Plus     Qty per Day:  1  Entered: Jan 24 2025  1:44PM  

## 2025-01-28 NOTE — PROGRESS NOTE ADULT - ASSESSMENT
79 year old, Irish speaking, female with hx of pancreatic cancer s/p Whipple in 2021, pulmonary mets s/p L VATS, TONIA wedge resection and LLL basilar segmentectomy in 11/2024, L partial nephrectomy (2019), HTN, RA, presenting with fevers for the last 2 days    Pancreatic ca  --[facility]s [facility]  --w/ metastatic spread to lung  --s/p multiple lines of treatment, currently on C1 gem/abraxane LD: 01/16  --Zarxio given for count recovery between 1/23-1/26.   --no plan for treatment inpatient and/or rehab  --ongoing care after discharge    Pancytopenia  --2/2 malignancy and treatment  --on Nplate 45mcg, LD 1/16, next dose scheduled for 02/06  --no nutritional deficiencies noted on outpatient labs from 01/02    Neutropenic fevers  --afebrile since admission  --peripheral blood cultures w/ GNR and GPC in clusters   --ID following, recommending MP removal if blood cultures persistently positive with Klebsiella. 1/24 repeat blood cultures NTD  --Neutropenia has resolved with zarxio  --Plan for 7 day course of IV abx    will follow    Kj Pineda PA-C  Hematology/Oncology  [facility]  [phone] (office)  Evenings and weekends please call MD on call or office

## 2025-01-28 NOTE — PROGRESS NOTE ADULT - PROBLEM SELECTOR PLAN 1
Neutropenia likely due to chemotherapy. Concern for infection give immuno compromised status.   - S/p vancomycin and zosyn in ED  - blood cx with Klebsiella pneumoniae, ESBL, CTX- M resistance marker; now on ertapenem   - F/u repeat blood cultures; if remains persistently positive, have to consider removal of mediport   - UA neg for UTI  - CXR without evidence of pna  - RVP neg for flu, RSV, covid  - Monitor for fever; check daily CBC w/diff   - f/u ID recs
Neutropenia likely due to chemotherapy. Concern for infection give immuno compromised status.   - S/p vancomycin and zosyn in ED  - blood cx with Klebsiella pneumoniae, ESBL, CTX- M resistance marker; now on ertapenem   - F/u repeat blood cultures; if remains persistently positive, have to consider removal of mediport   - UA neg for UTI  - CXR without evidence of pna  - RVP neg for flu, RSV, covid  - Monitor for fever; check daily CBC w/diff   - f/u ID recs
- Klebsiella Bacteremia- Unclear source.   - Neutropenic Fevers- resolved now.   - CON- Staph Bacteremia 1/2 sets-  Contaminant.     Continue ertapenem given ESBL Klebsiella.   FU blood cx 1/24- NGTD.   CT A/P reassuring for no abscess/fluid collection/other pathology  UA/CXR unremarkable.   R- chest Port in place.    Final dose of ertapenem 1/29
- Klebsiella Bacteremia- Unclear source.   - Neutropenic Fevers- resolved now.   - CON- Staph Bacteremia 1/2 sets-  Contaminant.     Continue ertapenem given ESBL Klebsiella.   FU blood cx 1/24- NGTD.   CT A/P reassuring for no abscess/fluid collection/other pathology  UA/CXR unremarkable.   R- chest Port in place.    Final dose of ertapenem 1/29
- Klebsiella Bacteremia- Unclear source.   - Neutropenic Fevers- resolved now.   - CON- Staph Bacteremia 1/2 sets- ? Contaminant.     Continue ertapenem given ESBL Klebsiella.   FU blood cx 1/24- NGTD.   CT A/P reassuring for no abscess/fluid collection/other pathology  UA/CXR unremarkable.   R- chest Port in place.   ID on board. Appreciate final Abx recs.

## 2025-01-28 NOTE — DISCHARGE NOTE PROVIDER - NSDCCPCAREPLAN_GEN_ALL_CORE_FT
PRINCIPAL DISCHARGE DIAGNOSIS  Diagnosis: Neutropenic fever  Assessment and Plan of Treatment: Medications: A course of ertapenem was completed in the hospital. However, it is essential to monitor closely for any signs of recurrent infection.  Monitoring: Be vigilant for any signs of infection, such as fever (even low-grade), chills, sweats, fatigue, weakness, changes in urine or bowel habits, redness, swelling, or drainage from any site. Report any of these to your physician immediately.  Hygiene: Practice meticulous hand hygiene to prevent infections. Avoid contact with people who are sick.  Diet: Maintain a healthy, balanced diet to support your immune system.  Follow-up: Schedule a follow-up appointment with your oncologist and primary care physician to discuss your recovery and ongoing treatment plan.      SECONDARY DISCHARGE DIAGNOSES  Diagnosis: Pancreatic cancer  Assessment and Plan of Treatment: Medications: Continue taking pancrelipase as prescribed to help with digestion.  Nutrition: Follow any dietary recommendations provided by your oncologist or a registered dietitian. Nutritional support is crucial for patients with pancreatic cancer.  Follow-up: Maintain regular follow-up appointments with your oncologist, Dr. Ludwig Scanlon, to monitor your cancer and discuss any further treatment options.    Diagnosis: Pancytopenia  Assessment and Plan of Treatment: Medications:Continue close follow up with your oncologist. It is crucial to adhere to the prescribed schedule to help improve your neutrophil count. Your next dose of romiplostim (Nplate) is scheduled for 02/06. Continue taking folic acid and your multivitamin as prescribed.  Monitoring: Monitor for signs of infection as described above, as well as any easy bruising or bleeding. Report any of these to your physician immediately.  Blood Tests: You will likely require regular blood tests to monitor your blood counts. Follow up with your oncologist or hematologist as directed.    Diagnosis: Rheumatoid arthritis  Assessment and Plan of Treatment: Medications: Your methotrexate has been temporarily held due to your infection. Your oncologist or rheumatologist will determine when it is safe to restart it. Discuss any pain management strategies with your physician.  Follow-up: Schedule a follow-up appointment with your rheumatologist to discuss the management of your rheumatoid arthritis.     PRINCIPAL DISCHARGE DIAGNOSIS  Diagnosis: Neutropenic fever  Assessment and Plan of Treatment: Medications: A course of ertapenem was completed in the hospital. However, it is essential to monitor closely for any signs of recurrent infection.  Monitoring: Be vigilant for any signs of infection, such as fever (even low-grade), chills, sweats, fatigue, weakness, changes in urine or bowel habits, redness, swelling, or drainage from any site. Report any of these to your physician immediately.  Hygiene: Practice meticulous hand hygiene to prevent infections. Avoid contact with people who are sick.  Diet: Maintain a healthy, balanced diet to support your immune system.  Follow-up: Schedule a follow-up appointment with your oncologist and primary care physician to discuss your recovery and ongoing treatment plan.      SECONDARY DISCHARGE DIAGNOSES  Diagnosis: Pancreatic cancer  Assessment and Plan of Treatment: Medications: Continue taking pancrelipase as prescribed to help with digestion.  Nutrition: Follow any dietary recommendations provided by your oncologist or a registered dietitian. Nutritional support is crucial for patients with pancreatic cancer.  Follow-up: Maintain regular follow-up appointments with your oncologist, Dr. Ludwig Scanlon, to monitor your cancer and discuss any further treatment options.    Diagnosis: Pancytopenia  Assessment and Plan of Treatment: Medications:Continue close follow up with your oncologist. It is crucial to adhere to the prescribed schedule to help improve your neutrophil count. Your next dose of romiplostim (Nplate) is scheduled for 02/06. Continue taking folic acid and your multivitamin as prescribed.  Monitoring: Monitor for signs of infection as described above, as well as any easy bruising or bleeding. Report any of these to your physician immediately.  Blood Tests: You will likely require regular blood tests to monitor your blood counts. Follow up with your oncologist or hematologist as directed.    Diagnosis: Rheumatoid arthritis  Assessment and Plan of Treatment: Medications: Your methotrexatewas temporarily held due to your infection. You may restart medication upon discharge.   Follow-up: Schedule a follow-up appointment with your rheumatologist to discuss the management of your rheumatoid arthritis.

## 2025-01-28 NOTE — DISCHARGE NOTE PROVIDER - NSDCFUSCHEDAPPT_GEN_ALL_CORE_FT
Joseph Rainey  [facility]  SURGONC [address] [place] [address]  Scheduled Appointment: 03/20/2025

## 2025-01-28 NOTE — PROGRESS NOTE ADULT - ASSESSMENT
79 year old, Lithuanian speaking, female with hx of pancreatic cancer s/p Whipple in 2021, pulmonary mets s/p L VATS, TONIA wedge resection and LLL basilar segmentectomy in 11/2024, L partial nephrectomy (2019), HTN, RA, presenting with fevers, found to have neutropenic sepsis, now admitted for further infectious workup and management.

## 2025-01-29 ENCOUNTER — TRANSCRIPTION ENCOUNTER (OUTPATIENT)
Age: 80
End: 2025-01-29

## 2025-01-29 VITALS
SYSTOLIC BLOOD PRESSURE: 110 MMHG | OXYGEN SATURATION: 98 % | TEMPERATURE: 98 F | HEART RATE: 99 BPM | RESPIRATION RATE: 17 BRPM | DIASTOLIC BLOOD PRESSURE: 69 MMHG

## 2025-01-29 PROCEDURE — 87641 MR-STAPH DNA AMP PROBE: CPT

## 2025-01-29 PROCEDURE — 0241U: CPT

## 2025-01-29 PROCEDURE — 87150 DNA/RNA AMPLIFIED PROBE: CPT

## 2025-01-29 PROCEDURE — 74177 CT ABD & PELVIS W/CONTRAST: CPT | Mod: MC

## 2025-01-29 PROCEDURE — 87637 SARSCOV2&INF A&B&RSV AMP PRB: CPT

## 2025-01-29 PROCEDURE — 99285 EMERGENCY DEPT VISIT HI MDM: CPT

## 2025-01-29 PROCEDURE — 86900 BLOOD TYPING SEROLOGIC ABO: CPT

## 2025-01-29 PROCEDURE — 83605 ASSAY OF LACTIC ACID: CPT

## 2025-01-29 PROCEDURE — 84295 ASSAY OF SERUM SODIUM: CPT

## 2025-01-29 PROCEDURE — 85025 COMPLETE CBC W/AUTO DIFF WBC: CPT

## 2025-01-29 PROCEDURE — 71045 X-RAY EXAM CHEST 1 VIEW: CPT

## 2025-01-29 PROCEDURE — 82435 ASSAY OF BLOOD CHLORIDE: CPT

## 2025-01-29 PROCEDURE — 84100 ASSAY OF PHOSPHORUS: CPT

## 2025-01-29 PROCEDURE — 99239 HOSP IP/OBS DSCHRG MGMT >30: CPT

## 2025-01-29 PROCEDURE — 0225U NFCT DS DNA&RNA 21 SARSCOV2: CPT

## 2025-01-29 PROCEDURE — 86901 BLOOD TYPING SEROLOGIC RH(D): CPT

## 2025-01-29 PROCEDURE — 82947 ASSAY GLUCOSE BLOOD QUANT: CPT

## 2025-01-29 PROCEDURE — 87640 STAPH A DNA AMP PROBE: CPT

## 2025-01-29 PROCEDURE — 84132 ASSAY OF SERUM POTASSIUM: CPT

## 2025-01-29 PROCEDURE — 85730 THROMBOPLASTIN TIME PARTIAL: CPT

## 2025-01-29 PROCEDURE — 86850 RBC ANTIBODY SCREEN: CPT

## 2025-01-29 PROCEDURE — 82803 BLOOD GASES ANY COMBINATION: CPT

## 2025-01-29 PROCEDURE — 87040 BLOOD CULTURE FOR BACTERIA: CPT

## 2025-01-29 PROCEDURE — 83735 ASSAY OF MAGNESIUM: CPT

## 2025-01-29 PROCEDURE — 87077 CULTURE AEROBIC IDENTIFY: CPT

## 2025-01-29 PROCEDURE — 36415 COLL VENOUS BLD VENIPUNCTURE: CPT

## 2025-01-29 PROCEDURE — 82330 ASSAY OF CALCIUM: CPT

## 2025-01-29 PROCEDURE — 87086 URINE CULTURE/COLONY COUNT: CPT

## 2025-01-29 PROCEDURE — 87186 SC STD MICRODIL/AGAR DIL: CPT

## 2025-01-29 PROCEDURE — 80048 BASIC METABOLIC PNL TOTAL CA: CPT

## 2025-01-29 PROCEDURE — 96374 THER/PROPH/DIAG INJ IV PUSH: CPT

## 2025-01-29 PROCEDURE — 80053 COMPREHEN METABOLIC PANEL: CPT

## 2025-01-29 PROCEDURE — 85014 HEMATOCRIT: CPT

## 2025-01-29 PROCEDURE — 85018 HEMOGLOBIN: CPT

## 2025-01-29 PROCEDURE — 81003 URINALYSIS AUTO W/O SCOPE: CPT

## 2025-01-29 PROCEDURE — 85610 PROTHROMBIN TIME: CPT

## 2025-01-29 PROCEDURE — 96375 TX/PRO/DX INJ NEW DRUG ADDON: CPT

## 2025-01-29 RX ADMIN — Medication 1 CAPSULE(S): at 09:10

## 2025-01-29 RX ADMIN — Medication 1 CAPSULE(S): at 11:59

## 2025-01-29 RX ADMIN — ANTISEPTIC SURGICAL SCRUB 1 APPLICATION(S): 0.04 SOLUTION TOPICAL at 11:59

## 2025-01-29 RX ADMIN — Medication 1 MILLIGRAM(S): at 11:58

## 2025-01-29 RX ADMIN — ERTAPENEM SODIUM 120 MILLIGRAM(S): 1 INJECTION, POWDER, LYOPHILIZED, FOR SOLUTION INTRAMUSCULAR; INTRAVENOUS at 11:58

## 2025-01-29 RX ADMIN — Medication 1 TABLET(S): at 11:58

## 2025-01-29 NOTE — DISCHARGE NOTE NURSING/CASE MANAGEMENT/SOCIAL WORK - FINANCIAL ASSISTANCE
[facility] provides services at a reduced cost to those who are determined to be eligible through [facility]’s financial assistance program. Information regarding [facility]’s financial assistance program can be found by going to [email or web address] or by calling [phone].

## 2025-01-29 NOTE — PROGRESS NOTE ADULT - PROVIDER SPECIALTY LIST ADULT
Infectious Disease
Infectious Disease
Heme/Onc
Hospitalist
Infectious Disease
Heme/Onc
Infectious Disease
Hospitalist

## 2025-01-29 NOTE — DISCHARGE NOTE NURSING/CASE MANAGEMENT/SOCIAL WORK - NSCORESITESY/N_GEN_A_CORE_RD
Caller: Silva Maldonado    Relationship: Self    Best call back number: [phone]      What does billing need from the patient: THE PATIENT ADVISED SHE HAD A PHYSICAL WITH STEVE CASAREZ ON 07/12/2024, BUT RECEIVED A NOTICE FROM HER INSURANCE THAT IT WAS NOT POSTED AS A PHYSICAL. THE PATIENT ADVISED SHE HAS TO HAVE A PHYSICAL EVERY YEAR TO KEEP HER INSURANCE PREMIUMS LOWER. THE PATIENT ADVISED THAT BLOOD WORK WAS NOT DONE AT THE APPOINTMENT AND IF SHE NEEDS TO BE RESCHEDULED FOR A PHYSICAL, BECAUSE IT WAS NOT DONE FOR SOME REASON, SHE MUST HAVE THE PHYSICAL DONE BY 09/01/2024.    PLEASE CALL THE PATIENT AND ADVISE.          No

## 2025-01-29 NOTE — PROGRESS NOTE ADULT - SUBJECTIVE AND OBJECTIVE BOX
Patient is a 79y old  Female who presents with a chief complaint of Infection (28 Jan 2025 15:49)    Patient seen and examined, feeling well. Completing abx today.    MEDICATIONS  (STANDING):  chlorhexidine 2% Cloths 1 Application(s) Topical daily  ertapenem  IVPB 1000 milliGRAM(s) IV Intermittent once  folic acid 1 milliGRAM(s) Oral daily  multivitamin 1 Tablet(s) Oral daily  pancrelipase  (CREON 24,000 Lipase Units) 1 Capsule(s) Oral three times a day with meals    MEDICATIONS  (PRN):  acetaminophen     Tablet .. 650 milliGRAM(s) Oral every 6 hours PRN Temp greater or equal to 38C (100.4F), Mild Pain (1 - 3)      ROS  No fever, sweats, chills  No epistaxis, HA, sore throat  No CP, SOB, cough, sputum  No n/v/d, abd pain, melena, hematochezia  No edema  No rash  No anxiety  No back pain, joint pain  No bleeding, bruising  No dysuria, hematuria    Vital Signs Last 24 Hrs  T(C): 36.6 (29 Jan 2025 05:00), Max: 37.2 (28 Jan 2025 12:40)  T(F): 97.8 (29 Jan 2025 05:00), Max: 98.9 (28 Jan 2025 12:40)  HR: 82 (29 Jan 2025 05:00) (82 - 100)  BP: 106/66 (29 Jan 2025 05:00) (105/69 - 107/71)  BP(mean): --  RR: 17 (29 Jan 2025 05:00) (17 - 18)  SpO2: 97% (29 Jan 2025 05:00) (96% - 97%)    Parameters below as of 29 Jan 2025 05:00  Patient On (Oxygen Delivery Method): room air    PE  NAD  Awake, alert  Anicteric, MMM  RRR  CTAB  Abd soft, NT, ND  No c/c/e  No rash grossly  FROM

## 2025-01-29 NOTE — DISCHARGE NOTE NURSING/CASE MANAGEMENT/SOCIAL WORK - PATIENT PORTAL LINK FT
You can access the FollowMyHealth Patient Portal offered by [facility] by registering at the following website: [email or web address]. By joining [facility]’s FollowMyHealth portal, you will also be able to view your health information using other applications (apps) compatible with our system.

## 2025-01-29 NOTE — PROGRESS NOTE ADULT - ASSESSMENT
79 year old, Luxembourgish speaking, female with hx of pancreatic cancer s/p Whipple in 2021, pulmonary mets s/p L VATS, TONIA wedge resection and LLL basilar segmentectomy in 11/2024, L partial nephrectomy (2019), HTN, RA, presenting with fevers for the last 2 days    Pancreatic ca  --[facility]  --w/ metastatic spread to lung  --s/p multiple lines of treatment, currently on C1 gem/abraxane LD: 01/16  --Zarxio given for count recovery between 1/23-1/26.   --no plan for treatment inpatient and/or rehab  --ongoing care after discharge    Pancytopenia  --2/2 malignancy and treatment  --on Nplate 45mcg, LD 1/16, next dose scheduled for 02/06  --no nutritional deficiencies noted on outpatient labs from 01/02    Neutropenic fevers  --afebrile since admission  --peripheral blood cultures w/ GNR and GPC in clusters   --ID following, recommending MP removal if blood cultures persistently positive with Klebsiella. 1/24 repeat blood cultures NTD  --Neutropenia has resolved with zarxio  --Plan for 7 day course of IV abx, to complete today.     will follow    Kj Pineda PA-C  Hematology/Oncology  [facility]  [phone] (office)  Evenings and weekends please call MD on call or office

## 2025-01-29 NOTE — DISCHARGE NOTE NURSING/CASE MANAGEMENT/SOCIAL WORK - NSDCPEFALRISK_GEN_ALL_CORE
For information on Fall & Injury Prevention, visit: [email or web address] OR  [email or web address] OR  https://www.cdc.gov/steadi/patient.html

## 2025-01-29 NOTE — DISCHARGE NOTE NURSING/CASE MANAGEMENT/SOCIAL WORK - NSDCVIVACCINE_GEN_ALL_CORE_FT
Tdap; 11-Apr-2015 07:16; Dasia Barajas (RN); [employer]; [id number]; IntraMuscular; Deltoid Left.; 0.5 milliLiter(s); VIS (VIS Published: 09-May-2013, VIS Presented: 11-Apr-2015);

## 2025-01-29 NOTE — PROGRESS NOTE ADULT - NS ATTEND AMEND GEN_ALL_CORE FT
Patient seen and examined with the PA during rounds  I agree with her assessment and plan
Agree with above assessment and plan.   79 year old female with pancreatic cancer s/p C1 gem/abraxane here with pancytopenia, neutropenic sepsis with klebsiella bacteremia. Coag Neg Staph may be contaminant. If cultures not clearing may need port removed. Appreciate ID recs. Continue zarxio until ANC recovers. Follow up with Dr Scanlon outpatient at [facility] after discharge
Agree with above assessment and plan.   Pancreatic cancer on gem/abraxane here for neutropenic sepsis. ANC has recovered with zarxio. Blood cultures now clear. Follow up further ID recs.   Follow up with Dr Scanlon outpatient at [facility] after discharge
Agree with above. Remains on antibiotics for bacteremia.
Patient seen and examined with the PA during rounds   I Agree with her assessment and plan

## 2025-03-15 ENCOUNTER — EMERGENCY (EMERGENCY)
Facility: HOSPITAL | Age: 80
LOS: 1 days | Discharge: HOME | End: 2025-03-15
Attending: EMERGENCY MEDICINE
Payer: MEDICARE

## 2025-03-15 VITALS
TEMPERATURE: 98 F | OXYGEN SATURATION: 97 % | RESPIRATION RATE: 16 BRPM | HEART RATE: 78 BPM | SYSTOLIC BLOOD PRESSURE: 121 MMHG | DIASTOLIC BLOOD PRESSURE: 81 MMHG

## 2025-03-15 VITALS
HEART RATE: 92 BPM | RESPIRATION RATE: 18 BRPM | TEMPERATURE: 98 F | WEIGHT: 95.02 LBS | DIASTOLIC BLOOD PRESSURE: 79 MMHG | HEIGHT: 60 IN | OXYGEN SATURATION: 98 % | SYSTOLIC BLOOD PRESSURE: 151 MMHG

## 2025-03-15 DIAGNOSIS — Z92.3 PERSONAL HISTORY OF IRRADIATION: Chronic | ICD-10-CM

## 2025-03-15 DIAGNOSIS — Z90.5 ACQUIRED ABSENCE OF KIDNEY: Chronic | ICD-10-CM

## 2025-03-15 DIAGNOSIS — Z90.49 ACQUIRED ABSENCE OF OTHER SPECIFIED PARTS OF DIGESTIVE TRACT: Chronic | ICD-10-CM

## 2025-03-15 DIAGNOSIS — Z98.890 OTHER SPECIFIED POSTPROCEDURAL STATES: Chronic | ICD-10-CM

## 2025-03-15 DIAGNOSIS — Z90.410 ACQUIRED TOTAL ABSENCE OF PANCREAS: Chronic | ICD-10-CM

## 2025-03-15 DIAGNOSIS — Z92.21 PERSONAL HISTORY OF ANTINEOPLASTIC CHEMOTHERAPY: Chronic | ICD-10-CM

## 2025-03-15 LAB
ALBUMIN SERPL ELPH-MCNC: 3.3 G/DL — SIGNIFICANT CHANGE UP (ref 3.3–5)
ALP SERPL-CCNC: 116 U/L — SIGNIFICANT CHANGE UP (ref 40–120)
ALT FLD-CCNC: 37 U/L — SIGNIFICANT CHANGE UP (ref 10–45)
ANION GAP SERPL CALC-SCNC: 13 MMOL/L — SIGNIFICANT CHANGE UP (ref 5–17)
ANISOCYTOSIS BLD QL: SLIGHT — SIGNIFICANT CHANGE UP
APPEARANCE UR: CLEAR — SIGNIFICANT CHANGE UP
AST SERPL-CCNC: 59 U/L — HIGH (ref 10–40)
BACTERIA # UR AUTO: NEGATIVE /HPF — SIGNIFICANT CHANGE UP
BASOPHILS # BLD AUTO: 0 K/UL — SIGNIFICANT CHANGE UP (ref 0–0.2)
BASOPHILS NFR BLD AUTO: 0 % — SIGNIFICANT CHANGE UP (ref 0–2)
BILIRUB SERPL-MCNC: 0.2 MG/DL — SIGNIFICANT CHANGE UP (ref 0.2–1.2)
BILIRUB UR-MCNC: NEGATIVE — SIGNIFICANT CHANGE UP
BUN SERPL-MCNC: 8 MG/DL — SIGNIFICANT CHANGE UP (ref 7–23)
BURR CELLS BLD QL SMEAR: PRESENT — SIGNIFICANT CHANGE UP
CALCIUM SERPL-MCNC: 8.6 MG/DL — SIGNIFICANT CHANGE UP (ref 8.4–10.5)
CAST: 0 /LPF — SIGNIFICANT CHANGE UP (ref 0–4)
CHLORIDE SERPL-SCNC: 106 MMOL/L — SIGNIFICANT CHANGE UP (ref 96–108)
CO2 SERPL-SCNC: 18 MMOL/L — LOW (ref 22–31)
COLOR SPEC: YELLOW — SIGNIFICANT CHANGE UP
CREAT SERPL-MCNC: 0.62 MG/DL — SIGNIFICANT CHANGE UP (ref 0.5–1.3)
DIFF PNL FLD: NEGATIVE — SIGNIFICANT CHANGE UP
EGFR: 90 ML/MIN/1.73M2 — SIGNIFICANT CHANGE UP
EGFR: 90 ML/MIN/1.73M2 — SIGNIFICANT CHANGE UP
ELLIPTOCYTES BLD QL SMEAR: SLIGHT — SIGNIFICANT CHANGE UP
EOSINOPHIL # BLD AUTO: 0.02 K/UL — SIGNIFICANT CHANGE UP (ref 0–0.5)
EOSINOPHIL NFR BLD AUTO: 0.9 % — SIGNIFICANT CHANGE UP (ref 0–6)
GAS PNL BLDV: SIGNIFICANT CHANGE UP
GLUCOSE SERPL-MCNC: 94 MG/DL — SIGNIFICANT CHANGE UP (ref 70–99)
GLUCOSE UR QL: NEGATIVE MG/DL — SIGNIFICANT CHANGE UP
HCT VFR BLD CALC: 27.1 % — LOW (ref 34.5–45)
HGB BLD-MCNC: 8.7 G/DL — LOW (ref 11.5–15.5)
KETONES UR-MCNC: NEGATIVE MG/DL — SIGNIFICANT CHANGE UP
LEUKOCYTE ESTERASE UR-ACNC: ABNORMAL
LIDOCAIN IGE QN: 6 U/L — LOW (ref 7–60)
LYMPHOCYTES # BLD AUTO: 0.62 K/UL — LOW (ref 1–3.3)
LYMPHOCYTES # BLD AUTO: 27.6 % — SIGNIFICANT CHANGE UP (ref 13–44)
MACROCYTES BLD QL: SLIGHT — SIGNIFICANT CHANGE UP
MANUAL SMEAR VERIFICATION: SIGNIFICANT CHANGE UP
MCHC RBC-ENTMCNC: 31.6 PG — SIGNIFICANT CHANGE UP (ref 27–34)
MCHC RBC-ENTMCNC: 32.1 G/DL — SIGNIFICANT CHANGE UP (ref 32–36)
MCV RBC AUTO: 98.5 FL — SIGNIFICANT CHANGE UP (ref 80–100)
MICROCYTES BLD QL: SLIGHT — SIGNIFICANT CHANGE UP
MONOCYTES # BLD AUTO: 0.19 K/UL — SIGNIFICANT CHANGE UP (ref 0–0.9)
MONOCYTES NFR BLD AUTO: 8.6 % — SIGNIFICANT CHANGE UP (ref 2–14)
NEUTROPHILS # BLD AUTO: 1.4 K/UL — LOW (ref 1.8–7.4)
NEUTROPHILS NFR BLD AUTO: 62.9 % — SIGNIFICANT CHANGE UP (ref 43–77)
NITRITE UR-MCNC: NEGATIVE — SIGNIFICANT CHANGE UP
OVALOCYTES BLD QL SMEAR: SLIGHT — SIGNIFICANT CHANGE UP
PH UR: 7 — SIGNIFICANT CHANGE UP (ref 5–8)
PLAT MORPH BLD: NORMAL — SIGNIFICANT CHANGE UP
PLATELET # BLD AUTO: 87 K/UL — LOW (ref 150–400)
POIKILOCYTOSIS BLD QL AUTO: SLIGHT — SIGNIFICANT CHANGE UP
POLYCHROMASIA BLD QL SMEAR: SLIGHT — SIGNIFICANT CHANGE UP
POTASSIUM SERPL-MCNC: 5 MMOL/L — SIGNIFICANT CHANGE UP (ref 3.5–5.3)
POTASSIUM SERPL-SCNC: 5 MMOL/L — SIGNIFICANT CHANGE UP (ref 3.5–5.3)
PROT SERPL-MCNC: 6.2 G/DL — SIGNIFICANT CHANGE UP (ref 6–8.3)
PROT UR-MCNC: NEGATIVE MG/DL — SIGNIFICANT CHANGE UP
RBC # BLD: 2.75 M/UL — LOW (ref 3.8–5.2)
RBC # FLD: 16.8 % — HIGH (ref 10.3–14.5)
RBC BLD AUTO: ABNORMAL
RBC CASTS # UR COMP ASSIST: 0 /HPF — SIGNIFICANT CHANGE UP (ref 0–4)
SCHISTOCYTES BLD QL AUTO: SLIGHT — SIGNIFICANT CHANGE UP
SODIUM SERPL-SCNC: 137 MMOL/L — SIGNIFICANT CHANGE UP (ref 135–145)
SP GR SPEC: 1.01 — SIGNIFICANT CHANGE UP (ref 1–1.03)
SQUAMOUS # UR AUTO: 0 /HPF — SIGNIFICANT CHANGE UP (ref 0–5)
UROBILINOGEN FLD QL: 0.2 MG/DL — SIGNIFICANT CHANGE UP (ref 0.2–1)
WBC # BLD: 2.23 K/UL — LOW (ref 3.8–10.5)
WBC # FLD AUTO: 2.23 K/UL — LOW (ref 3.8–10.5)
WBC UR QL: 2 /HPF — SIGNIFICANT CHANGE UP (ref 0–5)

## 2025-03-15 PROCEDURE — 81001 URINALYSIS AUTO W/SCOPE: CPT

## 2025-03-15 PROCEDURE — 74177 CT ABD & PELVIS W/CONTRAST: CPT | Mod: MC

## 2025-03-15 PROCEDURE — 83605 ASSAY OF LACTIC ACID: CPT

## 2025-03-15 PROCEDURE — 74177 CT ABD & PELVIS W/CONTRAST: CPT | Mod: 26

## 2025-03-15 PROCEDURE — 99285 EMERGENCY DEPT VISIT HI MDM: CPT

## 2025-03-15 PROCEDURE — 96374 THER/PROPH/DIAG INJ IV PUSH: CPT | Mod: XU

## 2025-03-15 PROCEDURE — 82803 BLOOD GASES ANY COMBINATION: CPT

## 2025-03-15 PROCEDURE — 82435 ASSAY OF BLOOD CHLORIDE: CPT

## 2025-03-15 PROCEDURE — 85014 HEMATOCRIT: CPT

## 2025-03-15 PROCEDURE — 71045 X-RAY EXAM CHEST 1 VIEW: CPT | Mod: 26

## 2025-03-15 PROCEDURE — 80053 COMPREHEN METABOLIC PANEL: CPT

## 2025-03-15 PROCEDURE — 83690 ASSAY OF LIPASE: CPT

## 2025-03-15 PROCEDURE — 84443 ASSAY THYROID STIM HORMONE: CPT

## 2025-03-15 PROCEDURE — 71045 X-RAY EXAM CHEST 1 VIEW: CPT

## 2025-03-15 PROCEDURE — 99285 EMERGENCY DEPT VISIT HI MDM: CPT | Mod: 25

## 2025-03-15 PROCEDURE — 85025 COMPLETE CBC W/AUTO DIFF WBC: CPT

## 2025-03-15 PROCEDURE — 85018 HEMOGLOBIN: CPT

## 2025-03-15 PROCEDURE — 84295 ASSAY OF SERUM SODIUM: CPT

## 2025-03-15 PROCEDURE — 84132 ASSAY OF SERUM POTASSIUM: CPT

## 2025-03-15 PROCEDURE — 82330 ASSAY OF CALCIUM: CPT

## 2025-03-15 PROCEDURE — 82947 ASSAY GLUCOSE BLOOD QUANT: CPT

## 2025-03-15 RX ORDER — SALINE 7; 19 G/118ML; G/118ML
1 ENEMA RECTAL ONCE
Refills: 0 | Status: COMPLETED | OUTPATIENT
Start: 2025-03-15 | End: 2025-03-15

## 2025-03-15 RX ORDER — ONDANSETRON HCL/PF 4 MG/2 ML
4 VIAL (ML) INJECTION ONCE
Refills: 0 | Status: DISCONTINUED | OUTPATIENT
Start: 2025-03-15 | End: 2025-03-19

## 2025-03-15 RX ORDER — POLYETHYLENE GLYCOL 3350 17 G/17G
17 POWDER, FOR SOLUTION ORAL ONCE
Refills: 0 | Status: COMPLETED | OUTPATIENT
Start: 2025-03-15 | End: 2025-03-15

## 2025-03-15 RX ORDER — SALINE 7; 19 G/118ML; G/118ML
1 ENEMA RECTAL ONCE
Refills: 0 | Status: DISCONTINUED | OUTPATIENT
Start: 2025-03-15 | End: 2025-03-19

## 2025-03-15 RX ADMIN — Medication 1000 MILLILITER(S): at 16:36

## 2025-03-15 RX ADMIN — POLYETHYLENE GLYCOL 3350 17 GRAM(S): 17 POWDER, FOR SOLUTION ORAL at 19:37

## 2025-03-15 RX ADMIN — Medication 20 MILLIGRAM(S): at 16:36

## 2025-03-15 RX ADMIN — SALINE 1 ENEMA: 7; 19 ENEMA RECTAL at 19:39

## 2025-03-15 NOTE — ED ADULT NURSE NOTE - OBJECTIVE STATEMENT
80 y/o F A&Ox3 PMH HTN, anemia, gastroparesis, HLD, pancreatic CA PSH therapeutic radiation, chemotherapy, left nephrectomy presents to the ED from home c/o abdominal pain. Pt reports abdominal pan and constipation x several days. Son states pt has tried several medications prescribed by outpatient providers with no relief. Upon ED arrival pt is well appearing. Breathing is even and unlabored. Skin is warm, dry & in tact. Pt denies CP, SOB, dizziness, fevers, chills. IV access obtained. Comfort & safety provided.

## 2025-03-15 NOTE — ED PROVIDER NOTE - NSFOLLOWUPINSTRUCTIONS_ED_ALL_ED_FT
You were seen in the ED for constipation    Your evaluation including CT scan, blood tests showed no findings requiring further evaluation or treatment in the hospital at this time.    Please follow up with your PCP as discussed within 1 week. Discuss your symptoms, medications, and results in this packet.    You may continue to use the medications that you have been given for constipation as instructed.  This includes MiraLAX which you should dissolve 1 capful into a full glass of water 2 times per day for 1 week and continue even if you are stooling well for 1 week.  Stay well-hydrated.    Seek immediate medical attention if you experience new or worsening symptoms, including sudden or severely worsening abdominal pain, inability to drink and eat anything by mouth.

## 2025-03-15 NOTE — ED PROVIDER NOTE - PROGRESS NOTE DETAILS
Shamar Proctor MD PGY3: Patient was signed out to me from previous provider at signout. With ct without obstruction, with stool burden. labs nonactionable. to trial meds for constipation, reassess. Shamar Proctor MD PGY3: pt stooled well with enema. feels improved. Discussed with patient all results including any incidentals. Discussed discharge, follow up, return precautions, medications. Patient verbalizes understanding and is amenable at this time. Answered patient questions.

## 2025-03-15 NOTE — ED PROVIDER NOTE - CLINICAL SUMMARY MEDICAL DECISION MAKING FREE TEXT BOX
79 year-old    with  metastatic  pancreatic cancer  on , chemo ,last one 10 days ago ,presented with constipation ,nausea .low appetites for the last week .has been taking laxative without relief ,will obtain blood work .CT scan ,fluids reassess ZR

## 2025-03-15 NOTE — ED ADULT NURSE NOTE - NSFALLRISKINTERV_ED_ALL_ED
Assistance OOB with selected safe patient handling equipment if applicable/Assistance with ambulation/Communicate fall risk and risk factors to all staff, patient, and family/Monitor gait and stability/Provide visual cue: yellow wristband, yellow gown, etc/Reinforce activity limits and safety measures with patient and family/Call bell, personal items and telephone in reach/Instruct patient to call for assistance before getting out of bed/chair/stretcher/Non-slip footwear applied when patient is off stretcher/[place] to call system/Physically safe environment - no spills, clutter or unnecessary equipment/Purposeful Proactive Rounding/Room/bathroom lighting operational, light cord in reach

## 2025-03-15 NOTE — ED PROVIDER NOTE - OBJECTIVE STATEMENT
79-year-old Irish speaking son at bedside  translating  history of hypertension, hyperlipidemia, pancreatitis, history of Whipple procedure secondary to pancreatic cancer 2021 radiation and chemo,, lung metastasis diagnosed last year, status post left lobectomy, and now again chemotherapy, last chemotherapy 10 days ago, presented to emergency room no BM r the last 10 days , seen by her oncologist yesterday was supposed to have a chemo, but not given.  To come to emergency room to be checked ,  patient complaining of low appetite  abdominal pain and distention NO  BM  even after MiraLAX and lactulose 79-year-old Mongolian speaking son at bedside  translating  history of hypertension, hyperlipidemia, RA on methotrexate    history of Whipple procedure secondary to pancreatic cancer 2021 radiation and chemo,, lung metastasis diagnosed last year, status post left  upper wedge resection and LLL basilar segmentectomy , and now again chemotherapy, last chemotherapy 10 days ago, presented to emergency room no BM r the last 10 days , seen by her oncologist yesterday was supposed to have a chemo, but not given.  To come to emergency room to be checked ,  patient complaining of low appetite  abdominal pain and distention NO  BM  even after MiraLAX and lactulose  oncologist dr Scanlon

## 2025-03-15 NOTE — ED ADULT TRIAGE NOTE - CHIEF COMPLAINT QUOTE
constipation x1 week a/w abdominal pain & bloating, +passing gas  was seen by PMD, urgent care & oncologist wo relief  hx pancreatic & lung CA on chemo

## 2025-03-15 NOTE — ED PROVIDER NOTE - HEME LYMPH, MLM
Assessment/Plan:  Problem List Items Addressed This Visit    None  Visit Diagnoses       Acute left ankle pain    -  Primary    Relevant Medications    meloxicam (MOBIC) 15 mg tablet    Other Relevant Orders    XR ankle 3+ vw left    Chronic low back pain, unspecified back pain laterality, unspecified whether sciatica present        Relevant Medications    meloxicam (MOBIC) 15 mg tablet             Diagnoses and all orders for this visit:    Acute left ankle pain  -     XR ankle 3+ vw left; Future  -     meloxicam (MOBIC) 15 mg tablet; Take 1 tablet (15 mg total) by mouth daily    Chronic low back pain, unspecified back pain laterality, unspecified whether sciatica present  -     meloxicam (MOBIC) 15 mg tablet; Take 1 tablet (15 mg total) by mouth daily        No problem-specific Assessment & Plan notes found for this encounter.    A/P: Stable and PE not overly impressive except for the pain. Doubt gout, diabetes, or CTD. ?sciatica or neuropathy. ?scar tissue with impingement. Will start with NSAID'a and an xray. May need labs and PT. RTC one week for f/u. If chronic, TICO trail.     Subjective:      Patient ID: Frida Reyes is a 35 y.o. female.    WF , with reported chronic ankle pain in the past, presents with a several week  of intermittent left ankle pain. Remote trauma. No h/o gout, LS spine issues, or CTD. Reports interminent sharp stabbing pain/burning, lasting a minute or so outer left ankle. No known triggers. Not positional. W/o or w/o activity. Some chronic LBP. No new meds or dietary changes. No swelling, redness or increased temp.         The following portions of the patient's history were reviewed and updated as appropriate:   She has a past medical history of Abnormal Pap smear of cervix, Herpes, HPV (human papilloma virus) infection, Known health problems: none, Morbid obesity with BMI of 40.0-44.9, adult (HCC) (11/17/2021), and Obesity (10/01/2019).,  does not have any pertinent  problems on file.,   has a past surgical history that includes Cervical cone biopsy; Tonsillectomy; Tonsillectomy; Dental surgery; INSERTION OF INTRAUTERINE DEVICE (IUD); and pr conization cervix w/wo d&c rpr knife/laser.,  family history includes Alcohol abuse in her father; Breast cancer in her maternal grandmother and sister; Breast cancer additional onset in her maternal aunt; COPD in her mother; Cancer in her maternal aunt and maternal grandmother; Cirrhosis in her father; Dementia in her family; Diabetes in her family; Heart disease in her family; Hypertension in her family; No Known Problems in her half-sister; Other in her brother; Other (age of onset: 30) in her mother; Retinitis pigmentosa in her father.,   reports that she has never smoked. She has never been exposed to tobacco smoke. She has never used smokeless tobacco. She reports current alcohol use of about 4.0 standard drinks of alcohol per week. She reports that she does not use drugs.,  has No Known Allergies..  Current Outpatient Medications   Medication Sig Dispense Refill    meloxicam (MOBIC) 15 mg tablet Take 1 tablet (15 mg total) by mouth daily 30 tablet 1    FLUoxetine (PROzac) 20 mg capsule Take 20 mg by mouth daily      levonorgestrel (MIRENA) 20 MCG/24HR IUD 1 each by Intrauterine route once      Multiple Vitamins-Minerals (multivitamin with minerals) tablet Take 1 tablet by mouth daily      traZODone (DESYREL) 50 mg tablet Take 100 mg by mouth daily at bedtime as needed      valACYclovir (VALTREX) 500 mg tablet Take 1 tablet (500 mg total) by mouth daily 90 tablet 3     No current facility-administered medications for this visit.       Review of Systems   Constitutional:  Positive for activity change. Negative for chills, diaphoresis, fatigue and fever.   Respiratory:  Negative for cough, chest tightness, shortness of breath and wheezing.    Cardiovascular:  Negative for chest pain, palpitations and leg swelling.   Gastrointestinal:   "Negative for abdominal pain, constipation, diarrhea, nausea and vomiting.   Genitourinary:  Negative for difficulty urinating, dysuria and frequency.   Musculoskeletal:  Positive for arthralgias. Negative for gait problem, joint swelling and myalgias.   Neurological:  Negative for light-headedness and headaches.   Psychiatric/Behavioral:  Negative for confusion. The patient is not nervous/anxious.        PHQ-2/9 Depression Screening    Little interest or pleasure in doing things: 0 - not at all  Feeling down, depressed, or hopeless: 0 - not at all  PHQ-2 Score: 0  PHQ-2 Interpretation: Negative depression screen        Objective:  Vitals:    04/09/24 0900   BP: 120/70   Pulse: 83   Temp: 98.7 °F (37.1 °C)   SpO2: 98%   Weight: (!) 140 kg (309 lb)   Height: 5' 7\" (1.702 m)     Body mass index is 48.4 kg/m².     Physical Exam  Vitals and nursing note reviewed.   Constitutional:       General: She is not in acute distress.     Appearance: Normal appearance. She is obese. She is not ill-appearing.   HENT:      Head: Normocephalic and atraumatic.      Mouth/Throat:      Mouth: Mucous membranes are moist.   Eyes:      Extraocular Movements: Extraocular movements intact.      Conjunctiva/sclera: Conjunctivae normal.      Pupils: Pupils are equal, round, and reactive to light.   Musculoskeletal:         General: Tenderness present. No swelling or deformity.      Right lower leg: No edema.      Left lower leg: No edema.      Comments: Left ankle joint w/o any gross deformities, increase temp, erythema, or swelling. NO crepitus. No effusions or ballotment. Joint integrity intact. ROM wnl. Tenderness just below the lateral malleolus .      Neurological:      General: No focal deficit present.      Mental Status: She is alert and oriented to person, place, and time. Mental status is at baseline.   Psychiatric:         Mood and Affect: Mood normal.         Behavior: Behavior normal.         Thought Content: Thought content " normal.         Judgment: Judgment normal.          No adenopathy or splenomegaly. No cervical or inguinal lymphadenopathy.

## 2025-03-15 NOTE — ED PROVIDER NOTE - PATIENT PORTAL LINK FT
You can access the FollowMyHealth Patient Portal offered by [facility] by registering at the following website: [email or web address]. By joining [facility]’s FollowMyHealth portal, you will also be able to view your health information using other applications (apps) compatible with our system.

## 2025-03-15 NOTE — ED PROVIDER NOTE - CONSTITUTIONAL, MLM
normal... chronically ill thin patient awake, alert, oriented to person, place, time/situation and in no apparent distress.

## 2025-03-16 LAB — TSH SERPL-MCNC: 0.59 UIU/ML — SIGNIFICANT CHANGE UP (ref 0.27–4.2)

## 2025-03-20 ENCOUNTER — NON-APPOINTMENT (OUTPATIENT)
Age: 80
End: 2025-03-20

## 2025-04-08 ENCOUNTER — APPOINTMENT (OUTPATIENT)
Dept: SURGICAL ONCOLOGY | Facility: CLINIC | Age: 80
End: 2025-04-08
Payer: MEDICARE

## 2025-04-08 VITALS
HEIGHT: 64 IN | OXYGEN SATURATION: 98 % | DIASTOLIC BLOOD PRESSURE: 78 MMHG | HEART RATE: 84 BPM | SYSTOLIC BLOOD PRESSURE: 130 MMHG | WEIGHT: 96 LBS | BODY MASS INDEX: 16.39 KG/M2

## 2025-04-08 DIAGNOSIS — C25.9 MALIGNANT NEOPLASM OF PANCREAS, UNSPECIFIED: ICD-10-CM

## 2025-04-08 DIAGNOSIS — C78.00 SECONDARY MALIGNANT NEOPLASM OF UNSPECIFIED LUNG: ICD-10-CM

## 2025-04-08 PROCEDURE — 99214 OFFICE O/P EST MOD 30 MIN: CPT

## 2025-05-19 ENCOUNTER — APPOINTMENT (OUTPATIENT)
Dept: PULMONOLOGY | Facility: CLINIC | Age: 80
End: 2025-05-19
Payer: MEDICARE

## 2025-05-19 VITALS
OXYGEN SATURATION: 98 % | HEIGHT: 64 IN | DIASTOLIC BLOOD PRESSURE: 73 MMHG | WEIGHT: 103 LBS | HEART RATE: 95 BPM | BODY MASS INDEX: 17.58 KG/M2 | SYSTOLIC BLOOD PRESSURE: 131 MMHG

## 2025-05-19 PROCEDURE — G2211 COMPLEX E/M VISIT ADD ON: CPT

## 2025-05-19 PROCEDURE — 99214 OFFICE O/P EST MOD 30 MIN: CPT

## 2025-06-23 ENCOUNTER — INPATIENT (INPATIENT)
Facility: HOSPITAL | Age: 80
LOS: 7 days | Discharge: HOME | DRG: 864 | End: 2025-07-01
Attending: INTERNAL MEDICINE | Admitting: INTERNAL MEDICINE
Payer: MEDICARE

## 2025-06-23 VITALS
TEMPERATURE: 100 F | OXYGEN SATURATION: 98 % | WEIGHT: 98.99 LBS | DIASTOLIC BLOOD PRESSURE: 63 MMHG | SYSTOLIC BLOOD PRESSURE: 124 MMHG | HEIGHT: 60 IN | RESPIRATION RATE: 20 BRPM | HEART RATE: 117 BPM

## 2025-06-23 DIAGNOSIS — I10 ESSENTIAL (PRIMARY) HYPERTENSION: ICD-10-CM

## 2025-06-23 DIAGNOSIS — Z92.3 PERSONAL HISTORY OF IRRADIATION: Chronic | ICD-10-CM

## 2025-06-23 DIAGNOSIS — C25.9 MALIGNANT NEOPLASM OF PANCREAS, UNSPECIFIED: ICD-10-CM

## 2025-06-23 DIAGNOSIS — R50.9 FEVER, UNSPECIFIED: ICD-10-CM

## 2025-06-23 DIAGNOSIS — Z90.5 ACQUIRED ABSENCE OF KIDNEY: Chronic | ICD-10-CM

## 2025-06-23 DIAGNOSIS — D61.810 ANTINEOPLASTIC CHEMOTHERAPY INDUCED PANCYTOPENIA: ICD-10-CM

## 2025-06-23 DIAGNOSIS — E78.5 HYPERLIPIDEMIA, UNSPECIFIED: ICD-10-CM

## 2025-06-23 DIAGNOSIS — Z79.899 OTHER LONG TERM (CURRENT) DRUG THERAPY: ICD-10-CM

## 2025-06-23 DIAGNOSIS — Z90.410 ACQUIRED TOTAL ABSENCE OF PANCREAS: Chronic | ICD-10-CM

## 2025-06-23 DIAGNOSIS — Z98.890 OTHER SPECIFIED POSTPROCEDURAL STATES: Chronic | ICD-10-CM

## 2025-06-23 DIAGNOSIS — A41.9 SEPSIS, UNSPECIFIED ORGANISM: ICD-10-CM

## 2025-06-23 DIAGNOSIS — Z92.21 PERSONAL HISTORY OF ANTINEOPLASTIC CHEMOTHERAPY: Chronic | ICD-10-CM

## 2025-06-23 DIAGNOSIS — Z90.49 ACQUIRED ABSENCE OF OTHER SPECIFIED PARTS OF DIGESTIVE TRACT: Chronic | ICD-10-CM

## 2025-06-23 LAB
ALBUMIN SERPL ELPH-MCNC: 2.8 G/DL — LOW (ref 3.3–5)
ALP SERPL-CCNC: 106 U/L — SIGNIFICANT CHANGE UP (ref 40–120)
ALT FLD-CCNC: 27 U/L — SIGNIFICANT CHANGE UP (ref 10–45)
ANION GAP SERPL CALC-SCNC: 11 MMOL/L — SIGNIFICANT CHANGE UP (ref 5–17)
ANISOCYTOSIS BLD QL: ABNORMAL
APPEARANCE UR: CLEAR — SIGNIFICANT CHANGE UP
APTT BLD: 32.3 SEC — SIGNIFICANT CHANGE UP (ref 26.1–36.8)
AST SERPL-CCNC: 22 U/L — SIGNIFICANT CHANGE UP (ref 10–40)
BASOPHILS # BLD AUTO: 0.01 K/UL — SIGNIFICANT CHANGE UP (ref 0–0.2)
BASOPHILS # BLD MANUAL: 0 K/UL — SIGNIFICANT CHANGE UP (ref 0–0.2)
BASOPHILS NFR BLD AUTO: 0.5 % — SIGNIFICANT CHANGE UP (ref 0–2)
BASOPHILS NFR BLD MANUAL: 0 % — SIGNIFICANT CHANGE UP (ref 0–2)
BILIRUB SERPL-MCNC: 0.3 MG/DL — SIGNIFICANT CHANGE UP (ref 0.2–1.2)
BILIRUB UR-MCNC: NEGATIVE — SIGNIFICANT CHANGE UP
BUN SERPL-MCNC: 17 MG/DL — SIGNIFICANT CHANGE UP (ref 7–23)
CALCIUM SERPL-MCNC: 8 MG/DL — LOW (ref 8.4–10.5)
CHLORIDE SERPL-SCNC: 103 MMOL/L — SIGNIFICANT CHANGE UP (ref 96–108)
CO2 SERPL-SCNC: 17 MMOL/L — LOW (ref 22–31)
COLOR SPEC: YELLOW — SIGNIFICANT CHANGE UP
CREAT SERPL-MCNC: 0.68 MG/DL — SIGNIFICANT CHANGE UP (ref 0.5–1.3)
DACRYOCYTES BLD QL SMEAR: SLIGHT — SIGNIFICANT CHANGE UP
DIFF PNL FLD: NEGATIVE — SIGNIFICANT CHANGE UP
EGFR: 88 ML/MIN/1.73M2 — SIGNIFICANT CHANGE UP
EGFR: 88 ML/MIN/1.73M2 — SIGNIFICANT CHANGE UP
ELLIPTOCYTES BLD QL SMEAR: SLIGHT — SIGNIFICANT CHANGE UP
EOSINOPHIL # BLD AUTO: 0.03 K/UL — SIGNIFICANT CHANGE UP (ref 0–0.5)
EOSINOPHIL # BLD MANUAL: 0.02 K/UL — SIGNIFICANT CHANGE UP (ref 0–0.5)
EOSINOPHIL NFR BLD AUTO: 1.4 % — SIGNIFICANT CHANGE UP (ref 0–6)
EOSINOPHIL NFR BLD MANUAL: 0.9 % — SIGNIFICANT CHANGE UP (ref 0–6)
FLUAV AG NPH QL: SIGNIFICANT CHANGE UP
FLUBV AG NPH QL: SIGNIFICANT CHANGE UP
GLUCOSE SERPL-MCNC: 150 MG/DL — HIGH (ref 70–99)
GLUCOSE UR QL: NEGATIVE MG/DL — SIGNIFICANT CHANGE UP
HCT VFR BLD CALC: 23.8 % — LOW (ref 34.5–45)
HGB BLD-MCNC: 7.6 G/DL — LOW (ref 11.5–15.5)
HMPV RNA SPEC QL NAA+PROBE: DETECTED
HYPOCHROMIA BLD QL: SLIGHT — SIGNIFICANT CHANGE UP
HYPOSEGMENTATION: PRESENT
IMM GRANULOCYTES # BLD AUTO: 0.34 K/UL — HIGH (ref 0–0.07)
IMM GRANULOCYTES NFR BLD AUTO: 16.4 % — HIGH (ref 0–0.9)
IMMATURE PLATELET FRACTION #: 3.1 K/UL — LOW (ref 4.7–11.1)
IMMATURE PLATELET FRACTION %: 6 % — HIGH (ref 1.6–4.9)
INR BLD: 1.02 RATIO — SIGNIFICANT CHANGE UP (ref 0.85–1.16)
KETONES UR QL: NEGATIVE MG/DL — SIGNIFICANT CHANGE UP
LACTATE SERPL-SCNC: 1.8 MMOL/L — SIGNIFICANT CHANGE UP (ref 0.5–2)
LEUKOCYTE ESTERASE UR-ACNC: NEGATIVE — SIGNIFICANT CHANGE UP
LYMPHOCYTES # BLD AUTO: 0.12 K/UL — LOW (ref 1–3.3)
LYMPHOCYTES # BLD MANUAL: 0.05 K/UL — LOW (ref 1–3.3)
LYMPHOCYTES NFR BLD AUTO: 5.8 % — LOW (ref 13–44)
LYMPHOCYTES NFR BLD MANUAL: 2.6 % — LOW (ref 13–44)
MACROCYTES BLD QL: ABNORMAL
MANUAL NEUTROPHIL BANDS #: 0.02 K/UL — SIGNIFICANT CHANGE UP (ref 0–0.84)
MCHC RBC-ENTMCNC: 30.6 PG — SIGNIFICANT CHANGE UP (ref 27–34)
MCHC RBC-ENTMCNC: 31.9 G/DL — LOW (ref 32–36)
MCV RBC AUTO: 96 FL — SIGNIFICANT CHANGE UP (ref 80–100)
MONOCYTES # BLD AUTO: 0.03 K/UL — SIGNIFICANT CHANGE UP (ref 0–0.9)
MONOCYTES # BLD MANUAL: 0 K/UL — SIGNIFICANT CHANGE UP (ref 0–0.9)
MONOCYTES NFR BLD AUTO: 1.4 % — LOW (ref 2–14)
MONOCYTES NFR BLD MANUAL: 0 % — LOW (ref 2–14)
NEUTROPHILS # BLD AUTO: 1.54 K/UL — LOW (ref 1.8–7.4)
NEUTROPHILS # BLD MANUAL: 1.98 K/UL — SIGNIFICANT CHANGE UP (ref 1.8–7.4)
NEUTROPHILS NFR BLD AUTO: 74.5 % — SIGNIFICANT CHANGE UP (ref 43–77)
NEUTROPHILS NFR BLD MANUAL: 95.6 % — HIGH (ref 43–77)
NEUTS BAND # BLD: 0.9 % — SIGNIFICANT CHANGE UP (ref 0–8)
NEUTS BAND NFR BLD: 0.9 % — SIGNIFICANT CHANGE UP (ref 0–8)
NITRITE UR-MCNC: NEGATIVE — SIGNIFICANT CHANGE UP
NRBC # BLD AUTO: 0 K/UL — SIGNIFICANT CHANGE UP (ref 0–0)
NRBC # FLD: 0 K/UL — SIGNIFICANT CHANGE UP (ref 0–0)
OVALOCYTES BLD QL SMEAR: SLIGHT — SIGNIFICANT CHANGE UP
PH UR: 6 — SIGNIFICANT CHANGE UP (ref 5–8)
PLAT MORPH BLD: NORMAL — SIGNIFICANT CHANGE UP
PLATELET # BLD AUTO: 51 K/UL — LOW (ref 150–400)
PMV BLD: SIGNIFICANT CHANGE UP FL (ref 7–13)
POIKILOCYTOSIS BLD QL AUTO: SLIGHT — SIGNIFICANT CHANGE UP
POTASSIUM SERPL-MCNC: 4.2 MMOL/L — SIGNIFICANT CHANGE UP (ref 3.5–5.3)
POTASSIUM SERPL-SCNC: 4.2 MMOL/L — SIGNIFICANT CHANGE UP (ref 3.5–5.3)
PROT SERPL-MCNC: 5.2 G/DL — LOW (ref 6–8.3)
PROT UR-MCNC: NEGATIVE MG/DL — SIGNIFICANT CHANGE UP
PROTHROM AB SERPL-ACNC: 11.7 SEC — SIGNIFICANT CHANGE UP (ref 9.9–13.4)
RAPID RVP RESULT: DETECTED
RBC # BLD: 2.48 M/UL — LOW (ref 3.8–5.2)
RBC # FLD: 19.3 % — HIGH (ref 10.3–14.5)
RBC BLD AUTO: ABNORMAL
RSV RNA NPH QL NAA+NON-PROBE: SIGNIFICANT CHANGE UP
SARS-COV-2 RNA SPEC QL NAA+PROBE: SIGNIFICANT CHANGE UP
SARS-COV-2 RNA SPEC QL NAA+PROBE: SIGNIFICANT CHANGE UP
SCHISTOCYTES BLD QL AUTO: SLIGHT — SIGNIFICANT CHANGE UP
SODIUM SERPL-SCNC: 131 MMOL/L — LOW (ref 135–145)
SOURCE RESPIRATORY: SIGNIFICANT CHANGE UP
SP GR SPEC: 1.01 — SIGNIFICANT CHANGE UP (ref 1–1.03)
TROPONIN T, HIGH SENSITIVITY RESULT: 16 NG/L — SIGNIFICANT CHANGE UP (ref 0–51)
UROBILINOGEN FLD QL: 0.2 MG/DL — SIGNIFICANT CHANGE UP (ref 0.2–1)
WBC # BLD: 2.07 K/UL — LOW (ref 3.8–10.5)
WBC # FLD AUTO: 2.07 K/UL — LOW (ref 3.8–10.5)

## 2025-06-23 PROCEDURE — 72100 X-RAY EXAM L-S SPINE 2/3 VWS: CPT | Mod: 26

## 2025-06-23 PROCEDURE — 93010 ELECTROCARDIOGRAM REPORT: CPT

## 2025-06-23 PROCEDURE — 73502 X-RAY EXAM HIP UNI 2-3 VIEWS: CPT | Mod: 26,LT

## 2025-06-23 PROCEDURE — 99285 EMERGENCY DEPT VISIT HI MDM: CPT

## 2025-06-23 PROCEDURE — 99223 1ST HOSP IP/OBS HIGH 75: CPT

## 2025-06-23 PROCEDURE — 71046 X-RAY EXAM CHEST 2 VIEWS: CPT | Mod: 26

## 2025-06-23 RX ORDER — CEFEPIME 2 G/20ML
2000 INJECTION, POWDER, FOR SOLUTION INTRAVENOUS ONCE
Refills: 0 | Status: COMPLETED | OUTPATIENT
Start: 2025-06-23 | End: 2025-06-23

## 2025-06-23 RX ORDER — CEFEPIME 2 G/20ML
INJECTION, POWDER, FOR SOLUTION INTRAVENOUS
Refills: 0 | Status: COMPLETED | OUTPATIENT
Start: 2025-06-23 | End: 2025-06-23

## 2025-06-23 RX ORDER — ERTAPENEM SODIUM 1 G/1
1000 INJECTION, POWDER, LYOPHILIZED, FOR SOLUTION INTRAMUSCULAR; INTRAVENOUS EVERY 24 HOURS
Refills: 0 | Status: DISCONTINUED | OUTPATIENT
Start: 2025-06-23 | End: 2025-06-24

## 2025-06-23 RX ORDER — ONDANSETRON HCL/PF 4 MG/2 ML
4 VIAL (ML) INJECTION EVERY 8 HOURS
Refills: 0 | Status: DISCONTINUED | OUTPATIENT
Start: 2025-06-23 | End: 2025-07-01

## 2025-06-23 RX ORDER — CEFEPIME 2 G/20ML
2000 INJECTION, POWDER, FOR SOLUTION INTRAVENOUS EVERY 8 HOURS
Refills: 0 | Status: DISCONTINUED | OUTPATIENT
Start: 2025-06-23 | End: 2025-06-23

## 2025-06-23 RX ORDER — VANCOMYCIN HCL IN 5 % DEXTROSE 1.5G/250ML
1000 PLASTIC BAG, INJECTION (ML) INTRAVENOUS ONCE
Refills: 0 | Status: COMPLETED | OUTPATIENT
Start: 2025-06-23 | End: 2025-06-23

## 2025-06-23 RX ORDER — LIPASE/PROTEASE/AMYLASE 10K-37.5K
1 CAPSULE,DELAYED RELEASE (ENTERIC COATED) ORAL
Refills: 0 | Status: DISCONTINUED | OUTPATIENT
Start: 2025-06-23 | End: 2025-07-01

## 2025-06-23 RX ORDER — ACETAMINOPHEN 500 MG/5ML
650 LIQUID (ML) ORAL EVERY 6 HOURS
Refills: 0 | Status: DISCONTINUED | OUTPATIENT
Start: 2025-06-23 | End: 2025-06-27

## 2025-06-23 RX ORDER — DEXTROMETHORPHAN HBR, GUAIFENESIN 20; 200 MG/10ML; MG/10ML
10 SOLUTION ORAL EVERY 4 HOURS
Refills: 0 | Status: DISCONTINUED | OUTPATIENT
Start: 2025-06-23 | End: 2025-07-01

## 2025-06-23 RX ORDER — MAGNESIUM, ALUMINUM HYDROXIDE 200-200 MG
30 TABLET,CHEWABLE ORAL EVERY 4 HOURS
Refills: 0 | Status: DISCONTINUED | OUTPATIENT
Start: 2025-06-23 | End: 2025-07-01

## 2025-06-23 RX ORDER — BENZONATATE 100 MG
100 CAPSULE ORAL EVERY 8 HOURS
Refills: 0 | Status: DISCONTINUED | OUTPATIENT
Start: 2025-06-23 | End: 2025-07-01

## 2025-06-23 RX ORDER — MELATONIN 5 MG
3 TABLET ORAL AT BEDTIME
Refills: 0 | Status: DISCONTINUED | OUTPATIENT
Start: 2025-06-23 | End: 2025-07-01

## 2025-06-23 RX ADMIN — CEFEPIME 100 MILLIGRAM(S): 2 INJECTION, POWDER, FOR SOLUTION INTRAVENOUS at 12:24

## 2025-06-23 RX ADMIN — ERTAPENEM SODIUM 100 MILLIGRAM(S): 1 INJECTION, POWDER, LYOPHILIZED, FOR SOLUTION INTRAMUSCULAR; INTRAVENOUS at 19:50

## 2025-06-23 RX ADMIN — Medication 250 MILLIGRAM(S): at 13:05

## 2025-06-23 RX ADMIN — Medication 500 MILLILITER(S): at 12:23

## 2025-06-23 RX ADMIN — Medication 80 MILLILITER(S): at 21:14

## 2025-06-23 NOTE — ED ADULT NURSE NOTE - OBJECTIVE STATEMENT
80 y/o female brought in my family from home c/o fevers, cough, chills since Saturday. pt Lithuanian speaking only and requested son at bedside as . pt states she also has had back pain and this morning was unable to get out of bed. pt denies N/V/D. as per pt son pt has been constipated for over a week. PMH of lung ca (gets chemo every 3 weeks- last chemo Thursday), anemia, HTN, HLD, pancreatic ca, arthritis. pt has NKDA. pt changed into hospital gown, bed at lowest position, call bell in reach, pt son and  at bedside.

## 2025-06-23 NOTE — ED PROVIDER NOTE - CLINICAL SUMMARY MEDICAL DECISION MAKING FREE TEXT BOX
79-year-old female with a past medical history of anemia, lung nodules, hypertension, gastroparesis, renal cell carcinoma, hyperlipidemia, arthritis, pancreatic cancer, and history of Whipple procedure, left nephrectomy, therapeutic radiation, and appendectomy presents to the emergency department for fever while on chemotherapy. The patient reports fever and chills starting five days ago, with associated cough, lower back pain, and constipation.  PE: NAD, MMM, lungs CTA, heart no MRG, no abdominal tenderness, no CVA tenderness, no LE edema   ddx: pna, uti, viral uri, fever of unknown etiology in a cancer patient.   plan: sepsis order set, vanc and cefepime, cxr, ua, frquent reassessments 79-year-old female with a past medical history of pancreatic adenocarcinoma sp Whipple now w lung mets, RCC sp partial nephrectomy, hypertension, hyperlipidemia, and appendectomy presents to the emergency department for fever while on chemotherapy. Pt is Georgian speaking, presents w  and son, son provides translation per pt request. Pt states last chemo tx was Th 6/19. Then Fri PM in to early Sat 6/19-20 pt developed fever and cough w Tmax 102F. Seen at urgent care 6/20 and Rx Cefdinir which she has been taking since then. Here now due to persistent fever, chills, generalized weakness, cough w scant sputum, L hip and low back pain. Denies any fall or injury. Heme/Onc- [facility] Dr CARA Scanlon. VS notable for tachycardia, oral T 99.6F, deferred rectal due to recent chemo and chance of neutropenia.   PE: NAD, MMM, lungs CTA, heart no MRG, R chest wall port site CDI no abdominal tenderness, no CVA tenderness, no LE edema, no skin rashes, no midline bony CTL spine TTP, FROM in ext x4, no bony hip TTP or pain w ROM. No obvious joint effusions.   ddx: pna, uti, viral uri, neutropenic fever, malignancy related fever, back and hip pain possibly arthrlagias due to fever or possibly new mets- no e/o SCC.   plan: sepsis order set, empiric abx inlcuding vanc and cefepime, cxr, ua, viral swab, Plan for admission given fever in settingof active chemo despite few days of oral abx.

## 2025-06-23 NOTE — ED PROVIDER NOTE - PHYSICAL EXAMINATION
Vital signs reviewed.  CONSTITUTIONAL: breathing, NAD  HEAD: Normocephalic; atraumatic  EYES: EOMI, PERRL, no conjunctival injection, no scleral icterus  MOUTH/THROAT:  MMM  NECK: Trachea midline, no JVD  CV: Normal S1, S2; no audible murmurs  RESP: normal work of breathing,  mild right sided wheezing  ABD: soft, non-distended; non-tender to palpation   : Deferred  MSK/EXT: no LE edema, no limited ROM  SKIN: No rashes on exposed skin surfaces  NEURO: Moves all extremities spontaneously with no focal deficits, speech is appropriate  PSYCH: calm

## 2025-06-23 NOTE — H&P ADULT - ASSESSMENT
78yo f w pmh htn, hld, metastatic (pulm mets) pancreatic adenoca s/p whipple + vats w darby wedge resection and lll basilar segmentectomy + adj chemo (folfirinox) + palliative chemo (currently on gemtcitabine/abraxane), rcc s/p l partial nephrectomy, ra, constipation, gastroparesis, p/w persistent fever iso recent chemo despite po abx use; in er, found to be meeting sepsis criteria w pancytopenia; c/f neutropenic fever iso recent chemo; admit to medicine for further mgmt

## 2025-06-23 NOTE — H&P ADULT - NSHPREVIEWOFSYSTEMS_GEN_ALL_CORE
CONSTITUTIONAL: + fever. + weakness  ENMT:  No sinus or throat pain  RESPIRATORY: No cough, wheezing, chills or hemoptysis; No shortness of breath  CARDIOVASCULAR: No chest pain, palpitations, dizziness, or leg swelling  GASTROINTESTINAL: No abdominal or epigastric pain. No nausea, vomiting, or hematemesis; No diarrhea or constipation. No melena or hematochezia.  GENITOURINARY: No dysuria or incontinence  NEUROLOGICAL: No headaches, memory loss, loss of strength, numbness, or tremors  SKIN: No rashes,  No hives or eczema  ENDOCRINE: No heat or cold intolerance; No hair loss  MUSCULOSKELETAL: No joint pain or swelling; No muscle, back, or extremity pain  PSYCHIATRIC: No depression, anxiety, mood swings, or difficulty sleeping  HEME/LYMPH: No easy bruising, or bleeding gums; no enlarged LN CONSTITUTIONAL: + fever. + weakness  ENMT:  No sinus or throat pain  RESPIRATORY: + cough, no wheezing, chills or hemoptysis; No shortness of breath  CARDIOVASCULAR: No chest pain, palpitations, dizziness, or leg swelling  GASTROINTESTINAL: No abdominal or epigastric pain. No nausea, vomiting, or hematemesis; No diarrhea or constipation. No melena or hematochezia.  GENITOURINARY: No dysuria or incontinence  NEUROLOGICAL: No headaches, memory loss, loss of strength, numbness, or tremors  SKIN: No rashes,  No hives or eczema  ENDOCRINE: No heat or cold intolerance; No hair loss  MUSCULOSKELETAL: No joint pain or swelling; No muscle, back, or extremity pain  PSYCHIATRIC: No depression, anxiety, mood swings, or difficulty sleeping  HEME/LYMPH: No easy bruising, or bleeding gums; no enlarged LN

## 2025-06-23 NOTE — ED PROVIDER NOTE - PROGRESS NOTE DETAILS
Genaro Diaz MD (Attending MD): I have received sign out on this patient. I am aware of the plan of care and what studies are pending from the previous provider. I have been available for supervision of the predetermined care and intervention if the plan should change.     Patient pending admission, spoke to Juanita CAMPOS.

## 2025-06-23 NOTE — H&P ADULT - PROBLEM SELECTOR PLAN 3
h/o metastatic (pulm mets) pancreatic adenoca s/p whipple + vats w darby wedge resection and lll basilar segmentectomy + adj chemo (folfirinox) + palliative chemo (currently on gemtcitabine/abraxane), rcc s/p l partial nephrectomy  last chemo reportedly on 6/19/2025 h/o metastatic (pulm mets) pancreatic adenoca s/p whipple + vats w darby wedge resection and lll basilar segmentectomy + adj chemo (folfirinox) + palliative chemo (currently on gemtcitabine/abraxane), rcc s/p l partial nephrectomy  outpatient pulm and surg onc documentation reviewed  last chemo reportedly on 6/19/2025  hold chemo while inpatient   [facility] fu on discharge

## 2025-06-23 NOTE — ED PROVIDER NOTE - CARE PLAN
1 Principal Discharge DX:	Fever and chills   Principal Discharge DX:	Fever  Secondary Diagnosis:	Pancytopenia

## 2025-06-23 NOTE — ED ADULT NURSE REASSESSMENT NOTE - NS ED NURSE REASSESS COMMENT FT1
Report received from IVONNE William. Pt alert & oriented x 3. Breathing spontaneous and nonlabored. Pt denies hip pain, fevers, chills.  IV site patent, flushing without difficulty. Pt resting comfortably in bed and made aware of plan of care.

## 2025-06-23 NOTE — H&P ADULT - HISTORY OF PRESENT ILLNESS
80yo f w pmh htn, hld, metastatic (pulm mets) pancreatic adenoca s/p whipple + vats w darby wedge resection and lll basilar segmentectomy + adj chemo (folfirinox) + palliative chemo (currently on gemtcitabine/abraxane), rcc s/p l partial nephrectomy, ra, constipation, gastroparesis, p/w persistent fever iso recent chemo despite po abx use; in er, found to be meeting sepsis criteria w pancytopenia; c/f neutropenic fever iso recent chemo; admit to medicine for further mgmt 80yo f w pmh htn, hld, metastatic (pulm mets) pancreatic adenoca s/p whipple + vats w darby wedge resection and lll basilar segmentectomy + adj chemo (folfirinox) + palliative chemo (currently on gemtcitabine/abraxane), rcc s/p l partial nephrectomy, ra, constipation, gastroparesis, p/w persistent fever iso recent chemo despite po abx use; in er, found to be meeting sepsis criteria w pancytopenia; c/f neutropenic fever iso recent chemo; admit to medicine for further mgmt    of note, hospitalized for similar presentation 1/23/2025-1/29/2025; at that time, found to have esbl klebsiella bacteremia; cxr, ua, ct imaging negative for source at that time; treated with zarxio + nplate for pancytopenia, and course of ertapenem for esbl klebsiella bacteremia. 78yo f w pmh htn, hld, metastatic (pulm mets) pancreatic adenoca s/p whipple + vats w darby wedge resection and lll basilar segmentectomy + adj chemo (folfirinox) + palliative chemo (currently on gemtcitabine/abraxane), rcc s/p l partial nephrectomy, ra, constipation, gastroparesis, p/w persistent fever iso recent chemo despite po abx use; a/w cough x2-3 days. patient went to urgent care where work up was negative, patient prescribed course of cefdinir empirically; however, patient w persistent fever, prompting visit to [facility] er for further mgmt. in er, found to be meeting sepsis criteria w pancytopenia; c/f neutropenic fever iso recent chemo; admit to medicine for further mgmt    of note, hospitalized for similar presentation 1/23/2025-1/29/2025; at that time, found to have esbl klebsiella bacteremia; cxr, ua, ct imaging negative for source at that time; treated with zarxio + nplate for pancytopenia, and course of ertapenem for esbl klebsiella bacteremia.

## 2025-06-23 NOTE — PATIENT PROFILE ADULT - FALL HARM RISK - PATIENT NEEDS ASSISTANCE
Quality 130: Documentation Of Current Medications In The Medical Record: Current Medications Documented Quality 47: Advance Care Plan: Advance Care Planning discussed and documented in the medical record; patient did not wish or was not able to name a surrogate decision maker or provide an advance care plan. Quality 226: Preventive Care And Screening: Tobacco Use: Screening And Cessation Intervention: Patient screened for tobacco use and is an ex/non-smoker Detail Level: Detailed Quality 358: Patient-Centered Surgical Risk Assessment And Communication: Documentation of patient-specific risk assessment with a risk calculator based on multi-institutional clinical data, the specific risk calculator used, and communication of risk assessment from risk calculator with the patient or family. Standing/Walking/Moving from bed to chair

## 2025-06-23 NOTE — ED ADULT NURSE NOTE - NSFALLUNIVINTERV_ED_ALL_ED
Bed/Stretcher in lowest position, wheels locked, appropriate side rails in place/Call bell, personal items and telephone in reach/Instruct patient to call for assistance before getting out of bed/chair/stretcher/Non-slip footwear applied when patient is off stretcher/[place] to call system/Physically safe environment - no spills, clutter or unnecessary equipment/Purposeful proactive rounding/Room/bathroom lighting operational, light cord in reach

## 2025-06-23 NOTE — PATIENT PROFILE ADULT - AVIAN FLU SYMPTOMS
I have reviewed discharge instructions with the patient. The patient verbalized understanding.  Patient armband removed and shredded
No

## 2025-06-23 NOTE — ED ADULT NURSE REASSESSMENT NOTE - NS ED NURSE REASSESS COMMENT FT1
pt reclined in bed, son present, vitals stable, inpatient bed pending, denies c/o, states feels better since coming to ED

## 2025-06-23 NOTE — ED PROVIDER NOTE - OBJECTIVE STATEMENT
79-year-old female who developed fever and chills starting last Thursday, five days prior to presentation. She initially felt fine on Friday but woke up Saturday morning with a high fever. She visited urgent care where urinalysis, blood tests, and chest x-ray were performed, all reportedly negative. She was prescribed antibiotics and Tylenol, which temporarily reduced her fever. However, the fever spiked again the following day. Today, her symptoms worsened, and she experienced difficulty getting out of bed due to lower back pain. She reports a wet cough with mucus for the past 2-3 days. The patient denies shortness of breath, chest pain, dysuria, hematuria, nausea, or vomiting. Her fever has reached up to 101-102°F at home, improving with Tylenol but recurring. She last took 1000mg of Tylenol this morning around 8 AM. The patient also reports constipation for the past 2-3 days but is passing gas. Of note, her father had COVID-19 about two weeks ago, but the patient's recent COVID tests (both rapid and PCR) were negative.

## 2025-06-23 NOTE — ED ADULT NURSE REASSESSMENT NOTE - NS ED NURSE REASSESS COMMENT FT1
pt taken to/from bathroom via wheelchair, +voided, urine sample provided, pt denies c/o, NS/vanco now infusing, family present

## 2025-06-23 NOTE — H&P ADULT - NSHPPHYSICALEXAM_GEN_ALL_CORE
T(C): 36.7 (06-23-25 @ 19:28), Max: 37.9 (06-23-25 @ 12:15)  HR: 95 (06-23-25 @ 19:28) (88 - 117)  BP: 143/64 (06-23-25 @ 19:28) (95/52 - 143/64)  RR: 18 (06-23-25 @ 19:28) (17 - 21)  SpO2: 98% (06-23-25 @ 19:28) (97% - 98%)  GENERAL: NAD, lying in bed   EYES: EOMI, PERRLA; conjunctiva and sclera clear  ENMT: Moist oral mucosa, no pharyngeal injection or exudates;   NECK: Supple, no palpable masses; no JVD  RESPIRATORY: Normal respiratory effort; lungs w decreased breath sounds on auscultation bilaterally  CARDIOVASCULAR: Regular rate and rhythm, normal S1 and S2, no murmur/rub/gallop; No lower extremity edema; Peripheral pulses are 2+ bilaterally  ABDOMEN: Nontender to palpation, normoactive bowel sounds, no rebound/guarding   MUSCULOSKELETAL: no joint swelling or tenderness to palpation  PSYCH: A+O to person, place, and time; affect appropriate  NEUROLOGY: CN 2-12 are intact and symmetric; no gross motor or sensory deficits   SKIN: No rashes; no palpable lesions

## 2025-06-23 NOTE — H&P ADULT - PROBLEM SELECTOR PLAN 2
unclear baseline counts  currently w hgb 7.6, plt 51, anc ~1.54   no gross bleeding in er, and hds otherwise  suspect 2/2 recent chemotherapy  had been receiving zarxio, nplate, aranesp in the outpatient setting  monitor for fever, worsening white count; broad spec abx as described above  trend hgb, plt, anc w serial CBC;  maintain piv and active t+s  transfuse to Goal Hb >7, plt >10k, >20k if septic/febrile, >50k if actively bleeding  hematology consult in am

## 2025-06-23 NOTE — H&P ADULT - PROBLEM SELECTOR PLAN 1
febrile, tachycardic; meets sepsis criteria  anc ~1.54 iso recent chemo  fluvid negative  cxr w no new findings  ua w bland sediment  of note, hospitalized for similar presentation 1/23/2025-1/29/2025; at that time, found to have esbl klebsiella bacteremia iso neutropenic fever, treated with ertapenem  suspect 2/2 viral rti  s/p 500ml ns bolus + vanc and cefepime in ER   fu procal, full rvp, blood + sputum cultures, atypical pna urine ag, mrsa screen  Monitor for fever, changes in white count  broad spec empirical abx therapy w ertapenem  ivf resusci + lytes as needed  id consult in am

## 2025-06-24 LAB
A1C WITH ESTIMATED AVERAGE GLUCOSE RESULT: 6.2 % — HIGH (ref 4–5.6)
ALBUMIN SERPL ELPH-MCNC: 2.1 G/DL — LOW (ref 3.3–5)
ALBUMIN SERPL ELPH-MCNC: 2.3 G/DL — LOW (ref 3.3–5)
ALP SERPL-CCNC: 121 U/L — HIGH (ref 40–120)
ALP SERPL-CCNC: 80 U/L — SIGNIFICANT CHANGE UP (ref 40–120)
ALT FLD-CCNC: 18 U/L — SIGNIFICANT CHANGE UP (ref 10–45)
ALT FLD-CCNC: 20 U/L — SIGNIFICANT CHANGE UP (ref 10–45)
ANION GAP SERPL CALC-SCNC: 12 MMOL/L — SIGNIFICANT CHANGE UP (ref 5–17)
ANION GAP SERPL CALC-SCNC: 9 MMOL/L — SIGNIFICANT CHANGE UP (ref 5–17)
APTT BLD: 34.7 SEC — SIGNIFICANT CHANGE UP (ref 26.1–36.8)
AST SERPL-CCNC: 15 U/L — SIGNIFICANT CHANGE UP (ref 10–40)
AST SERPL-CCNC: 22 U/L — SIGNIFICANT CHANGE UP (ref 10–40)
BASOPHILS # BLD AUTO: 0 K/UL — SIGNIFICANT CHANGE UP (ref 0–0.2)
BASOPHILS # BLD AUTO: 0.01 K/UL — SIGNIFICANT CHANGE UP (ref 0–0.2)
BASOPHILS NFR BLD AUTO: 0 % — SIGNIFICANT CHANGE UP (ref 0–2)
BASOPHILS NFR BLD AUTO: 0.5 % — SIGNIFICANT CHANGE UP (ref 0–2)
BILIRUB SERPL-MCNC: 0.2 MG/DL — SIGNIFICANT CHANGE UP (ref 0.2–1.2)
BILIRUB SERPL-MCNC: 0.4 MG/DL — SIGNIFICANT CHANGE UP (ref 0.2–1.2)
BUN SERPL-MCNC: 12 MG/DL — SIGNIFICANT CHANGE UP (ref 7–23)
BUN SERPL-MCNC: 15 MG/DL — SIGNIFICANT CHANGE UP (ref 7–23)
CALCIUM SERPL-MCNC: 6.3 MG/DL — CRITICAL LOW (ref 8.4–10.5)
CALCIUM SERPL-MCNC: 7.6 MG/DL — LOW (ref 8.4–10.5)
CHLORIDE SERPL-SCNC: 105 MMOL/L — SIGNIFICANT CHANGE UP (ref 96–108)
CHLORIDE SERPL-SCNC: 114 MMOL/L — HIGH (ref 96–108)
CHOLEST SERPL-MCNC: 61 MG/DL — SIGNIFICANT CHANGE UP
CO2 SERPL-SCNC: 14 MMOL/L — LOW (ref 22–31)
CO2 SERPL-SCNC: 16 MMOL/L — LOW (ref 22–31)
CREAT SERPL-MCNC: 0.59 MG/DL — SIGNIFICANT CHANGE UP (ref 0.5–1.3)
CREAT SERPL-MCNC: 0.72 MG/DL — SIGNIFICANT CHANGE UP (ref 0.5–1.3)
E COLI DNA BLD POS QL NAA+NON-PROBE: SIGNIFICANT CHANGE UP
EGFR: 84 ML/MIN/1.73M2 — SIGNIFICANT CHANGE UP
EGFR: 84 ML/MIN/1.73M2 — SIGNIFICANT CHANGE UP
EGFR: 91 ML/MIN/1.73M2 — SIGNIFICANT CHANGE UP
EGFR: 91 ML/MIN/1.73M2 — SIGNIFICANT CHANGE UP
EOSINOPHIL # BLD AUTO: 0.01 K/UL — SIGNIFICANT CHANGE UP (ref 0–0.5)
EOSINOPHIL # BLD AUTO: 0.02 K/UL — SIGNIFICANT CHANGE UP (ref 0–0.5)
EOSINOPHIL NFR BLD AUTO: 1 % — SIGNIFICANT CHANGE UP (ref 0–6)
EOSINOPHIL NFR BLD AUTO: 1.1 % — SIGNIFICANT CHANGE UP (ref 0–6)
ESTIMATED AVERAGE GLUCOSE: 131 MG/DL — HIGH (ref 68–114)
FERRITIN SERPL-MCNC: 606 NG/ML — HIGH (ref 13–330)
FOLATE SERPL-MCNC: >20 NG/ML — SIGNIFICANT CHANGE UP
GLUCOSE SERPL-MCNC: 154 MG/DL — HIGH (ref 70–99)
GLUCOSE SERPL-MCNC: 94 MG/DL — SIGNIFICANT CHANGE UP (ref 70–99)
GRAM STN FLD: ABNORMAL
HAPTOGLOB SERPL-MCNC: 83 MG/DL — SIGNIFICANT CHANGE UP (ref 34–200)
HCT VFR BLD CALC: 18.2 % — CRITICAL LOW (ref 34.5–45)
HCT VFR BLD CALC: 18.3 % — CRITICAL LOW (ref 34.5–45)
HCT VFR BLD CALC: 21.4 % — LOW (ref 34.5–45)
HCT VFR BLD CALC: 22.4 % — LOW (ref 34.5–45)
HDLC SERPL-MCNC: 27 MG/DL — LOW
HGB BLD-MCNC: 5.6 G/DL — CRITICAL LOW (ref 11.5–15.5)
HGB BLD-MCNC: 5.7 G/DL — CRITICAL LOW (ref 11.5–15.5)
HGB BLD-MCNC: 6.8 G/DL — CRITICAL LOW (ref 11.5–15.5)
HGB BLD-MCNC: 7.1 G/DL — LOW (ref 11.5–15.5)
IMM GRANULOCYTES # BLD AUTO: 0 K/UL — SIGNIFICANT CHANGE UP (ref 0–0.07)
IMM GRANULOCYTES # BLD AUTO: 0.01 K/UL — SIGNIFICANT CHANGE UP (ref 0–0.07)
IMM GRANULOCYTES NFR BLD AUTO: 0 % — SIGNIFICANT CHANGE UP (ref 0–0.9)
IMM GRANULOCYTES NFR BLD AUTO: 0.5 % — SIGNIFICANT CHANGE UP (ref 0–0.9)
IMMATURE PLATELET FRACTION #: 0.5 K/UL — LOW (ref 4.7–11.1)
IMMATURE PLATELET FRACTION #: 0.9 K/UL — LOW (ref 4.7–11.1)
IMMATURE PLATELET FRACTION #: 1.5 K/UL — LOW (ref 4.7–11.1)
IMMATURE PLATELET FRACTION #: 1.9 K/UL — LOW (ref 4.7–11.1)
IMMATURE PLATELET FRACTION %: 2.3 % — SIGNIFICANT CHANGE UP (ref 1.6–4.9)
IMMATURE PLATELET FRACTION %: 4.1 % — SIGNIFICANT CHANGE UP (ref 1.6–4.9)
IMMATURE PLATELET FRACTION %: 4.2 % — SIGNIFICANT CHANGE UP (ref 1.6–4.9)
IMMATURE PLATELET FRACTION %: 4.8 % — SIGNIFICANT CHANGE UP (ref 1.6–4.9)
IMMATURE RETICULOCYTE FRACTION %: 1.4 % — SIGNIFICANT CHANGE UP
INR BLD: 1.12 RATIO — SIGNIFICANT CHANGE UP (ref 0.85–1.16)
IRON SATN MFR SERPL: 10 UG/DL — LOW (ref 30–160)
IRON SATN MFR SERPL: 9 % — LOW (ref 14–50)
LDH SERPL L TO P-CCNC: 124 U/L — SIGNIFICANT CHANGE UP (ref 50–242)
LDLC SERPL-MCNC: 20 MG/DL — SIGNIFICANT CHANGE UP
LIPID PNL WITH DIRECT LDL SERPL: 20 MG/DL — SIGNIFICANT CHANGE UP
LYMPHOCYTES # BLD AUTO: 0.06 K/UL — LOW (ref 1–3.3)
LYMPHOCYTES # BLD AUTO: 0.3 K/UL — LOW (ref 1–3.3)
LYMPHOCYTES NFR BLD AUTO: 15.3 % — SIGNIFICANT CHANGE UP (ref 13–44)
LYMPHOCYTES NFR BLD AUTO: 6.6 % — LOW (ref 13–44)
MAGNESIUM SERPL-MCNC: 1.7 MG/DL — SIGNIFICANT CHANGE UP (ref 1.6–2.6)
MCHC RBC-ENTMCNC: 30.3 PG — SIGNIFICANT CHANGE UP (ref 27–34)
MCHC RBC-ENTMCNC: 30.5 PG — SIGNIFICANT CHANGE UP (ref 27–34)
MCHC RBC-ENTMCNC: 30.5 PG — SIGNIFICANT CHANGE UP (ref 27–34)
MCHC RBC-ENTMCNC: 30.8 G/DL — LOW (ref 32–36)
MCHC RBC-ENTMCNC: 31.1 G/DL — LOW (ref 32–36)
MCHC RBC-ENTMCNC: 31.1 PG — SIGNIFICANT CHANGE UP (ref 27–34)
MCHC RBC-ENTMCNC: 31.7 G/DL — LOW (ref 32–36)
MCHC RBC-ENTMCNC: 31.8 G/DL — LOW (ref 32–36)
MCV RBC AUTO: 101.1 FL — HIGH (ref 80–100)
MCV RBC AUTO: 95.7 FL — SIGNIFICANT CHANGE UP (ref 80–100)
MCV RBC AUTO: 96 FL — SIGNIFICANT CHANGE UP (ref 80–100)
MCV RBC AUTO: 97.9 FL — SIGNIFICANT CHANGE UP (ref 80–100)
METHOD TYPE: SIGNIFICANT CHANGE UP
MONOCYTES # BLD AUTO: 0.02 K/UL — SIGNIFICANT CHANGE UP (ref 0–0.9)
MONOCYTES # BLD AUTO: 0.11 K/UL — SIGNIFICANT CHANGE UP (ref 0–0.9)
MONOCYTES NFR BLD AUTO: 2.2 % — SIGNIFICANT CHANGE UP (ref 2–14)
MONOCYTES NFR BLD AUTO: 5.6 % — SIGNIFICANT CHANGE UP (ref 2–14)
NEUTROPHILS # BLD AUTO: 0.82 K/UL — LOW (ref 1.8–7.4)
NEUTROPHILS # BLD AUTO: 1.51 K/UL — LOW (ref 1.8–7.4)
NEUTROPHILS NFR BLD AUTO: 77.1 % — HIGH (ref 43–77)
NEUTROPHILS NFR BLD AUTO: 90.1 % — HIGH (ref 43–77)
NONHDLC SERPL-MCNC: 34 MG/DL — SIGNIFICANT CHANGE UP
NRBC # BLD AUTO: 0 K/UL — SIGNIFICANT CHANGE UP (ref 0–0)
NRBC # FLD: 0 K/UL — SIGNIFICANT CHANGE UP (ref 0–0)
NRBC BLD AUTO-RTO: 0 /100 WBCS — SIGNIFICANT CHANGE UP (ref 0–0)
PHOSPHATE SERPL-MCNC: 1.5 MG/DL — LOW (ref 2.5–4.5)
PLATELET # BLD AUTO: 22 K/UL — LOW (ref 150–400)
PLATELET # BLD AUTO: 25 K/UL — LOW (ref 150–400)
PLATELET # BLD AUTO: 36 K/UL — LOW (ref 150–400)
PLATELET # BLD AUTO: 40 K/UL — LOW (ref 150–400)
PMV BLD: 11.2 FL — SIGNIFICANT CHANGE UP (ref 7–13)
PMV BLD: SIGNIFICANT CHANGE UP FL (ref 7–13)
POTASSIUM SERPL-MCNC: 3.2 MMOL/L — LOW (ref 3.5–5.3)
POTASSIUM SERPL-MCNC: 4.1 MMOL/L — SIGNIFICANT CHANGE UP (ref 3.5–5.3)
POTASSIUM SERPL-SCNC: 3.2 MMOL/L — LOW (ref 3.5–5.3)
POTASSIUM SERPL-SCNC: 4.1 MMOL/L — SIGNIFICANT CHANGE UP (ref 3.5–5.3)
PROCALCITONIN SERPL-MCNC: 0.49 NG/ML — HIGH (ref 0.02–0.1)
PROT SERPL-MCNC: 4.1 G/DL — LOW (ref 6–8.3)
PROT SERPL-MCNC: 4.8 G/DL — LOW (ref 6–8.3)
PROTHROM AB SERPL-ACNC: 12.8 SEC — SIGNIFICANT CHANGE UP (ref 9.9–13.4)
RBC # BLD: 1.8 M/UL — LOW (ref 3.8–5.2)
RBC # BLD: 1.87 M/UL — LOW (ref 3.8–5.2)
RBC # BLD: 2.23 M/UL — LOW (ref 3.8–5.2)
RBC # BLD: 2.23 M/UL — LOW (ref 3.8–5.2)
RBC # BLD: 2.34 M/UL — LOW (ref 3.8–5.2)
RBC # FLD: 18.6 % — HIGH (ref 10.3–14.5)
RBC # FLD: 19.5 % — HIGH (ref 10.3–14.5)
RBC # FLD: 19.5 % — HIGH (ref 10.3–14.5)
RBC # FLD: 19.9 % — HIGH (ref 10.3–14.5)
RETICS #: 10.5 K/UL — LOW (ref 25–125)
RETICS/RBC NFR: 0.5 % — SIGNIFICANT CHANGE UP (ref 0.5–2.5)
RETICULOCYTE HEMOGLOBIN EQUIVALENT: 35.7 PG — SIGNIFICANT CHANGE UP (ref 30.6–40.7)
SODIUM SERPL-SCNC: 133 MMOL/L — LOW (ref 135–145)
SODIUM SERPL-SCNC: 137 MMOL/L — SIGNIFICANT CHANGE UP (ref 135–145)
SPECIMEN SOURCE: SIGNIFICANT CHANGE UP
SPECIMEN SOURCE: SIGNIFICANT CHANGE UP
TIBC SERPL-MCNC: 104 UG/DL — LOW (ref 220–430)
TRIGL SERPL-MCNC: 55 MG/DL — SIGNIFICANT CHANGE UP
TSH SERPL-MCNC: 0.31 UIU/ML — SIGNIFICANT CHANGE UP (ref 0.27–4.2)
UIBC SERPL-MCNC: 94 UG/DL — LOW (ref 110–370)
VIT B12 SERPL-MCNC: 868 PG/ML — SIGNIFICANT CHANGE UP (ref 232–1245)
WBC # BLD: 0.91 K/UL — CRITICAL LOW (ref 3.8–10.5)
WBC # BLD: 1.96 K/UL — LOW (ref 3.8–10.5)
WBC # BLD: 2.71 K/UL — LOW (ref 3.8–10.5)
WBC # BLD: 2.88 K/UL — LOW (ref 3.8–10.5)
WBC # FLD AUTO: 0.91 K/UL — CRITICAL LOW (ref 3.8–10.5)
WBC # FLD AUTO: 1.96 K/UL — LOW (ref 3.8–10.5)
WBC # FLD AUTO: 2.71 K/UL — LOW (ref 3.8–10.5)
WBC # FLD AUTO: 2.88 K/UL — LOW (ref 3.8–10.5)

## 2025-06-24 PROCEDURE — 84132 ASSAY OF SERUM POTASSIUM: CPT

## 2025-06-24 PROCEDURE — 85027 COMPLETE CBC AUTOMATED: CPT

## 2025-06-24 PROCEDURE — 83036 HEMOGLOBIN GLYCOSYLATED A1C: CPT

## 2025-06-24 PROCEDURE — 85025 COMPLETE CBC W/AUTO DIFF WBC: CPT

## 2025-06-24 PROCEDURE — 74177 CT ABD & PELVIS W/CONTRAST: CPT

## 2025-06-24 PROCEDURE — 86900 BLOOD TYPING SEROLOGIC ABO: CPT

## 2025-06-24 PROCEDURE — 83550 IRON BINDING TEST: CPT

## 2025-06-24 PROCEDURE — 84443 ASSAY THYROID STIM HORMONE: CPT

## 2025-06-24 PROCEDURE — 84145 PROCALCITONIN (PCT): CPT

## 2025-06-24 PROCEDURE — 84484 ASSAY OF TROPONIN QUANT: CPT

## 2025-06-24 PROCEDURE — 84100 ASSAY OF PHOSPHORUS: CPT

## 2025-06-24 PROCEDURE — 87077 CULTURE AEROBIC IDENTIFY: CPT

## 2025-06-24 PROCEDURE — 72100 X-RAY EXAM L-S SPINE 2/3 VWS: CPT

## 2025-06-24 PROCEDURE — 80053 COMPREHEN METABOLIC PANEL: CPT

## 2025-06-24 PROCEDURE — 80061 LIPID PANEL: CPT

## 2025-06-24 PROCEDURE — 85610 PROTHROMBIN TIME: CPT

## 2025-06-24 PROCEDURE — 83605 ASSAY OF LACTIC ACID: CPT

## 2025-06-24 PROCEDURE — 82607 VITAMIN B-12: CPT

## 2025-06-24 PROCEDURE — 0225U NFCT DS DNA&RNA 21 SARSCOV2: CPT

## 2025-06-24 PROCEDURE — 87040 BLOOD CULTURE FOR BACTERIA: CPT

## 2025-06-24 PROCEDURE — 74177 CT ABD & PELVIS W/CONTRAST: CPT | Mod: 26

## 2025-06-24 PROCEDURE — 83010 ASSAY OF HAPTOGLOBIN QUANT: CPT

## 2025-06-24 PROCEDURE — 85045 AUTOMATED RETICULOCYTE COUNT: CPT

## 2025-06-24 PROCEDURE — 0241U: CPT

## 2025-06-24 PROCEDURE — 86850 RBC ANTIBODY SCREEN: CPT

## 2025-06-24 PROCEDURE — 71046 X-RAY EXAM CHEST 2 VIEWS: CPT

## 2025-06-24 PROCEDURE — 85730 THROMBOPLASTIN TIME PARTIAL: CPT

## 2025-06-24 PROCEDURE — 86901 BLOOD TYPING SEROLOGIC RH(D): CPT

## 2025-06-24 PROCEDURE — 83615 LACTATE (LD) (LDH) ENZYME: CPT

## 2025-06-24 PROCEDURE — 84295 ASSAY OF SERUM SODIUM: CPT

## 2025-06-24 PROCEDURE — 73502 X-RAY EXAM HIP UNI 2-3 VIEWS: CPT

## 2025-06-24 PROCEDURE — 87150 DNA/RNA AMPLIFIED PROBE: CPT

## 2025-06-24 PROCEDURE — 86923 COMPATIBILITY TEST ELECTRIC: CPT

## 2025-06-24 PROCEDURE — 82435 ASSAY OF BLOOD CHLORIDE: CPT

## 2025-06-24 PROCEDURE — 85018 HEMOGLOBIN: CPT

## 2025-06-24 PROCEDURE — 83735 ASSAY OF MAGNESIUM: CPT

## 2025-06-24 PROCEDURE — 85014 HEMATOCRIT: CPT

## 2025-06-24 PROCEDURE — 81003 URINALYSIS AUTO W/O SCOPE: CPT

## 2025-06-24 PROCEDURE — 82728 ASSAY OF FERRITIN: CPT

## 2025-06-24 PROCEDURE — 82947 ASSAY GLUCOSE BLOOD QUANT: CPT

## 2025-06-24 PROCEDURE — 82746 ASSAY OF FOLIC ACID SERUM: CPT

## 2025-06-24 PROCEDURE — 83540 ASSAY OF IRON: CPT

## 2025-06-24 PROCEDURE — G0545: CPT

## 2025-06-24 PROCEDURE — 82330 ASSAY OF CALCIUM: CPT

## 2025-06-24 PROCEDURE — 82803 BLOOD GASES ANY COMBINATION: CPT

## 2025-06-24 PROCEDURE — 99223 1ST HOSP IP/OBS HIGH 75: CPT

## 2025-06-24 RX ORDER — PIPERACILLIN-TAZO-DEXTROSE,ISO 3.375G/5
3.38 IV SOLUTION, PIGGYBACK PREMIX FROZEN(ML) INTRAVENOUS ONCE
Refills: 0 | Status: COMPLETED | OUTPATIENT
Start: 2025-06-24 | End: 2025-06-24

## 2025-06-24 RX ORDER — PIPERACILLIN-TAZO-DEXTROSE,ISO 3.375G/5
3.38 IV SOLUTION, PIGGYBACK PREMIX FROZEN(ML) INTRAVENOUS EVERY 8 HOURS
Refills: 0 | Status: DISCONTINUED | OUTPATIENT
Start: 2025-06-25 | End: 2025-06-27

## 2025-06-24 RX ORDER — IPRATROPIUM BROMIDE AND ALBUTEROL SULFATE .5; 2.5 MG/3ML; MG/3ML
3 SOLUTION RESPIRATORY (INHALATION) EVERY 6 HOURS
Refills: 0 | Status: DISCONTINUED | OUTPATIENT
Start: 2025-06-24 | End: 2025-06-26

## 2025-06-24 RX ORDER — PIPERACILLIN-TAZO-DEXTROSE,ISO 3.375G/5
3.38 IV SOLUTION, PIGGYBACK PREMIX FROZEN(ML) INTRAVENOUS ONCE
Refills: 0 | Status: COMPLETED | OUTPATIENT
Start: 2025-06-25 | End: 2025-06-25

## 2025-06-24 RX ORDER — POTASSIUM PHOSPHATE, MONOBASIC POTASSIUM PHOSPHATE, DIBASIC INJECTION, 236; 224 MG/ML; MG/ML
30 SOLUTION, CONCENTRATE INTRAVENOUS ONCE
Refills: 0 | Status: COMPLETED | OUTPATIENT
Start: 2025-06-24 | End: 2025-06-24

## 2025-06-24 RX ADMIN — Medication 1 CAPSULE(S): at 08:27

## 2025-06-24 RX ADMIN — Medication 200 GRAM(S): at 15:31

## 2025-06-24 RX ADMIN — Medication 650 MILLIGRAM(S): at 06:00

## 2025-06-24 RX ADMIN — Medication 650 MILLIGRAM(S): at 10:31

## 2025-06-24 RX ADMIN — Medication 1 CAPSULE(S): at 12:58

## 2025-06-24 RX ADMIN — POTASSIUM PHOSPHATE, MONOBASIC POTASSIUM PHOSPHATE, DIBASIC INJECTION, 83.33 MILLIMOLE(S): 236; 224 SOLUTION, CONCENTRATE INTRAVENOUS at 17:21

## 2025-06-24 RX ADMIN — Medication 650 MILLIGRAM(S): at 22:10

## 2025-06-24 RX ADMIN — Medication 1 CAPSULE(S): at 17:20

## 2025-06-24 RX ADMIN — Medication 650 MILLIGRAM(S): at 22:40

## 2025-06-24 RX ADMIN — Medication 650 MILLIGRAM(S): at 05:30

## 2025-06-24 RX ADMIN — Medication 25 GRAM(S): at 17:19

## 2025-06-24 RX ADMIN — Medication 650 MILLIGRAM(S): at 11:00

## 2025-06-24 NOTE — CONSULT NOTE ADULT - ASSESSMENT
80yo f w pmh htn, hld, metastatic (pulm mets) pancreatic adenoca s/p whipple + vats w darby wedge resection and lll basilar segmentectomy + adj chemo (folfirinox) + palliative chemo (currently on gemtcitabine/abraxane), rcc s/p l partial nephrectomy, ra, constipation, gastroparesis, p/w persistent fever iso recent chemo despite po abx use; a/w cough x2-3 days.    Pancreatic ca, Stage IV  --follows it Dr Ludwig Scanlon for management  --w/ metastatic disease to lungs  --s/p whipple + vats w darby wedge resection and lll basilar segmentectomy + adj chemo (folfirinox)  --currently on gemtcitabine/ abraxane LD 6/1  --no plan for tretament inpatient and/or rehab  --ongoing care after discharge    Fever  --BC w/ GNR, c/o IV abx  --found to have metapneumovirus   --ID consult recommended    Pancytopenia  --multifactorial; in the setting of malignancy DMT, and sepsis  --transfuse for Hgb <7, 8 if symptomatic  --trend daily CBCs w/ diff    thank you for consulting  will follow,     Elsa Ramos NP  Hematology/ Oncology  [facility]  [phone] (office)  [phone] (alt office)  Evenings and weekends please call MD on call or office  
79 f with HTN, HLD, RA, constipation and gastroparesis, RCC s/p L partial nephrectomy, metastatic pancreatic ca with lung mets s/p whipple + vats w TONIA wedge resection and LLL basilar segmentectomy + adj chemo (folfirinox) + palliative chemo, currently on gemtcitabine/abraxane, last chemo a week ago, had neutropenic fever and ESBL klebsiella bacteremia 1/2025, her  had COVID and she went to  for fever and some cough, w/u was negative and was given cefdinir but now p/w fever and low back pain, no abd pain, dysuria or diarrhea,  here febrile to 101.5, tachy  WBC: 2 =< 0.9,  ANC: 1500 => 800  lumbar and hip xray no osseus lesions  blood cx: E-coli  RVP: hMPV    metastatic pancreatic ca on chemo (last week) now with fever, back pain and high grade bacteremia, is becoming neutropenic now    cough and hMPV URI    * abd/pelvis CT with contrast  * s/p ertapenem, switch to zosyn  * repeat blood cx tomorrow  * if no clear source in abd will need LS spine MRi with dionisio  * monitor CBC/diff and CMP    The above assessment and plan was discussed with the primary team    Laquita Morrison MD  contact on teams  After 5pm and on weekends call [phone]

## 2025-06-24 NOTE — PHYSICAL THERAPY INITIAL EVALUATION ADULT - PERTINENT HX OF CURRENT PROBLEM, REHAB EVAL
79 y.o. F PMH htn, hld, metastatic (pulm mets) pancreatic adenoca s/p whipple + vats w darby wedge resection and lll basilar segmentectomy + adj chemo (folfirinox) + palliative chemo (currently on gemtcitabine/abraxane), rcc s/p l partial nephrectomy, ra, constipation, gastroparesis, p/w persistent fever iso recent chemo despite po abx use; a/w cough x2-3 days. patient went to urgent care where work up was negative, patient prescribed course of cefdinir empirically; however, patient w persistent fever, prompting visit to [facility] er for further mgmt. in er, found to be meeting sepsis criteria w pancytopenia; c/f neutropenic fever iso recent chemo; admit to medicine for further mgmt    of note, hospitalized for similar presentation 1/23/2025-1/29/2025; at that time, found to have esbl klebsiella bacteremia; cxr, ua, ct imaging negative for source at that time; treated with zarxio + nplate for pancytopenia, and course of ertapenem for esbl klebsiella bacteremia. 79 y.o. F PMH htn, hld, metastatic (pulm mets) pancreatic adenoca s/p whipple + vats w darby wedge resection and lll basilar segmentectomy + adj chemo (folfirinox) + palliative chemo (currently on gemtcitabine/abraxane), rcc s/p l partial nephrectomy, ra, constipation, gastroparesis, p/w persistent fever iso recent chemo despite po abx use; a/w cough x2-3 days. patient went to urgent care where work up was negative, patient prescribed course of cefdinir empirically; however, patient w persistent fever, prompting visit to [facility] er for further mgmt. in er, found to be meeting sepsis criteria w pancytopenia; c/f neutropenic fever iso recent chemo; admit to medicine for further mgmt.  CT a/p w/ Peripherally enhancing right hepatic lobe lesion measuring 2.5 x 1.9 cm, consistent with abscess. Wall thickening of the descending and sigmoid colon, which may reflect underdistention or colitis.      of note, hospitalized for similar presentation 1/23/2025-1/29/2025; at that time, found to have esbl klebsiella bacteremia; cxr, ua, ct imaging negative for source at that time; treated with zarxio + nplate for pancytopenia, and course of ertapenem for esbl klebsiella bacteremia. 79 y.o. F PMH htn, hld, metastatic (pulm mets) pancreatic adenoca s/p whipple + vats w darby wedge resection and lll basilar segmentectomy + adj chemo (folfirinox) + palliative chemo (currently on gemtcitabine/abraxane), rcc s/p l partial nephrectomy, ra, constipation, gastroparesis, p/w persistent fever iso recent chemo despite po abx use; a/w cough x2-3 days. patient went to urgent care where work up was negative, patient prescribed course of cefdinir empirically; however, patient w persistent fever, prompting visit to [facility] er for further mgmt. in er, found to be meeting sepsis criteria w pancytopenia; c/f neutropenic fever iso recent chemo; admit to medicine for further mgmt.  CT a/p w/ Peripherally enhancing right hepatic lobe lesion measuring 2.5 x 1.9 cm, consistent with abscess. Wall thickening of the descending and sigmoid colon, which may reflect underdistention or colitis.  6/25 CXR small pleural effusion      of note, hospitalized for similar presentation 1/23/2025-1/29/2025; at that time, found to have esbl klebsiella bacteremia; cxr, ua, ct imaging negative for source at that time; treated with zarxio + nplate for pancytopenia, and course of ertapenem for esbl klebsiella bacteremia.

## 2025-06-24 NOTE — CONSULT NOTE ADULT - SUBJECTIVE AND OBJECTIVE BOX
HPI:  79 f with HTN, HLD, RA, constipation and gastroparesis, RCC s/p L partial nephrectomy, metastatic pancreatic ca with lung mets s/p whipple + vats w TONIA wedge resection and LLL basilar segmentectomy + adj chemo (folfirinox) + palliative chemo, currently on gemtcitabine/abraxane, last chemo a week ago, had neutropenic fever and ESBL klebsiella bacteremia 1/2025, her  had COVID and she went to  for fever and some cough, w/u was negative and was given cefdinir but now p/w fever and low back pain, no abd pain, dysuria or diarrhea,  here febrile to 101.5, tachy  WBC: 2 =< 0.9,  ANC: 1500 => 800  lumbar and hip xray no osseus lesions  blood cx: E-coli  RVP: hMPV      PAST MEDICAL & SURGICAL HISTORY:  Arthritis  rheumatoid      Pancreatic cancer      Rheumatoid arthritis      HLD (hyperlipidemia)      Renal cell carcinoma      Gastroparesis      HTN (hypertension)      Lung nodules      Anemia      S/P appendectomy  1969      H/O Whipple procedure      H/O left nephrectomy      History of chemotherapy      H/O therapeutic radiation      History of vascular access device          Allergies    No Known Allergies    Intolerances        ANTIMICROBIALS:  ertapenem  IVPB 1000 every 24 hours      OTHER MEDS:  acetaminophen     Tablet .. 650 milliGRAM(s) Oral every 6 hours PRN  aluminum hydroxide/magnesium hydroxide/simethicone Suspension 30 milliLiter(s) Oral every 4 hours PRN  benzonatate 100 milliGRAM(s) Oral every 8 hours PRN  guaifenesin/dextromethorphan Oral Liquid 10 milliLiter(s) Oral every 4 hours PRN  melatonin 3 milliGRAM(s) Oral at bedtime PRN  ondansetron Injectable 4 milliGRAM(s) IV Push every 8 hours PRN  pancrelipase  (CREON 36,000 Lipase Units) 1 Capsule(s) Oral three times a day with meals  potassium phosphate IVPB 30 milliMole(s) IV Intermittent once  sodium chloride 0.9%. 1000 milliLiter(s) IV Continuous <Continuous>      SOCIAL HISTORY:  from [place], , lives with   no recent travel    FAMILY HISTORY:   with recent COVID or ?URI    ROS:    All other systems negative     Constitutional: + fever  Eye: no eye pain, no redness, no vision changes  ENT:  no sore throat, no rhinorrhea  Cardiovascular:  no chest pain, no palpitation  Respiratory:  no SOB, + cough  GI:  no abd pain, no vomiting, no diarrhea, +constipation  urinary: no dysuria, no hematuria, no flank pain  : no vaginal discharge or bleeding  musculoskeletal: low back pain  skin:  no rash  neurology:  no headache, no seizure, no change in mental status  psych: no anxiety, no depression     Physical Exam:    General:    NAD, non toxic  Head: atraumatic, normocephalic  Eyes: normal sclera and conjunctiva  ENT:   no oropharyngeal lesions, no LAD, neck supple  Cardio:    regular S1,S2  Respiratory:   clear b/l, no wheezing  abd:   soft, BS +, not tender  :     no CVAT, no suprapubic tenderness, no hartley  Musculoskeletal : no joint swelling, no edema  Skin:    no rash  vascular: R chest port with no tenderness  Neurologic:     no focal deficits  psych: normal affect      Drug Dosing Weight  Height (cm): 152.4 (23 Jun 2025 10:52)  Weight (kg): 44.9 (23 Jun 2025 10:52)  BMI (kg/m2): 19.3 (23 Jun 2025 10:52)  BSA (m2): 1.38 (23 Jun 2025 10:52)    Vital Signs Last 24 Hrs  T(F): 100 (06-24-25 @ 12:18), Max: 101.5 (06-24-25 @ 05:20)    Vital Signs Last 24 Hrs  HR: 109 (06-24-25 @ 12:18) (88 - 109)  BP: 90/55 (06-24-25 @ 12:18) (90/55 - 143/64)  RR: 18 (06-24-25 @ 12:18)  SpO2: 96% (06-24-25 @ 12:18) (96% - 99%)  Wt(kg): --                          7.1    0.91  )-----------( 36       ( 24 Jun 2025 11:26 )             22.4       06-24    133[L]  |  105  |  15  ----------------------------<  154[H]  4.1   |  16[L]  |  0.72    Ca    7.6[L]      24 Jun 2025 11:26  Phos  1.5     06-24  Mg     1.7     06-24    TPro  4.8[L]  /  Alb  2.3[L]  /  TBili  0.4  /  DBili  x   /  AST  22  /  ALT  20  /  AlkPhos  121[H]  06-24      Urinalysis Basic - ( 24 Jun 2025 11:26 )    Color: x / Appearance: x / SG: x / pH: x  Gluc: 154 mg/dL / Ketone: x  / Bili: x / Urobili: x   Blood: x / Protein: x / Nitrite: x   Leuk Esterase: x / RBC: x / WBC x   Sq Epi: x / Non Sq Epi: x / Bacteria: x        MICROBIOLOGY:  v  Blood Blood-Peripheral  06-23-25   Growth in aerobic bottle: Gram Negative Rods  Growth in anaerobic bottle: Gram Negative Rods  --    Growth in aerobic bottle: Gram Negative Rods  Growth in anaerobic bottle: Gram Negative Rods      Blood Blood-Peripheral  06-23-25   Growth in anaerobic bottle: Gram Negative Rods  Growth in aerobic bottle: Gram Negative Rods  Direct identification is available within approximately 3-5  hours either by Blood Panel Multiplexed PCR or Direct  MALDI-TOF. Details: [email or web address]  --  Blood Culture PCR          Rapid RVP Result: Detected (06-23 @ 12:33)          RADIOLOGY:    Images independently visualized and reviewed personally,  findings as below    < from: Xray Hip w/ Pelvis 2 or 3 Views, Left (06.23.25 @ 15:18) >  IMPRESSION:  1.  No acute osseous abnormalities.    < end of copied text >  < from: Xray Lumbar Spine AP + Lateral (06.23.25 @ 15:18) >  IMPRESSION:  1.  No acute osseous abnormalities.    < end of copied text >  < from: Xray Chest 2 Views PA/Lat (06.23.25 @ 15:17) >  IMPRESSION:  Small left pleural effusion, similar to prior.    < end of copied text >

## 2025-06-24 NOTE — PHYSICAL THERAPY INITIAL EVALUATION ADULT - ADDITIONAL COMMENTS
patient resided with spouse in a private home with #13  steps to enter and #12-15 steps inside to bedroom and bathroom.

## 2025-06-24 NOTE — CONSULT NOTE ADULT - SUBJECTIVE AND OBJECTIVE BOX
Reason for consult: pancreatic ca    HPI:  80yo f w pmh htn, hld, metastatic (pulm mets) pancreatic adenoca s/p whipple + vats w darby wedge resection and lll basilar segmentectomy + adj chemo (folfirinox) + palliative chemo (currently on gemtcitabine/abraxane), rcc s/p l partial nephrectomy, ra, constipation, gastroparesis, p/w persistent fever iso recent chemo despite po abx use; a/w cough x2-3 days. patient went to urgent care where work up was negative, patient prescribed course of cefdinir empirically; however, patient w persistent fever, prompting visit to [facility] er for further mgmt. in er, found to be meeting sepsis criteria w pancytopenia; c/f neutropenic fever iso recent chemo; admit to medicine for further mgmt    of note, hospitalized for similar presentation 1/23/2025-1/29/2025; at that time, found to have esbl klebsiella bacteremia; cxr, ua, ct imaging negative for source at that time; treated with zarxio + nplate for pancytopenia, and course of ertapenem for esbl klebsiella bacteremia. (23 Jun 2025 19:19)    Hematology/oncology called to see patient who follows with Dr Ludwig Scanlon of [facility] for management of pancreatic     PAST MEDICAL & SURGICAL HISTORY:  Arthritis  rheumatoid      Pancreatic cancer      Rheumatoid arthritis      HLD (hyperlipidemia)      Renal cell carcinoma      Gastroparesis      HTN (hypertension)      Lung nodules      Anemia      S/P appendectomy  1969      H/O Whipple procedure      H/O left nephrectomy      History of chemotherapy      H/O therapeutic radiation      History of vascular access device          FAMILY HISTORY:      Alochol: Denied  Smoking: Nonsmoker  Drug Use: Denied  Marital Status:         Allergies    No Known Allergies    Intolerances        MEDICATIONS  (STANDING):  ertapenem  IVPB 1000 milliGRAM(s) IV Intermittent every 24 hours  pancrelipase  (CREON 36,000 Lipase Units) 1 Capsule(s) Oral three times a day with meals  potassium phosphate IVPB 30 milliMole(s) IV Intermittent once  sodium chloride 0.9%. 1000 milliLiter(s) (80 mL/Hr) IV Continuous <Continuous>    MEDICATIONS  (PRN):  acetaminophen     Tablet .. 650 milliGRAM(s) Oral every 6 hours PRN Temp greater or equal to 38C (100.4F), Mild Pain (1 - 3)  aluminum hydroxide/magnesium hydroxide/simethicone Suspension 30 milliLiter(s) Oral every 4 hours PRN Dyspepsia  benzonatate 100 milliGRAM(s) Oral every 8 hours PRN Cough  guaifenesin/dextromethorphan Oral Liquid 10 milliLiter(s) Oral every 4 hours PRN Cough  melatonin 3 milliGRAM(s) Oral at bedtime PRN Insomnia  ondansetron Injectable 4 milliGRAM(s) IV Push every 8 hours PRN Nausea and/or Vomiting      ROS  No fever, sweats, chills  No epistaxis, HA, sore throat  No CP, SOB, cough, sputum  No n/v/d, abd pain, melena, hematochezia  No edema  No rash  No anxiety  No back pain, joint pain  No bleeding, bruising  No dysuria, hematuria    T(C): 37.7 (06-24-25 @ 09:59), Max: 38.6 (06-24-25 @ 05:20)  HR: 88 (06-24-25 @ 08:30) (88 - 104)  BP: 117/78 (06-24-25 @ 08:30) (95/52 - 143/64)  RR: 18 (06-24-25 @ 08:30) (17 - 21)  SpO2: 97% (06-24-25 @ 08:30) (97% - 99%)  Wt(kg): --    PE  NAD  Awake, alert  Anicteric, MMM  RRR  CTAB  Abd soft, NT, ND  No c/c/e  No rash grossly  FROM                          6.8    1.96  )-----------( 40       ( 24 Jun 2025 07:03 )             21.4       06-24    137  |  114[H]  |  12  ----------------------------<  94  3.2[L]   |  14[L]  |  0.59    Ca    6.3[LL]      24 Jun 2025 07:09  Phos  1.5     06-24  Mg     1.7     06-24    TPro  4.1[L]  /  Alb  2.1[L]  /  TBili  0.2  /  DBili  x   /  AST  15  /  ALT  18  /  AlkPhos  80  06-24

## 2025-06-24 NOTE — PHYSICAL THERAPY INITIAL EVALUATION ADULT - LEVEL OF INDEPENDENCE: SIT/STAND, REHAB EVAL
"Pt reports that she has been throwing up since 4 am. Pt reports that she \"past out\" this morning in the bathroom, pt denies hitting head or neck pain, no LOC. PT reports generalized abdominal pain,, pt reports CP as well and a sore throat as well. PT denies abdominal surgical hx and one episode of diarrhea this morning. PT VSS and ABC's intact      "
stand-by assist

## 2025-06-25 LAB
-  AMPICILLIN/SULBACTAM: SIGNIFICANT CHANGE UP
-  AMPICILLIN: SIGNIFICANT CHANGE UP
-  AZTREONAM: SIGNIFICANT CHANGE UP
-  CEFAZOLIN: SIGNIFICANT CHANGE UP
-  CEFEPIME: SIGNIFICANT CHANGE UP
-  CEFOXITIN: SIGNIFICANT CHANGE UP
-  CEFTRIAXONE: SIGNIFICANT CHANGE UP
-  CIPROFLOXACIN: SIGNIFICANT CHANGE UP
-  ERTAPENEM: SIGNIFICANT CHANGE UP
-  GENTAMICIN: SIGNIFICANT CHANGE UP
-  IMIPENEM: SIGNIFICANT CHANGE UP
-  LEVOFLOXACIN: SIGNIFICANT CHANGE UP
-  MEROPENEM: SIGNIFICANT CHANGE UP
-  PIPERACILLIN/TAZOBACTAM: SIGNIFICANT CHANGE UP
-  TIGECYCLINE: SIGNIFICANT CHANGE UP
-  TOBRAMYCIN: SIGNIFICANT CHANGE UP
-  TRIMETHOPRIM/SULFAMETHOXAZOLE: SIGNIFICANT CHANGE UP
ACANTHOCYTES BLD QL SMEAR: SLIGHT — SIGNIFICANT CHANGE UP
ALBUMIN SERPL ELPH-MCNC: 2.6 G/DL — LOW (ref 3.3–5)
ALP SERPL-CCNC: 150 U/L — HIGH (ref 40–120)
ALT FLD-CCNC: 23 U/L — SIGNIFICANT CHANGE UP (ref 10–45)
ANION GAP SERPL CALC-SCNC: 12 MMOL/L — SIGNIFICANT CHANGE UP (ref 5–17)
ANION GAP SERPL CALC-SCNC: 9 MMOL/L — SIGNIFICANT CHANGE UP (ref 5–17)
ANISOCYTOSIS BLD QL: ABNORMAL
AST SERPL-CCNC: 30 U/L — SIGNIFICANT CHANGE UP (ref 10–40)
BASOPHILS # BLD AUTO: 0.04 K/UL — SIGNIFICANT CHANGE UP (ref 0–0.2)
BASOPHILS # BLD MANUAL: 0 K/UL — SIGNIFICANT CHANGE UP (ref 0–0.2)
BASOPHILS NFR BLD AUTO: 1 % — SIGNIFICANT CHANGE UP (ref 0–2)
BASOPHILS NFR BLD MANUAL: 0 % — SIGNIFICANT CHANGE UP (ref 0–2)
BILIRUB SERPL-MCNC: 0.9 MG/DL — SIGNIFICANT CHANGE UP (ref 0.2–1.2)
BUN SERPL-MCNC: 10 MG/DL — SIGNIFICANT CHANGE UP (ref 7–23)
BUN SERPL-MCNC: 11 MG/DL — SIGNIFICANT CHANGE UP (ref 7–23)
BURR CELLS BLD QL SMEAR: ABNORMAL
CALCIUM SERPL-MCNC: 7.8 MG/DL — LOW (ref 8.4–10.5)
CALCIUM SERPL-MCNC: 7.9 MG/DL — LOW (ref 8.4–10.5)
CHLORIDE SERPL-SCNC: 105 MMOL/L — SIGNIFICANT CHANGE UP (ref 96–108)
CHLORIDE SERPL-SCNC: 108 MMOL/L — SIGNIFICANT CHANGE UP (ref 96–108)
CO2 SERPL-SCNC: 17 MMOL/L — LOW (ref 22–31)
CO2 SERPL-SCNC: 18 MMOL/L — LOW (ref 22–31)
CREAT SERPL-MCNC: 0.82 MG/DL — SIGNIFICANT CHANGE UP (ref 0.5–1.3)
CREAT SERPL-MCNC: 0.83 MG/DL — SIGNIFICANT CHANGE UP (ref 0.5–1.3)
CULTURE RESULTS: ABNORMAL
CULTURE RESULTS: ABNORMAL
DACRYOCYTES BLD QL SMEAR: SLIGHT — SIGNIFICANT CHANGE UP
EGFR: 71 ML/MIN/1.73M2 — SIGNIFICANT CHANGE UP
EGFR: 71 ML/MIN/1.73M2 — SIGNIFICANT CHANGE UP
EGFR: 72 ML/MIN/1.73M2 — SIGNIFICANT CHANGE UP
EGFR: 72 ML/MIN/1.73M2 — SIGNIFICANT CHANGE UP
ELLIPTOCYTES BLD QL SMEAR: SLIGHT — SIGNIFICANT CHANGE UP
EOSINOPHIL # BLD AUTO: 0.02 K/UL — SIGNIFICANT CHANGE UP (ref 0–0.5)
EOSINOPHIL # BLD MANUAL: 0 K/UL — SIGNIFICANT CHANGE UP (ref 0–0.5)
EOSINOPHIL NFR BLD AUTO: 0.5 % — SIGNIFICANT CHANGE UP (ref 0–6)
EOSINOPHIL NFR BLD MANUAL: 0 % — SIGNIFICANT CHANGE UP (ref 0–6)
GIANT PLATELETS BLD QL SMEAR: PRESENT
GLUCOSE SERPL-MCNC: 110 MG/DL — HIGH (ref 70–99)
GLUCOSE SERPL-MCNC: 128 MG/DL — HIGH (ref 70–99)
HCT VFR BLD CALC: 38.9 % — SIGNIFICANT CHANGE UP (ref 34.5–45)
HCT VFR BLD CALC: 39.5 % — SIGNIFICANT CHANGE UP (ref 34.5–45)
HGB BLD-MCNC: 13.2 G/DL — SIGNIFICANT CHANGE UP (ref 11.5–15.5)
HGB BLD-MCNC: 13.4 G/DL — SIGNIFICANT CHANGE UP (ref 11.5–15.5)
IMM GRANULOCYTES # BLD AUTO: 0.06 K/UL — SIGNIFICANT CHANGE UP (ref 0–0.07)
IMM GRANULOCYTES NFR BLD AUTO: 1.5 % — HIGH (ref 0–0.9)
IMMATURE PLATELET FRACTION #: 1.6 K/UL — LOW (ref 4.7–11.1)
IMMATURE PLATELET FRACTION #: 1.7 K/UL — LOW (ref 4.7–11.1)
IMMATURE PLATELET FRACTION %: 5.9 % — HIGH (ref 1.6–4.9)
IMMATURE PLATELET FRACTION %: 7 % — HIGH (ref 1.6–4.9)
LYMPHOCYTES # BLD AUTO: 0.22 K/UL — LOW (ref 1–3.3)
LYMPHOCYTES # BLD MANUAL: 0.28 K/UL — LOW (ref 1–3.3)
LYMPHOCYTES NFR BLD AUTO: 5.4 % — LOW (ref 13–44)
LYMPHOCYTES NFR BLD MANUAL: 6.9 % — LOW (ref 13–44)
MACROCYTES BLD QL: ABNORMAL
MANUAL NEUTROPHIL BANDS #: 0.28 K/UL — SIGNIFICANT CHANGE UP (ref 0–0.84)
MCHC RBC-ENTMCNC: 30.2 PG — SIGNIFICANT CHANGE UP (ref 27–34)
MCHC RBC-ENTMCNC: 30.4 PG — SIGNIFICANT CHANGE UP (ref 27–34)
MCHC RBC-ENTMCNC: 33.9 G/DL — SIGNIFICANT CHANGE UP (ref 32–36)
MCHC RBC-ENTMCNC: 33.9 G/DL — SIGNIFICANT CHANGE UP (ref 32–36)
MCV RBC AUTO: 89 FL — SIGNIFICANT CHANGE UP (ref 80–100)
MCV RBC AUTO: 89.6 FL — SIGNIFICANT CHANGE UP (ref 80–100)
METHOD TYPE: SIGNIFICANT CHANGE UP
MICROCYTES BLD QL: SLIGHT — SIGNIFICANT CHANGE UP
MONOCYTES # BLD AUTO: 0.24 K/UL — SIGNIFICANT CHANGE UP (ref 0–0.9)
MONOCYTES # BLD MANUAL: 0.11 K/UL — SIGNIFICANT CHANGE UP (ref 0–0.9)
MONOCYTES NFR BLD AUTO: 5.9 % — SIGNIFICANT CHANGE UP (ref 2–14)
MONOCYTES NFR BLD MANUAL: 2.6 % — SIGNIFICANT CHANGE UP (ref 2–14)
NEUTROPHILS # BLD AUTO: 3.46 K/UL — SIGNIFICANT CHANGE UP (ref 1.8–7.4)
NEUTROPHILS # BLD MANUAL: 3.38 K/UL — SIGNIFICANT CHANGE UP (ref 1.8–7.4)
NEUTROPHILS NFR BLD AUTO: 85.7 % — HIGH (ref 43–77)
NEUTROPHILS NFR BLD MANUAL: 83.6 % — HIGH (ref 43–77)
NEUTS BAND # BLD: 6.9 % — SIGNIFICANT CHANGE UP (ref 0–8)
NEUTS BAND NFR BLD: 6.9 % — SIGNIFICANT CHANGE UP (ref 0–8)
NRBC # BLD AUTO: 0 K/UL — SIGNIFICANT CHANGE UP (ref 0–0)
NRBC # BLD AUTO: 0 K/UL — SIGNIFICANT CHANGE UP (ref 0–0)
NRBC # FLD: 0 K/UL — SIGNIFICANT CHANGE UP (ref 0–0)
NRBC # FLD: 0 K/UL — SIGNIFICANT CHANGE UP (ref 0–0)
ORGANISM # SPEC MICROSCOPIC CNT: ABNORMAL
PLAT MORPH BLD: ABNORMAL
PLATELET # BLD AUTO: 23 K/UL — LOW (ref 150–400)
PLATELET # BLD AUTO: 29 K/UL — LOW (ref 150–400)
PMV BLD: SIGNIFICANT CHANGE UP FL (ref 7–13)
PMV BLD: SIGNIFICANT CHANGE UP FL (ref 7–13)
POIKILOCYTOSIS BLD QL AUTO: ABNORMAL
POTASSIUM SERPL-MCNC: 3.8 MMOL/L — SIGNIFICANT CHANGE UP (ref 3.5–5.3)
POTASSIUM SERPL-MCNC: 4 MMOL/L — SIGNIFICANT CHANGE UP (ref 3.5–5.3)
POTASSIUM SERPL-SCNC: 3.8 MMOL/L — SIGNIFICANT CHANGE UP (ref 3.5–5.3)
POTASSIUM SERPL-SCNC: 4 MMOL/L — SIGNIFICANT CHANGE UP (ref 3.5–5.3)
PROT SERPL-MCNC: 5.4 G/DL — LOW (ref 6–8.3)
RBC # BLD: 4.34 M/UL — SIGNIFICANT CHANGE UP (ref 3.8–5.2)
RBC # BLD: 4.44 M/UL — SIGNIFICANT CHANGE UP (ref 3.8–5.2)
RBC # FLD: 17.6 % — HIGH (ref 10.3–14.5)
RBC # FLD: 17.6 % — HIGH (ref 10.3–14.5)
RBC BLD AUTO: ABNORMAL
SCHISTOCYTES BLD QL AUTO: SLIGHT — SIGNIFICANT CHANGE UP
SODIUM SERPL-SCNC: 134 MMOL/L — LOW (ref 135–145)
SODIUM SERPL-SCNC: 135 MMOL/L — SIGNIFICANT CHANGE UP (ref 135–145)
SPECIMEN SOURCE: SIGNIFICANT CHANGE UP
SPECIMEN SOURCE: SIGNIFICANT CHANGE UP
WBC # BLD: 3.44 K/UL — LOW (ref 3.8–10.5)
WBC # BLD: 4.04 K/UL — SIGNIFICANT CHANGE UP (ref 3.8–10.5)
WBC # FLD AUTO: 3.44 K/UL — LOW (ref 3.8–10.5)
WBC # FLD AUTO: 4.04 K/UL — SIGNIFICANT CHANGE UP (ref 3.8–10.5)

## 2025-06-25 PROCEDURE — 99233 SBSQ HOSP IP/OBS HIGH 50: CPT

## 2025-06-25 PROCEDURE — 71045 X-RAY EXAM CHEST 1 VIEW: CPT | Mod: 26

## 2025-06-25 PROCEDURE — G0545: CPT

## 2025-06-25 RX ORDER — ACETAMINOPHEN 500 MG/5ML
675 LIQUID (ML) ORAL ONCE
Refills: 0 | Status: COMPLETED | OUTPATIENT
Start: 2025-06-25 | End: 2025-06-25

## 2025-06-25 RX ORDER — ROMIPLOSTIM 125 UG/.25ML
45 INJECTION, POWDER, LYOPHILIZED, FOR SOLUTION SUBCUTANEOUS ONCE
Refills: 0 | Status: COMPLETED | OUTPATIENT
Start: 2025-06-26 | End: 2025-06-26

## 2025-06-25 RX ADMIN — Medication 25 GRAM(S): at 21:46

## 2025-06-25 RX ADMIN — Medication 650 MILLIGRAM(S): at 18:00

## 2025-06-25 RX ADMIN — Medication 270 MILLIGRAM(S): at 03:15

## 2025-06-25 RX ADMIN — Medication 650 MILLIGRAM(S): at 18:30

## 2025-06-25 RX ADMIN — IPRATROPIUM BROMIDE AND ALBUTEROL SULFATE 3 MILLILITER(S): .5; 2.5 SOLUTION RESPIRATORY (INHALATION) at 04:58

## 2025-06-25 RX ADMIN — Medication 1 CAPSULE(S): at 08:11

## 2025-06-25 RX ADMIN — Medication 1 CAPSULE(S): at 17:56

## 2025-06-25 RX ADMIN — Medication 675 MILLIGRAM(S): at 03:45

## 2025-06-25 RX ADMIN — Medication 1 CAPSULE(S): at 12:12

## 2025-06-25 RX ADMIN — Medication 25 GRAM(S): at 12:12

## 2025-06-25 RX ADMIN — Medication 25 GRAM(S): at 00:54

## 2025-06-25 RX ADMIN — Medication 100 MILLIGRAM(S): at 04:57

## 2025-06-25 RX ADMIN — Medication 25 GRAM(S): at 05:52

## 2025-06-25 NOTE — CHART NOTE - NSCHARTNOTEFT_GEN_A_CORE
Notified by RN for Hgb 5.6 / Hct 18.3 on repeat CBC.     Patient admitted with fever on chemo for metastatic pancreatic CA, found to have E. coli bacteremia and +metapnuemovirus.  Patient was found to have Hgb 6.4, requiring 1U pRBCs on 6/24 AM, and now with ongoing drop in Hgb despite receiving transfusion.  Patient is hemodynamically stable and denies chest pain, palpitations, sob, dizziness, lightheadedness, hematochezia or melena at this time.  CT A/P w/ IV contrast preliminary with no evidence of GI bleed, pending final results.  Discussed with attending Dr. Grant, will transfuse 2U pRBCs and order post-transfusion CBC to monitor change in Hgb.   Will continue to monitor and endorse to day team in AM.    Vickie Romero PA-C  Medicine ACP  #[phone]

## 2025-06-25 NOTE — CONSULT NOTE ADULT - SUBJECTIVE AND OBJECTIVE BOX
Interventional Radiology    Evaluate for Procedure: Hepatic abscess drainage    HPI: 80yo f w pmh htn, hld, metastatic (pulm mets) pancreatic adenoca s/p whipple + vats w darby wedge resection and lll basilar segmentectomy + adj chemo (folfirinox) + palliative chemo (currently on gemtcitabine/abraxane), rcc s/p l partial nephrectomy, ra, constipation, gastroparesis, p/w persistent fever iso recent chemo despite po abx use; in er, found to be meeting sepsis criteria w pancytopenia; c/f neutropenic fever iso recent chemo; admit to medicine for further mgmt. CT demonstrates Peripherally enhancing right hepatic lobe lesion measuring 2.5 x 1.9 cm, consistent with abscess. IR consulted for hepatic abscess drainage.     Allergies: No Known Allergies    Medications (Abx/Cardiac/Anticoagulation/Blood Products)  cefepime   IVPB: 100 mL/Hr IV Intermittent (06-23 @ 12:24)  ertapenem  IVPB: 100 mL/Hr IV Intermittent (06-23 @ 19:50)  piperacillin/tazobactam IVPB.: 200 mL/Hr IV Intermittent (06-24 @ 15:31)  piperacillin/tazobactam IVPB.-: 25 mL/Hr IV Intermittent (06-24 @ 17:19)  piperacillin/tazobactam IVPB.-: 25 mL/Hr IV Intermittent (06-25 @ 00:54)  piperacillin/tazobactam IVPB..: 25 mL/Hr IV Intermittent (06-25 @ 05:52)  vancomycin  IVPB.: 250 mL/Hr IV Intermittent (06-23 @ 13:05)    Data:  T(C): 37.5  HR: 101  BP: 111/72  RR: 18  SpO2: 98%    -WBC 3.44 / HgB 13.2 / Hct 38.9 / Plt 29  -Na 135 / Cl 105 / BUN 11 / Glucose 110  -K 4.0 / CO2 18 / Cr 0.83  -ALT -- / Alk Phos -- / T.Bili --  -INR 1.12 / PTT 34.7    Radiology:   < from: CT Abdomen and Pelvis w/ IV Cont (06.24.25 @ 21:25) >      INTERPRETATION:  CLINICAL INFORMATION: Bacteremia. Infectious workup.    COMPARISON: 3/15/2025    CONTRAST/COMPLICATIONS:  IV Contrast: Omnipaque 350  90 cc administered   10 cc discarded  Oral Contrast: NONE.    PROCEDURE:  CT of the Abdomen and Pelvis was performed.  Sagittal and coronal reformats were performed.    FINDINGS:  LOWER CHEST: Small left pleural effusion.    LIVER: New rim-enhancing collections within segment 6 overall measuring   2.5 x 1.9 cm suspicious for abscesses (3:54)  BILE DUCTS: Normal caliber.  GALLBLADDER: Cholecystectomy.  SPLEEN: Within normal limits.  PANCREAS: Status post Whipple. Pancreatic ductal stent. Region of the   pancreatic jejunostomy, hepaticojejunostomy, and gastrojejunostomy are   unremarkable.  ADRENALS: Within normal limits.  KIDNEYS/URETERS: Left nephrectomy. Right kidney within normal limits.    BLADDER: Within normal limits.  REPRODUCTIVE ORGANS: Uterus and bilateral adnexa within normal limits.    BOWEL: No bowel obstruction. Appendix is not visualized. Wall thickening   of the descending and sigmoid colon, which may reflect colitis or   underdistention.    Limited evaluation for gastrointestinal bleeding on this single phase   study as clinically questioned, within those limitations overt signs of   gastrointestinal hemorrhage.    PERITONEUM/RETROPERITONEUM: Within normal limits. No retroperitoneal or   intra-abdominal hematoma.  VESSELS: Within normal limits.  LYMPH NODES: No lymphadenopathy.  ABDOMINAL WALL: Postsurgical changes.  BONES: Degenerative changes. Old T12 vertebral body compression fracture.    IMPRESSION:  Peripherally enhancing right hepatic lobe lesion measuring 2.5 x 1.9 cm,   consistent with abscess.    Wall thickening of the descending and sigmoid colon, which may reflect   underdistention or colitis.    < end of copied text >      Assessment/Plan:   80yo f w pmh htn, hld, metastatic (pulm mets) pancreatic adenoca s/p whipple + vats w darby wedge resection and lll basilar segmentectomy + adj chemo (folfirinox) + palliative chemo (currently on gemtcitabine/abraxane), rcc s/p l partial nephrectomy, ra, constipation, gastroparesis, p/w persistent fever iso recent chemo despite po abx use; in er, found to be meeting sepsis criteria w pancytopenia; c/f neutropenic fever iso recent chemo; admit to medicine for further mgmt. CT demonstrates Peripherally enhancing right hepatic lobe lesion measuring 2.5 x 1.9 cm, consistent with abscess. IR consulted for hepatic abscess drainage.     *INCOMPLETE*     - case reviewed and approved for ____  - please place IR procedure order under ______  - STAT labs in AM (cbc,coags, bmp, T&S)  - hold AC x___hrs prior to procedure with tentative plan to resume AC __ hours post procedure, if no concern for bleeding  - NPO on ____ at 11pm  - d/w primary team Interventional Radiology    Evaluate for Procedure: Hepatic abscess drainage    HPI: 78yo f w pmh htn, hld, metastatic (pulm mets) pancreatic adenoca s/p whipple + vats w darby wedge resection and lll basilar segmentectomy + adj chemo (folfirinox) + palliative chemo (currently on gemtcitabine/abraxane), rcc s/p l partial nephrectomy, ra, constipation, gastroparesis, p/w persistent fever iso recent chemo despite po abx use; in er, found to be meeting sepsis criteria w pancytopenia; c/f neutropenic fever iso recent chemo; admit to medicine for further mgmt. CT demonstrates Peripherally enhancing right hepatic lobe lesion measuring 2.5 x 1.9 cm, consistent with abscess. IR consulted for hepatic abscess drainage.     Allergies: No Known Allergies    Medications (Abx/Cardiac/Anticoagulation/Blood Products)  cefepime   IVPB: 100 mL/Hr IV Intermittent (06-23 @ 12:24)  ertapenem  IVPB: 100 mL/Hr IV Intermittent (06-23 @ 19:50)  piperacillin/tazobactam IVPB.: 200 mL/Hr IV Intermittent (06-24 @ 15:31)  piperacillin/tazobactam IVPB.-: 25 mL/Hr IV Intermittent (06-24 @ 17:19)  piperacillin/tazobactam IVPB.-: 25 mL/Hr IV Intermittent (06-25 @ 00:54)  piperacillin/tazobactam IVPB..: 25 mL/Hr IV Intermittent (06-25 @ 05:52)  vancomycin  IVPB.: 250 mL/Hr IV Intermittent (06-23 @ 13:05)    Data:  T(C): 37.5  HR: 101  BP: 111/72  RR: 18  SpO2: 98%    -WBC 3.44 / HgB 13.2 / Hct 38.9 / Plt 29  -Na 135 / Cl 105 / BUN 11 / Glucose 110  -K 4.0 / CO2 18 / Cr 0.83  -ALT -- / Alk Phos -- / T.Bili --  -INR 1.12 / PTT 34.7    Radiology:   < from: CT Abdomen and Pelvis w/ IV Cont (06.24.25 @ 21:25) >      INTERPRETATION:  CLINICAL INFORMATION: Bacteremia. Infectious workup.    COMPARISON: 3/15/2025    CONTRAST/COMPLICATIONS:  IV Contrast: Omnipaque 350  90 cc administered   10 cc discarded  Oral Contrast: NONE.    PROCEDURE:  CT of the Abdomen and Pelvis was performed.  Sagittal and coronal reformats were performed.    FINDINGS:  LOWER CHEST: Small left pleural effusion.    LIVER: New rim-enhancing collections within segment 6 overall measuring   2.5 x 1.9 cm suspicious for abscesses (3:54)  BILE DUCTS: Normal caliber.  GALLBLADDER: Cholecystectomy.  SPLEEN: Within normal limits.  PANCREAS: Status post Whipple. Pancreatic ductal stent. Region of the   pancreatic jejunostomy, hepaticojejunostomy, and gastrojejunostomy are   unremarkable.  ADRENALS: Within normal limits.  KIDNEYS/URETERS: Left nephrectomy. Right kidney within normal limits.    BLADDER: Within normal limits.  REPRODUCTIVE ORGANS: Uterus and bilateral adnexa within normal limits.    BOWEL: No bowel obstruction. Appendix is not visualized. Wall thickening   of the descending and sigmoid colon, which may reflect colitis or   underdistention.    Limited evaluation for gastrointestinal bleeding on this single phase   study as clinically questioned, within those limitations overt signs of   gastrointestinal hemorrhage.    PERITONEUM/RETROPERITONEUM: Within normal limits. No retroperitoneal or   intra-abdominal hematoma.  VESSELS: Within normal limits.  LYMPH NODES: No lymphadenopathy.  ABDOMINAL WALL: Postsurgical changes.  BONES: Degenerative changes. Old T12 vertebral body compression fracture.    IMPRESSION:  Peripherally enhancing right hepatic lobe lesion measuring 2.5 x 1.9 cm,   consistent with abscess.    Wall thickening of the descending and sigmoid colon, which may reflect   underdistention or colitis.    < end of copied text >      Assessment/Plan:   78yo f w pmh htn, hld, metastatic (pulm mets) pancreatic adenoca s/p whipple + vats w darby wedge resection and lll basilar segmentectomy + adj chemo (folfirinox) + palliative chemo (currently on gemtcitabine/abraxane), rcc s/p l partial nephrectomy, ra, constipation, gastroparesis, p/w persistent fever iso recent chemo despite po abx use; in er, found to be meeting sepsis criteria w pancytopenia; c/f neutropenic fever iso recent chemo; admit to medicine for further mgmt. CT demonstrates Peripherally enhancing right hepatic lobe lesion measuring 2.5 x 1.9 cm, consistent with abscess. IR consulted for hepatic abscess drainage.     - case and images reviewed with Dr. Ramirez   - multi-loculated small collection, too small for drainage  - recommend conservative mgmt with abx   - d/w primary team

## 2025-06-25 NOTE — PROVIDER CONTACT NOTE (OTHER) - ASSESSMENT
Pt is ANOX4. Vital signs, 200/75 HR: 112 O2Sat: 90% Temp:99.5 Pt Lung sounds present with B/L Wheezing. Pt denies chest pain, dizziness, or headache.

## 2025-06-26 PROCEDURE — 99233 SBSQ HOSP IP/OBS HIGH 50: CPT

## 2025-06-26 PROCEDURE — G0545: CPT

## 2025-06-26 PROCEDURE — 93970 EXTREMITY STUDY: CPT | Mod: 26

## 2025-06-26 PROCEDURE — 71045 X-RAY EXAM CHEST 1 VIEW: CPT | Mod: 26

## 2025-06-26 RX ORDER — FUROSEMIDE 10 MG/ML
20 INJECTION INTRAMUSCULAR; INTRAVENOUS DAILY
Refills: 0 | Status: DISCONTINUED | OUTPATIENT
Start: 2025-06-26 | End: 2025-07-01

## 2025-06-26 RX ORDER — IPRATROPIUM BROMIDE AND ALBUTEROL SULFATE .5; 2.5 MG/3ML; MG/3ML
3 SOLUTION RESPIRATORY (INHALATION) EVERY 6 HOURS
Refills: 0 | Status: DISCONTINUED | OUTPATIENT
Start: 2025-06-26 | End: 2025-07-01

## 2025-06-26 RX ADMIN — Medication 1 CAPSULE(S): at 12:02

## 2025-06-26 RX ADMIN — Medication 1 CAPSULE(S): at 17:58

## 2025-06-26 RX ADMIN — IPRATROPIUM BROMIDE AND ALBUTEROL SULFATE 3 MILLILITER(S): .5; 2.5 SOLUTION RESPIRATORY (INHALATION) at 21:37

## 2025-06-26 RX ADMIN — Medication 650 MILLIGRAM(S): at 12:32

## 2025-06-26 RX ADMIN — ROMIPLOSTIM 45 MICROGRAM(S): 125 INJECTION, POWDER, LYOPHILIZED, FOR SOLUTION SUBCUTANEOUS at 06:16

## 2025-06-26 RX ADMIN — Medication 25 GRAM(S): at 21:37

## 2025-06-26 RX ADMIN — Medication 1 CAPSULE(S): at 08:06

## 2025-06-26 RX ADMIN — Medication 25 GRAM(S): at 12:02

## 2025-06-26 RX ADMIN — IPRATROPIUM BROMIDE AND ALBUTEROL SULFATE 3 MILLILITER(S): .5; 2.5 SOLUTION RESPIRATORY (INHALATION) at 12:01

## 2025-06-26 RX ADMIN — Medication 25 GRAM(S): at 06:13

## 2025-06-26 RX ADMIN — Medication 650 MILLIGRAM(S): at 12:02

## 2025-06-26 NOTE — DIETITIAN INITIAL EVALUATION ADULT - PERTINENT MEDS FT
MEDICATIONS  (STANDING):  albuterol/ipratropium for Nebulization 3 milliLiter(s) Nebulizer every 6 hours  furosemide   Injectable 20 milliGRAM(s) IV Push daily  pancrelipase  (CREON 36,000 Lipase Units) 1 Capsule(s) Oral three times a day with meals  piperacillin/tazobactam IVPB.. 3.375 Gram(s) IV Intermittent every 8 hours  sodium chloride 0.9%. 1000 milliLiter(s) (80 mL/Hr) IV Continuous <Continuous>    MEDICATIONS  (PRN):  acetaminophen     Tablet .. 650 milliGRAM(s) Oral every 6 hours PRN Temp greater or equal to 38C (100.4F), Mild Pain (1 - 3)  aluminum hydroxide/magnesium hydroxide/simethicone Suspension 30 milliLiter(s) Oral every 4 hours PRN Dyspepsia  benzonatate 100 milliGRAM(s) Oral every 8 hours PRN Cough  guaifenesin/dextromethorphan Oral Liquid 10 milliLiter(s) Oral every 4 hours PRN Cough  melatonin 3 milliGRAM(s) Oral at bedtime PRN Insomnia  ondansetron Injectable 4 milliGRAM(s) IV Push every 8 hours PRN Nausea and/or Vomiting

## 2025-06-26 NOTE — DIETITIAN INITIAL EVALUATION ADULT - REASON FOR ADMISSION
Pt is a 80 y/o F with PMH: "htn, hld, metastatic (pulm mets) pancreatic adenoca s/p whipple + vats w darby wedge resection and lll basilar segmentectomy + adj chemo (folfirinox) + palliative chemo (currently on gemtcitabine/abraxane), rcc s/p l partial nephrectomy, ra, constipation, gastroparesis, p/w persistent fever iso recent chemo despite po abx use; a/w cough x2-3 days. found to have metapneumovirus."

## 2025-06-26 NOTE — DIETITIAN INITIAL EVALUATION ADULT - PROBLEM SELECTOR PLAN 3
h/o metastatic (pulm mets) pancreatic adenoca s/p whipple + vats w darby wedge resection and lll basilar segmentectomy + adj chemo (folfirinox) + palliative chemo (currently on gemtcitabine/abraxane), rcc s/p l partial nephrectomy  outpatient pulm and surg onc documentation reviewed  last chemo reportedly on 6/19/2025  hold chemo while inpatient   [facility] fu on discharge

## 2025-06-26 NOTE — DIETITIAN INITIAL EVALUATION ADULT - EDUCATION DIETARY MODIFICATIONS
low sodium diet; adequate calorie/protein intake of small frequent meals/snacks; oral nutrition supplements; menu ordering/(1) partially meets; needs review/practice/verbalization

## 2025-06-26 NOTE — DIETITIAN INITIAL EVALUATION ADULT - ORAL INTAKE PTA/DIET HISTORY
Chart reviewed, events noted. Pt speaks Bengali and English, son at bedside provided translation as needed. Reports decreased appetite. Son reports Pt is tolerating more liquids than solids. Denies issues swallowing, sometimes difficulty chewing hard foods. NKFA. Occasionally drinks oral nutrition supplements at home but doesn't love the taste.

## 2025-06-26 NOTE — DIETITIAN INITIAL EVALUATION ADULT - OTHER INFO
dosing wt: 44.9 kg (6/23)  reported UBW: 44 kg  wt hx per Jose Eliaswell HIE: 46.7 kg (5/19), 43.1 kg (3/15), 43.1 kg (12/17/24), 46.3 kg (7/7/24) *wt relatively stable x 1 year

## 2025-06-26 NOTE — DIETITIAN INITIAL EVALUATION ADULT - NSFNSADHERENCEPTAFT_GEN_A_CORE
Pt reports avoiding too much sugar, salt, greasy/oily foods PTA. HbA1c 6.2% (6/24) indicating pre-DM. Not on any DM medications PTA.

## 2025-06-26 NOTE — DIETITIAN NUTRITION RISK NOTIFICATION - PHYSICAL ASSESSMENT TEMPLES
medication.  She is  monitoring blood pressures.  Wt Readings from Last 3 Encounters:   01/17/24 105.2 kg (232 lb)   10/13/23 107 kg (236 lb)   09/12/23 106.4 kg (234 lb 9.6 oz)        Hypertension  This is a chronic problem. The problem is unchanged. The problem is controlled. Pertinent negatives include no anxiety, blurred vision, chest pain, headaches, malaise/fatigue, neck pain, orthopnea, palpitations, peripheral edema, PND, shortness of breath or sweats. There are no associated agents to hypertension. There are no known risk factors for coronary artery disease. Past treatments include alpha 1 blockers. There are no compliance problems.          Review of Systems   Constitutional:  Negative for activity change, appetite change, chills, fatigue, fever and malaise/fatigue.   HENT:  Negative for congestion and rhinorrhea.    Eyes:  Negative for blurred vision.   Respiratory:  Negative for cough, shortness of breath and wheezing.    Cardiovascular:  Negative for chest pain, palpitations, orthopnea and PND.   Gastrointestinal:  Negative for abdominal pain, constipation, diarrhea, nausea and vomiting.   Genitourinary:  Negative for dysuria.   Musculoskeletal:  Positive for arthralgias and myalgias. Negative for back pain, gait problem, joint swelling and neck pain.   Skin:  Negative for color change, rash and wound.   Neurological:  Negative for dizziness, tremors, weakness, numbness and headaches.   Psychiatric/Behavioral:  Negative for dysphoric mood. The patient is not nervous/anxious.       Patient Active Problem List   Diagnosis    BMI 36.0-36.9,adult    Ankle fracture, bimalleolar, closed, left, initial encounter    Essential hypertension    Recurrent major depressive disorder, in full remission ([facility])       Blood pressure (!) 162/114, pulse 70, temperature 99 °F (37.2 °C), temperature source Temporal, resp. rate 16, height 1.727 m (5' 8\"), weight 105.2 kg (232 lb), last menstrual period 01/17/2024, SpO2 99 %.  moderate

## 2025-06-26 NOTE — DIETITIAN INITIAL EVALUATION ADULT - ENERGY INTAKE
Fair (50-75%) Son reports Pt not eating well 2/2 hospital food dislike. Just receive a copy of the menu today and will assist Pt with ordering off the menu. Pt requesting steamed vegetables and rice at meals as available. Pt not amenable to Ensure supplements, but states she will try unflavored liquid protein supplement. Labs reviewed, hyponatremia and hypophosphatemia noted. Possibly related to poor PO intake. Pt taking creon with meals.

## 2025-06-26 NOTE — DIETITIAN INITIAL EVALUATION ADULT - PERTINENT LABORATORY DATA
06-25    134[L]  |  108  |  10  ----------------------------<  128[H]  3.8   |  17[L]  |  0.82    Ca    7.8[L]      25 Jun 2025 12:51    TPro  5.4[L]  /  Alb  2.6[L]  /  TBili  0.9  /  DBili  x   /  AST  30  /  ALT  23  /  AlkPhos  150[H]  06-25  A1C with Estimated Average Glucose Result: 6.2 % (06-24-25 @ 07:03)  A1C with Estimated Average Glucose Result: 5.2 % (11-16-24 @ 03:55)  A1C with Estimated Average Glucose Result: 5.9 % (07-08-24 @ 09:00)

## 2025-06-26 NOTE — DIETITIAN INITIAL EVALUATION ADULT - OBTAIN WEEKLY WEIGHT
Telephone Encounter by Quynh Waters RN at 03/15/17 03:19 PM     Author:  Quynh Waters RN Service:  (none) Author Type:  Registered Nurse     Filed:  03/15/17 03:20 PM Encounter Date:  3/15/2017 Status:  Signed     :  Quynh Waters RN (Registered Nurse)            [facility] 6/2017.  Last OV 1/18/17- travelers diarrhea.  Last ASC: 6/24/14.  Msg to EAG.[TD1.1M]    Electronically Signed by:    Quynh Waters RN , 3/15/2017[TD1.2T]        Revision History        User Key Date/Time User Provider Type Action    > TD1.2 03/15/17 03:20 PM Quynh Waters, RN Registered Nurse Sign     TD1.1 03/15/17 03:19 PM Quynh Waters RN Registered Nurse     M - Manual, T - Template             yes

## 2025-06-26 NOTE — DIETITIAN INITIAL EVALUATION ADULT - ADD RECOMMEND
no
1) recommend low sodium diet  2) recommend LPS 1x/day; RD to provide high protein jello  3) Malnutrition sticker placed, RD to follow-up as per protocol   4) RD to honor food preferences as offered/able to maximize intake  5) replete lytes PRN  6) Monitor PO intake, weight, labs, skin, GI status, diet

## 2025-06-26 NOTE — DIETITIAN INITIAL EVALUATION ADULT - NSFNSGIIOFT_GEN_A_CORE
Pt denies any N/V/D. Reports constipation is a chronic issue. Declined prunes/prune juice. Pt reports BM today, last BM per RN flowsheets 6/25. Pt not ordered for bowel regimen.

## 2025-06-27 LAB
ALBUMIN SERPL ELPH-MCNC: 2.5 G/DL — LOW (ref 3.3–5)
ALP SERPL-CCNC: 136 U/L — HIGH (ref 40–120)
ALT FLD-CCNC: 20 U/L — SIGNIFICANT CHANGE UP (ref 10–45)
ANION GAP SERPL CALC-SCNC: 16 MMOL/L — SIGNIFICANT CHANGE UP (ref 5–17)
AST SERPL-CCNC: 33 U/L — SIGNIFICANT CHANGE UP (ref 10–40)
BASOPHILS # BLD AUTO: 0.05 K/UL — SIGNIFICANT CHANGE UP (ref 0–0.2)
BASOPHILS NFR BLD AUTO: 1.5 % — SIGNIFICANT CHANGE UP (ref 0–2)
BILIRUB SERPL-MCNC: 0.6 MG/DL — SIGNIFICANT CHANGE UP (ref 0.2–1.2)
BUN SERPL-MCNC: 10 MG/DL — SIGNIFICANT CHANGE UP (ref 7–23)
CALCIUM SERPL-MCNC: 8 MG/DL — LOW (ref 8.4–10.5)
CHLORIDE SERPL-SCNC: 106 MMOL/L — SIGNIFICANT CHANGE UP (ref 96–108)
CO2 SERPL-SCNC: 14 MMOL/L — LOW (ref 22–31)
CREAT SERPL-MCNC: 0.86 MG/DL — SIGNIFICANT CHANGE UP (ref 0.5–1.3)
EGFR: 68 ML/MIN/1.73M2 — SIGNIFICANT CHANGE UP
EGFR: 68 ML/MIN/1.73M2 — SIGNIFICANT CHANGE UP
EOSINOPHIL # BLD AUTO: 0.02 K/UL — SIGNIFICANT CHANGE UP (ref 0–0.5)
EOSINOPHIL NFR BLD AUTO: 0.6 % — SIGNIFICANT CHANGE UP (ref 0–6)
GLUCOSE SERPL-MCNC: 108 MG/DL — HIGH (ref 70–99)
HCT VFR BLD CALC: 43.6 % — SIGNIFICANT CHANGE UP (ref 34.5–45)
HGB BLD-MCNC: 14.2 G/DL — SIGNIFICANT CHANGE UP (ref 11.5–15.5)
IMM GRANULOCYTES # BLD AUTO: 0.02 K/UL — SIGNIFICANT CHANGE UP (ref 0–0.07)
IMM GRANULOCYTES NFR BLD AUTO: 0.6 % — SIGNIFICANT CHANGE UP (ref 0–0.9)
IMMATURE PLATELET FRACTION #: 2.7 K/UL — LOW (ref 4.7–11.1)
IMMATURE PLATELET FRACTION %: 6.1 % — HIGH (ref 1.6–4.9)
LYMPHOCYTES # BLD AUTO: 0.6 K/UL — LOW (ref 1–3.3)
LYMPHOCYTES NFR BLD AUTO: 17.6 % — SIGNIFICANT CHANGE UP (ref 13–44)
MCHC RBC-ENTMCNC: 30.3 PG — SIGNIFICANT CHANGE UP (ref 27–34)
MCHC RBC-ENTMCNC: 32.6 G/DL — SIGNIFICANT CHANGE UP (ref 32–36)
MCV RBC AUTO: 93 FL — SIGNIFICANT CHANGE UP (ref 80–100)
MONOCYTES # BLD AUTO: 0.32 K/UL — SIGNIFICANT CHANGE UP (ref 0–0.9)
MONOCYTES NFR BLD AUTO: 9.4 % — SIGNIFICANT CHANGE UP (ref 2–14)
NEUTROPHILS # BLD AUTO: 2.4 K/UL — SIGNIFICANT CHANGE UP (ref 1.8–7.4)
NEUTROPHILS NFR BLD AUTO: 70.3 % — SIGNIFICANT CHANGE UP (ref 43–77)
NRBC # BLD AUTO: 0 K/UL — SIGNIFICANT CHANGE UP (ref 0–0)
NRBC # FLD: 0 K/UL — SIGNIFICANT CHANGE UP (ref 0–0)
PLATELET # BLD AUTO: 45 K/UL — LOW (ref 150–400)
PMV BLD: SIGNIFICANT CHANGE UP FL (ref 7–13)
POTASSIUM SERPL-MCNC: 4.1 MMOL/L — SIGNIFICANT CHANGE UP (ref 3.5–5.3)
POTASSIUM SERPL-SCNC: 4.1 MMOL/L — SIGNIFICANT CHANGE UP (ref 3.5–5.3)
PROT SERPL-MCNC: 5.5 G/DL — LOW (ref 6–8.3)
RBC # BLD: 4.69 M/UL — SIGNIFICANT CHANGE UP (ref 3.8–5.2)
RBC # FLD: 18.4 % — HIGH (ref 10.3–14.5)
SODIUM SERPL-SCNC: 136 MMOL/L — SIGNIFICANT CHANGE UP (ref 135–145)
WBC # BLD: 3.41 K/UL — LOW (ref 3.8–10.5)
WBC # FLD AUTO: 3.41 K/UL — LOW (ref 3.8–10.5)

## 2025-06-27 PROCEDURE — 99232 SBSQ HOSP IP/OBS MODERATE 35: CPT

## 2025-06-27 PROCEDURE — G0545: CPT

## 2025-06-27 PROCEDURE — 72158 MRI LUMBAR SPINE W/O & W/DYE: CPT | Mod: 26

## 2025-06-27 RX ORDER — AMPICILLIN SODIUM AND SULBACTAM SODIUM 1; .5 G/1; G/1
3 INJECTION, POWDER, FOR SOLUTION INTRAMUSCULAR; INTRAVENOUS EVERY 6 HOURS
Refills: 0 | Status: DISCONTINUED | OUTPATIENT
Start: 2025-06-27 | End: 2025-07-01

## 2025-06-27 RX ADMIN — Medication 1 CAPSULE(S): at 12:49

## 2025-06-27 RX ADMIN — Medication 1 CAPSULE(S): at 17:35

## 2025-06-27 RX ADMIN — AMPICILLIN SODIUM AND SULBACTAM SODIUM 200 GRAM(S): 1; .5 INJECTION, POWDER, FOR SOLUTION INTRAMUSCULAR; INTRAVENOUS at 17:35

## 2025-06-27 RX ADMIN — Medication 25 GRAM(S): at 05:11

## 2025-06-27 RX ADMIN — Medication 1 CAPSULE(S): at 08:50

## 2025-06-27 RX ADMIN — IPRATROPIUM BROMIDE AND ALBUTEROL SULFATE 3 MILLILITER(S): .5; 2.5 SOLUTION RESPIRATORY (INHALATION) at 05:11

## 2025-06-27 RX ADMIN — FUROSEMIDE 20 MILLIGRAM(S): 10 INJECTION INTRAMUSCULAR; INTRAVENOUS at 05:11

## 2025-06-27 RX ADMIN — IPRATROPIUM BROMIDE AND ALBUTEROL SULFATE 3 MILLILITER(S): .5; 2.5 SOLUTION RESPIRATORY (INHALATION) at 14:00

## 2025-06-28 PROCEDURE — 71045 X-RAY EXAM CHEST 1 VIEW: CPT

## 2025-06-28 PROCEDURE — 87186 SC STD MICRODIL/AGAR DIL: CPT

## 2025-06-28 PROCEDURE — 84484 ASSAY OF TROPONIN QUANT: CPT

## 2025-06-28 PROCEDURE — 80061 LIPID PANEL: CPT

## 2025-06-28 PROCEDURE — 82330 ASSAY OF CALCIUM: CPT

## 2025-06-28 PROCEDURE — 87040 BLOOD CULTURE FOR BACTERIA: CPT

## 2025-06-28 PROCEDURE — 82803 BLOOD GASES ANY COMBINATION: CPT

## 2025-06-28 PROCEDURE — 82947 ASSAY GLUCOSE BLOOD QUANT: CPT

## 2025-06-28 PROCEDURE — 74177 CT ABD & PELVIS W/CONTRAST: CPT

## 2025-06-28 PROCEDURE — 86923 COMPATIBILITY TEST ELECTRIC: CPT

## 2025-06-28 PROCEDURE — 85018 HEMOGLOBIN: CPT

## 2025-06-28 PROCEDURE — P9016: CPT

## 2025-06-28 PROCEDURE — 97161 PT EVAL LOW COMPLEX 20 MIN: CPT

## 2025-06-28 PROCEDURE — 83540 ASSAY OF IRON: CPT

## 2025-06-28 PROCEDURE — 85014 HEMATOCRIT: CPT

## 2025-06-28 PROCEDURE — 72158 MRI LUMBAR SPINE W/O & W/DYE: CPT

## 2025-06-28 PROCEDURE — 94640 AIRWAY INHALATION TREATMENT: CPT

## 2025-06-28 PROCEDURE — 87077 CULTURE AEROBIC IDENTIFY: CPT

## 2025-06-28 PROCEDURE — 86850 RBC ANTIBODY SCREEN: CPT

## 2025-06-28 PROCEDURE — 85027 COMPLETE CBC AUTOMATED: CPT

## 2025-06-28 PROCEDURE — A9585: CPT

## 2025-06-28 PROCEDURE — 93970 EXTREMITY STUDY: CPT

## 2025-06-28 PROCEDURE — 83036 HEMOGLOBIN GLYCOSYLATED A1C: CPT

## 2025-06-28 PROCEDURE — 85730 THROMBOPLASTIN TIME PARTIAL: CPT

## 2025-06-28 PROCEDURE — 87150 DNA/RNA AMPLIFIED PROBE: CPT

## 2025-06-28 PROCEDURE — 73502 X-RAY EXAM HIP UNI 2-3 VIEWS: CPT

## 2025-06-28 PROCEDURE — 82728 ASSAY OF FERRITIN: CPT

## 2025-06-28 PROCEDURE — 85045 AUTOMATED RETICULOCYTE COUNT: CPT

## 2025-06-28 PROCEDURE — 80048 BASIC METABOLIC PNL TOTAL CA: CPT

## 2025-06-28 PROCEDURE — 85025 COMPLETE CBC W/AUTO DIFF WBC: CPT

## 2025-06-28 PROCEDURE — 72100 X-RAY EXAM L-S SPINE 2/3 VWS: CPT

## 2025-06-28 PROCEDURE — 83735 ASSAY OF MAGNESIUM: CPT

## 2025-06-28 PROCEDURE — 82607 VITAMIN B-12: CPT

## 2025-06-28 PROCEDURE — 84145 PROCALCITONIN (PCT): CPT

## 2025-06-28 PROCEDURE — 86901 BLOOD TYPING SEROLOGIC RH(D): CPT

## 2025-06-28 PROCEDURE — 0225U NFCT DS DNA&RNA 21 SARSCOV2: CPT

## 2025-06-28 PROCEDURE — 82746 ASSAY OF FOLIC ACID SERUM: CPT

## 2025-06-28 PROCEDURE — 84443 ASSAY THYROID STIM HORMONE: CPT

## 2025-06-28 PROCEDURE — 0241U: CPT

## 2025-06-28 PROCEDURE — 84132 ASSAY OF SERUM POTASSIUM: CPT

## 2025-06-28 PROCEDURE — 83550 IRON BINDING TEST: CPT

## 2025-06-28 PROCEDURE — 83615 LACTATE (LD) (LDH) ENZYME: CPT

## 2025-06-28 PROCEDURE — 85610 PROTHROMBIN TIME: CPT

## 2025-06-28 PROCEDURE — 82435 ASSAY OF BLOOD CHLORIDE: CPT

## 2025-06-28 PROCEDURE — 93005 ELECTROCARDIOGRAM TRACING: CPT

## 2025-06-28 PROCEDURE — 83605 ASSAY OF LACTIC ACID: CPT

## 2025-06-28 PROCEDURE — 83010 ASSAY OF HAPTOGLOBIN QUANT: CPT

## 2025-06-28 PROCEDURE — 84100 ASSAY OF PHOSPHORUS: CPT

## 2025-06-28 PROCEDURE — 71046 X-RAY EXAM CHEST 2 VIEWS: CPT

## 2025-06-28 PROCEDURE — 81003 URINALYSIS AUTO W/O SCOPE: CPT

## 2025-06-28 PROCEDURE — 86900 BLOOD TYPING SEROLOGIC ABO: CPT

## 2025-06-28 PROCEDURE — 84295 ASSAY OF SERUM SODIUM: CPT

## 2025-06-28 PROCEDURE — 80053 COMPREHEN METABOLIC PANEL: CPT

## 2025-06-28 RX ADMIN — IPRATROPIUM BROMIDE AND ALBUTEROL SULFATE 3 MILLILITER(S): .5; 2.5 SOLUTION RESPIRATORY (INHALATION) at 23:36

## 2025-06-28 RX ADMIN — IPRATROPIUM BROMIDE AND ALBUTEROL SULFATE 3 MILLILITER(S): .5; 2.5 SOLUTION RESPIRATORY (INHALATION) at 05:57

## 2025-06-28 RX ADMIN — Medication 1 CAPSULE(S): at 08:44

## 2025-06-28 RX ADMIN — AMPICILLIN SODIUM AND SULBACTAM SODIUM 200 GRAM(S): 1; .5 INJECTION, POWDER, FOR SOLUTION INTRAMUSCULAR; INTRAVENOUS at 05:57

## 2025-06-28 RX ADMIN — Medication 1 CAPSULE(S): at 11:53

## 2025-06-28 RX ADMIN — AMPICILLIN SODIUM AND SULBACTAM SODIUM 200 GRAM(S): 1; .5 INJECTION, POWDER, FOR SOLUTION INTRAMUSCULAR; INTRAVENOUS at 17:34

## 2025-06-28 RX ADMIN — IPRATROPIUM BROMIDE AND ALBUTEROL SULFATE 3 MILLILITER(S): .5; 2.5 SOLUTION RESPIRATORY (INHALATION) at 11:53

## 2025-06-28 RX ADMIN — AMPICILLIN SODIUM AND SULBACTAM SODIUM 200 GRAM(S): 1; .5 INJECTION, POWDER, FOR SOLUTION INTRAMUSCULAR; INTRAVENOUS at 00:29

## 2025-06-28 RX ADMIN — Medication 1 CAPSULE(S): at 17:32

## 2025-06-28 RX ADMIN — IPRATROPIUM BROMIDE AND ALBUTEROL SULFATE 3 MILLILITER(S): .5; 2.5 SOLUTION RESPIRATORY (INHALATION) at 00:30

## 2025-06-28 RX ADMIN — AMPICILLIN SODIUM AND SULBACTAM SODIUM 200 GRAM(S): 1; .5 INJECTION, POWDER, FOR SOLUTION INTRAMUSCULAR; INTRAVENOUS at 11:54

## 2025-06-28 RX ADMIN — AMPICILLIN SODIUM AND SULBACTAM SODIUM 200 GRAM(S): 1; .5 INJECTION, POWDER, FOR SOLUTION INTRAMUSCULAR; INTRAVENOUS at 23:36

## 2025-06-28 RX ADMIN — IPRATROPIUM BROMIDE AND ALBUTEROL SULFATE 3 MILLILITER(S): .5; 2.5 SOLUTION RESPIRATORY (INHALATION) at 17:32

## 2025-06-28 RX ADMIN — FUROSEMIDE 20 MILLIGRAM(S): 10 INJECTION INTRAMUSCULAR; INTRAVENOUS at 05:57

## 2025-06-29 LAB
ANISOCYTOSIS BLD QL: ABNORMAL
BASOPHILS # BLD AUTO: 0.02 K/UL — SIGNIFICANT CHANGE UP (ref 0–0.2)
BASOPHILS # BLD MANUAL: 0.03 K/UL — SIGNIFICANT CHANGE UP (ref 0–0.2)
BASOPHILS NFR BLD AUTO: 0.5 % — SIGNIFICANT CHANGE UP (ref 0–2)
BASOPHILS NFR BLD MANUAL: 0.9 % — SIGNIFICANT CHANGE UP (ref 0–2)
BURR CELLS BLD QL SMEAR: SLIGHT — SIGNIFICANT CHANGE UP
DACRYOCYTES BLD QL SMEAR: SLIGHT — SIGNIFICANT CHANGE UP
ELLIPTOCYTES BLD QL SMEAR: SLIGHT — SIGNIFICANT CHANGE UP
EOSINOPHIL # BLD AUTO: 0.09 K/UL — SIGNIFICANT CHANGE UP (ref 0–0.5)
EOSINOPHIL # BLD MANUAL: 0.1 K/UL — SIGNIFICANT CHANGE UP (ref 0–0.5)
EOSINOPHIL NFR BLD AUTO: 2.5 % — SIGNIFICANT CHANGE UP (ref 0–6)
EOSINOPHIL NFR BLD MANUAL: 2.6 % — SIGNIFICANT CHANGE UP (ref 0–6)
GRAM STN FLD: ABNORMAL
HCT VFR BLD CALC: 36.6 % — SIGNIFICANT CHANGE UP (ref 34.5–45)
HGB BLD-MCNC: 12.2 G/DL — SIGNIFICANT CHANGE UP (ref 11.5–15.5)
IMM GRANULOCYTES # BLD AUTO: 0.03 K/UL — SIGNIFICANT CHANGE UP (ref 0–0.07)
IMM GRANULOCYTES NFR BLD AUTO: 0.8 % — SIGNIFICANT CHANGE UP (ref 0–0.9)
IMMATURE PLATELET FRACTION #: 6.9 K/UL — SIGNIFICANT CHANGE UP (ref 4.7–11.1)
IMMATURE PLATELET FRACTION %: 9.6 % — HIGH (ref 1.6–4.9)
LYMPHOCYTES # BLD AUTO: 0.87 K/UL — LOW (ref 1–3.3)
LYMPHOCYTES # BLD MANUAL: 0.41 K/UL — LOW (ref 1–3.3)
LYMPHOCYTES NFR BLD AUTO: 23.8 % — SIGNIFICANT CHANGE UP (ref 13–44)
LYMPHOCYTES NFR BLD MANUAL: 11.1 % — LOW (ref 13–44)
MACROCYTES BLD QL: ABNORMAL
MCHC RBC-ENTMCNC: 30 PG — SIGNIFICANT CHANGE UP (ref 27–34)
MCHC RBC-ENTMCNC: 33.3 G/DL — SIGNIFICANT CHANGE UP (ref 32–36)
MCV RBC AUTO: 89.9 FL — SIGNIFICANT CHANGE UP (ref 80–100)
MONOCYTES # BLD AUTO: 0.58 K/UL — SIGNIFICANT CHANGE UP (ref 0–0.9)
MONOCYTES # BLD MANUAL: 0.34 K/UL — SIGNIFICANT CHANGE UP (ref 0–0.9)
MONOCYTES NFR BLD AUTO: 15.8 % — HIGH (ref 2–14)
MONOCYTES NFR BLD MANUAL: 9.4 % — SIGNIFICANT CHANGE UP (ref 2–14)
NEUTROPHILS # BLD AUTO: 2.07 K/UL — SIGNIFICANT CHANGE UP (ref 1.8–7.4)
NEUTROPHILS # BLD MANUAL: 2.78 K/UL — SIGNIFICANT CHANGE UP (ref 1.8–7.4)
NEUTROPHILS NFR BLD AUTO: 56.6 % — SIGNIFICANT CHANGE UP (ref 43–77)
NEUTROPHILS NFR BLD MANUAL: 76 % — SIGNIFICANT CHANGE UP (ref 43–77)
NRBC # BLD AUTO: 0 K/UL — SIGNIFICANT CHANGE UP (ref 0–0)
NRBC # FLD: 0 K/UL — SIGNIFICANT CHANGE UP (ref 0–0)
NRBC BLD AUTO-RTO: 0 /100 WBCS — SIGNIFICANT CHANGE UP (ref 0–0)
OVALOCYTES BLD QL SMEAR: SLIGHT — SIGNIFICANT CHANGE UP
PLAT MORPH BLD: ABNORMAL
PLATELET # BLD AUTO: 72 K/UL — LOW (ref 150–400)
PMV BLD: SIGNIFICANT CHANGE UP FL (ref 7–13)
POIKILOCYTOSIS BLD QL AUTO: SLIGHT — SIGNIFICANT CHANGE UP
RBC # BLD: 4.07 M/UL — SIGNIFICANT CHANGE UP (ref 3.8–5.2)
RBC # FLD: 17.1 % — HIGH (ref 10.3–14.5)
RBC BLD AUTO: ABNORMAL
WBC # BLD: 3.66 K/UL — LOW (ref 3.8–10.5)
WBC # FLD AUTO: 3.66 K/UL — LOW (ref 3.8–10.5)

## 2025-06-29 RX ADMIN — AMPICILLIN SODIUM AND SULBACTAM SODIUM 200 GRAM(S): 1; .5 INJECTION, POWDER, FOR SOLUTION INTRAMUSCULAR; INTRAVENOUS at 12:49

## 2025-06-29 RX ADMIN — Medication 1 CAPSULE(S): at 08:19

## 2025-06-29 RX ADMIN — AMPICILLIN SODIUM AND SULBACTAM SODIUM 200 GRAM(S): 1; .5 INJECTION, POWDER, FOR SOLUTION INTRAMUSCULAR; INTRAVENOUS at 06:13

## 2025-06-29 RX ADMIN — IPRATROPIUM BROMIDE AND ALBUTEROL SULFATE 3 MILLILITER(S): .5; 2.5 SOLUTION RESPIRATORY (INHALATION) at 17:11

## 2025-06-29 RX ADMIN — AMPICILLIN SODIUM AND SULBACTAM SODIUM 200 GRAM(S): 1; .5 INJECTION, POWDER, FOR SOLUTION INTRAMUSCULAR; INTRAVENOUS at 23:13

## 2025-06-29 RX ADMIN — IPRATROPIUM BROMIDE AND ALBUTEROL SULFATE 3 MILLILITER(S): .5; 2.5 SOLUTION RESPIRATORY (INHALATION) at 23:13

## 2025-06-29 RX ADMIN — FUROSEMIDE 20 MILLIGRAM(S): 10 INJECTION INTRAMUSCULAR; INTRAVENOUS at 06:13

## 2025-06-29 RX ADMIN — AMPICILLIN SODIUM AND SULBACTAM SODIUM 200 GRAM(S): 1; .5 INJECTION, POWDER, FOR SOLUTION INTRAMUSCULAR; INTRAVENOUS at 17:11

## 2025-06-29 RX ADMIN — IPRATROPIUM BROMIDE AND ALBUTEROL SULFATE 3 MILLILITER(S): .5; 2.5 SOLUTION RESPIRATORY (INHALATION) at 12:48

## 2025-06-29 RX ADMIN — Medication 1 CAPSULE(S): at 17:11

## 2025-06-29 RX ADMIN — IPRATROPIUM BROMIDE AND ALBUTEROL SULFATE 3 MILLILITER(S): .5; 2.5 SOLUTION RESPIRATORY (INHALATION) at 06:13

## 2025-06-29 RX ADMIN — Medication 1 CAPSULE(S): at 12:48

## 2025-06-29 NOTE — PROVIDER CONTACT NOTE (CRITICAL VALUE NOTIFICATION) - ASSESSMENT
Pt Is ANOx4, VSS. Pt denies Chest pain, SOB, Dizziness, nausea or vomitting. No S & S of bleeding.
Pt A&O4. VS as charted. Pt denies chest pain, sob or dizziness. No s/s of bleeding.
Pt alert and orientedx4, VSS, afebrile today, stable
Pt A&O4. VS as charted. Pt denies chest pain, sob or dizziness. No s/s of bleeding.
Pt A&O4. VS as charted. Pt denies chest pain, sob or dizziness. No s/s of bleeding.

## 2025-06-29 NOTE — PROVIDER CONTACT NOTE (CRITICAL VALUE NOTIFICATION) - PERSON GIVING RESULT:
Chuck Wheatley [facility]
Zaria Alonso [facility]
Rufino Ortiz [facility]
Fallon Berumen
Lorne De Oliveira [facility]

## 2025-06-29 NOTE — PROVIDER CONTACT NOTE (CRITICAL VALUE NOTIFICATION) - SITUATION
Admitted for fever
Hemoglobin 6.8 and Calcium 6.3
H&H is 5.7 and 18.3 respectively
H & H 5.6 & 18.3
WBC 0.91

## 2025-06-29 NOTE — PROVIDER CONTACT NOTE (CRITICAL VALUE NOTIFICATION) - TEST AND RESULT REPORTED:
H & H 5.6 & 18.3  H & H 5.6 & 18.3
Hemoglobin 6.8 and Calcium 6.3
H&H is 5.7 and 18.3 respectively
WBC 0.91
Bcx collected on 6/2025 @905 growth in aerobic bottles negative rodes

## 2025-06-29 NOTE — PROVIDER CONTACT NOTE (CRITICAL VALUE NOTIFICATION) - ACTION/TREATMENT ORDERED:
ACP Brandy Nuñez made aware. No new orders this time. Plan of care ongoing.
Detail Level: Detailed
AVTAR Romero made aware.
Add 24860 Cpt? (Important Note: In 2017 The Use Of 20619 Is Being Tracked By Cms To Determine Future Global Period Reimbursement For Global Periods): no
ACP Brandy Nuñez made aware. No new orders this time. Plan of care ongoing.
AVTAR Nuñez made aware and ordered repeat CBC.
NP made aware

## 2025-06-29 NOTE — PROVIDER CONTACT NOTE (CRITICAL VALUE NOTIFICATION) - BACKGROUND
Admitted for fever
Pt admitted for fever on chemo.
Pt admitted for fever on chemo
Pt admitted for fever on chemo.
Pt admitted for fever on chemo.

## 2025-06-30 LAB
ANION GAP SERPL CALC-SCNC: 12 MMOL/L — SIGNIFICANT CHANGE UP (ref 5–17)
BUN SERPL-MCNC: 10 MG/DL — SIGNIFICANT CHANGE UP (ref 7–23)
CALCIUM SERPL-MCNC: 8.7 MG/DL — SIGNIFICANT CHANGE UP (ref 8.4–10.5)
CHLORIDE SERPL-SCNC: 106 MMOL/L — SIGNIFICANT CHANGE UP (ref 96–108)
CO2 SERPL-SCNC: 21 MMOL/L — LOW (ref 22–31)
CREAT SERPL-MCNC: 0.76 MG/DL — SIGNIFICANT CHANGE UP (ref 0.5–1.3)
CULTURE RESULTS: ABNORMAL
CULTURE RESULTS: SIGNIFICANT CHANGE UP
EGFR: 79 ML/MIN/1.73M2 — SIGNIFICANT CHANGE UP
EGFR: 79 ML/MIN/1.73M2 — SIGNIFICANT CHANGE UP
GLUCOSE SERPL-MCNC: 92 MG/DL — SIGNIFICANT CHANGE UP (ref 70–99)
HCT VFR BLD CALC: 38.7 % — SIGNIFICANT CHANGE UP (ref 34.5–45)
HGB BLD-MCNC: 12.8 G/DL — SIGNIFICANT CHANGE UP (ref 11.5–15.5)
MCHC RBC-ENTMCNC: 30 PG — SIGNIFICANT CHANGE UP (ref 27–34)
MCHC RBC-ENTMCNC: 33.1 G/DL — SIGNIFICANT CHANGE UP (ref 32–36)
MCV RBC AUTO: 90.6 FL — SIGNIFICANT CHANGE UP (ref 80–100)
NRBC # BLD AUTO: 0 K/UL — SIGNIFICANT CHANGE UP (ref 0–0)
NRBC # FLD: 0 K/UL — SIGNIFICANT CHANGE UP (ref 0–0)
NRBC BLD AUTO-RTO: 0 /100 WBCS — SIGNIFICANT CHANGE UP (ref 0–0)
PLATELET # BLD AUTO: 142 K/UL — LOW (ref 150–400)
PMV BLD: 12.8 FL — SIGNIFICANT CHANGE UP (ref 7–13)
POTASSIUM SERPL-MCNC: 3.6 MMOL/L — SIGNIFICANT CHANGE UP (ref 3.5–5.3)
POTASSIUM SERPL-SCNC: 3.6 MMOL/L — SIGNIFICANT CHANGE UP (ref 3.5–5.3)
RBC # BLD: 4.27 M/UL — SIGNIFICANT CHANGE UP (ref 3.8–5.2)
RBC # FLD: 17.1 % — HIGH (ref 10.3–14.5)
SODIUM SERPL-SCNC: 139 MMOL/L — SIGNIFICANT CHANGE UP (ref 135–145)
SPECIMEN SOURCE: SIGNIFICANT CHANGE UP
SPECIMEN SOURCE: SIGNIFICANT CHANGE UP
WBC # BLD: 4.2 K/UL — SIGNIFICANT CHANGE UP (ref 3.8–10.5)
WBC # FLD AUTO: 4.2 K/UL — SIGNIFICANT CHANGE UP (ref 3.8–10.5)

## 2025-06-30 PROCEDURE — G0545: CPT

## 2025-06-30 PROCEDURE — 99232 SBSQ HOSP IP/OBS MODERATE 35: CPT

## 2025-06-30 RX ADMIN — Medication 1 CAPSULE(S): at 08:17

## 2025-06-30 RX ADMIN — AMPICILLIN SODIUM AND SULBACTAM SODIUM 200 GRAM(S): 1; .5 INJECTION, POWDER, FOR SOLUTION INTRAMUSCULAR; INTRAVENOUS at 06:23

## 2025-06-30 RX ADMIN — Medication 1 CAPSULE(S): at 18:12

## 2025-06-30 RX ADMIN — FUROSEMIDE 20 MILLIGRAM(S): 10 INJECTION INTRAMUSCULAR; INTRAVENOUS at 06:23

## 2025-06-30 RX ADMIN — AMPICILLIN SODIUM AND SULBACTAM SODIUM 200 GRAM(S): 1; .5 INJECTION, POWDER, FOR SOLUTION INTRAMUSCULAR; INTRAVENOUS at 12:20

## 2025-06-30 RX ADMIN — Medication 1 CAPSULE(S): at 12:20

## 2025-06-30 RX ADMIN — IPRATROPIUM BROMIDE AND ALBUTEROL SULFATE 3 MILLILITER(S): .5; 2.5 SOLUTION RESPIRATORY (INHALATION) at 18:12

## 2025-06-30 RX ADMIN — IPRATROPIUM BROMIDE AND ALBUTEROL SULFATE 3 MILLILITER(S): .5; 2.5 SOLUTION RESPIRATORY (INHALATION) at 23:52

## 2025-06-30 RX ADMIN — AMPICILLIN SODIUM AND SULBACTAM SODIUM 200 GRAM(S): 1; .5 INJECTION, POWDER, FOR SOLUTION INTRAMUSCULAR; INTRAVENOUS at 18:13

## 2025-06-30 RX ADMIN — IPRATROPIUM BROMIDE AND ALBUTEROL SULFATE 3 MILLILITER(S): .5; 2.5 SOLUTION RESPIRATORY (INHALATION) at 12:20

## 2025-06-30 RX ADMIN — AMPICILLIN SODIUM AND SULBACTAM SODIUM 200 GRAM(S): 1; .5 INJECTION, POWDER, FOR SOLUTION INTRAMUSCULAR; INTRAVENOUS at 23:53

## 2025-06-30 RX ADMIN — IPRATROPIUM BROMIDE AND ALBUTEROL SULFATE 3 MILLILITER(S): .5; 2.5 SOLUTION RESPIRATORY (INHALATION) at 06:23

## 2025-07-01 ENCOUNTER — TRANSCRIPTION ENCOUNTER (OUTPATIENT)
Age: 80
End: 2025-07-01

## 2025-07-01 VITALS
RESPIRATION RATE: 18 BRPM | TEMPERATURE: 98 F | SYSTOLIC BLOOD PRESSURE: 100 MMHG | HEART RATE: 96 BPM | OXYGEN SATURATION: 96 % | DIASTOLIC BLOOD PRESSURE: 66 MMHG

## 2025-07-01 PROCEDURE — 87150 DNA/RNA AMPLIFIED PROBE: CPT

## 2025-07-01 PROCEDURE — 82435 ASSAY OF BLOOD CHLORIDE: CPT

## 2025-07-01 PROCEDURE — 84443 ASSAY THYROID STIM HORMONE: CPT

## 2025-07-01 PROCEDURE — 87637 SARSCOV2&INF A&B&RSV AMP PRB: CPT

## 2025-07-01 PROCEDURE — 73502 X-RAY EXAM HIP UNI 2-3 VIEWS: CPT

## 2025-07-01 PROCEDURE — P9016: CPT

## 2025-07-01 PROCEDURE — 83605 ASSAY OF LACTIC ACID: CPT

## 2025-07-01 PROCEDURE — 86901 BLOOD TYPING SEROLOGIC RH(D): CPT

## 2025-07-01 PROCEDURE — 96374 THER/PROPH/DIAG INJ IV PUSH: CPT

## 2025-07-01 PROCEDURE — 82803 BLOOD GASES ANY COMBINATION: CPT

## 2025-07-01 PROCEDURE — 83540 ASSAY OF IRON: CPT

## 2025-07-01 PROCEDURE — 96375 TX/PRO/DX INJ NEW DRUG ADDON: CPT

## 2025-07-01 PROCEDURE — 84100 ASSAY OF PHOSPHORUS: CPT

## 2025-07-01 PROCEDURE — 93970 EXTREMITY STUDY: CPT

## 2025-07-01 PROCEDURE — 82746 ASSAY OF FOLIC ACID SERUM: CPT

## 2025-07-01 PROCEDURE — 84295 ASSAY OF SERUM SODIUM: CPT

## 2025-07-01 PROCEDURE — 83036 HEMOGLOBIN GLYCOSYLATED A1C: CPT

## 2025-07-01 PROCEDURE — 83550 IRON BINDING TEST: CPT

## 2025-07-01 PROCEDURE — 0225U NFCT DS DNA&RNA 21 SARSCOV2: CPT

## 2025-07-01 PROCEDURE — 85045 AUTOMATED RETICULOCYTE COUNT: CPT

## 2025-07-01 PROCEDURE — 80061 LIPID PANEL: CPT

## 2025-07-01 PROCEDURE — 83615 LACTATE (LD) (LDH) ENZYME: CPT

## 2025-07-01 PROCEDURE — 97161 PT EVAL LOW COMPLEX 20 MIN: CPT

## 2025-07-01 PROCEDURE — 84132 ASSAY OF SERUM POTASSIUM: CPT

## 2025-07-01 PROCEDURE — 80048 BASIC METABOLIC PNL TOTAL CA: CPT

## 2025-07-01 PROCEDURE — 87077 CULTURE AEROBIC IDENTIFY: CPT

## 2025-07-01 PROCEDURE — 72158 MRI LUMBAR SPINE W/O & W/DYE: CPT

## 2025-07-01 PROCEDURE — 82607 VITAMIN B-12: CPT

## 2025-07-01 PROCEDURE — 97110 THERAPEUTIC EXERCISES: CPT

## 2025-07-01 PROCEDURE — 36415 COLL VENOUS BLD VENIPUNCTURE: CPT

## 2025-07-01 PROCEDURE — 85730 THROMBOPLASTIN TIME PARTIAL: CPT

## 2025-07-01 PROCEDURE — 87040 BLOOD CULTURE FOR BACTERIA: CPT

## 2025-07-01 PROCEDURE — 93005 ELECTROCARDIOGRAM TRACING: CPT

## 2025-07-01 PROCEDURE — 85025 COMPLETE CBC W/AUTO DIFF WBC: CPT

## 2025-07-01 PROCEDURE — 83010 ASSAY OF HAPTOGLOBIN QUANT: CPT

## 2025-07-01 PROCEDURE — 87186 SC STD MICRODIL/AGAR DIL: CPT

## 2025-07-01 PROCEDURE — 81003 URINALYSIS AUTO W/O SCOPE: CPT

## 2025-07-01 PROCEDURE — 85610 PROTHROMBIN TIME: CPT

## 2025-07-01 PROCEDURE — 36430 TRANSFUSION BLD/BLD COMPNT: CPT

## 2025-07-01 PROCEDURE — 85027 COMPLETE CBC AUTOMATED: CPT

## 2025-07-01 PROCEDURE — 94640 AIRWAY INHALATION TREATMENT: CPT

## 2025-07-01 PROCEDURE — 0241U: CPT

## 2025-07-01 PROCEDURE — 74177 CT ABD & PELVIS W/CONTRAST: CPT

## 2025-07-01 PROCEDURE — 84484 ASSAY OF TROPONIN QUANT: CPT

## 2025-07-01 PROCEDURE — 83735 ASSAY OF MAGNESIUM: CPT

## 2025-07-01 PROCEDURE — 82947 ASSAY GLUCOSE BLOOD QUANT: CPT

## 2025-07-01 PROCEDURE — A9585: CPT

## 2025-07-01 PROCEDURE — 86923 COMPATIBILITY TEST ELECTRIC: CPT

## 2025-07-01 PROCEDURE — 85018 HEMOGLOBIN: CPT

## 2025-07-01 PROCEDURE — 86900 BLOOD TYPING SEROLOGIC ABO: CPT

## 2025-07-01 PROCEDURE — 80053 COMPREHEN METABOLIC PANEL: CPT

## 2025-07-01 PROCEDURE — 71046 X-RAY EXAM CHEST 2 VIEWS: CPT

## 2025-07-01 PROCEDURE — 84145 PROCALCITONIN (PCT): CPT

## 2025-07-01 PROCEDURE — 86850 RBC ANTIBODY SCREEN: CPT

## 2025-07-01 PROCEDURE — 82728 ASSAY OF FERRITIN: CPT

## 2025-07-01 PROCEDURE — 85014 HEMATOCRIT: CPT

## 2025-07-01 PROCEDURE — 99285 EMERGENCY DEPT VISIT HI MDM: CPT | Mod: 25

## 2025-07-01 PROCEDURE — 71045 X-RAY EXAM CHEST 1 VIEW: CPT

## 2025-07-01 PROCEDURE — 72100 X-RAY EXAM L-S SPINE 2/3 VWS: CPT

## 2025-07-01 PROCEDURE — 82330 ASSAY OF CALCIUM: CPT

## 2025-07-01 RX ORDER — FUROSEMIDE 10 MG/ML
1 INJECTION INTRAMUSCULAR; INTRAVENOUS
Qty: 30 | Refills: 0
Start: 2025-07-01 | End: 2025-07-30

## 2025-07-01 RX ORDER — AMOXICILLIN AND CLAVULANATE POTASSIUM 500; 125 MG/1; MG/1
1 TABLET, FILM COATED ORAL
Qty: 60 | Refills: 0
Start: 2025-07-01 | End: 2025-07-30

## 2025-07-01 RX ORDER — AMOXICILLIN AND CLAVULANATE POTASSIUM 500; 125 MG/1; MG/1
1 TABLET, FILM COATED ORAL
Refills: 0 | Status: DISCONTINUED | OUTPATIENT
Start: 2025-07-01 | End: 2025-07-01

## 2025-07-01 RX ADMIN — Medication 1 CAPSULE(S): at 12:33

## 2025-07-01 RX ADMIN — IPRATROPIUM BROMIDE AND ALBUTEROL SULFATE 3 MILLILITER(S): .5; 2.5 SOLUTION RESPIRATORY (INHALATION) at 05:35

## 2025-07-01 RX ADMIN — FUROSEMIDE 20 MILLIGRAM(S): 10 INJECTION INTRAMUSCULAR; INTRAVENOUS at 05:35

## 2025-07-01 RX ADMIN — Medication 1 CAPSULE(S): at 08:35

## 2025-07-01 RX ADMIN — AMPICILLIN SODIUM AND SULBACTAM SODIUM 200 GRAM(S): 1; .5 INJECTION, POWDER, FOR SOLUTION INTRAMUSCULAR; INTRAVENOUS at 05:35

## 2025-07-01 RX ADMIN — IPRATROPIUM BROMIDE AND ALBUTEROL SULFATE 3 MILLILITER(S): .5; 2.5 SOLUTION RESPIRATORY (INHALATION) at 12:33

## 2025-07-01 NOTE — PROGRESS NOTE ADULT - NUTRITIONAL ASSESSMENT
This patient has been assessed with a concern for Malnutrition and has been determined to have a diagnosis/diagnoses of Moderate protein-calorie malnutrition.    This patient is being managed with:   Diet Regular-  Low Sodium  Liquid Protein Supplement     Qty per Day:  1  Entered: Jun 26 2025  1:44PM  

## 2025-07-01 NOTE — PROGRESS NOTE ADULT - SUBJECTIVE AND OBJECTIVE BOX
Name of Patient : SUZY SILVA  MRN: [id number]  Date of visit: 06-26-25 @ 16:08      Subjective: Patient seen and examined. No new events except as noted.   Patient seen earlier this AM. Son at the bedside  S/P 3 units of PRBCs thus far. Patient tachypneic - Lasix and CXR ordered. Duoneb changed to standing.   Cont on ABX  Low grade temperature.   RUE Edema, IV site changed, duplex ordered    REVIEW OF SYSTEMS:    CONSTITUTIONAL: Generalized weakness - low grade temperature   EYES/ENT: No visual changes;  No vertigo or throat pain   NECK: No pain or stiffness  RESPIRATORY: + Tachypneic; No cough, wheezing, hemoptysis; No shortness of breath  CARDIOVASCULAR: No chest pain or palpitations  GASTROINTESTINAL: No abdominal or epigastric pain. No nausea, vomiting, or hematemesis; No diarrhea or constipation. No melena or hematochezia.  GENITOURINARY: No dysuria, frequency or hematuria  NEUROLOGICAL: No numbness or weakness  SKIN: No itching, burning, rashes, or lesions   All other review of systems is negative unless indicated above.    MEDICATIONS:  MEDICATIONS  (STANDING):  albuterol/ipratropium for Nebulization 3 milliLiter(s) Nebulizer every 6 hours  furosemide   Injectable 20 milliGRAM(s) IV Push daily  pancrelipase  (CREON 36,000 Lipase Units) 1 Capsule(s) Oral three times a day with meals  piperacillin/tazobactam IVPB.. 3.375 Gram(s) IV Intermittent every 8 hours  sodium chloride 0.9%. 1000 milliLiter(s) (80 mL/Hr) IV Continuous <Continuous>      PHYSICAL EXAM:  T(C): 37.9 (06-26-25 @ 11:40), Max: 37.9 (06-25-25 @ 18:10)  HR: 70 (06-26-25 @ 11:40) (70 - 112)  BP: 97/66 (06-26-25 @ 11:40) (97/66 - 153/97)  RR: 18 (06-26-25 @ 11:40) (18 - 18)  SpO2: 98% (06-26-25 @ 11:40) (91% - 98%)  Wt(kg): --  I&O's Summary        Appearance: Awake, generalized weakness 	  HEENT: Eyes are open   Lymphatic: No lymphadenopathy grossly  Cardiovascular: Normal    Respiratory: Tachypneic; +Crackles at R base   Gastrointestinal:  Soft, Non-tender  Skin: No rashes,  warm to touch  Psychiatry:  Mood & affect appropriate  Musculoskeletal: RUE Edema                                 13.4   4.04  )-----------( 23       ( 25 Jun 2025 12:51 )             39.5               06-25    134[L]  |  108  |  10  ----------------------------<  128[H]  3.8   |  17[L]  |  0.82    Ca    7.8[L]      25 Jun 2025 12:51    TPro  5.4[L]  /  Alb  2.6[L]  /  TBili  0.9  /  DBili  x   /  AST  30  /  ALT  23  /  AlkPhos  150[H]  06-25                       Urinalysis Basic - ( 25 Jun 2025 12:51 )    Color: x / Appearance: x / SG: x / pH: x  Gluc: 128 mg/dL / Ketone: x  / Bili: x / Urobili: x   Blood: x / Protein: x / Nitrite: x   Leuk Esterase: x / RBC: x / WBC x   Sq Epi: x / Non Sq Epi: x / Bacteria: x        < from: CT Abdomen and Pelvis w/ IV Cont (06.24.25 @ 21:25) >    IMPRESSION:  Peripherally enhancing right hepatic lobe lesion measuring 2.5 x 1.9 cm,   consistent with abscess.    Wall thickening of the descending and sigmoid colon, which may reflect   underdistention or colitis.        < end of copied text >  
Patient is a 79y old  Female who presents with a chief complaint of fever (27 Jun 2025 12:48)    Patient seen and examined  Reports feeling "better"  No acute overnight events reporetd    MEDICATIONS  (STANDING):  albuterol/ipratropium for Nebulization 3 milliLiter(s) Nebulizer every 6 hours  ampicillin/sulbactam  IVPB 3 Gram(s) IV Intermittent every 6 hours  furosemide   Injectable 20 milliGRAM(s) IV Push daily  pancrelipase  (CREON 36,000 Lipase Units) 1 Capsule(s) Oral three times a day with meals  sodium chloride 0.9%. 1000 milliLiter(s) (80 mL/Hr) IV Continuous <Continuous>    MEDICATIONS  (PRN):  aluminum hydroxide/magnesium hydroxide/simethicone Suspension 30 milliLiter(s) Oral every 4 hours PRN Dyspepsia  benzonatate 100 milliGRAM(s) Oral every 8 hours PRN Cough  guaifenesin/dextromethorphan Oral Liquid 10 milliLiter(s) Oral every 4 hours PRN Cough  melatonin 3 milliGRAM(s) Oral at bedtime PRN Insomnia  ondansetron Injectable 4 milliGRAM(s) IV Push every 8 hours PRN Nausea and/or Vomiting      Vital Signs Last 24 Hrs  T(C): 36.9 (28 Jun 2025 07:59), Max: 37.5 (27 Jun 2025 13:39)  T(F): 98.4 (28 Jun 2025 07:59), Max: 99.5 (27 Jun 2025 13:39)  HR: 87 (28 Jun 2025 07:59) (87 - 108)  BP: 105/63 (28 Jun 2025 07:59) (104/65 - 117/73)  BP(mean): --  RR: 18 (28 Jun 2025 07:59) (18 - 18)  SpO2: 96% (28 Jun 2025 07:59) (93% - 97%)    Parameters below as of 28 Jun 2025 07:59  Patient On (Oxygen Delivery Method): room air        PE  Awake, alert  Anicteric, MMM  RRR  CTAB  Abd soft, NT, ND  No c/c                        14.2   3.41  )-----------( 45       ( 27 Jun 2025 06:49 )             43.6       06-27    136  |  106  |  10  ----------------------------<  108[H]  4.1   |  14[L]  |  0.86    Ca    8.0[L]      27 Jun 2025 06:47    TPro  5.5[L]  /  Alb  2.5[L]  /  TBili  0.6  /  DBili  x   /  AST  33  /  ALT  20  /  AlkPhos  136[H]  06-27      
Patient is a 79y old  Female who presents with a chief complaint of fever (29 Jun 2025 11:22)    Patient seen and examined at bedside, no acute complaints.     MEDICATIONS  (STANDING):  albuterol/ipratropium for Nebulization 3 milliLiter(s) Nebulizer every 6 hours  ampicillin/sulbactam  IVPB 3 Gram(s) IV Intermittent every 6 hours  furosemide   Injectable 20 milliGRAM(s) IV Push daily  pancrelipase  (CREON 36,000 Lipase Units) 1 Capsule(s) Oral three times a day with meals  sodium chloride 0.9%. 1000 milliLiter(s) (80 mL/Hr) IV Continuous <Continuous>    MEDICATIONS  (PRN):  aluminum hydroxide/magnesium hydroxide/simethicone Suspension 30 milliLiter(s) Oral every 4 hours PRN Dyspepsia  benzonatate 100 milliGRAM(s) Oral every 8 hours PRN Cough  guaifenesin/dextromethorphan Oral Liquid 10 milliLiter(s) Oral every 4 hours PRN Cough  melatonin 3 milliGRAM(s) Oral at bedtime PRN Insomnia  ondansetron Injectable 4 milliGRAM(s) IV Push every 8 hours PRN Nausea and/or Vomiting    Vital Signs Last 24 Hrs  T(C): 36.8 (30 Jun 2025 09:00), Max: 37 (29 Jun 2025 16:30)  T(F): 98.3 (30 Jun 2025 09:00), Max: 98.6 (29 Jun 2025 16:30)  HR: 107 (30 Jun 2025 09:00) (87 - 107)  BP: 120/79 (30 Jun 2025 09:00) (109/67 - 120/79)  BP(mean): --  RR: 18 (30 Jun 2025 09:00) (18 - 18)  SpO2: 96% (30 Jun 2025 09:00) (94% - 96%)    Parameters below as of 30 Jun 2025 09:00  Patient On (Oxygen Delivery Method): room air    PE  NAD  Awake, alert  Anicteric, MMM  RRR  CTAB  Abd soft, NT, ND  No c/c/e  No rash grossly                        12.8   4.20  )-----------( 142      ( 30 Jun 2025 07:21 )             38.7       06-30    139  |  106  |  10  ----------------------------<  92  3.6   |  21[L]  |  0.76    Ca    8.7      30 Jun 2025 07:14        
Name of Patient : SUZY SILVA  MRN: [id number]      Subjective: Patient seen and examined. No new events except as noted.     REVIEW OF SYSTEMS:    CONSTITUTIONAL: No weakness, fevers or chills  EYES/ENT: No visual changes;  No vertigo or throat pain   NECK: No pain or stiffness  RESPIRATORY: No cough, wheezing, hemoptysis; No shortness of breath  CARDIOVASCULAR: No chest pain or palpitations  GASTROINTESTINAL: No abdominal or epigastric pain. No nausea, vomiting, or hematemesis; No diarrhea or constipation. No melena or hematochezia.  GENITOURINARY: No dysuria, frequency or hematuria  NEUROLOGICAL: No numbness or weakness  SKIN: No itching, burning, rashes, or lesions   All other review of systems is negative unless indicated above.    MEDICATIONS:  MEDICATIONS  (STANDING):  albuterol/ipratropium for Nebulization 3 milliLiter(s) Nebulizer every 6 hours  ampicillin/sulbactam  IVPB 3 Gram(s) IV Intermittent every 6 hours  furosemide   Injectable 20 milliGRAM(s) IV Push daily  pancrelipase  (CREON 36,000 Lipase Units) 1 Capsule(s) Oral three times a day with meals  sodium chloride 0.9%. 1000 milliLiter(s) (80 mL/Hr) IV Continuous <Continuous>      PHYSICAL EXAM:  T(C): 37.1 (06-29-25 @ 00:05), Max: 37.2 (06-28-25 @ 04:44)  HR: 87 (06-29-25 @ 00:05) (87 - 102)  BP: 102/64 (06-29-25 @ 00:05) (100/65 - 115/75)  RR: 18 (06-29-25 @ 00:05) (18 - 18)  SpO2: 95% (06-29-25 @ 00:05) (93% - 98%)  Wt(kg): --  I&O's Summary        Appearance: Normal	  HEENT:  PERRLA   Lymphatic: No lymphadenopathy   Cardiovascular: Normal S1 S2, no JVD  Respiratory: normal effort , clear  Gastrointestinal:  Soft, Non-tender  Skin: No rashes,  warm to touch  Psychiatry:  Mood & affect appropriate  Musculuskeletal: No edema    recent labs, Imaging and EKGs personally reviewed                           14.2   3.41  )-----------( 45       ( 27 Jun 2025 06:49 )             43.6               06-27    136  |  106  |  10  ----------------------------<  108[H]  4.1   |  14[L]  |  0.86    Ca    8.0[L]      27 Jun 2025 06:47    TPro  5.5[L]  /  Alb  2.5[L]  /  TBili  0.6  /  DBili  x   /  AST  33  /  ALT  20  /  AlkPhos  136[H]  06-27                       Urinalysis Basic - ( 27 Jun 2025 06:47 )    Color: x / Appearance: x / SG: x / pH: x  Gluc: 108 mg/dL / Ketone: x  / Bili: x / Urobili: x   Blood: x / Protein: x / Nitrite: x   Leuk Esterase: x / RBC: x / WBC x   Sq Epi: x / Non Sq Epi: x / Bacteria: x                    
Name of Patient : SUZY SILVA  MRN: [id number]  Date of visit: 06-27-25       Subjective: Patient seen and examined. No new events except as noted.   Doing okay   low back pain     REVIEW OF SYSTEMS:    CONSTITUTIONAL: No weakness, fevers or chills  EYES/ENT: No visual changes;  No vertigo or throat pain   NECK: No pain or stiffness  RESPIRATORY: No cough, wheezing, hemoptysis; No shortness of breath  CARDIOVASCULAR: No chest pain or palpitations  GASTROINTESTINAL: No abdominal or epigastric pain. No nausea, vomiting, or hematemesis; No diarrhea or constipation. No melena or hematochezia.  GENITOURINARY: No dysuria, frequency or hematuria  NEUROLOGICAL: No numbness or weakness  SKIN: No itching, burning, rashes, or lesions   All other review of systems is negative unless indicated above.    MEDICATIONS:  MEDICATIONS  (STANDING):  albuterol/ipratropium for Nebulization 3 milliLiter(s) Nebulizer every 6 hours  ampicillin/sulbactam  IVPB 3 Gram(s) IV Intermittent every 6 hours  furosemide   Injectable 20 milliGRAM(s) IV Push daily  pancrelipase  (CREON 36,000 Lipase Units) 1 Capsule(s) Oral three times a day with meals  sodium chloride 0.9%. 1000 milliLiter(s) (80 mL/Hr) IV Continuous <Continuous>      PHYSICAL EXAM:  T(C): 37.5 (06-27-25 @ 13:39), Max: 37.6 (06-27-25 @ 05:04)  HR: 108 (06-27-25 @ 13:39) (93 - 108)  BP: 104/65 (06-27-25 @ 13:39) (93/57 - 111/73)  RR: 18 (06-27-25 @ 13:39) (18 - 18)  SpO2: 96% (06-27-25 @ 13:39) (95% - 96%)  Wt(kg): --  I&O's Summary        Appearance: Normal	  HEENT:  PERRLA   Lymphatic: No lymphadenopathy   Cardiovascular: Normal S1 S2, no JVD  Respiratory: normal effort , clear  Gastrointestinal:  Soft, Non-tender  Skin: No rashes,  warm to touch  Psychiatry:  Mood & affect appropriate  Musculuskeletal: No edema    recent labs, Imaging and EKGs personally reviewed     CBC:            14.2   3.41  )-----------( 45       ( 06-27-25 @ 06:49 )             43.6         Chem:         ( 06-27-25 @ 06:47 )    136  |  106  |  10  ----------------------------<  108[H]  4.1   |  14[L]  |  0.86        Liver Functions: ( 06-27-25 @ 06:47 )  Alb: 2.5 g/dL / Pro: 5.5 g/dL / ALK PHOS: 136 U/L / ALT: 20 U/L / AST: 33 U/L / GGT: x              Type & Screen:           
  Follow Up:  bacteremia    Interval History/ROS: no more fever and no abd pain or vomiting but still with low back pain          Allergies  No Known Allergies        ANTIMICROBIALS:  ampicillin/sulbactam  IVPB 3 every 6 hours      OTHER MEDS:  albuterol/ipratropium for Nebulization 3 milliLiter(s) Nebulizer every 6 hours  aluminum hydroxide/magnesium hydroxide/simethicone Suspension 30 milliLiter(s) Oral every 4 hours PRN  benzonatate 100 milliGRAM(s) Oral every 8 hours PRN  furosemide   Injectable 20 milliGRAM(s) IV Push daily  guaifenesin/dextromethorphan Oral Liquid 10 milliLiter(s) Oral every 4 hours PRN  melatonin 3 milliGRAM(s) Oral at bedtime PRN  ondansetron Injectable 4 milliGRAM(s) IV Push every 8 hours PRN  pancrelipase  (CREON 36,000 Lipase Units) 1 Capsule(s) Oral three times a day with meals  sodium chloride 0.9%. 1000 milliLiter(s) IV Continuous <Continuous>      Vital Signs Last 24 Hrs  T(C): 36.7 (27 Jun 2025 09:30), Max: 37.6 (27 Jun 2025 05:04)  T(F): 98.1 (27 Jun 2025 09:30), Max: 99.6 (27 Jun 2025 05:04)  HR: 95 (27 Jun 2025 09:30) (92 - 102)  BP: 98/63 (27 Jun 2025 09:30) (87/55 - 111/73)  BP(mean): --  RR: 18 (27 Jun 2025 09:30) (16 - 18)  SpO2: 95% (27 Jun 2025 09:30) (94% - 96%)    Parameters below as of 27 Jun 2025 09:30  Patient On (Oxygen Delivery Method): room air        Physical Exam:  General:    NAD, non toxic  Respiratory:   comfortable on RA  abd:   soft,  not tender  :   no hartley  Musculoskeletal : no joint swelling, no edema  Skin:    no rash  vascular: R chest port with no tenderness                            14.2   3.41  )-----------( 45       ( 27 Jun 2025 06:49 )             43.6       06-27    136  |  106  |  10  ----------------------------<  108[H]  4.1   |  14[L]  |  0.86    Ca    8.0[L]      27 Jun 2025 06:47    TPro  5.5[L]  /  Alb  2.5[L]  /  TBili  0.6  /  DBili  x   /  AST  33  /  ALT  20  /  AlkPhos  136[H]  06-27      Urinalysis Basic - ( 27 Jun 2025 06:47 )    Color: x / Appearance: x / SG: x / pH: x  Gluc: 108 mg/dL / Ketone: x  / Bili: x / Urobili: x   Blood: x / Protein: x / Nitrite: x   Leuk Esterase: x / RBC: x / WBC x   Sq Epi: x / Non Sq Epi: x / Bacteria: x        MICROBIOLOGY:  v  Blood Blood-Peripheral  06-25-25   No growth at 48 Hours  --  --      Blood Blood-Peripheral  06-25-25   No growth at 48 Hours  --  --      Blood Blood-Peripheral  06-23-25   Growth in aerobic and anaerobic bottles: Escherichia coli  See previous culture [id number]  --    Growth in aerobic bottle: Gram Negative Rods  Growth in anaerobic bottle: Gram Negative Rods      Blood Blood-Peripheral  06-23-25   Growth in aerobic and anaerobic bottles: Escherichia coli  Direct identification is available within approximately 3-5  hours either by Blood Panel Multiplexed PCR or Direct  MALDI-TOF. Details: [email or web address]  --  Blood Culture PCR  Escherichia coli          Rapid RVP Result: Detected (06-23 @ 12:33)        RADIOLOGY:  Images independently visualized and reviewed personally, findings as below  < from: [facility] Duplex Upper Ext Vein Scan, Bilat (06.26.25 @ 17:47) >  IMPRESSION:  No evidence of deep venous thrombosis in either upper extremity.      < end of copied text >  < from: CT Abdomen and Pelvis w/ IV Cont (06.24.25 @ 21:25) >  IMPRESSION:  Peripherally enhancing right hepatic lobe lesion measuring 2.5 x 1.9 cm,   consistent with abscess.    Wall thickening of the descending and sigmoid colon, which may reflect   underdistention or colitis.      < end of copied text >  
  Follow Up:  bacteremia    Interval History/ROS: tmax 100.3 and pt still with back pain, IR stated abscess too small for drainage        Allergies  No Known Allergies        ANTIMICROBIALS:  piperacillin/tazobactam IVPB.. 3.375 every 8 hours      OTHER MEDS:  acetaminophen     Tablet .. 650 milliGRAM(s) Oral every 6 hours PRN  albuterol/ipratropium for Nebulization 3 milliLiter(s) Nebulizer every 6 hours  aluminum hydroxide/magnesium hydroxide/simethicone Suspension 30 milliLiter(s) Oral every 4 hours PRN  benzonatate 100 milliGRAM(s) Oral every 8 hours PRN  furosemide   Injectable 20 milliGRAM(s) IV Push daily  guaifenesin/dextromethorphan Oral Liquid 10 milliLiter(s) Oral every 4 hours PRN  melatonin 3 milliGRAM(s) Oral at bedtime PRN  ondansetron Injectable 4 milliGRAM(s) IV Push every 8 hours PRN  pancrelipase  (CREON 36,000 Lipase Units) 1 Capsule(s) Oral three times a day with meals  sodium chloride 0.9%. 1000 milliLiter(s) IV Continuous <Continuous>      Vital Signs Last 24 Hrs  T(C): 36.5 (26 Jun 2025 16:13), Max: 37.9 (25 Jun 2025 18:10)  T(F): 97.7 (26 Jun 2025 16:13), Max: 100.3 (25 Jun 2025 20:46)  HR: 92 (26 Jun 2025 16:13) (70 - 112)  BP: 103/66 (26 Jun 2025 16:13) (97/66 - 153/97)  BP(mean): --  RR: 18 (26 Jun 2025 16:13) (18 - 18)  SpO2: 96% (26 Jun 2025 16:13) (91% - 98%)    Parameters below as of 26 Jun 2025 16:13  Patient On (Oxygen Delivery Method): room air        Physical Exam:  General:    NAD, non toxic  Respiratory:   comfortable on RA  abd:   soft,  not tender  :   no hartley  Musculoskeletal : no joint swelling, no edema  Skin:    no rash  vascular: R chest port with no tenderness                          13.4   4.04  )-----------( 23       ( 25 Jun 2025 12:51 )             39.5       06-25    134[L]  |  108  |  10  ----------------------------<  128[H]  3.8   |  17[L]  |  0.82    Ca    7.8[L]      25 Jun 2025 12:51    TPro  5.4[L]  /  Alb  2.6[L]  /  TBili  0.9  /  DBili  x   /  AST  30  /  ALT  23  /  AlkPhos  150[H]  06-25      Urinalysis Basic - ( 25 Jun 2025 12:51 )    Color: x / Appearance: x / SG: x / pH: x  Gluc: 128 mg/dL / Ketone: x  / Bili: x / Urobili: x   Blood: x / Protein: x / Nitrite: x   Leuk Esterase: x / RBC: x / WBC x   Sq Epi: x / Non Sq Epi: x / Bacteria: x        MICROBIOLOGY:  v  Blood Blood-Peripheral  06-25-25   No growth at 24 hours  --  --      Blood     Blood-Peripheral  06-25-25   No growth at 24 hours  --  --      Blood Blood-Peripheral  06-23-25   Growth in aerobic and anaerobic bottles: Escherichia coli  See previous culture [id number]  --    Growth in aerobic bottle: Gram Negative Rods  Growth in anaerobic bottle: Gram Negative Rods      Blood Blood-Peripheral  06-23-25   Growth in aerobic and anaerobic bottles: Escherichia coli  Direct identification is available within approximately 3-5  hours either by Blood Panel Multiplexed PCR or Direct  MALDI-TOF. Details: [email or web address]  --  Blood Culture PCR  Escherichia coli          Rapid RVP Result: Detected (06-23 @ 12:33)        RADIOLOGY:  Images independently visualized and reviewed personally, findings as below  < from: Xray Chest 1 View- PORTABLE-Urgent (Xray Chest 1 View- PORTABLE-Urgent .) (06.26.25 @ 13:22) >  IMPRESSION:  Small left pleural effusion  Clear lungs.    < end of copied text >  < from: CT Abdomen and Pelvis w/ IV Cont (06.24.25 @ 21:25) >  IMPRESSION:  Peripherally enhancing right hepatic lobe lesion measuring 2.5 x 1.9 cm,   consistent with abscess.    Wall thickening of the descending and sigmoid colon, which may reflect   underdistention or colitis.      < end of copied text >  
Name of Patient : SUZY SILVA  MRN: [id number]  Date of visit: 06-24-25 @ 15:54      Subjective: Patient seen and examined. No new events except as noted.   Patient seen earlier this AM. Patient febrile this AM with Tmax of 101.5, tachycardic. Hgb downtrending  1 Unit of PRBCs ordered. ABX switched to Zosyn per ID  Had a brown BM this AM. Non-bloody per RN   RN at the bedside.     REVIEW OF SYSTEMS:    CONSTITUTIONAL: + Weakness; + Febrile this AM   EYES/ENT: No visual changes;  No vertigo or throat pain   NECK: No pain or stiffness  RESPIRATORY: No cough, wheezing, hemoptysis; No shortness of breath  CARDIOVASCULAR: No chest pain or palpitations  GASTROINTESTINAL: + Brown BM; No abdominal or epigastric pain. No nausea, vomiting, or hematemesis; No diarrhea or constipation. No melena or hematochezia.  GENITOURINARY: No dysuria, frequency or hematuria  NEUROLOGICAL: No numbness or weakness  SKIN: No itching, burning, rashes, or lesions   All other review of systems is negative unless indicated above.    MEDICATIONS:  MEDICATIONS  (STANDING):  pancrelipase  (CREON 36,000 Lipase Units) 1 Capsule(s) Oral three times a day with meals  piperacillin/tazobactam IVPB.- 3.375 Gram(s) IV Intermittent once  potassium phosphate IVPB 30 milliMole(s) IV Intermittent once  sodium chloride 0.9%. 1000 milliLiter(s) (80 mL/Hr) IV Continuous <Continuous>      PHYSICAL EXAM:  T(C): 37.8 (06-24-25 @ 12:18), Max: 38.6 (06-24-25 @ 05:20)  HR: 109 (06-24-25 @ 12:18) (88 - 109)  BP: 90/55 (06-24-25 @ 12:18) (90/55 - 143/64)  RR: 18 (06-24-25 @ 12:18) (17 - 20)  SpO2: 96% (06-24-25 @ 12:18) (96% - 99%)  Wt(kg): --  I&O's Summary    23 Jun 2025 07:01  -  24 Jun 2025 07:00  --------------------------------------------------------  IN: 800 mL / OUT: 0 mL / NET: 800 mL          Appearance: Awake, generalized weakness 	  HEENT: Eyes are open   Lymphatic: No lymphadenopathy grossly   Cardiovascular: Normal    Respiratory: normal effort on RA, clear  Gastrointestinal:  Soft, Non-tender  Skin: No rashes,  warm to touch  Psychiatry:  Mood & affect appropriate  Musculoskeletal: No edema       06-23-25 @ 07:01  -  06-24-25 @ 07:00  --------------------------------------------------------  IN: 800 mL / OUT: 0 mL / NET: 800 mL                            7.1    0.91  )-----------( 36       ( 24 Jun 2025 11:26 )             22.4               06-24    133[L]  |  105  |  15  ----------------------------<  154[H]  4.1   |  16[L]  |  0.72    Ca    7.6[L]      24 Jun 2025 11:26  Phos  1.5     06-24  Mg     1.7     06-24    TPro  4.8[L]  /  Alb  2.3[L]  /  TBili  0.4  /  DBili  x   /  AST  22  /  ALT  20  /  AlkPhos  121[H]  06-24    PT/INR - ( 24 Jun 2025 07:10 )   PT: 12.8 sec;   INR: 1.12 ratio         PTT - ( 24 Jun 2025 07:10 )  PTT:34.7 sec                   Urinalysis Basic - ( 24 Jun 2025 11:26 )    Color: x / Appearance: x / SG: x / pH: x  Gluc: 154 mg/dL / Ketone: x  / Bili: x / Urobili: x   Blood: x / Protein: x / Nitrite: x   Leuk Esterase: x / RBC: x / WBC x   Sq Epi: x / Non Sq Epi: x / Bacteria: x          < from: Xray Chest 2 Views PA/Lat (06.23.25 @ 15:17) >  IMPRESSION:  Small left pleural effusion, similar to prior.    < end of copied text >          Culture - Blood (06.23.25 @ 12:20)   Gram Stain:   Growth in aerobic bottle: Gram Negative Rods   Growth in anaerobic bottle: Gram Negative Rods  Specimen Source: Blood Blood-Peripheral  Culture Results:   Growth in aerobic bottle: Gram Negative Rods   Growth in anaerobic bottle: Gram Negative Rods    Culture - Blood (06.23.25 @ 12:05)   Gram Stain:   Growth in anaerobic bottle: Gram Negative Rods   Growth in aerobic bottle: Gram Negative Rods  - Escherichia coli: Detec  Specimen Source: Blood Blood-Peripheral  Organism: Blood Culture PCR  Culture Results:   Growth in anaerobic bottle: Gram Negative Rods   Growth in aerobic bottle: Gram Negative Rods     
Patient is a 79y old  Female who presents with a chief complaint of fever (25 Jun 2025 11:19)    Patient seen and examined  Resting comfortably, improving on IV abx  No new complaints offered  No acute overnight events reported    MEDICATIONS  (STANDING):  pancrelipase  (CREON 36,000 Lipase Units) 1 Capsule(s) Oral three times a day with meals  piperacillin/tazobactam IVPB.. 3.375 Gram(s) IV Intermittent every 8 hours  sodium chloride 0.9%. 1000 milliLiter(s) (80 mL/Hr) IV Continuous <Continuous>    MEDICATIONS  (PRN):  acetaminophen     Tablet .. 650 milliGRAM(s) Oral every 6 hours PRN Temp greater or equal to 38C (100.4F), Mild Pain (1 - 3)  albuterol/ipratropium for Nebulization 3 milliLiter(s) Nebulizer every 6 hours PRN Shortness of Breath and/or Wheezing  aluminum hydroxide/magnesium hydroxide/simethicone Suspension 30 milliLiter(s) Oral every 4 hours PRN Dyspepsia  benzonatate 100 milliGRAM(s) Oral every 8 hours PRN Cough  guaifenesin/dextromethorphan Oral Liquid 10 milliLiter(s) Oral every 4 hours PRN Cough  melatonin 3 milliGRAM(s) Oral at bedtime PRN Insomnia  ondansetron Injectable 4 milliGRAM(s) IV Push every 8 hours PRN Nausea and/or Vomiting      Vital Signs Last 24 Hrs  T(C): 37.1 (25 Jun 2025 11:37), Max: 38.8 (25 Jun 2025 00:21)  T(F): 98.7 (25 Jun 2025 11:37), Max: 101.8 (25 Jun 2025 00:21)  HR: 87 (25 Jun 2025 11:37) (84 - 112)  BP: 108/71 (25 Jun 2025 11:37) (90/55 - 200/75)  BP(mean): --  RR: 18 (25 Jun 2025 11:37) (18 - 18)  SpO2: 93% (25 Jun 2025 11:37) (91% - 98%)    Parameters below as of 25 Jun 2025 11:37  Patient On (Oxygen Delivery Method): room air        PE  Awake, alert  Anicteric, MMM  RRR  CTAB  Abd soft, NT, ND  No c/c                      13.2   3.44  )-----------( 29       ( 25 Jun 2025 09:27 )             38.9       06-25    135  |  105  |  11  ----------------------------<  110[H]  4.0   |  18[L]  |  0.83    Ca    7.9[L]      25 Jun 2025 09:27  Phos  1.5     06-24  Mg     1.7     06-24    TPro  4.8[L]  /  Alb  2.3[L]  /  TBili  0.4  /  DBili  x   /  AST  22  /  ALT  20  /  AlkPhos  121[H]  06-24      
  Follow Up:  bacteremia    Interval History/ROS: pt afebrile doing well, no abd pain or vomiting still with low back pain            Allergies  No Known Allergies        ANTIMICROBIALS:  ampicillin/sulbactam  IVPB 3 every 6 hours      OTHER MEDS:  albuterol/ipratropium for Nebulization 3 milliLiter(s) Nebulizer every 6 hours  aluminum hydroxide/magnesium hydroxide/simethicone Suspension 30 milliLiter(s) Oral every 4 hours PRN  benzonatate 100 milliGRAM(s) Oral every 8 hours PRN  furosemide   Injectable 20 milliGRAM(s) IV Push daily  guaifenesin/dextromethorphan Oral Liquid 10 milliLiter(s) Oral every 4 hours PRN  melatonin 3 milliGRAM(s) Oral at bedtime PRN  ondansetron Injectable 4 milliGRAM(s) IV Push every 8 hours PRN  pancrelipase  (CREON 36,000 Lipase Units) 1 Capsule(s) Oral three times a day with meals  sodium chloride 0.9%. 1000 milliLiter(s) IV Continuous <Continuous>      Vital Signs Last 24 Hrs  T(C): 36.9 (30 Jun 2025 15:57), Max: 37 (29 Jun 2025 16:30)  T(F): 98.5 (30 Jun 2025 15:57), Max: 98.6 (29 Jun 2025 16:30)  HR: 91 (30 Jun 2025 15:57) (87 - 107)  BP: 110/74 (30 Jun 2025 15:57) (109/67 - 120/79)  BP(mean): --  RR: 18 (30 Jun 2025 15:57) (18 - 18)  SpO2: 95% (30 Jun 2025 15:57) (94% - 99%)    Parameters below as of 30 Jun 2025 15:57  Patient On (Oxygen Delivery Method): room air        Physical Exam:  General:    NAD, non toxic  Respiratory:   comfortable on RA  abd:   soft,  not tender  :   no ahrtley  Musculoskeletal : no joint swelling, no edema  Skin:    no rash  vascular: R chest port with no tenderness                          12.8   4.20  )-----------( 142      ( 30 Jun 2025 07:21 )             38.7       06-30    139  |  106  |  10  ----------------------------<  92  3.6   |  21[L]  |  0.76    Ca    8.7      30 Jun 2025 07:14        Urinalysis Basic - ( 30 Jun 2025 07:14 )    Color: x / Appearance: x / SG: x / pH: x  Gluc: 92 mg/dL / Ketone: x  / Bili: x / Urobili: x   Blood: x / Protein: x / Nitrite: x   Leuk Esterase: x / RBC: x / WBC x   Sq Epi: x / Non Sq Epi: x / Bacteria: x        MICROBIOLOGY:  v  Blood Blood-Peripheral  06-25-25   No growth at 5 days  --  --      Blood Blood-Peripheral  06-25-25   Growth in aerobic bottle: Gram Negative Rods  --    Growth in aerobic bottle: Gram Negative Rods      Blood Blood-Peripheral  06-23-25   Growth in aerobic and anaerobic bottles: Escherichia coli  See previous culture [id number]  --    Growth in aerobic bottle: Gram Negative Rods  Growth in anaerobic bottle: Gram Negative Rods      Blood Blood-Peripheral  06-23-25   Growth in aerobic and anaerobic bottles: Escherichia coli  Direct identification is available within approximately 3-5  hours either by Blood Panel Multiplexed PCR or Direct  MALDI-TOF. Details: [email or web address]  --  Blood Culture PCR  Escherichia coli                RADIOLOGY:  Images independently visualized and reviewed personally, findings as below  < from: MR Lumbar Spine w/wo IV Cont (06.27.25 @ 22:43) >  IMPRESSION: Degenerative changes. Old compression deformity T12 without   cord compression. Transitional vertebra at the lumbosacral junction.   Grade 1 anterolisthesis L4 on L5 with degenerative spinal stenosis.   Multifocal far lateral disc herniations. No evidence of infection,   osteomyelitis or discitis. No drainable collections.    < end of copied text >  < from: CT Abdomen and Pelvis w/ IV Cont (06.24.25 @ 21:25) >  IMPRESSION:  Peripherally enhancing right hepatic lobe lesion measuring 2.5 x 1.9 cm,   consistent with abscess.    Wall thickening of the descending and sigmoid colon, which may reflect   underdistention or colitis.      < end of copied text >  
  Follow Up:  bacteremia    Interval History/ROS: pt was febrile overnight and tachycardic, no vomiting or abd pain, Ct showed liver abscess          Allergies  No Known Allergies        ANTIMICROBIALS:  piperacillin/tazobactam IVPB.. 3.375 every 8 hours      OTHER MEDS:  acetaminophen     Tablet .. 650 milliGRAM(s) Oral every 6 hours PRN  albuterol/ipratropium for Nebulization 3 milliLiter(s) Nebulizer every 6 hours PRN  aluminum hydroxide/magnesium hydroxide/simethicone Suspension 30 milliLiter(s) Oral every 4 hours PRN  benzonatate 100 milliGRAM(s) Oral every 8 hours PRN  guaifenesin/dextromethorphan Oral Liquid 10 milliLiter(s) Oral every 4 hours PRN  melatonin 3 milliGRAM(s) Oral at bedtime PRN  ondansetron Injectable 4 milliGRAM(s) IV Push every 8 hours PRN  pancrelipase  (CREON 36,000 Lipase Units) 1 Capsule(s) Oral three times a day with meals  sodium chloride 0.9%. 1000 milliLiter(s) IV Continuous <Continuous>      Vital Signs Last 24 Hrs  T(C): 37.1 (25 Jun 2025 11:37), Max: 38.8 (25 Jun 2025 00:21)  T(F): 98.7 (25 Jun 2025 11:37), Max: 101.8 (25 Jun 2025 00:21)  HR: 87 (25 Jun 2025 11:37) (84 - 112)  BP: 108/71 (25 Jun 2025 11:37) (98/61 - 200/75)  BP(mean): --  RR: 18 (25 Jun 2025 11:37) (18 - 18)  SpO2: 93% (25 Jun 2025 11:37) (91% - 98%)    Parameters below as of 25 Jun 2025 11:37  Patient On (Oxygen Delivery Method): room air        Physical Exam:  General:    NAD, non toxic  Respiratory:   comfortable on RA  abd:   soft,  not tender  :   no hartley  Musculoskeletal : no joint swelling, no edema  Skin:    no rash  vascular: R chest port with no tenderness                          13.4   4.04  )-----------( 23       ( 25 Jun 2025 12:51 )             39.5       06-25    134[L]  |  108  |  10  ----------------------------<  128[H]  3.8   |  17[L]  |  0.82    Ca    7.8[L]      25 Jun 2025 12:51  Phos  1.5     06-24  Mg     1.7     06-24    TPro  5.4[L]  /  Alb  2.6[L]  /  TBili  0.9  /  DBili  x   /  AST  30  /  ALT  23  /  AlkPhos  150[H]  06-25      Urinalysis Basic - ( 25 Jun 2025 12:51 )    Color: x / Appearance: x / SG: x / pH: x  Gluc: 128 mg/dL / Ketone: x  / Bili: x / Urobili: x   Blood: x / Protein: x / Nitrite: x   Leuk Esterase: x / RBC: x / WBC x   Sq Epi: x / Non Sq Epi: x / Bacteria: x        MICROBIOLOGY:  v  Blood Blood-Peripheral  06-23-25   Growth in aerobic and anaerobic bottles: Escherichia coli  See previous culture [id number]  --    Growth in aerobic bottle: Gram Negative Rods  Growth in anaerobic bottle: Gram Negative Rods      Blood Blood-Peripheral  06-23-25   Growth in aerobic and anaerobic bottles: Escherichia coli  Direct identification is available within approximately 3-5  hours either by Blood Panel Multiplexed PCR or Direct  MALDI-TOF. Details: [email or web address]  --  Blood Culture PCR          Rapid RVP Result: Detected (06-23 @ 12:33)        RADIOLOGY:  Images independently visualized and reviewed personally, findings as below  < from: CT Abdomen and Pelvis w/ IV Cont (06.24.25 @ 21:25) >  IMPRESSION:  Peripherally enhancing right hepatic lobe lesion measuring 2.5 x 1.9 cm,   consistent with abscess.    Wall thickening of the descending and sigmoid colon, which may reflect   underdistention or colitis.        < end of copied text >  
Name of Patient : SUZY SILVA  MRN: [id number]  Date of visit: 07-01-25 @ 12:06      Subjective: Patient seen and examined. No new events except as noted.   Patient seen earlier this AM. Son at the bedside. Sitting OOB TC. Offers no new complaints.   DC planning underway     REVIEW OF SYSTEMS:    CONSTITUTIONAL: No weakness, fevers or chills  EYES/ENT: No visual changes;  No vertigo or throat pain   NECK: No pain or stiffness  RESPIRATORY: No cough, wheezing, hemoptysis; No shortness of breath  CARDIOVASCULAR: No chest pain or palpitations  GASTROINTESTINAL: No abdominal or epigastric pain. No nausea, vomiting, or hematemesis; No diarrhea or constipation. No melena or hematochezia.  GENITOURINARY: No dysuria, frequency or hematuria  NEUROLOGICAL: No numbness or weakness  SKIN: No itching, burning, rashes, or lesions   All other review of systems is negative unless indicated above.    MEDICATIONS:  MEDICATIONS  (STANDING):  albuterol/ipratropium for Nebulization 3 milliLiter(s) Nebulizer every 6 hours  amoxicillin  875 milliGRAM(s)/clavulanate 1 Tablet(s) Oral two times a day  furosemide   Injectable 20 milliGRAM(s) IV Push daily  pancrelipase  (CREON 36,000 Lipase Units) 1 Capsule(s) Oral three times a day with meals  sodium chloride 0.9%. 1000 milliLiter(s) (80 mL/Hr) IV Continuous <Continuous>      PHYSICAL EXAM:  T(C): 36.6 (07-01-25 @ 04:38), Max: 37.1 (06-30-25 @ 20:22)  HR: 87 (07-01-25 @ 04:38) (87 - 98)  BP: 109/64 (07-01-25 @ 04:38) (109/64 - 127/78)  RR: 18 (07-01-25 @ 04:38) (18 - 18)  SpO2: 95% (07-01-25 @ 04:38) (95% - 99%)  Wt(kg): --  I&O's Summary        Appearance: Awake, sitting OOB TC 	  HEENT:  Eyes are open   Lymphatic: No lymphadenopathy   Cardiovascular: Normal S1 S2, no JVD  Respiratory: Normal effort on RA, clear  Gastrointestinal:  Soft, Non-tender  Skin: No rashes,  warm to touch  Psychiatry:  Mood & affect appropriate  Musculoskeletal: No edema                          12.8   4.20  )-----------( 142      ( 30 Jun 2025 07:21 )             38.7               06-30    139  |  106  |  10  ----------------------------<  92  3.6   |  21[L]  |  0.76    Ca    8.7      30 Jun 2025 07:14                         Urinalysis Basic - ( 30 Jun 2025 07:14 )    Color: x / Appearance: x / SG: x / pH: x  Gluc: 92 mg/dL / Ketone: x  / Bili: x / Urobili: x   Blood: x / Protein: x / Nitrite: x   Leuk Esterase: x / RBC: x / WBC x   Sq Epi: x / Non Sq Epi: x / Bacteria: x      Culture - Blood in AM (06.30.25 @ 06:35)   Specimen Source: Blood Blood-Peripheral  Culture Results:   No growth at 24 hours      Culture - Blood in AM (06.30.25 @ 06:30)   Specimen Source: Blood Blood-Peripheral  Culture Results:   No growth at 24 hours      
Name of Patient : SUZY SILVA  MRN: [id number]  Date of visit: 06-25-25       Subjective: Patient seen and examined. No new events except as noted.   doing okay  S/P multiple units of PRBC, doing well now     REVIEW OF SYSTEMS:    CONSTITUTIONAL: No weakness, fevers or chills  EYES/ENT: No visual changes;  No vertigo or throat pain   NECK: No pain or stiffness  RESPIRATORY: No cough, wheezing, hemoptysis; No shortness of breath  CARDIOVASCULAR: No chest pain or palpitations  GASTROINTESTINAL: No abdominal or epigastric pain. No nausea, vomiting, or hematemesis; No diarrhea or constipation. No melena or hematochezia.  GENITOURINARY: No dysuria, frequency or hematuria  NEUROLOGICAL: No numbness or weakness  SKIN: No itching, burning, rashes, or lesions   All other review of systems is negative unless indicated above.    MEDICATIONS:  MEDICATIONS  (STANDING):  pancrelipase  (CREON 36,000 Lipase Units) 1 Capsule(s) Oral three times a day with meals  piperacillin/tazobactam IVPB.. 3.375 Gram(s) IV Intermittent every 8 hours  sodium chloride 0.9%. 1000 milliLiter(s) (80 mL/Hr) IV Continuous <Continuous>      PHYSICAL EXAM:  T(C): 37.1 (06-25-25 @ 11:37), Max: 38.8 (06-25-25 @ 00:21)  HR: 87 (06-25-25 @ 11:37) (84 - 112)  BP: 108/71 (06-25-25 @ 11:37) (108/71 - 200/75)  RR: 18 (06-25-25 @ 11:37) (18 - 18)  SpO2: 93% (06-25-25 @ 11:37) (91% - 98%)  Wt(kg): --  I&O's Summary        Appearance: Normal	  HEENT:  PERRLA   Lymphatic: No lymphadenopathy   Cardiovascular: Normal S1 S2, no JVD  Respiratory: normal effort , clear  Gastrointestinal:  Soft, Non-tender  Skin: No rashes,  warm to touch  Psychiatry:  Mood & affect appropriate  Musculuskeletal: No edema    recent labs, Imaging and EKGs personally reviewed   CODE status discussed with the patient in detail                          13.4   4.04  )-----------( 23 ( 25 Jun 2025 12:51 )             39.5               06-25    134[L]  |  108  |  10  ----------------------------<  128[H]  3.8   |  17[L]  |  0.82    Ca    7.8[L]      25 Jun 2025 12:51  Phos  1.5     06-24  Mg     1.7     06-24    TPro  5.4[L]  /  Alb  2.6[L]  /  TBili  0.9  /  DBili  x   /  AST  30  /  ALT  23  /  AlkPhos  150[H]  06-25    PT/INR - ( 24 Jun 2025 07:10 )   PT: 12.8 sec;   INR: 1.12 ratio         PTT - ( 24 Jun 2025 07:10 )  PTT:34.7 sec                   Urinalysis Basic - ( 25 Jun 2025 12:51 )    Color: x / Appearance: x / SG: x / pH: x  Gluc: 128 mg/dL / Ketone: x  / Bili: x / Urobili: x   Blood: x / Protein: x / Nitrite: x   Leuk Esterase: x / RBC: x / WBC x   Sq Epi: x / Non Sq Epi: x / Bacteria: x                    
Name of Patient : SUZY SILVA  MRN: [id number]  Date of visit: 06-29-25       Subjective: Patient seen and examined. No new events except as noted.   Doing okay     REVIEW OF SYSTEMS:    CONSTITUTIONAL: No weakness, fevers or chills  EYES/ENT: No visual changes;  No vertigo or throat pain   NECK: No pain or stiffness  RESPIRATORY: No cough, wheezing, hemoptysis; No shortness of breath  CARDIOVASCULAR: No chest pain or palpitations  GASTROINTESTINAL: No abdominal or epigastric pain. No nausea, vomiting, or hematemesis; No diarrhea or constipation. No melena or hematochezia.  GENITOURINARY: No dysuria, frequency or hematuria  NEUROLOGICAL: No numbness or weakness  SKIN: No itching, burning, rashes, or lesions   All other review of systems is negative unless indicated above.    MEDICATIONS:  MEDICATIONS  (STANDING):  albuterol/ipratropium for Nebulization 3 milliLiter(s) Nebulizer every 6 hours  ampicillin/sulbactam  IVPB 3 Gram(s) IV Intermittent every 6 hours  furosemide   Injectable 20 milliGRAM(s) IV Push daily  pancrelipase  (CREON 36,000 Lipase Units) 1 Capsule(s) Oral three times a day with meals  sodium chloride 0.9%. 1000 milliLiter(s) (80 mL/Hr) IV Continuous <Continuous>      PHYSICAL EXAM:  T(C): 36.8 (06-29-25 @ 20:19), Max: 37.1 (06-29-25 @ 00:05)  HR: 92 (06-29-25 @ 20:19) (87 - 98)  BP: 120/72 (06-29-25 @ 20:19) (102/64 - 132/72)  RR: 18 (06-29-25 @ 20:19) (18 - 18)  SpO2: 95% (06-29-25 @ 20:19) (95% - 95%)  Wt(kg): --  I&O's Summary        Appearance: Normal	  HEENT:  PERRLA   Lymphatic: No lymphadenopathy   Cardiovascular: Normal S1 S2, no JVD  Respiratory: normal effort , clear  Gastrointestinal:  Soft, Non-tender  Skin: No rashes,  warm to touch  Psychiatry:  Mood & affect appropriate  Musculuskeletal: No edema    recent labs, Imaging and EKGs personally reviewed                           12.2   3.66  )-----------( 72       ( 29 Jun 2025 07:10 )             36.6                                                    
Name of Patient : SUZY SILVA  MRN: [id number]  Date of visit: 06-30-25 @ 17:00      Subjective: Patient seen and examined. No new events except as noted.   Patient seen earlier this AM. Son at the bedside  Denies pain or discomfort   feeling better overall     REVIEW OF SYSTEMS:    CONSTITUTIONAL: Generalized weakness   EYES/ENT: No visual changes;  No vertigo or throat pain   NECK: No pain or stiffness  RESPIRATORY: No cough, wheezing, hemoptysis; No shortness of breath  CARDIOVASCULAR: No chest pain or palpitations  GASTROINTESTINAL: No abdominal or epigastric pain. No nausea, vomiting, or hematemesis; No diarrhea or constipation. No melena or hematochezia.  GENITOURINARY: No dysuria, frequency or hematuria  NEUROLOGICAL: No numbness or weakness  SKIN: No itching, burning, rashes, or lesions   All other review of systems is negative unless indicated above.    MEDICATIONS:  MEDICATIONS  (STANDING):  albuterol/ipratropium for Nebulization 3 milliLiter(s) Nebulizer every 6 hours  ampicillin/sulbactam  IVPB 3 Gram(s) IV Intermittent every 6 hours  furosemide   Injectable 20 milliGRAM(s) IV Push daily  pancrelipase  (CREON 36,000 Lipase Units) 1 Capsule(s) Oral three times a day with meals  sodium chloride 0.9%. 1000 milliLiter(s) (80 mL/Hr) IV Continuous <Continuous>      PHYSICAL EXAM:  T(C): 36.9 (06-30-25 @ 15:57), Max: 37 (06-30-25 @ 00:08)  HR: 91 (06-30-25 @ 15:57) (87 - 107)  BP: 110/74 (06-30-25 @ 15:57) (109/67 - 120/79)  RR: 18 (06-30-25 @ 15:57) (18 - 18)  SpO2: 95% (06-30-25 @ 15:57) (94% - 99%)  Wt(kg): --  I&O's Summary        Appearance: Awake, sitting OOBTC 	  HEENT:  Eyes are open   Lymphatic: No lymphadenopathy   Cardiovascular: Normal S1 S2, no JVD  Respiratory: normal effort , clear  Gastrointestinal:  Soft, Non-tender  Skin: No rashes,  warm to touch  Psychiatry:  Mood & affect appropriate  Musculoskeletal: No edema                                 12.8   4.20  )-----------( 142      ( 30 Jun 2025 07:21 )             38.7               06-30    139  |  106  |  10  ----------------------------<  92  3.6   |  21[L]  |  0.76    Ca    8.7      30 Jun 2025 07:14                         Urinalysis Basic - ( 30 Jun 2025 07:14 )    Color: x / Appearance: x / SG: x / pH: x  Gluc: 92 mg/dL / Ketone: x  / Bili: x / Urobili: x   Blood: x / Protein: x / Nitrite: x   Leuk Esterase: x / RBC: x / WBC x   Sq Epi: x / Non Sq Epi: x / Bacteria: x          Culture - Blood (06.25.25 @ 09:07)   Gram Stain:   Growth in aerobic bottle: Gram Negative Rods  Specimen Source: Blood Blood-Peripheral  Culture Results:   Growth in aerobic bottle: Escherichia coli       Culture - Blood (06.25.25 @ 09:12)   Specimen Source: Blood Blood-Peripheral  Culture Results:   No growth at 5 days      < from: MR Lumbar Spine w/wo IV Cont (06.27.25 @ 22:43) >  IMPRESSION: Degenerative changes. Old compression deformity T12 without   cord compression. Transitional vertebra at the lumbosacral junction.   Grade 1 anterolisthesis L4 on L5 with degenerative spinal stenosis.   Multifocal far lateral disc herniations. No evidence of infection,   osteomyelitis or discitis. No drainable collections.    < end of copied text >  
Patient is a 79y old  Female who presents with a chief complaint of fever (25 Jun 2025 15:10)    Patient seen and examined  Reports ongoing fevers, chills, and  poor appetite   No acute overnight events reported    MEDICATIONS  (STANDING):  albuterol/ipratropium for Nebulization 3 milliLiter(s) Nebulizer every 6 hours  pancrelipase  (CREON 36,000 Lipase Units) 1 Capsule(s) Oral three times a day with meals  piperacillin/tazobactam IVPB.. 3.375 Gram(s) IV Intermittent every 8 hours  sodium chloride 0.9%. 1000 milliLiter(s) (80 mL/Hr) IV Continuous <Continuous>    MEDICATIONS  (PRN):  acetaminophen     Tablet .. 650 milliGRAM(s) Oral every 6 hours PRN Temp greater or equal to 38C (100.4F), Mild Pain (1 - 3)  aluminum hydroxide/magnesium hydroxide/simethicone Suspension 30 milliLiter(s) Oral every 4 hours PRN Dyspepsia  benzonatate 100 milliGRAM(s) Oral every 8 hours PRN Cough  guaifenesin/dextromethorphan Oral Liquid 10 milliLiter(s) Oral every 4 hours PRN Cough  melatonin 3 milliGRAM(s) Oral at bedtime PRN Insomnia  ondansetron Injectable 4 milliGRAM(s) IV Push every 8 hours PRN Nausea and/or Vomiting        Vital Signs Last 24 Hrs  T(C): 37.8 (26 Jun 2025 08:32), Max: 37.9 (25 Jun 2025 18:10)  T(F): 100 (26 Jun 2025 08:32), Max: 100.3 (25 Jun 2025 20:46)  HR: 98 (26 Jun 2025 08:32) (87 - 112)  BP: 129/84 (26 Jun 2025 08:32) (99/64 - 153/97)  BP(mean): --  RR: 18 (26 Jun 2025 08:32) (18 - 18)  SpO2: 95% (26 Jun 2025 08:32) (91% - 95%)    Parameters below as of 26 Jun 2025 08:32  Patient On (Oxygen Delivery Method): room air        PE  Awake, alert  Anicteric, MMM  RRR  CTAB  Abd soft, NT, ND  No c/c                        13.4   4.04  )-----------( 23       ( 25 Jun 2025 12:51 )             39.5       06-25    134[L]  |  108  |  10  ----------------------------<  128[H]  3.8   |  17[L]  |  0.82    Ca    7.8[L]      25 Jun 2025 12:51    TPro  5.4[L]  /  Alb  2.6[L]  /  TBili  0.9  /  DBili  x   /  AST  30  /  ALT  23  /  AlkPhos  150[H]  06-25      
Patient is a 79y old  Female who presents with a chief complaint of fever (27 Jun 2025 12:38)    Patient seen and examined  Appears comfortable  No acute overnight events     MEDICATIONS  (STANDING):  albuterol/ipratropium for Nebulization 3 milliLiter(s) Nebulizer every 6 hours  ampicillin/sulbactam  IVPB 3 Gram(s) IV Intermittent every 6 hours  furosemide   Injectable 20 milliGRAM(s) IV Push daily  pancrelipase  (CREON 36,000 Lipase Units) 1 Capsule(s) Oral three times a day with meals  sodium chloride 0.9%. 1000 milliLiter(s) (80 mL/Hr) IV Continuous <Continuous>    MEDICATIONS  (PRN):  aluminum hydroxide/magnesium hydroxide/simethicone Suspension 30 milliLiter(s) Oral every 4 hours PRN Dyspepsia  benzonatate 100 milliGRAM(s) Oral every 8 hours PRN Cough  guaifenesin/dextromethorphan Oral Liquid 10 milliLiter(s) Oral every 4 hours PRN Cough  melatonin 3 milliGRAM(s) Oral at bedtime PRN Insomnia  ondansetron Injectable 4 milliGRAM(s) IV Push every 8 hours PRN Nausea and/or Vomiting        Vital Signs Last 24 Hrs  T(C): 36.7 (27 Jun 2025 09:30), Max: 37.6 (27 Jun 2025 05:04)  T(F): 98.1 (27 Jun 2025 09:30), Max: 99.6 (27 Jun 2025 05:04)  HR: 95 (27 Jun 2025 09:30) (92 - 102)  BP: 98/63 (27 Jun 2025 09:30) (87/55 - 111/73)  BP(mean): --  RR: 18 (27 Jun 2025 09:30) (16 - 18)  SpO2: 95% (27 Jun 2025 09:30) (94% - 96%)    Parameters below as of 27 Jun 2025 09:30  Patient On (Oxygen Delivery Method): room air        PE  Awake, alert  Anicteric, MMM  RRR  CTAB  Abd soft, NT, ND  No c/c                        14.2   3.41  )-----------( 45       ( 27 Jun 2025 06:49 )             43.6       06-27    136  |  106  |  10  ----------------------------<  108[H]  4.1   |  14[L]  |  0.86    Ca    8.0[L]      27 Jun 2025 06:47    TPro  5.5[L]  /  Alb  2.5[L]  /  TBili  0.6  /  DBili  x   /  AST  33  /  ALT  20  /  AlkPhos  136[H]  06-27

## 2025-07-01 NOTE — DISCHARGE NOTE NURSING/CASE MANAGEMENT/SOCIAL WORK - NSDCVIVACCINE_GEN_ALL_CORE_FT
Tdap; 11-Apr-2015 07:16; Dasia Barajas (RN); [employer]; [id number]; IntraMuscular; Deltoid Left.; 0.5 milliLiter(s); VIS (VIS Published: 09-May-2013, VIS Presented: 11-Apr-2015);

## 2025-07-01 NOTE — DISCHARGE NOTE PROVIDER - NSDCFUADDAPPT_GEN_ALL_CORE_FT
APPTS ARE READY TO BE MADE: [x] YES    Best Family or Patient Contact (if needed):    Additional Information about above appointments (if needed):    1:   2:   3:     Other comments or requests:    APPTS ARE READY TO BE MADE: [x] YES    Best Family or Patient Contact (if needed):    Additional Information about above appointments (if needed):    1:   2:   3:     Other comments or requests:   Prior to outreaching the patient, it was visible that the patient has secured a follow up appointment which was not scheduled by our team. Patient is scheduled with Dr. Morrison on 7/28 at [address] [place] [address]

## 2025-07-01 NOTE — DISCHARGE NOTE PROVIDER - NSDCFUSCHEDAPPT_GEN_ALL_CORE_FT
[facility]  [facility] [address]  Scheduled Appointment: 07/21/2025     [facility]  [facility] [address]  Scheduled Appointment: 07/28/2025

## 2025-07-01 NOTE — DISCHARGE NOTE PROVIDER - NSDCCPCAREPLAN_GEN_ALL_CORE_FT
PRINCIPAL DISCHARGE DIAGNOSIS  Diagnosis: Bacteremia  Assessment and Plan of Treatment: - High grade E-coli bacteremia and a 2.5 cm liver abscess  IR stated abscess too small to drain  * s/p zosyn 6/23-6/27, then switched zosyn to unasyn 3 q 6, now day 8 of antibiotics   continue PO augmentin 875 for 5 week (already had a week here as well)  * will need repeat CT after for resolution  Follow-up with ID as scheduled 7/21/25 for further monitoring and treatment.         SECONDARY DISCHARGE DIAGNOSES  Diagnosis: Pancreatic adenocarcinoma  Assessment and Plan of Treatment: follow-up with your Oncologist for further monitoring    Diagnosis: Pancytopenia  Assessment and Plan of Treatment: resolved    Diagnosis: Backache  Assessment and Plan of Treatment: - MR w/ Degenerative changes. Old compression deformity T12 without cord compression. Transitional vertebra at the lumbosacral junction. Grade 1 anterolisthesis L4 on L5 with degenerative spinal stenosis. Multifocal far lateral disc herniations. No evidence of infection, osteomyelitis or discitis. No drainable collections.  - Pain control

## 2025-07-01 NOTE — DISCHARGE NOTE PROVIDER - HOSPITAL COURSE
HPI:  80yo f w pmh htn, hld, metastatic (pulm mets) pancreatic adenoca s/p whipple + vats w darby wedge resection and lll basilar segmentectomy + adj chemo (folfirinox) + palliative chemo (currently on gemtcitabine/abraxane), rcc s/p l partial nephrectomy, ra, constipation, gastroparesis, p/w persistent fever iso recent chemo despite po abx use; a/w cough x2-3 days. patient went to urgent care where work up was negative, patient prescribed course of cefdinir empirically; however, patient w persistent fever, prompting visit to [facility] er for further mgmt. in er, found to be meeting sepsis criteria w pancytopenia; c/f neutropenic fever iso recent chemo; admit to medicine for further mgmt    of note, hospitalized for similar presentation 1/23/2025-1/29/2025; at that time, found to have esbl klebsiella bacteremia; cxr, ua, ct imaging negative for source at that time; treated with zarxio + nplate for pancytopenia, and course of ertapenem for esbl klebsiella bacteremia. (23 Jun 2025 19:19)    Hospital Course:   An 79-year-old female with a history of metastatic pancreatic adenocarcinoma (s/p Whipple, chemotherapy), renal cell carcinoma (s/p partial nephrectomy), rheumatoid arthritis, hypertension, hyperlipidemia, constipation, and gastroparesis presented with persistent fever admitted  with bacteremia, liver abscess. Blood cultures showed gram-negative rods. A small liver abscess was found on CT but deemed too small to drain.  She also tested positive for human metapneumovirus (hMPV). She received broad-spectrum antibiotics now will transitioned tot Augmenting 875 bid x 4 more weeks per ID. Pancytopenia resolved- PRBC transfusions, and supportive care; heme following.  .  Pt noted with right upper extremity edema (resolved), IV Lasix transitioned to po LAsix 20mg as d/w Kali - NP.     Dr. Grant and ID has medicalled cleared pt for discharge  home with follow-up as advised.         Important Medication Changes and Reason: LASIX + Augmentin  x4 more weeks     Active or Pending Issues Requiring Follow-up: ID/ Oncology / PCP     Advanced Directives:   [x] Full code  [ ] DNR  [ ] Hospice    Discharge Diagnoses:  Bacteremia   back pain   pancytopenia   pancreatic ca

## 2025-07-01 NOTE — PROGRESS NOTE ADULT - ASSESSMENT
78yo f w pmh htn, hld, metastatic (pulm mets) pancreatic adenoca s/p whipple + vats w darby wedge resection and lll basilar segmentectomy + adj chemo (folfirinox) + palliative chemo (currently on gemtcitabine/abraxane), rcc s/p l partial nephrectomy, ra, constipation, gastroparesis, p/w persistent fever iso recent chemo despite po abx use; a/w cough x2-3 days.    Pancreatic ca, Stage IV  --follows it Dr Ludwig Scanlon for management  --w/ metastatic disease to lungs  --s/p whipple + vats w darby wedge resection and lll basilar segmentectomy + adj chemo (folfirinox)  --currently on gemtcitabine/ abraxane LD 6/1  --no plan for tretament inpatient and/or rehab  --ongoing care after discharge    Fever  --remains febrile, T max 101.8 on 6/25  --BC w/ GNR, c/o IV abx  --found to have metapneumovirus   --CT a/p w/ Peripherally enhancing right hepatic lobe lesion measuring 2.5 x 1.9 cm, consistent with abscess. Wall thickening of the descending and sigmoid colon, which may reflect underdistention or colitis.  --IR consulted for possible abscess drainage    Pancytopenia  --multifactorial; in the setting of malignancy DMT, and sepsis  --transfuse for Hgb <7, 8 if symptomatic  --trend daily CBCs w/ diff    thank you for consulting  will follow,     Elsa Ramos NP  Hematology/ Oncology  [facility]  [phone] (office)  [phone] (alt office)  Evenings and weekends please call MD on call or office  
78yo f w pmh htn, hld, metastatic (pulm mets) pancreatic adenoca s/p whipple + vats w darby wedge resection and lll basilar segmentectomy + adj chemo (folfirinox) + palliative chemo (currently on gemtcitabine/abraxane), rcc s/p l partial nephrectomy, ra, constipation, gastroparesis, p/w persistent fever iso recent chemo despite po abx use; in er, found to be meeting sepsis criteria w pancytopenia; c/f neutropenic fever iso recent chemo; admit to medicine for further mgmt      Sepsis.   - Pt febrile, tachycardic, leukopenic - meets sepsis criteria  - On recent Chemotherapy   - Of note, hospitalized for similar presentation 1/23/2025-1/29/2025; at that time, found to have ESBL klebsiella bacteremia iso neutropenic fever, treated with ertapenem  - Flu/Covid negative; CXR w/ small effusion   - RVP w/ hMPV - supportive care   - Procal 0.49   - 6/23 BCx2 w Gram neg rods; 6/25 BCx2 1/2 +, F/u repeat in lab from 6/`30 w/ NGTD; F/u final - ID cleared patient for DC with close outpatient follow up within 1 week and at the complete of ABX for repeat CT scan to follow up on abscess. Discussed with son and phone number provided to make follow up appointment.    - CT A/P w/ IV contrast, noted for liver abscess   - IR eval called - Per IR, too small for drainage. C/w ABX per ID   - MR neg for infection   - ABX switched to Unasyn and PO Augmentin per ID  - IVF as tolerated  - on hold   - Monitor for fever, changes in white count  - ID eval appreciated; F/u recs     Tachypnea  - Pt received 3 units of PRBCs  - Standing Duoneb   - On ABX   - Monitor O2 saturation; Supplement to maintain > 90%   - Improved    Pancytopenia due to chemotherapy.   - Has had receiving zarxio, nplate, aranesp in the outpatient setting  - Hgb down-trending. No bleeding reported - Likely 2/2 recent chemo   - 3 unit of PRBCs total; check post-transfusion CBC, improved hgb level   - Maintain active T/S, large bore IV Access   - Transfuse for Hgb < 7.0 and plt <10K or < 50K w/ bleeding   - Heme/Onc eval appreciated; F/u recs     Pancreatic adenocarcinoma.   - H/o metastatic (pulm mets) pancreatic adenoca s/p whipple + vats w darby wedge resection and lll basilar segmentectomy + adj chemo (folfirinox) + palliative chemo (currently on gemtcitabine/abraxane), rcc s/p l partial nephrectomy  - Last Chemo reportedly on 6/19/2025  - Outpatient Heme/Onc follow up upkn DC   - Heme/Onc eval appreciated; F/u recs     RUE Edema  - Change IV Site  - Extremity elevation   - UE Duplex neg   - Monitor   - Improved    Back pain   - MR w/ Degenerative changes. Old compression deformity T12 without cord compression. Transitional vertebra at the lumbosacral junction. Grade 1 anterolisthesis L4 on L5 with degenerative spinal stenosis. Multifocal far lateral disc herniations. No evidence of infection, osteomyelitis or discitis. No drainable collections.  - Pt and son report they are aware of compression fracture and patient would not like to proceed with surgical intervention   - Pain control     HTN   - Continue to monitor and trend BP, VS and adjust as tolerated     HLD   - Lipid panel     Rheumatoid arthritis.   - Hold home meds for now     PPX       Discussed in detail with Son at the bedside.   Discussed with Attending and ACP. 
78yo f w pmh htn, hld, metastatic (pulm mets) pancreatic adenoca s/p whipple + vats w darby wedge resection and lll basilar segmentectomy + adj chemo (folfirinox) + palliative chemo (currently on gemtcitabine/abraxane), rcc s/p l partial nephrectomy, ra, constipation, gastroparesis, p/w persistent fever iso recent chemo despite po abx use; in er, found to be meeting sepsis criteria w pancytopenia; c/f neutropenic fever iso recent chemo; admit to medicine for further mgmt      Sepsis.   - Pt febrile, tachycardic, leukopenic - meets sepsis criteria  - On recent Chemotherapy   - Of note, hospitalized for similar presentation 1/23/2025-1/29/2025; at that time, found to have ESBL klebsiella bacteremia iso neutropenic fever, treated with ertapenem  - Flu/Covid negative; CXR w/ small effusion   - RVP w/ hMPV - supportive care   - Procal 0.49   - 6/23 BCx2 w Gram neg rods; Check repeat BCx2 in AM and await S  - Check CT A/P w/ IV contrast   - ABX switched to Zosyn per ID  - IVF as tolerated   - F/u atypical pna urine ag, mrsa screen  - Monitor for fever, changes in white count  - ID eval appreciated; F/u recs     Pancytopenia due to chemotherapy.   - Has had receiving zarxio, nplate, aranesp in the outpatient setting  - Hgb down-trending. No bleeding reported - Likely 2/2 recent chemo   - 1 unit of PRBCs 6/24; check post-transfusion CBC  - Maintain active T/S, large bore IV Access   - Transfuse for Hgb < 7.0 and plt <10K or < 50K w/ bleeding   - Heme/Onc eval appreciated; F/u recs     Pancreatic adenocarcinoma.   - H/o metastatic (pulm mets) pancreatic adenoca s/p whipple + vats w darby wedge resection and lll basilar segmentectomy + adj chemo (folfirinox) + palliative chemo (currently on gemtcitabine/abraxane), rcc s/p l partial nephrectomy  - Last Chemo reportedly on 6/19/2025  - Heme/Onc eval appreciated; F/u recs     HTN (hypertension).   - Continue to monitor and trend BP, VS and adjust as tolerated     HLD (hyperlipidemia).   - Lipid panel     Rheumatoid arthritis.   - Hold home meds for now     PPX      Discussed with Attending, AVTAR, RN Heme/Onc and ID. 
80yo f w pmh htn, hld, metastatic (pulm mets) pancreatic adenoca s/p whipple + vats w darby wedge resection and lll basilar segmentectomy + adj chemo (folfirinox) + palliative chemo (currently on gemtcitabine/abraxane), rcc s/p l partial nephrectomy, ra, constipation, gastroparesis, p/w persistent fever iso recent chemo despite po abx use; a/w cough x2-3 days.    Pancreatic ca, Stage IV  --follows it Dr Ludwig Scanlon for management  --w/ metastatic disease to lungs  --s/p whipple + vats w darby wedge resection and lll basilar segmentectomy + adj chemo (folfirinox)  --currently on gemtcitabine/ abraxane LD 6/1  --no plan for tretament inpatient and/or rehab  --ongoing care after discharge    Fever  --T max 101.8 on 6/25  --BC w/ GNR, c/o IV abx  --found to have metapneumovirus   --CT a/p w/ Peripherally enhancing right hepatic lobe lesion measuring 2.5 x 1.9 cm, consistent with abscess. Wall thickening of the descending and sigmoid colon, which may reflect underdistention or colitis.  --ID following , f/up cultures pending    Pancytopenia  --multifactorial; in the setting of malignancy DMT, and sepsis  --transfuse for Hgb <7, 8 if symptomatic  --trend daily CBCs w/ diff    thank you for consulting  will follow,     Elsa Ramos NP  Hematology/ Oncology  [facility]  [phone] (office)  [phone] (alt office)  Evenings and weekends please call MD on call or office  
78yo f w pmh htn, hld, metastatic (pulm mets) pancreatic adenoca s/p whipple + vats w darby wedge resection and lll basilar segmentectomy + adj chemo (folfirinox) + palliative chemo (currently on gemtcitabine/abraxane), rcc s/p l partial nephrectomy, ra, constipation, gastroparesis, p/w persistent fever iso recent chemo despite po abx use; a/w cough x2-3 days.    Pancreatic ca, Stage IV  --follows it Dr Ludwig Scanlon for management  --w/ metastatic disease to lungs  --s/p whipple + vats w darby wedge resection and lll basilar segmentectomy + adj chemo (folfirinox)  --currently on gemtcitabine/ abraxane LD 6/1  --no plan for tretament inpatient and/or rehab  --ongoing care after discharge    Fever  --T max 101.8 on 6/25, now afebrile>24 hrs  --BC w/ GNR, c/o IV abx  --found to have metapneumovirus   --CT a/p w/ Peripherally enhancing right hepatic lobe lesion measuring 2.5 x 1.9 cm, consistent with abscess. Wall thickening of the descending and sigmoid colon, which may reflect underdistention or colitis.  --ID following , f/up cultures NTD    Pancytopenia  --multifactorial; in the setting of malignancy DMT, and sepsis  --transfuse for Hgb <7, 8 if symptomatic  --s/p N-plate 6/26, next dose to be given on 7/03  --trend daily CBCs w/ diff    thank you for consulting  will follow,     Elsa Ramos NP  Hematology/ Oncology  [facility]  [phone] (office)  [phone] (alt office)  Evenings and weekends please call MD on call or office  
78yo f w pmh htn, hld, metastatic (pulm mets) pancreatic adenoca s/p whipple + vats w darby wedge resection and lll basilar segmentectomy + adj chemo (folfirinox) + palliative chemo (currently on gemtcitabine/abraxane), rcc s/p l partial nephrectomy, ra, constipation, gastroparesis, p/w persistent fever iso recent chemo despite po abx use; in er, found to be meeting sepsis criteria w pancytopenia; c/f neutropenic fever iso recent chemo; admit to medicine for further mgmt      Sepsis.   - Pt febrile, tachycardic, leukopenic - meets sepsis criteria  - On recent Chemotherapy   - Of note, hospitalized for similar presentation 1/23/2025-1/29/2025; at that time, found to have ESBL klebsiella bacteremia iso neutropenic fever, treated with ertapenem  - Flu/Covid negative; CXR w/ small effusion   - RVP w/ hMPV - supportive care   - Procal 0.49   - 6/23 BCx2 w Gram neg rods; 6/25 BCx2 in lab, F/u results   - CT A/P w/ IV contrast , noted for liver abscess   - IR eval called - Per IR, too small for drainage. C/w ABX per ID   - ABX switched to Zosyn per ID  - IVF as tolerated  - on hold   - F/u atypical pna urine ag, mrsa screen  - Monitor for fever, changes in white count  - ID eval appreciated; F/u recs     Tachypnea  - Trial of IV Lasix 20 X 1, Pt received 3 units of PRBCs  - Check CXR  - Standing Duoneb   - On ABX   - Monitor O2 saturation; Supplement to maintain > 90%     Pancytopenia due to chemotherapy.   - Has had receiving zarxio, nplate, aranesp in the outpatient setting  - Hgb down-trending. No bleeding reported - Likely 2/2 recent chemo   - 3 unit of PRBCs total; check post-transfusion CBC, improved hgb level   - Maintain active T/S, large bore IV Access   - Transfuse for Hgb < 7.0 and plt <10K or < 50K w/ bleeding   - Heme/Onc eval appreciated; F/u recs     Pancreatic adenocarcinoma.   - H/o metastatic (pulm mets) pancreatic adenoca s/p whipple + vats w darby wedge resection and lll basilar segmentectomy + adj chemo (folfirinox) + palliative chemo (currently on gemtcitabine/abraxane), rcc s/p l partial nephrectomy  - Last Chemo reportedly on 6/19/2025  - Heme/Onc eval appreciated; F/u recs     RUE Edema  - Change IV Site  - Extremity elevation   - Check UE Duplex  - Monitor     HTN   - Continue to monitor and trend BP, VS and adjust as tolerated     HLD   - Lipid panel     Rheumatoid arthritis.   - Hold home meds for now     PPX         Discussed in detail with Son at the bedside.   Discussed with Attending and ACP 
79 f with HTN, HLD, RA, constipation and gastroparesis, RCC s/p L partial nephrectomy, metastatic pancreatic ca with lung mets s/p whipple + vats w TONIA wedge resection and LLL basilar segmentectomy + adj chemo (folfirinox) + palliative chemo, currently on gemtcitabine/abraxane, last chemo a week ago, had neutropenic fever and ESBL klebsiella bacteremia 1/2025, her  had COVID and she went to  for fever and some cough, w/u was negative and was given cefdinir but now p/w fever and low back pain, no abd pain, dysuria or diarrhea,  here febrile to 101.5, tachy  WBC: 2 =< 0.9,  ANC: 1500 => 800  lumbar and hip xray no osseus lesions  blood cx: E-coli  RVP: hMPV    metastatic pancreatic ca on chemo (last week) now with fever, back pain and high grade E-coli bacteremia and a 2.5 cm liver abscess, ANC was fluctuating and 800 yesterday, today>1500  IR stated abscess too small to drain, pt still with low back pain  cough and hMPV URI    * s/p zosyn 6/23-6/27, switch zosyn to unasyn 3 q 6, now day 5 of antibiotics   * LS spine MRI with dionisio  * monitor CBC/diff and CMP    The above assessment and plan was discussed with the primary team    Laquita Morrison MD  contact on teams  After 5pm and on weekends call [phone]    
79 f with HTN, HLD, RA, constipation and gastroparesis, RCC s/p L partial nephrectomy, metastatic pancreatic ca with lung mets s/p whipple + vats w TONIA wedge resection and LLL basilar segmentectomy + adj chemo (folfirinox) + palliative chemo, currently on gemtcitabine/abraxane, last chemo a week ago, had neutropenic fever and ESBL klebsiella bacteremia 1/2025, her  had COVID and she went to  for fever and some cough, w/u was negative and was given cefdinir but now p/w fever and low back pain, no abd pain, dysuria or diarrhea,  here febrile to 101.5, tachy  WBC: 2 =< 0.9,  ANC: 1500 => 800  lumbar and hip xray no osseus lesions  blood cx: E-coli  RVP: hMPV    metastatic pancreatic ca on chemo (last week) now with fever, back pain and high grade E-coli bacteremia and a 2.5 cm liver abscess, ANC was lower initially but not leukopenic or neutropenic now  IR stated abscess too small to drain, pt still with low back pain but MRI did not show discitis/osteo repeat blood cx 6/25, showed 1/4 GNR on day 5 but pt clinically well now and afebrile  cough and hMPV URI    * s/p zosyn 6/23-6/27, then switched zosyn to unasyn 3 q 6, now day 8 of antibiotics   * follow the repeat blood cx  * on discharge switch to PO augmentin 875 for 5 week (already had a week here as well)  * will need repeat CT after for resolution    The above assessment and plan was discussed with the primary team    Laquita Morrison MD  contact on teams  After 5pm and on weekends call [phone]      
80yo f w pmh htn, hld, metastatic (pulm mets) pancreatic adenoca s/p whipple + vats w darby wedge resection and lll basilar segmentectomy + adj chemo (folfirinox) + palliative chemo (currently on gemtcitabine/abraxane), rcc s/p l partial nephrectomy, ra, constipation, gastroparesis, p/w persistent fever iso recent chemo despite po abx use; in er, found to be meeting sepsis criteria w pancytopenia; c/f neutropenic fever iso recent chemo; admit to medicine for further mgmt      Sepsis.   - Pt febrile, tachycardic, leukopenic - meets sepsis criteria  - On recent Chemotherapy   - Of note, hospitalized for similar presentation 1/23/2025-1/29/2025; at that time, found to have ESBL klebsiella bacteremia iso neutropenic fever, treated with ertapenem  - Flu/Covid negative; CXR w/ small effusion   - RVP w/ hMPV - supportive care   - Procal 0.49   - 6/23 BCx2 w Gram neg rods; Check repeat BCx2 in AM and await S  - Check CT A/P w/ IV contrast , noted for liver abscess   - IR eval called   - ABX switched to Zosyn per ID  - IVF as tolerated   - F/u atypical pna urine ag, mrsa screen  - Monitor for fever, changes in white count  - ID eval appreciated; F/u recs     Pancytopenia due to chemotherapy.   - Has had receiving zarxio, nplate, aranesp in the outpatient setting  - Hgb down-trending. No bleeding reported - Likely 2/2 recent chemo   - 3 unit of PRBCs total; check post-transfusion CBC, improved hgb level   - Maintain active T/S, large bore IV Access   - Transfuse for Hgb < 7.0 and plt <10K or < 50K w/ bleeding   - Heme/Onc eval appreciated; F/u recs     Pancreatic adenocarcinoma.   - H/o metastatic (pulm mets) pancreatic adenoca s/p whipple + vats w darby wedge resection and lll basilar segmentectomy + adj chemo (folfirinox) + palliative chemo (currently on gemtcitabine/abraxane), rcc s/p l partial nephrectomy  - Last Chemo reportedly on 6/19/2025  - Heme/Onc eval appreciated; F/u recs     HTN (hypertension).   - Continue to monitor and trend BP, VS and adjust as tolerated     HLD (hyperlipidemia).   - Lipid panel     Rheumatoid arthritis.   - Hold home meds for now     PPX      Patient seen and examined by me. patient care and plan discussed and reviewed with PA. Plan as outlined above edited by me to reflect our discussion. Advanced care planning/advanced directives discussed with patient/family. DNR status including forceful chest compressions to attempt to restart the heart, ventilator support/artificial breathing, electric shock, artificial nutrition, health care proxy, Molst form all discussed with pt. More than 50% of the visit was spent counseling and/or coordinating care by the attending physician. Sixteen minutes spent on discussing advanced directives.    discussed in detail with son at the bedside 
78yo f w pmh htn, hld, metastatic (pulm mets) pancreatic adenoca s/p whipple + vats w darby wedge resection and lll basilar segmentectomy + adj chemo (folfirinox) + palliative chemo (currently on gemtcitabine/abraxane), rcc s/p l partial nephrectomy, ra, constipation, gastroparesis, p/w persistent fever iso recent chemo despite po abx use; a/w cough x2-3 days.    Pancreatic ca, Stage IV  --follows it Dr Ludwig Scanlon for management  --w/ metastatic disease to lungs  --s/p whipple + vats w darby wedge resection and lll basilar segmentectomy + adj chemo (folfirinox)  --currently on gemtcitabine/ abraxane LD 6/1  --no plan for tretament inpatient and/or rehab  --ongoing care after discharge    Fever  --T max 101.8 on 6/25  --BC w/ GNR, c/o IV abx  --found to have metapneumovirus   --CT a/p w/ Peripherally enhancing right hepatic lobe lesion measuring 2.5 x 1.9 cm, consistent with abscess. Wall thickening of the descending and sigmoid colon, which may reflect underdistention or colitis.  --ID following , f/up cultures pending    Pancytopenia  --multifactorial; in the setting of malignancy DMT, and sepsis  --transfuse for Hgb <7, 8 if symptomatic  --s/p N-plate 6/26, next dose to be given on 7/03  --trend daily CBCs w/ diff    thank you for consulting  will follow,     Elsa Rmaos NP  Hematology/ Oncology  [facility]  [phone] (office)  [phone] (alt office)  Evenings and weekends please call MD on call or office  
79 f with HTN, HLD, RA, constipation and gastroparesis, RCC s/p L partial nephrectomy, metastatic pancreatic ca with lung mets s/p whipple + vats w TONIA wedge resection and LLL basilar segmentectomy + adj chemo (folfirinox) + palliative chemo, currently on gemtcitabine/abraxane, last chemo a week ago, had neutropenic fever and ESBL klebsiella bacteremia 1/2025, her  had COVID and she went to  for fever and some cough, w/u was negative and was given cefdinir but now p/w fever and low back pain, no abd pain, dysuria or diarrhea,  here febrile to 101.5, tachy  WBC: 2 =< 0.9,  ANC: 1500 => 800  lumbar and hip xray no osseus lesions  blood cx: E-coli  RVP: hMPV    metastatic pancreatic ca on chemo (last week) now with fever, back pain and high grade E-coli bacteremia and a 2.5 cm liver abscess, ANC was fluctuating and 800 yesterday, today>1500  cough and hMPV URI    * c/w zosyn  * follow the repeat blood cx   * IR miri for liver abscess drainage  * monitor CBC/diff and CMP    The above assessment and plan was discussed with the primary team    Laquita Morrison MD  contact on teams  After 5pm and on weekends call [phone]    
80yo f w pmh htn, hld, metastatic (pulm mets) pancreatic adenoca s/p whipple + vats w darby wedge resection and lll basilar segmentectomy + adj chemo (folfirinox) + palliative chemo (currently on gemtcitabine/abraxane), rcc s/p l partial nephrectomy, ra, constipation, gastroparesis, p/w persistent fever iso recent chemo despite po abx use; in er, found to be meeting sepsis criteria w pancytopenia; c/f neutropenic fever iso recent chemo; admit to medicine for further mgmt      Sepsis.   - Pt febrile, tachycardic, leukopenic - meets sepsis criteria  - On recent Chemotherapy   - Of note, hospitalized for similar presentation 1/23/2025-1/29/2025; at that time, found to have ESBL klebsiella bacteremia iso neutropenic fever, treated with ertapenem  - Flu/Covid negative; CXR w/ small effusion   - RVP w/ hMPV - supportive care   - Procal 0.49   - 6/23 BCx2 w Gram neg rods; 6/25 BCx2 in lab, F/u results   - CT A/P w/ IV contrast , noted for liver abscess   - IR eval called - Per IR, too small for drainage. C/w ABX per ID   - ABX switched to Zosyn per ID  - IVF as tolerated  - on hold   - F/u atypical pna urine ag, mrsa screen  - Monitor for fever, changes in white count  - ID eval appreciated; F/u recs     Tachypnea  - Pt received 3 units of PRBCs  - Check CXR  - Standing Duoneb   - On ABX   - Monitor O2 saturation; Supplement to maintain > 90%     Pancytopenia due to chemotherapy.   - Has had receiving zarxio, nplate, aranesp in the outpatient setting  - Hgb down-trending. No bleeding reported - Likely 2/2 recent chemo   - 3 unit of PRBCs total; check post-transfusion CBC, improved hgb level   - Maintain active T/S, large bore IV Access   - Transfuse for Hgb < 7.0 and plt <10K or < 50K w/ bleeding   - Heme/Onc eval appreciated; F/u recs     Pancreatic adenocarcinoma.   - H/o metastatic (pulm mets) pancreatic adenoca s/p whipple + vats w darby wedge resection and lll basilar segmentectomy + adj chemo (folfirinox) + palliative chemo (currently on gemtcitabine/abraxane), rcc s/p l partial nephrectomy  - Last Chemo reportedly on 6/19/2025  - Heme/Onc eval appreciated; F/u recs     RUE Edema  - Change IV Site  - Extremity elevation   - Check UE Duplex  - Monitor     HTN   - Continue to monitor and trend BP, VS and adjust as tolerated     HLD   - Lipid panel     Rheumatoid arthritis.   - Hold home meds for now     PPX       
78yo f w pmh htn, hld, metastatic (pulm mets) pancreatic adenoca s/p whipple + vats w darby wedge resection and lll basilar segmentectomy + adj chemo (folfirinox) + palliative chemo (currently on gemcitabine/abraxane), rcc s/p l partial nephrectomy, ra, constipation, gastroparesis, p/w persistent fever iso recent chemo despite po abx use; a/w cough x 2-3 days.    Pancreatic ca, Stage IV  --follows with Dr. Ludwig Scanlon at [facility]  --w/ metastatic disease to lungs  --s/p whipple + vats w darby wedge resection and lll basilar segmentectomy + adj chemo (folfirinox)  --currently on gemcitabine/abraxane LD 6/1  --no plan for treatment inpatient and/or rehab  --ongoing care after discharge    Fever  --T max 101.8 on 6/25, now afebrile>24 hrs  --BC w/ E coli, continues IV abx  --found to have metapneumovirus   --CT a/p w/ Peripherally enhancing right hepatic lobe lesion measuring 2.5 x 1.9 cm, consistent with abscess. Wall thickening of the descending and sigmoid colon, which may reflect underdistention or colitis. IR stated abscess too small to drain  --ID following. Repeat 6/25 blood cultures 1 of 2 growing GNR.    Pancytopenia  --multifactorial; in the setting of malignancy DMT, and sepsis  --transfuse for Hgb <7, 8 if symptomatic  --s/p N-plate 6/26, next dose to be given on 7/03  --trend daily CBCs w/ diff    will follow,     Kj Pineda PA-C  Hematology/Oncology  [facility]  [phone] (office)  Evenings and weekends please call MD on call or office
79 f with HTN, HLD, RA, constipation and gastroparesis, RCC s/p L partial nephrectomy, metastatic pancreatic ca with lung mets s/p whipple + vats w TONIA wedge resection and LLL basilar segmentectomy + adj chemo (folfirinox) + palliative chemo, currently on gemtcitabine/abraxane, last chemo a week ago, had neutropenic fever and ESBL klebsiella bacteremia 1/2025, her  had COVID and she went to  for fever and some cough, w/u was negative and was given cefdinir but now p/w fever and low back pain, no abd pain, dysuria or diarrhea,  here febrile to 101.5, tachy  WBC: 2 =< 0.9,  ANC: 1500 => 800  lumbar and hip xray no osseus lesions  blood cx: E-coli  RVP: hMPV    metastatic pancreatic ca on chemo (last week) now with fever, back pain and high grade E-coli bacteremia and a 2.5 cm liver abscess, ANC was fluctuating and 800 yesterday, today>1500  IR stated abscess too small to drain, pt still with low back pain  cough and hMPV URI    * c/w zosyn  * follow the repeat blood cx   * LS spine MRI with dionisio  * monitor CBC/diff and CMP    The above assessment and plan was discussed with the primary team    Laquita Morrison MD  contact on teams  After 5pm and on weekends call [phone]
80yo f w pmh htn, hld, metastatic (pulm mets) pancreatic adenoca s/p whipple + vats w darby wedge resection and lll basilar segmentectomy + adj chemo (folfirinox) + palliative chemo (currently on gemtcitabine/abraxane), rcc s/p l partial nephrectomy, ra, constipation, gastroparesis, p/w persistent fever iso recent chemo despite po abx use; in er, found to be meeting sepsis criteria w pancytopenia; c/f neutropenic fever iso recent chemo; admit to medicine for further mgmt      Sepsis.   - Pt febrile, tachycardic, leukopenic - meets sepsis criteria  - On recent Chemotherapy   - Of note, hospitalized for similar presentation 1/23/2025-1/29/2025; at that time, found to have ESBL klebsiella bacteremia iso neutropenic fever, treated with ertapenem  - Flu/Covid negative; CXR w/ small effusion   - RVP w/ hMPV - supportive care   - Procal 0.49   - 6/23 BCx2 w Gram neg rods; 6/25 BCx2 in lab, F/u results   - CT A/P w/ IV contrast , noted for liver abscess   - IR eval called - Per IR, too small for drainage. C/w ABX per ID   - ABX switched to Zosyn per ID  - IVF as tolerated  - on hold   - F/u atypical pna urine ag, mrsa screen  - Monitor for fever, changes in white count  - ID eval appreciated; F/u recs     Tachypnea  - Trial of IV Lasix 20 X 1, Pt received 3 units of PRBCs  - Check CXR  - Standing Duoneb   - On ABX   - Monitor O2 saturation; Supplement to maintain > 90%     Pancytopenia due to chemotherapy.   - Has had receiving zarxio, nplate, aranesp in the outpatient setting  - Hgb down-trending. No bleeding reported - Likely 2/2 recent chemo   - 3 unit of PRBCs total; check post-transfusion CBC, improved hgb level   - Maintain active T/S, large bore IV Access   - Transfuse for Hgb < 7.0 and plt <10K or < 50K w/ bleeding   - Heme/Onc eval appreciated; F/u recs     Pancreatic adenocarcinoma.   - H/o metastatic (pulm mets) pancreatic adenoca s/p whipple + vats w darby wedge resection and lll basilar segmentectomy + adj chemo (folfirinox) + palliative chemo (currently on gemtcitabine/abraxane), rcc s/p l partial nephrectomy  - Last Chemo reportedly on 6/19/2025  - Heme/Onc eval appreciated; F/u recs     RUE Edema  - Change IV Site  - Extremity elevation   - Check UE Duplex  - Monitor     HTN   - Continue to monitor and trend BP, VS and adjust as tolerated     HLD   - Lipid panel     Rheumatoid arthritis.   - Hold home meds for now     PPX       
78yo f w pmh htn, hld, metastatic (pulm mets) pancreatic adenoca s/p whipple + vats w darby wedge resection and lll basilar segmentectomy + adj chemo (folfirinox) + palliative chemo (currently on gemtcitabine/abraxane), rcc s/p l partial nephrectomy, ra, constipation, gastroparesis, p/w persistent fever iso recent chemo despite po abx use; in er, found to be meeting sepsis criteria w pancytopenia; c/f neutropenic fever iso recent chemo; admit to medicine for further mgmt      Sepsis.   - Pt febrile, tachycardic, leukopenic - meets sepsis criteria  - On recent Chemotherapy   - Of note, hospitalized for similar presentation 1/23/2025-1/29/2025; at that time, found to have ESBL klebsiella bacteremia iso neutropenic fever, treated with ertapenem  - Flu/Covid negative; CXR w/ small effusion   - RVP w/ hMPV - supportive care   - Procal 0.49   - 6/23 BCx2 w Gram neg rods; 6/25 BCx2 1/2 +, F/u repeat in lab from 6/20, F/u results   - CT A/P w/ IV contrast , noted for liver abscess   - IR eval called - Per IR, too small for drainage. C/w ABX per ID   - MR neg for infection   - ABX switched to Zosyn per ID  - IVF as tolerated  - on hold   - F/u atypical pna urine ag, mrsa screen  - Monitor for fever, changes in white count  - ID eval appreciated; F/u recs     Tachypnea  - Pt received 3 units of PRBCs  - Check CXR  - Standing Duoneb   - On ABX   - Monitor O2 saturation; Supplement to maintain > 90%   - Improved    Pancytopenia due to chemotherapy.   - Has had receiving zarxio, nplate, aranesp in the outpatient setting  - Hgb down-trending. No bleeding reported - Likely 2/2 recent chemo   - 3 unit of PRBCs total; check post-transfusion CBC, improved hgb level   - Maintain active T/S, large bore IV Access   - Transfuse for Hgb < 7.0 and plt <10K or < 50K w/ bleeding   - Heme/Onc eval appreciated; F/u recs     Pancreatic adenocarcinoma.   - H/o metastatic (pulm mets) pancreatic adenoca s/p whipple + vats w darby wedge resection and lll basilar segmentectomy + adj chemo (folfirinox) + palliative chemo (currently on gemtcitabine/abraxane), rcc s/p l partial nephrectomy  - Last Chemo reportedly on 6/19/2025  - Heme/Onc eval appreciated; F/u recs     RUE Edema  - Change IV Site  - Extremity elevation   - UE Duplex neg   - Monitor   - Improved    Back pain   - MR w/ Degenerative changes. Old compression deformity T12 without cord compression. Transitional vertebra at the lumbosacral junction. Grade 1 anterolisthesis L4 on L5 with degenerative spinal stenosis. Multifocal far lateral disc herniations. No evidence of infection, osteomyelitis or discitis. No drainable collections.  - Pt and son report they are aware of compression fracture and patient would not like to proceed with surgical intervention   - Pain control     HTN   - Continue to monitor and trend BP, VS and adjust as tolerated     HLD   - Lipid panel     Rheumatoid arthritis.   - Hold home meds for now     PPX       Discussed in detail with Son at the bedside.   Discussed with Attending. 
78yo f w pmh htn, hld, metastatic (pulm mets) pancreatic adenoca s/p whipple + vats w darby wedge resection and lll basilar segmentectomy + adj chemo (folfirinox) + palliative chemo (currently on gemtcitabine/abraxane), rcc s/p l partial nephrectomy, ra, constipation, gastroparesis, p/w persistent fever iso recent chemo despite po abx use; in er, found to be meeting sepsis criteria w pancytopenia; c/f neutropenic fever iso recent chemo; admit to medicine for further mgmt      Sepsis.   - Pt febrile, tachycardic, leukopenic - meets sepsis criteria  - On recent Chemotherapy   - Of note, hospitalized for similar presentation 1/23/2025-1/29/2025; at that time, found to have ESBL klebsiella bacteremia iso neutropenic fever, treated with ertapenem  - Flu/Covid negative; CXR w/ small effusion   - RVP w/ hMPV - supportive care   - Procal 0.49   - 6/23 BCx2 w Gram neg rods; 6/25 BCx2 in lab, F/u results   - CT A/P w/ IV contrast , noted for liver abscess   - IR eval called - Per IR, too small for drainage. C/w ABX per ID   - ABX switched to Zosyn per ID  - IVF as tolerated  - on hold   - F/u atypical pna urine ag, mrsa screen  - Monitor for fever, changes in white count  - ID eval appreciated; F/u recs     Tachypnea  - Trial of IV Lasix 20 X 1, Pt received 3 units of PRBCs  - Check CXR  - Standing Duoneb   - On ABX   - Monitor O2 saturation; Supplement to maintain > 90%     Pancytopenia due to chemotherapy.   - Has had receiving zarxio, nplate, aranesp in the outpatient setting  - Hgb down-trending. No bleeding reported - Likely 2/2 recent chemo   - 3 unit of PRBCs total; check post-transfusion CBC, improved hgb level   - Maintain active T/S, large bore IV Access   - Transfuse for Hgb < 7.0 and plt <10K or < 50K w/ bleeding   - Heme/Onc eval appreciated; F/u recs     Pancreatic adenocarcinoma.   - H/o metastatic (pulm mets) pancreatic adenoca s/p whipple + vats w darby wedge resection and lll basilar segmentectomy + adj chemo (folfirinox) + palliative chemo (currently on gemtcitabine/abraxane), rcc s/p l partial nephrectomy  - Last Chemo reportedly on 6/19/2025  - Heme/Onc eval appreciated; F/u recs     RUE Edema  - Change IV Site  - Extremity elevation   - Check UE Duplex  - Monitor     HTN   - Continue to monitor and trend BP, VS and adjust as tolerated     HLD   - Lipid panel     Rheumatoid arthritis.   - Hold home meds for now     PPX

## 2025-07-01 NOTE — DISCHARGE NOTE NURSING/CASE MANAGEMENT/SOCIAL WORK - PATIENT PORTAL LINK FT
You can access the FollowMyHealth Patient Portal offered by [facility] by registering at the following website: [email or web address]. By joining [facility]’s FollowMyHealth portal, you will also be able to view your health information using other applications (apps) compatible with our system.

## 2025-07-01 NOTE — DISCHARGE NOTE NURSING/CASE MANAGEMENT/SOCIAL WORK - FINANCIAL ASSISTANCE
[facility] provides services at a reduced cost to those who are determined to be eligible through [facility]’s financial assistance program. Information regarding [facility]’s financial assistance program can be found by going to [email or web address] or by calling [phone].

## 2025-07-01 NOTE — PROGRESS NOTE ADULT - PROVIDER SPECIALTY LIST ADULT
Heme/Onc
Infectious Disease
Infectious Disease
Internal Medicine
Internal Medicine
Heme/Onc
Infectious Disease
Internal Medicine
Heme/Onc
Internal Medicine
Internal Medicine
Heme/Onc
Heme/Onc
Infectious Disease
Internal Medicine

## 2025-07-01 NOTE — DISCHARGE NOTE PROVIDER - CARE PROVIDER_API CALL
Laquita Morrison)  Infectious Disease  [address] [place] [id number]  Phone: [phone]  Fax: [phone]  Scheduled Appointment: 07/21/2025

## 2025-07-01 NOTE — DISCHARGE NOTE PROVIDER - CARE PROVIDERS DIRECT ADDRESSES
[email or web address]

## 2025-07-01 NOTE — PROGRESS NOTE ADULT - NS ATTEND AMEND GEN_ALL_CORE FT
Patient seen and examined by me. patient care and plan discussed and reviewed with PA. Plan as outlined above edited by me to reflect our discussion. Advanced care planning/advanced directives discussed with patient/family. DNR status including forceful chest compressions to attempt to restart the heart, ventilator support/artificial breathing, electric shock, artificial nutrition, health care proxy, Molst form all discussed with pt. More than 50% of the visit was spent counseling and/or coordinating care by the attending physician. Sixteen minutes spent on discussing advanced directives.
cont abx  blood count much improved  follow up ID reccs
cont abx  due for nplate tomorrow at 45mcg for thrombocytopenia
planned for nplate today given for thrombocytopenia   continued care with ID
Patient seen and examined by me. patient care and plan discussed and reviewed with PA. Plan as outlined above edited by me to reflect our discussion.
Agree with above assessment and plan.   GNR bacteremia and human metapneumonvirus. Abx.   No plan for inpatient treatment of pancreatic cancer. Follow up outpatient with Dr Scanlon at [facility]
Agree with above assessment and plan.   GNR bacteremia and human metapneumonvirus. Abx. Clinically improving   No plan for inpatient treatment of pancreatic cancer. Follow up outpatient with Dr Scanlon at [facility]
Patient seen and examined by me. patient care and plan discussed and reviewed with PA. Plan as outlined above edited by me to reflect our discussion. Advanced care planning/advanced directives discussed with patient/family. DNR status including forceful chest compressions to attempt to restart the heart, ventilator support/artificial breathing, electric shock, artificial nutrition, health care proxy, Molst form all discussed with pt. More than 50% of the visit was spent counseling and/or coordinating care by the attending physician. Sixteen minutes spent on discussing advanced directives.
Patient seen and examined by me. patient care and plan discussed and reviewed with PA. Plan as outlined above edited by me to reflect our discussion. Advanced care planning/advanced directives discussed with patient/family. DNR status including forceful chest compressions to attempt to restart the heart, ventilator support/artificial breathing, electric shock, artificial nutrition, health care proxy, Molst form all discussed with pt. More than 50% of the visit was spent counseling and/or coordinating care by the attending physician. Sixteen minutes spent on discussing advanced directives.

## 2025-07-01 NOTE — DISCHARGE NOTE PROVIDER - DETAILS OF MALNUTRITION DIAGNOSIS/DIAGNOSES
This patient has been assessed with a concern for Malnutrition and was treated during this hospitalization for the following Nutrition diagnosis/diagnoses:     -  06/26/2025: Moderate protein-calorie malnutrition

## 2025-07-10 ENCOUNTER — NON-APPOINTMENT (OUTPATIENT)
Age: 80
End: 2025-07-10

## 2025-07-11 ENCOUNTER — NON-APPOINTMENT (OUTPATIENT)
Age: 80
End: 2025-07-11

## 2025-07-21 ENCOUNTER — APPOINTMENT (OUTPATIENT)
Dept: INFECTIOUS DISEASE | Facility: CLINIC | Age: 80
End: 2025-07-21

## 2025-07-28 ENCOUNTER — APPOINTMENT (OUTPATIENT)
Dept: INFECTIOUS DISEASE | Facility: CLINIC | Age: 80
End: 2025-07-28
Payer: MEDICARE

## 2025-07-28 ENCOUNTER — LABORATORY RESULT (OUTPATIENT)
Age: 80
End: 2025-07-28

## 2025-07-28 VITALS
HEART RATE: 86 BPM | HEIGHT: 64 IN | BODY MASS INDEX: 17.42 KG/M2 | DIASTOLIC BLOOD PRESSURE: 70 MMHG | WEIGHT: 102 LBS | SYSTOLIC BLOOD PRESSURE: 121 MMHG | OXYGEN SATURATION: 97 %

## 2025-07-28 DIAGNOSIS — K75.0 ABSCESS OF LIVER: ICD-10-CM

## 2025-07-28 DIAGNOSIS — R50.9 FEVER, UNSPECIFIED: ICD-10-CM

## 2025-07-28 DIAGNOSIS — C25.9 MALIGNANT NEOPLASM OF PANCREAS, UNSPECIFIED: ICD-10-CM

## 2025-07-28 PROCEDURE — 99214 OFFICE O/P EST MOD 30 MIN: CPT

## 2025-07-29 ENCOUNTER — LABORATORY RESULT (OUTPATIENT)
Age: 80
End: 2025-07-29

## 2025-07-31 ENCOUNTER — APPOINTMENT (OUTPATIENT)
Dept: CT IMAGING | Facility: IMAGING CENTER | Age: 80
End: 2025-07-31
Payer: MEDICARE

## 2025-07-31 ENCOUNTER — OUTPATIENT (OUTPATIENT)
Dept: OUTPATIENT SERVICES | Facility: HOSPITAL | Age: 80
LOS: 1 days | End: 2025-07-31
Payer: MEDICARE

## 2025-07-31 DIAGNOSIS — Z92.3 PERSONAL HISTORY OF IRRADIATION: Chronic | ICD-10-CM

## 2025-07-31 DIAGNOSIS — Z90.410 ACQUIRED TOTAL ABSENCE OF PANCREAS: Chronic | ICD-10-CM

## 2025-07-31 DIAGNOSIS — Z90.49 ACQUIRED ABSENCE OF OTHER SPECIFIED PARTS OF DIGESTIVE TRACT: Chronic | ICD-10-CM

## 2025-07-31 DIAGNOSIS — Z90.5 ACQUIRED ABSENCE OF KIDNEY: Chronic | ICD-10-CM

## 2025-07-31 DIAGNOSIS — Z92.21 PERSONAL HISTORY OF ANTINEOPLASTIC CHEMOTHERAPY: Chronic | ICD-10-CM

## 2025-07-31 DIAGNOSIS — Z98.890 OTHER SPECIFIED POSTPROCEDURAL STATES: Chronic | ICD-10-CM

## 2025-07-31 PROCEDURE — 74177 CT ABD & PELVIS W/CONTRAST: CPT | Mod: 26

## 2025-07-31 PROCEDURE — 74177 CT ABD & PELVIS W/CONTRAST: CPT

## 2025-08-16 ENCOUNTER — INPATIENT (INPATIENT)
Facility: HOSPITAL | Age: 80
LOS: 10 days | Discharge: HOME | DRG: 871 | End: 2025-08-27
Attending: HOSPITALIST | Admitting: STUDENT IN AN ORGANIZED HEALTH CARE EDUCATION/TRAINING PROGRAM
Payer: MEDICARE

## 2025-08-16 VITALS
HEIGHT: 60 IN | SYSTOLIC BLOOD PRESSURE: 109 MMHG | HEART RATE: 86 BPM | RESPIRATION RATE: 16 BRPM | OXYGEN SATURATION: 98 % | WEIGHT: 97 LBS | TEMPERATURE: 98 F | DIASTOLIC BLOOD PRESSURE: 70 MMHG

## 2025-08-16 DIAGNOSIS — Z90.49 ACQUIRED ABSENCE OF OTHER SPECIFIED PARTS OF DIGESTIVE TRACT: Chronic | ICD-10-CM

## 2025-08-16 DIAGNOSIS — Z90.5 ACQUIRED ABSENCE OF KIDNEY: Chronic | ICD-10-CM

## 2025-08-16 DIAGNOSIS — R50.9 FEVER, UNSPECIFIED: ICD-10-CM

## 2025-08-16 DIAGNOSIS — Z98.890 OTHER SPECIFIED POSTPROCEDURAL STATES: Chronic | ICD-10-CM

## 2025-08-16 DIAGNOSIS — Z92.3 PERSONAL HISTORY OF IRRADIATION: Chronic | ICD-10-CM

## 2025-08-16 DIAGNOSIS — Z92.21 PERSONAL HISTORY OF ANTINEOPLASTIC CHEMOTHERAPY: Chronic | ICD-10-CM

## 2025-08-16 DIAGNOSIS — Z90.410 ACQUIRED TOTAL ABSENCE OF PANCREAS: Chronic | ICD-10-CM

## 2025-08-16 LAB
ALBUMIN SERPL ELPH-MCNC: 3 G/DL — LOW (ref 3.3–5)
ALP SERPL-CCNC: 142 U/L — HIGH (ref 40–120)
ALT FLD-CCNC: 29 U/L — SIGNIFICANT CHANGE UP (ref 10–45)
ANION GAP SERPL CALC-SCNC: 13 MMOL/L — SIGNIFICANT CHANGE UP (ref 5–17)
APPEARANCE UR: CLEAR — SIGNIFICANT CHANGE UP
APTT BLD: 29.3 SEC — SIGNIFICANT CHANGE UP (ref 26.1–36.8)
AST SERPL-CCNC: 40 U/L — SIGNIFICANT CHANGE UP (ref 10–40)
BACTERIA # UR AUTO: NEGATIVE /HPF — SIGNIFICANT CHANGE UP
BASOPHILS # BLD AUTO: 0.05 K/UL — SIGNIFICANT CHANGE UP (ref 0–0.2)
BASOPHILS NFR BLD AUTO: 0.3 % — SIGNIFICANT CHANGE UP (ref 0–2)
BILIRUB SERPL-MCNC: 0.6 MG/DL — SIGNIFICANT CHANGE UP (ref 0.2–1.2)
BILIRUB UR-MCNC: NEGATIVE — SIGNIFICANT CHANGE UP
BUN SERPL-MCNC: 20 MG/DL — SIGNIFICANT CHANGE UP (ref 7–23)
CALCIUM SERPL-MCNC: 9.4 MG/DL — SIGNIFICANT CHANGE UP (ref 8.4–10.5)
CAST: 0 /LPF — SIGNIFICANT CHANGE UP (ref 0–4)
CHLORIDE SERPL-SCNC: 97 MMOL/L — SIGNIFICANT CHANGE UP (ref 96–108)
CO2 SERPL-SCNC: 20 MMOL/L — LOW (ref 22–31)
COLOR SPEC: YELLOW — SIGNIFICANT CHANGE UP
CREAT SERPL-MCNC: 0.64 MG/DL — SIGNIFICANT CHANGE UP (ref 0.5–1.3)
DIFF PNL FLD: NEGATIVE — SIGNIFICANT CHANGE UP
EGFR: 89 ML/MIN/1.73M2 — SIGNIFICANT CHANGE UP
EGFR: 89 ML/MIN/1.73M2 — SIGNIFICANT CHANGE UP
EOSINOPHIL # BLD AUTO: 0.01 K/UL — SIGNIFICANT CHANGE UP (ref 0–0.5)
EOSINOPHIL NFR BLD AUTO: 0.1 % — SIGNIFICANT CHANGE UP (ref 0–6)
FLUAV AG NPH QL: SIGNIFICANT CHANGE UP
FLUBV AG NPH QL: SIGNIFICANT CHANGE UP
GLUCOSE SERPL-MCNC: 115 MG/DL — HIGH (ref 70–99)
GLUCOSE UR QL: NEGATIVE MG/DL — SIGNIFICANT CHANGE UP
HCT VFR BLD CALC: 35.9 % — SIGNIFICANT CHANGE UP (ref 34.5–45)
HGB BLD-MCNC: 11 G/DL — LOW (ref 11.5–15.5)
IMM GRANULOCYTES # BLD AUTO: 0.09 K/UL — HIGH (ref 0–0.07)
IMM GRANULOCYTES NFR BLD AUTO: 0.6 % — SIGNIFICANT CHANGE UP (ref 0–0.9)
INR BLD: 1.03 RATIO — SIGNIFICANT CHANGE UP (ref 0.85–1.16)
KETONES UR QL: NEGATIVE MG/DL — SIGNIFICANT CHANGE UP
LEUKOCYTE ESTERASE UR-ACNC: NEGATIVE — SIGNIFICANT CHANGE UP
LYMPHOCYTES # BLD AUTO: 1.44 K/UL — SIGNIFICANT CHANGE UP (ref 1–3.3)
LYMPHOCYTES NFR BLD AUTO: 9.5 % — LOW (ref 13–44)
MCHC RBC-ENTMCNC: 30.6 G/DL — LOW (ref 32–36)
MCHC RBC-ENTMCNC: 31.3 PG — SIGNIFICANT CHANGE UP (ref 27–34)
MCV RBC AUTO: 102 FL — HIGH (ref 80–100)
MONOCYTES # BLD AUTO: 0.63 K/UL — SIGNIFICANT CHANGE UP (ref 0–0.9)
MONOCYTES NFR BLD AUTO: 4.1 % — SIGNIFICANT CHANGE UP (ref 2–14)
NEUTROPHILS # BLD AUTO: 12.99 K/UL — HIGH (ref 1.8–7.4)
NEUTROPHILS NFR BLD AUTO: 85.4 % — HIGH (ref 43–77)
NITRITE UR-MCNC: NEGATIVE — SIGNIFICANT CHANGE UP
NRBC # BLD AUTO: 0 K/UL — SIGNIFICANT CHANGE UP (ref 0–0)
NRBC # FLD: 0 K/UL — SIGNIFICANT CHANGE UP (ref 0–0)
NRBC BLD AUTO-RTO: 0 /100 WBCS — SIGNIFICANT CHANGE UP (ref 0–0)
PH UR: 7 — SIGNIFICANT CHANGE UP (ref 5–8)
PLATELET # BLD AUTO: 179 K/UL — SIGNIFICANT CHANGE UP (ref 150–400)
PMV BLD: 9.9 FL — SIGNIFICANT CHANGE UP (ref 7–13)
POTASSIUM SERPL-MCNC: 4.8 MMOL/L — SIGNIFICANT CHANGE UP (ref 3.5–5.3)
POTASSIUM SERPL-SCNC: 4.8 MMOL/L — SIGNIFICANT CHANGE UP (ref 3.5–5.3)
PROT SERPL-MCNC: 7.2 G/DL — SIGNIFICANT CHANGE UP (ref 6–8.3)
PROT UR-MCNC: NEGATIVE MG/DL — SIGNIFICANT CHANGE UP
PROTHROM AB SERPL-ACNC: 11.7 SEC — SIGNIFICANT CHANGE UP (ref 9.9–13.4)
RBC # BLD: 3.52 M/UL — LOW (ref 3.8–5.2)
RBC # FLD: 19.1 % — HIGH (ref 10.3–14.5)
RBC CASTS # UR COMP ASSIST: 0 /HPF — SIGNIFICANT CHANGE UP (ref 0–4)
REVIEW: SIGNIFICANT CHANGE UP
RSV RNA NPH QL NAA+NON-PROBE: SIGNIFICANT CHANGE UP
SARS-COV-2 RNA SPEC QL NAA+PROBE: SIGNIFICANT CHANGE UP
SODIUM SERPL-SCNC: 130 MMOL/L — LOW (ref 135–145)
SOURCE RESPIRATORY: SIGNIFICANT CHANGE UP
SP GR SPEC: 1 — SIGNIFICANT CHANGE UP (ref 1–1.03)
SQUAMOUS # UR AUTO: 0 /HPF — SIGNIFICANT CHANGE UP (ref 0–5)
UROBILINOGEN FLD QL: 0.2 MG/DL — SIGNIFICANT CHANGE UP (ref 0.2–1)
WBC # BLD: 15.21 K/UL — HIGH (ref 3.8–10.5)
WBC # FLD AUTO: 15.21 K/UL — HIGH (ref 3.8–10.5)
WBC UR QL: 0 /HPF — SIGNIFICANT CHANGE UP (ref 0–5)

## 2025-08-16 PROCEDURE — 83605 ASSAY OF LACTIC ACID: CPT

## 2025-08-16 PROCEDURE — 82330 ASSAY OF CALCIUM: CPT

## 2025-08-16 PROCEDURE — 87637 SARSCOV2&INF A&B&RSV AMP PRB: CPT

## 2025-08-16 PROCEDURE — 71045 X-RAY EXAM CHEST 1 VIEW: CPT

## 2025-08-16 PROCEDURE — 85025 COMPLETE CBC W/AUTO DIFF WBC: CPT

## 2025-08-16 PROCEDURE — 82803 BLOOD GASES ANY COMBINATION: CPT

## 2025-08-16 PROCEDURE — 85610 PROTHROMBIN TIME: CPT

## 2025-08-16 PROCEDURE — 71260 CT THORAX DX C+: CPT

## 2025-08-16 PROCEDURE — 82435 ASSAY OF BLOOD CHLORIDE: CPT

## 2025-08-16 PROCEDURE — 85014 HEMATOCRIT: CPT

## 2025-08-16 PROCEDURE — 80053 COMPREHEN METABOLIC PANEL: CPT

## 2025-08-16 PROCEDURE — 84132 ASSAY OF SERUM POTASSIUM: CPT

## 2025-08-16 PROCEDURE — 93010 ELECTROCARDIOGRAM REPORT: CPT

## 2025-08-16 PROCEDURE — 85730 THROMBOPLASTIN TIME PARTIAL: CPT

## 2025-08-16 PROCEDURE — 71260 CT THORAX DX C+: CPT | Mod: 26

## 2025-08-16 PROCEDURE — 74177 CT ABD & PELVIS W/CONTRAST: CPT | Mod: 26

## 2025-08-16 PROCEDURE — 82947 ASSAY GLUCOSE BLOOD QUANT: CPT

## 2025-08-16 PROCEDURE — 84295 ASSAY OF SERUM SODIUM: CPT

## 2025-08-16 PROCEDURE — 71045 X-RAY EXAM CHEST 1 VIEW: CPT | Mod: 26

## 2025-08-16 PROCEDURE — 87040 BLOOD CULTURE FOR BACTERIA: CPT

## 2025-08-16 PROCEDURE — 74177 CT ABD & PELVIS W/CONTRAST: CPT

## 2025-08-16 PROCEDURE — 81001 URINALYSIS AUTO W/SCOPE: CPT

## 2025-08-16 PROCEDURE — 99285 EMERGENCY DEPT VISIT HI MDM: CPT

## 2025-08-16 PROCEDURE — 85018 HEMOGLOBIN: CPT

## 2025-08-16 PROCEDURE — 87086 URINE CULTURE/COLONY COUNT: CPT

## 2025-08-16 RX ORDER — ACETAMINOPHEN 500 MG/5ML
650 LIQUID (ML) ORAL EVERY 6 HOURS
Refills: 0 | Status: DISCONTINUED | OUTPATIENT
Start: 2025-08-16 | End: 2025-08-27

## 2025-08-16 RX ORDER — LIDOCAINE HYDROCHLORIDE 20 MG/ML
1 JELLY TOPICAL DAILY
Refills: 0 | Status: DISCONTINUED | OUTPATIENT
Start: 2025-08-16 | End: 2025-08-27

## 2025-08-16 RX ORDER — PIPERACILLIN-TAZO-DEXTROSE,ISO 3.375G/5
3.38 IV SOLUTION, PIGGYBACK PREMIX FROZEN(ML) INTRAVENOUS ONCE
Refills: 0 | Status: COMPLETED | OUTPATIENT
Start: 2025-08-16 | End: 2025-08-16

## 2025-08-16 RX ORDER — SENNA 187 MG
2 TABLET ORAL AT BEDTIME
Refills: 0 | Status: DISCONTINUED | OUTPATIENT
Start: 2025-08-16 | End: 2025-08-27

## 2025-08-16 RX ORDER — MAGNESIUM, ALUMINUM HYDROXIDE 200-200 MG
30 TABLET,CHEWABLE ORAL EVERY 4 HOURS
Refills: 0 | Status: DISCONTINUED | OUTPATIENT
Start: 2025-08-16 | End: 2025-08-27

## 2025-08-16 RX ORDER — BISACODYL 5 MG
5 TABLET, DELAYED RELEASE (ENTERIC COATED) ORAL DAILY
Refills: 0 | Status: DISCONTINUED | OUTPATIENT
Start: 2025-08-16 | End: 2025-08-27

## 2025-08-16 RX ORDER — NALOXONE HYDROCHLORIDE 0.4 MG/ML
0.4 INJECTION, SOLUTION INTRAMUSCULAR; INTRAVENOUS; SUBCUTANEOUS ONCE
Refills: 0 | Status: DISCONTINUED | OUTPATIENT
Start: 2025-08-16 | End: 2025-08-27

## 2025-08-16 RX ORDER — ACETAMINOPHEN 500 MG/5ML
650 LIQUID (ML) ORAL ONCE
Refills: 0 | Status: COMPLETED | OUTPATIENT
Start: 2025-08-16 | End: 2025-08-16

## 2025-08-16 RX ORDER — OXYCODONE HYDROCHLORIDE 30 MG/1
2.5 TABLET ORAL EVERY 4 HOURS
Refills: 0 | Status: DISCONTINUED | OUTPATIENT
Start: 2025-08-16 | End: 2025-08-17

## 2025-08-16 RX ORDER — ONDANSETRON HCL/PF 4 MG/2 ML
4 VIAL (ML) INJECTION EVERY 8 HOURS
Refills: 0 | Status: DISCONTINUED | OUTPATIENT
Start: 2025-08-16 | End: 2025-08-27

## 2025-08-16 RX ORDER — OXYCODONE HYDROCHLORIDE 30 MG/1
5 TABLET ORAL EVERY 4 HOURS
Refills: 0 | Status: DISCONTINUED | OUTPATIENT
Start: 2025-08-16 | End: 2025-08-17

## 2025-08-16 RX ORDER — OXYCODONE HYDROCHLORIDE 30 MG/1
5 TABLET ORAL EVERY 4 HOURS
Refills: 0 | Status: DISCONTINUED | OUTPATIENT
Start: 2025-08-16 | End: 2025-08-16

## 2025-08-16 RX ORDER — VANCOMYCIN HCL IN 5 % DEXTROSE 1.5G/250ML
1000 PLASTIC BAG, INJECTION (ML) INTRAVENOUS ONCE
Refills: 0 | Status: COMPLETED | OUTPATIENT
Start: 2025-08-16 | End: 2025-08-16

## 2025-08-16 RX ORDER — POLYETHYLENE GLYCOL 3350 17 G/17G
17 POWDER, FOR SOLUTION ORAL DAILY
Refills: 0 | Status: DISCONTINUED | OUTPATIENT
Start: 2025-08-16 | End: 2025-08-24

## 2025-08-16 RX ORDER — MELATONIN 5 MG
3 TABLET ORAL AT BEDTIME
Refills: 0 | Status: DISCONTINUED | OUTPATIENT
Start: 2025-08-16 | End: 2025-08-27

## 2025-08-16 RX ADMIN — Medication 250 MILLIGRAM(S): at 17:03

## 2025-08-16 RX ADMIN — Medication 260 MILLIGRAM(S): at 17:46

## 2025-08-16 RX ADMIN — Medication 200 GRAM(S): at 14:23

## 2025-08-16 RX ADMIN — Medication 1000 MILLILITER(S): at 14:23

## 2025-08-16 RX ADMIN — OXYCODONE HYDROCHLORIDE 5 MILLIGRAM(S): 30 TABLET ORAL at 23:33

## 2025-08-17 DIAGNOSIS — M54.9 DORSALGIA, UNSPECIFIED: ICD-10-CM

## 2025-08-17 DIAGNOSIS — J98.4 OTHER DISORDERS OF LUNG: ICD-10-CM

## 2025-08-17 DIAGNOSIS — Z29.9 ENCOUNTER FOR PROPHYLACTIC MEASURES, UNSPECIFIED: ICD-10-CM

## 2025-08-17 DIAGNOSIS — D72.829 ELEVATED WHITE BLOOD CELL COUNT, UNSPECIFIED: ICD-10-CM

## 2025-08-17 DIAGNOSIS — Z91.89 OTHER SPECIFIED PERSONAL RISK FACTORS, NOT ELSEWHERE CLASSIFIED: ICD-10-CM

## 2025-08-17 DIAGNOSIS — E87.1 HYPO-OSMOLALITY AND HYPONATREMIA: ICD-10-CM

## 2025-08-17 DIAGNOSIS — C25.9 MALIGNANT NEOPLASM OF PANCREAS, UNSPECIFIED: ICD-10-CM

## 2025-08-17 DIAGNOSIS — K75.0 ABSCESS OF LIVER: ICD-10-CM

## 2025-08-17 LAB
ANION GAP SERPL CALC-SCNC: 10 MMOL/L — SIGNIFICANT CHANGE UP (ref 5–17)
APPEARANCE UR: CLEAR — SIGNIFICANT CHANGE UP
BACTERIA # UR AUTO: NEGATIVE /HPF — SIGNIFICANT CHANGE UP
BASOPHILS # BLD AUTO: 0.02 K/UL — SIGNIFICANT CHANGE UP (ref 0–0.2)
BASOPHILS NFR BLD AUTO: 0.3 % — SIGNIFICANT CHANGE UP (ref 0–2)
BILIRUB UR-MCNC: NEGATIVE — SIGNIFICANT CHANGE UP
BUN SERPL-MCNC: 16 MG/DL — SIGNIFICANT CHANGE UP (ref 7–23)
CALCIUM SERPL-MCNC: 8.9 MG/DL — SIGNIFICANT CHANGE UP (ref 8.4–10.5)
CAST: 0 /LPF — SIGNIFICANT CHANGE UP (ref 0–4)
CHLORIDE SERPL-SCNC: 104 MMOL/L — SIGNIFICANT CHANGE UP (ref 96–108)
CO2 SERPL-SCNC: 23 MMOL/L — SIGNIFICANT CHANGE UP (ref 22–31)
COLOR SPEC: YELLOW — SIGNIFICANT CHANGE UP
CREAT ?TM UR-MCNC: 19 MG/DL — SIGNIFICANT CHANGE UP
CREAT SERPL-MCNC: 0.67 MG/DL — SIGNIFICANT CHANGE UP (ref 0.5–1.3)
CULTURE RESULTS: SIGNIFICANT CHANGE UP
DIFF PNL FLD: NEGATIVE — SIGNIFICANT CHANGE UP
EGFR: 88 ML/MIN/1.73M2 — SIGNIFICANT CHANGE UP
EGFR: 88 ML/MIN/1.73M2 — SIGNIFICANT CHANGE UP
EOSINOPHIL # BLD AUTO: 0.04 K/UL — SIGNIFICANT CHANGE UP (ref 0–0.5)
EOSINOPHIL NFR BLD AUTO: 0.6 % — SIGNIFICANT CHANGE UP (ref 0–6)
GLUCOSE SERPL-MCNC: 77 MG/DL — SIGNIFICANT CHANGE UP (ref 70–99)
GLUCOSE UR QL: NEGATIVE MG/DL — SIGNIFICANT CHANGE UP
HCT VFR BLD CALC: 33.3 % — LOW (ref 34.5–45)
HGB BLD-MCNC: 9.9 G/DL — LOW (ref 11.5–15.5)
IMM GRANULOCYTES # BLD AUTO: 0.01 K/UL — SIGNIFICANT CHANGE UP (ref 0–0.07)
IMM GRANULOCYTES NFR BLD AUTO: 0.1 % — SIGNIFICANT CHANGE UP (ref 0–0.9)
KETONES UR QL: NEGATIVE MG/DL — SIGNIFICANT CHANGE UP
LEUKOCYTE ESTERASE UR-ACNC: ABNORMAL
LYMPHOCYTES # BLD AUTO: 1.01 K/UL — SIGNIFICANT CHANGE UP (ref 1–3.3)
LYMPHOCYTES NFR BLD AUTO: 15 % — SIGNIFICANT CHANGE UP (ref 13–44)
MAGNESIUM SERPL-MCNC: 2.1 MG/DL — SIGNIFICANT CHANGE UP (ref 1.6–2.6)
MCHC RBC-ENTMCNC: 29.7 G/DL — LOW (ref 32–36)
MCHC RBC-ENTMCNC: 31.1 PG — SIGNIFICANT CHANGE UP (ref 27–34)
MCV RBC AUTO: 104.7 FL — HIGH (ref 80–100)
MONOCYTES # BLD AUTO: 0.56 K/UL — SIGNIFICANT CHANGE UP (ref 0–0.9)
MONOCYTES NFR BLD AUTO: 8.3 % — SIGNIFICANT CHANGE UP (ref 2–14)
NEUTROPHILS # BLD AUTO: 5.08 K/UL — SIGNIFICANT CHANGE UP (ref 1.8–7.4)
NEUTROPHILS NFR BLD AUTO: 75.7 % — SIGNIFICANT CHANGE UP (ref 43–77)
NITRITE UR-MCNC: NEGATIVE — SIGNIFICANT CHANGE UP
NRBC # BLD AUTO: 0 K/UL — SIGNIFICANT CHANGE UP (ref 0–0)
NRBC # FLD: 0 K/UL — SIGNIFICANT CHANGE UP (ref 0–0)
NRBC BLD AUTO-RTO: 0 /100 WBCS — SIGNIFICANT CHANGE UP (ref 0–0)
OSMOLALITY SERPL: 287 MOSMOL/KG — SIGNIFICANT CHANGE UP (ref 280–301)
OSMOLALITY UR: 279 MOSM/KG — SIGNIFICANT CHANGE UP (ref 50–1200)
PH UR: 6 — SIGNIFICANT CHANGE UP (ref 5–8)
PHOSPHATE SERPL-MCNC: 3.5 MG/DL — SIGNIFICANT CHANGE UP (ref 2.5–4.5)
PLATELET # BLD AUTO: 153 K/UL — SIGNIFICANT CHANGE UP (ref 150–400)
PMV BLD: 10.9 FL — SIGNIFICANT CHANGE UP (ref 7–13)
POTASSIUM SERPL-MCNC: 3.7 MMOL/L — SIGNIFICANT CHANGE UP (ref 3.5–5.3)
POTASSIUM SERPL-SCNC: 3.7 MMOL/L — SIGNIFICANT CHANGE UP (ref 3.5–5.3)
POTASSIUM UR-SCNC: 13 MMOL/L — SIGNIFICANT CHANGE UP
PROT ?TM UR-MCNC: <7 MG/DL — SIGNIFICANT CHANGE UP (ref 0–12)
PROT UR-MCNC: NEGATIVE MG/DL — SIGNIFICANT CHANGE UP
PROT/CREAT UR-RTO: SIGNIFICANT CHANGE UP (ref 0–0.2)
RBC # BLD: 3.18 M/UL — LOW (ref 3.8–5.2)
RBC # FLD: 19 % — HIGH (ref 10.3–14.5)
RBC CASTS # UR COMP ASSIST: 0 /HPF — SIGNIFICANT CHANGE UP (ref 0–4)
SODIUM SERPL-SCNC: 137 MMOL/L — SIGNIFICANT CHANGE UP (ref 135–145)
SODIUM UR-SCNC: 63 MMOL/L — SIGNIFICANT CHANGE UP
SP GR SPEC: 1.02 — SIGNIFICANT CHANGE UP (ref 1–1.03)
SPECIMEN SOURCE: SIGNIFICANT CHANGE UP
SQUAMOUS # UR AUTO: 2 /HPF — SIGNIFICANT CHANGE UP (ref 0–5)
UROBILINOGEN FLD QL: 0.2 MG/DL — SIGNIFICANT CHANGE UP (ref 0.2–1)
UUN UR-MCNC: 260 MG/DL — SIGNIFICANT CHANGE UP
WBC # BLD: 6.72 K/UL — SIGNIFICANT CHANGE UP (ref 3.8–10.5)
WBC # FLD AUTO: 6.72 K/UL — SIGNIFICANT CHANGE UP (ref 3.8–10.5)
WBC UR QL: 2 /HPF — SIGNIFICANT CHANGE UP (ref 0–5)

## 2025-08-17 PROCEDURE — 99223 1ST HOSP IP/OBS HIGH 75: CPT

## 2025-08-17 RX ORDER — B1/B2/B3/B5/B6/B12/VIT C/FOLIC 500-0.5 MG
1 TABLET ORAL DAILY
Refills: 0 | Status: DISCONTINUED | OUTPATIENT
Start: 2025-08-17 | End: 2025-08-27

## 2025-08-17 RX ORDER — OXYCODONE HYDROCHLORIDE 30 MG/1
10 TABLET ORAL EVERY 4 HOURS
Refills: 0 | Status: DISCONTINUED | OUTPATIENT
Start: 2025-08-17 | End: 2025-08-19

## 2025-08-17 RX ORDER — LIPASE/PROTEASE/AMYLASE 10K-37.5K
2 CAPSULE,DELAYED RELEASE (ENTERIC COATED) ORAL
Refills: 0 | Status: DISCONTINUED | OUTPATIENT
Start: 2025-08-17 | End: 2025-08-17

## 2025-08-17 RX ORDER — OXYCODONE HYDROCHLORIDE 30 MG/1
5 TABLET ORAL EVERY 4 HOURS
Refills: 0 | Status: DISCONTINUED | OUTPATIENT
Start: 2025-08-17 | End: 2025-08-19

## 2025-08-17 RX ORDER — LIPASE/PROTEASE/AMYLASE 10K-37.5K
2 CAPSULE,DELAYED RELEASE (ENTERIC COATED) ORAL
Refills: 0 | Status: DISCONTINUED | OUTPATIENT
Start: 2025-08-17 | End: 2025-08-27

## 2025-08-17 RX ORDER — ENOXAPARIN SODIUM 100 MG/ML
40 INJECTION SUBCUTANEOUS EVERY 24 HOURS
Refills: 0 | Status: COMPLETED | OUTPATIENT
Start: 2025-08-17 | End: 2025-08-19

## 2025-08-17 RX ORDER — GABAPENTIN 400 MG/1
1 CAPSULE ORAL
Refills: 0 | DISCHARGE

## 2025-08-17 RX ORDER — FOLIC ACID 1 MG/1
1 TABLET ORAL DAILY
Refills: 0 | Status: DISCONTINUED | OUTPATIENT
Start: 2025-08-17 | End: 2025-08-27

## 2025-08-17 RX ADMIN — OXYCODONE HYDROCHLORIDE 5 MILLIGRAM(S): 30 TABLET ORAL at 12:40

## 2025-08-17 RX ADMIN — ENOXAPARIN SODIUM 40 MILLIGRAM(S): 100 INJECTION SUBCUTANEOUS at 11:38

## 2025-08-17 RX ADMIN — Medication 3 MILLIGRAM(S): at 00:02

## 2025-08-17 RX ADMIN — Medication 1 TABLET(S): at 11:32

## 2025-08-17 RX ADMIN — OXYCODONE HYDROCHLORIDE 10 MILLIGRAM(S): 30 TABLET ORAL at 13:52

## 2025-08-17 RX ADMIN — OXYCODONE HYDROCHLORIDE 5 MILLIGRAM(S): 30 TABLET ORAL at 22:53

## 2025-08-17 RX ADMIN — Medication 2 CAPSULE(S): at 17:04

## 2025-08-17 RX ADMIN — OXYCODONE HYDROCHLORIDE 5 MILLIGRAM(S): 30 TABLET ORAL at 21:53

## 2025-08-17 RX ADMIN — LIDOCAINE HYDROCHLORIDE 1 PATCH: 20 JELLY TOPICAL at 23:31

## 2025-08-17 RX ADMIN — OXYCODONE HYDROCHLORIDE 5 MILLIGRAM(S): 30 TABLET ORAL at 00:33

## 2025-08-17 RX ADMIN — OXYCODONE HYDROCHLORIDE 5 MILLIGRAM(S): 30 TABLET ORAL at 11:38

## 2025-08-17 RX ADMIN — Medication 2 CAPSULE(S): at 08:36

## 2025-08-17 RX ADMIN — Medication 650 MILLIGRAM(S): at 02:52

## 2025-08-17 RX ADMIN — FOLIC ACID 1 MILLIGRAM(S): 1 TABLET ORAL at 11:32

## 2025-08-17 RX ADMIN — Medication 2 CAPSULE(S): at 11:38

## 2025-08-17 RX ADMIN — Medication 650 MILLIGRAM(S): at 01:52

## 2025-08-17 RX ADMIN — OXYCODONE HYDROCHLORIDE 10 MILLIGRAM(S): 30 TABLET ORAL at 14:50

## 2025-08-17 RX ADMIN — OXYCODONE HYDROCHLORIDE 5 MILLIGRAM(S): 30 TABLET ORAL at 07:00

## 2025-08-17 RX ADMIN — LIDOCAINE HYDROCHLORIDE 1 PATCH: 20 JELLY TOPICAL at 11:31

## 2025-08-18 LAB
ALBUMIN SERPL ELPH-MCNC: 2.7 G/DL — LOW (ref 3.3–5)
ALP SERPL-CCNC: 121 U/L — HIGH (ref 40–120)
ALT FLD-CCNC: 22 U/L — SIGNIFICANT CHANGE UP (ref 10–45)
ANION GAP SERPL CALC-SCNC: 14 MMOL/L — SIGNIFICANT CHANGE UP (ref 5–17)
AST SERPL-CCNC: 26 U/L — SIGNIFICANT CHANGE UP (ref 10–40)
BASOPHILS # BLD AUTO: 0.03 K/UL — SIGNIFICANT CHANGE UP (ref 0–0.2)
BASOPHILS NFR BLD AUTO: 0.3 % — SIGNIFICANT CHANGE UP (ref 0–2)
BILIRUB SERPL-MCNC: 0.5 MG/DL — SIGNIFICANT CHANGE UP (ref 0.2–1.2)
BUN SERPL-MCNC: 15 MG/DL — SIGNIFICANT CHANGE UP (ref 7–23)
CALCIUM SERPL-MCNC: 8.9 MG/DL — SIGNIFICANT CHANGE UP (ref 8.4–10.5)
CHLORIDE SERPL-SCNC: 100 MMOL/L — SIGNIFICANT CHANGE UP (ref 96–108)
CO2 SERPL-SCNC: 22 MMOL/L — SIGNIFICANT CHANGE UP (ref 22–31)
CREAT SERPL-MCNC: 0.67 MG/DL — SIGNIFICANT CHANGE UP (ref 0.5–1.3)
EGFR: 88 ML/MIN/1.73M2 — SIGNIFICANT CHANGE UP
EGFR: 88 ML/MIN/1.73M2 — SIGNIFICANT CHANGE UP
EOSINOPHIL # BLD AUTO: 0.01 K/UL — SIGNIFICANT CHANGE UP (ref 0–0.5)
EOSINOPHIL NFR BLD AUTO: 0.1 % — SIGNIFICANT CHANGE UP (ref 0–6)
FERRITIN SERPL-MCNC: 794 NG/ML — HIGH (ref 13–330)
FOLATE SERPL-MCNC: >20 NG/ML — SIGNIFICANT CHANGE UP
GLUCOSE SERPL-MCNC: 102 MG/DL — HIGH (ref 70–99)
HAPTOGLOB SERPL-MCNC: 134 MG/DL — SIGNIFICANT CHANGE UP (ref 34–200)
HCT VFR BLD CALC: 32.4 % — LOW (ref 34.5–45)
HGB BLD-MCNC: 10.2 G/DL — LOW (ref 11.5–15.5)
IMM GRANULOCYTES # BLD AUTO: 0.04 K/UL — SIGNIFICANT CHANGE UP (ref 0–0.07)
IMM GRANULOCYTES NFR BLD AUTO: 0.4 % — SIGNIFICANT CHANGE UP (ref 0–0.9)
IMMATURE RETICULOCYTE FRACTION %: 14.6 % — SIGNIFICANT CHANGE UP
IRON SATN MFR SERPL: 12 % — LOW (ref 14–50)
IRON SATN MFR SERPL: 18 UG/DL — LOW (ref 30–160)
LYMPHOCYTES # BLD AUTO: 2.54 K/UL — SIGNIFICANT CHANGE UP (ref 1–3.3)
LYMPHOCYTES NFR BLD AUTO: 22.9 % — SIGNIFICANT CHANGE UP (ref 13–44)
MAGNESIUM SERPL-MCNC: 2.1 MG/DL — SIGNIFICANT CHANGE UP (ref 1.6–2.6)
MCHC RBC-ENTMCNC: 31.5 G/DL — LOW (ref 32–36)
MCHC RBC-ENTMCNC: 31.5 PG — SIGNIFICANT CHANGE UP (ref 27–34)
MCV RBC AUTO: 100 FL — SIGNIFICANT CHANGE UP (ref 80–100)
MONOCYTES # BLD AUTO: 0.96 K/UL — HIGH (ref 0–0.9)
MONOCYTES NFR BLD AUTO: 8.6 % — SIGNIFICANT CHANGE UP (ref 2–14)
NEUTROPHILS # BLD AUTO: 7.52 K/UL — HIGH (ref 1.8–7.4)
NEUTROPHILS NFR BLD AUTO: 67.7 % — SIGNIFICANT CHANGE UP (ref 43–77)
NRBC # BLD AUTO: 0 K/UL — SIGNIFICANT CHANGE UP (ref 0–0)
NRBC # FLD: 0 K/UL — SIGNIFICANT CHANGE UP (ref 0–0)
NRBC BLD AUTO-RTO: 0 /100 WBCS — SIGNIFICANT CHANGE UP (ref 0–0)
PHOSPHATE SERPL-MCNC: 3.3 MG/DL — SIGNIFICANT CHANGE UP (ref 2.5–4.5)
PLATELET # BLD AUTO: 178 K/UL — SIGNIFICANT CHANGE UP (ref 150–400)
PMV BLD: 11 FL — SIGNIFICANT CHANGE UP (ref 7–13)
POTASSIUM SERPL-MCNC: 4 MMOL/L — SIGNIFICANT CHANGE UP (ref 3.5–5.3)
POTASSIUM SERPL-SCNC: 4 MMOL/L — SIGNIFICANT CHANGE UP (ref 3.5–5.3)
PROT SERPL-MCNC: 6.5 G/DL — SIGNIFICANT CHANGE UP (ref 6–8.3)
RBC # BLD: 3.24 M/UL — LOW (ref 3.8–5.2)
RBC # BLD: 3.24 M/UL — LOW (ref 3.8–5.2)
RBC # FLD: 18.2 % — HIGH (ref 10.3–14.5)
RETICS #: 110.2 K/UL — SIGNIFICANT CHANGE UP (ref 25–125)
RETICS/RBC NFR: 3.4 % — HIGH (ref 0.5–2.5)
RETICULOCYTE HEMOGLOBIN EQUIVALENT: 29.2 PG — LOW (ref 30.6–40.7)
SODIUM SERPL-SCNC: 136 MMOL/L — SIGNIFICANT CHANGE UP (ref 135–145)
TIBC SERPL-MCNC: 153 UG/DL — LOW (ref 220–430)
UIBC SERPL-MCNC: 136 UG/DL — SIGNIFICANT CHANGE UP (ref 110–370)
VIT B12 SERPL-MCNC: 550 PG/ML — SIGNIFICANT CHANGE UP (ref 232–1245)
WBC # BLD: 11.1 K/UL — HIGH (ref 3.8–10.5)
WBC # FLD AUTO: 11.1 K/UL — HIGH (ref 3.8–10.5)

## 2025-08-18 PROCEDURE — 36415 COLL VENOUS BLD VENIPUNCTURE: CPT

## 2025-08-18 PROCEDURE — 85025 COMPLETE CBC W/AUTO DIFF WBC: CPT

## 2025-08-18 PROCEDURE — 85730 THROMBOPLASTIN TIME PARTIAL: CPT

## 2025-08-18 PROCEDURE — 85018 HEMOGLOBIN: CPT

## 2025-08-18 PROCEDURE — 84300 ASSAY OF URINE SODIUM: CPT

## 2025-08-18 PROCEDURE — 84133 ASSAY OF URINE POTASSIUM: CPT

## 2025-08-18 PROCEDURE — 99233 SBSQ HOSP IP/OBS HIGH 50: CPT | Mod: GC

## 2025-08-18 PROCEDURE — 87086 URINE CULTURE/COLONY COUNT: CPT

## 2025-08-18 PROCEDURE — 83935 ASSAY OF URINE OSMOLALITY: CPT

## 2025-08-18 PROCEDURE — 83010 ASSAY OF HAPTOGLOBIN QUANT: CPT

## 2025-08-18 PROCEDURE — 84295 ASSAY OF SERUM SODIUM: CPT

## 2025-08-18 PROCEDURE — 83930 ASSAY OF BLOOD OSMOLALITY: CPT

## 2025-08-18 PROCEDURE — 81001 URINALYSIS AUTO W/SCOPE: CPT

## 2025-08-18 PROCEDURE — 72158 MRI LUMBAR SPINE W/O & W/DYE: CPT

## 2025-08-18 PROCEDURE — 80053 COMPREHEN METABOLIC PANEL: CPT

## 2025-08-18 PROCEDURE — 84540 ASSAY OF URINE/UREA-N: CPT

## 2025-08-18 PROCEDURE — 83605 ASSAY OF LACTIC ACID: CPT

## 2025-08-18 PROCEDURE — 82570 ASSAY OF URINE CREATININE: CPT

## 2025-08-18 PROCEDURE — 80048 BASIC METABOLIC PNL TOTAL CA: CPT

## 2025-08-18 PROCEDURE — 83540 ASSAY OF IRON: CPT

## 2025-08-18 PROCEDURE — 87637 SARSCOV2&INF A&B&RSV AMP PRB: CPT

## 2025-08-18 PROCEDURE — 85610 PROTHROMBIN TIME: CPT

## 2025-08-18 PROCEDURE — 84156 ASSAY OF PROTEIN URINE: CPT

## 2025-08-18 PROCEDURE — 93005 ELECTROCARDIOGRAM TRACING: CPT

## 2025-08-18 PROCEDURE — 85014 HEMATOCRIT: CPT

## 2025-08-18 PROCEDURE — 84100 ASSAY OF PHOSPHORUS: CPT

## 2025-08-18 PROCEDURE — 73723 MRI JOINT LWR EXTR W/O&W/DYE: CPT | Mod: 26,RT

## 2025-08-18 PROCEDURE — 82330 ASSAY OF CALCIUM: CPT

## 2025-08-18 PROCEDURE — 71045 X-RAY EXAM CHEST 1 VIEW: CPT

## 2025-08-18 PROCEDURE — 71260 CT THORAX DX C+: CPT

## 2025-08-18 PROCEDURE — 82435 ASSAY OF BLOOD CHLORIDE: CPT

## 2025-08-18 PROCEDURE — 82728 ASSAY OF FERRITIN: CPT

## 2025-08-18 PROCEDURE — 97161 PT EVAL LOW COMPLEX 20 MIN: CPT

## 2025-08-18 PROCEDURE — 82607 VITAMIN B-12: CPT

## 2025-08-18 PROCEDURE — 83550 IRON BINDING TEST: CPT

## 2025-08-18 PROCEDURE — 82947 ASSAY GLUCOSE BLOOD QUANT: CPT

## 2025-08-18 PROCEDURE — 72158 MRI LUMBAR SPINE W/O & W/DYE: CPT | Mod: 26

## 2025-08-18 PROCEDURE — 83735 ASSAY OF MAGNESIUM: CPT

## 2025-08-18 PROCEDURE — 73723 MRI JOINT LWR EXTR W/O&W/DYE: CPT

## 2025-08-18 PROCEDURE — 87040 BLOOD CULTURE FOR BACTERIA: CPT

## 2025-08-18 PROCEDURE — 85045 AUTOMATED RETICULOCYTE COUNT: CPT

## 2025-08-18 PROCEDURE — A9585: CPT

## 2025-08-18 PROCEDURE — 84132 ASSAY OF SERUM POTASSIUM: CPT

## 2025-08-18 PROCEDURE — 82746 ASSAY OF FOLIC ACID SERUM: CPT

## 2025-08-18 PROCEDURE — 74177 CT ABD & PELVIS W/CONTRAST: CPT

## 2025-08-18 PROCEDURE — 82803 BLOOD GASES ANY COMBINATION: CPT

## 2025-08-18 RX ORDER — MEROPENEM 1 G/30ML
1000 INJECTION INTRAVENOUS EVERY 8 HOURS
Refills: 0 | Status: COMPLETED | OUTPATIENT
Start: 2025-08-18 | End: 2025-08-24

## 2025-08-18 RX ORDER — VANCOMYCIN HCL IN 5 % DEXTROSE 1.5G/250ML
750 PLASTIC BAG, INJECTION (ML) INTRAVENOUS EVERY 12 HOURS
Refills: 0 | Status: DISCONTINUED | OUTPATIENT
Start: 2025-08-18 | End: 2025-08-20

## 2025-08-18 RX ADMIN — OXYCODONE HYDROCHLORIDE 10 MILLIGRAM(S): 30 TABLET ORAL at 22:58

## 2025-08-18 RX ADMIN — Medication 2 TABLET(S): at 21:58

## 2025-08-18 RX ADMIN — MEROPENEM 100 MILLIGRAM(S): 1 INJECTION INTRAVENOUS at 21:31

## 2025-08-18 RX ADMIN — LIDOCAINE HYDROCHLORIDE 1 PATCH: 20 JELLY TOPICAL at 13:27

## 2025-08-18 RX ADMIN — Medication 2 CAPSULE(S): at 13:26

## 2025-08-18 RX ADMIN — MEROPENEM 100 MILLIGRAM(S): 1 INJECTION INTRAVENOUS at 13:26

## 2025-08-18 RX ADMIN — Medication 650 MILLIGRAM(S): at 07:47

## 2025-08-18 RX ADMIN — Medication 2 CAPSULE(S): at 18:38

## 2025-08-18 RX ADMIN — ENOXAPARIN SODIUM 40 MILLIGRAM(S): 100 INJECTION SUBCUTANEOUS at 12:26

## 2025-08-18 RX ADMIN — FOLIC ACID 1 MILLIGRAM(S): 1 TABLET ORAL at 13:26

## 2025-08-18 RX ADMIN — POLYETHYLENE GLYCOL 3350 17 GRAM(S): 17 POWDER, FOR SOLUTION ORAL at 13:31

## 2025-08-18 RX ADMIN — LIDOCAINE HYDROCHLORIDE 1 PATCH: 20 JELLY TOPICAL at 20:32

## 2025-08-18 RX ADMIN — Medication 250 MILLIGRAM(S): at 20:51

## 2025-08-18 RX ADMIN — Medication 3 MILLIGRAM(S): at 21:58

## 2025-08-18 RX ADMIN — OXYCODONE HYDROCHLORIDE 10 MILLIGRAM(S): 30 TABLET ORAL at 03:12

## 2025-08-18 RX ADMIN — Medication 650 MILLIGRAM(S): at 13:31

## 2025-08-18 RX ADMIN — MEROPENEM 100 MILLIGRAM(S): 1 INJECTION INTRAVENOUS at 05:00

## 2025-08-18 RX ADMIN — OXYCODONE HYDROCHLORIDE 10 MILLIGRAM(S): 30 TABLET ORAL at 21:58

## 2025-08-18 RX ADMIN — MEROPENEM 100 MILLIGRAM(S): 1 INJECTION INTRAVENOUS at 00:40

## 2025-08-18 RX ADMIN — OXYCODONE HYDROCHLORIDE 10 MILLIGRAM(S): 30 TABLET ORAL at 02:12

## 2025-08-18 RX ADMIN — Medication 2 CAPSULE(S): at 08:32

## 2025-08-18 RX ADMIN — Medication 1 TABLET(S): at 13:26

## 2025-08-19 DIAGNOSIS — G06.1 INTRASPINAL ABSCESS AND GRANULOMA: ICD-10-CM

## 2025-08-19 DIAGNOSIS — M46.40 DISCITIS, UNSPECIFIED, SITE UNSPECIFIED: ICD-10-CM

## 2025-08-19 DIAGNOSIS — M46.46 DISCITIS, UNSPECIFIED, LUMBAR REGION: ICD-10-CM

## 2025-08-19 LAB
ANION GAP SERPL CALC-SCNC: 10 MMOL/L — SIGNIFICANT CHANGE UP (ref 5–17)
BASOPHILS # BLD AUTO: 0.03 K/UL — SIGNIFICANT CHANGE UP (ref 0–0.2)
BASOPHILS NFR BLD AUTO: 0.4 % — SIGNIFICANT CHANGE UP (ref 0–2)
BUN SERPL-MCNC: 17 MG/DL — SIGNIFICANT CHANGE UP (ref 7–23)
CALCIUM SERPL-MCNC: 9 MG/DL — SIGNIFICANT CHANGE UP (ref 8.4–10.5)
CHLORIDE SERPL-SCNC: 102 MMOL/L — SIGNIFICANT CHANGE UP (ref 96–108)
CO2 SERPL-SCNC: 23 MMOL/L — SIGNIFICANT CHANGE UP (ref 22–31)
CREAT SERPL-MCNC: 0.62 MG/DL — SIGNIFICANT CHANGE UP (ref 0.5–1.3)
EGFR: 89 ML/MIN/1.73M2 — SIGNIFICANT CHANGE UP
EGFR: 89 ML/MIN/1.73M2 — SIGNIFICANT CHANGE UP
EOSINOPHIL # BLD AUTO: 0.04 K/UL — SIGNIFICANT CHANGE UP (ref 0–0.5)
EOSINOPHIL NFR BLD AUTO: 0.5 % — SIGNIFICANT CHANGE UP (ref 0–6)
GLUCOSE SERPL-MCNC: 86 MG/DL — SIGNIFICANT CHANGE UP (ref 70–99)
HCT VFR BLD CALC: 33.7 % — LOW (ref 34.5–45)
HGB BLD-MCNC: 10.3 G/DL — LOW (ref 11.5–15.5)
IMM GRANULOCYTES # BLD AUTO: 0.02 K/UL — SIGNIFICANT CHANGE UP (ref 0–0.07)
IMM GRANULOCYTES NFR BLD AUTO: 0.2 % — SIGNIFICANT CHANGE UP (ref 0–0.9)
LYMPHOCYTES # BLD AUTO: 2.21 K/UL — SIGNIFICANT CHANGE UP (ref 1–3.3)
LYMPHOCYTES NFR BLD AUTO: 26.2 % — SIGNIFICANT CHANGE UP (ref 13–44)
MAGNESIUM SERPL-MCNC: 2.3 MG/DL — SIGNIFICANT CHANGE UP (ref 1.6–2.6)
MCHC RBC-ENTMCNC: 30.6 G/DL — LOW (ref 32–36)
MCHC RBC-ENTMCNC: 30.8 PG — SIGNIFICANT CHANGE UP (ref 27–34)
MCV RBC AUTO: 100.9 FL — HIGH (ref 80–100)
MONOCYTES # BLD AUTO: 0.76 K/UL — SIGNIFICANT CHANGE UP (ref 0–0.9)
MONOCYTES NFR BLD AUTO: 9 % — SIGNIFICANT CHANGE UP (ref 2–14)
NEUTROPHILS # BLD AUTO: 5.36 K/UL — SIGNIFICANT CHANGE UP (ref 1.8–7.4)
NEUTROPHILS NFR BLD AUTO: 63.7 % — SIGNIFICANT CHANGE UP (ref 43–77)
NRBC # BLD AUTO: 0 K/UL — SIGNIFICANT CHANGE UP (ref 0–0)
NRBC # FLD: 0 K/UL — SIGNIFICANT CHANGE UP (ref 0–0)
NRBC BLD AUTO-RTO: 0 /100 WBCS — SIGNIFICANT CHANGE UP (ref 0–0)
PHOSPHATE SERPL-MCNC: 3.1 MG/DL — SIGNIFICANT CHANGE UP (ref 2.5–4.5)
PLATELET # BLD AUTO: 163 K/UL — SIGNIFICANT CHANGE UP (ref 150–400)
PMV BLD: 10.5 FL — SIGNIFICANT CHANGE UP (ref 7–13)
POTASSIUM SERPL-MCNC: 4.3 MMOL/L — SIGNIFICANT CHANGE UP (ref 3.5–5.3)
POTASSIUM SERPL-SCNC: 4.3 MMOL/L — SIGNIFICANT CHANGE UP (ref 3.5–5.3)
RBC # BLD: 3.34 M/UL — LOW (ref 3.8–5.2)
RBC # FLD: 18 % — HIGH (ref 10.3–14.5)
SODIUM SERPL-SCNC: 135 MMOL/L — SIGNIFICANT CHANGE UP (ref 135–145)
WBC # BLD: 8.42 K/UL — SIGNIFICANT CHANGE UP (ref 3.8–10.5)
WBC # FLD AUTO: 8.42 K/UL — SIGNIFICANT CHANGE UP (ref 3.8–10.5)

## 2025-08-19 PROCEDURE — 99222 1ST HOSP IP/OBS MODERATE 55: CPT | Mod: GC

## 2025-08-19 PROCEDURE — 99223 1ST HOSP IP/OBS HIGH 75: CPT

## 2025-08-19 PROCEDURE — G0545: CPT

## 2025-08-19 PROCEDURE — 99233 SBSQ HOSP IP/OBS HIGH 50: CPT

## 2025-08-19 RX ORDER — HYDROMORPHONE/SOD CHLOR,ISO/PF 2 MG/10 ML
0.5 SYRINGE (ML) INJECTION EVERY 4 HOURS
Refills: 0 | Status: DISCONTINUED | OUTPATIENT
Start: 2025-08-19 | End: 2025-08-20

## 2025-08-19 RX ORDER — OXYCODONE HYDROCHLORIDE 30 MG/1
10 TABLET ORAL EVERY 4 HOURS
Refills: 0 | Status: DISCONTINUED | OUTPATIENT
Start: 2025-08-19 | End: 2025-08-20

## 2025-08-19 RX ADMIN — OXYCODONE HYDROCHLORIDE 10 MILLIGRAM(S): 30 TABLET ORAL at 14:58

## 2025-08-19 RX ADMIN — MEROPENEM 100 MILLIGRAM(S): 1 INJECTION INTRAVENOUS at 05:02

## 2025-08-19 RX ADMIN — Medication 2 CAPSULE(S): at 18:18

## 2025-08-19 RX ADMIN — OXYCODONE HYDROCHLORIDE 10 MILLIGRAM(S): 30 TABLET ORAL at 11:28

## 2025-08-19 RX ADMIN — FOLIC ACID 1 MILLIGRAM(S): 1 TABLET ORAL at 11:28

## 2025-08-19 RX ADMIN — Medication 0.5 MILLIGRAM(S): at 18:36

## 2025-08-19 RX ADMIN — Medication 650 MILLIGRAM(S): at 05:01

## 2025-08-19 RX ADMIN — Medication 2 CAPSULE(S): at 12:23

## 2025-08-19 RX ADMIN — LIDOCAINE HYDROCHLORIDE 1 PATCH: 20 JELLY TOPICAL at 11:26

## 2025-08-19 RX ADMIN — MEROPENEM 100 MILLIGRAM(S): 1 INJECTION INTRAVENOUS at 21:37

## 2025-08-19 RX ADMIN — LIDOCAINE HYDROCHLORIDE 1 PATCH: 20 JELLY TOPICAL at 21:56

## 2025-08-19 RX ADMIN — Medication 2 TABLET(S): at 21:37

## 2025-08-19 RX ADMIN — Medication 250 MILLIGRAM(S): at 05:03

## 2025-08-19 RX ADMIN — Medication 2 CAPSULE(S): at 08:40

## 2025-08-19 RX ADMIN — POLYETHYLENE GLYCOL 3350 17 GRAM(S): 17 POWDER, FOR SOLUTION ORAL at 11:27

## 2025-08-19 RX ADMIN — Medication 0.5 MILLIGRAM(S): at 22:37

## 2025-08-19 RX ADMIN — Medication 0.5 MILLIGRAM(S): at 21:37

## 2025-08-19 RX ADMIN — Medication 650 MILLIGRAM(S): at 06:01

## 2025-08-19 RX ADMIN — LIDOCAINE HYDROCHLORIDE 1 PATCH: 20 JELLY TOPICAL at 01:04

## 2025-08-19 RX ADMIN — MEROPENEM 100 MILLIGRAM(S): 1 INJECTION INTRAVENOUS at 14:50

## 2025-08-19 RX ADMIN — Medication 250 MILLIGRAM(S): at 18:18

## 2025-08-19 RX ADMIN — Medication 3 MILLIGRAM(S): at 21:37

## 2025-08-19 RX ADMIN — Medication 0.5 MILLIGRAM(S): at 15:53

## 2025-08-19 RX ADMIN — Medication 1 APPLICATION(S): at 05:57

## 2025-08-19 RX ADMIN — Medication 1 TABLET(S): at 11:28

## 2025-08-19 RX ADMIN — ENOXAPARIN SODIUM 40 MILLIGRAM(S): 100 INJECTION SUBCUTANEOUS at 11:27

## 2025-08-20 DIAGNOSIS — Z71.89 OTHER SPECIFIED COUNSELING: ICD-10-CM

## 2025-08-20 DIAGNOSIS — Z51.5 ENCOUNTER FOR PALLIATIVE CARE: ICD-10-CM

## 2025-08-20 DIAGNOSIS — R53.2 FUNCTIONAL QUADRIPLEGIA: ICD-10-CM

## 2025-08-20 LAB
ANION GAP SERPL CALC-SCNC: 14 MMOL/L — SIGNIFICANT CHANGE UP (ref 5–17)
APTT BLD: 38.3 SEC — HIGH (ref 26.1–36.8)
BLD GP AB SCN SERPL QL: NEGATIVE — SIGNIFICANT CHANGE UP
BUN SERPL-MCNC: 14 MG/DL — SIGNIFICANT CHANGE UP (ref 7–23)
CALCIUM SERPL-MCNC: 9.2 MG/DL — SIGNIFICANT CHANGE UP (ref 8.4–10.5)
CHLORIDE SERPL-SCNC: 102 MMOL/L — SIGNIFICANT CHANGE UP (ref 96–108)
CO2 SERPL-SCNC: 20 MMOL/L — LOW (ref 22–31)
CREAT SERPL-MCNC: 0.66 MG/DL — SIGNIFICANT CHANGE UP (ref 0.5–1.3)
EGFR: 88 ML/MIN/1.73M2 — SIGNIFICANT CHANGE UP
EGFR: 88 ML/MIN/1.73M2 — SIGNIFICANT CHANGE UP
GLUCOSE SERPL-MCNC: 116 MG/DL — HIGH (ref 70–99)
HCT VFR BLD CALC: 36.2 % — SIGNIFICANT CHANGE UP (ref 34.5–45)
HGB BLD-MCNC: 11.2 G/DL — LOW (ref 11.5–15.5)
INR BLD: 0.97 RATIO — SIGNIFICANT CHANGE UP (ref 0.85–1.16)
MAGNESIUM SERPL-MCNC: 2.4 MG/DL — SIGNIFICANT CHANGE UP (ref 1.6–2.6)
MCHC RBC-ENTMCNC: 30.9 G/DL — LOW (ref 32–36)
MCHC RBC-ENTMCNC: 31.8 PG — SIGNIFICANT CHANGE UP (ref 27–34)
MCV RBC AUTO: 102.8 FL — HIGH (ref 80–100)
NRBC # BLD AUTO: 0 K/UL — SIGNIFICANT CHANGE UP (ref 0–0)
NRBC # FLD: 0 K/UL — SIGNIFICANT CHANGE UP (ref 0–0)
NRBC BLD AUTO-RTO: 0 /100 WBCS — SIGNIFICANT CHANGE UP (ref 0–0)
PHOSPHATE SERPL-MCNC: 2.2 MG/DL — LOW (ref 2.5–4.5)
PLATELET # BLD AUTO: 196 K/UL — SIGNIFICANT CHANGE UP (ref 150–400)
PMV BLD: 11.3 FL — SIGNIFICANT CHANGE UP (ref 7–13)
POTASSIUM SERPL-MCNC: 4.1 MMOL/L — SIGNIFICANT CHANGE UP (ref 3.5–5.3)
POTASSIUM SERPL-SCNC: 4.1 MMOL/L — SIGNIFICANT CHANGE UP (ref 3.5–5.3)
PROTHROM AB SERPL-ACNC: 11.3 SEC — SIGNIFICANT CHANGE UP (ref 9.9–13.4)
RBC # BLD: 3.52 M/UL — LOW (ref 3.8–5.2)
RBC # FLD: 18.3 % — HIGH (ref 10.3–14.5)
RH IG SCN BLD-IMP: POSITIVE — SIGNIFICANT CHANGE UP
SODIUM SERPL-SCNC: 136 MMOL/L — SIGNIFICANT CHANGE UP (ref 135–145)
VANCOMYCIN TROUGH SERPL-MCNC: 15.4 UG/ML — SIGNIFICANT CHANGE UP (ref 10–20)
WBC # BLD: 7.25 K/UL — SIGNIFICANT CHANGE UP (ref 3.8–10.5)
WBC # FLD AUTO: 7.25 K/UL — SIGNIFICANT CHANGE UP (ref 3.8–10.5)

## 2025-08-20 PROCEDURE — 99233 SBSQ HOSP IP/OBS HIGH 50: CPT

## 2025-08-20 PROCEDURE — 72131 CT LUMBAR SPINE W/O DYE: CPT | Mod: 26

## 2025-08-20 PROCEDURE — 82728 ASSAY OF FERRITIN: CPT

## 2025-08-20 PROCEDURE — 80053 COMPREHEN METABOLIC PANEL: CPT

## 2025-08-20 PROCEDURE — 83735 ASSAY OF MAGNESIUM: CPT

## 2025-08-20 PROCEDURE — 85025 COMPLETE CBC W/AUTO DIFF WBC: CPT

## 2025-08-20 PROCEDURE — 80202 ASSAY OF VANCOMYCIN: CPT

## 2025-08-20 PROCEDURE — 84540 ASSAY OF URINE/UREA-N: CPT

## 2025-08-20 PROCEDURE — G0545: CPT

## 2025-08-20 PROCEDURE — 83550 IRON BINDING TEST: CPT

## 2025-08-20 PROCEDURE — 36415 COLL VENOUS BLD VENIPUNCTURE: CPT

## 2025-08-20 PROCEDURE — 73723 MRI JOINT LWR EXTR W/O&W/DYE: CPT

## 2025-08-20 PROCEDURE — 82803 BLOOD GASES ANY COMBINATION: CPT

## 2025-08-20 PROCEDURE — 99223 1ST HOSP IP/OBS HIGH 75: CPT

## 2025-08-20 PROCEDURE — 72131 CT LUMBAR SPINE W/O DYE: CPT

## 2025-08-20 PROCEDURE — A9585: CPT

## 2025-08-20 PROCEDURE — 83540 ASSAY OF IRON: CPT

## 2025-08-20 PROCEDURE — 84295 ASSAY OF SERUM SODIUM: CPT

## 2025-08-20 PROCEDURE — 87040 BLOOD CULTURE FOR BACTERIA: CPT

## 2025-08-20 PROCEDURE — 80048 BASIC METABOLIC PNL TOTAL CA: CPT

## 2025-08-20 PROCEDURE — 81001 URINALYSIS AUTO W/SCOPE: CPT

## 2025-08-20 PROCEDURE — 84100 ASSAY OF PHOSPHORUS: CPT

## 2025-08-20 PROCEDURE — 71045 X-RAY EXAM CHEST 1 VIEW: CPT

## 2025-08-20 PROCEDURE — 82435 ASSAY OF BLOOD CHLORIDE: CPT

## 2025-08-20 PROCEDURE — 72128 CT CHEST SPINE W/O DYE: CPT

## 2025-08-20 PROCEDURE — 82330 ASSAY OF CALCIUM: CPT

## 2025-08-20 PROCEDURE — 72158 MRI LUMBAR SPINE W/O & W/DYE: CPT

## 2025-08-20 PROCEDURE — 74177 CT ABD & PELVIS W/CONTRAST: CPT

## 2025-08-20 PROCEDURE — 85027 COMPLETE CBC AUTOMATED: CPT

## 2025-08-20 PROCEDURE — 85018 HEMOGLOBIN: CPT

## 2025-08-20 PROCEDURE — 99497 ADVNCD CARE PLAN 30 MIN: CPT | Mod: 25

## 2025-08-20 PROCEDURE — 84133 ASSAY OF URINE POTASSIUM: CPT

## 2025-08-20 PROCEDURE — 82746 ASSAY OF FOLIC ACID SERUM: CPT

## 2025-08-20 PROCEDURE — 71260 CT THORAX DX C+: CPT

## 2025-08-20 PROCEDURE — 97161 PT EVAL LOW COMPLEX 20 MIN: CPT

## 2025-08-20 PROCEDURE — 85730 THROMBOPLASTIN TIME PARTIAL: CPT

## 2025-08-20 PROCEDURE — 82570 ASSAY OF URINE CREATININE: CPT

## 2025-08-20 PROCEDURE — 85610 PROTHROMBIN TIME: CPT

## 2025-08-20 PROCEDURE — 84132 ASSAY OF SERUM POTASSIUM: CPT

## 2025-08-20 PROCEDURE — 83930 ASSAY OF BLOOD OSMOLALITY: CPT

## 2025-08-20 PROCEDURE — 85045 AUTOMATED RETICULOCYTE COUNT: CPT

## 2025-08-20 PROCEDURE — 82947 ASSAY GLUCOSE BLOOD QUANT: CPT

## 2025-08-20 PROCEDURE — 87070 CULTURE OTHR SPECIMN AEROBIC: CPT

## 2025-08-20 PROCEDURE — 87086 URINE CULTURE/COLONY COUNT: CPT

## 2025-08-20 PROCEDURE — 72128 CT CHEST SPINE W/O DYE: CPT | Mod: 26

## 2025-08-20 PROCEDURE — 85014 HEMATOCRIT: CPT

## 2025-08-20 PROCEDURE — 86900 BLOOD TYPING SEROLOGIC ABO: CPT

## 2025-08-20 PROCEDURE — 84300 ASSAY OF URINE SODIUM: CPT

## 2025-08-20 PROCEDURE — 83010 ASSAY OF HAPTOGLOBIN QUANT: CPT

## 2025-08-20 PROCEDURE — 83935 ASSAY OF URINE OSMOLALITY: CPT

## 2025-08-20 PROCEDURE — 86850 RBC ANTIBODY SCREEN: CPT

## 2025-08-20 PROCEDURE — 83605 ASSAY OF LACTIC ACID: CPT

## 2025-08-20 PROCEDURE — 82607 VITAMIN B-12: CPT

## 2025-08-20 PROCEDURE — 87637 SARSCOV2&INF A&B&RSV AMP PRB: CPT

## 2025-08-20 PROCEDURE — 99232 SBSQ HOSP IP/OBS MODERATE 35: CPT

## 2025-08-20 PROCEDURE — 86901 BLOOD TYPING SEROLOGIC RH(D): CPT

## 2025-08-20 PROCEDURE — 93005 ELECTROCARDIOGRAM TRACING: CPT

## 2025-08-20 PROCEDURE — 84156 ASSAY OF PROTEIN URINE: CPT

## 2025-08-20 RX ORDER — VANCOMYCIN HCL IN 5 % DEXTROSE 1.5G/250ML
1000 PLASTIC BAG, INJECTION (ML) INTRAVENOUS EVERY 24 HOURS
Refills: 0 | Status: DISCONTINUED | OUTPATIENT
Start: 2025-08-21 | End: 2025-08-23

## 2025-08-20 RX ORDER — OXYCODONE HYDROCHLORIDE 30 MG/1
5 TABLET ORAL EVERY 4 HOURS
Refills: 0 | Status: DISCONTINUED | OUTPATIENT
Start: 2025-08-20 | End: 2025-08-27

## 2025-08-20 RX ORDER — SODIUM PHOSPHATE,DIBASIC DIHYD
15 POWDER (GRAM) MISCELLANEOUS ONCE
Refills: 0 | Status: COMPLETED | OUTPATIENT
Start: 2025-08-20 | End: 2025-08-20

## 2025-08-20 RX ORDER — OXYCODONE HYDROCHLORIDE 30 MG/1
10 TABLET ORAL EVERY 4 HOURS
Refills: 0 | Status: DISCONTINUED | OUTPATIENT
Start: 2025-08-20 | End: 2025-08-24

## 2025-08-20 RX ORDER — HYDROMORPHONE/SOD CHLOR,ISO/PF 2 MG/10 ML
0.5 SYRINGE (ML) INJECTION EVERY 4 HOURS
Refills: 0 | Status: DISCONTINUED | OUTPATIENT
Start: 2025-08-20 | End: 2025-08-22

## 2025-08-20 RX ADMIN — FOLIC ACID 1 MILLIGRAM(S): 1 TABLET ORAL at 12:26

## 2025-08-20 RX ADMIN — MEROPENEM 100 MILLIGRAM(S): 1 INJECTION INTRAVENOUS at 14:18

## 2025-08-20 RX ADMIN — Medication 1 APPLICATION(S): at 04:57

## 2025-08-20 RX ADMIN — MEROPENEM 100 MILLIGRAM(S): 1 INJECTION INTRAVENOUS at 05:24

## 2025-08-20 RX ADMIN — LIDOCAINE HYDROCHLORIDE 1 PATCH: 20 JELLY TOPICAL at 04:56

## 2025-08-20 RX ADMIN — Medication 2 CAPSULE(S): at 18:33

## 2025-08-20 RX ADMIN — LIDOCAINE HYDROCHLORIDE 1 PATCH: 20 JELLY TOPICAL at 21:05

## 2025-08-20 RX ADMIN — MEROPENEM 100 MILLIGRAM(S): 1 INJECTION INTRAVENOUS at 21:10

## 2025-08-20 RX ADMIN — Medication 0.5 MILLIGRAM(S): at 04:54

## 2025-08-20 RX ADMIN — LIDOCAINE HYDROCHLORIDE 1 PATCH: 20 JELLY TOPICAL at 12:27

## 2025-08-20 RX ADMIN — Medication 1 APPLICATION(S): at 04:56

## 2025-08-20 RX ADMIN — Medication 3 MILLIGRAM(S): at 21:16

## 2025-08-20 RX ADMIN — Medication 0.5 MILLIGRAM(S): at 22:08

## 2025-08-20 RX ADMIN — Medication 250 MILLIGRAM(S): at 05:24

## 2025-08-20 RX ADMIN — Medication 0.5 MILLIGRAM(S): at 21:08

## 2025-08-20 RX ADMIN — Medication 0.5 MILLIGRAM(S): at 05:54

## 2025-08-20 RX ADMIN — Medication 63.75 MILLIMOLE(S): at 09:06

## 2025-08-20 RX ADMIN — POLYETHYLENE GLYCOL 3350 17 GRAM(S): 17 POWDER, FOR SOLUTION ORAL at 12:26

## 2025-08-20 RX ADMIN — Medication 1 TABLET(S): at 12:27

## 2025-08-21 DIAGNOSIS — R33.9 RETENTION OF URINE, UNSPECIFIED: ICD-10-CM

## 2025-08-21 LAB
ANION GAP SERPL CALC-SCNC: 10 MMOL/L — SIGNIFICANT CHANGE UP (ref 5–17)
APTT BLD: 35.3 SEC — SIGNIFICANT CHANGE UP (ref 26.1–36.8)
BUN SERPL-MCNC: 12 MG/DL — SIGNIFICANT CHANGE UP (ref 7–23)
CALCIUM SERPL-MCNC: 8.7 MG/DL — SIGNIFICANT CHANGE UP (ref 8.4–10.5)
CHLORIDE SERPL-SCNC: 106 MMOL/L — SIGNIFICANT CHANGE UP (ref 96–108)
CO2 SERPL-SCNC: 21 MMOL/L — LOW (ref 22–31)
CREAT SERPL-MCNC: 0.52 MG/DL — SIGNIFICANT CHANGE UP (ref 0.5–1.3)
CULTURE RESULTS: SIGNIFICANT CHANGE UP
CULTURE RESULTS: SIGNIFICANT CHANGE UP
EGFR: 93 ML/MIN/1.73M2 — SIGNIFICANT CHANGE UP
EGFR: 93 ML/MIN/1.73M2 — SIGNIFICANT CHANGE UP
GLUCOSE SERPL-MCNC: 83 MG/DL — SIGNIFICANT CHANGE UP (ref 70–99)
GRAM STN FLD: SIGNIFICANT CHANGE UP
HCT VFR BLD CALC: 33.5 % — LOW (ref 34.5–45)
HGB BLD-MCNC: 10.4 G/DL — LOW (ref 11.5–15.5)
INR BLD: 0.94 RATIO — SIGNIFICANT CHANGE UP (ref 0.85–1.16)
MAGNESIUM SERPL-MCNC: 2.5 MG/DL — SIGNIFICANT CHANGE UP (ref 1.6–2.6)
MCHC RBC-ENTMCNC: 31 G/DL — LOW (ref 32–36)
MCHC RBC-ENTMCNC: 31.2 PG — SIGNIFICANT CHANGE UP (ref 27–34)
MCV RBC AUTO: 100.6 FL — HIGH (ref 80–100)
NRBC # BLD AUTO: 0 K/UL — SIGNIFICANT CHANGE UP (ref 0–0)
NRBC # FLD: 0 K/UL — SIGNIFICANT CHANGE UP (ref 0–0)
NRBC BLD AUTO-RTO: 0 /100 WBCS — SIGNIFICANT CHANGE UP (ref 0–0)
OB PNL STL IA: NEGATIVE — SIGNIFICANT CHANGE UP
PHOSPHATE SERPL-MCNC: 2.4 MG/DL — LOW (ref 2.5–4.5)
PLATELET # BLD AUTO: 177 K/UL — SIGNIFICANT CHANGE UP (ref 150–400)
PMV BLD: 10.3 FL — SIGNIFICANT CHANGE UP (ref 7–13)
POTASSIUM SERPL-MCNC: 3.8 MMOL/L — SIGNIFICANT CHANGE UP (ref 3.5–5.3)
POTASSIUM SERPL-SCNC: 3.8 MMOL/L — SIGNIFICANT CHANGE UP (ref 3.5–5.3)
PROTHROM AB SERPL-ACNC: 10.9 SEC — SIGNIFICANT CHANGE UP (ref 9.9–13.4)
RBC # BLD: 3.33 M/UL — LOW (ref 3.8–5.2)
RBC # FLD: 17.9 % — HIGH (ref 10.3–14.5)
SODIUM SERPL-SCNC: 137 MMOL/L — SIGNIFICANT CHANGE UP (ref 135–145)
SPECIMEN SOURCE: SIGNIFICANT CHANGE UP
WBC # BLD: 4.64 K/UL — SIGNIFICANT CHANGE UP (ref 3.8–10.5)
WBC # FLD AUTO: 4.64 K/UL — SIGNIFICANT CHANGE UP (ref 3.8–10.5)

## 2025-08-21 PROCEDURE — G0545: CPT

## 2025-08-21 PROCEDURE — 99233 SBSQ HOSP IP/OBS HIGH 50: CPT | Mod: GC

## 2025-08-21 PROCEDURE — 99232 SBSQ HOSP IP/OBS MODERATE 35: CPT

## 2025-08-21 RX ORDER — ENOXAPARIN SODIUM 100 MG/ML
40 INJECTION SUBCUTANEOUS EVERY 24 HOURS
Refills: 0 | Status: DISCONTINUED | OUTPATIENT
Start: 2025-08-21 | End: 2025-08-27

## 2025-08-21 RX ORDER — SOD PHOS DI, MONO/K PHOS MONO 250 MG
2 TABLET ORAL ONCE
Refills: 0 | Status: COMPLETED | OUTPATIENT
Start: 2025-08-21 | End: 2025-08-21

## 2025-08-21 RX ADMIN — Medication 650 MILLIGRAM(S): at 08:47

## 2025-08-21 RX ADMIN — MEROPENEM 100 MILLIGRAM(S): 1 INJECTION INTRAVENOUS at 13:27

## 2025-08-21 RX ADMIN — LIDOCAINE HYDROCHLORIDE 1 PATCH: 20 JELLY TOPICAL at 00:34

## 2025-08-21 RX ADMIN — Medication 3 MILLIGRAM(S): at 23:00

## 2025-08-21 RX ADMIN — Medication 0.5 MILLIGRAM(S): at 05:08

## 2025-08-21 RX ADMIN — Medication 1 TABLET(S): at 11:26

## 2025-08-21 RX ADMIN — LIDOCAINE HYDROCHLORIDE 1 PATCH: 20 JELLY TOPICAL at 23:02

## 2025-08-21 RX ADMIN — LIDOCAINE HYDROCHLORIDE 1 PATCH: 20 JELLY TOPICAL at 11:25

## 2025-08-21 RX ADMIN — LIDOCAINE HYDROCHLORIDE 1 PATCH: 20 JELLY TOPICAL at 19:35

## 2025-08-21 RX ADMIN — Medication 0.5 MILLIGRAM(S): at 15:43

## 2025-08-21 RX ADMIN — Medication 1 APPLICATION(S): at 05:34

## 2025-08-21 RX ADMIN — OXYCODONE HYDROCHLORIDE 5 MILLIGRAM(S): 30 TABLET ORAL at 23:00

## 2025-08-21 RX ADMIN — Medication 250 MILLIGRAM(S): at 05:42

## 2025-08-21 RX ADMIN — Medication 2 CAPSULE(S): at 17:08

## 2025-08-21 RX ADMIN — POLYETHYLENE GLYCOL 3350 17 GRAM(S): 17 POWDER, FOR SOLUTION ORAL at 11:25

## 2025-08-21 RX ADMIN — Medication 2 PACKET(S): at 08:47

## 2025-08-21 RX ADMIN — FOLIC ACID 1 MILLIGRAM(S): 1 TABLET ORAL at 11:26

## 2025-08-21 RX ADMIN — MEROPENEM 100 MILLIGRAM(S): 1 INJECTION INTRAVENOUS at 05:08

## 2025-08-21 RX ADMIN — Medication 2 CAPSULE(S): at 08:46

## 2025-08-21 RX ADMIN — MEROPENEM 100 MILLIGRAM(S): 1 INJECTION INTRAVENOUS at 22:20

## 2025-08-21 RX ADMIN — Medication 2 CAPSULE(S): at 13:27

## 2025-08-21 RX ADMIN — Medication 650 MILLIGRAM(S): at 09:47

## 2025-08-21 RX ADMIN — Medication 0.5 MILLIGRAM(S): at 14:43

## 2025-08-22 ENCOUNTER — RESULT REVIEW (OUTPATIENT)
Age: 80
End: 2025-08-22

## 2025-08-22 LAB
ANION GAP SERPL CALC-SCNC: 10 MMOL/L — SIGNIFICANT CHANGE UP (ref 5–17)
BUN SERPL-MCNC: 10 MG/DL — SIGNIFICANT CHANGE UP (ref 7–23)
CALCIUM SERPL-MCNC: 8.7 MG/DL — SIGNIFICANT CHANGE UP (ref 8.4–10.5)
CHLORIDE SERPL-SCNC: 106 MMOL/L — SIGNIFICANT CHANGE UP (ref 96–108)
CO2 SERPL-SCNC: 21 MMOL/L — LOW (ref 22–31)
CREAT SERPL-MCNC: 0.51 MG/DL — SIGNIFICANT CHANGE UP (ref 0.5–1.3)
CULTURE RESULTS: SIGNIFICANT CHANGE UP
EGFR: 94 ML/MIN/1.73M2 — SIGNIFICANT CHANGE UP
EGFR: 94 ML/MIN/1.73M2 — SIGNIFICANT CHANGE UP
GLUCOSE SERPL-MCNC: 105 MG/DL — HIGH (ref 70–99)
GRAM STN FLD: ABNORMAL
HCT VFR BLD CALC: 33.3 % — LOW (ref 34.5–45)
HGB BLD-MCNC: 10.4 G/DL — LOW (ref 11.5–15.5)
MAGNESIUM SERPL-MCNC: 2.4 MG/DL — SIGNIFICANT CHANGE UP (ref 1.6–2.6)
MCHC RBC-ENTMCNC: 31.2 G/DL — LOW (ref 32–36)
MCHC RBC-ENTMCNC: 31.2 PG — SIGNIFICANT CHANGE UP (ref 27–34)
MCV RBC AUTO: 100 FL — SIGNIFICANT CHANGE UP (ref 80–100)
NRBC # BLD AUTO: 0 K/UL — SIGNIFICANT CHANGE UP (ref 0–0)
NRBC # FLD: 0 K/UL — SIGNIFICANT CHANGE UP (ref 0–0)
NRBC BLD AUTO-RTO: 0 /100 WBCS — SIGNIFICANT CHANGE UP (ref 0–0)
PHOSPHATE SERPL-MCNC: 2.2 MG/DL — LOW (ref 2.5–4.5)
PLATELET # BLD AUTO: 191 K/UL — SIGNIFICANT CHANGE UP (ref 150–400)
PMV BLD: 10.9 FL — SIGNIFICANT CHANGE UP (ref 7–13)
POTASSIUM SERPL-MCNC: 3.9 MMOL/L — SIGNIFICANT CHANGE UP (ref 3.5–5.3)
POTASSIUM SERPL-SCNC: 3.9 MMOL/L — SIGNIFICANT CHANGE UP (ref 3.5–5.3)
RBC # BLD: 3.33 M/UL — LOW (ref 3.8–5.2)
RBC # FLD: 17.6 % — HIGH (ref 10.3–14.5)
SODIUM SERPL-SCNC: 137 MMOL/L — SIGNIFICANT CHANGE UP (ref 135–145)
SPECIMEN SOURCE: SIGNIFICANT CHANGE UP
WBC # BLD: 4.16 K/UL — SIGNIFICANT CHANGE UP (ref 3.8–10.5)
WBC # FLD AUTO: 4.16 K/UL — SIGNIFICANT CHANGE UP (ref 3.8–10.5)

## 2025-08-22 PROCEDURE — 93356 MYOCRD STRAIN IMG SPCKL TRCK: CPT

## 2025-08-22 PROCEDURE — 93306 TTE W/DOPPLER COMPLETE: CPT | Mod: 26

## 2025-08-22 PROCEDURE — 99232 SBSQ HOSP IP/OBS MODERATE 35: CPT

## 2025-08-22 PROCEDURE — G0545: CPT

## 2025-08-22 PROCEDURE — 99233 SBSQ HOSP IP/OBS HIGH 50: CPT | Mod: GC

## 2025-08-22 PROCEDURE — 99233 SBSQ HOSP IP/OBS HIGH 50: CPT

## 2025-08-22 RX ORDER — SODIUM PHOSPHATE,DIBASIC DIHYD
15 POWDER (GRAM) MISCELLANEOUS ONCE
Refills: 0 | Status: COMPLETED | OUTPATIENT
Start: 2025-08-22 | End: 2025-08-22

## 2025-08-22 RX ADMIN — MEROPENEM 100 MILLIGRAM(S): 1 INJECTION INTRAVENOUS at 05:22

## 2025-08-22 RX ADMIN — MEROPENEM 100 MILLIGRAM(S): 1 INJECTION INTRAVENOUS at 16:15

## 2025-08-22 RX ADMIN — LIDOCAINE HYDROCHLORIDE 1 PATCH: 20 JELLY TOPICAL at 23:51

## 2025-08-22 RX ADMIN — OXYCODONE HYDROCHLORIDE 5 MILLIGRAM(S): 30 TABLET ORAL at 12:44

## 2025-08-22 RX ADMIN — MEROPENEM 100 MILLIGRAM(S): 1 INJECTION INTRAVENOUS at 21:05

## 2025-08-22 RX ADMIN — OXYCODONE HYDROCHLORIDE 5 MILLIGRAM(S): 30 TABLET ORAL at 22:03

## 2025-08-22 RX ADMIN — Medication 2 CAPSULE(S): at 17:52

## 2025-08-22 RX ADMIN — Medication 0.5 MILLIGRAM(S): at 09:15

## 2025-08-22 RX ADMIN — LIDOCAINE HYDROCHLORIDE 1 PATCH: 20 JELLY TOPICAL at 19:24

## 2025-08-22 RX ADMIN — Medication 3 MILLIGRAM(S): at 22:03

## 2025-08-22 RX ADMIN — Medication 1 APPLICATION(S): at 05:39

## 2025-08-22 RX ADMIN — OXYCODONE HYDROCHLORIDE 5 MILLIGRAM(S): 30 TABLET ORAL at 13:40

## 2025-08-22 RX ADMIN — LIDOCAINE HYDROCHLORIDE 1 PATCH: 20 JELLY TOPICAL at 12:44

## 2025-08-22 RX ADMIN — Medication 0.5 MILLIGRAM(S): at 08:55

## 2025-08-22 RX ADMIN — ENOXAPARIN SODIUM 40 MILLIGRAM(S): 100 INJECTION SUBCUTANEOUS at 05:25

## 2025-08-22 RX ADMIN — Medication 63.75 MILLIMOLE(S): at 08:47

## 2025-08-22 RX ADMIN — OXYCODONE HYDROCHLORIDE 5 MILLIGRAM(S): 30 TABLET ORAL at 00:00

## 2025-08-22 RX ADMIN — FOLIC ACID 1 MILLIGRAM(S): 1 TABLET ORAL at 12:44

## 2025-08-22 RX ADMIN — Medication 2 CAPSULE(S): at 08:47

## 2025-08-22 RX ADMIN — Medication 2 CAPSULE(S): at 12:43

## 2025-08-22 RX ADMIN — POLYETHYLENE GLYCOL 3350 17 GRAM(S): 17 POWDER, FOR SOLUTION ORAL at 12:43

## 2025-08-22 RX ADMIN — OXYCODONE HYDROCHLORIDE 5 MILLIGRAM(S): 30 TABLET ORAL at 23:05

## 2025-08-22 RX ADMIN — Medication 250 MILLIGRAM(S): at 06:38

## 2025-08-22 RX ADMIN — Medication 1 TABLET(S): at 12:44

## 2025-08-23 LAB
-  AMOXICILLIN/CLAVULANIC ACID: SIGNIFICANT CHANGE UP
-  AMPICILLIN/SULBACTAM: SIGNIFICANT CHANGE UP
-  AMPICILLIN: SIGNIFICANT CHANGE UP
-  AZTREONAM: SIGNIFICANT CHANGE UP
-  CEFAZOLIN: SIGNIFICANT CHANGE UP
-  CEFEPIME: SIGNIFICANT CHANGE UP
-  CEFOXITIN: SIGNIFICANT CHANGE UP
-  CEFTRIAXONE: SIGNIFICANT CHANGE UP
-  CIPROFLOXACIN: SIGNIFICANT CHANGE UP
-  ERTAPENEM: SIGNIFICANT CHANGE UP
-  GENTAMICIN: SIGNIFICANT CHANGE UP
-  IMIPENEM: SIGNIFICANT CHANGE UP
-  LEVOFLOXACIN: SIGNIFICANT CHANGE UP
-  MEROPENEM: SIGNIFICANT CHANGE UP
-  PIPERACILLIN/TAZOBACTAM: SIGNIFICANT CHANGE UP
-  TIGECYCLINE: SIGNIFICANT CHANGE UP
-  TOBRAMYCIN: SIGNIFICANT CHANGE UP
-  TRIMETHOPRIM/SULFAMETHOXAZOLE: SIGNIFICANT CHANGE UP
ANION GAP SERPL CALC-SCNC: 10 MMOL/L — SIGNIFICANT CHANGE UP (ref 5–17)
BUN SERPL-MCNC: 18 MG/DL — SIGNIFICANT CHANGE UP (ref 7–23)
CALCIUM SERPL-MCNC: 8.9 MG/DL — SIGNIFICANT CHANGE UP (ref 8.4–10.5)
CHLORIDE SERPL-SCNC: 105 MMOL/L — SIGNIFICANT CHANGE UP (ref 96–108)
CO2 SERPL-SCNC: 21 MMOL/L — LOW (ref 22–31)
CREAT SERPL-MCNC: 0.55 MG/DL — SIGNIFICANT CHANGE UP (ref 0.5–1.3)
CRP SERPL-MCNC: 28 MG/L — HIGH (ref 0–4)
EGFR: 92 ML/MIN/1.73M2 — SIGNIFICANT CHANGE UP
EGFR: 92 ML/MIN/1.73M2 — SIGNIFICANT CHANGE UP
ERYTHROCYTE [SEDIMENTATION RATE] IN BLOOD: 75 MM/HR — HIGH (ref 0–20)
GLUCOSE SERPL-MCNC: 95 MG/DL — SIGNIFICANT CHANGE UP (ref 70–99)
HCT VFR BLD CALC: 34.6 % — SIGNIFICANT CHANGE UP (ref 34.5–45)
HGB BLD-MCNC: 10.9 G/DL — LOW (ref 11.5–15.5)
MAGNESIUM SERPL-MCNC: 2.5 MG/DL — SIGNIFICANT CHANGE UP (ref 1.6–2.6)
MCHC RBC-ENTMCNC: 31.5 G/DL — LOW (ref 32–36)
MCHC RBC-ENTMCNC: 32 PG — SIGNIFICANT CHANGE UP (ref 27–34)
MCV RBC AUTO: 101.5 FL — HIGH (ref 80–100)
METHOD TYPE: SIGNIFICANT CHANGE UP
NRBC # BLD AUTO: 0 K/UL — SIGNIFICANT CHANGE UP (ref 0–0)
NRBC # FLD: 0 K/UL — SIGNIFICANT CHANGE UP (ref 0–0)
NRBC BLD AUTO-RTO: 0 /100 WBCS — SIGNIFICANT CHANGE UP (ref 0–0)
PHOSPHATE SERPL-MCNC: 2.6 MG/DL — SIGNIFICANT CHANGE UP (ref 2.5–4.5)
PLATELET # BLD AUTO: 207 K/UL — SIGNIFICANT CHANGE UP (ref 150–400)
PMV BLD: 10.6 FL — SIGNIFICANT CHANGE UP (ref 7–13)
POTASSIUM SERPL-MCNC: 4.3 MMOL/L — SIGNIFICANT CHANGE UP (ref 3.5–5.3)
POTASSIUM SERPL-SCNC: 4.3 MMOL/L — SIGNIFICANT CHANGE UP (ref 3.5–5.3)
RBC # BLD: 3.41 M/UL — LOW (ref 3.8–5.2)
RBC # FLD: 17.7 % — HIGH (ref 10.3–14.5)
SODIUM SERPL-SCNC: 136 MMOL/L — SIGNIFICANT CHANGE UP (ref 135–145)
VANCOMYCIN TROUGH SERPL-MCNC: 9.2 UG/ML — LOW (ref 10–20)
WBC # BLD: 4.29 K/UL — SIGNIFICANT CHANGE UP (ref 3.8–10.5)
WBC # FLD AUTO: 4.29 K/UL — SIGNIFICANT CHANGE UP (ref 3.8–10.5)

## 2025-08-23 PROCEDURE — 99233 SBSQ HOSP IP/OBS HIGH 50: CPT | Mod: GC

## 2025-08-23 RX ORDER — HEPARIN SODIUM,PORCINE/NS/PF 20/20 ML
300 SYRINGE (ML) INTRAVENOUS ONCE
Refills: 0 | Status: COMPLETED | OUTPATIENT
Start: 2025-08-23 | End: 2025-08-23

## 2025-08-23 RX ADMIN — OXYCODONE HYDROCHLORIDE 5 MILLIGRAM(S): 30 TABLET ORAL at 20:56

## 2025-08-23 RX ADMIN — Medication 2 CAPSULE(S): at 12:36

## 2025-08-23 RX ADMIN — OXYCODONE HYDROCHLORIDE 5 MILLIGRAM(S): 30 TABLET ORAL at 19:55

## 2025-08-23 RX ADMIN — Medication 2 CAPSULE(S): at 09:15

## 2025-08-23 RX ADMIN — Medication 250 MILLIGRAM(S): at 05:40

## 2025-08-23 RX ADMIN — LIDOCAINE HYDROCHLORIDE 1 PATCH: 20 JELLY TOPICAL at 23:20

## 2025-08-23 RX ADMIN — MEROPENEM 100 MILLIGRAM(S): 1 INJECTION INTRAVENOUS at 21:10

## 2025-08-23 RX ADMIN — Medication 1 APPLICATION(S): at 05:28

## 2025-08-23 RX ADMIN — POLYETHYLENE GLYCOL 3350 17 GRAM(S): 17 POWDER, FOR SOLUTION ORAL at 11:36

## 2025-08-23 RX ADMIN — ENOXAPARIN SODIUM 40 MILLIGRAM(S): 100 INJECTION SUBCUTANEOUS at 05:40

## 2025-08-23 RX ADMIN — LIDOCAINE HYDROCHLORIDE 1 PATCH: 20 JELLY TOPICAL at 19:27

## 2025-08-23 RX ADMIN — Medication 1 TABLET(S): at 11:36

## 2025-08-23 RX ADMIN — MEROPENEM 100 MILLIGRAM(S): 1 INJECTION INTRAVENOUS at 13:27

## 2025-08-23 RX ADMIN — OXYCODONE HYDROCHLORIDE 5 MILLIGRAM(S): 30 TABLET ORAL at 06:46

## 2025-08-23 RX ADMIN — FOLIC ACID 1 MILLIGRAM(S): 1 TABLET ORAL at 11:36

## 2025-08-23 RX ADMIN — MEROPENEM 100 MILLIGRAM(S): 1 INJECTION INTRAVENOUS at 05:06

## 2025-08-23 RX ADMIN — OXYCODONE HYDROCHLORIDE 5 MILLIGRAM(S): 30 TABLET ORAL at 08:13

## 2025-08-23 RX ADMIN — LIDOCAINE HYDROCHLORIDE 1 PATCH: 20 JELLY TOPICAL at 11:36

## 2025-08-23 RX ADMIN — Medication 3 MILLIGRAM(S): at 22:00

## 2025-08-23 RX ADMIN — Medication 2 CAPSULE(S): at 17:30

## 2025-08-23 RX ADMIN — Medication 300 UNIT(S): at 21:48

## 2025-08-24 LAB
ANION GAP SERPL CALC-SCNC: 10 MMOL/L — SIGNIFICANT CHANGE UP (ref 5–17)
BUN SERPL-MCNC: 20 MG/DL — SIGNIFICANT CHANGE UP (ref 7–23)
CALCIUM SERPL-MCNC: 9.7 MG/DL — SIGNIFICANT CHANGE UP (ref 8.4–10.5)
CHLORIDE SERPL-SCNC: 104 MMOL/L — SIGNIFICANT CHANGE UP (ref 96–108)
CO2 SERPL-SCNC: 20 MMOL/L — LOW (ref 22–31)
CREAT SERPL-MCNC: 0.54 MG/DL — SIGNIFICANT CHANGE UP (ref 0.5–1.3)
CULTURE RESULTS: SIGNIFICANT CHANGE UP
CULTURE RESULTS: SIGNIFICANT CHANGE UP
EGFR: 92 ML/MIN/1.73M2 — SIGNIFICANT CHANGE UP
EGFR: 92 ML/MIN/1.73M2 — SIGNIFICANT CHANGE UP
GLUCOSE SERPL-MCNC: 91 MG/DL — SIGNIFICANT CHANGE UP (ref 70–99)
HCT VFR BLD CALC: 35.9 % — SIGNIFICANT CHANGE UP (ref 34.5–45)
HGB BLD-MCNC: 10.8 G/DL — LOW (ref 11.5–15.5)
MAGNESIUM SERPL-MCNC: 2.7 MG/DL — HIGH (ref 1.6–2.6)
MCHC RBC-ENTMCNC: 30.1 G/DL — LOW (ref 32–36)
MCHC RBC-ENTMCNC: 31.4 PG — SIGNIFICANT CHANGE UP (ref 27–34)
MCV RBC AUTO: 104.4 FL — HIGH (ref 80–100)
NRBC # BLD AUTO: 0 K/UL — SIGNIFICANT CHANGE UP (ref 0–0)
NRBC # FLD: 0 K/UL — SIGNIFICANT CHANGE UP (ref 0–0)
NRBC BLD AUTO-RTO: 0 /100 WBCS — SIGNIFICANT CHANGE UP (ref 0–0)
PHOSPHATE SERPL-MCNC: 3 MG/DL — SIGNIFICANT CHANGE UP (ref 2.5–4.5)
PLATELET # BLD AUTO: 199 K/UL — SIGNIFICANT CHANGE UP (ref 150–400)
PMV BLD: 11.3 FL — SIGNIFICANT CHANGE UP (ref 7–13)
POTASSIUM SERPL-MCNC: 4.6 MMOL/L — SIGNIFICANT CHANGE UP (ref 3.5–5.3)
POTASSIUM SERPL-SCNC: 4.6 MMOL/L — SIGNIFICANT CHANGE UP (ref 3.5–5.3)
RBC # BLD: 3.44 M/UL — LOW (ref 3.8–5.2)
RBC # FLD: 18.2 % — HIGH (ref 10.3–14.5)
SODIUM SERPL-SCNC: 134 MMOL/L — LOW (ref 135–145)
SPECIMEN SOURCE: SIGNIFICANT CHANGE UP
SPECIMEN SOURCE: SIGNIFICANT CHANGE UP
WBC # BLD: 4.82 K/UL — SIGNIFICANT CHANGE UP (ref 3.8–10.5)
WBC # FLD AUTO: 4.82 K/UL — SIGNIFICANT CHANGE UP (ref 3.8–10.5)

## 2025-08-24 PROCEDURE — 99232 SBSQ HOSP IP/OBS MODERATE 35: CPT | Mod: GC

## 2025-08-24 RX ORDER — POLYETHYLENE GLYCOL 3350 17 G/17G
17 POWDER, FOR SOLUTION ORAL ONCE
Refills: 0 | Status: COMPLETED | OUTPATIENT
Start: 2025-08-24 | End: 2025-08-24

## 2025-08-24 RX ORDER — POLYETHYLENE GLYCOL 3350 17 G/17G
17 POWDER, FOR SOLUTION ORAL
Refills: 0 | Status: DISCONTINUED | OUTPATIENT
Start: 2025-08-24 | End: 2025-08-27

## 2025-08-24 RX ADMIN — Medication 2 CAPSULE(S): at 13:49

## 2025-08-24 RX ADMIN — MEROPENEM 100 MILLIGRAM(S): 1 INJECTION INTRAVENOUS at 21:17

## 2025-08-24 RX ADMIN — Medication 2 CAPSULE(S): at 08:30

## 2025-08-24 RX ADMIN — MEROPENEM 100 MILLIGRAM(S): 1 INJECTION INTRAVENOUS at 13:49

## 2025-08-24 RX ADMIN — OXYCODONE HYDROCHLORIDE 5 MILLIGRAM(S): 30 TABLET ORAL at 22:43

## 2025-08-24 RX ADMIN — Medication 1 TABLET(S): at 11:09

## 2025-08-24 RX ADMIN — Medication 3 MILLIGRAM(S): at 21:43

## 2025-08-24 RX ADMIN — ENOXAPARIN SODIUM 40 MILLIGRAM(S): 100 INJECTION SUBCUTANEOUS at 05:07

## 2025-08-24 RX ADMIN — Medication 1 APPLICATION(S): at 05:32

## 2025-08-24 RX ADMIN — OXYCODONE HYDROCHLORIDE 5 MILLIGRAM(S): 30 TABLET ORAL at 21:43

## 2025-08-24 RX ADMIN — Medication 2 CAPSULE(S): at 17:40

## 2025-08-24 RX ADMIN — MEROPENEM 100 MILLIGRAM(S): 1 INJECTION INTRAVENOUS at 05:07

## 2025-08-24 RX ADMIN — OXYCODONE HYDROCHLORIDE 10 MILLIGRAM(S): 30 TABLET ORAL at 10:53

## 2025-08-24 RX ADMIN — Medication 2 TABLET(S): at 21:15

## 2025-08-24 RX ADMIN — FOLIC ACID 1 MILLIGRAM(S): 1 TABLET ORAL at 11:09

## 2025-08-24 RX ADMIN — POLYETHYLENE GLYCOL 3350 17 GRAM(S): 17 POWDER, FOR SOLUTION ORAL at 17:53

## 2025-08-24 RX ADMIN — POLYETHYLENE GLYCOL 3350 17 GRAM(S): 17 POWDER, FOR SOLUTION ORAL at 11:10

## 2025-08-24 RX ADMIN — LIDOCAINE HYDROCHLORIDE 1 PATCH: 20 JELLY TOPICAL at 11:09

## 2025-08-24 RX ADMIN — OXYCODONE HYDROCHLORIDE 10 MILLIGRAM(S): 30 TABLET ORAL at 09:53

## 2025-08-25 LAB
ANION GAP SERPL CALC-SCNC: 13 MMOL/L — SIGNIFICANT CHANGE UP (ref 5–17)
BASOPHILS # BLD AUTO: 0.03 K/UL — SIGNIFICANT CHANGE UP (ref 0–0.2)
BASOPHILS NFR BLD AUTO: 0.6 % — SIGNIFICANT CHANGE UP (ref 0–2)
BUN SERPL-MCNC: 19 MG/DL — SIGNIFICANT CHANGE UP (ref 7–23)
CALCIUM SERPL-MCNC: 9.8 MG/DL — SIGNIFICANT CHANGE UP (ref 8.4–10.5)
CHLORIDE SERPL-SCNC: 102 MMOL/L — SIGNIFICANT CHANGE UP (ref 96–108)
CO2 SERPL-SCNC: 21 MMOL/L — LOW (ref 22–31)
CREAT SERPL-MCNC: 0.54 MG/DL — SIGNIFICANT CHANGE UP (ref 0.5–1.3)
CULTURE RESULTS: ABNORMAL
EGFR: 92 ML/MIN/1.73M2 — SIGNIFICANT CHANGE UP
EGFR: 92 ML/MIN/1.73M2 — SIGNIFICANT CHANGE UP
EOSINOPHIL # BLD AUTO: 0.09 K/UL — SIGNIFICANT CHANGE UP (ref 0–0.5)
EOSINOPHIL NFR BLD AUTO: 1.7 % — SIGNIFICANT CHANGE UP (ref 0–6)
GLUCOSE SERPL-MCNC: 97 MG/DL — SIGNIFICANT CHANGE UP (ref 70–99)
HCT VFR BLD CALC: 37.6 % — SIGNIFICANT CHANGE UP (ref 34.5–45)
HGB BLD-MCNC: 11.7 G/DL — SIGNIFICANT CHANGE UP (ref 11.5–15.5)
IMM GRANULOCYTES # BLD AUTO: 0.01 K/UL — SIGNIFICANT CHANGE UP (ref 0–0.07)
IMM GRANULOCYTES NFR BLD AUTO: 0.2 % — SIGNIFICANT CHANGE UP (ref 0–0.9)
LYMPHOCYTES # BLD AUTO: 2.89 K/UL — SIGNIFICANT CHANGE UP (ref 1–3.3)
LYMPHOCYTES NFR BLD AUTO: 53.8 % — HIGH (ref 13–44)
MAGNESIUM SERPL-MCNC: 2.6 MG/DL — SIGNIFICANT CHANGE UP (ref 1.6–2.6)
MCHC RBC-ENTMCNC: 31.1 G/DL — LOW (ref 32–36)
MCHC RBC-ENTMCNC: 32.3 PG — SIGNIFICANT CHANGE UP (ref 27–34)
MCV RBC AUTO: 103.9 FL — HIGH (ref 80–100)
MONOCYTES # BLD AUTO: 0.44 K/UL — SIGNIFICANT CHANGE UP (ref 0–0.9)
MONOCYTES NFR BLD AUTO: 8.2 % — SIGNIFICANT CHANGE UP (ref 2–14)
NEUTROPHILS # BLD AUTO: 1.91 K/UL — SIGNIFICANT CHANGE UP (ref 1.8–7.4)
NEUTROPHILS NFR BLD AUTO: 35.5 % — LOW (ref 43–77)
NRBC # BLD AUTO: 0 K/UL — SIGNIFICANT CHANGE UP (ref 0–0)
NRBC # FLD: 0 K/UL — SIGNIFICANT CHANGE UP (ref 0–0)
NRBC BLD AUTO-RTO: 0 /100 WBCS — SIGNIFICANT CHANGE UP (ref 0–0)
ORGANISM # SPEC MICROSCOPIC CNT: ABNORMAL
ORGANISM # SPEC MICROSCOPIC CNT: ABNORMAL
PHOSPHATE SERPL-MCNC: 2.7 MG/DL — SIGNIFICANT CHANGE UP (ref 2.5–4.5)
PLATELET # BLD AUTO: 241 K/UL — SIGNIFICANT CHANGE UP (ref 150–400)
PMV BLD: 10.8 FL — SIGNIFICANT CHANGE UP (ref 7–13)
POTASSIUM SERPL-MCNC: 4.6 MMOL/L — SIGNIFICANT CHANGE UP (ref 3.5–5.3)
POTASSIUM SERPL-SCNC: 4.6 MMOL/L — SIGNIFICANT CHANGE UP (ref 3.5–5.3)
RBC # BLD: 3.62 M/UL — LOW (ref 3.8–5.2)
RBC # FLD: 17.6 % — HIGH (ref 10.3–14.5)
SODIUM SERPL-SCNC: 136 MMOL/L — SIGNIFICANT CHANGE UP (ref 135–145)
SPECIMEN SOURCE: SIGNIFICANT CHANGE UP
WBC # BLD: 5.37 K/UL — SIGNIFICANT CHANGE UP (ref 3.8–10.5)
WBC # FLD AUTO: 5.37 K/UL — SIGNIFICANT CHANGE UP (ref 3.8–10.5)

## 2025-08-25 PROCEDURE — 99232 SBSQ HOSP IP/OBS MODERATE 35: CPT | Mod: GC

## 2025-08-25 RX ADMIN — Medication 2 CAPSULE(S): at 12:19

## 2025-08-25 RX ADMIN — Medication 2 CAPSULE(S): at 17:19

## 2025-08-25 RX ADMIN — ENOXAPARIN SODIUM 40 MILLIGRAM(S): 100 INJECTION SUBCUTANEOUS at 04:59

## 2025-08-25 RX ADMIN — OXYCODONE HYDROCHLORIDE 5 MILLIGRAM(S): 30 TABLET ORAL at 23:23

## 2025-08-25 RX ADMIN — POLYETHYLENE GLYCOL 3350 17 GRAM(S): 17 POWDER, FOR SOLUTION ORAL at 04:58

## 2025-08-25 RX ADMIN — Medication 2 TABLET(S): at 21:11

## 2025-08-25 RX ADMIN — FOLIC ACID 1 MILLIGRAM(S): 1 TABLET ORAL at 12:19

## 2025-08-25 RX ADMIN — Medication 1 APPLICATION(S): at 05:01

## 2025-08-25 RX ADMIN — LIDOCAINE HYDROCHLORIDE 1 PATCH: 20 JELLY TOPICAL at 00:14

## 2025-08-25 RX ADMIN — LIDOCAINE HYDROCHLORIDE 1 PATCH: 20 JELLY TOPICAL at 01:09

## 2025-08-25 RX ADMIN — Medication 1 TABLET(S): at 12:19

## 2025-08-25 RX ADMIN — OXYCODONE HYDROCHLORIDE 5 MILLIGRAM(S): 30 TABLET ORAL at 22:23

## 2025-08-25 RX ADMIN — OXYCODONE HYDROCHLORIDE 5 MILLIGRAM(S): 30 TABLET ORAL at 10:26

## 2025-08-25 RX ADMIN — Medication 2 CAPSULE(S): at 08:29

## 2025-08-25 RX ADMIN — OXYCODONE HYDROCHLORIDE 5 MILLIGRAM(S): 30 TABLET ORAL at 11:26

## 2025-08-25 RX ADMIN — Medication 3 MILLIGRAM(S): at 22:23

## 2025-08-26 LAB
ANION GAP SERPL CALC-SCNC: 10 MMOL/L — SIGNIFICANT CHANGE UP (ref 5–17)
BUN SERPL-MCNC: 16 MG/DL — SIGNIFICANT CHANGE UP (ref 7–23)
CALCIUM SERPL-MCNC: 9.7 MG/DL — SIGNIFICANT CHANGE UP (ref 8.4–10.5)
CHLORIDE SERPL-SCNC: 103 MMOL/L — SIGNIFICANT CHANGE UP (ref 96–108)
CO2 SERPL-SCNC: 23 MMOL/L — SIGNIFICANT CHANGE UP (ref 22–31)
CREAT SERPL-MCNC: 0.6 MG/DL — SIGNIFICANT CHANGE UP (ref 0.5–1.3)
EGFR: 90 ML/MIN/1.73M2 — SIGNIFICANT CHANGE UP
EGFR: 90 ML/MIN/1.73M2 — SIGNIFICANT CHANGE UP
GLUCOSE SERPL-MCNC: 103 MG/DL — HIGH (ref 70–99)
HCT VFR BLD CALC: 37.4 % — SIGNIFICANT CHANGE UP (ref 34.5–45)
HGB BLD-MCNC: 11.5 G/DL — SIGNIFICANT CHANGE UP (ref 11.5–15.5)
MAGNESIUM SERPL-MCNC: 2.6 MG/DL — SIGNIFICANT CHANGE UP (ref 1.6–2.6)
MCHC RBC-ENTMCNC: 30.7 G/DL — LOW (ref 32–36)
MCHC RBC-ENTMCNC: 31.1 PG — SIGNIFICANT CHANGE UP (ref 27–34)
MCV RBC AUTO: 101.1 FL — HIGH (ref 80–100)
NRBC # BLD AUTO: 0 K/UL — SIGNIFICANT CHANGE UP (ref 0–0)
NRBC # FLD: 0 K/UL — SIGNIFICANT CHANGE UP (ref 0–0)
NRBC BLD AUTO-RTO: 0 /100 WBCS — SIGNIFICANT CHANGE UP (ref 0–0)
PHOSPHATE SERPL-MCNC: 2.5 MG/DL — SIGNIFICANT CHANGE UP (ref 2.5–4.5)
PLATELET # BLD AUTO: 255 K/UL — SIGNIFICANT CHANGE UP (ref 150–400)
PMV BLD: 10.2 FL — SIGNIFICANT CHANGE UP (ref 7–13)
POTASSIUM SERPL-MCNC: 4.1 MMOL/L — SIGNIFICANT CHANGE UP (ref 3.5–5.3)
POTASSIUM SERPL-SCNC: 4.1 MMOL/L — SIGNIFICANT CHANGE UP (ref 3.5–5.3)
RBC # BLD: 3.7 M/UL — LOW (ref 3.8–5.2)
RBC # FLD: 17.2 % — HIGH (ref 10.3–14.5)
SODIUM SERPL-SCNC: 136 MMOL/L — SIGNIFICANT CHANGE UP (ref 135–145)
WBC # BLD: 3.47 K/UL — LOW (ref 3.8–10.5)
WBC # FLD AUTO: 3.47 K/UL — LOW (ref 3.8–10.5)

## 2025-08-26 PROCEDURE — 99233 SBSQ HOSP IP/OBS HIGH 50: CPT

## 2025-08-26 PROCEDURE — 99232 SBSQ HOSP IP/OBS MODERATE 35: CPT

## 2025-08-26 RX ADMIN — OXYCODONE HYDROCHLORIDE 5 MILLIGRAM(S): 30 TABLET ORAL at 12:00

## 2025-08-26 RX ADMIN — FOLIC ACID 1 MILLIGRAM(S): 1 TABLET ORAL at 11:55

## 2025-08-26 RX ADMIN — OXYCODONE HYDROCHLORIDE 5 MILLIGRAM(S): 30 TABLET ORAL at 22:21

## 2025-08-26 RX ADMIN — Medication 2 TABLET(S): at 21:21

## 2025-08-26 RX ADMIN — Medication 2 CAPSULE(S): at 17:02

## 2025-08-26 RX ADMIN — Medication 3 MILLIGRAM(S): at 21:21

## 2025-08-26 RX ADMIN — Medication 2 CAPSULE(S): at 08:06

## 2025-08-26 RX ADMIN — Medication 2 CAPSULE(S): at 11:55

## 2025-08-26 RX ADMIN — Medication 1 APPLICATION(S): at 04:31

## 2025-08-26 RX ADMIN — OXYCODONE HYDROCHLORIDE 5 MILLIGRAM(S): 30 TABLET ORAL at 21:21

## 2025-08-26 RX ADMIN — POLYETHYLENE GLYCOL 3350 17 GRAM(S): 17 POWDER, FOR SOLUTION ORAL at 04:24

## 2025-08-26 RX ADMIN — ENOXAPARIN SODIUM 40 MILLIGRAM(S): 100 INJECTION SUBCUTANEOUS at 04:24

## 2025-08-26 RX ADMIN — Medication 1 TABLET(S): at 11:55

## 2025-08-26 RX ADMIN — OXYCODONE HYDROCHLORIDE 5 MILLIGRAM(S): 30 TABLET ORAL at 11:00

## 2025-08-26 RX ADMIN — LIDOCAINE HYDROCHLORIDE 1 PATCH: 20 JELLY TOPICAL at 20:04

## 2025-08-26 RX ADMIN — LIDOCAINE HYDROCHLORIDE 1 PATCH: 20 JELLY TOPICAL at 11:54

## 2025-08-27 ENCOUNTER — TRANSCRIPTION ENCOUNTER (OUTPATIENT)
Age: 80
End: 2025-08-27

## 2025-08-27 VITALS
HEART RATE: 97 BPM | SYSTOLIC BLOOD PRESSURE: 111 MMHG | TEMPERATURE: 97 F | OXYGEN SATURATION: 100 % | DIASTOLIC BLOOD PRESSURE: 74 MMHG | RESPIRATION RATE: 18 BRPM

## 2025-08-27 LAB
ANION GAP SERPL CALC-SCNC: 10 MMOL/L — SIGNIFICANT CHANGE UP (ref 5–17)
BUN SERPL-MCNC: 16 MG/DL — SIGNIFICANT CHANGE UP (ref 7–23)
CALCIUM SERPL-MCNC: 9.8 MG/DL — SIGNIFICANT CHANGE UP (ref 8.4–10.5)
CHLORIDE SERPL-SCNC: 102 MMOL/L — SIGNIFICANT CHANGE UP (ref 96–108)
CO2 SERPL-SCNC: 23 MMOL/L — SIGNIFICANT CHANGE UP (ref 22–31)
CREAT SERPL-MCNC: 0.7 MG/DL — SIGNIFICANT CHANGE UP (ref 0.5–1.3)
CRP SERPL-MCNC: 25 MG/L — HIGH (ref 0–4)
EGFR: 87 ML/MIN/1.73M2 — SIGNIFICANT CHANGE UP
EGFR: 87 ML/MIN/1.73M2 — SIGNIFICANT CHANGE UP
ERYTHROCYTE [SEDIMENTATION RATE] IN BLOOD: 119 MM/HR — HIGH (ref 0–20)
GLUCOSE SERPL-MCNC: 104 MG/DL — HIGH (ref 70–99)
HCT VFR BLD CALC: 34.8 % — SIGNIFICANT CHANGE UP (ref 34.5–45)
HGB BLD-MCNC: 10.6 G/DL — LOW (ref 11.5–15.5)
MAGNESIUM SERPL-MCNC: 2.6 MG/DL — SIGNIFICANT CHANGE UP (ref 1.6–2.6)
MCHC RBC-ENTMCNC: 30.5 G/DL — LOW (ref 32–36)
MCHC RBC-ENTMCNC: 30.5 PG — SIGNIFICANT CHANGE UP (ref 27–34)
MCV RBC AUTO: 100.3 FL — HIGH (ref 80–100)
NRBC # BLD AUTO: 0 K/UL — SIGNIFICANT CHANGE UP (ref 0–0)
NRBC # FLD: 0 K/UL — SIGNIFICANT CHANGE UP (ref 0–0)
NRBC BLD AUTO-RTO: 0 /100 WBCS — SIGNIFICANT CHANGE UP (ref 0–0)
PHOSPHATE SERPL-MCNC: 2.9 MG/DL — SIGNIFICANT CHANGE UP (ref 2.5–4.5)
PLATELET # BLD AUTO: 255 K/UL — SIGNIFICANT CHANGE UP (ref 150–400)
PMV BLD: 10.7 FL — SIGNIFICANT CHANGE UP (ref 7–13)
POTASSIUM SERPL-MCNC: 3.9 MMOL/L — SIGNIFICANT CHANGE UP (ref 3.5–5.3)
POTASSIUM SERPL-SCNC: 3.9 MMOL/L — SIGNIFICANT CHANGE UP (ref 3.5–5.3)
RBC # BLD: 3.47 M/UL — LOW (ref 3.8–5.2)
RBC # FLD: 17.3 % — HIGH (ref 10.3–14.5)
SODIUM SERPL-SCNC: 135 MMOL/L — SIGNIFICANT CHANGE UP (ref 135–145)
WBC # BLD: 3.97 K/UL — SIGNIFICANT CHANGE UP (ref 3.8–10.5)
WBC # FLD AUTO: 3.97 K/UL — SIGNIFICANT CHANGE UP (ref 3.8–10.5)

## 2025-08-27 PROCEDURE — 83550 IRON BINDING TEST: CPT

## 2025-08-27 PROCEDURE — 87086 URINE CULTURE/COLONY COUNT: CPT

## 2025-08-27 PROCEDURE — 82947 ASSAY GLUCOSE BLOOD QUANT: CPT

## 2025-08-27 PROCEDURE — 85730 THROMBOPLASTIN TIME PARTIAL: CPT

## 2025-08-27 PROCEDURE — 74177 CT ABD & PELVIS W/CONTRAST: CPT

## 2025-08-27 PROCEDURE — 82330 ASSAY OF CALCIUM: CPT

## 2025-08-27 PROCEDURE — 72128 CT CHEST SPINE W/O DYE: CPT

## 2025-08-27 PROCEDURE — 93306 TTE W/DOPPLER COMPLETE: CPT

## 2025-08-27 PROCEDURE — 84295 ASSAY OF SERUM SODIUM: CPT

## 2025-08-27 PROCEDURE — 82607 VITAMIN B-12: CPT

## 2025-08-27 PROCEDURE — 80048 BASIC METABOLIC PNL TOTAL CA: CPT

## 2025-08-27 PROCEDURE — 80053 COMPREHEN METABOLIC PANEL: CPT

## 2025-08-27 PROCEDURE — 97116 GAIT TRAINING THERAPY: CPT

## 2025-08-27 PROCEDURE — 82570 ASSAY OF URINE CREATININE: CPT

## 2025-08-27 PROCEDURE — 71260 CT THORAX DX C+: CPT

## 2025-08-27 PROCEDURE — 82746 ASSAY OF FOLIC ACID SERUM: CPT

## 2025-08-27 PROCEDURE — 85018 HEMOGLOBIN: CPT

## 2025-08-27 PROCEDURE — 87186 SC STD MICRODIL/AGAR DIL: CPT

## 2025-08-27 PROCEDURE — 84133 ASSAY OF URINE POTASSIUM: CPT

## 2025-08-27 PROCEDURE — 81001 URINALYSIS AUTO W/SCOPE: CPT

## 2025-08-27 PROCEDURE — 85045 AUTOMATED RETICULOCYTE COUNT: CPT

## 2025-08-27 PROCEDURE — 84156 ASSAY OF PROTEIN URINE: CPT

## 2025-08-27 PROCEDURE — 85610 PROTHROMBIN TIME: CPT

## 2025-08-27 PROCEDURE — 83935 ASSAY OF URINE OSMOLALITY: CPT

## 2025-08-27 PROCEDURE — 85652 RBC SED RATE AUTOMATED: CPT

## 2025-08-27 PROCEDURE — 72131 CT LUMBAR SPINE W/O DYE: CPT

## 2025-08-27 PROCEDURE — 87205 SMEAR GRAM STAIN: CPT

## 2025-08-27 PROCEDURE — 99239 HOSP IP/OBS DSCHRG MGMT >30: CPT | Mod: GC

## 2025-08-27 PROCEDURE — 73723 MRI JOINT LWR EXTR W/O&W/DYE: CPT

## 2025-08-27 PROCEDURE — 97530 THERAPEUTIC ACTIVITIES: CPT

## 2025-08-27 PROCEDURE — 93356 MYOCRD STRAIN IMG SPCKL TRCK: CPT

## 2025-08-27 PROCEDURE — 82274 ASSAY TEST FOR BLOOD FECAL: CPT

## 2025-08-27 PROCEDURE — 84100 ASSAY OF PHOSPHORUS: CPT

## 2025-08-27 PROCEDURE — 86850 RBC ANTIBODY SCREEN: CPT

## 2025-08-27 PROCEDURE — 83540 ASSAY OF IRON: CPT

## 2025-08-27 PROCEDURE — 84300 ASSAY OF URINE SODIUM: CPT

## 2025-08-27 PROCEDURE — 83735 ASSAY OF MAGNESIUM: CPT

## 2025-08-27 PROCEDURE — 82435 ASSAY OF BLOOD CHLORIDE: CPT

## 2025-08-27 PROCEDURE — 87637 SARSCOV2&INF A&B&RSV AMP PRB: CPT

## 2025-08-27 PROCEDURE — 87070 CULTURE OTHR SPECIMN AEROBIC: CPT

## 2025-08-27 PROCEDURE — 72158 MRI LUMBAR SPINE W/O & W/DYE: CPT

## 2025-08-27 PROCEDURE — 71045 X-RAY EXAM CHEST 1 VIEW: CPT

## 2025-08-27 PROCEDURE — 85027 COMPLETE CBC AUTOMATED: CPT

## 2025-08-27 PROCEDURE — 83010 ASSAY OF HAPTOGLOBIN QUANT: CPT

## 2025-08-27 PROCEDURE — 83605 ASSAY OF LACTIC ACID: CPT

## 2025-08-27 PROCEDURE — 87077 CULTURE AEROBIC IDENTIFY: CPT

## 2025-08-27 PROCEDURE — 87040 BLOOD CULTURE FOR BACTERIA: CPT

## 2025-08-27 PROCEDURE — 82728 ASSAY OF FERRITIN: CPT

## 2025-08-27 PROCEDURE — 83930 ASSAY OF BLOOD OSMOLALITY: CPT

## 2025-08-27 PROCEDURE — 82803 BLOOD GASES ANY COMBINATION: CPT

## 2025-08-27 PROCEDURE — 84132 ASSAY OF SERUM POTASSIUM: CPT

## 2025-08-27 PROCEDURE — 36415 COLL VENOUS BLD VENIPUNCTURE: CPT

## 2025-08-27 PROCEDURE — 85014 HEMATOCRIT: CPT

## 2025-08-27 PROCEDURE — 97161 PT EVAL LOW COMPLEX 20 MIN: CPT

## 2025-08-27 PROCEDURE — 93005 ELECTROCARDIOGRAM TRACING: CPT

## 2025-08-27 PROCEDURE — 86140 C-REACTIVE PROTEIN: CPT

## 2025-08-27 PROCEDURE — 86900 BLOOD TYPING SEROLOGIC ABO: CPT

## 2025-08-27 PROCEDURE — 85025 COMPLETE CBC W/AUTO DIFF WBC: CPT

## 2025-08-27 PROCEDURE — 80202 ASSAY OF VANCOMYCIN: CPT

## 2025-08-27 PROCEDURE — 96374 THER/PROPH/DIAG INJ IV PUSH: CPT

## 2025-08-27 PROCEDURE — 86901 BLOOD TYPING SEROLOGIC RH(D): CPT

## 2025-08-27 PROCEDURE — A9585: CPT

## 2025-08-27 PROCEDURE — 96375 TX/PRO/DX INJ NEW DRUG ADDON: CPT

## 2025-08-27 PROCEDURE — 99285 EMERGENCY DEPT VISIT HI MDM: CPT

## 2025-08-27 PROCEDURE — 84540 ASSAY OF URINE/UREA-N: CPT

## 2025-08-27 RX ORDER — LIDOCAINE HYDROCHLORIDE 20 MG/ML
1 JELLY TOPICAL
Qty: 0 | Refills: 0 | DISCHARGE
Start: 2025-08-27

## 2025-08-27 RX ORDER — OXYCODONE HYDROCHLORIDE 30 MG/1
1 TABLET ORAL
Qty: 0 | Refills: 0 | DISCHARGE
Start: 2025-08-27

## 2025-08-27 RX ORDER — ACETAMINOPHEN 500 MG/5ML
2 LIQUID (ML) ORAL
Qty: 240 | Refills: 0
Start: 2025-08-27 | End: 2025-09-25

## 2025-08-27 RX ORDER — SENNA 187 MG
2 TABLET ORAL
Qty: 0 | Refills: 0 | DISCHARGE
Start: 2025-08-27

## 2025-08-27 RX ORDER — POLYETHYLENE GLYCOL 3350 17 G/17G
17 POWDER, FOR SOLUTION ORAL
Qty: 0 | Refills: 0 | DISCHARGE
Start: 2025-08-27

## 2025-08-27 RX ORDER — LEVOFLOXACIN 25 MG/ML
1 SOLUTION ORAL
Qty: 0 | Refills: 0 | DISCHARGE
Start: 2025-08-27

## 2025-08-27 RX ORDER — NALOXONE HYDROCHLORIDE 0.4 MG/ML
0 INJECTION, SOLUTION INTRAMUSCULAR; INTRAVENOUS; SUBCUTANEOUS
Qty: 0 | Refills: 0 | DISCHARGE
Start: 2025-08-27

## 2025-08-27 RX ORDER — TRAMADOL HYDROCHLORIDE 50 MG/1
1 TABLET, FILM COATED ORAL
Refills: 0 | DISCHARGE

## 2025-08-27 RX ORDER — CELECOXIB 50 MG/1
1 CAPSULE ORAL
Refills: 0 | DISCHARGE

## 2025-08-27 RX ADMIN — Medication 2 CAPSULE(S): at 17:21

## 2025-08-27 RX ADMIN — POLYETHYLENE GLYCOL 3350 17 GRAM(S): 17 POWDER, FOR SOLUTION ORAL at 04:59

## 2025-08-27 RX ADMIN — Medication 2 CAPSULE(S): at 12:30

## 2025-08-27 RX ADMIN — Medication 650 MILLIGRAM(S): at 12:30

## 2025-08-27 RX ADMIN — Medication 1 TABLET(S): at 12:30

## 2025-08-27 RX ADMIN — FOLIC ACID 1 MILLIGRAM(S): 1 TABLET ORAL at 12:30

## 2025-08-27 RX ADMIN — Medication 2 CAPSULE(S): at 08:57

## 2025-08-27 RX ADMIN — ENOXAPARIN SODIUM 40 MILLIGRAM(S): 100 INJECTION SUBCUTANEOUS at 04:59

## 2025-08-27 RX ADMIN — LIDOCAINE HYDROCHLORIDE 1 PATCH: 20 JELLY TOPICAL at 01:10

## 2025-08-27 RX ADMIN — Medication 1 APPLICATION(S): at 05:12

## 2025-08-27 RX ADMIN — OXYCODONE HYDROCHLORIDE 5 MILLIGRAM(S): 30 TABLET ORAL at 17:28

## 2025-08-28 ENCOUNTER — APPOINTMENT (OUTPATIENT)
Dept: INFECTIOUS DISEASE | Facility: CLINIC | Age: 80
End: 2025-08-28

## 2025-09-08 ENCOUNTER — NON-APPOINTMENT (OUTPATIENT)
Age: 80
End: 2025-09-08

## 2025-09-08 ENCOUNTER — APPOINTMENT (OUTPATIENT)
Dept: SPINE | Facility: CLINIC | Age: 80
End: 2025-09-08
Payer: MEDICARE

## 2025-09-08 VITALS
SYSTOLIC BLOOD PRESSURE: 134 MMHG | WEIGHT: 89 LBS | RESPIRATION RATE: 22 BRPM | HEART RATE: 83 BPM | DIASTOLIC BLOOD PRESSURE: 76 MMHG | OXYGEN SATURATION: 97 % | HEIGHT: 61 IN | BODY MASS INDEX: 16.8 KG/M2

## 2025-09-08 DIAGNOSIS — G06.1 INTRASPINAL ABSCESS AND GRANULOMA: ICD-10-CM

## 2025-09-08 PROCEDURE — 99215 OFFICE O/P EST HI 40 MIN: CPT

## 2025-09-08 RX ORDER — BACLOFEN 5 MG/1
5 TABLET ORAL
Refills: 0 | Status: ACTIVE | COMMUNITY

## (undated) DEVICE — PACK MAJOR ABDOMINAL W ENDO DRAPE

## (undated) DEVICE — DURABLE MEDICAL EQUIPMENT: Type: DURABLE MEDICAL EQUIPMENT

## (undated) DEVICE — STAPLER ETHICON ECHELON 3000 STANDARD 45MM X 340MM

## (undated) DEVICE — VENODYNE/SCD SLEEVE CALF MEDIUM

## (undated) DEVICE — DRAPE MAGNETIC INSTRUMENT MEDIUM

## (undated) DEVICE — DRSG BENZOIN 0.6CC

## (undated) DEVICE — TUBING SUCTION NONCONDUCTIVE 6MM X 12FT

## (undated) DEVICE — SYR LUER LOK 10CC

## (undated) DEVICE — SUT PROLENE 0 30" CT-1

## (undated) DEVICE — SYR SLIP 10CC

## (undated) DEVICE — DRSG TEGADERM 4 X 4.75"

## (undated) DEVICE — NDL HYPO REGULAR BEVEL 25G X 1.5" (BLUE)

## (undated) DEVICE — TAPE SILK 3"

## (undated) DEVICE — FOLEY TRAY 16FR 5CC LF UMETER CLOSED

## (undated) DEVICE — DRAPE MAYO STAND 23"

## (undated) DEVICE — STAPLER ECHELON FLEX POWERED PLUS 340MM

## (undated) DEVICE — SUT MONOCRYL 4-0 27" PS-2 UNDYED

## (undated) DEVICE — CHEST DRAIN OASIS DRY SUCTION WATER SEAL

## (undated) DEVICE — SPECIMEN CONTAINER 100ML

## (undated) DEVICE — PREP CHLORAPREP HI-LITE ORANGE 26ML

## (undated) DEVICE — SUT VICRYL 0 27" CT-1 UNDYED

## (undated) DEVICE — TRAP SPECIMEN SPUTUM 40CC

## (undated) DEVICE — VISITEC 4X4

## (undated) DEVICE — DISSECTOR ENDOSCOPIC KITTNER SINGLE TIP

## (undated) DEVICE — ELCTR GROUNDING PAD ADULT COVIDIEN

## (undated) DEVICE — ELCTR BOVIE TIP BLADE INSULATED 6.5" EDGE

## (undated) DEVICE — STAPLER ECHELON CURVED 35MM

## (undated) DEVICE — WARMING BLANKET LOWER ADULT

## (undated) DEVICE — SUT VICRYL 2-0 27" UR-6

## (undated) DEVICE — ENDOCATCH GENERAL 15MM (PURPLE)

## (undated) DEVICE — ELCTR BOVIE TIP BLADE INSULATED 2.75" EDGE

## (undated) DEVICE — PROTECTOR HEEL / ELBOW FLUFFY

## (undated) DEVICE — ENDOCATCH GENERAL 10MM (PURPLE)

## (undated) DEVICE — CONNECTOR REDUCING STRAIGHT 3/8X0.25"

## (undated) DEVICE — DRSG TELFA 3 X 8

## (undated) DEVICE — DRSG STERISTRIPS 0.5 X 4"

## (undated) DEVICE — SUT SILK 2-0 30" TIES

## (undated) DEVICE — SOL ANTI FOG (FRED)

## (undated) DEVICE — DRAPE IOBAN 33" X 23"

## (undated) DEVICE — DRAPE 3/4 SHEET 52X76"

## (undated) DEVICE — SUT SILK 0 18" TIES

## (undated) DEVICE — ELCTR EXTENSION STRAIGHT